# Patient Record
Sex: FEMALE | Race: WHITE | Employment: OTHER
[De-identification: names, ages, dates, MRNs, and addresses within clinical notes are randomized per-mention and may not be internally consistent; named-entity substitution may affect disease eponyms.]

---

## 2017-02-13 ENCOUNTER — TELEPHONE (OUTPATIENT)
Dept: INTERNAL MEDICINE | Facility: CLINIC | Age: 82
End: 2017-02-13

## 2017-02-20 ENCOUNTER — HOSPITAL ENCOUNTER (OUTPATIENT)
Age: 82
Setting detail: SPECIMEN
Discharge: HOME | End: 2017-02-20
Payer: MEDICARE

## 2017-02-20 ENCOUNTER — OFFICE VISIT (OUTPATIENT)
Dept: INTERNAL MEDICINE | Facility: CLINIC | Age: 82
End: 2017-02-20

## 2017-02-20 VITALS
HEIGHT: 62 IN | DIASTOLIC BLOOD PRESSURE: 62 MMHG | BODY MASS INDEX: 40.3 KG/M2 | SYSTOLIC BLOOD PRESSURE: 106 MMHG | HEART RATE: 67 BPM | WEIGHT: 219 LBS

## 2017-02-20 DIAGNOSIS — G89.29 CHRONIC LOW BACK PAIN WITHOUT SCIATICA, UNSPECIFIED BACK PAIN LATERALITY: ICD-10-CM

## 2017-02-20 DIAGNOSIS — Z79.899 LONG TERM USE OF DRUG: Primary | ICD-10-CM

## 2017-02-20 DIAGNOSIS — M54.50 CHRONIC LOW BACK PAIN WITHOUT SCIATICA, UNSPECIFIED BACK PAIN LATERALITY: ICD-10-CM

## 2017-02-20 DIAGNOSIS — B37.2 INTERTRIGINOUS CANDIDIASIS: ICD-10-CM

## 2017-02-20 DIAGNOSIS — M79.89 PAIN AND SWELLING OF LOWER LEG, LEFT: ICD-10-CM

## 2017-02-20 DIAGNOSIS — I50.32 CHRONIC DIASTOLIC CONGESTIVE HEART FAILURE (HCC): ICD-10-CM

## 2017-02-20 DIAGNOSIS — E11.8 TYPE 2 DIABETES MELLITUS WITH COMPLICATION, WITHOUT LONG-TERM CURRENT USE OF INSULIN (HCC): Primary | ICD-10-CM

## 2017-02-20 DIAGNOSIS — M79.662 PAIN AND SWELLING OF LOWER LEG, LEFT: ICD-10-CM

## 2017-02-20 PROCEDURE — 4040F PNEUMOC VAC/ADMIN/RCVD: CPT | Performed by: INTERNAL MEDICINE

## 2017-02-20 PROCEDURE — G8598 ASA/ANTIPLAT THER USED: HCPCS | Performed by: INTERNAL MEDICINE

## 2017-02-20 PROCEDURE — G8427 DOCREV CUR MEDS BY ELIG CLIN: HCPCS | Performed by: INTERNAL MEDICINE

## 2017-02-20 PROCEDURE — 99214 OFFICE O/P EST MOD 30 MIN: CPT | Performed by: INTERNAL MEDICINE

## 2017-02-20 PROCEDURE — 1090F PRES/ABSN URINE INCON ASSESS: CPT | Performed by: INTERNAL MEDICINE

## 2017-02-20 PROCEDURE — G8484 FLU IMMUNIZE NO ADMIN: HCPCS | Performed by: INTERNAL MEDICINE

## 2017-02-20 PROCEDURE — 1123F ACP DISCUSS/DSCN MKR DOCD: CPT | Performed by: INTERNAL MEDICINE

## 2017-02-20 PROCEDURE — G8400 PT W/DXA NO RESULTS DOC: HCPCS | Performed by: INTERNAL MEDICINE

## 2017-02-20 PROCEDURE — G8417 CALC BMI ABV UP PARAM F/U: HCPCS | Performed by: INTERNAL MEDICINE

## 2017-02-20 PROCEDURE — 1036F TOBACCO NON-USER: CPT | Performed by: INTERNAL MEDICINE

## 2017-02-20 RX ORDER — SYRING-NEEDL,DISP,INSUL,0.3 ML 30 GX5/16"
1 SYRINGE, EMPTY DISPOSABLE MISCELLANEOUS 4 TIMES DAILY
Qty: 100 EACH | Refills: 3 | Status: SHIPPED | OUTPATIENT
Start: 2017-02-20 | End: 2018-05-07

## 2017-02-20 RX ORDER — NYSTATIN 100000 [USP'U]/G
POWDER TOPICAL
Qty: 1 BOTTLE | Refills: 1 | Status: SHIPPED | OUTPATIENT
Start: 2017-02-20 | End: 2018-08-28 | Stop reason: ALTCHOICE

## 2017-02-20 RX ORDER — HYDROCODONE BITARTRATE AND ACETAMINOPHEN 5; 325 MG/1; MG/1
1 TABLET ORAL DAILY PRN
Qty: 30 TABLET | Refills: 0 | Status: SHIPPED | OUTPATIENT
Start: 2017-02-20 | End: 2017-04-20 | Stop reason: SDUPTHER

## 2017-02-20 RX ORDER — GLUCOSAMINE HCL/CHONDROITIN SU 500-400 MG
1 CAPSULE ORAL 4 TIMES DAILY
Qty: 100 STRIP | Refills: 11 | Status: SHIPPED | OUTPATIENT
Start: 2017-02-20 | End: 2018-05-07

## 2017-02-20 RX ORDER — FUROSEMIDE 20 MG/1
20 TABLET ORAL DAILY
Qty: 60 TABLET | Refills: 3 | Status: SHIPPED | OUTPATIENT
Start: 2017-02-20 | End: 2017-04-20 | Stop reason: ALTCHOICE

## 2017-02-22 ASSESSMENT — ENCOUNTER SYMPTOMS
ANAL BLEEDING: 0
ABDOMINAL DISTENTION: 0
CHEST TIGHTNESS: 0
EYE DISCHARGE: 0
BACK PAIN: 1
ABDOMINAL PAIN: 0
SHORTNESS OF BREATH: 0
COUGH: 1
APNEA: 0
EYE ITCHING: 0
COLOR CHANGE: 0

## 2017-02-25 LAB
6-ACETYLMORPHINE, UR: NOT DETECTED
7-AMINOCLONAZEPAM, URINE: NOT DETECTED
ALPHA-OH-ALPRAZ, URINE: NOT DETECTED
ALPRAZOLAM, URINE: NOT DETECTED
AMPHETAMINES, URINE: NOT DETECTED
BARBITURATES, URINE: PRESENT
BENZOYLECGONINE, UR: NOT DETECTED
BUPRENORPHINE URINE: NOT DETECTED
CARISOPRODOL, UR: NOT DETECTED
CLONAZEPAM, URINE: NOT DETECTED
CODEINE, URINE: NOT DETECTED
CREATININE URINE: 101.5 MG/DL (ref 20–400)
DIAZEPAM, URINE: NOT DETECTED
DRUGS EXPECTED, UR: NORMAL
EER HI RES INTERP UR: NORMAL
ETHYL GLUCURONIDE UR: NOT DETECTED
FENTANYL URINE: NOT DETECTED
HYDROCODONE, URINE: NOT DETECTED
HYDROMORPHONE, URINE: NOT DETECTED
LORAZEPAM, URINE: NOT DETECTED
MARIJUANA METAB, UR: NOT DETECTED
MDA, UR: NOT DETECTED
MDEA, EVE, UR: NOT DETECTED
MDMA URINE: NOT DETECTED
MEPERIDINE METAB, UR: NOT DETECTED
METHADONE, URINE: NOT DETECTED
METHAMPHETAMINE, URINE: NOT DETECTED
METHYLPHENIDATE: NOT DETECTED
MIDAZOLAM, URINE: NOT DETECTED
MORPHINE URINE: NOT DETECTED
NORBUPRENORPHINE, URINE: NOT DETECTED
NORDIAZEPAM, URINE: NOT DETECTED
NORFENTANYL, URINE: NOT DETECTED
NORHYDROCODONE, URINE: NOT DETECTED
NOROXYCODONE, URINE: NOT DETECTED
NOROXYMORPHONE, URINE: NOT DETECTED
OXAZEPAM, URINE: NOT DETECTED
OXYCODONE URINE: NOT DETECTED
OXYMORPHONE, URINE: NOT DETECTED
PAIN MANAGEMENT DRUG PANEL INTERP, URINE: NORMAL
PAIN MGT DRUG PANEL, HI RES, UR: NORMAL
PCP,URINE: NOT DETECTED
PHENTERMINE, UR: NOT DETECTED
PROPOXYPHENE, URINE: NOT DETECTED
TAPENTADOL, URINE: NOT DETECTED
TAPENTADOL-O-SULFATE, URINE: NOT DETECTED
TEMAZEPAM, URINE: NOT DETECTED
TRAMADOL, URINE: NOT DETECTED
ZOLPIDEM, URINE: NOT DETECTED

## 2017-02-28 ENCOUNTER — HOSPITAL ENCOUNTER (OUTPATIENT)
Dept: VASCULAR LAB | Age: 82
Discharge: HOME | End: 2017-02-28
Payer: MEDICARE

## 2017-02-28 ENCOUNTER — HOSPITAL ENCOUNTER (OUTPATIENT)
Age: 82
Discharge: HOME | End: 2017-02-28
Payer: MEDICARE

## 2017-02-28 DIAGNOSIS — M79.662 PAIN AND SWELLING OF LOWER LEG, LEFT: ICD-10-CM

## 2017-02-28 DIAGNOSIS — M79.89 PAIN AND SWELLING OF LOWER LEG, LEFT: ICD-10-CM

## 2017-02-28 DIAGNOSIS — I50.32 CHRONIC DIASTOLIC CONGESTIVE HEART FAILURE (HCC): ICD-10-CM

## 2017-02-28 DIAGNOSIS — E11.8 TYPE 2 DIABETES MELLITUS WITH COMPLICATION, WITHOUT LONG-TERM CURRENT USE OF INSULIN (HCC): ICD-10-CM

## 2017-02-28 LAB
ANION GAP SERPL CALCULATED.3IONS-SCNC: 15 MMOL/L (ref 9–17)
BUN BLDV-MCNC: 16 MG/DL (ref 8–23)
BUN/CREAT BLD: ABNORMAL (ref 9–20)
CALCIUM SERPL-MCNC: 7.9 MG/DL (ref 8.6–10.4)
CHLORIDE BLD-SCNC: 104 MMOL/L (ref 98–107)
CO2: 23 MMOL/L (ref 20–31)
CREAT SERPL-MCNC: 0.85 MG/DL (ref 0.5–0.9)
ESTIMATED AVERAGE GLUCOSE: 146 MG/DL
GFR AFRICAN AMERICAN: >60 ML/MIN
GFR NON-AFRICAN AMERICAN: >60 ML/MIN
GFR SERPL CREATININE-BSD FRML MDRD: ABNORMAL ML/MIN/{1.73_M2}
GFR SERPL CREATININE-BSD FRML MDRD: ABNORMAL ML/MIN/{1.73_M2}
GLUCOSE BLD-MCNC: 111 MG/DL (ref 70–99)
HBA1C MFR BLD: 6.7 % (ref 4–6)
POTASSIUM SERPL-SCNC: 4.5 MMOL/L (ref 3.7–5.3)
SODIUM BLD-SCNC: 142 MMOL/L (ref 135–144)

## 2017-02-28 PROCEDURE — 36415 COLL VENOUS BLD VENIPUNCTURE: CPT

## 2017-02-28 PROCEDURE — 80048 BASIC METABOLIC PNL TOTAL CA: CPT

## 2017-02-28 PROCEDURE — 93970 EXTREMITY STUDY: CPT

## 2017-02-28 PROCEDURE — 83036 HEMOGLOBIN GLYCOSYLATED A1C: CPT

## 2017-03-30 DIAGNOSIS — E11.8 TYPE 2 DIABETES MELLITUS WITH COMPLICATION (HCC): ICD-10-CM

## 2017-03-30 RX ORDER — SITAGLIPTIN 100 MG/1
TABLET, FILM COATED ORAL
Qty: 90 TABLET | Refills: 3 | Status: SHIPPED | OUTPATIENT
Start: 2017-03-30 | End: 2018-04-16 | Stop reason: SDUPTHER

## 2017-03-31 RX ORDER — SITAGLIPTIN 100 MG/1
TABLET, FILM COATED ORAL
Qty: 90 TABLET | Refills: 0 | Status: SHIPPED | OUTPATIENT
Start: 2017-03-31 | End: 2017-04-20 | Stop reason: SDUPTHER

## 2017-04-10 RX ORDER — REPAGLINIDE 1 MG/1
TABLET ORAL
Qty: 270 TABLET | Refills: 0 | Status: SHIPPED | OUTPATIENT
Start: 2017-04-10 | End: 2018-08-29 | Stop reason: SDUPTHER

## 2017-04-20 ENCOUNTER — OFFICE VISIT (OUTPATIENT)
Dept: INTERNAL MEDICINE CLINIC | Age: 82
End: 2017-04-20
Payer: MEDICARE

## 2017-04-20 VITALS
BODY MASS INDEX: 40.12 KG/M2 | HEART RATE: 64 BPM | HEIGHT: 62 IN | WEIGHT: 218 LBS | SYSTOLIC BLOOD PRESSURE: 88 MMHG | DIASTOLIC BLOOD PRESSURE: 54 MMHG

## 2017-04-20 DIAGNOSIS — G89.29 CHRONIC LOW BACK PAIN WITHOUT SCIATICA, UNSPECIFIED BACK PAIN LATERALITY: ICD-10-CM

## 2017-04-20 DIAGNOSIS — M25.512 CHRONIC LEFT SHOULDER PAIN: Primary | ICD-10-CM

## 2017-04-20 DIAGNOSIS — I50.32 CHRONIC DIASTOLIC CONGESTIVE HEART FAILURE (HCC): ICD-10-CM

## 2017-04-20 DIAGNOSIS — G89.29 CHRONIC LEFT SHOULDER PAIN: Primary | ICD-10-CM

## 2017-04-20 DIAGNOSIS — I15.9 SECONDARY HYPERTENSION: ICD-10-CM

## 2017-04-20 DIAGNOSIS — M54.50 CHRONIC LOW BACK PAIN WITHOUT SCIATICA, UNSPECIFIED BACK PAIN LATERALITY: ICD-10-CM

## 2017-04-20 PROCEDURE — G8417 CALC BMI ABV UP PARAM F/U: HCPCS | Performed by: INTERNAL MEDICINE

## 2017-04-20 PROCEDURE — 1123F ACP DISCUSS/DSCN MKR DOCD: CPT | Performed by: INTERNAL MEDICINE

## 2017-04-20 PROCEDURE — G8400 PT W/DXA NO RESULTS DOC: HCPCS | Performed by: INTERNAL MEDICINE

## 2017-04-20 PROCEDURE — 1090F PRES/ABSN URINE INCON ASSESS: CPT | Performed by: INTERNAL MEDICINE

## 2017-04-20 PROCEDURE — 4040F PNEUMOC VAC/ADMIN/RCVD: CPT | Performed by: INTERNAL MEDICINE

## 2017-04-20 PROCEDURE — G8598 ASA/ANTIPLAT THER USED: HCPCS | Performed by: INTERNAL MEDICINE

## 2017-04-20 PROCEDURE — G8427 DOCREV CUR MEDS BY ELIG CLIN: HCPCS | Performed by: INTERNAL MEDICINE

## 2017-04-20 PROCEDURE — 1036F TOBACCO NON-USER: CPT | Performed by: INTERNAL MEDICINE

## 2017-04-20 PROCEDURE — 20610 DRAIN/INJ JOINT/BURSA W/O US: CPT | Performed by: INTERNAL MEDICINE

## 2017-04-20 PROCEDURE — 99214 OFFICE O/P EST MOD 30 MIN: CPT | Performed by: INTERNAL MEDICINE

## 2017-04-20 RX ORDER — METHYLPREDNISOLONE ACETATE 80 MG/ML
40 INJECTION, SUSPENSION INTRA-ARTICULAR; INTRALESIONAL; INTRAMUSCULAR; SOFT TISSUE ONCE
Status: COMPLETED | OUTPATIENT
Start: 2017-04-20 | End: 2017-04-20

## 2017-04-20 RX ORDER — MEDICAL SUPPLY, MISCELLANEOUS
1 EACH MISCELLANEOUS DAILY
Qty: 1 EACH | Refills: 0 | Status: SHIPPED | OUTPATIENT
Start: 2017-04-20 | End: 2018-05-07

## 2017-04-20 RX ORDER — BUMETANIDE 1 MG/1
1 TABLET ORAL DAILY
Qty: 30 TABLET | Refills: 0 | Status: SHIPPED | OUTPATIENT
Start: 2017-04-20 | End: 2017-06-29 | Stop reason: SDUPTHER

## 2017-04-20 RX ORDER — HYDROCODONE BITARTRATE AND ACETAMINOPHEN 5; 325 MG/1; MG/1
1 TABLET ORAL DAILY PRN
Qty: 30 TABLET | Refills: 0 | Status: SHIPPED | OUTPATIENT
Start: 2017-04-20 | End: 2017-05-11 | Stop reason: SDUPTHER

## 2017-04-20 RX ADMIN — METHYLPREDNISOLONE ACETATE 40 MG: 80 INJECTION, SUSPENSION INTRA-ARTICULAR; INTRALESIONAL; INTRAMUSCULAR; SOFT TISSUE at 16:28

## 2017-04-23 ASSESSMENT — ENCOUNTER SYMPTOMS
EYE ITCHING: 0
COLOR CHANGE: 0
ANAL BLEEDING: 0
EYE DISCHARGE: 0
COUGH: 0
ABDOMINAL PAIN: 0
EYE PAIN: 0
ABDOMINAL DISTENTION: 0
CHEST TIGHTNESS: 0
BACK PAIN: 1
CHOKING: 0
APNEA: 0

## 2017-04-26 RX ORDER — LEVOTHYROXINE SODIUM 0.1 MG/1
TABLET ORAL
Qty: 90 TABLET | Refills: 0 | Status: SHIPPED | OUTPATIENT
Start: 2017-04-26 | End: 2017-09-01 | Stop reason: SDUPTHER

## 2017-05-11 ENCOUNTER — HOSPITAL ENCOUNTER (OUTPATIENT)
Age: 82
Setting detail: SPECIMEN
Discharge: HOME | End: 2017-05-11
Payer: MEDICARE

## 2017-05-11 ENCOUNTER — OFFICE VISIT (OUTPATIENT)
Dept: INTERNAL MEDICINE CLINIC | Age: 82
End: 2017-05-11
Payer: MEDICARE

## 2017-05-11 VITALS
WEIGHT: 219 LBS | HEART RATE: 73 BPM | BODY MASS INDEX: 40.3 KG/M2 | HEIGHT: 62 IN | SYSTOLIC BLOOD PRESSURE: 90 MMHG | DIASTOLIC BLOOD PRESSURE: 56 MMHG

## 2017-05-11 DIAGNOSIS — G89.29 CHRONIC LOW BACK PAIN WITHOUT SCIATICA, UNSPECIFIED BACK PAIN LATERALITY: ICD-10-CM

## 2017-05-11 DIAGNOSIS — E03.9 HYPOTHYROIDISM, UNSPECIFIED TYPE: ICD-10-CM

## 2017-05-11 DIAGNOSIS — Z13.9 SCREENING: ICD-10-CM

## 2017-05-11 DIAGNOSIS — M54.50 CHRONIC LOW BACK PAIN WITHOUT SCIATICA, UNSPECIFIED BACK PAIN LATERALITY: ICD-10-CM

## 2017-05-11 DIAGNOSIS — E11.8 TYPE 2 DIABETES MELLITUS WITH COMPLICATION, WITHOUT LONG-TERM CURRENT USE OF INSULIN (HCC): Primary | ICD-10-CM

## 2017-05-11 DIAGNOSIS — E66.01 MORBID OBESITY WITH BMI OF 40.0-44.9, ADULT (HCC): ICD-10-CM

## 2017-05-11 LAB
CREATININE URINE: 51.2 MG/DL (ref 28–217)
HBA1C MFR BLD: 7.6 %
MICROALBUMIN/CREAT 24H UR: <12 MG/L
MICROALBUMIN/CREAT UR-RTO: 23 MCG/MG CREAT

## 2017-05-11 PROCEDURE — G8598 ASA/ANTIPLAT THER USED: HCPCS | Performed by: INTERNAL MEDICINE

## 2017-05-11 PROCEDURE — G8400 PT W/DXA NO RESULTS DOC: HCPCS | Performed by: INTERNAL MEDICINE

## 2017-05-11 PROCEDURE — 99214 OFFICE O/P EST MOD 30 MIN: CPT | Performed by: INTERNAL MEDICINE

## 2017-05-11 PROCEDURE — 4040F PNEUMOC VAC/ADMIN/RCVD: CPT | Performed by: INTERNAL MEDICINE

## 2017-05-11 PROCEDURE — 1123F ACP DISCUSS/DSCN MKR DOCD: CPT | Performed by: INTERNAL MEDICINE

## 2017-05-11 PROCEDURE — 83036 HEMOGLOBIN GLYCOSYLATED A1C: CPT | Performed by: INTERNAL MEDICINE

## 2017-05-11 PROCEDURE — 1090F PRES/ABSN URINE INCON ASSESS: CPT | Performed by: INTERNAL MEDICINE

## 2017-05-11 PROCEDURE — G8427 DOCREV CUR MEDS BY ELIG CLIN: HCPCS | Performed by: INTERNAL MEDICINE

## 2017-05-11 PROCEDURE — G8417 CALC BMI ABV UP PARAM F/U: HCPCS | Performed by: INTERNAL MEDICINE

## 2017-05-11 PROCEDURE — 1036F TOBACCO NON-USER: CPT | Performed by: INTERNAL MEDICINE

## 2017-05-11 RX ORDER — HYDROCODONE BITARTRATE AND ACETAMINOPHEN 5; 325 MG/1; MG/1
1 TABLET ORAL DAILY PRN
Qty: 30 TABLET | Refills: 0 | Status: SHIPPED | OUTPATIENT
Start: 2017-05-11 | End: 2017-07-13 | Stop reason: ALTCHOICE

## 2017-05-15 ASSESSMENT — ENCOUNTER SYMPTOMS
ANAL BLEEDING: 0
ABDOMINAL DISTENTION: 0
ABDOMINAL PAIN: 0
APNEA: 0
SHORTNESS OF BREATH: 1
EYE REDNESS: 0
CHEST TIGHTNESS: 0
EYE ITCHING: 0
WHEEZING: 1
EYE PAIN: 0
COLOR CHANGE: 0
EYE DISCHARGE: 0
BACK PAIN: 1

## 2017-06-29 DIAGNOSIS — I50.32 CHRONIC DIASTOLIC CONGESTIVE HEART FAILURE (HCC): ICD-10-CM

## 2017-06-29 RX ORDER — BUMETANIDE 1 MG/1
TABLET ORAL
Qty: 30 TABLET | Refills: 6 | Status: SHIPPED | OUTPATIENT
Start: 2017-06-29 | End: 2017-08-18 | Stop reason: SDUPTHER

## 2017-07-03 ENCOUNTER — APPOINTMENT (OUTPATIENT)
Dept: GENERAL RADIOLOGY | Age: 82
DRG: 202 | End: 2017-07-03
Payer: MEDICARE

## 2017-07-03 ENCOUNTER — HOSPITAL ENCOUNTER (INPATIENT)
Age: 82
LOS: 2 days | Discharge: HOME HEALTH | DRG: 202 | End: 2017-07-05
Attending: EMERGENCY MEDICINE | Admitting: FAMILY MEDICINE
Payer: MEDICARE

## 2017-07-03 DIAGNOSIS — D64.9 ANEMIA, UNSPECIFIED TYPE: ICD-10-CM

## 2017-07-03 DIAGNOSIS — J18.9 PNEUMONIA DUE TO ORGANISM: Primary | ICD-10-CM

## 2017-07-03 LAB
-: ABNORMAL
ABSOLUTE EOS #: 0.3 K/UL (ref 0–0.4)
ABSOLUTE LYMPH #: 1.26 K/UL (ref 1–4.8)
ABSOLUTE MONO #: 0.22 K/UL (ref 0.1–0.8)
AMORPHOUS: ABNORMAL
ANION GAP SERPL CALCULATED.3IONS-SCNC: 16 MMOL/L (ref 9–17)
BACTERIA: ABNORMAL
BASOPHILS # BLD: 0 %
BASOPHILS ABSOLUTE: 0 K/UL (ref 0–0.2)
BILIRUBIN URINE: NEGATIVE
BNP INTERPRETATION: ABNORMAL
BUN BLDV-MCNC: 19 MG/DL (ref 8–23)
BUN/CREAT BLD: ABNORMAL (ref 9–20)
CALCIUM SERPL-MCNC: 8.3 MG/DL (ref 8.6–10.4)
CASTS UA: ABNORMAL /LPF (ref 0–8)
CHLORIDE BLD-SCNC: 103 MMOL/L (ref 98–107)
CO2: 21 MMOL/L (ref 20–31)
COLOR: YELLOW
CREAT SERPL-MCNC: 0.87 MG/DL (ref 0.5–0.9)
CRYSTALS, UA: ABNORMAL /HPF
DIFFERENTIAL TYPE: ABNORMAL
EOSINOPHILS RELATIVE PERCENT: 4 %
EPITHELIAL CELLS UA: ABNORMAL /HPF (ref 0–5)
GFR AFRICAN AMERICAN: >60 ML/MIN
GFR NON-AFRICAN AMERICAN: >60 ML/MIN
GFR SERPL CREATININE-BSD FRML MDRD: ABNORMAL ML/MIN/{1.73_M2}
GFR SERPL CREATININE-BSD FRML MDRD: ABNORMAL ML/MIN/{1.73_M2}
GLUCOSE BLD-MCNC: 130 MG/DL (ref 70–99)
GLUCOSE URINE: NEGATIVE
HCT VFR BLD CALC: 24.9 % (ref 36–46)
HEMOGLOBIN: 8 G/DL (ref 12–16)
KETONES, URINE: ABNORMAL
LEUKOCYTE ESTERASE, URINE: ABNORMAL
LYMPHOCYTES # BLD: 17 %
MCH RBC QN AUTO: 30.1 PG (ref 26–34)
MCHC RBC AUTO-ENTMCNC: 31.9 G/DL (ref 31–37)
MCV RBC AUTO: 94.4 FL (ref 80–100)
MONOCYTES # BLD: 3 %
MORPHOLOGY: ABNORMAL
MUCUS: ABNORMAL
NITRITE, URINE: NEGATIVE
OTHER OBSERVATIONS UA: ABNORMAL
PDW BLD-RTO: 26.8 % (ref 12.5–15.4)
PH UA: 5 (ref 5–8)
PLATELET # BLD: 234 K/UL (ref 140–450)
PLATELET ESTIMATE: ABNORMAL
PMV BLD AUTO: 8.2 FL (ref 6–12)
POC TROPONIN I: 0 NG/ML (ref 0–0.1)
POC TROPONIN I: 0 NG/ML (ref 0–0.1)
POC TROPONIN INTERP: NORMAL
POC TROPONIN INTERP: NORMAL
POTASSIUM SERPL-SCNC: 4.4 MMOL/L (ref 3.7–5.3)
PRO-BNP: 356 PG/ML
PROTEIN UA: ABNORMAL
RBC # BLD: 2.64 M/UL (ref 4–5.2)
RBC # BLD: ABNORMAL 10*6/UL
RBC UA: ABNORMAL /HPF (ref 0–4)
RENAL EPITHELIAL, UA: ABNORMAL /HPF
SEG NEUTROPHILS: 76 %
SEGMENTED NEUTROPHILS ABSOLUTE COUNT: 5.62 K/UL (ref 1.8–7.7)
SODIUM BLD-SCNC: 140 MMOL/L (ref 135–144)
SPECIFIC GRAVITY UA: 1.03 (ref 1–1.03)
TRICHOMONAS: ABNORMAL
TURBIDITY: ABNORMAL
URINE HGB: NEGATIVE
UROBILINOGEN, URINE: NORMAL
WBC # BLD: 7.4 K/UL (ref 3.5–11)
WBC # BLD: ABNORMAL 10*3/UL
WBC UA: ABNORMAL /HPF (ref 0–5)
YEAST: ABNORMAL

## 2017-07-03 PROCEDURE — 83036 HEMOGLOBIN GLYCOSYLATED A1C: CPT

## 2017-07-03 PROCEDURE — 85018 HEMOGLOBIN: CPT

## 2017-07-03 PROCEDURE — 80048 BASIC METABOLIC PNL TOTAL CA: CPT

## 2017-07-03 PROCEDURE — 87040 BLOOD CULTURE FOR BACTERIA: CPT

## 2017-07-03 PROCEDURE — 36415 COLL VENOUS BLD VENIPUNCTURE: CPT

## 2017-07-03 PROCEDURE — 99285 EMERGENCY DEPT VISIT HI MDM: CPT

## 2017-07-03 PROCEDURE — 84484 ASSAY OF TROPONIN QUANT: CPT

## 2017-07-03 PROCEDURE — 93005 ELECTROCARDIOGRAM TRACING: CPT

## 2017-07-03 PROCEDURE — 83880 ASSAY OF NATRIURETIC PEPTIDE: CPT

## 2017-07-03 PROCEDURE — 81001 URINALYSIS AUTO W/SCOPE: CPT

## 2017-07-03 PROCEDURE — 1200000000 HC SEMI PRIVATE

## 2017-07-03 PROCEDURE — 2580000003 HC RX 258: Performed by: EMERGENCY MEDICINE

## 2017-07-03 PROCEDURE — 6360000002 HC RX W HCPCS: Performed by: EMERGENCY MEDICINE

## 2017-07-03 PROCEDURE — 96365 THER/PROPH/DIAG IV INF INIT: CPT

## 2017-07-03 PROCEDURE — 85014 HEMATOCRIT: CPT

## 2017-07-03 PROCEDURE — 71020 XR CHEST STANDARD TWO VW: CPT

## 2017-07-03 PROCEDURE — 85025 COMPLETE CBC W/AUTO DIFF WBC: CPT

## 2017-07-03 RX ORDER — ONDANSETRON 2 MG/ML
4 INJECTION INTRAMUSCULAR; INTRAVENOUS EVERY 6 HOURS PRN
Status: DISCONTINUED | OUTPATIENT
Start: 2017-07-03 | End: 2017-07-05 | Stop reason: HOSPADM

## 2017-07-03 RX ORDER — SODIUM CHLORIDE 9 MG/ML
INJECTION, SOLUTION INTRAVENOUS CONTINUOUS
Status: DISCONTINUED | OUTPATIENT
Start: 2017-07-04 | End: 2017-07-04

## 2017-07-03 RX ORDER — BISACODYL 10 MG
10 SUPPOSITORY, RECTAL RECTAL DAILY PRN
Status: DISCONTINUED | OUTPATIENT
Start: 2017-07-03 | End: 2017-07-05 | Stop reason: HOSPADM

## 2017-07-03 RX ORDER — DEXTROSE MONOHYDRATE 50 MG/ML
100 INJECTION, SOLUTION INTRAVENOUS PRN
Status: DISCONTINUED | OUTPATIENT
Start: 2017-07-03 | End: 2017-07-05 | Stop reason: HOSPADM

## 2017-07-03 RX ORDER — SODIUM CHLORIDE 0.9 % (FLUSH) 0.9 %
10 SYRINGE (ML) INJECTION EVERY 12 HOURS SCHEDULED
Status: DISCONTINUED | OUTPATIENT
Start: 2017-07-04 | End: 2017-07-05 | Stop reason: HOSPADM

## 2017-07-03 RX ORDER — ASPIRIN 81 MG/1
81 TABLET, CHEWABLE ORAL DAILY
Status: DISCONTINUED | OUTPATIENT
Start: 2017-07-04 | End: 2017-07-05 | Stop reason: HOSPADM

## 2017-07-03 RX ORDER — ACETAMINOPHEN 325 MG/1
650 TABLET ORAL EVERY 4 HOURS PRN
Status: DISCONTINUED | OUTPATIENT
Start: 2017-07-03 | End: 2017-07-05 | Stop reason: HOSPADM

## 2017-07-03 RX ORDER — IPRATROPIUM BROMIDE AND ALBUTEROL SULFATE 2.5; .5 MG/3ML; MG/3ML
1 SOLUTION RESPIRATORY (INHALATION)
Status: DISCONTINUED | OUTPATIENT
Start: 2017-07-04 | End: 2017-07-04

## 2017-07-03 RX ORDER — FLUTICASONE PROPIONATE 50 MCG
1 SPRAY, SUSPENSION (ML) NASAL DAILY
Status: DISCONTINUED | OUTPATIENT
Start: 2017-07-04 | End: 2017-07-05 | Stop reason: HOSPADM

## 2017-07-03 RX ORDER — LEVOTHYROXINE SODIUM 0.1 MG/1
100 TABLET ORAL DAILY
Status: DISCONTINUED | OUTPATIENT
Start: 2017-07-04 | End: 2017-07-05 | Stop reason: HOSPADM

## 2017-07-03 RX ORDER — DOCUSATE SODIUM 100 MG/1
100 CAPSULE, LIQUID FILLED ORAL 2 TIMES DAILY
Status: DISCONTINUED | OUTPATIENT
Start: 2017-07-04 | End: 2017-07-05 | Stop reason: HOSPADM

## 2017-07-03 RX ORDER — DEXTROSE MONOHYDRATE 25 G/50ML
12.5 INJECTION, SOLUTION INTRAVENOUS PRN
Status: DISCONTINUED | OUTPATIENT
Start: 2017-07-03 | End: 2017-07-05 | Stop reason: HOSPADM

## 2017-07-03 RX ORDER — ATORVASTATIN CALCIUM 80 MG/1
80 TABLET, FILM COATED ORAL NIGHTLY
Status: DISCONTINUED | OUTPATIENT
Start: 2017-07-03 | End: 2017-07-05 | Stop reason: HOSPADM

## 2017-07-03 RX ORDER — MORPHINE SULFATE 4 MG/ML
4 INJECTION, SOLUTION INTRAMUSCULAR; INTRAVENOUS
Status: DISCONTINUED | OUTPATIENT
Start: 2017-07-03 | End: 2017-07-05 | Stop reason: HOSPADM

## 2017-07-03 RX ORDER — MORPHINE SULFATE 2 MG/ML
2 INJECTION, SOLUTION INTRAMUSCULAR; INTRAVENOUS
Status: DISCONTINUED | OUTPATIENT
Start: 2017-07-03 | End: 2017-07-05 | Stop reason: HOSPADM

## 2017-07-03 RX ORDER — FAMOTIDINE 20 MG/1
20 TABLET, FILM COATED ORAL 2 TIMES DAILY
Status: DISCONTINUED | OUTPATIENT
Start: 2017-07-04 | End: 2017-07-05 | Stop reason: HOSPADM

## 2017-07-03 RX ORDER — SODIUM CHLORIDE 0.9 % (FLUSH) 0.9 %
10 SYRINGE (ML) INJECTION PRN
Status: DISCONTINUED | OUTPATIENT
Start: 2017-07-03 | End: 2017-07-05 | Stop reason: HOSPADM

## 2017-07-03 RX ORDER — NICOTINE POLACRILEX 4 MG
15 LOZENGE BUCCAL PRN
Status: DISCONTINUED | OUTPATIENT
Start: 2017-07-03 | End: 2017-07-05 | Stop reason: HOSPADM

## 2017-07-03 RX ORDER — BUMETANIDE 1 MG/1
1 TABLET ORAL DAILY
Status: DISCONTINUED | OUTPATIENT
Start: 2017-07-04 | End: 2017-07-05 | Stop reason: HOSPADM

## 2017-07-03 RX ORDER — ALBUTEROL SULFATE 2.5 MG/3ML
2.5 SOLUTION RESPIRATORY (INHALATION)
Status: DISCONTINUED | OUTPATIENT
Start: 2017-07-03 | End: 2017-07-05 | Stop reason: HOSPADM

## 2017-07-03 RX ORDER — LANOLIN ALCOHOL/MO/W.PET/CERES
325 CREAM (GRAM) TOPICAL 2 TIMES DAILY
Status: DISCONTINUED | OUTPATIENT
Start: 2017-07-04 | End: 2017-07-05 | Stop reason: HOSPADM

## 2017-07-03 RX ORDER — CLOPIDOGREL BISULFATE 75 MG/1
75 TABLET ORAL DAILY
Status: DISCONTINUED | OUTPATIENT
Start: 2017-07-04 | End: 2017-07-05 | Stop reason: HOSPADM

## 2017-07-03 RX ADMIN — CEFTRIAXONE SODIUM 1 G: 1 INJECTION, POWDER, FOR SOLUTION INTRAMUSCULAR; INTRAVENOUS at 22:28

## 2017-07-03 RX ADMIN — DEXTROSE 500 MG: 5 SOLUTION INTRAVENOUS at 23:01

## 2017-07-03 ASSESSMENT — ENCOUNTER SYMPTOMS
SORE THROAT: 0
SHORTNESS OF BREATH: 0
ABDOMINAL PAIN: 0
VOMITING: 0
NAUSEA: 0

## 2017-07-03 ASSESSMENT — PAIN DESCRIPTION - FREQUENCY: FREQUENCY: CONTINUOUS

## 2017-07-03 ASSESSMENT — PAIN DESCRIPTION - ORIENTATION: ORIENTATION: RIGHT;LEFT

## 2017-07-03 ASSESSMENT — PAIN DESCRIPTION - DESCRIPTORS: DESCRIPTORS: ACHING

## 2017-07-03 ASSESSMENT — PAIN DESCRIPTION - PAIN TYPE: TYPE: ACUTE PAIN

## 2017-07-03 ASSESSMENT — PAIN SCALES - WONG BAKER: WONGBAKER_NUMERICALRESPONSE: 2

## 2017-07-03 ASSESSMENT — PAIN DESCRIPTION - LOCATION: LOCATION: EAR

## 2017-07-04 ENCOUNTER — APPOINTMENT (OUTPATIENT)
Dept: GENERAL RADIOLOGY | Age: 82
DRG: 202 | End: 2017-07-04
Payer: MEDICARE

## 2017-07-04 PROBLEM — R05.9 COUGH: Status: ACTIVE | Noted: 2017-07-04

## 2017-07-04 PROBLEM — E78.5 DYSLIPIDEMIA: Status: ACTIVE | Noted: 2017-07-04

## 2017-07-04 PROBLEM — H66.90 OTITIS MEDIA: Status: ACTIVE | Noted: 2017-07-04

## 2017-07-04 PROBLEM — E11.9 TYPE 2 DIABETES MELLITUS WITHOUT COMPLICATION, WITHOUT LONG-TERM CURRENT USE OF INSULIN (HCC): Status: ACTIVE | Noted: 2017-07-04

## 2017-07-04 PROBLEM — M25.475 BILATERAL SWELLING OF FEET AND ANKLES: Status: ACTIVE | Noted: 2017-07-04

## 2017-07-04 PROBLEM — D64.9 NORMOCYTIC ANEMIA: Status: ACTIVE | Noted: 2017-07-04

## 2017-07-04 PROBLEM — M25.472 BILATERAL SWELLING OF FEET AND ANKLES: Status: ACTIVE | Noted: 2017-07-04

## 2017-07-04 PROBLEM — J18.9 PNEUMONIA SYMPTOMS: Status: ACTIVE | Noted: 2017-07-04

## 2017-07-04 PROBLEM — H92.03 OTALGIA OF BOTH EARS: Status: ACTIVE | Noted: 2017-07-04

## 2017-07-04 PROBLEM — M25.471 BILATERAL SWELLING OF FEET AND ANKLES: Status: ACTIVE | Noted: 2017-07-04

## 2017-07-04 PROBLEM — M25.474 BILATERAL SWELLING OF FEET AND ANKLES: Status: ACTIVE | Noted: 2017-07-04

## 2017-07-04 PROBLEM — H65.93 BILATERAL NON-SUPPURATIVE OTITIS MEDIA: Status: ACTIVE | Noted: 2017-07-04

## 2017-07-04 LAB
ABSOLUTE EOS #: 0.11 K/UL (ref 0–0.4)
ABSOLUTE LYMPH #: 1.03 K/UL (ref 1–4.8)
ABSOLUTE MONO #: 0.29 K/UL (ref 0.1–1.2)
ABSOLUTE RETIC #: 0.04 M/UL (ref 0.02–0.1)
ADENOVIRUS PCR: NOT DETECTED
ALBUMIN SERPL-MCNC: 3 G/DL (ref 3.5–5.2)
ALBUMIN/GLOBULIN RATIO: 1.1 (ref 1–2.5)
ALP BLD-CCNC: 65 U/L (ref 35–104)
ALT SERPL-CCNC: 6 U/L (ref 5–33)
ANION GAP SERPL CALCULATED.3IONS-SCNC: 14 MMOL/L (ref 9–17)
AST SERPL-CCNC: 11 U/L
BASOPHILS # BLD: 0 %
BASOPHILS ABSOLUTE: 0 K/UL (ref 0–0.2)
BILIRUB SERPL-MCNC: 0.39 MG/DL (ref 0.3–1.2)
BLOOD BANK SPECIMEN: NORMAL
BORDETELLA PERTUSSIS PCR: NOT DETECTED
BUN BLDV-MCNC: 17 MG/DL (ref 8–23)
BUN/CREAT BLD: ABNORMAL (ref 9–20)
CALCIUM SERPL-MCNC: 8 MG/DL (ref 8.6–10.4)
CHLAMYDIA PNEUMONIAE BY PCR: NOT DETECTED
CHLORIDE BLD-SCNC: 106 MMOL/L (ref 98–107)
CO2: 21 MMOL/L (ref 20–31)
CORONAVIRUS 229E PCR: NOT DETECTED
CORONAVIRUS HKU1 PCR: NOT DETECTED
CORONAVIRUS NL63 PCR: NOT DETECTED
CORONAVIRUS OC43 PCR: NOT DETECTED
CREAT SERPL-MCNC: 0.78 MG/DL (ref 0.5–0.9)
DIFFERENTIAL TYPE: ABNORMAL
EOSINOPHILS RELATIVE PERCENT: 2 %
ESTIMATED AVERAGE GLUCOSE: 160 MG/DL
FERRITIN: 56 UG/L (ref 13–150)
FOLATE: 15.3 NG/ML
GFR AFRICAN AMERICAN: >60 ML/MIN
GFR NON-AFRICAN AMERICAN: >60 ML/MIN
GFR SERPL CREATININE-BSD FRML MDRD: ABNORMAL ML/MIN/{1.73_M2}
GFR SERPL CREATININE-BSD FRML MDRD: ABNORMAL ML/MIN/{1.73_M2}
GLUCOSE BLD-MCNC: 129 MG/DL (ref 70–99)
GLUCOSE BLD-MCNC: 137 MG/DL (ref 65–105)
GLUCOSE BLD-MCNC: 139 MG/DL (ref 65–105)
GLUCOSE BLD-MCNC: 162 MG/DL (ref 65–105)
HAPTOGLOBIN: 268 MG/DL (ref 30–200)
HBA1C MFR BLD: 7.2 % (ref 4–6)
HCT VFR BLD CALC: 21.3 % (ref 36–46)
HCT VFR BLD CALC: 21.9 % (ref 36–46)
HCT VFR BLD CALC: 22.5 % (ref 36–46)
HCT VFR BLD CALC: 23.5 % (ref 36–46)
HEMOGLOBIN: 7.1 G/DL (ref 12–16)
HEMOGLOBIN: 7.1 G/DL (ref 12–16)
HEMOGLOBIN: 7.3 G/DL (ref 12–16)
HEMOGLOBIN: 7.4 G/DL (ref 12–16)
HUMAN METAPNEUMOVIRUS PCR: NOT DETECTED
INFLUENZA A BY PCR: NOT DETECTED
INFLUENZA A H1 (2009) PCR: NORMAL
INFLUENZA A H1 PCR: NORMAL
INFLUENZA A H3 PCR: NORMAL
INFLUENZA B BY PCR: NOT DETECTED
IRON SATURATION: 7 % (ref 20–55)
IRON: 17 UG/DL (ref 37–145)
LACTATE DEHYDROGENASE: 136 U/L (ref 135–214)
LYMPHOCYTES # BLD: 18 %
MCH RBC QN AUTO: 30.5 PG (ref 26–34)
MCH RBC QN AUTO: 31.2 PG (ref 26–34)
MCHC RBC AUTO-ENTMCNC: 32.3 G/DL (ref 31–37)
MCHC RBC AUTO-ENTMCNC: 33.3 G/DL (ref 31–37)
MCV RBC AUTO: 93.5 FL (ref 80–100)
MCV RBC AUTO: 94.3 FL (ref 80–100)
MONOCYTES # BLD: 5 %
MORPHOLOGY: ABNORMAL
MYCOPLASMA PNEUMONIAE PCR: NOT DETECTED
PARAINFLUENZA 1 PCR: NOT DETECTED
PARAINFLUENZA 2 PCR: NOT DETECTED
PARAINFLUENZA 3 PCR: NOT DETECTED
PARAINFLUENZA 4 PCR: NOT DETECTED
PDW BLD-RTO: 26.8 % (ref 12.5–15.4)
PDW BLD-RTO: 26.9 % (ref 12.5–15.4)
PLATELET # BLD: 205 K/UL (ref 140–450)
PLATELET # BLD: 216 K/UL (ref 140–450)
PLATELET ESTIMATE: ABNORMAL
PMV BLD AUTO: 7.9 FL (ref 6–12)
PMV BLD AUTO: 8 FL (ref 6–12)
POTASSIUM SERPL-SCNC: 3.9 MMOL/L (ref 3.7–5.3)
RBC # BLD: 2.28 M/UL (ref 4–5.2)
RBC # BLD: 2.32 M/UL (ref 4–5.2)
RBC # BLD: ABNORMAL 10*6/UL
RESP SYNCYTIAL VIRUS PCR: NOT DETECTED
RETIC %: 1.9 % (ref 0.5–2)
RHINO/ENTEROVIRUS PCR: NOT DETECTED
SEG NEUTROPHILS: 75 %
SEGMENTED NEUTROPHILS ABSOLUTE COUNT: 4.27 K/UL (ref 1.8–7.7)
SODIUM BLD-SCNC: 141 MMOL/L (ref 135–144)
SOURCE: NORMAL
TOTAL IRON BINDING CAPACITY: 253 UG/DL (ref 250–450)
TOTAL PROTEIN: 5.8 G/DL (ref 6.4–8.3)
UNSATURATED IRON BINDING CAPACITY: 236 UG/DL (ref 112–347)
VITAMIN B-12: 41 PG/ML (ref 211–946)
WBC # BLD: 5.7 K/UL (ref 3.5–11)
WBC # BLD: 6 K/UL (ref 3.5–11)
WBC # BLD: ABNORMAL 10*3/UL

## 2017-07-04 PROCEDURE — 80053 COMPREHEN METABOLIC PANEL: CPT

## 2017-07-04 PROCEDURE — 71020 XR CHEST STANDARD TWO VW: CPT

## 2017-07-04 PROCEDURE — 82607 VITAMIN B-12: CPT

## 2017-07-04 PROCEDURE — 86850 RBC ANTIBODY SCREEN: CPT

## 2017-07-04 PROCEDURE — 86920 COMPATIBILITY TEST SPIN: CPT

## 2017-07-04 PROCEDURE — 85045 AUTOMATED RETICULOCYTE COUNT: CPT

## 2017-07-04 PROCEDURE — 82746 ASSAY OF FOLIC ACID SERUM: CPT

## 2017-07-04 PROCEDURE — 83540 ASSAY OF IRON: CPT

## 2017-07-04 PROCEDURE — 83010 ASSAY OF HAPTOGLOBIN QUANT: CPT

## 2017-07-04 PROCEDURE — G0378 HOSPITAL OBSERVATION PER HR: HCPCS

## 2017-07-04 PROCEDURE — 85027 COMPLETE CBC AUTOMATED: CPT

## 2017-07-04 PROCEDURE — 6360000002 HC RX W HCPCS: Performed by: NURSE PRACTITIONER

## 2017-07-04 PROCEDURE — 85025 COMPLETE CBC W/AUTO DIFF WBC: CPT

## 2017-07-04 PROCEDURE — 36415 COLL VENOUS BLD VENIPUNCTURE: CPT

## 2017-07-04 PROCEDURE — 96367 TX/PROPH/DG ADDL SEQ IV INF: CPT

## 2017-07-04 PROCEDURE — 94640 AIRWAY INHALATION TREATMENT: CPT

## 2017-07-04 PROCEDURE — 99220 PR INITIAL OBSERVATION CARE/DAY 70 MINUTES: CPT | Performed by: FAMILY MEDICINE

## 2017-07-04 PROCEDURE — 85014 HEMATOCRIT: CPT

## 2017-07-04 PROCEDURE — 6370000000 HC RX 637 (ALT 250 FOR IP): Performed by: NURSE PRACTITIONER

## 2017-07-04 PROCEDURE — 87798 DETECT AGENT NOS DNA AMP: CPT

## 2017-07-04 PROCEDURE — 87581 M.PNEUMON DNA AMP PROBE: CPT

## 2017-07-04 PROCEDURE — 1200000000 HC SEMI PRIVATE

## 2017-07-04 PROCEDURE — 83615 LACTATE (LD) (LDH) ENZYME: CPT

## 2017-07-04 PROCEDURE — 99215 OFFICE O/P EST HI 40 MIN: CPT | Performed by: INTERNAL MEDICINE

## 2017-07-04 PROCEDURE — 82728 ASSAY OF FERRITIN: CPT

## 2017-07-04 PROCEDURE — 6370000000 HC RX 637 (ALT 250 FOR IP): Performed by: INTERNAL MEDICINE

## 2017-07-04 PROCEDURE — 85018 HEMOGLOBIN: CPT

## 2017-07-04 PROCEDURE — 83550 IRON BINDING TEST: CPT

## 2017-07-04 PROCEDURE — 82947 ASSAY GLUCOSE BLOOD QUANT: CPT

## 2017-07-04 PROCEDURE — 86901 BLOOD TYPING SEROLOGIC RH(D): CPT

## 2017-07-04 PROCEDURE — 86900 BLOOD TYPING SEROLOGIC ABO: CPT

## 2017-07-04 PROCEDURE — 87486 CHLMYD PNEUM DNA AMP PROBE: CPT

## 2017-07-04 PROCEDURE — 96372 THER/PROPH/DIAG INJ SC/IM: CPT

## 2017-07-04 PROCEDURE — 2580000003 HC RX 258: Performed by: NURSE PRACTITIONER

## 2017-07-04 PROCEDURE — 87633 RESP VIRUS 12-25 TARGETS: CPT

## 2017-07-04 RX ORDER — 0.9 % SODIUM CHLORIDE 0.9 %
250 INTRAVENOUS SOLUTION INTRAVENOUS ONCE
Status: DISCONTINUED | OUTPATIENT
Start: 2017-07-04 | End: 2017-07-05 | Stop reason: HOSPADM

## 2017-07-04 RX ORDER — ECHINACEA PURPUREA EXTRACT 125 MG
2 TABLET ORAL 2 TIMES DAILY
Status: DISCONTINUED | OUTPATIENT
Start: 2017-07-04 | End: 2017-07-05 | Stop reason: HOSPADM

## 2017-07-04 RX ORDER — ALBUTEROL SULFATE 2.5 MG/3ML
2.5 SOLUTION RESPIRATORY (INHALATION)
Status: DISCONTINUED | OUTPATIENT
Start: 2017-07-04 | End: 2017-07-05 | Stop reason: HOSPADM

## 2017-07-04 RX ADMIN — IPRATROPIUM BROMIDE AND ALBUTEROL SULFATE 1 AMPULE: .5; 3 SOLUTION RESPIRATORY (INHALATION) at 16:36

## 2017-07-04 RX ADMIN — CEFTRIAXONE SODIUM 1 G: 1 INJECTION, POWDER, FOR SOLUTION INTRAMUSCULAR; INTRAVENOUS at 22:17

## 2017-07-04 RX ADMIN — BUMETANIDE 1 MG: 1 TABLET ORAL at 08:01

## 2017-07-04 RX ADMIN — INSULIN LISPRO 1 UNITS: 100 INJECTION, SOLUTION INTRAVENOUS; SUBCUTANEOUS at 20:34

## 2017-07-04 RX ADMIN — IPRATROPIUM BROMIDE AND ALBUTEROL SULFATE 1 AMPULE: .5; 3 SOLUTION RESPIRATORY (INHALATION) at 08:52

## 2017-07-04 RX ADMIN — SERTRALINE 50 MG: 50 TABLET, FILM COATED ORAL at 08:00

## 2017-07-04 RX ADMIN — SODIUM CHLORIDE: 9 INJECTION, SOLUTION INTRAVENOUS at 01:29

## 2017-07-04 RX ADMIN — FERROUS SULFATE TAB EC 325 MG (65 MG FE EQUIVALENT) 325 MG: 325 (65 FE) TABLET DELAYED RESPONSE at 20:21

## 2017-07-04 RX ADMIN — FLUTICASONE PROPIONATE 1 SPRAY: 50 SPRAY, METERED NASAL at 08:00

## 2017-07-04 RX ADMIN — VITAMIN D, TAB 1000IU (100/BT) 1000 UNITS: 25 TAB at 08:00

## 2017-07-04 RX ADMIN — ASPIRIN 81 MG: 81 TABLET, CHEWABLE ORAL at 08:01

## 2017-07-04 RX ADMIN — FAMOTIDINE 20 MG: 20 TABLET, FILM COATED ORAL at 08:01

## 2017-07-04 RX ADMIN — FERROUS SULFATE TAB EC 325 MG (65 MG FE EQUIVALENT) 325 MG: 325 (65 FE) TABLET DELAYED RESPONSE at 08:01

## 2017-07-04 RX ADMIN — AZITHROMYCIN MONOHYDRATE 500 MG: 500 INJECTION, POWDER, LYOPHILIZED, FOR SOLUTION INTRAVENOUS at 23:31

## 2017-07-04 RX ADMIN — CLOPIDOGREL 75 MG: 75 TABLET, FILM COATED ORAL at 08:01

## 2017-07-04 RX ADMIN — METOPROLOL TARTRATE 12.5 MG: 25 TABLET ORAL at 20:21

## 2017-07-04 RX ADMIN — ENOXAPARIN SODIUM 40 MG: 40 INJECTION SUBCUTANEOUS at 08:00

## 2017-07-04 RX ADMIN — Medication 10 ML: at 20:22

## 2017-07-04 RX ADMIN — DOCUSATE SODIUM 100 MG: 100 CAPSULE ORAL at 20:21

## 2017-07-04 RX ADMIN — DOCUSATE SODIUM 100 MG: 100 CAPSULE ORAL at 08:01

## 2017-07-04 RX ADMIN — ATORVASTATIN CALCIUM 80 MG: 80 TABLET, FILM COATED ORAL at 20:22

## 2017-07-04 RX ADMIN — SALINE NASAL SPRAY 2 SPRAY: 1.5 SOLUTION NASAL at 20:22

## 2017-07-04 RX ADMIN — FAMOTIDINE 20 MG: 20 TABLET, FILM COATED ORAL at 20:21

## 2017-07-04 RX ADMIN — LEVOTHYROXINE SODIUM 100 MCG: 100 TABLET ORAL at 08:01

## 2017-07-04 ASSESSMENT — ENCOUNTER SYMPTOMS
EYE PAIN: 0
CHEST TIGHTNESS: 0
BACK PAIN: 0
DIARRHEA: 0
ABDOMINAL PAIN: 0
COUGH: 1
RHINORRHEA: 0
SHORTNESS OF BREATH: 0
VOMITING: 0
SINUS PRESSURE: 0
CONSTIPATION: 0
NAUSEA: 0
SORE THROAT: 1
TROUBLE SWALLOWING: 1
BLOOD IN STOOL: 0

## 2017-07-05 VITALS
WEIGHT: 215 LBS | HEIGHT: 62 IN | RESPIRATION RATE: 16 BRPM | DIASTOLIC BLOOD PRESSURE: 58 MMHG | OXYGEN SATURATION: 93 % | HEART RATE: 76 BPM | BODY MASS INDEX: 39.56 KG/M2 | TEMPERATURE: 98.7 F | SYSTOLIC BLOOD PRESSURE: 131 MMHG

## 2017-07-05 PROBLEM — R35.0 FREQUENCY OF MICTURITION: Status: ACTIVE | Noted: 2017-07-05

## 2017-07-05 PROBLEM — J40 TRACHEOBRONCHITIS: Status: ACTIVE | Noted: 2017-07-05

## 2017-07-05 LAB
ABO/RH: NORMAL
ANION GAP SERPL CALCULATED.3IONS-SCNC: 13 MMOL/L (ref 9–17)
ANTIBODY SCREEN: NEGATIVE
ARM BAND NUMBER: NORMAL
BILIRUBIN URINE: NEGATIVE
BLD PROD TYP BPU: NORMAL
BUN BLDV-MCNC: 14 MG/DL (ref 8–23)
BUN/CREAT BLD: ABNORMAL (ref 9–20)
CALCIUM SERPL-MCNC: 8.1 MG/DL (ref 8.6–10.4)
CHLORIDE BLD-SCNC: 105 MMOL/L (ref 98–107)
CO2: 24 MMOL/L (ref 20–31)
COLOR: YELLOW
COMMENT UA: NORMAL
CREAT SERPL-MCNC: 0.77 MG/DL (ref 0.5–0.9)
CROSSMATCH RESULT: NORMAL
DISPENSE STATUS BLOOD BANK: NORMAL
EXPIRATION DATE: NORMAL
GFR AFRICAN AMERICAN: >60 ML/MIN
GFR NON-AFRICAN AMERICAN: >60 ML/MIN
GFR SERPL CREATININE-BSD FRML MDRD: ABNORMAL ML/MIN/{1.73_M2}
GFR SERPL CREATININE-BSD FRML MDRD: ABNORMAL ML/MIN/{1.73_M2}
GLUCOSE BLD-MCNC: 138 MG/DL (ref 65–105)
GLUCOSE BLD-MCNC: 158 MG/DL (ref 65–105)
GLUCOSE BLD-MCNC: 160 MG/DL (ref 70–99)
GLUCOSE URINE: NEGATIVE
HCT VFR BLD CALC: 22 % (ref 36–46)
HCT VFR BLD CALC: 25.3 % (ref 36–46)
HEMOGLOBIN: 7.1 G/DL (ref 12–16)
HEMOGLOBIN: 8.2 G/DL (ref 12–16)
KETONES, URINE: NEGATIVE
LEUKOCYTE ESTERASE, URINE: NEGATIVE
MCH RBC QN AUTO: 30.2 PG (ref 26–34)
MCHC RBC AUTO-ENTMCNC: 32.2 G/DL (ref 31–37)
MCV RBC AUTO: 93.8 FL (ref 80–100)
NITRITE, URINE: NEGATIVE
PDW BLD-RTO: 27.1 % (ref 12.5–15.4)
PH UA: 5 (ref 5–8)
PLATELET # BLD: 211 K/UL (ref 140–450)
PMV BLD AUTO: 8.1 FL (ref 6–12)
POTASSIUM SERPL-SCNC: 4.1 MMOL/L (ref 3.7–5.3)
PROTEIN UA: NEGATIVE
RBC # BLD: 2.35 M/UL (ref 4–5.2)
SODIUM BLD-SCNC: 142 MMOL/L (ref 135–144)
SPECIFIC GRAVITY UA: 1.01 (ref 1–1.03)
SURGICAL PATHOLOGY REPORT: NORMAL
TRANSFUSION STATUS: NORMAL
TURBIDITY: CLEAR
UNIT DIVISION: 0
UNIT NUMBER: NORMAL
URINE HGB: NEGATIVE
UROBILINOGEN, URINE: NORMAL
WBC # BLD: 5.8 K/UL (ref 3.5–11)

## 2017-07-05 PROCEDURE — 96375 TX/PRO/DX INJ NEW DRUG ADDON: CPT

## 2017-07-05 PROCEDURE — 97530 THERAPEUTIC ACTIVITIES: CPT

## 2017-07-05 PROCEDURE — G0378 HOSPITAL OBSERVATION PER HR: HCPCS

## 2017-07-05 PROCEDURE — 85027 COMPLETE CBC AUTOMATED: CPT

## 2017-07-05 PROCEDURE — 6360000002 HC RX W HCPCS: Performed by: NURSE PRACTITIONER

## 2017-07-05 PROCEDURE — 82947 ASSAY GLUCOSE BLOOD QUANT: CPT

## 2017-07-05 PROCEDURE — G8979 MOBILITY GOAL STATUS: HCPCS

## 2017-07-05 PROCEDURE — 6360000002 HC RX W HCPCS: Performed by: FAMILY MEDICINE

## 2017-07-05 PROCEDURE — 81003 URINALYSIS AUTO W/O SCOPE: CPT

## 2017-07-05 PROCEDURE — 87086 URINE CULTURE/COLONY COUNT: CPT

## 2017-07-05 PROCEDURE — 99225 PR SBSQ OBSERVATION CARE/DAY 25 MINUTES: CPT | Performed by: FAMILY MEDICINE

## 2017-07-05 PROCEDURE — 85018 HEMOGLOBIN: CPT

## 2017-07-05 PROCEDURE — 2580000003 HC RX 258: Performed by: NURSE PRACTITIONER

## 2017-07-05 PROCEDURE — 6370000000 HC RX 637 (ALT 250 FOR IP): Performed by: NURSE PRACTITIONER

## 2017-07-05 PROCEDURE — 85014 HEMATOCRIT: CPT

## 2017-07-05 PROCEDURE — 99225 PR SBSQ OBSERVATION CARE/DAY 25 MINUTES: CPT | Performed by: INTERNAL MEDICINE

## 2017-07-05 PROCEDURE — 97162 PT EVAL MOD COMPLEX 30 MIN: CPT

## 2017-07-05 PROCEDURE — 2580000003 HC RX 258: Performed by: FAMILY MEDICINE

## 2017-07-05 PROCEDURE — 80048 BASIC METABOLIC PNL TOTAL CA: CPT

## 2017-07-05 PROCEDURE — G8978 MOBILITY CURRENT STATUS: HCPCS

## 2017-07-05 PROCEDURE — 36415 COLL VENOUS BLD VENIPUNCTURE: CPT

## 2017-07-05 PROCEDURE — 96372 THER/PROPH/DIAG INJ SC/IM: CPT

## 2017-07-05 RX ORDER — CYANOCOBALAMIN 1000 UG/ML
1000 INJECTION INTRAMUSCULAR; SUBCUTANEOUS ONCE
Status: COMPLETED | OUTPATIENT
Start: 2017-07-05 | End: 2017-07-05

## 2017-07-05 RX ORDER — LEVOFLOXACIN 500 MG/1
500 TABLET, FILM COATED ORAL DAILY
Qty: 7 TABLET | Refills: 0 | Status: SHIPPED | OUTPATIENT
Start: 2017-07-05 | End: 2017-07-12

## 2017-07-05 RX ADMIN — Medication 10 ML: at 08:57

## 2017-07-05 RX ADMIN — LEVOTHYROXINE SODIUM 100 MCG: 100 TABLET ORAL at 05:50

## 2017-07-05 RX ADMIN — FAMOTIDINE 20 MG: 20 TABLET, FILM COATED ORAL at 08:59

## 2017-07-05 RX ADMIN — METOPROLOL TARTRATE 12.5 MG: 25 TABLET ORAL at 08:56

## 2017-07-05 RX ADMIN — VITAMIN D, TAB 1000IU (100/BT) 1000 UNITS: 25 TAB at 08:56

## 2017-07-05 RX ADMIN — CYANOCOBALAMIN 1000 MCG: 1000 INJECTION, SOLUTION INTRAMUSCULAR at 08:58

## 2017-07-05 RX ADMIN — BUMETANIDE 1 MG: 1 TABLET ORAL at 08:56

## 2017-07-05 RX ADMIN — SERTRALINE 50 MG: 50 TABLET, FILM COATED ORAL at 08:56

## 2017-07-05 RX ADMIN — IRON SUCROSE 200 MG: 20 INJECTION, SOLUTION INTRAVENOUS at 08:57

## 2017-07-05 RX ADMIN — INSULIN LISPRO 2 UNITS: 100 INJECTION, SOLUTION INTRAVENOUS; SUBCUTANEOUS at 09:40

## 2017-07-05 RX ADMIN — DOCUSATE SODIUM 100 MG: 100 CAPSULE ORAL at 08:55

## 2017-07-05 RX ADMIN — ASPIRIN 81 MG: 81 TABLET, CHEWABLE ORAL at 08:56

## 2017-07-05 RX ADMIN — CLOPIDOGREL 75 MG: 75 TABLET, FILM COATED ORAL at 08:56

## 2017-07-05 RX ADMIN — FERROUS SULFATE TAB EC 325 MG (65 MG FE EQUIVALENT) 325 MG: 325 (65 FE) TABLET DELAYED RESPONSE at 08:55

## 2017-07-05 RX ADMIN — ONDANSETRON 4 MG: 2 INJECTION, SOLUTION INTRAMUSCULAR; INTRAVENOUS at 00:43

## 2017-07-05 RX ADMIN — ENOXAPARIN SODIUM 40 MG: 40 INJECTION SUBCUTANEOUS at 08:58

## 2017-07-05 ASSESSMENT — ENCOUNTER SYMPTOMS
DIARRHEA: 0
SHORTNESS OF BREATH: 0
WHEEZING: 0
ABDOMINAL PAIN: 0
SORE THROAT: 1
NAUSEA: 0
VOMITING: 0

## 2017-07-05 ASSESSMENT — PAIN SCALES - WONG BAKER: WONGBAKER_NUMERICALRESPONSE: 2

## 2017-07-05 ASSESSMENT — PAIN DESCRIPTION - ORIENTATION: ORIENTATION: RIGHT;LEFT

## 2017-07-05 ASSESSMENT — PAIN DESCRIPTION - LOCATION: LOCATION: KNEE

## 2017-07-05 ASSESSMENT — PAIN DESCRIPTION - PAIN TYPE: TYPE: ACUTE PAIN;CHRONIC PAIN

## 2017-07-06 ENCOUNTER — TELEPHONE (OUTPATIENT)
Dept: PHARMACY | Age: 82
End: 2017-07-06

## 2017-07-06 LAB
CULTURE: NO GROWTH
CULTURE: NORMAL
Lab: NORMAL
PATHOLOGIST REVIEW: NORMAL
SPECIMEN DESCRIPTION: NORMAL
STATUS: NORMAL

## 2017-07-07 DIAGNOSIS — F32.A DEPRESSION: ICD-10-CM

## 2017-07-07 LAB
EKG ATRIAL RATE: 85 BPM
EKG P AXIS: 5 DEGREES
EKG P-R INTERVAL: 182 MS
EKG Q-T INTERVAL: 400 MS
EKG QRS DURATION: 96 MS
EKG QTC CALCULATION (BAZETT): 476 MS
EKG R AXIS: -35 DEGREES
EKG T AXIS: 59 DEGREES
EKG VENTRICULAR RATE: 85 BPM

## 2017-07-09 LAB
CULTURE: NORMAL
Lab: NORMAL
Lab: NORMAL
SPECIMEN DESCRIPTION: NORMAL
SPECIMEN DESCRIPTION: NORMAL
STATUS: NORMAL
STATUS: NORMAL

## 2017-07-13 ENCOUNTER — OFFICE VISIT (OUTPATIENT)
Dept: INTERNAL MEDICINE CLINIC | Age: 82
End: 2017-07-13
Payer: MEDICARE

## 2017-07-13 VITALS
DIASTOLIC BLOOD PRESSURE: 50 MMHG | WEIGHT: 211 LBS | HEART RATE: 69 BPM | HEIGHT: 62 IN | BODY MASS INDEX: 38.83 KG/M2 | SYSTOLIC BLOOD PRESSURE: 90 MMHG

## 2017-07-13 DIAGNOSIS — H61.22 IMPACTED CERUMEN OF LEFT EAR: ICD-10-CM

## 2017-07-13 DIAGNOSIS — I10 ESSENTIAL HYPERTENSION: Primary | Chronic | ICD-10-CM

## 2017-07-13 DIAGNOSIS — K21.00 GASTROESOPHAGEAL REFLUX DISEASE WITH ESOPHAGITIS: ICD-10-CM

## 2017-07-13 DIAGNOSIS — M54.50 CHRONIC LOW BACK PAIN WITHOUT SCIATICA, UNSPECIFIED BACK PAIN LATERALITY: ICD-10-CM

## 2017-07-13 DIAGNOSIS — D64.9 NORMOCYTIC ANEMIA: ICD-10-CM

## 2017-07-13 DIAGNOSIS — E11.8 TYPE 2 DIABETES MELLITUS WITH COMPLICATION, WITHOUT LONG-TERM CURRENT USE OF INSULIN (HCC): ICD-10-CM

## 2017-07-13 DIAGNOSIS — G89.29 CHRONIC LOW BACK PAIN WITHOUT SCIATICA, UNSPECIFIED BACK PAIN LATERALITY: ICD-10-CM

## 2017-07-13 DIAGNOSIS — H92.03 OTALGIA OF BOTH EARS: ICD-10-CM

## 2017-07-13 PROCEDURE — G8598 ASA/ANTIPLAT THER USED: HCPCS | Performed by: INTERNAL MEDICINE

## 2017-07-13 PROCEDURE — 99214 OFFICE O/P EST MOD 30 MIN: CPT | Performed by: INTERNAL MEDICINE

## 2017-07-13 PROCEDURE — 1036F TOBACCO NON-USER: CPT | Performed by: INTERNAL MEDICINE

## 2017-07-13 PROCEDURE — G8417 CALC BMI ABV UP PARAM F/U: HCPCS | Performed by: INTERNAL MEDICINE

## 2017-07-13 PROCEDURE — 1111F DSCHRG MED/CURRENT MED MERGE: CPT | Performed by: INTERNAL MEDICINE

## 2017-07-13 PROCEDURE — G8427 DOCREV CUR MEDS BY ELIG CLIN: HCPCS | Performed by: INTERNAL MEDICINE

## 2017-07-13 PROCEDURE — 1090F PRES/ABSN URINE INCON ASSESS: CPT | Performed by: INTERNAL MEDICINE

## 2017-07-13 PROCEDURE — 4040F PNEUMOC VAC/ADMIN/RCVD: CPT | Performed by: INTERNAL MEDICINE

## 2017-07-13 PROCEDURE — 1123F ACP DISCUSS/DSCN MKR DOCD: CPT | Performed by: INTERNAL MEDICINE

## 2017-07-13 PROCEDURE — G8400 PT W/DXA NO RESULTS DOC: HCPCS | Performed by: INTERNAL MEDICINE

## 2017-07-13 RX ORDER — HYDROCODONE BITARTRATE AND ACETAMINOPHEN 5; 325 MG/1; MG/1
1 TABLET ORAL DAILY PRN
Qty: 30 TABLET | Refills: 0 | Status: SHIPPED | OUTPATIENT
Start: 2017-07-13 | End: 2017-08-18 | Stop reason: SDUPTHER

## 2017-07-13 RX ORDER — OMEPRAZOLE 20 MG/1
20 TABLET, DELAYED RELEASE ORAL DAILY
Qty: 30 TABLET | Refills: 3 | Status: SHIPPED | OUTPATIENT
Start: 2017-07-13 | End: 2018-01-11 | Stop reason: SDUPTHER

## 2017-07-23 ASSESSMENT — ENCOUNTER SYMPTOMS
COLOR CHANGE: 0
BLOOD IN STOOL: 0
COUGH: 0
CHEST TIGHTNESS: 0
EYE REDNESS: 0
DIARRHEA: 0
VOMITING: 1
CHOKING: 0
APNEA: 0
EYE PAIN: 0
SHORTNESS OF BREATH: 1
CONSTIPATION: 0
EYE ITCHING: 0
BACK PAIN: 0
ABDOMINAL PAIN: 1
NAUSEA: 1
EYE DISCHARGE: 0
ABDOMINAL DISTENTION: 0

## 2017-08-01 ENCOUNTER — TELEPHONE (OUTPATIENT)
Dept: INTERNAL MEDICINE CLINIC | Age: 82
End: 2017-08-01

## 2017-08-04 DIAGNOSIS — I10 HYPERTENSION: ICD-10-CM

## 2017-08-04 RX ORDER — CLOPIDOGREL BISULFATE 75 MG/1
TABLET ORAL
Qty: 90 TABLET | Refills: 3 | Status: SHIPPED | OUTPATIENT
Start: 2017-08-04 | End: 2018-09-25 | Stop reason: SDUPTHER

## 2017-08-04 RX ORDER — MELATONIN
Qty: 30 TABLET | Refills: 0 | Status: SHIPPED | OUTPATIENT
Start: 2017-08-04 | End: 2017-11-06 | Stop reason: SDUPTHER

## 2017-08-18 ENCOUNTER — OFFICE VISIT (OUTPATIENT)
Dept: INTERNAL MEDICINE CLINIC | Age: 82
End: 2017-08-18
Payer: MEDICARE

## 2017-08-18 VITALS
HEART RATE: 67 BPM | DIASTOLIC BLOOD PRESSURE: 58 MMHG | WEIGHT: 215 LBS | BODY MASS INDEX: 39.56 KG/M2 | HEIGHT: 62 IN | SYSTOLIC BLOOD PRESSURE: 96 MMHG

## 2017-08-18 DIAGNOSIS — D50.9 IRON DEFICIENCY ANEMIA, UNSPECIFIED IRON DEFICIENCY ANEMIA TYPE: Primary | ICD-10-CM

## 2017-08-18 DIAGNOSIS — M54.50 CHRONIC LOW BACK PAIN WITHOUT SCIATICA, UNSPECIFIED BACK PAIN LATERALITY: ICD-10-CM

## 2017-08-18 DIAGNOSIS — I50.32 CHRONIC DIASTOLIC CONGESTIVE HEART FAILURE (HCC): ICD-10-CM

## 2017-08-18 DIAGNOSIS — E53.8 VITAMIN B 12 DEFICIENCY: ICD-10-CM

## 2017-08-18 DIAGNOSIS — I25.83 CORONARY ARTERY DISEASE DUE TO LIPID RICH PLAQUE: ICD-10-CM

## 2017-08-18 DIAGNOSIS — I25.10 CORONARY ARTERY DISEASE DUE TO LIPID RICH PLAQUE: ICD-10-CM

## 2017-08-18 DIAGNOSIS — G89.29 CHRONIC LOW BACK PAIN WITHOUT SCIATICA, UNSPECIFIED BACK PAIN LATERALITY: ICD-10-CM

## 2017-08-18 PROCEDURE — G8598 ASA/ANTIPLAT THER USED: HCPCS | Performed by: INTERNAL MEDICINE

## 2017-08-18 PROCEDURE — 99214 OFFICE O/P EST MOD 30 MIN: CPT | Performed by: INTERNAL MEDICINE

## 2017-08-18 PROCEDURE — 1036F TOBACCO NON-USER: CPT | Performed by: INTERNAL MEDICINE

## 2017-08-18 PROCEDURE — 1090F PRES/ABSN URINE INCON ASSESS: CPT | Performed by: INTERNAL MEDICINE

## 2017-08-18 PROCEDURE — G8417 CALC BMI ABV UP PARAM F/U: HCPCS | Performed by: INTERNAL MEDICINE

## 2017-08-18 PROCEDURE — G8427 DOCREV CUR MEDS BY ELIG CLIN: HCPCS | Performed by: INTERNAL MEDICINE

## 2017-08-18 PROCEDURE — 4040F PNEUMOC VAC/ADMIN/RCVD: CPT | Performed by: INTERNAL MEDICINE

## 2017-08-18 PROCEDURE — G8400 PT W/DXA NO RESULTS DOC: HCPCS | Performed by: INTERNAL MEDICINE

## 2017-08-18 PROCEDURE — 1123F ACP DISCUSS/DSCN MKR DOCD: CPT | Performed by: INTERNAL MEDICINE

## 2017-08-18 RX ORDER — ATORVASTATIN CALCIUM 80 MG/1
TABLET, FILM COATED ORAL
Qty: 90 TABLET | Refills: 3 | Status: SHIPPED | OUTPATIENT
Start: 2017-08-18 | End: 2017-08-18 | Stop reason: SDUPTHER

## 2017-08-18 RX ORDER — HYDROCODONE BITARTRATE AND ACETAMINOPHEN 5; 325 MG/1; MG/1
1 TABLET ORAL DAILY PRN
Qty: 30 TABLET | Refills: 0 | Status: SHIPPED | OUTPATIENT
Start: 2017-08-18 | End: 2018-08-28 | Stop reason: ALTCHOICE

## 2017-08-18 RX ORDER — LANOLIN ALCOHOL/MO/W.PET/CERES
325 CREAM (GRAM) TOPICAL 2 TIMES DAILY
Qty: 90 TABLET | Refills: 3 | Status: SHIPPED | OUTPATIENT
Start: 2017-08-18 | End: 2020-06-19 | Stop reason: SDUPTHER

## 2017-08-18 RX ORDER — BUMETANIDE 1 MG/1
1 TABLET ORAL 2 TIMES DAILY
Qty: 30 TABLET | Refills: 6 | Status: ON HOLD | OUTPATIENT
Start: 2017-08-18 | End: 2018-01-06 | Stop reason: HOSPADM

## 2017-08-18 RX ORDER — DOCUSATE SODIUM 100 MG/1
100 CAPSULE, LIQUID FILLED ORAL DAILY PRN
Qty: 30 CAPSULE | Refills: 0 | Status: SHIPPED | OUTPATIENT
Start: 2017-08-18 | End: 2022-02-23 | Stop reason: SDUPTHER

## 2017-08-18 RX ORDER — ATORVASTATIN CALCIUM 80 MG/1
80 TABLET, FILM COATED ORAL DAILY
Qty: 30 TABLET | Refills: 3 | Status: SHIPPED | OUTPATIENT
Start: 2017-08-18 | End: 2020-03-05 | Stop reason: SDUPTHER

## 2017-08-18 RX ORDER — OMEPRAZOLE 20 MG/1
CAPSULE, DELAYED RELEASE ORAL
Status: ON HOLD | COMMUNITY
Start: 2017-07-13 | End: 2018-01-06 | Stop reason: HOSPADM

## 2017-08-20 ASSESSMENT — ENCOUNTER SYMPTOMS
ABDOMINAL PAIN: 0
COUGH: 0
SHORTNESS OF BREATH: 1
EYE DISCHARGE: 0
EYE REDNESS: 0
CHEST TIGHTNESS: 0
EYE ITCHING: 0
CHOKING: 0
WHEEZING: 1
COLOR CHANGE: 0
EYE PAIN: 0
CONSTIPATION: 0
APNEA: 0
BLOOD IN STOOL: 0
BACK PAIN: 1
DIARRHEA: 0
ABDOMINAL DISTENTION: 0

## 2017-09-01 RX ORDER — LEVOTHYROXINE SODIUM 0.1 MG/1
TABLET ORAL
Qty: 90 TABLET | Refills: 0 | Status: SHIPPED | OUTPATIENT
Start: 2017-09-01 | End: 2017-12-08 | Stop reason: SDUPTHER

## 2017-09-13 DIAGNOSIS — G89.29 CHRONIC LOW BACK PAIN WITHOUT SCIATICA, UNSPECIFIED BACK PAIN LATERALITY: ICD-10-CM

## 2017-09-13 DIAGNOSIS — Z51.81 ENCOUNTER FOR THERAPEUTIC DRUG MONITORING: Primary | ICD-10-CM

## 2017-09-13 DIAGNOSIS — M54.50 CHRONIC LOW BACK PAIN WITHOUT SCIATICA, UNSPECIFIED BACK PAIN LATERALITY: ICD-10-CM

## 2017-09-14 ENCOUNTER — TELEPHONE (OUTPATIENT)
Dept: INTERNAL MEDICINE CLINIC | Age: 82
End: 2017-09-14

## 2017-09-14 RX ORDER — HYDROCODONE BITARTRATE AND ACETAMINOPHEN 5; 325 MG/1; MG/1
1 TABLET ORAL DAILY PRN
Qty: 30 TABLET | Refills: 0 | OUTPATIENT
Start: 2017-09-14

## 2017-09-29 ENCOUNTER — TELEPHONE (OUTPATIENT)
Dept: INTERNAL MEDICINE CLINIC | Age: 82
End: 2017-09-29

## 2017-10-10 ENCOUNTER — OFFICE VISIT (OUTPATIENT)
Dept: INTERNAL MEDICINE CLINIC | Age: 82
End: 2017-10-10
Payer: MEDICARE

## 2017-10-10 VITALS
HEIGHT: 62 IN | DIASTOLIC BLOOD PRESSURE: 60 MMHG | SYSTOLIC BLOOD PRESSURE: 120 MMHG | HEART RATE: 55 BPM | OXYGEN SATURATION: 94 % | WEIGHT: 220 LBS | BODY MASS INDEX: 40.48 KG/M2

## 2017-10-10 DIAGNOSIS — Z23 NEED FOR VACCINATION: ICD-10-CM

## 2017-10-10 DIAGNOSIS — I50.32 CHRONIC DIASTOLIC CONGESTIVE HEART FAILURE (HCC): ICD-10-CM

## 2017-10-10 DIAGNOSIS — E11.9 TYPE 2 DIABETES MELLITUS WITHOUT COMPLICATION, WITHOUT LONG-TERM CURRENT USE OF INSULIN (HCC): Primary | ICD-10-CM

## 2017-10-10 DIAGNOSIS — E53.8 VITAMIN B 12 DEFICIENCY: ICD-10-CM

## 2017-10-10 LAB — HBA1C MFR BLD: 7.1 %

## 2017-10-10 PROCEDURE — 1123F ACP DISCUSS/DSCN MKR DOCD: CPT | Performed by: INTERNAL MEDICINE

## 2017-10-10 PROCEDURE — G8400 PT W/DXA NO RESULTS DOC: HCPCS | Performed by: INTERNAL MEDICINE

## 2017-10-10 PROCEDURE — G8484 FLU IMMUNIZE NO ADMIN: HCPCS | Performed by: INTERNAL MEDICINE

## 2017-10-10 PROCEDURE — 1090F PRES/ABSN URINE INCON ASSESS: CPT | Performed by: INTERNAL MEDICINE

## 2017-10-10 PROCEDURE — G8427 DOCREV CUR MEDS BY ELIG CLIN: HCPCS | Performed by: INTERNAL MEDICINE

## 2017-10-10 PROCEDURE — 99214 OFFICE O/P EST MOD 30 MIN: CPT | Performed by: INTERNAL MEDICINE

## 2017-10-10 PROCEDURE — G8417 CALC BMI ABV UP PARAM F/U: HCPCS | Performed by: INTERNAL MEDICINE

## 2017-10-10 PROCEDURE — 83036 HEMOGLOBIN GLYCOSYLATED A1C: CPT | Performed by: INTERNAL MEDICINE

## 2017-10-10 PROCEDURE — 1036F TOBACCO NON-USER: CPT | Performed by: INTERNAL MEDICINE

## 2017-10-10 PROCEDURE — 4040F PNEUMOC VAC/ADMIN/RCVD: CPT | Performed by: INTERNAL MEDICINE

## 2017-10-10 PROCEDURE — 90662 IIV NO PRSV INCREASED AG IM: CPT | Performed by: INTERNAL MEDICINE

## 2017-10-10 PROCEDURE — G0008 ADMIN INFLUENZA VIRUS VAC: HCPCS | Performed by: INTERNAL MEDICINE

## 2017-10-10 PROCEDURE — G8598 ASA/ANTIPLAT THER USED: HCPCS | Performed by: INTERNAL MEDICINE

## 2017-10-10 RX ORDER — METOLAZONE 2.5 MG/1
2.5 TABLET ORAL DAILY
Qty: 30 TABLET | Refills: 3 | Status: ON HOLD | OUTPATIENT
Start: 2017-10-10 | End: 2018-01-06 | Stop reason: HOSPADM

## 2017-10-10 NOTE — PROGRESS NOTES
Subjective:      Patient ID: Briseyda Britt is a [de-identified] y.o. female. Visit Information    Have you changed or started any medications since your last visit including any over-the-counter medicines, vitamins, or herbal medicines? no   Are you having any side effects from any of your medications? -  no  Have you stopped taking any of your medications? Is so, why? -  no    Have you seen any other physician or provider since your last visit? No  Have you had any other diagnostic tests since your last visit? No  Have you been seen in the emergency room and/or had an admission to a hospital since we last saw you? No  Have you had your routine dental cleaning in the past 6 months? no    Have you activated your [employer] account? If not, what are your barriers? No: Inactive      Patient Care Team:  Fabiano Galvez MD as PCP - General (Internal Medicine)  Fabiano Galvez MD as PCP - S Attributed Provider  Julee Billy MD as Consulting Physician (Gastroenterology)    Medical History Review  Past Medical, Family, and Social History reviewed and does not contribute to the patient presenting condition    Health Maintenance   Topic Date Due    DTaP/Tdap/Td vaccine (1 - Tdap) 05/01/1954    Zostavax vaccine  05/01/1997    Diabetic foot exam  06/18/2016    Diabetic retinal exam  09/02/2016    Flu vaccine (1) 09/01/2017    Lipid screen  09/03/2017    Pneumococcal low/med risk (2 of 2 - PPSV23) 12/13/2017    Diabetic hemoglobin A1C test  01/03/2018    DEXA (modify frequency per FRAX score)  Addressed     Chief Complaint   Patient presents with    Diarrhea     pt stated she has had on and off for over a week    Swelling     bilateral swelling in both legs and feet.  pt stated it has not gotten better    Diabetes     pt due for A1C check    Other     pt would like flu shot       HPI    Review of Systems    Objective:   Physical Exam    Assessment:            Plan:
exhibits no discharge. Left eye exhibits no discharge. No scleral icterus. Neck: Normal range of motion. Neck supple. No JVD present. No tracheal deviation present. No thyromegaly present. Cardiovascular: Normal rate and normal heart sounds. Exam reveals no gallop. No murmur heard. Pulmonary/Chest: Effort normal and breath sounds normal. No stridor. No respiratory distress. She has no wheezes. She has no rales. She exhibits no tenderness. Abdominal: Soft. Bowel sounds are normal. She exhibits no distension. There is no tenderness. There is no rebound and no guarding. Musculoskeletal: Normal range of motion. She exhibits edema (3+ pitting edema present in both legs). Neurological: She is alert and oriented to person, place, and time. Skin: She is not diaphoretic. Assessment:       1. Type 2 diabetes mellitus without complication, without long-term current use of insulin ([facility] [address]    2. Need for vaccination    3. Chronic diastolic congestive heart failure ([address]    4. Vitamin B 12 deficiency            Plan:     1. Type 2 diabetes mellitus without complication, without long-term current use of insulin (HCC)    High blood sugars are better controlled  - POCT glycosylated hemoglobin (Hb A1C)    2. Need for vaccination  - INFLUENZA, HIGH DOSE, 65 YRS +, IM, PF, PREFILL SYR, 0.5ML (FLUZONE HD)    3. Chronic diastolic congestive heart failure ([address]    I will add Zaroxolyn to Bumex which patient is already taking. I would repeat her BMP  - metolazone (ZAROXOLYN) 2.5 MG tablet; Take 1 tablet by mouth daily  Dispense: 30 tablet; Refill: 3  - Basic Metabolic Panel; Future    4. Vitamin B 12 deficiency  - CBC; Future  - Vitamin B12 & Folate; Future    Patient is on oral vitamin B12 replacement, will recheck her vitamin B12 level. Patient may need an) vitamin B12 replacement      · Return in about 6 weeks (around 11/21/2017).     · Vernon Coker received counseling on the following healthy behaviors: nutrition,

## 2017-10-12 ASSESSMENT — ENCOUNTER SYMPTOMS
EYE ITCHING: 0
EYE DISCHARGE: 0
EYE PAIN: 0
COUGH: 0
BACK PAIN: 1
APNEA: 0
BLOOD IN STOOL: 0
EYE REDNESS: 0
DIARRHEA: 1
SHORTNESS OF BREATH: 1
ABDOMINAL PAIN: 0
CHOKING: 0
CONSTIPATION: 0
COLOR CHANGE: 0
ABDOMINAL DISTENTION: 0
CHEST TIGHTNESS: 0

## 2017-11-02 ENCOUNTER — TELEPHONE (OUTPATIENT)
Dept: PHARMACY | Facility: CLINIC | Age: 82
End: 2017-11-02

## 2017-11-02 NOTE — LETTER
[address]  [address], [place] [address]  Phone: [phone], option 7  Fax: [phone] [facility] [address]           11/06/17     Dear Wendy Cardenas,    You are eligible for a complete medication therapy review performed by a [address] [place] [address],[address] licensed clinical pharmacist. This review helps ensure that you are getting the most benefit from the medications you receive and includes the following:  ? Review of your medications, including over-the-counter and herbal medications. ? Answering questions about your medications and how to get the most benefit from them. ? Identifying and helping to prevent potential drug interactions or side effects. ? Possibly identifying less costly alternatives that are equally effective. Under this program, [employer] will work with you and your doctor to manage your drug therapy. Please contact the [address] [place] [address] office to set up a time for your medication review with one of our clinical pharmacists. To contact us call [phone] and select Option 7. This will be a phone consult and therefore will not require a trip to the medical office. Please note: This is an optional program. It is a free service provided to help ensure that your medicines are safe, necessary, and effective. Your participation is encouraged, but not required.     Sincerely,  Stefano Ervin, PharmD  [address] [facility]  [phone], option 7

## 2017-11-06 RX ORDER — MELATONIN
Qty: 30 TABLET | Refills: 5 | Status: SHIPPED | OUTPATIENT
Start: 2017-11-06 | End: 2018-02-15 | Stop reason: SDUPTHER

## 2017-11-08 ENCOUNTER — OFFICE VISIT (OUTPATIENT)
Dept: INTERNAL MEDICINE CLINIC | Age: 82
End: 2017-11-08
Payer: MEDICARE

## 2017-11-08 VITALS
WEIGHT: 214 LBS | OXYGEN SATURATION: 98 % | SYSTOLIC BLOOD PRESSURE: 120 MMHG | HEART RATE: 75 BPM | TEMPERATURE: 98.8 F | HEIGHT: 62 IN | BODY MASS INDEX: 39.38 KG/M2 | DIASTOLIC BLOOD PRESSURE: 60 MMHG

## 2017-11-08 DIAGNOSIS — J01.00 ACUTE NON-RECURRENT MAXILLARY SINUSITIS: Primary | ICD-10-CM

## 2017-11-08 PROCEDURE — G8417 CALC BMI ABV UP PARAM F/U: HCPCS | Performed by: INTERNAL MEDICINE

## 2017-11-08 PROCEDURE — 4040F PNEUMOC VAC/ADMIN/RCVD: CPT | Performed by: INTERNAL MEDICINE

## 2017-11-08 PROCEDURE — 1036F TOBACCO NON-USER: CPT | Performed by: INTERNAL MEDICINE

## 2017-11-08 PROCEDURE — 1090F PRES/ABSN URINE INCON ASSESS: CPT | Performed by: INTERNAL MEDICINE

## 2017-11-08 PROCEDURE — G8427 DOCREV CUR MEDS BY ELIG CLIN: HCPCS | Performed by: INTERNAL MEDICINE

## 2017-11-08 PROCEDURE — G8400 PT W/DXA NO RESULTS DOC: HCPCS | Performed by: INTERNAL MEDICINE

## 2017-11-08 PROCEDURE — G8484 FLU IMMUNIZE NO ADMIN: HCPCS | Performed by: INTERNAL MEDICINE

## 2017-11-08 PROCEDURE — G8598 ASA/ANTIPLAT THER USED: HCPCS | Performed by: INTERNAL MEDICINE

## 2017-11-08 PROCEDURE — 99213 OFFICE O/P EST LOW 20 MIN: CPT | Performed by: INTERNAL MEDICINE

## 2017-11-08 PROCEDURE — 1123F ACP DISCUSS/DSCN MKR DOCD: CPT | Performed by: INTERNAL MEDICINE

## 2017-11-08 RX ORDER — LORATADINE 10 MG/1
10 TABLET ORAL DAILY
Qty: 10 TABLET | Refills: 0 | Status: SHIPPED | OUTPATIENT
Start: 2017-11-08 | End: 2017-11-27 | Stop reason: SDUPTHER

## 2017-11-08 RX ORDER — BENZONATATE 100 MG/1
100 CAPSULE ORAL 3 TIMES DAILY PRN
Qty: 30 CAPSULE | Refills: 0 | Status: SHIPPED | OUTPATIENT
Start: 2017-11-08 | End: 2017-11-15

## 2017-11-08 RX ORDER — AMOXICILLIN AND CLAVULANATE POTASSIUM 875; 125 MG/1; MG/1
1 TABLET, FILM COATED ORAL 2 TIMES DAILY
Qty: 14 TABLET | Refills: 0 | Status: SHIPPED | OUTPATIENT
Start: 2017-11-08 | End: 2017-11-15

## 2017-11-08 NOTE — ADDENDUM NOTE
Encounter addended by: Parisa Mcclendon on: 11/8/2017  9:46 AM<BR>    Actions taken: Letter status changed

## 2017-11-21 ASSESSMENT — ENCOUNTER SYMPTOMS
CONSTIPATION: 0
COUGH: 1
ABDOMINAL PAIN: 0
BLOOD IN STOOL: 0
CHEST TIGHTNESS: 0
ABDOMINAL DISTENTION: 0
EYE PAIN: 0
SHORTNESS OF BREATH: 0
COLOR CHANGE: 0
EYE DISCHARGE: 0
CHOKING: 0
APNEA: 0
EYE REDNESS: 0
BACK PAIN: 1
EYE ITCHING: 0
DIARRHEA: 0

## 2017-11-27 DIAGNOSIS — J01.00 ACUTE NON-RECURRENT MAXILLARY SINUSITIS: ICD-10-CM

## 2017-11-27 RX ORDER — LORATADINE 10 MG/1
TABLET ORAL
Qty: 10 TABLET | Refills: 0 | Status: SHIPPED | OUTPATIENT
Start: 2017-11-27 | End: 2018-01-11 | Stop reason: SDUPTHER

## 2017-12-08 RX ORDER — LEVOTHYROXINE SODIUM 0.1 MG/1
TABLET ORAL
Qty: 90 TABLET | Refills: 0 | Status: SHIPPED | OUTPATIENT
Start: 2017-12-08 | End: 2018-03-19 | Stop reason: SDUPTHER

## 2017-12-29 ENCOUNTER — TELEPHONE (OUTPATIENT)
Dept: INTERNAL MEDICINE CLINIC | Age: 82
End: 2017-12-29

## 2017-12-29 NOTE — LETTER
AVINASH BOSE [facility]  [address] [facility] [address]  Phone: [phone]  Fax: [phone]      December 29, 2017     [address]      Dear Vernon Coker:    I am writing you because I have been informed of your missed appointment(s). We ask that you please call 24 hours in advance if you are unable to make your appointment so that we can give that time to another patient in need. We care about you and the management of your healthcare and want to make sure that you follow up as recommended. I have to also mention, that in an effort to provide quality care and timely appointments to all my patients, it is our policy that patients be discharged from the practice if they do not show for three scheduled appointments. You would be informed of this termination by mail, and would have 30 days from the date of discharge to locate another physician. We're sorry you were unable to keep your appointment and hope that you are doing well. We would like to continue treating your healthcare needs. Please call the office to let us know your plans for treatment and to reschedule your appointment.      Sincerely,        Kailey Watts MD

## 2018-01-04 ENCOUNTER — APPOINTMENT (OUTPATIENT)
Dept: GENERAL RADIOLOGY | Age: 83
DRG: 871 | End: 2018-01-04
Payer: MEDICARE

## 2018-01-04 ENCOUNTER — HOSPITAL ENCOUNTER (INPATIENT)
Age: 83
LOS: 3 days | Discharge: HOME HEALTH | DRG: 871 | End: 2018-01-07
Attending: EMERGENCY MEDICINE | Admitting: INTERNAL MEDICINE
Payer: MEDICARE

## 2018-01-04 DIAGNOSIS — E83.51 HYPOCALCEMIA: ICD-10-CM

## 2018-01-04 DIAGNOSIS — R65.21 SEPTIC SHOCK (HCC): ICD-10-CM

## 2018-01-04 DIAGNOSIS — I50.9 ACUTE ON CHRONIC CONGESTIVE HEART FAILURE, UNSPECIFIED CONGESTIVE HEART FAILURE TYPE: ICD-10-CM

## 2018-01-04 DIAGNOSIS — D50.9 HYPOCHROMIC-MICROCYTIC ANEMIA: ICD-10-CM

## 2018-01-04 DIAGNOSIS — J18.9 PNEUMONIA OF RIGHT LUNG DUE TO INFECTIOUS ORGANISM, UNSPECIFIED PART OF LUNG: Primary | ICD-10-CM

## 2018-01-04 DIAGNOSIS — N17.9 ACUTE KIDNEY INJURY (HCC): ICD-10-CM

## 2018-01-04 DIAGNOSIS — E87.29 INCREASED ANION GAP METABOLIC ACIDOSIS: ICD-10-CM

## 2018-01-04 DIAGNOSIS — A41.9 SEPTIC SHOCK (HCC): ICD-10-CM

## 2018-01-04 DIAGNOSIS — R41.0 DELIRIUM: ICD-10-CM

## 2018-01-04 DIAGNOSIS — R73.9 HYPERGLYCEMIA: ICD-10-CM

## 2018-01-04 DIAGNOSIS — A41.9 SEPSIS, DUE TO UNSPECIFIED ORGANISM: ICD-10-CM

## 2018-01-04 PROBLEM — E87.1 HYPONATREMIA: Status: ACTIVE | Noted: 2018-01-04

## 2018-01-04 PROBLEM — E87.20 LACTIC ACID ACIDOSIS: Status: ACTIVE | Noted: 2018-01-04

## 2018-01-04 PROBLEM — E88.09 HYPOALBUMINEMIA: Status: ACTIVE | Noted: 2018-01-04

## 2018-01-04 LAB
-: ABNORMAL
ABSOLUTE EOS #: 0 K/UL (ref 0–0.4)
ABSOLUTE IMMATURE GRANULOCYTE: 0 K/UL (ref 0–0.3)
ABSOLUTE LYMPH #: 0.59 K/UL (ref 1–4.8)
ABSOLUTE MONO #: 0.59 K/UL (ref 0.1–0.8)
ALBUMIN SERPL-MCNC: 2.9 G/DL (ref 3.5–5.2)
ALBUMIN/GLOBULIN RATIO: 0.9 (ref 1–2.5)
ALLEN TEST: ABNORMAL
ALP BLD-CCNC: 84 U/L (ref 35–104)
ALT SERPL-CCNC: 14 U/L (ref 5–33)
AMORPHOUS: ABNORMAL
ANION GAP SERPL CALCULATED.3IONS-SCNC: 19 MMOL/L (ref 9–17)
AST SERPL-CCNC: 21 U/L
BACTERIA: ABNORMAL
BASOPHILS # BLD: 0 % (ref 0–2)
BASOPHILS ABSOLUTE: 0 K/UL (ref 0–0.2)
BILIRUB SERPL-MCNC: 0.65 MG/DL (ref 0.3–1.2)
BILIRUBIN DIRECT: 0.27 MG/DL
BILIRUBIN URINE: NEGATIVE
BILIRUBIN, INDIRECT: 0.38 MG/DL (ref 0–1)
BNP INTERPRETATION: ABNORMAL
BUN BLDV-MCNC: 19 MG/DL (ref 8–23)
BUN/CREAT BLD: ABNORMAL (ref 9–20)
CALCIUM SERPL-MCNC: 7.2 MG/DL (ref 8.6–10.4)
CASTS UA: ABNORMAL /LPF (ref 0–2)
CHLORIDE BLD-SCNC: 97 MMOL/L (ref 98–107)
CO2: 17 MMOL/L (ref 20–31)
COLOR: ABNORMAL
CREAT SERPL-MCNC: 1.27 MG/DL (ref 0.5–0.9)
CRYSTALS, UA: ABNORMAL /HPF
DIFFERENTIAL TYPE: ABNORMAL
EKG ATRIAL RATE: 83 BPM
EKG P AXIS: 88 DEGREES
EKG P-R INTERVAL: 182 MS
EKG Q-T INTERVAL: 406 MS
EKG QRS DURATION: 94 MS
EKG QTC CALCULATION (BAZETT): 477 MS
EKG R AXIS: -43 DEGREES
EKG T AXIS: 37 DEGREES
EKG VENTRICULAR RATE: 83 BPM
EOSINOPHILS RELATIVE PERCENT: 0 % (ref 1–4)
EPITHELIAL CELLS UA: ABNORMAL /HPF (ref 0–5)
FIO2: ABNORMAL
GFR AFRICAN AMERICAN: 49 ML/MIN
GFR NON-AFRICAN AMERICAN: 40 ML/MIN
GFR SERPL CREATININE-BSD FRML MDRD: ABNORMAL ML/MIN/{1.73_M2}
GFR SERPL CREATININE-BSD FRML MDRD: ABNORMAL ML/MIN/{1.73_M2}
GLOBULIN: ABNORMAL G/DL (ref 1.5–3.8)
GLUCOSE BLD-MCNC: 114 MG/DL (ref 65–105)
GLUCOSE BLD-MCNC: 132 MG/DL (ref 65–105)
GLUCOSE BLD-MCNC: 184 MG/DL (ref 65–105)
GLUCOSE BLD-MCNC: 233 MG/DL (ref 74–100)
GLUCOSE BLD-MCNC: 238 MG/DL (ref 70–99)
GLUCOSE URINE: ABNORMAL
HCO3 VENOUS: 17.9 MMOL/L (ref 22–29)
HCT VFR BLD CALC: 27 % (ref 36.3–47.1)
HEMOGLOBIN: 7.9 G/DL (ref 11.9–15.1)
IMMATURE GRANULOCYTES: 0 %
INR BLD: 1
KETONES, URINE: ABNORMAL
LACTIC ACID, WHOLE BLOOD: 1.1 MMOL/L (ref 0.7–2.1)
LACTIC ACID, WHOLE BLOOD: 2.3 MMOL/L (ref 0.7–2.1)
LACTIC ACID, WHOLE BLOOD: 4.9 MMOL/L (ref 0.7–2.1)
LEUKOCYTE ESTERASE, URINE: ABNORMAL
LYMPHOCYTES # BLD: 4 % (ref 24–44)
MCH RBC QN AUTO: 20.3 PG (ref 25.2–33.5)
MCHC RBC AUTO-ENTMCNC: 29.3 G/DL (ref 28.4–34.8)
MCV RBC AUTO: 69.4 FL (ref 82.6–102.9)
MODE: ABNORMAL
MONOCYTES # BLD: 4 % (ref 1–7)
MORPHOLOGY: ABNORMAL
MRSA, DNA, NASAL: NORMAL
MUCUS: ABNORMAL
NEGATIVE BASE EXCESS, VEN: 6 (ref 0–2)
NITRITE, URINE: POSITIVE
O2 DEVICE/FLOW/%: ABNORMAL
O2 SAT, VEN: 47 % (ref 60–85)
OTHER OBSERVATIONS UA: ABNORMAL
PARTIAL THROMBOPLASTIN TIME: 21 SEC (ref 21.3–31.3)
PATIENT TEMP: ABNORMAL
PCO2, VEN: 29.3 MM HG (ref 41–51)
PDW BLD-RTO: 18.5 % (ref 11.8–14.4)
PH UA: 5 (ref 5–8)
PH VENOUS: 7.39 (ref 7.32–7.43)
PLATELET # BLD: 234 K/UL (ref 138–453)
PLATELET ESTIMATE: ABNORMAL
PMV BLD AUTO: 10.8 FL (ref 8.1–13.5)
PO2, VEN: 25.2 MM HG (ref 30–50)
POC LACTIC ACID: 4.53 MMOL/L (ref 0.56–1.39)
POC PCO2 TEMP: ABNORMAL MM HG
POC PH TEMP: ABNORMAL
POC PO2 TEMP: ABNORMAL MM HG
POC TROPONIN I: 0 NG/ML (ref 0–0.1)
POC TROPONIN I: 0 NG/ML (ref 0–0.1)
POC TROPONIN INTERP: NORMAL
POC TROPONIN INTERP: NORMAL
POSITIVE BASE EXCESS, VEN: ABNORMAL (ref 0–3)
POTASSIUM SERPL-SCNC: 3.7 MMOL/L (ref 3.7–5.3)
PRO-BNP: 1343 PG/ML
PROTEIN UA: NEGATIVE
PROTHROMBIN TIME: 10.8 SEC (ref 9.4–12.6)
RBC # BLD: 3.89 M/UL (ref 3.95–5.11)
RBC # BLD: ABNORMAL 10*6/UL
RBC UA: ABNORMAL /HPF (ref 0–2)
RENAL EPITHELIAL, UA: ABNORMAL /HPF
SAMPLE SITE: ABNORMAL
SEG NEUTROPHILS: 92 % (ref 36–66)
SEGMENTED NEUTROPHILS ABSOLUTE COUNT: 13.62 K/UL (ref 1.8–7.7)
SODIUM BLD-SCNC: 133 MMOL/L (ref 135–144)
SPECIFIC GRAVITY UA: 1.02 (ref 1–1.03)
SPECIMEN DESCRIPTION: NORMAL
TOTAL CO2, VENOUS: 19 MMOL/L (ref 23–30)
TOTAL PROTEIN: 6.3 G/DL (ref 6.4–8.3)
TRICHOMONAS: ABNORMAL
TURBIDITY: ABNORMAL
URINE HGB: NEGATIVE
UROBILINOGEN, URINE: NORMAL
WBC # BLD: 14.8 K/UL (ref 3.5–11.3)
WBC # BLD: ABNORMAL 10*3/UL
WBC UA: ABNORMAL /HPF (ref 0–5)
YEAST: ABNORMAL

## 2018-01-04 PROCEDURE — 83605 ASSAY OF LACTIC ACID: CPT

## 2018-01-04 PROCEDURE — 6360000002 HC RX W HCPCS: Performed by: EMERGENCY MEDICINE

## 2018-01-04 PROCEDURE — 6370000000 HC RX 637 (ALT 250 FOR IP): Performed by: NURSE PRACTITIONER

## 2018-01-04 PROCEDURE — 82803 BLOOD GASES ANY COMBINATION: CPT

## 2018-01-04 PROCEDURE — 6360000002 HC RX W HCPCS: Performed by: INTERNAL MEDICINE

## 2018-01-04 PROCEDURE — 2580000003 HC RX 258: Performed by: NURSE PRACTITIONER

## 2018-01-04 PROCEDURE — 94640 AIRWAY INHALATION TREATMENT: CPT

## 2018-01-04 PROCEDURE — 99285 EMERGENCY DEPT VISIT HI MDM: CPT

## 2018-01-04 PROCEDURE — 94664 DEMO&/EVAL PT USE INHALER: CPT

## 2018-01-04 PROCEDURE — 99222 1ST HOSP IP/OBS MODERATE 55: CPT | Performed by: INTERNAL MEDICINE

## 2018-01-04 PROCEDURE — 36415 COLL VENOUS BLD VENIPUNCTURE: CPT

## 2018-01-04 PROCEDURE — 82947 ASSAY GLUCOSE BLOOD QUANT: CPT

## 2018-01-04 PROCEDURE — 94762 N-INVAS EAR/PLS OXIMTRY CONT: CPT

## 2018-01-04 PROCEDURE — 6370000000 HC RX 637 (ALT 250 FOR IP): Performed by: INTERNAL MEDICINE

## 2018-01-04 PROCEDURE — 85730 THROMBOPLASTIN TIME PARTIAL: CPT

## 2018-01-04 PROCEDURE — 87086 URINE CULTURE/COLONY COUNT: CPT

## 2018-01-04 PROCEDURE — 87040 BLOOD CULTURE FOR BACTERIA: CPT

## 2018-01-04 PROCEDURE — 93005 ELECTROCARDIOGRAM TRACING: CPT

## 2018-01-04 PROCEDURE — 83880 ASSAY OF NATRIURETIC PEPTIDE: CPT

## 2018-01-04 PROCEDURE — 99223 1ST HOSP IP/OBS HIGH 75: CPT | Performed by: INTERNAL MEDICINE

## 2018-01-04 PROCEDURE — 2060000000 HC ICU INTERMEDIATE R&B

## 2018-01-04 PROCEDURE — 81001 URINALYSIS AUTO W/SCOPE: CPT

## 2018-01-04 PROCEDURE — 2580000003 HC RX 258: Performed by: EMERGENCY MEDICINE

## 2018-01-04 PROCEDURE — 85025 COMPLETE CBC W/AUTO DIFF WBC: CPT

## 2018-01-04 PROCEDURE — 87641 MR-STAPH DNA AMP PROBE: CPT

## 2018-01-04 PROCEDURE — 71046 X-RAY EXAM CHEST 2 VIEWS: CPT

## 2018-01-04 PROCEDURE — 96374 THER/PROPH/DIAG INJ IV PUSH: CPT

## 2018-01-04 PROCEDURE — 80048 BASIC METABOLIC PNL TOTAL CA: CPT

## 2018-01-04 PROCEDURE — 84484 ASSAY OF TROPONIN QUANT: CPT

## 2018-01-04 PROCEDURE — 2500000003 HC RX 250 WO HCPCS: Performed by: INTERNAL MEDICINE

## 2018-01-04 PROCEDURE — 6360000002 HC RX W HCPCS: Performed by: NURSE PRACTITIONER

## 2018-01-04 PROCEDURE — 2500000003 HC RX 250 WO HCPCS: Performed by: EMERGENCY MEDICINE

## 2018-01-04 PROCEDURE — 80076 HEPATIC FUNCTION PANEL: CPT

## 2018-01-04 PROCEDURE — 85610 PROTHROMBIN TIME: CPT

## 2018-01-04 RX ORDER — FAMOTIDINE 20 MG/1
20 TABLET, FILM COATED ORAL DAILY
Status: DISCONTINUED | OUTPATIENT
Start: 2018-01-04 | End: 2018-01-07 | Stop reason: HOSPADM

## 2018-01-04 RX ORDER — LEVOTHYROXINE SODIUM 0.1 MG/1
100 TABLET ORAL DAILY
Status: DISCONTINUED | OUTPATIENT
Start: 2018-01-04 | End: 2018-01-07 | Stop reason: HOSPADM

## 2018-01-04 RX ORDER — SODIUM CHLORIDE 0.9 % (FLUSH) 0.9 %
10 SYRINGE (ML) INJECTION EVERY 12 HOURS SCHEDULED
Status: DISCONTINUED | OUTPATIENT
Start: 2018-01-04 | End: 2018-01-07 | Stop reason: HOSPADM

## 2018-01-04 RX ORDER — DEXTROSE MONOHYDRATE 50 MG/ML
100 INJECTION, SOLUTION INTRAVENOUS PRN
Status: DISCONTINUED | OUTPATIENT
Start: 2018-01-04 | End: 2018-01-07 | Stop reason: HOSPADM

## 2018-01-04 RX ORDER — SODIUM CHLORIDE 0.9 % (FLUSH) 0.9 %
10 SYRINGE (ML) INJECTION PRN
Status: DISCONTINUED | OUTPATIENT
Start: 2018-01-04 | End: 2018-01-07 | Stop reason: HOSPADM

## 2018-01-04 RX ORDER — DEXTROSE MONOHYDRATE 25 G/50ML
12.5 INJECTION, SOLUTION INTRAVENOUS PRN
Status: DISCONTINUED | OUTPATIENT
Start: 2018-01-04 | End: 2018-01-07 | Stop reason: HOSPADM

## 2018-01-04 RX ORDER — ATORVASTATIN CALCIUM 80 MG/1
80 TABLET, FILM COATED ORAL DAILY
Status: DISCONTINUED | OUTPATIENT
Start: 2018-01-04 | End: 2018-01-07 | Stop reason: HOSPADM

## 2018-01-04 RX ORDER — REPAGLINIDE 0.5 MG/1
1 TABLET ORAL
Status: DISCONTINUED | OUTPATIENT
Start: 2018-01-04 | End: 2018-01-05

## 2018-01-04 RX ORDER — NICOTINE 21 MG/24HR
1 PATCH, TRANSDERMAL 24 HOURS TRANSDERMAL DAILY PRN
Status: DISCONTINUED | OUTPATIENT
Start: 2018-01-04 | End: 2018-01-07 | Stop reason: HOSPADM

## 2018-01-04 RX ORDER — 0.9 % SODIUM CHLORIDE 0.9 %
500 INTRAVENOUS SOLUTION INTRAVENOUS ONCE
Status: COMPLETED | OUTPATIENT
Start: 2018-01-04 | End: 2018-01-04

## 2018-01-04 RX ORDER — DOCUSATE SODIUM 100 MG/1
100 CAPSULE, LIQUID FILLED ORAL DAILY PRN
Status: DISCONTINUED | OUTPATIENT
Start: 2018-01-04 | End: 2018-01-07 | Stop reason: HOSPADM

## 2018-01-04 RX ORDER — CHOLECALCIFEROL (VITAMIN D3) 125 MCG
1000 CAPSULE ORAL DAILY
Status: DISCONTINUED | OUTPATIENT
Start: 2018-01-04 | End: 2018-01-07 | Stop reason: HOSPADM

## 2018-01-04 RX ORDER — CLOPIDOGREL BISULFATE 75 MG/1
75 TABLET ORAL DAILY
Status: DISCONTINUED | OUTPATIENT
Start: 2018-01-04 | End: 2018-01-07 | Stop reason: HOSPADM

## 2018-01-04 RX ORDER — SODIUM CHLORIDE 9 MG/ML
INJECTION, SOLUTION INTRAVENOUS CONTINUOUS
Status: ACTIVE | OUTPATIENT
Start: 2018-01-04 | End: 2018-01-06

## 2018-01-04 RX ORDER — IPRATROPIUM BROMIDE AND ALBUTEROL SULFATE 2.5; .5 MG/3ML; MG/3ML
1 SOLUTION RESPIRATORY (INHALATION)
Status: DISCONTINUED | OUTPATIENT
Start: 2018-01-04 | End: 2018-01-07 | Stop reason: HOSPADM

## 2018-01-04 RX ORDER — ONDANSETRON 2 MG/ML
4 INJECTION INTRAMUSCULAR; INTRAVENOUS EVERY 6 HOURS PRN
Status: DISCONTINUED | OUTPATIENT
Start: 2018-01-04 | End: 2018-01-07 | Stop reason: HOSPADM

## 2018-01-04 RX ORDER — HYDROCODONE BITARTRATE AND ACETAMINOPHEN 5; 325 MG/1; MG/1
1 TABLET ORAL EVERY 4 HOURS PRN
Status: DISCONTINUED | OUTPATIENT
Start: 2018-01-04 | End: 2018-01-07 | Stop reason: HOSPADM

## 2018-01-04 RX ORDER — BUMETANIDE 1 MG/1
1 TABLET ORAL 2 TIMES DAILY
Status: DISCONTINUED | OUTPATIENT
Start: 2018-01-04 | End: 2018-01-04

## 2018-01-04 RX ORDER — VANCOMYCIN HYDROCHLORIDE 1 G/200ML
1000 INJECTION, SOLUTION INTRAVENOUS EVERY 24 HOURS
Status: DISCONTINUED | OUTPATIENT
Start: 2018-01-04 | End: 2018-01-04

## 2018-01-04 RX ORDER — LEVOFLOXACIN 5 MG/ML
750 INJECTION, SOLUTION INTRAVENOUS
Status: DISCONTINUED | OUTPATIENT
Start: 2018-01-04 | End: 2018-01-06

## 2018-01-04 RX ORDER — NICOTINE POLACRILEX 4 MG
15 LOZENGE BUCCAL PRN
Status: DISCONTINUED | OUTPATIENT
Start: 2018-01-04 | End: 2018-01-07 | Stop reason: HOSPADM

## 2018-01-04 RX ORDER — DOCUSATE SODIUM 100 MG/1
100 CAPSULE, LIQUID FILLED ORAL 2 TIMES DAILY
Status: DISCONTINUED | OUTPATIENT
Start: 2018-01-04 | End: 2018-01-07 | Stop reason: HOSPADM

## 2018-01-04 RX ORDER — ALBUTEROL SULFATE 2.5 MG/3ML
2.5 SOLUTION RESPIRATORY (INHALATION)
Status: DISCONTINUED | OUTPATIENT
Start: 2018-01-04 | End: 2018-01-07 | Stop reason: HOSPADM

## 2018-01-04 RX ORDER — ACETAMINOPHEN 325 MG/1
650 TABLET ORAL EVERY 4 HOURS PRN
Status: DISCONTINUED | OUTPATIENT
Start: 2018-01-04 | End: 2018-01-07 | Stop reason: HOSPADM

## 2018-01-04 RX ORDER — BISACODYL 10 MG
10 SUPPOSITORY, RECTAL RECTAL DAILY PRN
Status: DISCONTINUED | OUTPATIENT
Start: 2018-01-04 | End: 2018-01-07 | Stop reason: HOSPADM

## 2018-01-04 RX ORDER — METOLAZONE 2.5 MG/1
2.5 TABLET ORAL DAILY
Status: DISCONTINUED | OUTPATIENT
Start: 2018-01-04 | End: 2018-01-04

## 2018-01-04 RX ORDER — HYDROCODONE BITARTRATE AND ACETAMINOPHEN 5; 325 MG/1; MG/1
2 TABLET ORAL EVERY 4 HOURS PRN
Status: DISCONTINUED | OUTPATIENT
Start: 2018-01-04 | End: 2018-01-07 | Stop reason: HOSPADM

## 2018-01-04 RX ORDER — ASPIRIN 81 MG/1
81 TABLET, CHEWABLE ORAL DAILY
Status: DISCONTINUED | OUTPATIENT
Start: 2018-01-04 | End: 2018-01-07 | Stop reason: HOSPADM

## 2018-01-04 RX ORDER — LANOLIN ALCOHOL/MO/W.PET/CERES
325 CREAM (GRAM) TOPICAL 2 TIMES DAILY
Status: DISCONTINUED | OUTPATIENT
Start: 2018-01-04 | End: 2018-01-07 | Stop reason: HOSPADM

## 2018-01-04 RX ORDER — CETIRIZINE HYDROCHLORIDE 10 MG/1
10 TABLET ORAL DAILY
Status: DISCONTINUED | OUTPATIENT
Start: 2018-01-04 | End: 2018-01-07 | Stop reason: HOSPADM

## 2018-01-04 RX ADMIN — DOCUSATE SODIUM 100 MG: 100 CAPSULE, LIQUID FILLED ORAL at 21:35

## 2018-01-04 RX ADMIN — HYDROCODONE BITARTRATE AND ACETAMINOPHEN 1 TABLET: 5; 325 TABLET ORAL at 07:37

## 2018-01-04 RX ADMIN — SODIUM CHLORIDE 500 ML: 9 INJECTION, SOLUTION INTRAVENOUS at 02:42

## 2018-01-04 RX ADMIN — REPAGLINIDE 1 MG: 0.5 TABLET ORAL at 17:03

## 2018-01-04 RX ADMIN — FERROUS SULFATE TAB EC 325 MG (65 MG FE EQUIVALENT) 325 MG: 325 (65 FE) TABLET DELAYED RESPONSE at 21:34

## 2018-01-04 RX ADMIN — VANCOMYCIN HYDROCHLORIDE 1250 MG: 1 INJECTION, POWDER, LYOPHILIZED, FOR SOLUTION INTRAVENOUS at 02:44

## 2018-01-04 RX ADMIN — CEFEPIME HYDROCHLORIDE 2 G: 2 INJECTION, POWDER, FOR SOLUTION INTRAVENOUS at 02:42

## 2018-01-04 RX ADMIN — SODIUM CHLORIDE: 9 INJECTION, SOLUTION INTRAVENOUS at 05:26

## 2018-01-04 RX ADMIN — IPRATROPIUM BROMIDE AND ALBUTEROL SULFATE 1 AMPULE: .5; 3 SOLUTION RESPIRATORY (INHALATION) at 07:58

## 2018-01-04 RX ADMIN — CEFTRIAXONE SODIUM 1 G: 1 INJECTION, POWDER, FOR SOLUTION INTRAMUSCULAR; INTRAVENOUS at 17:06

## 2018-01-04 RX ADMIN — LEVOFLOXACIN 750 MG: 5 INJECTION, SOLUTION INTRAVENOUS at 07:37

## 2018-01-04 RX ADMIN — CETIRIZINE HYDROCHLORIDE 10 MG: 10 TABLET ORAL at 09:09

## 2018-01-04 RX ADMIN — IPRATROPIUM BROMIDE AND ALBUTEROL SULFATE 1 AMPULE: .5; 3 SOLUTION RESPIRATORY (INHALATION) at 12:03

## 2018-01-04 RX ADMIN — FAMOTIDINE 20 MG: 20 TABLET, FILM COATED ORAL at 11:33

## 2018-01-04 RX ADMIN — IPRATROPIUM BROMIDE 0.5 MG: 0.5 SOLUTION RESPIRATORY (INHALATION) at 01:43

## 2018-01-04 RX ADMIN — ASPIRIN 81 MG: 81 TABLET, CHEWABLE ORAL at 09:09

## 2018-01-04 RX ADMIN — FERROUS SULFATE TAB EC 325 MG (65 MG FE EQUIVALENT) 325 MG: 325 (65 FE) TABLET DELAYED RESPONSE at 09:09

## 2018-01-04 RX ADMIN — VITAMIN D, TAB 1000IU (100/BT) 1000 UNITS: 25 TAB at 09:09

## 2018-01-04 RX ADMIN — ALBUTEROL SULFATE 5 MG: 5 SOLUTION RESPIRATORY (INHALATION) at 01:43

## 2018-01-04 RX ADMIN — ENOXAPARIN SODIUM 40 MG: 40 INJECTION SUBCUTANEOUS at 09:09

## 2018-01-04 RX ADMIN — LEVOTHYROXINE SODIUM 100 MCG: 100 TABLET ORAL at 07:37

## 2018-01-04 RX ADMIN — CLOPIDOGREL 75 MG: 75 TABLET, FILM COATED ORAL at 09:09

## 2018-01-04 RX ADMIN — REPAGLINIDE 1 MG: 0.5 TABLET ORAL at 07:37

## 2018-01-04 RX ADMIN — IPRATROPIUM BROMIDE AND ALBUTEROL SULFATE 1 AMPULE: .5; 3 SOLUTION RESPIRATORY (INHALATION) at 15:36

## 2018-01-04 RX ADMIN — ATORVASTATIN CALCIUM 80 MG: 80 TABLET, FILM COATED ORAL at 09:09

## 2018-01-04 RX ADMIN — ONDANSETRON 4 MG: 2 INJECTION INTRAMUSCULAR; INTRAVENOUS at 06:28

## 2018-01-04 RX ADMIN — METOLAZONE 2.5 MG: 2.5 TABLET ORAL at 09:09

## 2018-01-04 RX ADMIN — BUMETANIDE 1 MG: 1 TABLET ORAL at 09:09

## 2018-01-04 RX ADMIN — SERTRALINE 50 MG: 50 TABLET, FILM COATED ORAL at 09:09

## 2018-01-04 RX ADMIN — Medication 1000 MCG: at 09:08

## 2018-01-04 RX ADMIN — METOPROLOL TARTRATE 25 MG: 25 TABLET ORAL at 09:08

## 2018-01-04 RX ADMIN — INSULIN LISPRO 1 UNITS: 100 INJECTION, SOLUTION INTRAVENOUS; SUBCUTANEOUS at 11:57

## 2018-01-04 RX ADMIN — CALCIUM GLUCONATE 2 G: 94 INJECTION, SOLUTION INTRAVENOUS at 05:26

## 2018-01-04 RX ADMIN — HYDROCODONE BITARTRATE AND ACETAMINOPHEN 1 TABLET: 5; 325 TABLET ORAL at 14:32

## 2018-01-04 RX ADMIN — HYDROCODONE BITARTRATE AND ACETAMINOPHEN 2 TABLET: 5; 325 TABLET ORAL at 19:38

## 2018-01-04 RX ADMIN — REPAGLINIDE 1 MG: 0.5 TABLET ORAL at 11:33

## 2018-01-04 RX ADMIN — LINAGLIPTIN 5 MG: 5 TABLET, FILM COATED ORAL at 09:09

## 2018-01-04 ASSESSMENT — PAIN SCALES - GENERAL
PAINLEVEL_OUTOF10: 0
PAINLEVEL_OUTOF10: 7
PAINLEVEL_OUTOF10: 0
PAINLEVEL_OUTOF10: 0
PAINLEVEL_OUTOF10: 8
PAINLEVEL_OUTOF10: 8
PAINLEVEL_OUTOF10: 6

## 2018-01-04 ASSESSMENT — ENCOUNTER SYMPTOMS
BLOOD IN STOOL: 0
SHORTNESS OF BREATH: 1
BLURRED VISION: 0
HEMOPTYSIS: 0
NAUSEA: 0
SPUTUM PRODUCTION: 1
CONSTIPATION: 0
COUGH: 1
WHEEZING: 1
WHEEZING: 0
ABDOMINAL PAIN: 0
BACK PAIN: 0
RHINORRHEA: 1
DIARRHEA: 0
VOMITING: 0

## 2018-01-04 ASSESSMENT — PAIN DESCRIPTION - LOCATION: LOCATION: BACK

## 2018-01-04 ASSESSMENT — PAIN DESCRIPTION - PAIN TYPE: TYPE: CHRONIC PAIN

## 2018-01-04 NOTE — PROGRESS NOTES
Pharmacy Note     Renal Dose Adjustment    Marco Ivey is a [de-identified] y.o. female. Pharmacist assessment of renally cleared medications. Recent Labs      01/04/18   0154   BUN  19       Recent Labs      01/04/18   0154   CREATININE  1.27*       Estimated Creatinine Clearance: 39 mL/min (based on SCr of 1.27 mg/dL). Estimated CrCl using Ideal Body Weight:  28 mL/min (based on IBW 49.6 kg)    Height:   Ht Readings from Last 1 Encounters:   01/04/18 5' 1.81\" (1.57 m)     Weight:  Wt Readings from Last 1 Encounters:   01/04/18 220 lb (99.8 kg)       The following medication dose has been adjusted based upon renal function per P&T Guidelines:             Famotidine dose changed from 20 mg po bid to 20 mg po daily. Konrad Prince Pharm. D.    1/4/2018  4:27 AM

## 2018-01-04 NOTE — CONSULTS
smoker of @year@. Pack year equal ____. There  is not history of TB or TB exposure. There is not asbestos or silica dust exposure. The patient reports does not have coal, foundry, quarry or [employer] exposure. Travel history reveals No.  There is not  history of recreational or IV drug use. There is not hot tube exposure. The patient does not dog    Occupational history :retiree     TOBACCO:   reports that she has quit smoking. She has never used smokeless tobacco.  ETOH:   reports that she does not drink alcohol. ALLERGIES:    Allergies   Allergen Reactions    Strawberry Extract     Tape Mika [place] Tape] Rash       Home Meds:   Prior to Admission medications    Medication Sig Start Date End Date Taking? Authorizing Provider   levothyroxine (SYNTHROID) 100 MCG tablet TAKE 1 TAB BY MOUTH EVERY MORNING 12/8/17   Noah Flynn MD   loratadine (CLARITIN) 10 MG tablet TAKE 1 TAB BY MOUTH ONCE A DAY 11/27/17   Noah Flynn MD   Cholecalciferol (VITAMIN D3) 1000 units TABS TAKE 1 TAB BY MOUTH ONCE A DAY 11/6/17   Noah Flynn MD   metolazone (ZAROXOLYN) 2.5 MG tablet Take 1 tablet by mouth daily 10/10/17   Noah Flynn MD   omeprazole (PRILOSEC) 20 MG delayed release capsule  7/13/17   Historical Provider, MD   atorvastatin (LIPITOR) 80 MG tablet Take 1 tablet by mouth daily 8/18/17   Noah Flynn MD   bumetanide (BUMEX) 1 MG tablet Take 1 tablet by mouth 2 times daily 8/18/17   Noah Flynn MD   HYDROcodone-acetaminophen (NORCO) 5-325 MG per tablet Take 1 tablet by mouth daily as needed for Pain .  Earliest Fill Date: 8/18/17 8/18/17   Noah Flynn MD   ferrous sulfate (FE TABS) 325 (65 Fe) MG EC tablet Take 1 tablet by mouth 2 times daily 8/18/17   Noah Flynn MD   docusate sodium (COLACE) 100 MG capsule Take 1 capsule by mouth daily as needed for Constipation 8/18/17   Noah Flynn MD   cyanocobalamin ([employer] VITAMIN B12) 1000 MCG tablet Take 1 tablet by mouth daily 8/18/17   Noah Flynn MD   clopidogrel (PLAVIX) 75 MG tablet TAKE 1 TAB BY MOUTH ONCE A DAY 8/4/17   Isabella Shay MD   metoprolol tartrate (LOPRESSOR) 25 MG tablet TAKE 1 TABLET BY MOUTH 2 TIMES DAILY 8/4/17   Isabella Shay MD   omeprazole (PRILOSEC OTC) 20 MG tablet Take 1 tablet by mouth daily 7/13/17   Isabella Shay MD   metFORMIN (GLUCOPHAGE) 500 MG tablet TAKE 1 TAB BY MOUTH THREE TIMES A DAY 7/10/17   Isabella Shay MD   sertraline (ZOLOFT) 50 MG tablet TAKE 1 TAB BY MOUTH ONCE A DAY 7/10/17   Isabella Shay MD   Blood Pressure Monitoring (B-D ASSURE BPM/AUTO ARM CUFF) MISC 1 Device by Does not apply route daily 4/20/17   Isablela Shay MD   repaglinide (PRANDIN) 1 MG tablet TAKE 1 TAB BY MOUTH THREE TIMES A DAY BEFORE MEALS 4/10/17   Lytle Schaumann, MD   JANUVIA 100 MG tablet TAKE 1 TAB BY MOUTH ONCE A DAY 3/30/17   Lytle Schaumann, MD   Glucose Blood (BLOOD GLUCOSE TEST STRIPS) STRP 1 Device by In Vitro route 4 times daily Test___times daily    Diagnosis: 250.0   Diabetes Mellitus____Insulin Dependent____Non-Insulin Dependent 2/20/17   Isabella Shay MD   Lancets (AIMSC[employer] ULTRA THIN AUTO LANCET) [facility] 1 Device by Does not apply route 4 times daily 2/20/17   Isabella Shay MD   nystatin (MYCOSTATIN) 645832 UNIT/GM powder Apply 3 times daily.  2/20/17   Isabella Shay MD   Vitamin D, Cholecalciferol, 1000 UNITS CAPS Take 1,000 Units by mouth daily 10/6/16   Isabella Shay MD   aspirin 81 MG chewable tablet Take 1 tablet by mouth daily 9/5/16   Kal Salinas MD   clopidogrel (PLAVIX) 75 MG tablet TAKE ONE TABLET BY MOUTH ONCE A DAY 8/2/16   Roland Mccormick MD   ferrous sulfate 325 (65 FE) MG tablet TAKE ONE TABLET BY MOUTH TWICE A DAY 6/18/15   Lytle Schaumann, MD     CURRENT MEDS :  Scheduled Meds:   vancomycin (VANCOCIN) intermittent dosing (placeholder)   Other RX Placeholder    aspirin  81 mg Oral Daily    atorvastatin  80 mg Oral Daily    bumetanide  1 mg Oral BID    clopidogrel  75 mg Oral Daily    cyanocobalamin 1,000 mcg Oral Daily    ferrous sulfate  325 mg Oral BID    linagliptin  5 mg Oral Daily    levothyroxine  100 mcg Oral Daily    cetirizine  10 mg Oral Daily    metolazone  2.5 mg Oral Daily    metoprolol tartrate  25 mg Oral BID    repaglinide  1 mg Oral TID AC    sertraline  50 mg Oral Daily    vitamin D  1,000 Units Oral Daily    sodium chloride flush  10 mL Intravenous 2 times per day    docusate sodium  100 mg Oral BID    famotidine  20 mg Oral Daily    enoxaparin  40 mg Subcutaneous Daily    ipratropium-albuterol  1 ampule Inhalation Q4H WA    levofloxacin  750 mg Intravenous Q48H    insulin lispro  0-6 Units Subcutaneous TID WC    insulin lispro  0-3 Units Subcutaneous Nightly    vancomycin  1,000 mg Intravenous Q24H       Continuous Infusions:   sodium chloride 50 mL/hr at 01/04/18 0526    dextrose             REVIEW OF SYSTEMS:  CONSTITUTIONAL:   See hpi   EYES:  negative for  double vision, blurred vision, dry eyes, eye discharge and redness  HEENT:  negative for  hearing loss, tinnitus, ear drainage, earaches and nasal congestion  RESPIRATORY:  See hpi  CARDIOVASCULAR:  negative for  chest pain, palpitations, orthopnea, PND  GASTROINTESTINAL:  negative for, vomiting, change in bowel habits, diarrhea,+ nausea  constipation, abdominal pain, pruritus, abdominal mass and abdominal distention  GENITOURINARY:  negative for frequency, dysuria, nocturia, urinary incontinence and hesitancy  INTEGUMENT:  negative for rash, skin lesion(s), dryness, skin color change, changes in lesion, pruritus and changes in hair  HEMATOLOGIC/LYMPHATIC:  negative for easy bruising, bleeding, lymphadenopathy, petechiae and swelling/edema  ALLERGIC/IMMUNOLOGIC:  negative for recurrent infections, urticaria and drug reactions  ENDOCRINE:  negative for heat intolerance, cold intolerance, tremor, weight changes and change in bowel habits  MUSCULOSKELETAL:  negative for  myalgias, arthralgias, pain, joint swelling,

## 2018-01-04 NOTE — PROGRESS NOTES
Smoking Cessation - topics covered   []  Health Risks  []  Benefits of Quitting   []  Smoking Cessation  []  Patient has no history of tobacco use  [x]  Patient is former smoker. [x]  No need for tobacco cessation education. []  Booklet given  []  Patient verbalizes understanding. []  Patient denies need for tobacco cessation education.   Cullen Barnett  8:04 AM

## 2018-01-04 NOTE — PLAN OF CARE
Diabetes  Will monitor and control Blood Pressure - has been low  Increase hydration / meds titrated  Hold Zaroxolyn  Hold metoprolol  Check bun and creatinine  Correct electrolyte abnormalities  DVT prophylaxis  Check iron, outpatient anemia workup suggested  Thyroid replacement (history of thyroid cancer)    PHARMACY TO DOSE VANCOMYCIN  IP CONSULT TO HOSPITALIST  IP CONSULT TO [facility],   1/4/2018  4:52 PM

## 2018-01-04 NOTE — ED PROVIDER NOTES
[address] [address]    Specimen Description  [phone]     Special Requests NOT REPORTED     Culture Pending     Status Pending    CBC WITH AUTO DIFFERENTIAL   Result Value Ref Range    WBC 14.8 (H) 3.5 - 11.3 k/uL    RBC 3.89 (L) 3.95 - 5.11 m/uL    Hemoglobin 7.9 (L) 11.9 - 15.1 g/dL    Hematocrit 27.0 (L) 36.3 - 47.1 %    MCV 69.4 (L) 82.6 - 102.9 fL    MCH 20.3 (L) 25.2 - 33.5 pg    MCHC 29.3 28.4 - 34.8 g/dL    RDW 18.5 (H) 11.8 - 14.4 %    Platelets 242 285 - 826 k/uL    MPV 10.8 8.1 - 13.5 fL    Differential Type NOT REPORTED     WBC Morphology NOT REPORTED     RBC Morphology NOT REPORTED     Platelet Estimate NOT REPORTED     Immature Granulocytes 0 0 %    Seg Neutrophils 92 (H) 36 - 66 %    Lymphocytes 4 (L) 24 - 44 %    Monocytes 4 1 - 7 %    Eosinophils % 0 (L) 1 - 4 %    Basophils 0 0 - 2 %    Absolute Immature Granulocyte 0.00 0.00 - 0.30 k/uL    Segs Absolute 13.62 (H) 1.8 - 7.7 k/uL    Absolute Lymph # 0.59 (L) 1.0 - 4.8 k/uL    Absolute Mono # 0.59 0.1 - 0.8 k/uL    Absolute Eos # 0.00 0.0 - 0.4 k/uL    Basophils # 0.00 0.0 - 0.2 k/uL    Morphology ANISOCYTOSIS PRESENT    BASIC METABOLIC PANEL   Result Value Ref Range    Glucose 238 (H) 70 - 99 mg/dL    BUN 19 8 - 23 mg/dL    CREATININE 1.27 (H) 0.50 - 0.90 mg/dL    Bun/Cre Ratio NOT REPORTED 9 - 20    Calcium 7.2 (L) 8.6 - 10.4 mg/dL    Sodium 133 (L) 135 - 144 mmol/L    Potassium 3.7 3.7 - 5.3 mmol/L    Chloride 97 (L) 98 - 107 mmol/L    CO2 17 (L) 20 - 31 mmol/L    Anion Gap 19 (H) 9 - 17 mmol/L    GFR Non-African American 40 (L) >60 mL/min    GFR  49 (L) >60 mL/min    GFR Comment          GFR Staging NOT REPORTED    HEPATIC FUNCTION PANEL   Result Value Ref Range    Alb 2.9 (L) 3.5 - 5.2 g/dL    Alkaline Phosphatase 84 35 - 104 U/L    ALT 14 5 - 33 U/L    AST 21 <32 U/L    Total Bilirubin 0.65 0.3 - 1.2 mg/dL    Bilirubin, Direct 0.27 <0.31 mg/dL    Bilirubin, Indirect 0.38 0.00 - 1.00 mg/dL    Total Protein 6.3 lead to respiratory failure and intubation. We will give 500 milliliter bolus and plan for fluids slowly after that. Patient also hypocalcemic. Given replacement. [facility] consulted for admission. Spoke with Arnel Allison NP who agreed with admission to stepdown. PROCEDURES:  Procedures    CONSULTS:  PHARMACY TO DOSE VANCOMYCIN  IP CONSULT TO HOSPITALIST  IP CONSULT TO PULMONOLOGY    CRITICAL CARE:  See attending note    FINAL IMPRESSION      1. Pneumonia of right lung due to infectious organism, unspecified part of lung    2. Sepsis, due to unspecified organism ([facility] [address]    3. Hypocalcemia    4. Delirium    5. Septic shock ([address]    6. Acute kidney injury ([facility] [address]    7. Hyperglycemia    8. Increased anion gap metabolic acidosis    9.  Acute on chronic congestive heart failure, unspecified congestive heart failure type [facility])              DISPOSITION / PLAN     DISPOSITION Admitted 01/04/2018 03:19:58 AM          PATIENT REFERRED TO:  Swapnil Fisherers, [address] [place] [address]  [phone]            DISCHARGE MEDICATIONS:  New Prescriptions    No medications on file       Burgess Enriqueta DO  Emergency Medicine Resident    (Please note that portions of this note were completed with a voice recognition program.  Efforts were made to edit the dictations but occasionally words are mis-transcribed.)        Burgess Enriqueta DO  01/04/18 [id number]

## 2018-01-04 NOTE — H&P
Sharee Anand [address]    HISTORY AND PHYSICAL EXAMINATION            Date:   1/4/2018  Patient name:  Leesa Vásquez  Date of admission:  1/4/2018  1:17 AM  MRN:   [id number]  Account:  [de-identified]  YOB: 1935  PCP:    Jacinda Garcia MD  Room:   0120/7111-92  Code Status:    Full Code    Chief Complaint:     Chief Complaint   Patient presents with    Fever    Cough    Altered Mental Status       History Obtained From:     patient, electronic medical record    History of Present Illness: The patient is a [de-identified] y.o. female who presents with:   Fever; Cough; and Altered Mental Status       Patient was admitted thru ER with:  Echo Christiansen is a [de-identified] y.o. female who presents With increasing cough over the past few days productive of clear sputum. Also family states patient has become more confused as of the past 24 hours. Some baseline dementia. Patient noted to be subjective fever. Also state that the patient has been urinating more frequently has a history of urosepsis in the past.  Patient currently denies any complaints. States that she feels well. Patient is oriented to time but not place. Denies any chest pain, shortness of breath, abdominal pain, dysuria, back pain, neck pain, headache.  \"    82-year-old female admitted to the hospital through the emergency room with cough/sputum production, mental status change  There is concern of underlying urinary tract infection  The patient reports she is feeling much better  She is having trouble providing a clear historical account of the circumstances that led to her admission  She is hard of hearing  She has underlying dementia    Past Medical History:     Past Medical History:   Diagnosis Date    Arthritis     Atrial flutter ([address]     CAD (coronary artery disease)     Diabetes ([address]     Diabetes mellitus ([address]     Hyperlipidemia     Hypertension     Psychiatric problem     Positive for fever and malaise/fatigue. Negative for chills and diaphoresis. HENT: Positive for hearing loss. Eyes: Negative for blurred vision. Respiratory: Positive for cough, sputum production (White ) and shortness of breath. Negative for hemoptysis and wheezing. Cardiovascular: Negative for chest pain and palpitations. Gastrointestinal: Negative for blood in stool, melena, nausea and vomiting. Genitourinary: Negative for flank pain and hematuria. Musculoskeletal: Positive for joint pain and myalgias. The patient has chronic arthralgias and myalgias     Skin: Negative for rash. Neurological: Negative for dizziness, focal weakness and weakness. Psychiatric/Behavioral: Positive for memory loss. Physical Exam:   BP (!) 120/33   Pulse 87   Temp 100.6 °F (38.1 °C) (Oral)   Resp 18   Ht 5' 1\" (1.549 m)   Wt 220 lb (99.8 kg)   SpO2 91%   BMI 41.57 kg/m²   Temp (24hrs), Av.1 °F (37.8 °C), Min:99.8 °F (37.7 °C), Max:100.6 °F (38.1 °C)    Recent Labs      18   0151   POCGLU  233*     No intake or output data in the 24 hours ending 18 1126    Physical Exam   Constitutional: No distress. HENT:   Head: Normocephalic and atraumatic. Hard of hearing   Eyes: Conjunctivae are normal. No scleral icterus. Neck: Neck supple. No JVD present. Cardiovascular: Normal rate and regular rhythm. Pulmonary/Chest: Effort normal. No respiratory distress. She has wheezes. She has no rales. She exhibits no tenderness. Abdominal: Soft. Bowel sounds are normal. She exhibits no distension. There is no rebound and no guarding. Musculoskeletal: She exhibits edema. She exhibits no tenderness. Neurological:   Awake  No gross motor or sensory deficits  Somewhat confused  The patient is having trouble providing historical data    Skin: Skin is warm and dry. She is not diaphoretic.    Psychiatric: Memory normal.          Investigations:      Laboratory Testing:  Recent Results (from the past 24 hour(s))   EKG 12 Lead    Collection Time: 01/04/18  1:42 AM   Result Value Ref Range    Ventricular Rate 83 BPM    Atrial Rate 83 BPM    P-R Interval 182 ms    QRS Duration 94 ms    Q-T Interval 406 ms    QTc Calculation (Bazett) 477 ms    P Axis 88 degrees    R Axis -43 degrees    T Axis 37 degrees   POCT troponin    Collection Time: 01/04/18  1:48 AM   Result Value Ref Range    POC Troponin I 0.00 0.00 - 0.10 ng/mL    POC Troponin Interp       The Troponin-I (POC) results cannot be compared to the Troponin-T results.    Venous Blood Gas, POC    Collection Time: 01/04/18  1:51 AM   Result Value Ref Range    pH, Uday 7.394 7.320 - 7.430    pCO2, Uday 29.3 (L) 41.0 - 51.0 mm Hg    pO2, Uday 25.2 (L) 30.0 - 50.0 mm Hg    HCO3, Venous 17.9 (L) 22.0 - 29.0 mmol/L    Total CO2, Venous 19 (L) 23.0 - 30.0 mmol/L    Negative Base Excess, Uday 6 (H) 0.0 - 2.0    Positive Base Excess, Uday NOT REPORTED 0.0 - 3.0    O2 Sat, Uday 47 (L) 60.0 - 85.0 %    O2 Device/Flow/% NOT REPORTED     Redd Test NOT REPORTED     Sample Site NOT REPORTED     Mode NOT REPORTED     FIO2 NOT REPORTED     Pt Temp NOT REPORTED     POC pH Temp NOT REPORTED     POC pCO2 Temp NOT REPORTED mm Hg    POC pO2 Temp NOT REPORTED mm Hg   Lactic Acid, POC    Collection Time: 01/04/18  1:51 AM   Result Value Ref Range    POC Lactic Acid 4.53 (H) 0.56 - 1.39 mmol/L   POCT Glucose    Collection Time: 01/04/18  1:51 AM   Result Value Ref Range    POC Glucose 233 (H) 74 - 100 mg/dL   CBC WITH AUTO DIFFERENTIAL    Collection Time: 01/04/18  1:54 AM   Result Value Ref Range    WBC 14.8 (H) 3.5 - 11.3 k/uL    RBC 3.89 (L) 3.95 - 5.11 m/uL    Hemoglobin 7.9 (L) 11.9 - 15.1 g/dL    Hematocrit 27.0 (L) 36.3 - 47.1 %    MCV 69.4 (L) 82.6 - 102.9 fL    MCH 20.3 (L) 25.2 - 33.5 pg    MCHC 29.3 28.4 - 34.8 g/dL    RDW 18.5 (H) 11.8 - 14.4 %    Platelets 504 740 - 543 k/uL    MPV 10.8 8.1 - 13.5 fL    Differential Type NOT REPORTED     WBC Morphology NOT REPORTED RBC Morphology NOT REPORTED     Platelet Estimate NOT REPORTED     Immature Granulocytes 0 0 %    Seg Neutrophils 92 (H) 36 - 66 %    Lymphocytes 4 (L) 24 - 44 %    Monocytes 4 1 - 7 %    Eosinophils % 0 (L) 1 - 4 %    Basophils 0 0 - 2 %    Absolute Immature Granulocyte 0.00 0.00 - 0.30 k/uL    Segs Absolute 13.62 (H) 1.8 - 7.7 k/uL    Absolute Lymph # 0.59 (L) 1.0 - 4.8 k/uL    Absolute Mono # 0.59 0.1 - 0.8 k/uL    Absolute Eos # 0.00 0.0 - 0.4 k/uL    Basophils # 0.00 0.0 - 0.2 k/uL    Morphology ANISOCYTOSIS PRESENT    BASIC METABOLIC PANEL    Collection Time: 01/04/18  1:54 AM   Result Value Ref Range    Glucose 238 (H) 70 - 99 mg/dL    BUN 19 8 - 23 mg/dL    CREATININE 1.27 (H) 0.50 - 0.90 mg/dL    Bun/Cre Ratio NOT REPORTED 9 - 20    Calcium 7.2 (L) 8.6 - 10.4 mg/dL    Sodium 133 (L) 135 - 144 mmol/L    Potassium 3.7 3.7 - 5.3 mmol/L    Chloride 97 (L) 98 - 107 mmol/L    CO2 17 (L) 20 - 31 mmol/L    Anion Gap 19 (H) 9 - 17 mmol/L    GFR Non-African American 40 (L) >60 mL/min    GFR  49 (L) >60 mL/min    GFR Comment          GFR Staging NOT REPORTED    Culture Blood #1    Collection Time: 01/04/18  1:54 AM   Result Value Ref Range    Specimen Description . BLOOD 11ML RIGHT FA     Special Requests NOT REPORTED     Culture NO GROWTH 7 HOURS     Culture       [facility] [address] [address] [phone]    Status Pending    HEPATIC FUNCTION PANEL    Collection Time: 01/04/18  1:54 AM   Result Value Ref Range    Alb 2.9 (L) 3.5 - 5.2 g/dL    Alkaline Phosphatase 84 35 - 104 U/L    ALT 14 5 - 33 U/L    AST 21 <32 U/L    Total Bilirubin 0.65 0.3 - 1.2 mg/dL    Bilirubin, Direct 0.27 <0.31 mg/dL    Bilirubin, Indirect 0.38 0.00 - 1.00 mg/dL    Total Protein 6.3 (L) 6.4 - 8.3 g/dL    Globulin NOT REPORTED 1.5 - 3.8 g/dL    Albumin/Globulin Ratio 0.9 (L) 1.0 - 2.5   PROTIME-INR    Collection Time: 01/04/18  1:54 AM   Result Value Ref Range    Protime 10.8 9.4 - 12.6 sec    INR 1.0

## 2018-01-04 NOTE — ED NOTES
Pt arrived to the ED w/ family for c/o fever, cough and AMS  Per family, she has had multiple UTI, pt is concerned for possible pneumonia   Pt was found naked sitting in the doorway   Pt has been having a productive cough for the last few days  Pt skin is warm to touch   Pt placed on full cardiac monitor  Dr. BLANCO at bedside for examination  Pt has diminished breath sounds  RR is even and non-labored       Viktor Jackson RN  01/04/18 2987

## 2018-01-04 NOTE — PROGRESS NOTES
Pharmacy Note     Renal Dose Adjustment    Payton Braun is a [de-identified] y.o. female. Pharmacist assessment of renally cleared medications. Recent Labs      01/04/18   0154   BUN  19       Recent Labs      01/04/18   0154   CREATININE  1.27*       Estimated Creatinine Clearance: 39 mL/min (based on SCr of 1.27 mg/dL). Estimated CrCl using Ideal Body Weight: 28 mL/min (based on IBW 49.6 kg)    Height:   Ht Readings from Last 1 Encounters:   01/04/18 5' 1.81\" (1.57 m)     Weight:  Wt Readings from Last 1 Encounters:   01/04/18 220 lb (99.8 kg)       The following medication dose has been adjusted based upon renal function per P&T Guidelines:             Levaquin dose changed from 750 mg IV every 24 hours to 750 mg IV every 48 hours. Ayana Villafana Pharm. D.    1/4/2018  4:30 AM

## 2018-01-05 ENCOUNTER — APPOINTMENT (OUTPATIENT)
Dept: GENERAL RADIOLOGY | Age: 83
DRG: 871 | End: 2018-01-05
Payer: MEDICARE

## 2018-01-05 ENCOUNTER — APPOINTMENT (OUTPATIENT)
Dept: ULTRASOUND IMAGING | Age: 83
DRG: 871 | End: 2018-01-05
Payer: MEDICARE

## 2018-01-05 LAB
BUN BLDV-MCNC: 26 MG/DL (ref 8–23)
CREAT SERPL-MCNC: 2.27 MG/DL (ref 0.5–0.9)
CREATININE URINE: 178.4 MG/DL (ref 28–217)
CREATININE URINE: 89.9 MG/DL (ref 28–217)
CULTURE: NORMAL
CULTURE: NORMAL
DIRECT EXAM: NORMAL
DIRECT EXAM: NORMAL
FERRITIN: 105 UG/L (ref 13–150)
GFR AFRICAN AMERICAN: 25 ML/MIN
GFR NON-AFRICAN AMERICAN: 21 ML/MIN
GFR SERPL CREATININE-BSD FRML MDRD: ABNORMAL ML/MIN/{1.73_M2}
GFR SERPL CREATININE-BSD FRML MDRD: ABNORMAL ML/MIN/{1.73_M2}
GLUCOSE BLD-MCNC: 102 MG/DL (ref 65–105)
GLUCOSE BLD-MCNC: 107 MG/DL (ref 65–105)
GLUCOSE BLD-MCNC: 114 MG/DL (ref 70–99)
GLUCOSE BLD-MCNC: 128 MG/DL (ref 65–105)
GLUCOSE BLD-MCNC: 151 MG/DL (ref 65–105)
GLUCOSE BLD-MCNC: 54 MG/DL (ref 65–105)
GLUCOSE BLD-MCNC: 56 MG/DL (ref 65–105)
GLUCOSE BLD-MCNC: 65 MG/DL (ref 65–105)
GLUCOSE BLD-MCNC: 80 MG/DL (ref 65–105)
GLUCOSE BLD-MCNC: 84 MG/DL (ref 65–105)
GLUCOSE BLD-MCNC: 87 MG/DL (ref 70–99)
HCT VFR BLD CALC: 24.7 % (ref 36.3–47.1)
HEMOGLOBIN: 7.1 G/DL (ref 11.9–15.1)
IRON SATURATION: 18 % (ref 20–55)
IRON: 38 UG/DL (ref 37–145)
Lab: NORMAL
Lab: NORMAL
MCH RBC QN AUTO: 20.6 PG (ref 25.2–33.5)
MCHC RBC AUTO-ENTMCNC: 28.7 G/DL (ref 28.4–34.8)
MCV RBC AUTO: 71.6 FL (ref 82.6–102.9)
PDW BLD-RTO: 18.5 % (ref 11.8–14.4)
PLATELET # BLD: 274 K/UL (ref 138–453)
PMV BLD AUTO: 11.3 FL (ref 8.1–13.5)
RBC # BLD: 3.45 M/UL (ref 3.95–5.11)
SODIUM,UR: 66 MMOL/L
SPECIMEN DESCRIPTION: NORMAL
SPECIMEN DESCRIPTION: NORMAL
STATUS: NORMAL
STATUS: NORMAL
TOTAL IRON BINDING CAPACITY: 211 UG/DL (ref 250–450)
TOTAL PROTEIN, URINE: 46 MG/DL
UNSATURATED IRON BINDING CAPACITY: 173 UG/DL (ref 112–347)
VANCOMYCIN RANDOM DATE LAST DOSE: NORMAL
VANCOMYCIN RANDOM DOSE AMOUNT: NORMAL
VANCOMYCIN RANDOM TIME LAST DOSE: NORMAL
VANCOMYCIN RANDOM: 8.9 UG/ML
WBC # BLD: 12.2 K/UL (ref 3.5–11.3)

## 2018-01-05 PROCEDURE — 6370000000 HC RX 637 (ALT 250 FOR IP): Performed by: NURSE PRACTITIONER

## 2018-01-05 PROCEDURE — 99233 SBSQ HOSP IP/OBS HIGH 50: CPT | Performed by: INTERNAL MEDICINE

## 2018-01-05 PROCEDURE — 82728 ASSAY OF FERRITIN: CPT

## 2018-01-05 PROCEDURE — 82570 ASSAY OF URINE CREATININE: CPT

## 2018-01-05 PROCEDURE — 6370000000 HC RX 637 (ALT 250 FOR IP): Performed by: INTERNAL MEDICINE

## 2018-01-05 PROCEDURE — 71046 X-RAY EXAM CHEST 2 VIEWS: CPT

## 2018-01-05 PROCEDURE — 85027 COMPLETE CBC AUTOMATED: CPT

## 2018-01-05 PROCEDURE — 94640 AIRWAY INHALATION TREATMENT: CPT

## 2018-01-05 PROCEDURE — 82565 ASSAY OF CREATININE: CPT

## 2018-01-05 PROCEDURE — 83550 IRON BINDING TEST: CPT

## 2018-01-05 PROCEDURE — 36415 COLL VENOUS BLD VENIPUNCTURE: CPT

## 2018-01-05 PROCEDURE — 51798 US URINE CAPACITY MEASURE: CPT

## 2018-01-05 PROCEDURE — 80202 ASSAY OF VANCOMYCIN: CPT

## 2018-01-05 PROCEDURE — 2580000003 HC RX 258: Performed by: INTERNAL MEDICINE

## 2018-01-05 PROCEDURE — 84156 ASSAY OF PROTEIN URINE: CPT

## 2018-01-05 PROCEDURE — 84300 ASSAY OF URINE SODIUM: CPT

## 2018-01-05 PROCEDURE — 6360000002 HC RX W HCPCS: Performed by: INTERNAL MEDICINE

## 2018-01-05 PROCEDURE — 76770 US EXAM ABDO BACK WALL COMP: CPT

## 2018-01-05 PROCEDURE — 82947 ASSAY GLUCOSE BLOOD QUANT: CPT

## 2018-01-05 PROCEDURE — 83540 ASSAY OF IRON: CPT

## 2018-01-05 PROCEDURE — 94762 N-INVAS EAR/PLS OXIMTRY CONT: CPT

## 2018-01-05 PROCEDURE — 87899 AGENT NOS ASSAY W/OPTIC: CPT

## 2018-01-05 PROCEDURE — 2060000000 HC ICU INTERMEDIATE R&B

## 2018-01-05 PROCEDURE — 84520 ASSAY OF UREA NITROGEN: CPT

## 2018-01-05 RX ADMIN — IPRATROPIUM BROMIDE AND ALBUTEROL SULFATE 1 AMPULE: .5; 3 SOLUTION RESPIRATORY (INHALATION) at 20:51

## 2018-01-05 RX ADMIN — DEXTROSE 15 G: 15 GEL ORAL at 17:15

## 2018-01-05 RX ADMIN — ATORVASTATIN CALCIUM 80 MG: 80 TABLET, FILM COATED ORAL at 08:31

## 2018-01-05 RX ADMIN — FERROUS SULFATE TAB EC 325 MG (65 MG FE EQUIVALENT) 325 MG: 325 (65 FE) TABLET DELAYED RESPONSE at 20:30

## 2018-01-05 RX ADMIN — REPAGLINIDE 1 MG: 0.5 TABLET ORAL at 08:30

## 2018-01-05 RX ADMIN — FERROUS SULFATE TAB EC 325 MG (65 MG FE EQUIVALENT) 325 MG: 325 (65 FE) TABLET DELAYED RESPONSE at 08:27

## 2018-01-05 RX ADMIN — IPRATROPIUM BROMIDE AND ALBUTEROL SULFATE 1 AMPULE: .5; 3 SOLUTION RESPIRATORY (INHALATION) at 11:58

## 2018-01-05 RX ADMIN — SODIUM CHLORIDE: 9 INJECTION, SOLUTION INTRAVENOUS at 05:49

## 2018-01-05 RX ADMIN — REPAGLINIDE 1 MG: 0.5 TABLET ORAL at 12:11

## 2018-01-05 RX ADMIN — CETIRIZINE HYDROCHLORIDE 10 MG: 10 TABLET ORAL at 08:27

## 2018-01-05 RX ADMIN — CLOPIDOGREL 75 MG: 75 TABLET, FILM COATED ORAL at 08:27

## 2018-01-05 RX ADMIN — SERTRALINE 50 MG: 50 TABLET, FILM COATED ORAL at 08:27

## 2018-01-05 RX ADMIN — Medication 1000 MCG: at 08:27

## 2018-01-05 RX ADMIN — DOCUSATE SODIUM 100 MG: 100 CAPSULE, LIQUID FILLED ORAL at 20:31

## 2018-01-05 RX ADMIN — HYDROCODONE BITARTRATE AND ACETAMINOPHEN 2 TABLET: 5; 325 TABLET ORAL at 05:33

## 2018-01-05 RX ADMIN — IPRATROPIUM BROMIDE AND ALBUTEROL SULFATE 1 AMPULE: .5; 3 SOLUTION RESPIRATORY (INHALATION) at 16:06

## 2018-01-05 RX ADMIN — FAMOTIDINE 20 MG: 20 TABLET, FILM COATED ORAL at 08:27

## 2018-01-05 RX ADMIN — HYDROCODONE BITARTRATE AND ACETAMINOPHEN 2 TABLET: 5; 325 TABLET ORAL at 01:01

## 2018-01-05 RX ADMIN — CEFTRIAXONE SODIUM 1 G: 1 INJECTION, POWDER, FOR SOLUTION INTRAMUSCULAR; INTRAVENOUS at 17:18

## 2018-01-05 RX ADMIN — VITAMIN D, TAB 1000IU (100/BT) 1000 UNITS: 25 TAB at 08:27

## 2018-01-05 RX ADMIN — LEVOTHYROXINE SODIUM 100 MCG: 100 TABLET ORAL at 05:33

## 2018-01-05 RX ADMIN — ASPIRIN 81 MG: 81 TABLET, CHEWABLE ORAL at 08:27

## 2018-01-05 RX ADMIN — LINAGLIPTIN 5 MG: 5 TABLET, FILM COATED ORAL at 08:27

## 2018-01-05 RX ADMIN — DOCUSATE SODIUM 100 MG: 100 CAPSULE, LIQUID FILLED ORAL at 08:31

## 2018-01-05 RX ADMIN — IPRATROPIUM BROMIDE AND ALBUTEROL SULFATE 1 AMPULE: .5; 3 SOLUTION RESPIRATORY (INHALATION) at 08:56

## 2018-01-05 ASSESSMENT — ENCOUNTER SYMPTOMS
SPUTUM PRODUCTION: 1
SHORTNESS OF BREATH: 1
ABDOMINAL PAIN: 0
COUGH: 1
HEMOPTYSIS: 0
BLURRED VISION: 0
NAUSEA: 0
BLOOD IN STOOL: 0
VOMITING: 0
WHEEZING: 0

## 2018-01-05 ASSESSMENT — PAIN SCALES - GENERAL
PAINLEVEL_OUTOF10: 8
PAINLEVEL_OUTOF10: 8
PAINLEVEL_OUTOF10: 0

## 2018-01-05 NOTE — PROGRESS NOTES
Problem Relation Age of Onset    Cancer Mother     Cancer Father     Cancer Brother     Diabetes Brother        SURGICAL HISTORY:   Past Surgical History:   Procedure Laterality Date    APPENDECTOMY      BACK SURGERY      CHOLECYSTECTOMY      CORONARY ANGIOPLASTY WITH STENT PLACEMENT      HYSTERECTOMY      THYROIDECTOMY                TOBACCO:   reports that she has quit smoking. She has never used smokeless tobacco.  ETOH:   reports that she does not drink alcohol. ALLERGIES:    Allergies   Allergen Reactions    Strawberry Extract     Tape Alina Savage Tape] Rash       Home Meds:   Prior to Admission medications    Medication Sig Start Date End Date Taking? Authorizing Provider   levothyroxine (SYNTHROID) 100 MCG tablet TAKE 1 TAB BY MOUTH EVERY MORNING 12/8/17   Vitor Davenport MD   loratadine (CLARITIN) 10 MG tablet TAKE 1 TAB BY MOUTH ONCE A DAY 11/27/17   Vitor Davenport MD   Cholecalciferol (VITAMIN D3) 1000 units TABS TAKE 1 TAB BY MOUTH ONCE A DAY 11/6/17   Vitor Davenport MD   metolazone (ZAROXOLYN) 2.5 MG tablet Take 1 tablet by mouth daily 10/10/17   Vitor Davenport MD   omeprazole (PRILOSEC) 20 MG delayed release capsule  7/13/17   Historical Provider, MD   atorvastatin (LIPITOR) 80 MG tablet Take 1 tablet by mouth daily 8/18/17   Vitor Davenport MD   bumetanide (BUMEX) 1 MG tablet Take 1 tablet by mouth 2 times daily 8/18/17   Vitor Davenport MD   HYDROcodone-acetaminophen (NORCO) 5-325 MG per tablet Take 1 tablet by mouth daily as needed for Pain .  Earliest Fill Date: 8/18/17 8/18/17   Vitor Davenport MD   ferrous sulfate (FE TABS) 325 (65 Fe) MG EC tablet Take 1 tablet by mouth 2 times daily 8/18/17   Vitor Davenport MD   docusate sodium (COLACE) 100 MG capsule Take 1 capsule by mouth daily as needed for Constipation 8/18/17   Vitor Davenport MD   cyanocobalamin ([employer] VITAMIN B12) 1000 MCG tablet Take 1 tablet by mouth daily 8/18/17   Vitor Davenport MD   clopidogrel (PLAVIX) 75 MG tablet TAKE 1 TAB BY MOUTH ONCE A DAY 8/4/17   Kirit Washington MD   metoprolol tartrate (LOPRESSOR) 25 MG tablet TAKE 1 TABLET BY MOUTH 2 TIMES DAILY 8/4/17   Kirit Washington MD   omeprazole (PRILOSEC OTC) 20 MG tablet Take 1 tablet by mouth daily 7/13/17   Kirit Washington MD   metFORMIN (GLUCOPHAGE) 500 MG tablet TAKE 1 TAB BY MOUTH THREE TIMES A DAY 7/10/17   Kirit Washington MD   sertraline (ZOLOFT) 50 MG tablet TAKE 1 TAB BY MOUTH ONCE A DAY 7/10/17   Kirit Washington MD   Blood Pressure Monitoring (B-D ASSURE BPM/AUTO ARM CUFF) MISC 1 Device by Does not apply route daily 4/20/17   Kirit Washington MD   repaglinide (PRANDIN) 1 MG tablet TAKE 1 TAB BY MOUTH THREE TIMES A DAY BEFORE MEALS 4/10/17   Yulissa Sosa MD   JANUVIA 100 MG tablet TAKE 1 TAB BY MOUTH ONCE A DAY 3/30/17   Yulissa Sosa MD   Glucose Blood (BLOOD GLUCOSE TEST STRIPS) STRP 1 Device by In Vitro route 4 times daily Test___times daily    Diagnosis: 250.0   Diabetes Mellitus____Insulin Dependent____Non-Insulin Dependent 2/20/17   Kirit Washington MD   Lancets (AIMSCO ULTRA THIN AUTO LANCET) [facility] 1 Device by Does not apply route 4 times daily 2/20/17   Kirit Washington MD   nystatin (MYCOSTATIN) 922687 UNIT/GM powder Apply 3 times daily. 2/20/17   Kirit Washington MD   Vitamin D, Cholecalciferol, 1000 UNITS CAPS Take 1,000 Units by mouth daily 10/6/16   Kirit Washington MD   aspirin 81 MG chewable tablet Take 1 tablet by mouth daily 9/5/16   Kirill Elam MD   clopidogrel (PLAVIX) 75 MG tablet TAKE ONE TABLET BY MOUTH ONCE A DAY 8/2/16   Cheo Cedillo MD   ferrous sulfate 325 (65 FE) MG tablet TAKE ONE TABLET BY MOUTH TWICE A DAY 6/18/15   Yulissa Sosa MD         Intake/Output Summary (Last 24 hours) at 01/05/18 1045  Last data filed at 01/05/18 0629   Gross per 24 hour   Intake          2057.33 ml   Output              650 ml   Net          1407.33 ml       Diet/Nutrition   DIET GENERAL; Carb Control: 3 carbs/meal (approximate 1500 kcals/day);  Low Sodium

## 2018-01-06 LAB
ANION GAP SERPL CALCULATED.3IONS-SCNC: 12 MMOL/L (ref 9–17)
BUN BLDV-MCNC: 24 MG/DL (ref 8–23)
BUN/CREAT BLD: ABNORMAL (ref 9–20)
CALCIUM SERPL-MCNC: 7 MG/DL (ref 8.6–10.4)
CHLORIDE BLD-SCNC: 106 MMOL/L (ref 98–107)
CO2: 22 MMOL/L (ref 20–31)
CREAT SERPL-MCNC: 1.56 MG/DL (ref 0.5–0.9)
GFR AFRICAN AMERICAN: 39 ML/MIN
GFR NON-AFRICAN AMERICAN: 32 ML/MIN
GFR SERPL CREATININE-BSD FRML MDRD: ABNORMAL ML/MIN/{1.73_M2}
GFR SERPL CREATININE-BSD FRML MDRD: ABNORMAL ML/MIN/{1.73_M2}
GLUCOSE BLD-MCNC: 112 MG/DL (ref 65–105)
GLUCOSE BLD-MCNC: 114 MG/DL (ref 65–105)
GLUCOSE BLD-MCNC: 114 MG/DL (ref 70–99)
GLUCOSE BLD-MCNC: 150 MG/DL (ref 65–105)
GLUCOSE BLD-MCNC: 152 MG/DL (ref 65–105)
GLUCOSE BLD-MCNC: 166 MG/DL (ref 65–105)
GLUCOSE BLD-MCNC: 181 MG/DL (ref 65–105)
GLUCOSE BLD-MCNC: 78 MG/DL (ref 65–105)
HCT VFR BLD CALC: 24.5 % (ref 36.3–47.1)
HCT VFR BLD CALC: 24.5 % (ref 36.3–47.1)
HEMOGLOBIN: 7.1 G/DL (ref 11.9–15.1)
HEMOGLOBIN: 7.1 G/DL (ref 11.9–15.1)
MCH RBC QN AUTO: 20.6 PG (ref 25.2–33.5)
MCH RBC QN AUTO: 20.7 PG (ref 25.2–33.5)
MCHC RBC AUTO-ENTMCNC: 29 G/DL (ref 28.4–34.8)
MCHC RBC AUTO-ENTMCNC: 29 G/DL (ref 28.4–34.8)
MCV RBC AUTO: 71 FL (ref 82.6–102.9)
MCV RBC AUTO: 71.4 FL (ref 82.6–102.9)
PDW BLD-RTO: 18.4 % (ref 11.8–14.4)
PDW BLD-RTO: 18.4 % (ref 11.8–14.4)
PLATELET # BLD: 270 K/UL (ref 138–453)
PLATELET # BLD: 295 K/UL (ref 138–453)
PMV BLD AUTO: 10.3 FL (ref 8.1–13.5)
PMV BLD AUTO: 10.4 FL (ref 8.1–13.5)
POTASSIUM SERPL-SCNC: 3.9 MMOL/L (ref 3.7–5.3)
RBC # BLD: 3.43 M/UL (ref 3.95–5.11)
RBC # BLD: 3.45 M/UL (ref 3.95–5.11)
SODIUM BLD-SCNC: 140 MMOL/L (ref 135–144)
WBC # BLD: 9.4 K/UL (ref 3.5–11.3)
WBC # BLD: 9.9 K/UL (ref 3.5–11.3)

## 2018-01-06 PROCEDURE — 82947 ASSAY GLUCOSE BLOOD QUANT: CPT

## 2018-01-06 PROCEDURE — 99232 SBSQ HOSP IP/OBS MODERATE 35: CPT | Performed by: INTERNAL MEDICINE

## 2018-01-06 PROCEDURE — 80048 BASIC METABOLIC PNL TOTAL CA: CPT

## 2018-01-06 PROCEDURE — 2580000003 HC RX 258: Performed by: INTERNAL MEDICINE

## 2018-01-06 PROCEDURE — 6370000000 HC RX 637 (ALT 250 FOR IP): Performed by: NURSE PRACTITIONER

## 2018-01-06 PROCEDURE — 6360000002 HC RX W HCPCS: Performed by: NURSE PRACTITIONER

## 2018-01-06 PROCEDURE — 94640 AIRWAY INHALATION TREATMENT: CPT

## 2018-01-06 PROCEDURE — 6360000002 HC RX W HCPCS: Performed by: INTERNAL MEDICINE

## 2018-01-06 PROCEDURE — 2580000003 HC RX 258: Performed by: NURSE PRACTITIONER

## 2018-01-06 PROCEDURE — 36415 COLL VENOUS BLD VENIPUNCTURE: CPT

## 2018-01-06 PROCEDURE — 85027 COMPLETE CBC AUTOMATED: CPT

## 2018-01-06 PROCEDURE — 2060000000 HC ICU INTERMEDIATE R&B

## 2018-01-06 PROCEDURE — 94762 N-INVAS EAR/PLS OXIMTRY CONT: CPT

## 2018-01-06 RX ORDER — LEVOFLOXACIN 750 MG/1
750 TABLET ORAL EVERY OTHER DAY
Status: DISCONTINUED | OUTPATIENT
Start: 2018-01-08 | End: 2018-01-07 | Stop reason: HOSPADM

## 2018-01-06 RX ORDER — CEFUROXIME AXETIL 250 MG/1
250 TABLET ORAL 2 TIMES DAILY
Qty: 14 TABLET | Refills: 0 | Status: SHIPPED | OUTPATIENT
Start: 2018-01-06 | End: 2018-01-07

## 2018-01-06 RX ORDER — LEVOFLOXACIN 750 MG/1
750 TABLET ORAL EVERY OTHER DAY
Qty: 3 TABLET | Refills: 0 | Status: SHIPPED | OUTPATIENT
Start: 2018-01-08 | End: 2018-01-07

## 2018-01-06 RX ADMIN — SODIUM CHLORIDE: 9 INJECTION, SOLUTION INTRAVENOUS at 01:07

## 2018-01-06 RX ADMIN — ATORVASTATIN CALCIUM 80 MG: 80 TABLET, FILM COATED ORAL at 08:09

## 2018-01-06 RX ADMIN — FERROUS SULFATE TAB EC 325 MG (65 MG FE EQUIVALENT) 325 MG: 325 (65 FE) TABLET DELAYED RESPONSE at 20:16

## 2018-01-06 RX ADMIN — IPRATROPIUM BROMIDE AND ALBUTEROL SULFATE 1 AMPULE: .5; 3 SOLUTION RESPIRATORY (INHALATION) at 16:44

## 2018-01-06 RX ADMIN — HYDROCODONE BITARTRATE AND ACETAMINOPHEN 1 TABLET: 5; 325 TABLET ORAL at 19:08

## 2018-01-06 RX ADMIN — DOCUSATE SODIUM 100 MG: 100 CAPSULE, LIQUID FILLED ORAL at 08:09

## 2018-01-06 RX ADMIN — SERTRALINE 50 MG: 50 TABLET, FILM COATED ORAL at 08:09

## 2018-01-06 RX ADMIN — HYDROCODONE BITARTRATE AND ACETAMINOPHEN 2 TABLET: 5; 325 TABLET ORAL at 00:10

## 2018-01-06 RX ADMIN — CLOPIDOGREL 75 MG: 75 TABLET, FILM COATED ORAL at 08:09

## 2018-01-06 RX ADMIN — IPRATROPIUM BROMIDE AND ALBUTEROL SULFATE 1 AMPULE: .5; 3 SOLUTION RESPIRATORY (INHALATION) at 19:30

## 2018-01-06 RX ADMIN — LEVOFLOXACIN 750 MG: 5 INJECTION, SOLUTION INTRAVENOUS at 05:07

## 2018-01-06 RX ADMIN — FERROUS SULFATE TAB EC 325 MG (65 MG FE EQUIVALENT) 325 MG: 325 (65 FE) TABLET DELAYED RESPONSE at 08:09

## 2018-01-06 RX ADMIN — LEVOTHYROXINE SODIUM 100 MCG: 100 TABLET ORAL at 05:07

## 2018-01-06 RX ADMIN — CEFTRIAXONE SODIUM 1 G: 1 INJECTION, POWDER, FOR SOLUTION INTRAMUSCULAR; INTRAVENOUS at 17:37

## 2018-01-06 RX ADMIN — HYDROCODONE BITARTRATE AND ACETAMINOPHEN 2 TABLET: 5; 325 TABLET ORAL at 05:29

## 2018-01-06 RX ADMIN — ASPIRIN 81 MG: 81 TABLET, CHEWABLE ORAL at 08:09

## 2018-01-06 RX ADMIN — IPRATROPIUM BROMIDE AND ALBUTEROL SULFATE 1 AMPULE: .5; 3 SOLUTION RESPIRATORY (INHALATION) at 08:43

## 2018-01-06 RX ADMIN — DOCUSATE SODIUM 100 MG: 100 CAPSULE, LIQUID FILLED ORAL at 20:16

## 2018-01-06 RX ADMIN — VITAMIN D, TAB 1000IU (100/BT) 1000 UNITS: 25 TAB at 08:09

## 2018-01-06 RX ADMIN — IPRATROPIUM BROMIDE AND ALBUTEROL SULFATE 1 AMPULE: .5; 3 SOLUTION RESPIRATORY (INHALATION) at 12:45

## 2018-01-06 RX ADMIN — FAMOTIDINE 20 MG: 20 TABLET, FILM COATED ORAL at 08:09

## 2018-01-06 RX ADMIN — Medication 10 ML: at 20:16

## 2018-01-06 RX ADMIN — Medication 1000 MCG: at 08:09

## 2018-01-06 ASSESSMENT — ENCOUNTER SYMPTOMS
ABDOMINAL PAIN: 0
HEMOPTYSIS: 0
BLOOD IN STOOL: 0
SPUTUM PRODUCTION: 1
VOMITING: 0
NAUSEA: 0
BLURRED VISION: 0
COUGH: 1
WHEEZING: 0
SHORTNESS OF BREATH: 1

## 2018-01-06 ASSESSMENT — PAIN SCALES - GENERAL
PAINLEVEL_OUTOF10: 4
PAINLEVEL_OUTOF10: 0
PAINLEVEL_OUTOF10: 2
PAINLEVEL_OUTOF10: 9
PAINLEVEL_OUTOF10: 10

## 2018-01-06 NOTE — PLAN OF CARE
lispro (HUMALOG) injection vial 0-6 Units TID WC   insulin lispro (HUMALOG) injection vial 0-3 Units Nightly   cefTRIAXone (ROCEPHIN) 1 g in sterile water 10 mL IV syringe Q24H       VITALS:  BP (!) 115/51   Pulse 68   Temp 97.4 °F (36.3 °C) (Axillary)   Resp 17   Ht 5' 1\" (1.549 m)   Wt 225 lb 4.8 oz (102.2 kg)   SpO2 92%   BMI 42.57 kg/m²     LABS:   Hematology:  Recent Labs      01/04/18 0154 01/05/18   0626 01/06/18   0629  01/06/18   1457   WBC  14.8*  12.2*  9.4  9.9   HGB  7.9*  7.1*  7.1*  7.1*   HCT  27.0*  24.7*  24.5*  24.5*   PLT  234  274  270  295   INR  1.0   --    --    --      Chemistry:  Recent Labs      01/04/18 0154 01/05/18   0626 01/05/18   1907 01/05/18   1908 01/06/18   0629   NA  133*   --    --    --   140   K  3.7   --    --    --   3.9   CL  97*   --    --    --   106   CO2  17*   --    --    --   22   GLUCOSE  238*   --   87  114*  114*   BUN  19  26*   --    --   24*   CREATININE  1.27*  2.27*   --    --   1.56*   CALCIUM  7.2*   --    --    --   7.0*     Recent Labs      01/04/18   0148  01/04/18 0154 01/04/18   0346   PROT   --   6.3*   --    LABALBU   --   2.9*   --    AST   --   21   --    ALT   --   14   --    ALKPHOS   --   84   --    BILITOT   --   0.65   --    BILIDIR   --   0.27   --    TROPONINI  0.00   --   0.00       PROBLEMS:  Principal Problem:    Pneumonia of right lung due to infectious organism  Active Problems:    Acute kidney injury ([facility]    CAD (coronary artery disease)    Essential hypertension    Diabetes mellitus (HCC)    Urinary tract infection without hematuria    Chronic diastolic congestive heart failure (HCC)    Type 2 diabetes mellitus without complication, without long-term current use of insulin ([facility])    Pneumonia    Hypoalbuminemia    Lactic acid acidosis    Hyponatremia    Hyperglycemia    Hypocalcemia      UPDATED PLAN:  See current orders  Gentle hydration  Renal evaluation  Vancomycin on hold   Check bun and creatinine  Will monitor and control Diabetes - hold Prandin, hold Tradjenta   Will monitor and control Blood Pressure  Correct electrolyte abnormalities   Pulmonary evaluation - may need thoracentesis for right pleural effusion  Respiratory Therapy and Bronchodilators prn   Zaroxolyn on hold  Metoprolol on hold  Metformin on hold  Bumex on hold  The Zaroxolyn on hold  Thyroid replacement - history of thyroid cancer  Repeat H&H,  Transfuse hemoglobin less than 7,  Patient has consented  Stool for occult blood pending  Outpatient anemia workup,  Continue vitamin B12 supplementation  Urine culture -  Revealing no growth   Discharge planning  Will discharge when arrangements complete and ok with other services.   Follow-up with PCP in one week, Swapnil Cantu MD  Notify PCP of discharge    CBC 1 week   BMP one week   Pulmonary follow-up as scheduled   Suggest chest x-ray in 2 weeks  The patient's status and plan have been discussed with the patient and family at the bedside   He family plans on taking her home, lives with her daughter    [address] [facility], [address] [place] [address]  1/6/2018  6:35 PM

## 2018-01-06 NOTE — PROGRESS NOTES
organism  3. DM2  4. CAD  5. Lactic acidosis  6. Hyponatremia - secondary to BRIA  7. Chronic diastolic CHF  8. Essential hypertension  9. Anemia of chronic disease  PLAN     1. Continue to hold Bumex and metolazone (did get a dose yesterday)  2. Encourage oral intake  3. Monitor input and output  4. BMP in AM  5. Antibiotic dosing per eGFR       Please do not hesitate to call with questions    This note is created with the assistance of a speech-recognition program. While intending to generate a document that actually reflects the content of the visit, no guarantees can be provided that every mistake has been identified and corrected by editing    Sean Garcia MD, [facility] Ashley Kumari), [address]   1/6/2018 3:20 PM     Attending Physician Statement  I have discussed the care of Marco Ivey, including pertinent history and exam findings with the resident/fellow. I have reviewed the key elements of all parts of the encounter with the resident/fellow and have edited the documentation as appropriate. I have seen and examined the patient with the resident/fellow. I agree with the assessment and plan and status of the problem list as documented. Addiitionally I recommend OK for discharge any time from the Nephrology stand point.     Kyle Lopez MD  Nephrology Attending  [facility]

## 2018-01-06 NOTE — PROGRESS NOTES
Systems   Constitutional: Negative for chills, diaphoresis, fever and malaise/fatigue. HENT: Positive for hearing loss. Eyes: Negative for blurred vision. Respiratory: Positive for cough, sputum production (yellow) and shortness of breath. Negative for hemoptysis and wheezing. Cardiovascular: Positive for leg swelling (chronic). Negative for chest pain and palpitations. Gastrointestinal: Negative for abdominal pain, blood in stool, melena, nausea and vomiting. Genitourinary: Negative for flank pain and hematuria. Musculoskeletal: Negative for joint pain and myalgias. No new neuromuscular complaints   Skin: Negative for itching and rash. Neurological: Positive for weakness. Negative for dizziness and focal weakness. Psychiatric/Behavioral: Positive for memory loss. Physical Examination:        Vitals:  BP (!) 93/41   Pulse 64   Temp 97.7 °F (36.5 °C) (Oral)   Resp 13   Ht 5' 1\" (1.549 m)   Wt 225 lb 4.8 oz (102.2 kg)   SpO2 93%   BMI 42.57 kg/m²   Temp (24hrs), Av.7 °F (36.5 °C), Min:97.3 °F (36.3 °C), Max:98 °F (36.7 °C)    Recent Labs      18   0013  18   0106  18   0246  18   0516   POCGLU  152*  181*  166*  114*       Physical Exam   Constitutional: No distress. HENT:   Head: Normocephalic and atraumatic. Eyes: Conjunctivae are normal. No scleral icterus. Neck: Neck supple. No JVD present. Cardiovascular: Normal rate and regular rhythm. Pulmonary/Chest: Effort normal. No respiratory distress. She exhibits no tenderness. Airflow improved  Scattered rhonchi  Rare rale   Abdominal: Soft. Bowel sounds are normal. She exhibits no distension. There is no tenderness. Musculoskeletal: She exhibits edema (lymphedema). She exhibits no tenderness. Neurological: She is alert. She exhibits normal muscle tone. Coordination normal.   Skin: Skin is warm and dry. She is not diaphoretic.    Lymphedema and stasis changes at the ankles Data:     I/O (24Hr): Intake/Output Summary (Last 24 hours) at 01/06/18 0746  Last data filed at 01/06/18 0546   Gross per 24 hour   Intake             2025 ml   Output              500 ml   Net             1525 ml       Labs:    Hematology:  Recent Labs      01/04/18   0154 01/05/18   0626 01/06/18   0629   WBC  14.8*  12.2*  9.4   HGB  7.9*  7.1*  7.1*   HCT  27.0*  24.7*  24.5*   PLT  234  274  270   INR  1.0   --    --      Chemistry:  Recent Labs      01/04/18   0154 01/05/18   0626 01/05/18   1907 01/05/18   1908 01/06/18   0629   NA  133*   --    --    --   140   K  3.7   --    --    --   3.9   CL  97*   --    --    --   106   CO2  17*   --    --    --   22   GLUCOSE  238*   --   87  114*  114*   BUN  19  26*   --    --   24*   CREATININE  1.27*  2.27*   --    --   1.56*   CALCIUM  7.2*   --    --    --   7.0*     Recent Labs      01/04/18   0148  01/04/18 0154 01/04/18   0346   PROT   --   6.3*   --    LABALBU   --   2.9*   --    AST   --   21   --    ALT   --   14   --    ALKPHOS   --   84   --    BILITOT   --   0.65   --    BILIDIR   --   0.27   --    TROPONINI  0.00   --   0.00       Lab Results   Component Value Date/Time    SPECIAL NOT REPORTED 01/05/2018 01:05 AM     Lab Results   Component Value Date/Time    CULTURE CULTURE IN PROGRESS 01/04/2018 06:00 AM    CULTURE  01/04/2018 06:00 AM     [address] [place] [address] [phone]       Lab Results   Component Value Date    PH 7.20 10/05/2012    PCO2 66 10/05/2012    HCO3 25.8 10/05/2012    FIO2 NOT REPORTED 01/04/2018       Radiology:  Renal ultrasound  Negative    Chest x-ray 1/5:  Impression:        1. Findings consistent with multifocal pneumonia in the right lung primarily  in the right upper lobe.  Additional airspace opacity in the right mid and  right lower lung zones.  Follow-up is recommend to document resolution. 2. Underlying mild CHF related changes as detailed above.          Assessment:

## 2018-01-06 NOTE — PLAN OF CARE
Problem: Risk for Impaired Skin Integrity  Goal: Tissue integrity - skin and mucous membranes  Structural intactness and normal physiological function of skin and  mucous membranes. Intervention: SKIN ASSESSMENT  Skin assessment complete. No new breakdown noted. Interventions in place. See doc flowsheet for interventions initiated. Pt encouraged to turn.  Will continue to monitor for additional needs and skin breakdown

## 2018-01-06 NOTE — PROGRESS NOTES
sertraline (ZOLOFT) 50 MG tablet TAKE 1 TAB BY MOUTH ONCE A DAY 7/10/17   Watson Ward MD   Blood Pressure Monitoring (B-D ASSURE BPM/AUTO ARM CUFF) MISC 1 Device by Does not apply route daily 4/20/17   Watson Ward MD   repaglinide (PRANDIN) 1 MG tablet TAKE 1 TAB BY MOUTH THREE TIMES A DAY BEFORE MEALS 4/10/17   Eli Burkett MD   JANUVIA 100 MG tablet TAKE 1 TAB BY MOUTH ONCE A DAY 3/30/17   Eli Burkett MD   Glucose Blood (BLOOD GLUCOSE TEST STRIPS) STRP 1 Device by In Vitro route 4 times daily Test___times daily    Diagnosis: 250.0   Diabetes Mellitus____Insulin Dependent____Non-Insulin Dependent 2/20/17   Watson Ward MD   Lancets ([employer] ULTRA THIN AUTO LANCET) [address] 1 Device by Does not apply route 4 times daily 2/20/17   Watson Ward MD   nystatin (MYCOSTATIN) 718641 UNIT/GM powder Apply 3 times daily. 2/20/17   Watson Ward MD   Vitamin D, Cholecalciferol, 1000 UNITS CAPS Take 1,000 Units by mouth daily 10/6/16   Watson Ward MD   aspirin 81 MG chewable tablet Take 1 tablet by mouth daily 9/5/16   Newton Reich MD   clopidogrel (PLAVIX) 75 MG tablet TAKE ONE TABLET BY MOUTH ONCE A DAY 8/2/16   Pamella Fajardo MD   ferrous sulfate 325 (65 FE) MG tablet TAKE ONE TABLET BY MOUTH TWICE A DAY 6/18/15   Eli Burkett MD         Intake/Output Summary (Last 24 hours) at 01/06/18 1052  Last data filed at 01/06/18 0546   Gross per 24 hour   Intake             2025 ml   Output              500 ml   Net             1525 ml       Diet/Nutrition   DIET GENERAL; Carb Control: 3 carbs/meal (approximate 1500 kcals/day);  Low Sodium (2 GM)    Vitals:   BP (!) 93/41   Pulse 64   Temp 97.7 °F (36.5 °C) (Oral)   Resp 13   Ht 5' 1\" (1.549 m)   Wt 225 lb 4.8 oz (102.2 kg)   SpO2 93%   BMI 42.57 kg/m²  on     I/O   I/O (24 Hours)    Patient Vitals for the past 8 hrs:   BP Temp Temp src Pulse Resp SpO2 Weight   01/06/18 0730 (!) 93/41 97.7 °F (36.5 °C) Oral 64 13 93 % -   01/06/18 0546 - - - - - - MODE NOT REPORTED 01/04/2018       Cx:  None +     Films:  CXR  Infiltrates- right upper lobe and right lower lobe  Pleural effusion on the right small           Assessment:   Right upper lobe pneumonia   Small rt pleural effusion   BRIA worsening fena 1.3 %   Chronic le lymphedema  Active Hospital Problems    Diagnosis Date Noted    Hypocalcemia [E83.51]     Pneumonia [J18.9] 01/04/2018    Hypoalbuminemia [E88.09] 01/04/2018    Lactic acid acidosis [E87.2] 01/04/2018    Hyponatremia [E87.1] 01/04/2018    Hyperglycemia [R73.9]     Type 2 diabetes mellitus without complication, without long-term current use of insulin ([facility] [E11.9] 07/04/2017    Pneumonia of right lung due to infectious organism [J18.9]     Chronic diastolic congestive heart failure ([address] [I50.32] 02/20/2017    Urinary tract infection without hematuria [N39.0]     Diabetes mellitus ([address] [E11.9]     CAD (coronary artery disease) [I25.10]     Essential hypertension [I10]     Acute kidney injury ([address] [N17.9] 09/27/2013      Plan:  Cont ceftriaxone has received 3 doses   Renal dose levoquin has received 2 doses - change to po   Renal fx better   Encourage po   For follow x-ray on Sunday to assess for right pleural effusion.  If better can dc luis on 5 more doses of levoquin and follow up in clinic in 4 weeks with xray chest   D/w pt's daughters     Mary Conrad MD

## 2018-01-07 ENCOUNTER — APPOINTMENT (OUTPATIENT)
Dept: GENERAL RADIOLOGY | Age: 83
DRG: 871 | End: 2018-01-07
Payer: MEDICARE

## 2018-01-07 VITALS
HEART RATE: 67 BPM | BODY MASS INDEX: 41.07 KG/M2 | SYSTOLIC BLOOD PRESSURE: 119 MMHG | OXYGEN SATURATION: 92 % | WEIGHT: 217.5 LBS | HEIGHT: 61 IN | RESPIRATION RATE: 19 BRPM | DIASTOLIC BLOOD PRESSURE: 66 MMHG | TEMPERATURE: 98.4 F

## 2018-01-07 PROBLEM — E66.01 OBESITY, CLASS III, BMI 40-49.9 (MORBID OBESITY) (HCC): Status: ACTIVE | Noted: 2018-01-07

## 2018-01-07 PROBLEM — E66.813 OBESITY, CLASS III, BMI 40-49.9 (MORBID OBESITY) (HCC): Status: ACTIVE | Noted: 2018-01-07

## 2018-01-07 LAB
ANION GAP SERPL CALCULATED.3IONS-SCNC: 13 MMOL/L (ref 9–17)
BUN BLDV-MCNC: 16 MG/DL (ref 8–23)
BUN/CREAT BLD: ABNORMAL (ref 9–20)
CALCIUM SERPL-MCNC: 7.7 MG/DL (ref 8.6–10.4)
CHLORIDE BLD-SCNC: 105 MMOL/L (ref 98–107)
CO2: 24 MMOL/L (ref 20–31)
CREAT SERPL-MCNC: 1.08 MG/DL (ref 0.5–0.9)
GFR AFRICAN AMERICAN: 59 ML/MIN
GFR NON-AFRICAN AMERICAN: 49 ML/MIN
GFR SERPL CREATININE-BSD FRML MDRD: ABNORMAL ML/MIN/{1.73_M2}
GFR SERPL CREATININE-BSD FRML MDRD: ABNORMAL ML/MIN/{1.73_M2}
GLUCOSE BLD-MCNC: 115 MG/DL (ref 70–99)
HCT VFR BLD CALC: 27.6 % (ref 36.3–47.1)
HEMOGLOBIN: 7.9 G/DL (ref 11.9–15.1)
MCH RBC QN AUTO: 20.1 PG (ref 25.2–33.5)
MCHC RBC AUTO-ENTMCNC: 28.6 G/DL (ref 28.4–34.8)
MCV RBC AUTO: 70.2 FL (ref 82.6–102.9)
PDW BLD-RTO: 18.3 % (ref 11.8–14.4)
PLATELET # BLD: 337 K/UL (ref 138–453)
PMV BLD AUTO: 10 FL (ref 8.1–13.5)
POTASSIUM SERPL-SCNC: 4.5 MMOL/L (ref 3.7–5.3)
RBC # BLD: 3.93 M/UL (ref 3.95–5.11)
SODIUM BLD-SCNC: 142 MMOL/L (ref 135–144)
WBC # BLD: 10.8 K/UL (ref 3.5–11.3)

## 2018-01-07 PROCEDURE — 6370000000 HC RX 637 (ALT 250 FOR IP): Performed by: NURSE PRACTITIONER

## 2018-01-07 PROCEDURE — 36415 COLL VENOUS BLD VENIPUNCTURE: CPT

## 2018-01-07 PROCEDURE — 85027 COMPLETE CBC AUTOMATED: CPT

## 2018-01-07 PROCEDURE — 71046 X-RAY EXAM CHEST 2 VIEWS: CPT

## 2018-01-07 PROCEDURE — 6360000002 HC RX W HCPCS: Performed by: INTERNAL MEDICINE

## 2018-01-07 PROCEDURE — 2580000003 HC RX 258: Performed by: NURSE PRACTITIONER

## 2018-01-07 PROCEDURE — 99232 SBSQ HOSP IP/OBS MODERATE 35: CPT | Performed by: INTERNAL MEDICINE

## 2018-01-07 PROCEDURE — 94762 N-INVAS EAR/PLS OXIMTRY CONT: CPT

## 2018-01-07 PROCEDURE — 2500000003 HC RX 250 WO HCPCS: Performed by: INTERNAL MEDICINE

## 2018-01-07 PROCEDURE — 80048 BASIC METABOLIC PNL TOTAL CA: CPT

## 2018-01-07 RX ORDER — CEFUROXIME AXETIL 250 MG/1
250 TABLET ORAL 2 TIMES DAILY
Qty: 6 TABLET | Refills: 0 | Status: SHIPPED | OUTPATIENT
Start: 2018-01-08 | End: 2018-01-11 | Stop reason: ALTCHOICE

## 2018-01-07 RX ORDER — LEVOFLOXACIN 750 MG/1
750 TABLET ORAL EVERY OTHER DAY
Qty: 5 TABLET | Refills: 0 | Status: SHIPPED | OUTPATIENT
Start: 2018-01-08 | End: 2018-01-17

## 2018-01-07 RX ADMIN — FERROUS SULFATE TAB EC 325 MG (65 MG FE EQUIVALENT) 325 MG: 325 (65 FE) TABLET DELAYED RESPONSE at 08:06

## 2018-01-07 RX ADMIN — SERTRALINE 50 MG: 50 TABLET, FILM COATED ORAL at 08:06

## 2018-01-07 RX ADMIN — ATORVASTATIN CALCIUM 80 MG: 80 TABLET, FILM COATED ORAL at 08:06

## 2018-01-07 RX ADMIN — DOCUSATE SODIUM 100 MG: 100 CAPSULE, LIQUID FILLED ORAL at 08:06

## 2018-01-07 RX ADMIN — CLOPIDOGREL 75 MG: 75 TABLET, FILM COATED ORAL at 08:06

## 2018-01-07 RX ADMIN — HYDROCODONE BITARTRATE AND ACETAMINOPHEN 1 TABLET: 5; 325 TABLET ORAL at 00:35

## 2018-01-07 RX ADMIN — ASPIRIN 81 MG: 81 TABLET, CHEWABLE ORAL at 08:06

## 2018-01-07 RX ADMIN — HYDROCODONE BITARTRATE AND ACETAMINOPHEN 1 TABLET: 5; 325 TABLET ORAL at 04:04

## 2018-01-07 RX ADMIN — Medication 1000 MCG: at 08:06

## 2018-01-07 RX ADMIN — LEVOTHYROXINE SODIUM 100 MCG: 100 TABLET ORAL at 06:03

## 2018-01-07 RX ADMIN — VITAMIN D, TAB 1000IU (100/BT) 1000 UNITS: 25 TAB at 08:06

## 2018-01-07 RX ADMIN — Medication 10 ML: at 08:06

## 2018-01-07 RX ADMIN — CEFTRIAXONE SODIUM 1 G: 1 INJECTION, POWDER, FOR SOLUTION INTRAMUSCULAR; INTRAVENOUS at 10:34

## 2018-01-07 RX ADMIN — FAMOTIDINE 20 MG: 20 TABLET, FILM COATED ORAL at 08:06

## 2018-01-07 ASSESSMENT — ENCOUNTER SYMPTOMS
NAUSEA: 0
WHEEZING: 0
SPUTUM PRODUCTION: 1
HEMOPTYSIS: 0
BLOOD IN STOOL: 0
COUGH: 1
BLURRED VISION: 0
SHORTNESS OF BREATH: 1
ABDOMINAL PAIN: 0
VOMITING: 0

## 2018-01-07 ASSESSMENT — PAIN SCALES - GENERAL
PAINLEVEL_OUTOF10: 6
PAINLEVEL_OUTOF10: 6

## 2018-01-07 NOTE — PROGRESS NOTES
initiated  Ferritin was 105  Recent folate was 15.3 recent B12 was 41 -  She is on vitamin B12 supplementation  Cultures have been negative    The patient responded to medical therapy  She was transitioned to oral therapy  She was discharged in improved condition    Past Medical History:   has a past medical history of Arthritis; Atrial flutter ([facility] CAD (coronary artery disease); Diabetes ([facility] Diabetes mellitus ([facility] Hyperlipidemia; Hypertension; Psychiatric problem; Thyroid [facility] ([facility] and Thyroid disease. Social History:   reports that she has quit smoking. She has never used smokeless tobacco. She reports that she does not drink alcohol or use drugs. Family History:   Family History   Problem Relation Age of Onset    Cancer Mother     Cancer Father     Cancer Brother     Diabetes Brother        Medications: Allergies:     Allergies   Allergen Reactions    Strawberry Extract     Tape Melburn Coins Tape] Rash       Current Meds:   Scheduled Meds:    [START ON 1/8/2018] levofloxacin  750 mg Oral Every Other Day    enoxaparin  30 mg Subcutaneous Daily    aspirin  81 mg Oral Daily    atorvastatin  80 mg Oral Daily    clopidogrel  75 mg Oral Daily    cyanocobalamin  1,000 mcg Oral Daily    ferrous sulfate  325 mg Oral BID    levothyroxine  100 mcg Oral Daily    cetirizine  10 mg Oral Daily    sertraline  50 mg Oral Daily    vitamin D  1,000 Units Oral Daily    sodium chloride flush  10 mL Intravenous 2 times per day    docusate sodium  100 mg Oral BID    famotidine  20 mg Oral Daily    ipratropium-albuterol  1 ampule Inhalation Q4H WA    insulin lispro  0-6 Units Subcutaneous TID WC    insulin lispro  0-3 Units Subcutaneous Nightly    cefTRIAXone (ROCEPHIN) IV  1 g Intravenous Q24H     Continuous Infusions:    dextrose       PRN Meds: docusate sodium, sodium chloride flush, acetaminophen, HYDROcodone 5 mg - acetaminophen **OR** HYDROcodone 5 mg - acetaminophen, magnesium hydroxide, bisacodyl, ondansetron, nicotine, albuterol, glucose, dextrose, glucagon (rDNA), dextrose      Review of Systems:     Review of Systems   Constitutional: Negative for chills, diaphoresis, fever and malaise/fatigue. HENT: Negative for hearing loss. Eyes: Negative for blurred vision. Respiratory: Positive for cough, sputum production (yellow) and shortness of breath. Negative for hemoptysis and wheezing. Cardiovascular: Positive for leg swelling (chronic). Negative for chest pain and palpitations. Gastrointestinal: Negative for abdominal pain, blood in stool, melena, nausea and vomiting. Genitourinary: Negative for flank pain and hematuria. Musculoskeletal: Negative for joint pain and myalgias. No new neuromuscular complaints   Skin: Negative for itching and rash. Neurological: Positive for weakness. Negative for dizziness and focal weakness. Psychiatric/Behavioral: Positive for memory loss. Physical Examination:        Vitals:  BP (!) 118/55   Pulse 72   Temp 98.1 °F (36.7 °C)   Resp 20   Ht 5' 1\" (1.549 m)   Wt 217 lb 8 oz (98.7 kg)   SpO2 93%   BMI 41.10 kg/m²   Temp (24hrs), Av.9 °F (36.6 °C), Min:97.4 °F (36.3 °C), Max:98.6 °F (37 °C)    Recent Labs      18   0516  18   1140  18   1627  18   1954   POCGLU  114*  78  112*  150*       Physical Exam   Constitutional: No distress. HENT:   Head: Normocephalic and atraumatic. Eyes: Conjunctivae are normal. No scleral icterus. Neck: Neck supple. No JVD present. Cardiovascular: Normal rate and regular rhythm. Pulmonary/Chest: Effort normal. No respiratory distress. She exhibits no tenderness. Airflow improved  Scattered rhonchi  Rare rale   Abdominal: Soft. Bowel sounds are normal. She exhibits no distension. There is no tenderness. Musculoskeletal: She exhibits edema (lymphedema 3/4 from the knee dpwn ). She exhibits no tenderness. Neurological: She is alert.  She exhibits normal muscle tone. Coordination normal.   Skin: Skin is warm and dry. She is not diaphoretic. Lymphedema and stasis changes at the ankles         Data:     I/O (24Hr): Intake/Output Summary (Last 24 hours) at 01/07/18 0736  Last data filed at 01/07/18 0655   Gross per 24 hour   Intake             1060 ml   Output             1100 ml   Net              -40 ml       Labs:    Hematology:  Recent Labs      01/06/18   0629  01/06/18   1457  01/07/18   0700   WBC  9.4  9.9  10.8   HGB  7.1*  7.1*  7.9*   HCT  24.5*  24.5*  27.6*   PLT  270  295  337     Chemistry:  Recent Labs      01/05/18   0626   01/05/18   1908  01/06/18   0629  01/07/18   0700   NA   --    --    --   140  142   K   --    --    --   3.9  4.5   CL   --    --    --   106  105   CO2   --    --    --   22  24   GLUCOSE   --    < >  114*  114*  115*   BUN  26*   --    --   24*  16   CREATININE  2.27*   --    --   1.56*  1.08*   CALCIUM   --    --    --   7.0*  7.7*    < > = values in this interval not displayed. No results for input(s): PROT, LABALBU, LABA1C, V0POTNA, H4LBMAA, FT4, TSH, AST, ALT, LDH, GGT, ALKPHOS, BILITOT, BILIDIR, AMMONIA, AMYLASE, LIPASE, LACTATE, CHOL, TRIG, HDL, LDLCALC, LDLDIRECT, LABVLDL, BNP, TROPONINI, CKTOTAL, CKMB, CKMBINDEX in the last 72 hours. Lab Results   Component Value Date/Time    SPECIAL NOT REPORTED 01/05/2018 01:05 AM     Lab Results   Component Value Date/Time    CULTURE CULTURE IN PROGRESS 01/04/2018 06:00 AM    CULTURE  01/04/2018 06:00 AM     [address], [address] [place] [address] [phone]       Lab Results   Component Value Date    PH 7.20 10/05/2012    PCO2 66 10/05/2012    HCO3 25.8 10/05/2012    FIO2 NOT REPORTED 01/04/2018       Radiology:  Renal ultrasound  Negative    Chest x-ray 1/5:  Impression:        1. Findings consistent with multifocal pneumonia in the right lung primarily  in the right upper lobe.  Additional airspace opacity in the right mid and  right lower lung zones.  Follow-up is recommend to document resolution. 2. Underlying mild CHF related changes as detailed above. Assessment:        Primary Problem  Principal Problem:    Pneumonia of right lung due to infectious organism  Active Problems:    Acute kidney injury ([facility]    CAD (coronary artery disease)    Essential hypertension    Diabetes mellitus ([facility]    Urinary tract infection without hematuria    Chronic diastolic congestive heart failure (HCC)    Type 2 diabetes mellitus without complication, without long-term current use of insulin (HCC)    Pneumonia    Hypoalbuminemia    Lactic acid acidosis    Hyponatremia    Hyperglycemia    Hypocalcemia      Plan:        Vancomycin Discontinued  To be discharged on Levaquin and Ceftin  Renal evaluation noted   Check bun and creatinine  Will monitor and control Diabetes - hold Prandin, hold Tradjenta for hypoglycemia  Will monitor and control Blood Pressure  Correct electrolyte abnormalities   Pulmonary evaluation - may need thoracentesis for right pleural effusion  Respiratory Therapy and Bronchodilators prn   Zaroxolyn on hold  Metoprolol on hold  Metformin on hold   Bumex on hold  Zaroxolyn on hold  Thyroid replacement - history of thyroid cancer  Monitor H&H,  Transfuse hemoglobin less than 7,  Patient has consented if needed  Stool for occult blood pending  Outpatient anemia workup,  Continue vitamin B12 supplementation  Urine culture -  Revealing no growth  Discharge planning - hopes to go home with daughter  Will discharge when arrangements complete and ok with other services.   Follow-up with PCP in one week, Krystle Frankel MD  Notify PCP of discharge    CBC 1 week   BMP one week   Pulmonary follow-up as scheduled   Suggest chest x-ray in 2 weeks    PHARMACY TO [address] [place], [place] [address]IST  IP CONSULT TO PULMONOLOGY  PHARMACY CONSULT FOR RENAL DOSING  IP CONSULT TO DO Yesi  1/7/2018  7:36 AM

## 2018-01-07 NOTE — PROGRESS NOTES
quit smoking. She has never used smokeless tobacco.  ETOH:   reports that she does not drink alcohol. ALLERGIES:    Allergies   Allergen Reactions    Strawberry Extract     Tape Georgena Fairly Tape] Rash       Home Meds:   Prior to Admission medications    Medication Sig Start Date End Date Taking? Authorizing Provider   levofloxacin (LEVAQUIN) 750 MG tablet Take 1 tablet by mouth every other day for 5 doses 1/8/18 1/17/18 Yes Jacob Esposito MD   cefUROXime (CEFTIN) 250 MG tablet Take 1 tablet by mouth 2 times daily for 3 days 1/8/18 1/11/18 Yes Jacob Esposito MD   levothyroxine (SYNTHROID) 100 MCG tablet TAKE 1 TAB BY MOUTH EVERY MORNING 12/8/17   Mary Jane Davenport MD   loratadine (CLARITIN) 10 MG tablet TAKE 1 TAB BY MOUTH ONCE A DAY 11/27/17   Mary Jane Davenport MD   Cholecalciferol (VITAMIN D3) 1000 units TABS TAKE 1 TAB BY MOUTH ONCE A DAY 11/6/17   Mary Jane Davenport MD   atorvastatin (LIPITOR) 80 MG tablet Take 1 tablet by mouth daily 8/18/17   Mary Jane Davenport MD   HYDROcodone-acetaminophen (NORCO) 5-325 MG per tablet Take 1 tablet by mouth daily as needed for Pain .  Earliest Fill Date: 8/18/17 8/18/17   Mary Jane Davenport MD   ferrous sulfate (FE TABS) 325 (65 Fe) MG EC tablet Take 1 tablet by mouth 2 times daily 8/18/17   Mary Jane Davenport MD   docusate sodium (COLACE) 100 MG capsule Take 1 capsule by mouth daily as needed for Constipation 8/18/17   Mary Jane Davenport MD   cyanocobalamin ([employer] VITAMIN B12) 1000 MCG tablet Take 1 tablet by mouth daily 8/18/17   Mary Jane Davenport MD   clopidogrel (PLAVIX) 75 MG tablet TAKE 1 TAB BY MOUTH ONCE A DAY 8/4/17   Mary Jane Davenport MD   metoprolol tartrate (LOPRESSOR) 25 MG tablet TAKE 1 TABLET BY MOUTH 2 TIMES DAILY 8/4/17   Mary Jane Davenport MD   omeprazole (PRILOSEC OTC) 20 MG tablet Take 1 tablet by mouth daily 7/13/17   Mary Jane Davenport MD   sertraline (ZOLOFT) 50 MG tablet TAKE 1 TAB BY MOUTH ONCE A DAY 7/10/17   Mary Jane Davenport MD   Blood Pressure Monitoring (B-D ASSURE BPM/AUTO ARM CUFF)

## 2018-01-07 NOTE — DISCHARGE SUMMARY
creatinine TECHNOLOGIST PROVIDED HISTORY: Reason for exam:->increased creatinine FINDINGS: Kidneys: The right kidney measures 10.3 cm in length and the left kidney measures CT 10.9 cm in length. Kidneys demonstrate normal cortical echogenicity. No evidence of hydronephrosis or intrarenal stones. A 14 mm cyst lower pole left kidney requiring no further imaging evaluation. Bladder: Unremarkable appearance of the bladder. The patient was unable to void. Unremarkable ultrasound of the kidneys and urinary bladder. Consultations:    Consults:     Final Specialist Recommendations/Findings:   PHARMACY TO DOSE VANCOMYCIN  IP CONSULT TO HOSPITALIST  IP CONSULT TO PULMONOLOGY  PHARMACY CONSULT FOR RENAL DOSING  IP CONSULT TO NEPHROLOGY        Discharged Condition:    Stable     [facility]    Physician Follow Up:   Shoshana Maldonado MD   [address] [place] [address] [address] [place] [phone]             Diet:   Cardiac/ADA    Activity:   As tolerated    Discharge Medications:      Medication List      START taking these medications    cefUROXime 250 MG tablet  Commonly known as:  CEFTIN  Take 1 tablet by mouth 2 times daily for 3 days     levofloxacin 750 MG tablet  Commonly known as:  LEVAQUIN  Take 1 tablet by mouth every other day for 5 doses        CHANGE how you take these medications    clopidogrel 75 MG tablet  Commonly known as:  PLAVIX  TAKE 1 TAB BY MOUTH ONCE A DAY  What changed:  Another medication with the same name was removed. Continue taking this medication, and follow the directions you see here. ferrous sulfate 325 (65 Fe) MG EC tablet  Commonly known as:  FE TABS  Take 1 tablet by mouth 2 times daily  What changed:  Another medication with the same name was removed. Continue taking this medication, and follow the directions you see here. vitamin D3 1000 units Tabs  TAKE 1 TAB BY MOUTH ONCE A DAY  What changed:  Another medication with the same name was removed.

## 2018-01-08 ENCOUNTER — TELEPHONE (OUTPATIENT)
Dept: PULMONOLOGY | Age: 83
End: 2018-01-08

## 2018-01-08 ENCOUNTER — CARE COORDINATION (OUTPATIENT)
Dept: CASE MANAGEMENT | Age: 83
End: 2018-01-08

## 2018-01-08 DIAGNOSIS — J18.9 PNEUMONIA OF RIGHT UPPER LOBE DUE TO INFECTIOUS ORGANISM: Primary | ICD-10-CM

## 2018-01-10 ENCOUNTER — HOSPITAL ENCOUNTER (OUTPATIENT)
Age: 83
Discharge: HOME | End: 2018-01-10
Payer: MEDICARE

## 2018-01-10 DIAGNOSIS — E83.51 HYPOCALCEMIA: ICD-10-CM

## 2018-01-10 DIAGNOSIS — N17.9 ACUTE KIDNEY INJURY (HCC): ICD-10-CM

## 2018-01-10 DIAGNOSIS — D50.9 HYPOCHROMIC-MICROCYTIC ANEMIA: ICD-10-CM

## 2018-01-10 PROBLEM — A41.9 SEPSIS DUE TO PNEUMONIA (HCC): Status: ACTIVE | Noted: 2018-01-10

## 2018-01-10 PROBLEM — J18.9 SEPSIS DUE TO PNEUMONIA (HCC): Status: ACTIVE | Noted: 2018-01-10

## 2018-01-10 LAB
ANION GAP SERPL CALCULATED.3IONS-SCNC: 14 MMOL/L (ref 9–17)
BUN BLDV-MCNC: 6 MG/DL (ref 8–23)
BUN/CREAT BLD: ABNORMAL (ref 9–20)
CALCIUM SERPL-MCNC: 7.4 MG/DL (ref 8.6–10.4)
CHLORIDE BLD-SCNC: 99 MMOL/L (ref 98–107)
CO2: 28 MMOL/L (ref 20–31)
CREAT SERPL-MCNC: 0.96 MG/DL (ref 0.5–0.9)
CULTURE: NORMAL
CULTURE: NORMAL
GFR AFRICAN AMERICAN: >60 ML/MIN
GFR NON-AFRICAN AMERICAN: 56 ML/MIN
GFR SERPL CREATININE-BSD FRML MDRD: ABNORMAL ML/MIN/{1.73_M2}
GFR SERPL CREATININE-BSD FRML MDRD: ABNORMAL ML/MIN/{1.73_M2}
GLUCOSE BLD-MCNC: 98 MG/DL (ref 70–99)
HCT VFR BLD CALC: 29.5 % (ref 36.3–47.1)
HEMOGLOBIN: 8.6 G/DL (ref 11.9–15.1)
Lab: NORMAL
MCH RBC QN AUTO: 20.2 PG (ref 25.2–33.5)
MCHC RBC AUTO-ENTMCNC: 29.2 G/DL (ref 28.4–34.8)
MCV RBC AUTO: 69.4 FL (ref 82.6–102.9)
PDW BLD-RTO: 18.6 % (ref 11.8–14.4)
PLATELET # BLD: 387 K/UL (ref 138–453)
PMV BLD AUTO: 9.8 FL (ref 8.1–13.5)
POTASSIUM SERPL-SCNC: 3.5 MMOL/L (ref 3.7–5.3)
RBC # BLD: 4.25 M/UL (ref 3.95–5.11)
SODIUM BLD-SCNC: 141 MMOL/L (ref 135–144)
SPECIMEN DESCRIPTION: NORMAL
STATUS: NORMAL
WBC # BLD: 10.6 K/UL (ref 3.5–11.3)

## 2018-01-10 PROCEDURE — 80048 BASIC METABOLIC PNL TOTAL CA: CPT

## 2018-01-10 PROCEDURE — 85027 COMPLETE CBC AUTOMATED: CPT

## 2018-01-10 PROCEDURE — 36415 COLL VENOUS BLD VENIPUNCTURE: CPT

## 2018-01-11 ENCOUNTER — OFFICE VISIT (OUTPATIENT)
Dept: INTERNAL MEDICINE CLINIC | Age: 83
End: 2018-01-11
Payer: MEDICARE

## 2018-01-11 ENCOUNTER — CARE COORDINATION (OUTPATIENT)
Dept: CASE MANAGEMENT | Age: 83
End: 2018-01-11

## 2018-01-11 VITALS
WEIGHT: 206 LBS | BODY MASS INDEX: 38.89 KG/M2 | HEART RATE: 81 BPM | DIASTOLIC BLOOD PRESSURE: 60 MMHG | HEIGHT: 61 IN | SYSTOLIC BLOOD PRESSURE: 104 MMHG | OXYGEN SATURATION: 93 %

## 2018-01-11 DIAGNOSIS — E11.9 TYPE 2 DIABETES MELLITUS WITHOUT COMPLICATION, WITHOUT LONG-TERM CURRENT USE OF INSULIN (HCC): Primary | ICD-10-CM

## 2018-01-11 DIAGNOSIS — K21.00 GASTROESOPHAGEAL REFLUX DISEASE WITH ESOPHAGITIS: ICD-10-CM

## 2018-01-11 DIAGNOSIS — I50.32 CHRONIC DIASTOLIC CONGESTIVE HEART FAILURE (HCC): ICD-10-CM

## 2018-01-11 DIAGNOSIS — J01.00 ACUTE NON-RECURRENT MAXILLARY SINUSITIS: ICD-10-CM

## 2018-01-11 DIAGNOSIS — E66.01 OBESITY, CLASS III, BMI 40-49.9 (MORBID OBESITY) (HCC): ICD-10-CM

## 2018-01-11 LAB — HBA1C MFR BLD: 7.4 %

## 2018-01-11 PROCEDURE — 99496 TRANSJ CARE MGMT HIGH F2F 7D: CPT | Performed by: INTERNAL MEDICINE

## 2018-01-11 PROCEDURE — 1111F DSCHRG MED/CURRENT MED MERGE: CPT | Performed by: INTERNAL MEDICINE

## 2018-01-11 PROCEDURE — 83036 HEMOGLOBIN GLYCOSYLATED A1C: CPT | Performed by: INTERNAL MEDICINE

## 2018-01-11 RX ORDER — ONDANSETRON 4 MG/1
4 TABLET, FILM COATED ORAL EVERY 8 HOURS PRN
Qty: 30 TABLET | Refills: 0 | Status: SHIPPED | OUTPATIENT
Start: 2018-01-11 | End: 2018-03-05 | Stop reason: SDUPTHER

## 2018-01-11 RX ORDER — OMEPRAZOLE 20 MG/1
20 TABLET, DELAYED RELEASE ORAL DAILY
Qty: 30 TABLET | Refills: 3 | Status: SHIPPED | OUTPATIENT
Start: 2018-01-11 | End: 2018-08-28 | Stop reason: ALTCHOICE

## 2018-01-11 RX ORDER — LORATADINE 10 MG/1
TABLET ORAL
Qty: 10 TABLET | Refills: 0 | Status: SHIPPED | OUTPATIENT
Start: 2018-01-11 | End: 2018-02-01 | Stop reason: SDUPTHER

## 2018-01-11 ASSESSMENT — PATIENT HEALTH QUESTIONNAIRE - PHQ9
1. LITTLE INTEREST OR PLEASURE IN DOING THINGS: 0
SUM OF ALL RESPONSES TO PHQ9 QUESTIONS 1 & 2: 0
SUM OF ALL RESPONSES TO PHQ QUESTIONS 1-9: 0
2. FEELING DOWN, DEPRESSED OR HOPELESS: 0

## 2018-01-11 NOTE — PROGRESS NOTES
Subjective:      Patient ID: Kathe De Oliveira is a [de-identified] y.o. female. Transition Care Office Visit    Date of Face-to-Face: 1/11/2018    Persons at visit: daughter    Here for follow after hospitalization for: Pneumonia    Activity: activity as tolerated    Any medication changes since post-hospitalization phone call? No    Any treatment changes since post-hospitalization phone call? No    Any further education needed on medications/treatment plan? No      Current Medication List  Outpatient Encounter Prescriptions as of 1/11/2018   Medication Sig Dispense Refill    loratadine (CLARITIN) 10 MG tablet TAKE 1 TAB BY MOUTH ONCE A DAY 10 tablet 0    levofloxacin (LEVAQUIN) 750 MG tablet Take 1 tablet by mouth every other day for 5 doses 5 tablet 0    levothyroxine (SYNTHROID) 100 MCG tablet TAKE 1 TAB BY MOUTH EVERY MORNING 90 tablet 0    Cholecalciferol (VITAMIN D3) 1000 units TABS TAKE 1 TAB BY MOUTH ONCE A DAY 30 tablet 5    atorvastatin (LIPITOR) 80 MG tablet Take 1 tablet by mouth daily 30 tablet 3    HYDROcodone-acetaminophen (NORCO) 5-325 MG per tablet Take 1 tablet by mouth daily as needed for Pain .  Earliest Fill Date: 8/18/17 30 tablet 0    ferrous sulfate (FE TABS) 325 (65 Fe) MG EC tablet Take 1 tablet by mouth 2 times daily 90 tablet 3    docusate sodium (COLACE) 100 MG capsule Take 1 capsule by mouth daily as needed for Constipation 30 capsule 0    cyanocobalamin ([employer] VITAMIN B12) 1000 MCG tablet Take 1 tablet by mouth daily 30 tablet 3    clopidogrel (PLAVIX) 75 MG tablet TAKE 1 TAB BY MOUTH ONCE A DAY 90 tablet 3    metoprolol tartrate (LOPRESSOR) 25 MG tablet TAKE 1 TABLET BY MOUTH 2 TIMES DAILY 180 tablet 3    omeprazole (PRILOSEC OTC) 20 MG tablet Take 1 tablet by mouth daily 30 tablet 3    sertraline (ZOLOFT) 50 MG tablet TAKE 1 TAB BY MOUTH ONCE A DAY 90 tablet 3    Blood Pressure Monitoring (B-D ASSURE BPM/AUTO ARM CUFF) MISC 1 Device by Does not apply route daily 1 each 0    Negative for congestion, dental problem, drooling and ear discharge. Eyes: Negative for pain, discharge, redness and itching. Respiratory: Negative for apnea, cough, choking, chest tightness and shortness of breath. Cardiovascular: Positive for leg swelling. Negative for chest pain. Gastrointestinal: Negative for abdominal distention, abdominal pain, blood in stool, constipation and diarrhea. Endocrine: Negative for cold intolerance and heat intolerance. Genitourinary: Negative for difficulty urinating, dysuria, enuresis, flank pain and frequency. Musculoskeletal: Negative for arthralgias, back pain, gait problem and joint swelling. Skin: Negative for color change, pallor and rash. Neurological: Negative for dizziness, facial asymmetry, light-headedness, numbness and headaches. Psychiatric/Behavioral: Negative for agitation, behavioral problems, confusion, decreased concentration and dysphoric mood. Objective:   Physical Exam   Constitutional: She is oriented to person, place, and time. She appears well-developed. Wheelchair-bound   HENT:   Head: Normocephalic and atraumatic. Mouth/Throat: No oropharyngeal exudate. Eyes: Conjunctivae are normal. Pupils are equal, round, and reactive to light. Right eye exhibits no discharge. Left eye exhibits no discharge. No scleral icterus. Neck: Normal range of motion. Neck supple. No JVD present. No tracheal deviation present. No thyromegaly present. Cardiovascular: Normal rate and normal heart sounds. Exam reveals no gallop. No murmur heard. Pulmonary/Chest: Effort normal and breath sounds normal. No stridor. No respiratory distress. She has no wheezes. She has no rales. She exhibits no tenderness. Abdominal: Soft. Bowel sounds are normal. She exhibits no distension. There is no tenderness. There is no rebound and no guarding. Musculoskeletal: Normal range of motion. She exhibits edema (Present in Both legs ).    Neurological: She is alert and oriented to person, place, and time. Skin: She is not diaphoretic. Assessment:      1. Type 2 diabetes mellitus without complication, without long-term current use of insulin ([facility])    2. Obesity, Class III, BMI 40-49.9 (morbid obesity) ([facility]    3. Chronic diastolic congestive heart failure ([facility]    4. Gastroesophageal reflux disease with esophagitis            Plan:     1. Type 2 diabetes mellitus without complication, without long-term current use of insulin ([facility])    - POCT glycosylated hemoglobin (Hb A1C)    2. Obesity, Class III, BMI 40-49.9 (morbid obesity) ([facility]- Advice to loose weight     3. Chronic diastolic congestive heart failure ([facility]  Compensated     4. Gastroesophageal reflux disease with esophagitis  - omeprazole (PRILOSEC OTC) 20 MG tablet; Take 1 tablet by mouth daily  Dispense: 30 tablet; Refill: 3  - ondansetron (ZOFRAN) 4 MG tablet; Take 1 tablet by mouth every 8 hours as needed for Nausea or Vomiting  Dispense: 30 tablet; Refill: 0    Patient symptoms of pneumonia has completely improved, as per family her altered mental status has improved and she is at her baseline  She will have chest x-ray in 4 weeks to document resolution of lung shadows    · Return in about 4 weeks (around 2/8/2018). · Sunitha Selby received counseling on the following healthy behaviors: nutrition, exercise and medication adherence    · Reviewed prior labs and health maintenance. · Discussed use, benefit, and side effects of prescribed medications. Barriers to medication compliance addressed. All patient questions answered. Pt voiced understanding.      MD AVINASH [facility]  1/21/2018, 12:06 AM         [place]   YOB: 1935    Date of Office Visit:  1/11/2018  Date of Hospital Admission: 1/4/18  Date of Hospital Discharge: 1/7/18  [facility] Risk Score [risk of hospital readmission >=10  medium risk capsule  Take 1 capsule by mouth daily as needed for Constipation             ferrous sulfate (FE TABS) 325 (65 Fe) MG EC tablet  Take 1 tablet by mouth 2 times daily             Glucose Blood (BLOOD GLUCOSE TEST STRIPS) STRP  1 Device by In Vitro route 4 times daily Test___times daily    Diagnosis: 250.0   Diabetes Mellitus____Insulin Dependent____Non-Insulin Dependent             HYDROcodone-acetaminophen (NORCO) 5-325 MG per tablet  Take 1 tablet by mouth daily as needed for Pain . Earliest Fill Date: 8/18/17             JANUVIA 100 MG tablet  TAKE 1 TAB BY MOUTH ONCE A DAY             Lancets ([employer] ULTRA THIN AUTO LANCET) MISC  1 Device by Does not apply route 4 times daily             levothyroxine (SYNTHROID) 100 MCG tablet  TAKE 1 TAB BY MOUTH EVERY MORNING             loratadine (CLARITIN) 10 MG tablet  TAKE 1 TAB BY MOUTH ONCE A DAY             metoprolol tartrate (LOPRESSOR) 25 MG tablet  TAKE 1 TABLET BY MOUTH 2 TIMES DAILY             nystatin (MYCOSTATIN) 121911 UNIT/GM powder  Apply 3 times daily.              omeprazole (PRILOSEC OTC) 20 MG tablet  Take 1 tablet by mouth daily             ondansetron (ZOFRAN) 4 MG tablet  Take 1 tablet by mouth every 8 hours as needed for Nausea or Vomiting             repaglinide (PRANDIN) 1 MG tablet  TAKE 1 TAB BY MOUTH THREE TIMES A DAY BEFORE MEALS             sertraline (ZOLOFT) 50 MG tablet  TAKE 1 TAB BY MOUTH ONCE A DAY             vitamin B-12 (CYANOCOBALAMIN) 1000 MCG tablet  TAKE 1 TAB BY MOUTH ONCE A DAY                   Medications marked \"taking\" at this time  Outpatient Prescriptions Marked as Taking for the 1/11/18 encounter (Office Visit) with Irving Schreiber MD   Medication Sig Dispense Refill    loratadine (CLARITIN) 10 MG tablet TAKE 1 TAB BY MOUTH ONCE A DAY 10 tablet 0    omeprazole (PRILOSEC OTC) 20 MG tablet Take 1 tablet by mouth daily 30 tablet 3    ondansetron (ZOFRAN) 4 MG tablet Take 1 tablet by mouth every 8 hours as needed for

## 2018-01-11 NOTE — CARE COORDINATION
[facility] Transitions Follow Up Call    2018    Patient: Tamika Huerta  Patient : 1935   MRN: [id number]  Reason for Admission: There are no discharge diagnoses documented for the most recent discharge. Discharge Date: 18 RARS: Risk Score: 13.75       Spoke with: daughter Esperanza Meneses with daughter Stoney Roman who states patient is doing well- although she states she does not think patient has moved her bowels since discharge (18)- states appetite is small and patient does not complain of nausea or vomiting. Writer advised speaking with Dr Nataliya Gold this afternoon at appointment to see if anything else can be recommended as a stool softener- writer recommended giving patient a colace (ordered prn for constipation)- v/u   States patient had labs done yesterday and should get results at appointment today. States patient still has cough (productive) but is improving since discharge. States she does not have follow up with pulmonary yet- writer encouraged call their office for appointment- v/u   Encouraged call if questions or concerns- v/u     Care Transitions Subsequent and Final Call    Subsequent and Final Calls  Do you have any ongoing symptoms?:  No  Have your medications changed?:  No  Do you have any questions related to your medications?:  No  Do you currently have any active services?:  Yes  Are you currently active with any services?:  Home Health  Do you have any needs or concerns that I can assist you with?:  No  Identified Barriers:  Lack of Education  Care Transitions Interventions  Other Interventions:             Follow Up  Future Appointments  Date Time Provider Estrellita Segura   2018 3:45 PM Lonnie Boxer, MD [address] Arturo Aguilar RN

## 2018-01-12 LAB
CULTURE: NORMAL
CULTURE: NORMAL
Lab: NORMAL
SPECIMEN DESCRIPTION: NORMAL
STATUS: NORMAL

## 2018-01-15 ENCOUNTER — CARE COORDINATION (OUTPATIENT)
Dept: CASE MANAGEMENT | Age: 83
End: 2018-01-15

## 2018-01-19 DIAGNOSIS — E53.8 VITAMIN B 12 DEFICIENCY: ICD-10-CM

## 2018-01-19 RX ORDER — LANOLIN ALCOHOL/MO/W.PET/CERES
CREAM (GRAM) TOPICAL
Qty: 30 TABLET | Refills: 3 | Status: SHIPPED | OUTPATIENT
Start: 2018-01-19 | End: 2018-02-15 | Stop reason: SDUPTHER

## 2018-01-21 ASSESSMENT — ENCOUNTER SYMPTOMS
EYE DISCHARGE: 0
APNEA: 0
CHOKING: 0
EYE ITCHING: 0
SHORTNESS OF BREATH: 0
BLOOD IN STOOL: 0
DIARRHEA: 0
EYE REDNESS: 0
COUGH: 0
CHEST TIGHTNESS: 0
BACK PAIN: 0
ABDOMINAL PAIN: 0
EYE PAIN: 0
CONSTIPATION: 0
ABDOMINAL DISTENTION: 0
COLOR CHANGE: 0

## 2018-01-22 ENCOUNTER — CARE COORDINATION (OUTPATIENT)
Dept: CASE MANAGEMENT | Age: 83
End: 2018-01-22

## 2018-01-26 ENCOUNTER — TELEPHONE (OUTPATIENT)
Dept: INTERNAL MEDICINE CLINIC | Age: 83
End: 2018-01-26

## 2018-01-26 NOTE — TELEPHONE ENCOUNTER
[address] Hernandez called to inform office that physicial therapist is in the hm this morning & pt is complaining of some chest discomfort, heaviness, hurts on the lft back side. Pt is s/p lft lower lobe pneu. Pt is not symptomomatic & dtr is w/ pt so pt is not alone. No openings available to be seen in office.  care wants to know if you want to order stat chest xray or to go to ED if symptoms worsen.

## 2018-02-01 DIAGNOSIS — J01.00 ACUTE NON-RECURRENT MAXILLARY SINUSITIS: ICD-10-CM

## 2018-02-01 RX ORDER — LORATADINE 10 MG/1
TABLET ORAL
Qty: 10 TABLET | Refills: 0 | Status: SHIPPED | OUTPATIENT
Start: 2018-02-01 | End: 2018-02-15 | Stop reason: SDUPTHER

## 2018-02-15 ENCOUNTER — HOSPITAL ENCOUNTER (OUTPATIENT)
Age: 83
Setting detail: SPECIMEN
Discharge: HOME | End: 2018-02-15
Payer: MEDICARE

## 2018-02-15 ENCOUNTER — HOSPITAL ENCOUNTER (OUTPATIENT)
Dept: GENERAL RADIOLOGY | Facility: CLINIC | Age: 83
Discharge: HOME | End: 2018-02-17
Payer: MEDICARE

## 2018-02-15 ENCOUNTER — OFFICE VISIT (OUTPATIENT)
Dept: INTERNAL MEDICINE CLINIC | Age: 83
End: 2018-02-15
Payer: MEDICARE

## 2018-02-15 ENCOUNTER — HOSPITAL ENCOUNTER (OUTPATIENT)
Facility: CLINIC | Age: 83
Discharge: HOME | End: 2018-02-17
Payer: MEDICARE

## 2018-02-15 VITALS
HEIGHT: 61 IN | DIASTOLIC BLOOD PRESSURE: 72 MMHG | SYSTOLIC BLOOD PRESSURE: 130 MMHG | WEIGHT: 214 LBS | BODY MASS INDEX: 40.4 KG/M2 | HEART RATE: 63 BPM | OXYGEN SATURATION: 92 %

## 2018-02-15 DIAGNOSIS — E03.9 HYPOTHYROIDISM, UNSPECIFIED TYPE: ICD-10-CM

## 2018-02-15 DIAGNOSIS — Z23 NEED FOR VACCINATION: Primary | ICD-10-CM

## 2018-02-15 DIAGNOSIS — J18.9 PNEUMONIA OF RIGHT UPPER LOBE DUE TO INFECTIOUS ORGANISM: ICD-10-CM

## 2018-02-15 DIAGNOSIS — E53.8 VITAMIN B 12 DEFICIENCY: ICD-10-CM

## 2018-02-15 DIAGNOSIS — J01.00 ACUTE NON-RECURRENT MAXILLARY SINUSITIS: ICD-10-CM

## 2018-02-15 DIAGNOSIS — E11.8 TYPE 2 DIABETES MELLITUS WITH COMPLICATION, WITHOUT LONG-TERM CURRENT USE OF INSULIN (HCC): ICD-10-CM

## 2018-02-15 DIAGNOSIS — E55.9 VITAMIN D DEFICIENCY: ICD-10-CM

## 2018-02-15 LAB
FOLATE: 5.6 NG/ML
TSH SERPL DL<=0.05 MIU/L-ACNC: 1.96 MIU/L (ref 0.3–5)
VITAMIN B-12: 411 PG/ML (ref 232–1245)

## 2018-02-15 PROCEDURE — 82607 VITAMIN B-12: CPT

## 2018-02-15 PROCEDURE — 90732 PPSV23 VACC 2 YRS+ SUBQ/IM: CPT | Performed by: INTERNAL MEDICINE

## 2018-02-15 PROCEDURE — 99214 OFFICE O/P EST MOD 30 MIN: CPT | Performed by: INTERNAL MEDICINE

## 2018-02-15 PROCEDURE — 1036F TOBACCO NON-USER: CPT | Performed by: INTERNAL MEDICINE

## 2018-02-15 PROCEDURE — 82746 ASSAY OF FOLIC ACID SERUM: CPT

## 2018-02-15 PROCEDURE — 36415 COLL VENOUS BLD VENIPUNCTURE: CPT

## 2018-02-15 PROCEDURE — G8427 DOCREV CUR MEDS BY ELIG CLIN: HCPCS | Performed by: INTERNAL MEDICINE

## 2018-02-15 PROCEDURE — 71046 X-RAY EXAM CHEST 2 VIEWS: CPT

## 2018-02-15 PROCEDURE — G8417 CALC BMI ABV UP PARAM F/U: HCPCS | Performed by: INTERNAL MEDICINE

## 2018-02-15 PROCEDURE — 1123F ACP DISCUSS/DSCN MKR DOCD: CPT | Performed by: INTERNAL MEDICINE

## 2018-02-15 PROCEDURE — G8598 ASA/ANTIPLAT THER USED: HCPCS | Performed by: INTERNAL MEDICINE

## 2018-02-15 PROCEDURE — G8400 PT W/DXA NO RESULTS DOC: HCPCS | Performed by: INTERNAL MEDICINE

## 2018-02-15 PROCEDURE — 84443 ASSAY THYROID STIM HORMONE: CPT

## 2018-02-15 PROCEDURE — 1090F PRES/ABSN URINE INCON ASSESS: CPT | Performed by: INTERNAL MEDICINE

## 2018-02-15 PROCEDURE — 82306 VITAMIN D 25 HYDROXY: CPT

## 2018-02-15 PROCEDURE — 4040F PNEUMOC VAC/ADMIN/RCVD: CPT | Performed by: INTERNAL MEDICINE

## 2018-02-15 PROCEDURE — G0009 ADMIN PNEUMOCOCCAL VACCINE: HCPCS | Performed by: INTERNAL MEDICINE

## 2018-02-15 PROCEDURE — G8484 FLU IMMUNIZE NO ADMIN: HCPCS | Performed by: INTERNAL MEDICINE

## 2018-02-15 RX ORDER — OMEPRAZOLE 20 MG/1
CAPSULE, DELAYED RELEASE ORAL
COMMUNITY
Start: 2018-01-11 | End: 2018-05-07

## 2018-02-15 RX ORDER — MELATONIN
Qty: 30 TABLET | Refills: 11 | Status: SHIPPED | OUTPATIENT
Start: 2018-02-15 | End: 2019-05-29 | Stop reason: SDUPTHER

## 2018-02-15 RX ORDER — LANOLIN ALCOHOL/MO/W.PET/CERES
CREAM (GRAM) TOPICAL
Qty: 30 TABLET | Refills: 11 | Status: SHIPPED | OUTPATIENT
Start: 2018-02-15 | End: 2018-04-16 | Stop reason: SDUPTHER

## 2018-02-15 RX ORDER — CEFUROXIME AXETIL 250 MG/1
TABLET ORAL
Refills: 0 | COMMUNITY
Start: 2018-01-15 | End: 2018-04-16 | Stop reason: ALTCHOICE

## 2018-02-16 LAB — VITAMIN D 25-HYDROXY: 23.1 NG/ML (ref 30–100)

## 2018-02-16 RX ORDER — LORATADINE 10 MG/1
TABLET ORAL
Qty: 10 TABLET | Refills: 0 | Status: SHIPPED | OUTPATIENT
Start: 2018-02-16 | End: 2018-02-26 | Stop reason: SDUPTHER

## 2018-02-26 DIAGNOSIS — J01.00 ACUTE NON-RECURRENT MAXILLARY SINUSITIS: ICD-10-CM

## 2018-02-26 RX ORDER — LORATADINE 10 MG/1
TABLET ORAL
Qty: 10 TABLET | Refills: 1 | Status: SHIPPED | OUTPATIENT
Start: 2018-02-26 | End: 2018-04-09 | Stop reason: SDUPTHER

## 2018-03-05 DIAGNOSIS — K21.00 GASTROESOPHAGEAL REFLUX DISEASE WITH ESOPHAGITIS: ICD-10-CM

## 2018-03-05 RX ORDER — ONDANSETRON 4 MG/1
TABLET, FILM COATED ORAL
Qty: 30 TABLET | Refills: 0 | Status: SHIPPED | OUTPATIENT
Start: 2018-03-05 | End: 2018-08-28 | Stop reason: ALTCHOICE

## 2018-03-19 RX ORDER — LEVOTHYROXINE SODIUM 0.1 MG/1
TABLET ORAL
Qty: 90 TABLET | Refills: 3 | Status: SHIPPED | OUTPATIENT
Start: 2018-03-19 | End: 2019-04-15 | Stop reason: SDUPTHER

## 2018-04-09 DIAGNOSIS — J01.00 ACUTE NON-RECURRENT MAXILLARY SINUSITIS: ICD-10-CM

## 2018-04-10 RX ORDER — LORATADINE 10 MG/1
TABLET ORAL
Qty: 10 TABLET | Refills: 6 | Status: SHIPPED | OUTPATIENT
Start: 2018-04-10 | End: 2018-08-28 | Stop reason: ALTCHOICE

## 2018-04-16 ENCOUNTER — OFFICE VISIT (OUTPATIENT)
Dept: INTERNAL MEDICINE CLINIC | Age: 83
End: 2018-04-16
Payer: MEDICARE

## 2018-04-16 VITALS — DIASTOLIC BLOOD PRESSURE: 62 MMHG | HEIGHT: 61 IN | HEART RATE: 64 BPM | SYSTOLIC BLOOD PRESSURE: 136 MMHG

## 2018-04-16 DIAGNOSIS — E11.8 TYPE 2 DIABETES MELLITUS WITH COMPLICATION, WITHOUT LONG-TERM CURRENT USE OF INSULIN (HCC): ICD-10-CM

## 2018-04-16 DIAGNOSIS — E03.9 HYPOTHYROIDISM, UNSPECIFIED TYPE: ICD-10-CM

## 2018-04-16 DIAGNOSIS — E53.8 VITAMIN B 12 DEFICIENCY: ICD-10-CM

## 2018-04-16 DIAGNOSIS — E11.8 TYPE 2 DIABETES MELLITUS WITH COMPLICATION, WITHOUT LONG-TERM CURRENT USE OF INSULIN (HCC): Primary | ICD-10-CM

## 2018-04-16 DIAGNOSIS — I10 ESSENTIAL HYPERTENSION: Chronic | ICD-10-CM

## 2018-04-16 DIAGNOSIS — L03.116 CELLULITIS OF LEFT LEG: ICD-10-CM

## 2018-04-16 DIAGNOSIS — M79.89 LEFT LEG SWELLING: Primary | ICD-10-CM

## 2018-04-16 DIAGNOSIS — I50.32 CHRONIC DIASTOLIC CONGESTIVE HEART FAILURE (HCC): ICD-10-CM

## 2018-04-16 LAB — HBA1C MFR BLD: 7.5 %

## 2018-04-16 PROCEDURE — 1036F TOBACCO NON-USER: CPT | Performed by: INTERNAL MEDICINE

## 2018-04-16 PROCEDURE — G8417 CALC BMI ABV UP PARAM F/U: HCPCS | Performed by: INTERNAL MEDICINE

## 2018-04-16 PROCEDURE — G8598 ASA/ANTIPLAT THER USED: HCPCS | Performed by: INTERNAL MEDICINE

## 2018-04-16 PROCEDURE — 1123F ACP DISCUSS/DSCN MKR DOCD: CPT | Performed by: INTERNAL MEDICINE

## 2018-04-16 PROCEDURE — 99214 OFFICE O/P EST MOD 30 MIN: CPT | Performed by: INTERNAL MEDICINE

## 2018-04-16 PROCEDURE — 1090F PRES/ABSN URINE INCON ASSESS: CPT | Performed by: INTERNAL MEDICINE

## 2018-04-16 PROCEDURE — 4040F PNEUMOC VAC/ADMIN/RCVD: CPT | Performed by: INTERNAL MEDICINE

## 2018-04-16 PROCEDURE — G8400 PT W/DXA NO RESULTS DOC: HCPCS | Performed by: INTERNAL MEDICINE

## 2018-04-16 PROCEDURE — 83036 HEMOGLOBIN GLYCOSYLATED A1C: CPT | Performed by: INTERNAL MEDICINE

## 2018-04-16 PROCEDURE — G8427 DOCREV CUR MEDS BY ELIG CLIN: HCPCS | Performed by: INTERNAL MEDICINE

## 2018-04-16 RX ORDER — DOXYCYCLINE HYCLATE 100 MG
100 TABLET ORAL 2 TIMES DAILY
Qty: 20 TABLET | Refills: 0 | Status: SHIPPED | OUTPATIENT
Start: 2018-04-16 | End: 2018-04-26

## 2018-04-16 RX ORDER — LANOLIN ALCOHOL/MO/W.PET/CERES
CREAM (GRAM) TOPICAL
Qty: 30 TABLET | Refills: 11 | Status: SHIPPED | OUTPATIENT
Start: 2018-04-16 | End: 2019-05-24 | Stop reason: SDUPTHER

## 2018-04-16 RX ORDER — FUROSEMIDE 20 MG/1
20 TABLET ORAL DAILY
Qty: 60 TABLET | Refills: 3 | Status: SHIPPED | OUTPATIENT
Start: 2018-04-16 | End: 2018-08-28 | Stop reason: SDUPTHER

## 2018-04-16 ASSESSMENT — ENCOUNTER SYMPTOMS
COUGH: 0
BLOOD IN STOOL: 0
EYE REDNESS: 0
COLOR CHANGE: 0
DIARRHEA: 0
BACK PAIN: 0
APNEA: 0
CONSTIPATION: 0
ABDOMINAL PAIN: 0
CHOKING: 0
ABDOMINAL DISTENTION: 0
SHORTNESS OF BREATH: 1
EYE ITCHING: 0
EYE DISCHARGE: 0
CHEST TIGHTNESS: 0
EYE PAIN: 0

## 2018-05-07 ENCOUNTER — OFFICE VISIT (OUTPATIENT)
Dept: INTERNAL MEDICINE CLINIC | Age: 83
End: 2018-05-07
Payer: MEDICARE

## 2018-05-07 VITALS
BODY MASS INDEX: 40.22 KG/M2 | HEIGHT: 61 IN | WEIGHT: 213 LBS | SYSTOLIC BLOOD PRESSURE: 136 MMHG | DIASTOLIC BLOOD PRESSURE: 62 MMHG

## 2018-05-07 DIAGNOSIS — E11.8 TYPE 2 DIABETES MELLITUS WITH COMPLICATION, WITHOUT LONG-TERM CURRENT USE OF INSULIN (HCC): ICD-10-CM

## 2018-05-07 DIAGNOSIS — M25.562 CHRONIC PAIN OF LEFT KNEE: ICD-10-CM

## 2018-05-07 DIAGNOSIS — Z13.220 SCREENING FOR HYPERLIPIDEMIA: Primary | ICD-10-CM

## 2018-05-07 DIAGNOSIS — G89.29 CHRONIC PAIN OF LEFT KNEE: ICD-10-CM

## 2018-05-07 DIAGNOSIS — B37.2 CANDIDAL SKIN INFECTION: ICD-10-CM

## 2018-05-07 PROCEDURE — 4040F PNEUMOC VAC/ADMIN/RCVD: CPT | Performed by: INTERNAL MEDICINE

## 2018-05-07 PROCEDURE — G8417 CALC BMI ABV UP PARAM F/U: HCPCS | Performed by: INTERNAL MEDICINE

## 2018-05-07 PROCEDURE — 1090F PRES/ABSN URINE INCON ASSESS: CPT | Performed by: INTERNAL MEDICINE

## 2018-05-07 PROCEDURE — G8427 DOCREV CUR MEDS BY ELIG CLIN: HCPCS | Performed by: INTERNAL MEDICINE

## 2018-05-07 PROCEDURE — G8598 ASA/ANTIPLAT THER USED: HCPCS | Performed by: INTERNAL MEDICINE

## 2018-05-07 PROCEDURE — G8400 PT W/DXA NO RESULTS DOC: HCPCS | Performed by: INTERNAL MEDICINE

## 2018-05-07 PROCEDURE — 1036F TOBACCO NON-USER: CPT | Performed by: INTERNAL MEDICINE

## 2018-05-07 PROCEDURE — 1123F ACP DISCUSS/DSCN MKR DOCD: CPT | Performed by: INTERNAL MEDICINE

## 2018-05-07 PROCEDURE — 99214 OFFICE O/P EST MOD 30 MIN: CPT | Performed by: INTERNAL MEDICINE

## 2018-05-07 RX ORDER — NYSTATIN 100000 U/G
CREAM TOPICAL
Qty: 1 TUBE | Refills: 1 | Status: SHIPPED | OUTPATIENT
Start: 2018-05-07

## 2018-05-07 ASSESSMENT — ENCOUNTER SYMPTOMS
CHEST TIGHTNESS: 0
CONSTIPATION: 0
COUGH: 0
EYE DISCHARGE: 0
CHOKING: 0
COLOR CHANGE: 0
BLOOD IN STOOL: 0
APNEA: 0
EYE REDNESS: 0
SHORTNESS OF BREATH: 0
BACK PAIN: 1
ABDOMINAL DISTENTION: 0
DIARRHEA: 0
EYE ITCHING: 0
EYE PAIN: 0
ABDOMINAL PAIN: 0

## 2018-08-15 ENCOUNTER — TELEPHONE (OUTPATIENT)
Dept: INTERNAL MEDICINE CLINIC | Age: 83
End: 2018-08-15

## 2018-08-28 ENCOUNTER — OFFICE VISIT (OUTPATIENT)
Dept: INTERNAL MEDICINE CLINIC | Age: 83
End: 2018-08-28
Payer: MEDICARE

## 2018-08-28 VITALS — DIASTOLIC BLOOD PRESSURE: 74 MMHG | SYSTOLIC BLOOD PRESSURE: 124 MMHG | BODY MASS INDEX: 41.6 KG/M2 | HEIGHT: 60 IN

## 2018-08-28 DIAGNOSIS — L03.119 CELLULITIS OF LOWER EXTREMITY, UNSPECIFIED LATERALITY: ICD-10-CM

## 2018-08-28 DIAGNOSIS — E11.9 TYPE 2 DIABETES MELLITUS WITHOUT COMPLICATION, WITHOUT LONG-TERM CURRENT USE OF INSULIN (HCC): ICD-10-CM

## 2018-08-28 DIAGNOSIS — C73 THYROID CANCER (HCC): ICD-10-CM

## 2018-08-28 DIAGNOSIS — M79.89 LEFT LEG SWELLING: ICD-10-CM

## 2018-08-28 DIAGNOSIS — Z13.220 SCREENING FOR HYPERLIPIDEMIA: Primary | ICD-10-CM

## 2018-08-28 DIAGNOSIS — I50.32 CHRONIC DIASTOLIC CONGESTIVE HEART FAILURE (HCC): ICD-10-CM

## 2018-08-28 PROCEDURE — 1036F TOBACCO NON-USER: CPT | Performed by: INTERNAL MEDICINE

## 2018-08-28 PROCEDURE — 1090F PRES/ABSN URINE INCON ASSESS: CPT | Performed by: INTERNAL MEDICINE

## 2018-08-28 PROCEDURE — G8400 PT W/DXA NO RESULTS DOC: HCPCS | Performed by: INTERNAL MEDICINE

## 2018-08-28 PROCEDURE — G8427 DOCREV CUR MEDS BY ELIG CLIN: HCPCS | Performed by: INTERNAL MEDICINE

## 2018-08-28 PROCEDURE — 4040F PNEUMOC VAC/ADMIN/RCVD: CPT | Performed by: INTERNAL MEDICINE

## 2018-08-28 PROCEDURE — 99214 OFFICE O/P EST MOD 30 MIN: CPT | Performed by: INTERNAL MEDICINE

## 2018-08-28 PROCEDURE — G8598 ASA/ANTIPLAT THER USED: HCPCS | Performed by: INTERNAL MEDICINE

## 2018-08-28 PROCEDURE — G8417 CALC BMI ABV UP PARAM F/U: HCPCS | Performed by: INTERNAL MEDICINE

## 2018-08-28 PROCEDURE — 1101F PT FALLS ASSESS-DOCD LE1/YR: CPT | Performed by: INTERNAL MEDICINE

## 2018-08-28 PROCEDURE — 1123F ACP DISCUSS/DSCN MKR DOCD: CPT | Performed by: INTERNAL MEDICINE

## 2018-08-28 RX ORDER — FUROSEMIDE 40 MG/1
40 TABLET ORAL DAILY
Qty: 30 TABLET | Refills: 3 | Status: SHIPPED | OUTPATIENT
Start: 2018-08-28 | End: 2019-01-11 | Stop reason: SDUPTHER

## 2018-08-28 RX ORDER — MUPIROCIN CALCIUM 20 MG/G
CREAM TOPICAL
Qty: 1 TUBE | Refills: 1 | Status: SHIPPED | OUTPATIENT
Start: 2018-08-28 | End: 2018-09-27

## 2018-08-28 ASSESSMENT — PATIENT HEALTH QUESTIONNAIRE - PHQ9
1. LITTLE INTEREST OR PLEASURE IN DOING THINGS: 0
2. FEELING DOWN, DEPRESSED OR HOPELESS: 0
SUM OF ALL RESPONSES TO PHQ QUESTIONS 1-9: 0
SUM OF ALL RESPONSES TO PHQ9 QUESTIONS 1 & 2: 0
SUM OF ALL RESPONSES TO PHQ QUESTIONS 1-9: 0

## 2018-08-28 ASSESSMENT — ENCOUNTER SYMPTOMS
ABDOMINAL DISTENTION: 0
CHEST TIGHTNESS: 0
BLOOD IN STOOL: 0
CHOKING: 0
COUGH: 0
DIARRHEA: 0
CONSTIPATION: 0
EYE DISCHARGE: 0
ABDOMINAL PAIN: 0
EYE PAIN: 0
SHORTNESS OF BREATH: 0
BACK PAIN: 1
EYE ITCHING: 0
EYE REDNESS: 0
COLOR CHANGE: 0
APNEA: 0

## 2018-08-28 NOTE — PROGRESS NOTES
Subjective:      Patient ID: Enedina Yanez is a 82 y.o. female. Chronic Disease Visit Information    BP Readings from Last 3 Encounters:   05/07/18 136/62   04/16/18 136/62   02/15/18 130/72          Hemoglobin A1C (%)   Date Value   04/16/2018 7.5   01/11/2018 7.4   10/10/2017 7.1     Microalb/Crt. Ratio (mcg/mg creat)   Date Value   05/11/2017 23     LDL Cholesterol (mg/dL)   Date Value   09/03/2016 23     HDL (mg/dL)   Date Value   09/03/2016 53     BUN (mg/dL)   Date Value   01/10/2018 6 (L)     CREATININE (mg/dL)   Date Value   01/10/2018 0.96 (H)     Glucose (mg/dL)   Date Value   01/10/2018 98   05/14/2012 380 (H)            Have you changed or started any medications since your last visit including any over-the-counter medicines, vitamins, or herbal medicines? no   Are you having any side effects from any of your medications? -  no  Have you stopped taking any of your medications? Is so, why? -  no    Have you seen any other physician or provider since your last visit? No  Have you had any other diagnostic tests since your last visit? No  Have you been seen in the emergency room and/or had an admission to a hospital since we last saw you? No  Have you had your annual diabetic retinal (eye) exam? No  Have you had your routine dental cleaning in the past 6 months? no    Have you activated your [employer] account? If not, what are your barriers? No:      Patient Care Team:  Thomas Napier MD as PCP - General (Internal Medicine)  Thomas Napier MD as PCP - S Attributed Provider  Lou Klein MD as Consulting Physician (Gastroenterology)  Severo Labella, MD as Consulting Physician (Pulmonology)     Chief Complaint   Patient presents with    Edema     b/l lower leg edema and redness x 2 months.                Medical History Review  Past Medical, Family, and Social History reviewed and does contribute to the patient presenting condition    Health Maintenance   Topic Date Due    DTaP/Tdap/Td vaccine (1 - Tdap) 05/01/1956    Shingles Vaccine (1 of 2 - 2 Dose Series) 05/01/1987    Diabetic retinal exam  09/02/2016    Lipid screen  09/03/2017    Flu vaccine (1) 09/01/2018    A1C test (Diabetic or Prediabetic)  10/16/2018    Potassium monitoring  01/10/2019    Creatinine monitoring  01/10/2019    Diabetic foot exam  05/07/2019    DEXA (modify frequency per FRAX score)  Addressed    Pneumococcal low/med risk  Completed       HPI- Patient is here for evaluation of multiple medical problems which includes hypothyroidism, diabetes, chronic kidney disease, heart failure with preserved ejection fraction. She notice worsening swelling in her both legs, associated with rash and pain in both legs, symptoms are going on for last 2 months, notice occasional drainage from the local site, symptoms are progressively getting worse, no fever, shortness of breath. No other complaints    Review of Systems   Constitutional: Negative for activity change, appetite change, chills, diaphoresis, fatigue and fever. HENT: Negative for congestion, dental problem, drooling and ear discharge. Eyes: Negative for pain, discharge, redness and itching. Respiratory: Negative for apnea, cough, choking, chest tightness and shortness of breath. Cardiovascular: Positive for leg swelling (both legs ). Negative for chest pain. Gastrointestinal: Negative for abdominal distention, abdominal pain, blood in stool, constipation and diarrhea. Endocrine: Negative for cold intolerance and heat intolerance. Genitourinary: Negative for difficulty urinating, dysuria, enuresis, flank pain and frequency. Musculoskeletal: Positive for arthralgias (generalized ), back pain and gait problem (uses wheel chair ). Negative for joint swelling. Skin: Positive for rash (Both legs ). Negative for color change and pallor. Neurological: Negative for dizziness, facial asymmetry, light-headedness, numbness and headaches.    Psychiatric/Behavioral:

## 2018-08-30 RX ORDER — REPAGLINIDE 1 MG/1
TABLET ORAL
Qty: 90 TABLET | Refills: 5 | Status: SHIPPED | OUTPATIENT
Start: 2018-08-30 | End: 2019-05-08 | Stop reason: SDUPTHER

## 2018-09-11 DIAGNOSIS — J01.00 ACUTE NON-RECURRENT MAXILLARY SINUSITIS: ICD-10-CM

## 2018-09-11 DIAGNOSIS — F32.A DEPRESSION: ICD-10-CM

## 2018-09-11 RX ORDER — LORATADINE 10 MG/1
TABLET ORAL
Qty: 30 TABLET | Refills: 1 | Status: SHIPPED | OUTPATIENT
Start: 2018-09-11 | End: 2019-01-03 | Stop reason: SDUPTHER

## 2018-09-25 DIAGNOSIS — I10 HYPERTENSION: ICD-10-CM

## 2018-09-25 RX ORDER — CLOPIDOGREL BISULFATE 75 MG/1
TABLET ORAL
Qty: 90 TABLET | Refills: 3 | Status: SHIPPED | OUTPATIENT
Start: 2018-09-25 | End: 2019-11-25 | Stop reason: SDUPTHER

## 2018-09-25 RX ORDER — ATORVASTATIN CALCIUM 80 MG/1
TABLET, FILM COATED ORAL
Qty: 90 TABLET | Refills: 3 | Status: SHIPPED | OUTPATIENT
Start: 2018-09-25 | End: 2018-12-18

## 2018-09-26 PROBLEM — R05.9 COUGH: Status: RESOLVED | Noted: 2017-07-04 | Resolved: 2018-09-26

## 2018-10-11 ENCOUNTER — OFFICE VISIT (OUTPATIENT)
Dept: INTERNAL MEDICINE CLINIC | Age: 83
End: 2018-10-11
Payer: MEDICARE

## 2018-10-11 ENCOUNTER — HOSPITAL ENCOUNTER (OUTPATIENT)
Facility: CLINIC | Age: 83
Discharge: HOME | End: 2018-10-13
Payer: MEDICARE

## 2018-10-11 ENCOUNTER — HOSPITAL ENCOUNTER (OUTPATIENT)
Dept: GENERAL RADIOLOGY | Facility: CLINIC | Age: 83
Discharge: HOME | End: 2018-10-13
Payer: MEDICARE

## 2018-10-11 VITALS
BODY MASS INDEX: 41.6 KG/M2 | DIASTOLIC BLOOD PRESSURE: 78 MMHG | TEMPERATURE: 98.4 F | HEIGHT: 60 IN | HEART RATE: 74 BPM | SYSTOLIC BLOOD PRESSURE: 111 MMHG

## 2018-10-11 DIAGNOSIS — G89.29 CHRONIC LOW BACK PAIN WITHOUT SCIATICA, UNSPECIFIED BACK PAIN LATERALITY: ICD-10-CM

## 2018-10-11 DIAGNOSIS — M54.50 CHRONIC LOW BACK PAIN WITHOUT SCIATICA, UNSPECIFIED BACK PAIN LATERALITY: ICD-10-CM

## 2018-10-11 DIAGNOSIS — G89.29 CHRONIC PAIN OF LEFT KNEE: ICD-10-CM

## 2018-10-11 DIAGNOSIS — J06.9 UPPER RESPIRATORY TRACT INFECTION, UNSPECIFIED TYPE: ICD-10-CM

## 2018-10-11 DIAGNOSIS — M25.562 CHRONIC PAIN OF LEFT KNEE: ICD-10-CM

## 2018-10-11 DIAGNOSIS — J06.9 UPPER RESPIRATORY TRACT INFECTION, UNSPECIFIED TYPE: Primary | ICD-10-CM

## 2018-10-11 PROCEDURE — 1090F PRES/ABSN URINE INCON ASSESS: CPT | Performed by: INTERNAL MEDICINE

## 2018-10-11 PROCEDURE — 1123F ACP DISCUSS/DSCN MKR DOCD: CPT | Performed by: INTERNAL MEDICINE

## 2018-10-11 PROCEDURE — G8417 CALC BMI ABV UP PARAM F/U: HCPCS | Performed by: INTERNAL MEDICINE

## 2018-10-11 PROCEDURE — 1036F TOBACCO NON-USER: CPT | Performed by: INTERNAL MEDICINE

## 2018-10-11 PROCEDURE — 1101F PT FALLS ASSESS-DOCD LE1/YR: CPT | Performed by: INTERNAL MEDICINE

## 2018-10-11 PROCEDURE — G8400 PT W/DXA NO RESULTS DOC: HCPCS | Performed by: INTERNAL MEDICINE

## 2018-10-11 PROCEDURE — 4040F PNEUMOC VAC/ADMIN/RCVD: CPT | Performed by: INTERNAL MEDICINE

## 2018-10-11 PROCEDURE — 99214 OFFICE O/P EST MOD 30 MIN: CPT | Performed by: INTERNAL MEDICINE

## 2018-10-11 PROCEDURE — G8427 DOCREV CUR MEDS BY ELIG CLIN: HCPCS | Performed by: INTERNAL MEDICINE

## 2018-10-11 PROCEDURE — 71046 X-RAY EXAM CHEST 2 VIEWS: CPT

## 2018-10-11 PROCEDURE — G8598 ASA/ANTIPLAT THER USED: HCPCS | Performed by: INTERNAL MEDICINE

## 2018-10-11 PROCEDURE — G8484 FLU IMMUNIZE NO ADMIN: HCPCS | Performed by: INTERNAL MEDICINE

## 2018-10-11 RX ORDER — GUAIFENESIN 100 MG/5ML
10 SYRUP ORAL EVERY 4 HOURS PRN
Qty: 1 BOTTLE | Refills: 2 | Status: SHIPPED | OUTPATIENT
Start: 2018-10-11 | End: 2019-05-31 | Stop reason: ALTCHOICE

## 2018-10-11 RX ORDER — LORATADINE 10 MG/1
10 TABLET ORAL DAILY
Qty: 10 TABLET | Refills: 0 | Status: SHIPPED | OUTPATIENT
Start: 2018-10-11 | End: 2018-11-20 | Stop reason: SDUPTHER

## 2018-10-11 RX ORDER — LEVOFLOXACIN 500 MG/1
500 TABLET, FILM COATED ORAL DAILY
Qty: 10 TABLET | Refills: 0 | Status: SHIPPED | OUTPATIENT
Start: 2018-10-11 | End: 2018-10-21

## 2018-10-11 RX ORDER — HYDROCODONE BITARTRATE AND ACETAMINOPHEN 5; 325 MG/1; MG/1
1 TABLET ORAL EVERY 8 HOURS PRN
Qty: 15 TABLET | Refills: 0 | Status: SHIPPED | OUTPATIENT
Start: 2018-10-11 | End: 2018-10-16

## 2018-10-11 ASSESSMENT — ENCOUNTER SYMPTOMS
ABDOMINAL DISTENTION: 0
BLOOD IN STOOL: 0
SINUS PRESSURE: 1
EYE REDNESS: 0
EYE DISCHARGE: 0
APNEA: 0
EYE ITCHING: 0
COLOR CHANGE: 0
COUGH: 1
DIARRHEA: 0
CHOKING: 0
CONSTIPATION: 0
BACK PAIN: 0
ABDOMINAL PAIN: 0
RHINORRHEA: 1
SHORTNESS OF BREATH: 1
CHEST TIGHTNESS: 0
EYE PAIN: 0

## 2018-10-23 DIAGNOSIS — K21.00 GASTROESOPHAGEAL REFLUX DISEASE WITH ESOPHAGITIS: ICD-10-CM

## 2018-10-23 NOTE — TELEPHONE ENCOUNTER
Medication: Ondansetron  Last visit: 10/11/18  Next visit: 12/18/2018  Last refill: 3/5/18  Pharmacy: [address]

## 2018-10-29 RX ORDER — ONDANSETRON 4 MG/1
4 TABLET, FILM COATED ORAL EVERY 8 HOURS PRN
Qty: 30 TABLET | Refills: 0 | Status: SHIPPED | OUTPATIENT
Start: 2018-10-29 | End: 2020-05-22

## 2018-11-20 DIAGNOSIS — J06.9 UPPER RESPIRATORY TRACT INFECTION, UNSPECIFIED TYPE: ICD-10-CM

## 2018-11-20 RX ORDER — LORATADINE 10 MG/1
10 TABLET ORAL DAILY
Qty: 30 TABLET | Refills: 0 | Status: SHIPPED | OUTPATIENT
Start: 2018-11-20 | End: 2018-12-18

## 2018-12-18 ENCOUNTER — OFFICE VISIT (OUTPATIENT)
Dept: INTERNAL MEDICINE CLINIC | Age: 83
End: 2018-12-18
Payer: MEDICARE

## 2018-12-18 ENCOUNTER — HOSPITAL ENCOUNTER (OUTPATIENT)
Age: 83
Setting detail: SPECIMEN
Discharge: HOME | End: 2018-12-18
Payer: MEDICARE

## 2018-12-18 ENCOUNTER — HOSPITAL ENCOUNTER (OUTPATIENT)
Dept: GENERAL RADIOLOGY | Facility: CLINIC | Age: 83
Discharge: HOME | End: 2018-12-20
Payer: MEDICARE

## 2018-12-18 ENCOUNTER — HOSPITAL ENCOUNTER (OUTPATIENT)
Facility: CLINIC | Age: 83
Discharge: HOME | End: 2018-12-20
Payer: MEDICARE

## 2018-12-18 VITALS
WEIGHT: 222 LBS | SYSTOLIC BLOOD PRESSURE: 122 MMHG | BODY MASS INDEX: 43.59 KG/M2 | HEIGHT: 60 IN | DIASTOLIC BLOOD PRESSURE: 60 MMHG

## 2018-12-18 DIAGNOSIS — E03.9 HYPOTHYROIDISM, UNSPECIFIED TYPE: ICD-10-CM

## 2018-12-18 DIAGNOSIS — M25.562 CHRONIC PAIN OF LEFT KNEE: ICD-10-CM

## 2018-12-18 DIAGNOSIS — E11.8 TYPE 2 DIABETES MELLITUS WITH COMPLICATION, WITHOUT LONG-TERM CURRENT USE OF INSULIN (HCC): Primary | ICD-10-CM

## 2018-12-18 DIAGNOSIS — G89.29 CHRONIC PAIN OF LEFT KNEE: ICD-10-CM

## 2018-12-18 DIAGNOSIS — Z00.00 HEALTH CARE MAINTENANCE: ICD-10-CM

## 2018-12-18 LAB
ALBUMIN SERPL-MCNC: 3.4 G/DL (ref 3.5–5.2)
ALBUMIN/GLOBULIN RATIO: 1.1 (ref 1–2.5)
ALP BLD-CCNC: 84 U/L (ref 35–104)
ALT SERPL-CCNC: 12 U/L (ref 5–33)
ANION GAP SERPL CALCULATED.3IONS-SCNC: 11 MMOL/L (ref 9–17)
AST SERPL-CCNC: 19 U/L
BILIRUB SERPL-MCNC: 0.18 MG/DL (ref 0.3–1.2)
BILIRUBIN DIRECT: <0.08 MG/DL
BILIRUBIN, INDIRECT: ABNORMAL MG/DL (ref 0–1)
BUN BLDV-MCNC: 14 MG/DL (ref 8–23)
BUN/CREAT BLD: ABNORMAL (ref 9–20)
CALCIUM SERPL-MCNC: 8.4 MG/DL (ref 8.6–10.4)
CHLORIDE BLD-SCNC: 102 MMOL/L (ref 98–107)
CO2: 26 MMOL/L (ref 20–31)
CREAT SERPL-MCNC: 0.95 MG/DL (ref 0.5–0.9)
ESTIMATED AVERAGE GLUCOSE: 197 MG/DL
GFR AFRICAN AMERICAN: >60 ML/MIN
GFR NON-AFRICAN AMERICAN: 56 ML/MIN
GFR SERPL CREATININE-BSD FRML MDRD: ABNORMAL ML/MIN/{1.73_M2}
GFR SERPL CREATININE-BSD FRML MDRD: ABNORMAL ML/MIN/{1.73_M2}
GLOBULIN: ABNORMAL G/DL (ref 1.5–3.8)
GLUCOSE BLD-MCNC: 186 MG/DL (ref 70–99)
HBA1C MFR BLD: 8.5 % (ref 4–6)
HCT VFR BLD CALC: 29 % (ref 36.3–47.1)
HEMOGLOBIN: 8 G/DL (ref 11.9–15.1)
MCH RBC QN AUTO: 20.2 PG (ref 25.2–33.5)
MCHC RBC AUTO-ENTMCNC: 27.6 G/DL (ref 28.4–34.8)
MCV RBC AUTO: 73 FL (ref 82.6–102.9)
NRBC AUTOMATED: 0 PER 100 WBC
PDW BLD-RTO: 17.2 % (ref 11.8–14.4)
PLATELET # BLD: 212 K/UL (ref 138–453)
PMV BLD AUTO: 11.8 FL (ref 8.1–13.5)
POTASSIUM SERPL-SCNC: 4.1 MMOL/L (ref 3.7–5.3)
RBC # BLD: 3.97 M/UL (ref 3.95–5.11)
SODIUM BLD-SCNC: 139 MMOL/L (ref 135–144)
TOTAL PROTEIN: 6.4 G/DL (ref 6.4–8.3)
TSH SERPL DL<=0.05 MIU/L-ACNC: 3.02 MIU/L (ref 0.3–5)
WBC # BLD: 7.5 K/UL (ref 3.5–11.3)

## 2018-12-18 PROCEDURE — 73562 X-RAY EXAM OF KNEE 3: CPT

## 2018-12-18 PROCEDURE — G8400 PT W/DXA NO RESULTS DOC: HCPCS | Performed by: INTERNAL MEDICINE

## 2018-12-18 PROCEDURE — G8598 ASA/ANTIPLAT THER USED: HCPCS | Performed by: INTERNAL MEDICINE

## 2018-12-18 PROCEDURE — 1090F PRES/ABSN URINE INCON ASSESS: CPT | Performed by: INTERNAL MEDICINE

## 2018-12-18 PROCEDURE — 1101F PT FALLS ASSESS-DOCD LE1/YR: CPT | Performed by: INTERNAL MEDICINE

## 2018-12-18 PROCEDURE — G8427 DOCREV CUR MEDS BY ELIG CLIN: HCPCS | Performed by: INTERNAL MEDICINE

## 2018-12-18 PROCEDURE — 1036F TOBACCO NON-USER: CPT | Performed by: INTERNAL MEDICINE

## 2018-12-18 PROCEDURE — 1123F ACP DISCUSS/DSCN MKR DOCD: CPT | Performed by: INTERNAL MEDICINE

## 2018-12-18 PROCEDURE — 99214 OFFICE O/P EST MOD 30 MIN: CPT | Performed by: INTERNAL MEDICINE

## 2018-12-18 PROCEDURE — G8417 CALC BMI ABV UP PARAM F/U: HCPCS | Performed by: INTERNAL MEDICINE

## 2018-12-18 PROCEDURE — G8484 FLU IMMUNIZE NO ADMIN: HCPCS | Performed by: INTERNAL MEDICINE

## 2018-12-18 PROCEDURE — 4040F PNEUMOC VAC/ADMIN/RCVD: CPT | Performed by: INTERNAL MEDICINE

## 2018-12-18 ASSESSMENT — ENCOUNTER SYMPTOMS
ABDOMINAL DISTENTION: 0
EYE DISCHARGE: 0
EYE PAIN: 0
SHORTNESS OF BREATH: 0
DIARRHEA: 0
EYE REDNESS: 0
ABDOMINAL PAIN: 0
COUGH: 0
COLOR CHANGE: 0
CHEST TIGHTNESS: 0
EYE ITCHING: 0
BACK PAIN: 0
APNEA: 0
CHOKING: 0
BLOOD IN STOOL: 0
CONSTIPATION: 0

## 2018-12-18 NOTE — PROGRESS NOTES
blood work     Diabetes     due for blood work      Health Maintenance   Topic Date Due    DTaP/Tdap/Td vaccine (1 - Tdap) 05/01/1956    Shingles Vaccine (1 of 2 - 2 Dose Series) 05/01/1987    Diabetic retinal exam  09/02/2016    Lipid screen  09/03/2017    Flu vaccine (1) 09/01/2018    A1C test (Diabetic or Prediabetic)  10/16/2018    Potassium monitoring  01/10/2019    Creatinine monitoring  01/10/2019    Diabetic foot exam  05/07/2019    DEXA (modify frequency per FRAX score)  Addressed    Pneumococcal low/med risk  Completed     HPI-patient is here for eval his multiple medical problems. She has morbid obesity, diabetes, patient has chronic pain in left knee, pain is going on for last few days, pain increased with movement of knee. Relieved with rest.  Did not remember any trauma, fall, injury. No other complaints    Review of Systems   Constitutional: Negative for activity change, appetite change, chills, diaphoresis, fatigue and fever. HENT: Negative for congestion, dental problem, drooling and ear discharge. Eyes: Negative for pain, discharge, redness and itching. Respiratory: Negative for apnea, cough, choking, chest tightness and shortness of breath. Cardiovascular: Negative for chest pain and leg swelling. Gastrointestinal: Negative for abdominal distention, abdominal pain, blood in stool, constipation and diarrhea. Endocrine: Negative for cold intolerance and heat intolerance. Genitourinary: Negative for difficulty urinating, dysuria, enuresis, flank pain and frequency. Musculoskeletal: Positive for arthralgias (Left Knee ), gait problem (wheel Chair Bound ) and joint swelling. Negative for back pain. Skin: Negative for color change, pallor and rash. Neurological: Negative for dizziness, facial asymmetry, light-headedness, numbness and headaches.    Psychiatric/Behavioral: Negative for agitation, behavioral problems, confusion, decreased concentration and dysphoric

## 2019-01-03 DIAGNOSIS — J01.00 ACUTE NON-RECURRENT MAXILLARY SINUSITIS: ICD-10-CM

## 2019-01-03 RX ORDER — LORATADINE 10 MG/1
10 TABLET ORAL DAILY
Qty: 30 TABLET | Refills: 5 | Status: SHIPPED | OUTPATIENT
Start: 2019-01-03 | End: 2019-05-10 | Stop reason: ALTCHOICE

## 2019-01-11 DIAGNOSIS — M79.89 LEFT LEG SWELLING: ICD-10-CM

## 2019-01-11 RX ORDER — FUROSEMIDE 40 MG/1
40 TABLET ORAL DAILY
Qty: 90 TABLET | Refills: 3 | Status: SHIPPED | OUTPATIENT
Start: 2019-01-11 | End: 2020-03-05 | Stop reason: SDUPTHER

## 2019-02-05 ENCOUNTER — OFFICE VISIT (OUTPATIENT)
Dept: INTERNAL MEDICINE CLINIC | Age: 84
End: 2019-02-05
Payer: MEDICARE

## 2019-02-05 VITALS
SYSTOLIC BLOOD PRESSURE: 127 MMHG | BODY MASS INDEX: 43.59 KG/M2 | HEART RATE: 68 BPM | DIASTOLIC BLOOD PRESSURE: 68 MMHG | HEIGHT: 60 IN | RESPIRATION RATE: 18 BRPM | WEIGHT: 222 LBS

## 2019-02-05 DIAGNOSIS — E11.8 TYPE 2 DIABETES MELLITUS WITH COMPLICATION, WITHOUT LONG-TERM CURRENT USE OF INSULIN (HCC): ICD-10-CM

## 2019-02-05 DIAGNOSIS — E66.01 MORBID OBESITY WITH BMI OF 40.0-44.9, ADULT (HCC): ICD-10-CM

## 2019-02-05 DIAGNOSIS — D50.9 IRON DEFICIENCY ANEMIA, UNSPECIFIED IRON DEFICIENCY ANEMIA TYPE: ICD-10-CM

## 2019-02-05 DIAGNOSIS — E11.9 TYPE 2 DIABETES MELLITUS WITHOUT COMPLICATION, WITHOUT LONG-TERM CURRENT USE OF INSULIN (HCC): ICD-10-CM

## 2019-02-05 DIAGNOSIS — E66.9 OBESITY, UNSPECIFIED CLASSIFICATION, UNSPECIFIED OBESITY TYPE, UNSPECIFIED WHETHER SERIOUS COMORBIDITY PRESENT: ICD-10-CM

## 2019-02-05 DIAGNOSIS — I50.32 CHRONIC DIASTOLIC CONGESTIVE HEART FAILURE (HCC): Primary | ICD-10-CM

## 2019-02-05 DIAGNOSIS — M25.562 CHRONIC PAIN OF LEFT KNEE: ICD-10-CM

## 2019-02-05 DIAGNOSIS — G89.29 CHRONIC PAIN OF LEFT KNEE: ICD-10-CM

## 2019-02-05 PROCEDURE — G8417 CALC BMI ABV UP PARAM F/U: HCPCS | Performed by: INTERNAL MEDICINE

## 2019-02-05 PROCEDURE — 1090F PRES/ABSN URINE INCON ASSESS: CPT | Performed by: INTERNAL MEDICINE

## 2019-02-05 PROCEDURE — 4040F PNEUMOC VAC/ADMIN/RCVD: CPT | Performed by: INTERNAL MEDICINE

## 2019-02-05 PROCEDURE — G8400 PT W/DXA NO RESULTS DOC: HCPCS | Performed by: INTERNAL MEDICINE

## 2019-02-05 PROCEDURE — G8598 ASA/ANTIPLAT THER USED: HCPCS | Performed by: INTERNAL MEDICINE

## 2019-02-05 PROCEDURE — 1123F ACP DISCUSS/DSCN MKR DOCD: CPT | Performed by: INTERNAL MEDICINE

## 2019-02-05 PROCEDURE — 1036F TOBACCO NON-USER: CPT | Performed by: INTERNAL MEDICINE

## 2019-02-05 PROCEDURE — 1101F PT FALLS ASSESS-DOCD LE1/YR: CPT | Performed by: INTERNAL MEDICINE

## 2019-02-05 PROCEDURE — 99214 OFFICE O/P EST MOD 30 MIN: CPT | Performed by: INTERNAL MEDICINE

## 2019-02-05 PROCEDURE — G8427 DOCREV CUR MEDS BY ELIG CLIN: HCPCS | Performed by: INTERNAL MEDICINE

## 2019-02-05 PROCEDURE — G8484 FLU IMMUNIZE NO ADMIN: HCPCS | Performed by: INTERNAL MEDICINE

## 2019-02-07 ASSESSMENT — ENCOUNTER SYMPTOMS
ABDOMINAL DISTENTION: 0
DIARRHEA: 0
CHOKING: 0
APNEA: 0
EYE ITCHING: 0
ABDOMINAL PAIN: 0
EYE DISCHARGE: 0
COLOR CHANGE: 0
CONSTIPATION: 0
SHORTNESS OF BREATH: 1
CHEST TIGHTNESS: 0
EYE PAIN: 0
BACK PAIN: 1
EYE REDNESS: 0
COUGH: 0
BLOOD IN STOOL: 0

## 2019-02-21 ENCOUNTER — TELEPHONE (OUTPATIENT)
Dept: INTERNAL MEDICINE CLINIC | Age: 84
End: 2019-02-21

## 2019-03-12 ENCOUNTER — OFFICE VISIT (OUTPATIENT)
Dept: INTERNAL MEDICINE CLINIC | Age: 84
End: 2019-03-12
Payer: MEDICARE

## 2019-03-12 VITALS
HEIGHT: 60 IN | SYSTOLIC BLOOD PRESSURE: 120 MMHG | OXYGEN SATURATION: 96 % | DIASTOLIC BLOOD PRESSURE: 62 MMHG | WEIGHT: 223 LBS | BODY MASS INDEX: 43.78 KG/M2

## 2019-03-12 DIAGNOSIS — Z74.09 MOBILITY IMPAIRED: ICD-10-CM

## 2019-03-12 DIAGNOSIS — E66.01 MORBID OBESITY WITH BMI OF 40.0-44.9, ADULT (HCC): Primary | ICD-10-CM

## 2019-03-12 PROCEDURE — G8417 CALC BMI ABV UP PARAM F/U: HCPCS | Performed by: INTERNAL MEDICINE

## 2019-03-12 PROCEDURE — 1123F ACP DISCUSS/DSCN MKR DOCD: CPT | Performed by: INTERNAL MEDICINE

## 2019-03-12 PROCEDURE — 1090F PRES/ABSN URINE INCON ASSESS: CPT | Performed by: INTERNAL MEDICINE

## 2019-03-12 PROCEDURE — G8598 ASA/ANTIPLAT THER USED: HCPCS | Performed by: INTERNAL MEDICINE

## 2019-03-12 PROCEDURE — 1101F PT FALLS ASSESS-DOCD LE1/YR: CPT | Performed by: INTERNAL MEDICINE

## 2019-03-12 PROCEDURE — G8400 PT W/DXA NO RESULTS DOC: HCPCS | Performed by: INTERNAL MEDICINE

## 2019-03-12 PROCEDURE — 4040F PNEUMOC VAC/ADMIN/RCVD: CPT | Performed by: INTERNAL MEDICINE

## 2019-03-12 PROCEDURE — 1036F TOBACCO NON-USER: CPT | Performed by: INTERNAL MEDICINE

## 2019-03-12 PROCEDURE — G8427 DOCREV CUR MEDS BY ELIG CLIN: HCPCS | Performed by: INTERNAL MEDICINE

## 2019-03-12 PROCEDURE — 99213 OFFICE O/P EST LOW 20 MIN: CPT | Performed by: INTERNAL MEDICINE

## 2019-03-12 PROCEDURE — G8484 FLU IMMUNIZE NO ADMIN: HCPCS | Performed by: INTERNAL MEDICINE

## 2019-03-12 ASSESSMENT — PATIENT HEALTH QUESTIONNAIRE - PHQ9
2. FEELING DOWN, DEPRESSED OR HOPELESS: 0
SUM OF ALL RESPONSES TO PHQ QUESTIONS 1-9: 0
SUM OF ALL RESPONSES TO PHQ QUESTIONS 1-9: 0
SUM OF ALL RESPONSES TO PHQ9 QUESTIONS 1 & 2: 0
1. LITTLE INTEREST OR PLEASURE IN DOING THINGS: 0

## 2019-03-24 ASSESSMENT — ENCOUNTER SYMPTOMS
DIARRHEA: 0
EYE REDNESS: 0
COLOR CHANGE: 0
CONSTIPATION: 0
APNEA: 0
CHOKING: 0
EYE ITCHING: 0
ABDOMINAL PAIN: 0
EYE PAIN: 0
BACK PAIN: 1
BLOOD IN STOOL: 0
CHEST TIGHTNESS: 0
ABDOMINAL DISTENTION: 0
SHORTNESS OF BREATH: 0
EYE DISCHARGE: 0
COUGH: 0

## 2019-03-26 ENCOUNTER — TELEPHONE (OUTPATIENT)
Dept: INTERNAL MEDICINE CLINIC | Age: 84
End: 2019-03-26

## 2019-03-26 DIAGNOSIS — Z74.09 MOBILITY IMPAIRED: Primary | ICD-10-CM

## 2019-04-15 DIAGNOSIS — F32.A DEPRESSION: ICD-10-CM

## 2019-04-16 RX ORDER — LEVOTHYROXINE SODIUM 0.1 MG/1
TABLET ORAL
Qty: 90 TABLET | Refills: 3 | Status: SHIPPED | OUTPATIENT
Start: 2019-04-16 | End: 2019-05-08 | Stop reason: SDUPTHER

## 2019-04-18 ENCOUNTER — TELEPHONE (OUTPATIENT)
Dept: INTERNAL MEDICINE CLINIC | Age: 84
End: 2019-04-18

## 2019-05-08 ENCOUNTER — CARE COORDINATION (OUTPATIENT)
Dept: CARE COORDINATION | Age: 84
End: 2019-05-08

## 2019-05-08 ENCOUNTER — OFFICE VISIT (OUTPATIENT)
Dept: INTERNAL MEDICINE CLINIC | Age: 84
End: 2019-05-08
Payer: MEDICARE

## 2019-05-08 ENCOUNTER — HOSPITAL ENCOUNTER (INPATIENT)
Age: 84
LOS: 1 days | Discharge: HOME | DRG: 638 | End: 2019-05-09
Attending: INTERNAL MEDICINE | Admitting: INTERNAL MEDICINE
Payer: MEDICARE

## 2019-05-08 VITALS
HEART RATE: 82 BPM | OXYGEN SATURATION: 96 % | SYSTOLIC BLOOD PRESSURE: 104 MMHG | HEIGHT: 60 IN | DIASTOLIC BLOOD PRESSURE: 72 MMHG | WEIGHT: 206 LBS | BODY MASS INDEX: 40.44 KG/M2

## 2019-05-08 DIAGNOSIS — E03.9 HYPOTHYROIDISM, UNSPECIFIED TYPE: ICD-10-CM

## 2019-05-08 DIAGNOSIS — L03.116 CELLULITIS OF LEFT LOWER EXTREMITY: ICD-10-CM

## 2019-05-08 DIAGNOSIS — I25.10 CORONARY ARTERY DISEASE DUE TO LIPID RICH PLAQUE: ICD-10-CM

## 2019-05-08 DIAGNOSIS — E11.9 TYPE 2 DIABETES MELLITUS WITHOUT COMPLICATION, WITHOUT LONG-TERM CURRENT USE OF INSULIN (HCC): ICD-10-CM

## 2019-05-08 DIAGNOSIS — E66.01 MORBID OBESITY WITH BMI OF 40.0-44.9, ADULT (HCC): Primary | ICD-10-CM

## 2019-05-08 DIAGNOSIS — I25.83 CORONARY ARTERY DISEASE DUE TO LIPID RICH PLAQUE: ICD-10-CM

## 2019-05-08 PROBLEM — L03.90 CELLULITIS: Status: ACTIVE | Noted: 2019-05-08

## 2019-05-08 LAB
-: NORMAL
ALBUMIN SERPL-MCNC: 3.3 G/DL (ref 3.5–5.2)
ALBUMIN/GLOBULIN RATIO: ABNORMAL (ref 1–2.5)
ALLEN TEST: ABNORMAL
ALP BLD-CCNC: 114 U/L (ref 35–104)
ALT SERPL-CCNC: 10 U/L (ref 5–33)
ANION GAP SERPL CALCULATED.3IONS-SCNC: 16 MMOL/L (ref 9–17)
AST SERPL-CCNC: 15 U/L
BETA-HYDROXYBUTYRATE: 0.43 MMOL/L (ref 0.02–0.27)
BILIRUB SERPL-MCNC: 0.34 MG/DL (ref 0.3–1.2)
BUN BLDV-MCNC: 15 MG/DL (ref 8–23)
BUN/CREAT BLD: ABNORMAL (ref 9–20)
CALCIUM SERPL-MCNC: 7.9 MG/DL (ref 8.6–10.4)
CARBOXYHEMOGLOBIN: 2.1 % (ref 0–5)
CHLORIDE BLD-SCNC: 87 MMOL/L (ref 98–107)
CHOLESTEROL/HDL RATIO: 4.6
CHOLESTEROL: 218 MG/DL
CO2: 23 MMOL/L (ref 20–31)
CREAT SERPL-MCNC: 1.2 MG/DL (ref 0.5–0.9)
FIO2: ABNORMAL
GFR AFRICAN AMERICAN: 52 ML/MIN
GFR NON-AFRICAN AMERICAN: 43 ML/MIN
GFR SERPL CREATININE-BSD FRML MDRD: ABNORMAL ML/MIN/{1.73_M2}
GFR SERPL CREATININE-BSD FRML MDRD: ABNORMAL ML/MIN/{1.73_M2}
GLUCOSE BLD-MCNC: 543 MG/DL (ref 65–105)
GLUCOSE BLD-MCNC: 746 MG/DL (ref 70–99)
HCO3 VENOUS: 25.5 MMOL/L (ref 24–30)
HCT VFR BLD CALC: 31.9 % (ref 36–46)
HDLC SERPL-MCNC: 47 MG/DL
HEMOGLOBIN: 9.5 G/DL (ref 12–16)
LDL CHOLESTEROL: ABNORMAL MG/DL (ref 0–130)
MAGNESIUM: 1.6 MG/DL (ref 1.6–2.6)
MCH RBC QN AUTO: 20.8 PG (ref 26–34)
MCHC RBC AUTO-ENTMCNC: 29.8 G/DL (ref 31–37)
MCV RBC AUTO: 69.9 FL (ref 80–100)
METHEMOGLOBIN: 0.9 % (ref 0–1.9)
MODE: ABNORMAL
NEGATIVE BASE EXCESS, VEN: ABNORMAL MMOL/L (ref 0–2)
NOTIFICATION TIME: ABNORMAL
NOTIFICATION: ABNORMAL
NRBC AUTOMATED: ABNORMAL PER 100 WBC
O2 DEVICE/FLOW/%: ABNORMAL
O2 SAT, VEN: 45.1 % (ref 60–85)
OXYHEMOGLOBIN: ABNORMAL % (ref 95–98)
PATIENT TEMP: 37
PCO2, VEN, TEMP ADJ: ABNORMAL MMHG (ref 39–55)
PCO2, VEN: 45.9 (ref 39–55)
PDW BLD-RTO: 19.6 % (ref 11.5–14.9)
PEEP/CPAP: ABNORMAL
PH VENOUS: 7.35 (ref 7.32–7.42)
PH, VEN, TEMP ADJ: ABNORMAL (ref 7.32–7.42)
PHOSPHORUS: 2.7 MG/DL (ref 2.6–4.5)
PLATELET # BLD: 170 K/UL (ref 150–450)
PMV BLD AUTO: 9.5 FL (ref 6–12)
PO2, VEN, TEMP ADJ: ABNORMAL MMHG (ref 30–50)
PO2, VEN: 25.1 (ref 30–50)
POSITIVE BASE EXCESS, VEN: 0 MMOL/L (ref 0–2)
POTASSIUM SERPL-SCNC: 3.9 MMOL/L (ref 3.7–5.3)
PSV: ABNORMAL
PT. POSITION: ABNORMAL
RBC # BLD: 4.57 M/UL (ref 4–5.2)
REASON FOR REJECTION: NORMAL
RESPIRATORY RATE: ABNORMAL
SAMPLE SITE: ABNORMAL
SET RATE: ABNORMAL
SODIUM BLD-SCNC: 126 MMOL/L (ref 135–144)
TEXT FOR RESPIRATORY: ABNORMAL
THYROXINE, FREE: 0.33 NG/DL (ref 0.93–1.7)
TOTAL HB: ABNORMAL G/DL (ref 12–16)
TOTAL PROTEIN: 6.3 G/DL (ref 6.4–8.3)
TOTAL RATE: ABNORMAL
TRIGL SERPL-MCNC: 750 MG/DL
TSH SERPL DL<=0.05 MIU/L-ACNC: 13.12 MIU/L (ref 0.3–5)
VLDLC SERPL CALC-MCNC: ABNORMAL MG/DL (ref 1–30)
VT: ABNORMAL
WBC # BLD: 7.9 K/UL (ref 3.5–11)
ZZ NTE CLEAN UP: ORDERED TEST: NORMAL
ZZ NTE WITH NAME CLEAN UP: SPECIMEN SOURCE: NORMAL

## 2019-05-08 PROCEDURE — 85027 COMPLETE CBC AUTOMATED: CPT

## 2019-05-08 PROCEDURE — G8400 PT W/DXA NO RESULTS DOC: HCPCS | Performed by: INTERNAL MEDICINE

## 2019-05-08 PROCEDURE — G8427 DOCREV CUR MEDS BY ELIG CLIN: HCPCS | Performed by: INTERNAL MEDICINE

## 2019-05-08 PROCEDURE — 2580000003 HC RX 258: Performed by: INTERNAL MEDICINE

## 2019-05-08 PROCEDURE — 1123F ACP DISCUSS/DSCN MKR DOCD: CPT | Performed by: INTERNAL MEDICINE

## 2019-05-08 PROCEDURE — 1090F PRES/ABSN URINE INCON ASSESS: CPT | Performed by: INTERNAL MEDICINE

## 2019-05-08 PROCEDURE — 36415 COLL VENOUS BLD VENIPUNCTURE: CPT

## 2019-05-08 PROCEDURE — G8417 CALC BMI ABV UP PARAM F/U: HCPCS | Performed by: INTERNAL MEDICINE

## 2019-05-08 PROCEDURE — 2060000000 HC ICU INTERMEDIATE R&B

## 2019-05-08 PROCEDURE — 87040 BLOOD CULTURE FOR BACTERIA: CPT

## 2019-05-08 PROCEDURE — G8598 ASA/ANTIPLAT THER USED: HCPCS | Performed by: INTERNAL MEDICINE

## 2019-05-08 PROCEDURE — 82947 ASSAY GLUCOSE BLOOD QUANT: CPT

## 2019-05-08 PROCEDURE — 84439 ASSAY OF FREE THYROXINE: CPT

## 2019-05-08 PROCEDURE — 83721 ASSAY OF BLOOD LIPOPROTEIN: CPT

## 2019-05-08 PROCEDURE — 6370000000 HC RX 637 (ALT 250 FOR IP): Performed by: STUDENT IN AN ORGANIZED HEALTH CARE EDUCATION/TRAINING PROGRAM

## 2019-05-08 PROCEDURE — 4040F PNEUMOC VAC/ADMIN/RCVD: CPT | Performed by: INTERNAL MEDICINE

## 2019-05-08 PROCEDURE — 84100 ASSAY OF PHOSPHORUS: CPT

## 2019-05-08 PROCEDURE — 83036 HEMOGLOBIN GLYCOSYLATED A1C: CPT

## 2019-05-08 PROCEDURE — 82800 BLOOD PH: CPT

## 2019-05-08 PROCEDURE — 1036F TOBACCO NON-USER: CPT | Performed by: INTERNAL MEDICINE

## 2019-05-08 PROCEDURE — 6370000000 HC RX 637 (ALT 250 FOR IP): Performed by: INTERNAL MEDICINE

## 2019-05-08 PROCEDURE — 6360000002 HC RX W HCPCS: Performed by: INTERNAL MEDICINE

## 2019-05-08 PROCEDURE — 84443 ASSAY THYROID STIM HORMONE: CPT

## 2019-05-08 PROCEDURE — 82010 KETONE BODYS QUAN: CPT

## 2019-05-08 PROCEDURE — 6360000002 HC RX W HCPCS: Performed by: STUDENT IN AN ORGANIZED HEALTH CARE EDUCATION/TRAINING PROGRAM

## 2019-05-08 PROCEDURE — 99214 OFFICE O/P EST MOD 30 MIN: CPT | Performed by: INTERNAL MEDICINE

## 2019-05-08 PROCEDURE — 80061 LIPID PANEL: CPT

## 2019-05-08 PROCEDURE — 83735 ASSAY OF MAGNESIUM: CPT

## 2019-05-08 PROCEDURE — 80053 COMPREHEN METABOLIC PANEL: CPT

## 2019-05-08 PROCEDURE — 2580000003 HC RX 258: Performed by: STUDENT IN AN ORGANIZED HEALTH CARE EDUCATION/TRAINING PROGRAM

## 2019-05-08 PROCEDURE — 82805 BLOOD GASES W/O2 SATURATION: CPT

## 2019-05-08 RX ORDER — SODIUM CHLORIDE 0.9 % (FLUSH) 0.9 %
10 SYRINGE (ML) INJECTION PRN
Status: DISCONTINUED | OUTPATIENT
Start: 2019-05-08 | End: 2019-05-09 | Stop reason: HOSPADM

## 2019-05-08 RX ORDER — HEPARIN SODIUM 5000 [USP'U]/ML
5000 INJECTION, SOLUTION INTRAVENOUS; SUBCUTANEOUS EVERY 8 HOURS SCHEDULED
Status: DISCONTINUED | OUTPATIENT
Start: 2019-05-08 | End: 2019-05-09 | Stop reason: HOSPADM

## 2019-05-08 RX ORDER — CLOPIDOGREL BISULFATE 75 MG/1
75 TABLET ORAL DAILY
Status: DISCONTINUED | OUTPATIENT
Start: 2019-05-08 | End: 2019-05-09 | Stop reason: HOSPADM

## 2019-05-08 RX ORDER — DEXTROSE MONOHYDRATE 50 MG/ML
100 INJECTION, SOLUTION INTRAVENOUS PRN
Status: DISCONTINUED | OUTPATIENT
Start: 2019-05-08 | End: 2019-05-09 | Stop reason: HOSPADM

## 2019-05-08 RX ORDER — DEXTROSE MONOHYDRATE 25 G/50ML
12.5 INJECTION, SOLUTION INTRAVENOUS PRN
Status: DISCONTINUED | OUTPATIENT
Start: 2019-05-08 | End: 2019-05-08

## 2019-05-08 RX ORDER — ASPIRIN 81 MG/1
81 TABLET, CHEWABLE ORAL DAILY
Status: DISCONTINUED | OUTPATIENT
Start: 2019-05-08 | End: 2019-05-09 | Stop reason: HOSPADM

## 2019-05-08 RX ORDER — MUPIROCIN CALCIUM 20 MG/G
CREAM TOPICAL
Qty: 1 TUBE | Refills: 0 | Status: SHIPPED | OUTPATIENT
Start: 2019-05-08 | End: 2019-06-07

## 2019-05-08 RX ORDER — FAMOTIDINE 20 MG/1
20 TABLET, FILM COATED ORAL 2 TIMES DAILY
Status: DISCONTINUED | OUTPATIENT
Start: 2019-05-08 | End: 2019-05-09 | Stop reason: HOSPADM

## 2019-05-08 RX ORDER — DEXTROSE MONOHYDRATE 50 MG/ML
100 INJECTION, SOLUTION INTRAVENOUS PRN
Status: DISCONTINUED | OUTPATIENT
Start: 2019-05-08 | End: 2019-05-08

## 2019-05-08 RX ORDER — POTASSIUM CHLORIDE 20 MEQ/1
40 TABLET, EXTENDED RELEASE ORAL PRN
Status: DISCONTINUED | OUTPATIENT
Start: 2019-05-08 | End: 2019-05-09 | Stop reason: HOSPADM

## 2019-05-08 RX ORDER — REPAGLINIDE 1 MG/1
TABLET ORAL
Qty: 90 TABLET | Refills: 5 | Status: SHIPPED | OUTPATIENT
Start: 2019-05-08 | End: 2020-05-22

## 2019-05-08 RX ORDER — ACETAMINOPHEN 325 MG/1
650 TABLET ORAL EVERY 4 HOURS PRN
Status: DISCONTINUED | OUTPATIENT
Start: 2019-05-08 | End: 2019-05-09 | Stop reason: HOSPADM

## 2019-05-08 RX ORDER — SODIUM CHLORIDE 0.9 % (FLUSH) 0.9 %
10 SYRINGE (ML) INJECTION EVERY 12 HOURS SCHEDULED
Status: DISCONTINUED | OUTPATIENT
Start: 2019-05-08 | End: 2019-05-09 | Stop reason: HOSPADM

## 2019-05-08 RX ORDER — NICOTINE POLACRILEX 4 MG
15 LOZENGE BUCCAL PRN
Status: DISCONTINUED | OUTPATIENT
Start: 2019-05-08 | End: 2019-05-08

## 2019-05-08 RX ORDER — FUROSEMIDE 40 MG/1
40 TABLET ORAL DAILY
Status: DISCONTINUED | OUTPATIENT
Start: 2019-05-08 | End: 2019-05-09 | Stop reason: HOSPADM

## 2019-05-08 RX ORDER — POTASSIUM CHLORIDE 7.45 MG/ML
10 INJECTION INTRAVENOUS PRN
Status: DISCONTINUED | OUTPATIENT
Start: 2019-05-08 | End: 2019-05-09 | Stop reason: HOSPADM

## 2019-05-08 RX ORDER — ATORVASTATIN CALCIUM 80 MG/1
80 TABLET, FILM COATED ORAL NIGHTLY
Status: DISCONTINUED | OUTPATIENT
Start: 2019-05-08 | End: 2019-05-09 | Stop reason: HOSPADM

## 2019-05-08 RX ORDER — INSULIN GLARGINE 100 [IU]/ML
15 INJECTION, SOLUTION SUBCUTANEOUS NIGHTLY
Status: DISCONTINUED | OUTPATIENT
Start: 2019-05-08 | End: 2019-05-09

## 2019-05-08 RX ORDER — LEVOTHYROXINE SODIUM 0.1 MG/1
TABLET ORAL
Qty: 90 TABLET | Refills: 3 | Status: ON HOLD | OUTPATIENT
Start: 2019-05-08 | End: 2019-05-09 | Stop reason: HOSPADM

## 2019-05-08 RX ORDER — CEPHALEXIN 500 MG/1
500 CAPSULE ORAL 3 TIMES DAILY
Qty: 30 CAPSULE | Refills: 0 | Status: ON HOLD | OUTPATIENT
Start: 2019-05-08 | End: 2019-05-09 | Stop reason: HOSPADM

## 2019-05-08 RX ORDER — LEVOTHYROXINE SODIUM 0.1 MG/1
100 TABLET ORAL DAILY
Status: DISCONTINUED | OUTPATIENT
Start: 2019-05-08 | End: 2019-05-09

## 2019-05-08 RX ORDER — INSULIN GLARGINE 100 [IU]/ML
20 INJECTION, SOLUTION SUBCUTANEOUS NIGHTLY
Status: DISCONTINUED | OUTPATIENT
Start: 2019-05-08 | End: 2019-05-08

## 2019-05-08 RX ORDER — SODIUM CHLORIDE 9 MG/ML
INJECTION, SOLUTION INTRAVENOUS CONTINUOUS
Status: DISCONTINUED | OUTPATIENT
Start: 2019-05-08 | End: 2019-05-09 | Stop reason: HOSPADM

## 2019-05-08 RX ORDER — NICOTINE POLACRILEX 4 MG
15 LOZENGE BUCCAL PRN
Status: DISCONTINUED | OUTPATIENT
Start: 2019-05-08 | End: 2019-05-09 | Stop reason: HOSPADM

## 2019-05-08 RX ORDER — DEXTROSE MONOHYDRATE 25 G/50ML
12.5 INJECTION, SOLUTION INTRAVENOUS PRN
Status: DISCONTINUED | OUTPATIENT
Start: 2019-05-08 | End: 2019-05-09 | Stop reason: HOSPADM

## 2019-05-08 RX ORDER — ONDANSETRON 2 MG/ML
4 INJECTION INTRAMUSCULAR; INTRAVENOUS EVERY 6 HOURS PRN
Status: DISCONTINUED | OUTPATIENT
Start: 2019-05-08 | End: 2019-05-09 | Stop reason: HOSPADM

## 2019-05-08 RX ADMIN — METOPROLOL TARTRATE 25 MG: 25 TABLET ORAL at 21:15

## 2019-05-08 RX ADMIN — DEXTROSE MONOHYDRATE 100 ML/HR: 50 INJECTION, SOLUTION INTRAVENOUS at 17:51

## 2019-05-08 RX ADMIN — SODIUM CHLORIDE: 9 INJECTION, SOLUTION INTRAVENOUS at 17:51

## 2019-05-08 RX ADMIN — CLOPIDOGREL BISULFATE 75 MG: 75 TABLET ORAL at 18:17

## 2019-05-08 RX ADMIN — HEPARIN SODIUM 5000 UNITS: 5000 INJECTION, SOLUTION INTRAVENOUS; SUBCUTANEOUS at 21:15

## 2019-05-08 RX ADMIN — INSULIN GLARGINE 15 UNITS: 100 INJECTION, SOLUTION SUBCUTANEOUS at 18:12

## 2019-05-08 RX ADMIN — INSULIN LISPRO 18 UNITS: 100 INJECTION, SOLUTION INTRAVENOUS; SUBCUTANEOUS at 18:20

## 2019-05-08 RX ADMIN — ATORVASTATIN CALCIUM 80 MG: 80 TABLET, FILM COATED ORAL at 21:15

## 2019-05-08 RX ADMIN — VANCOMYCIN HYDROCHLORIDE 1500 MG: 5 INJECTION, POWDER, LYOPHILIZED, FOR SOLUTION INTRAVENOUS at 17:55

## 2019-05-08 RX ADMIN — INSULIN LISPRO 9 UNITS: 100 INJECTION, SOLUTION INTRAVENOUS; SUBCUTANEOUS at 21:15

## 2019-05-08 RX ADMIN — ASPIRIN 81 MG 81 MG: 81 TABLET ORAL at 18:17

## 2019-05-08 RX ADMIN — SERTRALINE HYDROCHLORIDE 50 MG: 50 TABLET ORAL at 18:17

## 2019-05-08 RX ADMIN — FUROSEMIDE 40 MG: 40 TABLET ORAL at 18:17

## 2019-05-08 RX ADMIN — LEVOTHYROXINE SODIUM 100 MCG: 100 TABLET ORAL at 18:17

## 2019-05-08 RX ADMIN — FAMOTIDINE 20 MG: 20 TABLET ORAL at 21:16

## 2019-05-08 ASSESSMENT — ENCOUNTER SYMPTOMS
ABDOMINAL PAIN: 0
VOMITING: 0
BACK PAIN: 1
DIARRHEA: 0
CONSTIPATION: 0
EYE DISCHARGE: 0
COUGH: 0
NAUSEA: 0
COLOR CHANGE: 0
SHORTNESS OF BREATH: 0
ABDOMINAL PAIN: 0
EYE PAIN: 0
SHORTNESS OF BREATH: 0
DIARRHEA: 0
BLOOD IN STOOL: 0
COUGH: 0
EYE REDNESS: 0
CHEST TIGHTNESS: 0
ABDOMINAL DISTENTION: 0
EYE ITCHING: 0
CONSTIPATION: 0
APNEA: 0
CHOKING: 0

## 2019-05-08 ASSESSMENT — PAIN SCALES - GENERAL: PAINLEVEL_OUTOF10: 0

## 2019-05-08 NOTE — PROGRESS NOTES
Pharmacy Note  Vancomycin Consult    Serum Creatinine = 1.20, adjusted crcl = 26 using IBW. Dose = 1500 mg every 36 hours. Itz Joe. Ph.  5/8/2019  5:32 PM

## 2019-05-08 NOTE — PROGRESS NOTES
Subjective:      Chief Complaint   Patient presents with    Diabetes     Pt due for a1c check, pt states she has not been checking BS, pt due for DM foot exam     Rash     Left leg rash, pt dtr states rash bleeds at times        Patient ID: Tacho Suresh is a 82 y.o. female. Visit Information    Have you changed or started any medications since your last visit including any over-the-counter medicines, vitamins, or herbal medicines? no   Are you having any side effects from any of your medications? -  no  Have you stopped taking any of your medications? Is so, why? -  no    Have you seen any other physician or provider since your last visit? No  Have you had any other diagnostic tests since your last visit? No  Have you been seen in the emergency room and/or had an admission to a hospital since we last saw you? No  Have you had your routine dental cleaning in the past 6 months? no    Have you activated your [employer] account? If not, what are your barriers?  No:      Patient Care Team:  Emil Koenig MD as PCP - General (Internal Medicine)  Emil Koenig MD as PCP - S Attributed Provider  Nadja Og MD as Consulting Physician (Gastroenterology)  Florin Ortiz MD as Consulting Physician (Pulmonology)    Medical History Review  Past Medical, Family, and Social History reviewed and does not contribute to the patient presenting condition    Health Maintenance   Topic Date Due    DTaP/Tdap/Td vaccine (1 - Tdap) 05/01/1956    Shingles Vaccine (1 of 2) 05/01/1987    Diabetic retinal exam  09/02/2016    Lipid screen  09/03/2017    Diabetic foot exam  05/07/2019    A1C test (Diabetic or Prediabetic)  06/18/2019    Flu vaccine (Season Ended) 09/01/2019    Potassium monitoring  12/18/2019    Creatinine monitoring  12/18/2019    Pneumococcal 65+ years Vaccine  Completed    DEXA (modify frequency per FRAX score)  Addressed     HPI       HPI   1) Location/Symptom Rash in left leg , Pain   2) range of motion. Neck supple. No JVD present. No tracheal deviation present. No thyromegaly present. Cardiovascular: Normal rate and normal heart sounds. Exam reveals no gallop. No murmur heard. Pulmonary/Chest: Effort normal and breath sounds normal. No stridor. No respiratory distress. She has no wheezes. She has no rales. She exhibits no tenderness. Abdominal: Soft. Bowel sounds are normal. She exhibits no distension. There is no tenderness. There is no rebound and no guarding. Musculoskeletal: Normal range of motion. She exhibits no edema. Neurological: She is alert and oriented to person, place, and time. Skin: Rash (area of redness around 8x6 cm in Left leg , Small amount of drainage present ) noted. She is not diaphoretic. Assessment / Plan:   1. Depression  - sertraline (ZOLOFT) 50 MG tablet; TAKE 1 TAB BY MOUTH ONCE A DAY  Dispense: 90 tablet; Refill: 1    2. Morbid obesity with BMI of 40.0-44.9, adult ([address]  Loosing weight     3. Type 2 diabetes mellitus without complication, without long-term current use of insulin (HCC)  - POCT glycosylated hemoglobin (Hb A1C)  - repaglinide (PRANDIN) 1 MG tablet; TAKE 1 TAB BY MOUTH THREE TIMES A DAY BEFORE MEALS  Dispense: 90 tablet; Refill: 5  - Comprehensive Metabolic Panel; Future    4. Hypothyroidism, unspecified type  - levothyroxine (SYNTHROID) 100 MCG tablet; TAKE 1 TAB BY MOUTH EVERY MORNING  Dispense: 90 tablet; Refill: 3  - TSH; Future    5. Coronary artery disease due to lipid rich plaque  - Lipid Panel; Future    6. Cellulitis of left lower extremity      Patient has Uncontrolled blood sugars , Her blood sugar is Office is High ( > 600) ,her HBAIC is > 14   Advise to get admitted at [facility] for control of her Hyperglycemia   She has Infection of Left leg responsible for Hyperglycemia   Patient and Family understood   Will start on IV Antibiotics , Blood Culture       · Return in about 2 weeks (around 5/22/2019).     · Reviewed prior labs and health maintenance. · Discussed use, benefit, and side effects of prescribed medications. Barriers to medication compliance addressed. All patient questions answered. Pt voiced understanding. MD AVINASH ValeroFreeman Health System  5/8/2019, 3:32 PM    Please note that this chart was generated using voice recognition Dragon dictation software. Although every effort was made to ensure the accuracy of this automated transcription, some errors in transcription may have occurred.

## 2019-05-08 NOTE — H&P
[address]      [address] [facility]     HISTORY AND PHYSICAL EXAMINATION            Date:   5/8/2019  Patient name:  Vikas Stiles  Date of admission:  5/8/2019  3:17 PM  MRN:   [id number]  Account:  [de-identified]  YOB: 1935  PCP:    Flaquita Ngo MD  Room:   [address] [place] [address]  Code Status:    Full Code    Chief Complaint:     No chief complaint on file. Hyperglycemia, sent in from office     History Obtained From:     patient    History of Present Illness: The patient is a 82 y.o. Non-/non  female who presents withNo chief complaint on file. and she is admitted to the hospital for the management of  Hyperglycemia. 81 yo female, pmhx DM II - not on insulin, CHFpEF, HTN, hypothyroidism s/p thyroidectomy 2/2 thyroid cancer, presents with hyperglycemia. Patient was sent from her pcp office for glucose reading of >600. She was seen in the office for a left leg cellulitis. Patient states she has had blurry vision, increased thirst and polyuria for 2 days duration. Left leg cellulitis has been present for 1 week. She does not take her blood sugars at home, lives with daughter. Also has chronic bilateral pitting edema but edema of left LE has been worsening with associated erythema and warmth. Denies fever/chills, headaches, n/v, sob, chest pain, abdo pain, constipation/diarrhea. Pt was a direct admit, vanc was started.      Past Medical History:     Past Medical History:   Diagnosis Date    Arthritis     Atrial flutter ([address]     CAD (coronary artery disease)     CHF (congestive heart failure) (HCC)     Diabetes ([address]     Diabetes mellitus ([address]     Hyperlipidemia     Hypertension     Psychiatric problem     Thyroid cancer ([address]     S/P thyroidectomy; on synthroid    Thyroid disease     hypothyroid        Past SurgicalHistory: Past Surgical History:   Procedure Laterality Date    APPENDECTOMY      BACK SURGERY      CHOLECYSTECTOMY      CORONARY ANGIOPLASTY WITH STENT PLACEMENT      HYSTERECTOMY      THYROIDECTOMY          Medications Prior to Admission:        Prior to Admission medications    Medication Sig Start Date End Date Taking? Authorizing Provider   sertraline (ZOLOFT) 50 MG tablet TAKE 1 TAB BY MOUTH ONCE A DAY 5/8/19   Suki Mayorga MD   repaglinide (PRANDIN) 1 MG tablet TAKE 1 TAB BY MOUTH THREE TIMES A DAY BEFORE MEALS 5/8/19   Suki Mayorga MD   levothyroxine (SYNTHROID) 100 MCG tablet TAKE 1 TAB BY MOUTH EVERY MORNING 5/8/19   Suki Mayorga MD   cephALEXin (KEFLEX) 500 MG capsule Take 1 capsule by mouth 3 times daily for 10 days 5/8/19 5/18/19  Suki Mayorga MD   mupirocin (BACTROBAN) 2 % cream Apply 3 times daily. 5/8/19 6/7/19  Suki Mayorga MD   furosemide (LASIX) 40 MG tablet Take 1 tablet by mouth daily 1/11/19   Suki Mayorga MD   loratadine (CLARITIN) 10 MG tablet Take 1 tablet by mouth daily 1/3/19   Suki Mayorga MD   diclofenac sodium (VOLTAREN) 1 % GEL Apply 2 g topically 2 times daily 12/18/18   Suki Mayorga MD   ondansetron (ZOFRAN) 4 MG tablet Take 1 tablet by mouth every 8 hours as needed for Nausea or Vomiting 10/29/18   Suki Mayorga MD   guaiFENesin (ALTARUSSIN) 100 MG/5ML syrup Take 10 mLs by mouth every 4 hours as needed for Cough 10/11/18   Suki Mayorga MD   clopidogrel (PLAVIX) 75 MG tablet TAKE 1 TAB BY MOUTH ONCE A DAY 9/25/18   Suki Mayorga MD   metoprolol tartrate (LOPRESSOR) 25 MG tablet TAKE 1 TAB BY MOUTH TWICE A DAY 9/25/18   Suki Mayorga MD   metFORMIN (GLUCOPHAGE) 500 MG tablet TAKE 1 TAB BY MOUTH THREE TIMES A DAY 7/25/18   Suki Mayorga MD   nystatin (MYCOSTATIN) 984345 UNIT/GM cream Apply topically 2 times daily.  5/7/18   Suki Mayorga MD   SITagliptin (JANUVIA) 100 MG tablet Take 1 tablet by mouth daily 4/16/18   Suki Mayorga MD   vitamin B-12 (CYANOCOBALAMIN) 1000 MCG tablet TAKE 1 TAB BY MOUTH ONCE A DAY 4/16/18   Chante Christianson MD   Cholecalciferol (VITAMIN D3) 1000 units TABS TAKE 1 TAB BY MOUTH ONCE A DAY 2/15/18   Chante Christianson MD   atorvastatin (LIPITOR) 80 MG tablet Take 1 tablet by mouth daily 8/18/17   Chante Christianson MD   ferrous sulfate (FE TABS) 325 (65 Fe) MG EC tablet Take 1 tablet by mouth 2 times daily 8/18/17   Chante Christianson MD   docusate sodium (COLACE) 100 MG capsule Take 1 capsule by mouth daily as needed for Constipation 8/18/17   Chante Christianson MD   aspirin 81 MG chewable tablet Take 1 tablet by mouth daily 9/5/16   Ivet Akhtar MD        Allergies:     Strawberry extract and Tape Inetta Ana tape]    Social History:     Tobacco:    reports that she has quit smoking. She has never used smokeless tobacco.  Alcohol:      reports that she does not drink alcohol. Drug Use:  reports that she does not use drugs. Family History:     Family History   Problem Relation Age of Onset    Cancer Mother     Cancer Father     Cancer Brother     Diabetes Brother        Review of Systems:     Positive and Negative as described in HPI. Review of Systems   Constitutional: Negative for chills, fatigue and fever. Eyes: Positive for visual disturbance (blurry vision x2days ). Respiratory: Negative for cough and shortness of breath. Cardiovascular: Positive for leg swelling. Negative for chest pain and palpitations. Gastrointestinal: Negative for abdominal pain, constipation, diarrhea, nausea and vomiting. Endocrine: Positive for polydipsia and polyuria. Negative for polyphagia. Genitourinary: Positive for frequency. Negative for dysuria. Musculoskeletal: Negative for myalgias. Skin: Positive for rash (left leg redness ). Neurological: Negative for weakness, light-headedness, numbness and headaches.        Physical Exam:   BP (!) 138/57   Pulse 71   Temp 98.6 °F (37 °C) (Oral)   Resp 16   Ht 5' (1.524 m)   Wt 205 lb 14.6 oz (93.4 kg)   SpO2 98%   BMI 40.21 kg/m²   Temp (24hrs), Av.6 °F (37 °C), Min:98.6 °F (37 °C), Max:98.6 °F (37 °C)    No results for input(s): POCGLU in the last 72 hours. No intake or output data in the 24 hours ending 19 1649    Physical Exam   Constitutional: She is oriented to person, place, and time. No distress. Obese, in no acute distress    HENT:   Mouth/Throat: Oropharynx is clear and moist.   Eyes: Conjunctivae are normal.   Neck: Neck supple. Cardiovascular: Normal rate, regular rhythm and normal heart sounds. Pulmonary/Chest: Effort normal and breath sounds normal.   Abdominal: Soft. Bowel sounds are normal.   Musculoskeletal: She exhibits edema (bilateral +2 pitting edema LE ). She exhibits no tenderness. Lymphadenopathy:     She has no cervical adenopathy. Neurological: She is alert and oriented to person, place, and time. Skin: Skin is warm and dry. She is not diaphoretic. Left lateral LE: 10cm x 10cm erythematous area, warm to touch, no discharge noted   Psychiatric: She has a normal mood and affect. Nursing note and vitals reviewed.       Investigations:     Laboratory Testing:  Recent Results (from the past 24 hour(s))   BLOOD GAS, VENOUS    Collection Time: 19  4:03 PM   Result Value Ref Range    pH, Uday 7.354 7.320 - 7.420    pCO2, Uday 45.9 39.0 - 55.0    pO2, Uday 25.1 (L) 30.0 - 50.0    HCO3, Venous 25.5 24.0 - 30.0 mmol/L    Positive Base Excess, Uday 0.0 0.0 - 2.0 mmol/L    Negative Base Excess, Uday NOT REPORTED 0.0 - 2.0 mmol/L    O2 Sat, Uday 45.1 (L) 60.0 - 85.0 %    Total Hb NOT REPORTED 12.0 - 16.0 g/dl    Oxyhemoglobin NOT REPORTED 95.0 - 98.0 %    Carboxyhemoglobin 2.1 0 - 5 %    Methemoglobin 0.9 0.0 - 1.9 %    Pt Temp 37.0     pH, Uday, Temp Adj NOT REPORTED 7.320 - 7.420    pCO2, Uday, Temp Adj NOT REPORTED 39.0 - 55.0 mmHg    pO2, Uday, Temp Adj NOT REPORTED 30.0 - 50.0 mmHg    O2 Device/Flow/% NOT REPORTED     Respiratory Rate NOT REPORTED     Alina Higginbotham Test NOT REPORTED     Sample Site NOT REPORTED     Pt. Position NOT REPORTED     Mode NOT REPORTED     Set Rate NOT REPORTED     Total Rate NOT REPORTED     VT NOT REPORTED     FIO2 NOT REPORTED     Peep/Cpap NOT REPORTED     PSV NOT REPORTED     Text for Respiratory DRAWN PER LAB     NOTIFICATION NOT REPORTED     NOTIFICATION TIME NOT REPORTED    CBC    Collection Time: 05/08/19  4:03 PM   Result Value Ref Range    WBC 7.9 3.5 - 11.0 k/uL    RBC 4.57 4.0 - 5.2 m/uL    Hemoglobin 9.5 (L) 12.0 - 16.0 g/dL    Hematocrit 31.9 (L) 36 - 46 %    MCV 69.9 (L) 80 - 100 fL    MCH 20.8 (L) 26 - 34 pg    MCHC 29.8 (L) 31 - 37 g/dL    RDW 19.6 (H) 11.5 - 14.9 %    Platelets 489 590 - 699 k/uL    MPV 9.5 6.0 - 12.0 fL    NRBC Automated NOT REPORTED per 100 WBC   SPECIMEN REJECTION    Collection Time: 05/08/19  4:03 PM   Result Value Ref Range    Specimen Source . BLOOD     Ordered Test [facility]     Reason for Rejection Unable to perform testing: Specimen hemolyzed. - NOT REPORTED        Imaging/Diagnostics:  No results found.     Assessment :      Primary Problem  Hyperglycemia    Active Hospital Problems    Diagnosis Date Noted    Cellulitis [L03.90] 05/08/2019    Hyperglycemia [R73.9]     Type 2 diabetes mellitus without complication, without long-term current use of insulin ([facility] [address] [facility] [E11.9] 07/04/2017    Chronic diastolic congestive heart failure (HCC) [I50.32] 02/20/2017    Essential hypertension [I10]     Hypothyroidism [E03.9]        Plan:     Patient status Admit as inpatient in the  Progressive Unit/Step down    Hyperglycemia in DM II   BS >600 in office   HbA1c (12/2018): 8.5%   F/u HbA1c    Lantus 15 units nightly   Low dose sliding scale  Hypoglycemia protocol  Anion gap, Mg, Phos: wnl   Glucose checks AC and at bedtime   F/u Beta-hydroxybutyrate  F/u UA  IV Normal Saline at 250 mL/hr  Potassium replacement protocol     Cellulitis   Afebrile   Vancomycin   Wound care per nursing     CHFpEF   EF (2016): 60-65%

## 2019-05-08 NOTE — PROGRESS NOTES
vancomycin 1500 mg IV x 1 then further dosing per results of serum creatinine. Timing of trough level will be determined based on culture results, renal function, and clinical response. Thank you for the consult. Will continue to follow. Nieves Pummel Curlee Galeazzi. Ph.  5/8/2019  3:55 PM

## 2019-05-08 NOTE — CARE COORDINATION
Ambulatory Care Coordination Note  5/8/2019  CM Risk Score: 6  Shmuel Mortality Risk Score: 30    ACC: Migue Lira RN    Summary Note: Spoke with patient, explained care coordination. Patient is accepting of program.  PCP asked cc to introduce self and care coordination. PCP sent patient to the hospital from the office due to blood sugar over 600, unable to read on meter. CC explained that she will call when patient returns home and gave them CC contact #. Ambulatory Care Coordination Assessment    Care Coordination Protocol  Program Enrollment:  Rising Risk  Referral from Primary Care Provider:  Yes  Week 1 - Initial Assessment     Do you have all of your prescriptions and are they filled?:  Yes  Are you able to afford your medications?:  Yes  How often do you have trouble taking your medications the way you have been told to take them?:  I always take them as prescribed. Do you have Home O2 Therapy?:  No      Ability to seek help/take action for Emergent Urgent situations i.e. fire, crime, inclement weather or health crisis.:  Needs Assistance     Current Housing:  Private Residence              Are you experiencing loss of meaning?:  No  Are you experiencing loss of hope and peace?:  No     Suggested Interventions and Community Resources   Zone Management Tools: In Process                  Prior to Admission medications    Medication Sig Start Date End Date Taking? Authorizing Provider   sertraline (ZOLOFT) 50 MG tablet TAKE 1 TAB BY MOUTH ONCE A DAY 5/8/19   Prema Mallory MD   repaglinide (PRANDIN) 1 MG tablet TAKE 1 TAB BY MOUTH THREE TIMES A DAY BEFORE MEALS 5/8/19   Prema Mallory MD   levothyroxine (SYNTHROID) 100 MCG tablet TAKE 1 TAB BY MOUTH EVERY MORNING 5/8/19   Prema Mallory MD   cephALEXin (KEFLEX) 500 MG capsule Take 1 capsule by mouth 3 times daily for 10 days 5/8/19 5/18/19  Prema Mallory MD   mupirocin (BACTROBAN) 2 % cream Apply 3 times daily.  5/8/19 6/7/19  Prema Mallory MD

## 2019-05-09 VITALS
WEIGHT: 209.44 LBS | OXYGEN SATURATION: 98 % | TEMPERATURE: 98.3 F | HEART RATE: 76 BPM | SYSTOLIC BLOOD PRESSURE: 97 MMHG | DIASTOLIC BLOOD PRESSURE: 54 MMHG | HEIGHT: 60 IN | BODY MASS INDEX: 41.12 KG/M2 | RESPIRATION RATE: 16 BRPM

## 2019-05-09 PROBLEM — E83.39 HYPOPHOSPHATEMIA: Status: ACTIVE | Noted: 2019-05-09

## 2019-05-09 LAB
ALBUMIN SERPL-MCNC: 3.2 G/DL (ref 3.5–5.2)
ALBUMIN/GLOBULIN RATIO: ABNORMAL (ref 1–2.5)
ALP BLD-CCNC: 91 U/L (ref 35–104)
ALT SERPL-CCNC: 9 U/L (ref 5–33)
ANION GAP SERPL CALCULATED.3IONS-SCNC: 11 MMOL/L (ref 9–17)
AST SERPL-CCNC: 14 U/L
BILIRUB SERPL-MCNC: 0.28 MG/DL (ref 0.3–1.2)
BUN BLDV-MCNC: 13 MG/DL (ref 8–23)
BUN/CREAT BLD: ABNORMAL (ref 9–20)
CALCIUM SERPL-MCNC: 7.6 MG/DL (ref 8.6–10.4)
CHLORIDE BLD-SCNC: 97 MMOL/L (ref 98–107)
CO2: 28 MMOL/L (ref 20–31)
CREAT SERPL-MCNC: 1 MG/DL (ref 0.5–0.9)
ESTIMATED AVERAGE GLUCOSE: 421 MG/DL
GFR AFRICAN AMERICAN: >60 ML/MIN
GFR NON-AFRICAN AMERICAN: 53 ML/MIN
GFR SERPL CREATININE-BSD FRML MDRD: ABNORMAL ML/MIN/{1.73_M2}
GFR SERPL CREATININE-BSD FRML MDRD: ABNORMAL ML/MIN/{1.73_M2}
GLUCOSE BLD-MCNC: 220 MG/DL (ref 70–99)
GLUCOSE BLD-MCNC: 247 MG/DL (ref 65–105)
GLUCOSE BLD-MCNC: 267 MG/DL (ref 65–105)
GLUCOSE BLD-MCNC: 403 MG/DL (ref 65–105)
HBA1C MFR BLD: 16.3 % (ref 4–6)
HCT VFR BLD CALC: 29.3 % (ref 36–46)
HEMOGLOBIN: 9.2 G/DL (ref 12–16)
LDL CHOLESTEROL DIRECT: 94 MG/DL
MCH RBC QN AUTO: 21 PG (ref 26–34)
MCHC RBC AUTO-ENTMCNC: 31.2 G/DL (ref 31–37)
MCV RBC AUTO: 67.1 FL (ref 80–100)
NRBC AUTOMATED: ABNORMAL PER 100 WBC
PDW BLD-RTO: 18.8 % (ref 11.5–14.9)
PHOSPHORUS: 1.9 MG/DL (ref 2.6–4.5)
PLATELET # BLD: 165 K/UL (ref 150–450)
PMV BLD AUTO: 8.5 FL (ref 6–12)
POTASSIUM SERPL-SCNC: 3.3 MMOL/L (ref 3.7–5.3)
RBC # BLD: 4.37 M/UL (ref 4–5.2)
SODIUM BLD-SCNC: 136 MMOL/L (ref 135–144)
TOTAL PROTEIN: 5.8 G/DL (ref 6.4–8.3)
WBC # BLD: 7.8 K/UL (ref 3.5–11)

## 2019-05-09 PROCEDURE — 97162 PT EVAL MOD COMPLEX 30 MIN: CPT

## 2019-05-09 PROCEDURE — 6370000000 HC RX 637 (ALT 250 FOR IP): Performed by: NURSE PRACTITIONER

## 2019-05-09 PROCEDURE — 36415 COLL VENOUS BLD VENIPUNCTURE: CPT

## 2019-05-09 PROCEDURE — 99999 PR OFFICE/OUTPT VISIT,PROCEDURE ONLY: CPT | Performed by: INTERNAL MEDICINE

## 2019-05-09 PROCEDURE — 97530 THERAPEUTIC ACTIVITIES: CPT

## 2019-05-09 PROCEDURE — 80053 COMPREHEN METABOLIC PANEL: CPT

## 2019-05-09 PROCEDURE — 6370000000 HC RX 637 (ALT 250 FOR IP): Performed by: STUDENT IN AN ORGANIZED HEALTH CARE EDUCATION/TRAINING PROGRAM

## 2019-05-09 PROCEDURE — 97166 OT EVAL MOD COMPLEX 45 MIN: CPT

## 2019-05-09 PROCEDURE — 85027 COMPLETE CBC AUTOMATED: CPT

## 2019-05-09 PROCEDURE — 83036 HEMOGLOBIN GLYCOSYLATED A1C: CPT

## 2019-05-09 PROCEDURE — 6360000002 HC RX W HCPCS: Performed by: STUDENT IN AN ORGANIZED HEALTH CARE EDUCATION/TRAINING PROGRAM

## 2019-05-09 PROCEDURE — 82947 ASSAY GLUCOSE BLOOD QUANT: CPT

## 2019-05-09 PROCEDURE — 84100 ASSAY OF PHOSPHORUS: CPT

## 2019-05-09 RX ORDER — LEVOTHYROXINE SODIUM 0.12 MG/1
125 TABLET ORAL DAILY
Status: DISCONTINUED | OUTPATIENT
Start: 2019-05-10 | End: 2019-05-09 | Stop reason: HOSPADM

## 2019-05-09 RX ORDER — INSULIN GLARGINE 100 [IU]/ML
10 INJECTION, SOLUTION SUBCUTANEOUS ONCE
Status: COMPLETED | OUTPATIENT
Start: 2019-05-09 | End: 2019-05-09

## 2019-05-09 RX ORDER — INSULIN GLARGINE 100 [IU]/ML
20 INJECTION, SOLUTION SUBCUTANEOUS NIGHTLY
Status: DISCONTINUED | OUTPATIENT
Start: 2019-05-09 | End: 2019-05-09 | Stop reason: HOSPADM

## 2019-05-09 RX ORDER — CEPHALEXIN 500 MG/1
500 CAPSULE ORAL 4 TIMES DAILY
Qty: 20 CAPSULE | Refills: 0 | Status: SHIPPED | OUTPATIENT
Start: 2019-05-09 | End: 2019-05-14

## 2019-05-09 RX ORDER — LEVOTHYROXINE SODIUM 0.12 MG/1
125 TABLET ORAL DAILY
Qty: 30 TABLET | Refills: 3 | Status: SHIPPED | OUTPATIENT
Start: 2019-05-10 | End: 2020-05-22

## 2019-05-09 RX ADMIN — SERTRALINE HYDROCHLORIDE 50 MG: 50 TABLET ORAL at 08:19

## 2019-05-09 RX ADMIN — FUROSEMIDE 40 MG: 40 TABLET ORAL at 08:19

## 2019-05-09 RX ADMIN — FAMOTIDINE 20 MG: 20 TABLET ORAL at 08:19

## 2019-05-09 RX ADMIN — LEVOTHYROXINE SODIUM 100 MCG: 100 TABLET ORAL at 06:26

## 2019-05-09 RX ADMIN — ASPIRIN 81 MG 81 MG: 81 TABLET ORAL at 08:19

## 2019-05-09 RX ADMIN — INSULIN LISPRO 7 UNITS: 100 INJECTION, SOLUTION INTRAVENOUS; SUBCUTANEOUS at 00:26

## 2019-05-09 RX ADMIN — METOPROLOL TARTRATE 25 MG: 25 TABLET ORAL at 08:19

## 2019-05-09 RX ADMIN — INSULIN LISPRO 6 UNITS: 100 INJECTION, SOLUTION INTRAVENOUS; SUBCUTANEOUS at 11:46

## 2019-05-09 RX ADMIN — CLOPIDOGREL BISULFATE 75 MG: 75 TABLET ORAL at 08:19

## 2019-05-09 RX ADMIN — POTASSIUM CHLORIDE 40 MEQ: 1500 TABLET, EXTENDED RELEASE ORAL at 06:26

## 2019-05-09 RX ADMIN — HEPARIN SODIUM 5000 UNITS: 5000 INJECTION, SOLUTION INTRAVENOUS; SUBCUTANEOUS at 06:26

## 2019-05-09 RX ADMIN — INSULIN LISPRO 9 UNITS: 100 INJECTION, SOLUTION INTRAVENOUS; SUBCUTANEOUS at 08:19

## 2019-05-09 RX ADMIN — INSULIN GLARGINE 10 UNITS: 100 INJECTION, SOLUTION SUBCUTANEOUS at 10:04

## 2019-05-09 ASSESSMENT — ENCOUNTER SYMPTOMS
DIARRHEA: 0
COUGH: 0
CONSTIPATION: 0
NAUSEA: 0
VOMITING: 0
SHORTNESS OF BREATH: 0
ABDOMINAL PAIN: 0

## 2019-05-09 NOTE — PLAN OF CARE
Problem: Pain:  Goal: Patient's pain/discomfort is manageable  Description  Patient's pain/discomfort is manageable  Outcome: Ongoing   Pt has had no complaints of pain throughout this shift   Problem: Skin Integrity:  Goal: Skin integrity will stabilize  Description  Skin integrity will stabilize  Outcome: Ongoing   Pt has area of skin breakdown on lower left leg. Redness noted to area   Problem: Falls - Risk of:  Goal: Absence of physical injury  Description  Absence of physical injury  Outcome: Ongoing   The patient remained free from falls this shift, call light within reach, bed in locked and lowest position. Side rails up x2. Continue to monitor closely.

## 2019-05-09 NOTE — DISCHARGE SUMMARY
[address]    Discharge Summary     Patient ID: Juvenal Simmons  :  1935   MRN: [id number]     ACCOUNT:  [de-identified]   Patient's PCP: Jackie De Leon MD  Admit Date: 2019   Discharge Date: 2019     Length of Stay: 1  Code Status:  Full Code  Admitting Physician: Jackie De Leon MD  Discharge Physician: Cristofer Smith MD     Active Discharge Diagnoses:       Primary Problem  Hyperglycemia      [place] Problems    Diagnosis Date Noted    Hypophosphatemia [E83.39] 2019    Cellulitis [L03.90] 2019    Hyperglycemia [R73.9]     Type 2 diabetes mellitus without complication, without long-term current use of insulin ([facility] [address] [E11.9] 2017    Chronic diastolic congestive heart failure ([facility] [address] [I50.32] 2017    Essential hypertension [I10]     Hypothyroidism [E03.9]     Hyperlipidemia [E78.5]        Admission Condition:  fair     Discharged Condition: good    Hospital Stay:       Hospital Course: Juvenal Simmons is a 82 y.o. female who was admitted for the management of   Hyperglycemia , presented as a direct admit from the PCP office for hyperglycemia. Patient was at her PCP office for left leg cellulitis when found to have elevated blood sugar. On admission, patient was stable with no complaints. Labs were significant for hyperglycemia with normal anion gap and bicarb. Insulin, fluids and an antibiotic started. Hypothyroidism found to be inadequately managed - increased levothyroxine dose. Patient's symptoms improved. On day of discharge, symptoms improved and patient was agreeable of dc plan.          Significant therapeutic interventions: insulin    Significant Diagnostic Studies: n/aLabs / Micro:  CBC:   Lab Results   Component Value Date    WBC 7.8 2019    RBC 4.37 2019    RBC 4.41 2012    HGB 9.2 2019    HCT 29.3 2019    MCV 67.1 2019    MCH 21.0 05/09/2019    MCHC 31.2 05/09/2019    RDW 18.8 05/09/2019     05/09/2019     02/03/2012     BMP:    Lab Results   Component Value Date    GLUCOSE 220 05/09/2019    GLUCOSE 380 05/14/2012     05/09/2019    K 3.3 05/09/2019    CL 97 05/09/2019    CO2 28 05/09/2019    ANIONGAP 11 05/09/2019    BUN 13 05/09/2019    CREATININE 1.00 05/09/2019    BUNCRER NOT REPORTED 05/09/2019    CALCIUM 7.6 05/09/2019    LABGLOM 53 05/09/2019    GFRAA >60 05/09/2019    GFR      05/09/2019    GFR NOT REPORTED 05/09/2019     TSH:    Lab Results   Component Value Date    TSH 13.12 05/08/2019           Radiology:    No results found. Consultations:    Consults:     Final Specialist Recommendations/Findings:   PHARMACY TO DOSE VANCOMYCIN  IP CONSULT TO SOCIAL WORK      The patient was seen and examined on day of discharge and this discharge summary is in conjunction with any daily progress note from day of discharge. Discharge plan:       Disposition: Home    Physician Follow Up:     Laura Mcelroy MD   [address] [place] [address] [address] [place] [phone]             Requiring Further Evaluation/Follow Up POST HOSPITALIZATION/Incidental Findings: Increased levothyroxine dose, f/u elevated triglycerides, diabetic education     Diet: diabetic diet    Activity: As tolerated    Instructions to Patient:   - Take all medications as prescribed  - Follow up with PCP   - In case of any worsening condition please visit Emergency Room.       Discharge Medications:      Medication List      CHANGE how you take these medications    cephALEXin 500 MG capsule  Commonly known as:  KEFLEX  Take 1 capsule by mouth 4 times daily for 5 days  What changed:  when to take this     levothyroxine 125 MCG tablet  Commonly known as:  SYNTHROID  Take 1 tablet by mouth Daily  Start taking on:  5/10/2019  What changed:    · medication strength  · how much to take  · how to take this  · when to take this  · additional instructions CONTINUE taking these medications    aspirin 81 MG chewable tablet  Take 1 tablet by mouth daily     atorvastatin 80 MG tablet  Commonly known as:  LIPITOR  Take 1 tablet by mouth daily     clopidogrel 75 MG tablet  Commonly known as:  PLAVIX  TAKE 1 TAB BY MOUTH ONCE A DAY     diclofenac sodium 1 % Gel  Commonly known as:  VOLTAREN  Apply 2 g topically 2 times daily     docusate sodium 100 MG capsule  Commonly known as:  COLACE  Take 1 capsule by mouth daily as needed for Constipation     ferrous sulfate 325 (65 Fe) MG EC tablet  Commonly known as:  FE TABS  Take 1 tablet by mouth 2 times daily     furosemide 40 MG tablet  Commonly known as:  LASIX  Take 1 tablet by mouth daily     guaiFENesin 100 MG/5ML syrup  Commonly known as:  ALTARUSSIN  Take 10 mLs by mouth every 4 hours as needed for Cough     loratadine 10 MG tablet  Commonly known as:  CLARITIN  Take 1 tablet by mouth daily     metFORMIN 500 MG tablet  Commonly known as:  GLUCOPHAGE  TAKE 1 TAB BY MOUTH THREE TIMES A DAY     metoprolol tartrate 25 MG tablet  Commonly known as:  LOPRESSOR  TAKE 1 TAB BY MOUTH TWICE A DAY     mupirocin 2 % cream  Commonly known as:  BACTROBAN  Apply 3 times daily. nystatin [id number] UNIT/GM cream  Commonly known as:  MYCOSTATIN  Apply topically 2 times daily.      ondansetron 4 MG tablet  Commonly known as:  ZOFRAN  Take 1 tablet by mouth every 8 hours as needed for Nausea or Vomiting     repaglinide 1 MG tablet  Commonly known as:  PRANDIN  TAKE 1 TAB BY MOUTH THREE TIMES A DAY BEFORE MEALS     sertraline 50 MG tablet  Commonly known as:  ZOLOFT  TAKE 1 TAB BY MOUTH ONCE A DAY     SITagliptin 100 MG tablet  Commonly known as:  JANUVIA  Take 1 tablet by mouth daily     vitamin B-12 1000 MCG tablet  Commonly known as:  CYANOCOBALAMIN  TAKE 1 TAB BY MOUTH ONCE A DAY     vitamin D3 1000 units Tabs  TAKE 1 TAB BY MOUTH ONCE A DAY           Where to Get Your Medications      These medications were sent to [facility]

## 2019-05-09 NOTE — DISCHARGE INSTR - COC
Continuity of Care Form    Patient Name: Gene Staley   :  1935  MRN:  [id number]    Admit date:  2019  Discharge date:  ***    Code Status Order: Full Code   Advance Directives:   Advance Care [facility] [address] [facility] Documentation     Date/Time Healthcare Directive Type of Healthcare Directive Copy in [address] Agent's Name Healthcare Agent's Phone Number    19 [phone]  No, patient does not have an advance directive for healthcare treatment -- -- -- -- --          Admitting Physician:  Jordi Gama MD  PCP: Jordi Gama MD    Discharging Nurse: [facility] Unit/Room#: 2106/2106-01  Discharging Unit Phone Number: ***    Emergency Contact:   Extended Emergency Contact Information  Primary Emergency Contact: Qing Reece  Address: N/A           Severa Genera [address] [place] [address] Phone: [phone]  Mobile Phone: [phone]  Relation: Child  Secondary Emergency Contact: Jaylen Box  Address: [address] [place] [address], [address] [place] [address] Phone: [phone]  Mobile Phone: [phone]  Relation: Child    Past Surgical History:  Past Surgical History:   Procedure Laterality Date    APPENDECTOMY      BACK SURGERY      CHOLECYSTECTOMY      CORONARY ANGIOPLASTY WITH STENT PLACEMENT      HYSTERECTOMY      THYROIDECTOMY         Immunization History:   Immunization History   Administered Date(s) Administered    Influenza Virus Vaccine 2014    Influenza, High Dose (Fluzone 65 yrs and older) 2016, 10/10/2017    Pneumococcal 13-valent Conjugate (Gjrvzzt79) 2016    Pneumococcal Polysaccharide ([id number]) 02/15/2018       Active Problems:  Patient Active Problem List   Diagnosis Code    Acute kidney injury ([facility] [address] N17.9    Anemia due to vitamin B12 deficiency D51.9    CAD (coronary artery disease) I25.10    Hyperlipidemia E78.5    Thyroid cancer ([facility] [address] C73    Hypothyroidism E03.9    Essential hypertension I10    Diabetes mellitus ([facility] [address] E11.9    Transaminitis R74.0    Acute cystitis without hematuria N30.00    Urinary tract infection without hematuria N39.0    Chronic diastolic congestive heart failure ([facility]) I50.32    Otalgia of both ears H92.03    Bilateral swelling of feet and ankles M25.473, M25.476    Pneumonia symptoms R06.89    Dyslipidemia E78.5    Type 2 diabetes mellitus without complication, without long-term current use of insulin ([facility]) E11.9    Bilateral non-suppurative otitis media H65.93    Hypochromic-microcytic anemia D50.9    Pneumonia of right lung due to infectious organism J18.9    Tracheobronchitis J40    Frequency of micturition R35.0    Pneumonia J18.9    Hypoalbuminemia E88.09    Lactic acid acidosis E87.2    Hyponatremia E87.1    Hyperglycemia R73.9    Hypocalcemia E83.51    Obesity, Class III, BMI 40-49.9 (morbid obesity) ([facility]) E66.01    Sepsis due to pneumonia ([facility]) J18.9, A41.9    Cellulitis L03.90    Hypophosphatemia E83.39       Isolation/Infection:   Isolation          Contact        Patient Infection Status     Infection Encounter Level? Onset Date Added Added By Resolved Resolved By Review Date    MDRO (multi-drug resistant organism) No  09/06/16 Mo Richard RN       E.  Coli - urine 9/2016            Nurse Assessment:  Last Vital Signs: BP (!) 97/54   Pulse 76   Temp 98.3 °F (36.8 °C) (Oral)   Resp 16   Ht 5' (1.524 m)   Wt 209 lb 7 oz (95 kg)   SpO2 98%   BMI 40.90 kg/m²     Last documented pain score (0-10 scale): Pain Level: 0  Last Weight:   Wt Readings from Last 1 Encounters:   05/09/19 209 lb 7 oz (95 kg)     Mental Status:  {IP PT MENTAL STATUS:60897}    IV Access:  {[facility] IV ACCESS:228819232}    Nursing Mobility/ADLs:  Walking   {CHP DME JFHU:292329406}  Transfer  {CHP DME INLA:945332845}  Bathing  {CHP DME PQSE:329558571}  Dressing  {CHP DME KUAA:887340650}  Toileting  {CHP DME DUHO:963944785}  Feeding  {GRACIELA HUMPHREYS EAUQ:960391350}  Med Admin {P DME HDBP:528268489}  Med Delivery   { KATELYN MED Delivery:896810258}    Wound Care Documentation and Therapy:        Elimination:  Continence:   · Bowel: {YES / IY:67266}  · Bladder: {YES / PQ:41065}  Urinary Catheter: {Urinary Catheter:172423621}   Colostomy/Ileostomy/Ileal Conduit: {YES / HM:30354}       Date of Last BM: ***    Intake/Output Summary (Last 24 hours) at 2019 1342  Last data filed at 2019 0643  Gross per 24 hour   Intake 1847 ml   Output --   Net 1847 ml     I/O last 3 completed shifts:   In:  [I.V.:]  Out: -     Safety Concerns:     [address] [employer] Safety Concerns:940964746}    Impairments/Disabilities:      [employer] Impairments/Disabilities:031887973}    Nutrition Therapy:  Current Nutrition Therapy:   [employer] Diet List:674940759}    Routes of Feeding: {[facility] DME Other Feedings:662196248}  Liquids: {Slp liquid thickness:86276}  Daily Fluid Restriction: {CHP DME Yes amt example:396709095}  Last Modified Barium Swallow with Video (Video Swallowing Test): {Done Not Done BUPZ:518042915}    Treatments at the Time of Hospital Discharge:   Respiratory Treatments: ***  Oxygen Therapy:  {Therapy; copd oxygen:19423}  Ventilator:    { CC Vent XLSL:074435528}    Rehab Therapies: {THERAPEUTIC INTERVENTION:9947666075}  Weight Bearing Status/Restrictions: [employer]  Weight Bearin}  Other Medical Equipment (for information only, NOT a DME order):  {EQUIPMENT:607333096}  Other Treatments: ***    Patient's personal belongings (please select all that are sent with patient):  {[facility] DME Belongings:116885779}    RN SIGNATURE:  {Esignature:854340269}    CASE MANAGEMENT/SOCIAL WORK SECTION    Inpatient Status Date: ***    Readmission Risk Assessment Score:  Readmission Risk              Risk of Unplanned Readmission:        21           Discharging to Facility/ Agency   · Name:   · Address:  · Phone:  · Fax:    Dialysis Facility (if applicable)   · Name:  · Address:  · Dialysis Schedule:  · Phone:  · Fax:    / signature: {Esignature:575088968}    PHYSICIAN SECTION    Prognosis: {Prognosis:3075770983}    Condition at Discharge: [address] Patient Condition:546443993}    Rehab Potential (if transferring to Rehab): {Prognosis:4192764555}    Recommended Labs or Other Treatments After Discharge: ***    Physician Certification: I certify the above information and transfer of Bre Ma  is necessary for the continuing treatment of the diagnosis listed and that she requires {Admit to Appropriate Level of Care:15085} for {GREATER/LESS:465137763} 30 days.      Update Admission H&P: {CHP DME Changes in DTAMF:122350690}    PHYSICIAN SIGNATURE:  {Esignature:741066732}

## 2019-05-09 NOTE — FLOWSHEET NOTE
05/09/19 1315   Encounter Summary   Services provided to: Patient and family together   Ref[facility]Consult From: [facility]   (5-9-19)   Complexity of Encounter Low   Length of Encounter 15 minutes   Routine   Type Initial   Assessment Approachable;Passive   Intervention Active listening;Nurtured hope;Sustaining presence/ Ministry of presence   Outcome Expressed gratitude;Engaged in conversation

## 2019-05-09 NOTE — DISCHARGE INSTR - DIET

## 2019-05-09 NOTE — PROGRESS NOTES
[address] [facility].    PROGRESS NOTE             5/9/2019    8:36 AM    Name:   Ruddy Holden  MRN:     [id number]     Acct:      [de-identified]   Room:   [address] [place] [address] Day:  1  Admit Date:  5/8/2019  3:17 PM    PCP:  Hawk Esposito MD  Code Status:  Full Code    Subjective:     C/C: No chief complaint on file. Interval History Status: improved. Patient seen bedside on progressive. No acute events overnight. VSS. Patient has no complaints, states blurry vision is improving. BS has improved. Levothyroxine dose increased. Cellulitis managed with vanc, monitor cr. Brief History:     83 yo female, pmhx DM II - not on insulin, CHFpEF, HTN, hypothyroidism s/p thyroidectomy 2/2 thyroid cancer, presents with hyperglycemia. Review of Systems:     Review of Systems   Constitutional: Negative for chills, fatigue and fever. Eyes: Positive for visual disturbance (improving ). Respiratory: Negative for cough and shortness of breath. Cardiovascular: Positive for leg swelling (chronic ). Negative for chest pain and palpitations. Gastrointestinal: Negative for abdominal pain, constipation, diarrhea, nausea and vomiting. Endocrine: Positive for polydipsia and polyuria. Negative for polyphagia. Genitourinary: Positive for frequency. Negative for dysuria. Musculoskeletal: Negative for myalgias. Neurological: Negative for weakness, light-headedness and headaches. Medications: Allergies:     Allergies   Allergen Reactions    Strawberry Extract     Tape Birdena Layman Tape] Rash       Current Meds:   Scheduled Meds:    [START ON 5/10/2019] levothyroxine  125 mcg Oral Daily    vancomycin (VANCOCIN) intermittent dosing (placeholder)   Other RX Placeholder    aspirin  81 mg Oral Daily    atorvastatin  80 mg Oral Nightly    clopidogrel  75 mg Oral Daily    furosemide  40 mg Oral Daily    metoprolol tartrate  25 mg Oral BID    sertraline  50 mg Oral Daily    sodium chloride flush  10 mL Intravenous 2 times per day    famotidine  20 mg Oral BID    insulin glargine  15 Units Subcutaneous Nightly    heparin (porcine)  5,000 Units Subcutaneous 3 times per day    [START ON 5/10/2019] vancomycin  1,500 mg Intravenous Q36H    insulin lispro  0-18 Units Subcutaneous TID WC    insulin lispro  0-9 Units Subcutaneous Nightly     Continuous Infusions:    sodium chloride 150 mL/hr at 19 1751    dextrose 100 mL/hr (19 175)     PRN Meds: sodium phosphate IVPB **OR** sodium phosphate IVPB, sodium chloride flush, ondansetron, potassium chloride **OR** potassium alternative oral replacement **OR** potassium chloride, magnesium hydroxide, acetaminophen, glucose, dextrose, glucagon (rDNA), dextrose    Data:     Past Medical History:   has a past medical history of Arthritis, Atrial flutter ([facility] [address] CAD (coronary artery disease), CHF (congestive heart failure) ([facility] [address] Diabetes ([facility] [address] Diabetes mellitus ([facility] [address] Hyperlipidemia, Hypertension, Psychiatric problem, Thyroid cancer ([facility] [address] and Thyroid disease. Social History:   reports that she has quit smoking. She has never used smokeless tobacco. She reports that she does not drink alcohol or use drugs. Family History:   Family History   Problem Relation Age of Onset    Cancer Mother     Cancer Father     Cancer Brother     Diabetes Brother        Vitals:  BP (!) 104/50   Pulse 75   Temp 98.2 °F (36.8 °C) (Oral)   Resp 15   Ht 5' (1.524 m)   Wt 209 lb 7 oz (95 kg)   SpO2 96%   BMI 40.90 kg/m²   Temp (24hrs), Av.4 °F (36.9 °C), Min:98.2 °F (36.8 °C), Max:98.6 °F (37 °C)    Recent Labs     19  0001 19  0737   POCGLU 543* 403* 267*       I/O(24Hr):     Intake/Output Summary (Last 24 hours) at 2019 0836  Last data filed at 2019 0643  Gross per 24 hour   Intake 1847 ml   Output --   Net 1847 ml       Labs:    Lab Results   Component Value Date    WBC 7.8 05/09/2019    HGB 9.2 (L) 05/09/2019    HCT 29.3 (L) 05/09/2019    MCV 67.1 (L) 05/09/2019     05/09/2019     Lab Results   Component Value Date     05/09/2019    K 3.3 (L) 05/09/2019    CL 97 (L) 05/09/2019    CO2 28 05/09/2019    BUN 13 05/09/2019    CREATININE 1.00 (H) 05/09/2019    GLUCOSE 220 (H) 05/09/2019    CALCIUM 7.6 (L) 05/09/2019    PROT 5.8 (L) 05/09/2019    LABALBU 3.2 (L) 05/09/2019    BILITOT 0.28 (L) 05/09/2019    ALKPHOS 91 05/09/2019    AST 14 05/09/2019    ALT 9 05/09/2019    LABGLOM 53 (L) 05/09/2019    GFRAA >60 05/09/2019    GLOB NOT REPORTED 12/18/2018           Lab Results   Component Value Date/Time    SPECIAL LT AC 15ML 05/08/2019 04:03 PM    SPECIAL RT FOREARM 6ML 05/08/2019 04:03 PM     Lab Results   Component Value Date/Time    CULTURE NO GROWTH 9 HOURS 05/08/2019 04:03 PM    CULTURE NO GROWTH 9 HOURS 05/08/2019 04:03 PM       [unfilled]    Radiology:    No results found. Physical Examination:        Physical Exam   Constitutional: No distress. obese   HENT:   Mouth/Throat: Oropharynx is clear and moist.   Eyes: Conjunctivae are normal.   Neck: Neck supple. Cardiovascular: Normal rate, regular rhythm and normal heart sounds. Pulmonary/Chest: Effort normal and breath sounds normal.   Abdominal: Soft. Bowel sounds are normal.   Musculoskeletal: She exhibits edema (+1-2 pitting edema in bilateral LE). She exhibits no tenderness. Neurological: She is alert. Skin: Skin is warm and dry. Lateral left leg: cellulitis 10cm x 8 cm, erythematous, no longer warm to touch   Nursing note and vitals reviewed.         Assessment:        Primary Problem  Hyperglycemia    Active Hospital Problems    Diagnosis Date Noted    Cellulitis [L03.90] 05/08/2019    Hyperglycemia [R73.9]     Type 2 diabetes mellitus without complication, without long-term current use of insulin (HCC) [E11.9] 07/04/2017    Chronic diastolic congestive heart failure ([facility] [address] [I50.32] 02/20/2017    Essential hypertension [I10]     Hypothyroidism [E03.9]     Hyperlipidemia [E78.5]        Plan:        Hyperglycemia in DM II   HbA1c (12/2018): 8.5%   F/u HbA1c    Increased Lantus from 15 to 20 units nightly   High dose sliding scale  Hypoglycemia protocol  Anion gap, Mg: wnl   Glucose checks AC and at bedtime   Beta-hydroxybutyrate elevated   F/u UA  IV Normal Saline at 75 mL/hr  Potassium replacement protocol      Cellulitis   Afebrile   Vancomycin   Wound care per nursing      Hypophosphatemia  Replace   Phos 1.9     CHFpEF   EF (2016): 60-65%   Resume home meds     Hypertension   Resume home med     Hyperlipidemia   Triglycerides today: 750   Cholesterol: 218   Lipitor      Hypothyroidism   TSH today: 13.12   T4: 0.33  Increase Levothyroxine 125mcg  Possibly increase dose tomorrow      Maintenance   DVT prophylaxis: Heparin  Will need DM education         Neil Schaeffer MD  5/9/2019  8:36 AM

## 2019-05-09 NOTE — PROGRESS NOTES
[address] [facility]    Occupational Therapy Evaluation  Date: 19  Patient Name: Ria Vega       Room:   MRN: [id number]  Account: [de-identified]   : 1935  (82 y.o.) Gender: female     Discharge Recommendations:  Home with assist PRN, Home with Home health OT, Home with nursing aide    Referring Practitioner: Dr. Jose Viramontes  Diagnosis: Hyperglycemia  Additional Pertinent Hx: Pt reports that she has had \"quite a few\" falls with the most recent being ~3 weeks ago, reports that she gets dizzy    Treatment Diagnosis: Impaired self-care status  Past Medical History:  has a past medical history of Arthritis, Atrial flutter ([address] CAD (coronary artery disease), CHF (congestive heart failure) ([address] Diabetes ([address] Diabetes mellitus ([address] Hyperlipidemia, Hypertension, Psychiatric problem, Thyroid cancer ([address] and Thyroid disease. Past Surgical History:   has a past surgical history that includes Coronary angioplasty with stent; Hysterectomy; Cholecystectomy; Thyroidectomy; Appendectomy; and back surgery. Restrictions  Restrictions/Precautions: Fall Risk, Contact Precautions  Required Braces or Orthoses?: No     Vitals  Temp: 98.2 °F (36.8 °C)  Pulse: 75  Resp: 15  BP: (!) 104/50  Height: 5' (152.4 cm)  Weight: 209 lb 7 oz (95 kg)  BMI (Calculated): 41  Oxygen Therapy  SpO2: 96 %  O2 Device: None (Room air)  Level of Consciousness: Alert    Subjective  Subjective: \"I just slide over\"  Comments: When asked how she normally does bed<>wheelchair transfer at home.   Overall Orientation Status: Within Functional Limits  Vision  Vision: Within Functional Limits  Hearing  Hearing: Within functional limits  Social/Functional History  Lives With: Daughter  Type of Home: House  Home Layout: One level  Home Access: Stairs to enter without rails(Family always helps her up/down the stairs; 2 assist)  Entrance Stairs - Number of Steps: 4  Bathroom Shower/Tub: Tub/Shower unit(Sponge bathes only.)  Bathroom Toilet: Standard  Bathroom Equipment: (NO DME)  Bathroom Accessibility: Wheelchair accessible(Power w/c)  Home Equipment: Wheelchair-electric  Receives Help From: Family  ADL Assistance: Needs assistance(Daughter A with LB ADL's; IND with toileting)  Homemaking Assistance: Needs assistance(Daughter is primary; Pt assists as able)  Homemaking Responsibilities: No  Ambulation Assistance: (Pt non-ambulatory for \"a couple years\"; IND w/power w/c)  Transfer Assistance: Independent  Active : No  Patient's  Info: Family  Occupation: Retired  Additional Comments: Pt reports that daughter works from ~5:00 AM to noon, pt is home alone during that time. Pt reports that grandson and son live close by and can help out too. Objective  Vision - Basic Assessment  Patient Visual Report: No visual complaint reported. Cognition  Overall Cognitive Status: WFL   Perception  Overall Perceptual Status: WFL  Sensation  Overall Sensation Status: Impaired  Additional Comments: Reports some numbness in L lower leg; Denies neuropathy   ADL  Feeding: Independent  Grooming: Setup  UE Bathing: Stand by assistance  LE Bathing: Moderate assistance  UE Dressing: Stand by assistance  LE Dressing: Maximum assistance(A for foot level dressing and steady A in standing)  Toileting: Dependent/Total(External female catheter in place)  Additional Comments: Based on skilled observation and clinical reasoning. Pt's daughter A with LB ADL's at home, but typically pt is able to complete toileting at Mod I level. Pt is reliant on her own home set-up for IND which may be difficult to simulate here.       UE Function  LUE Strength  Gross LUE Strength: WFL  L Hand Grasp: 4/5     LUE Tone: Normotonic     LUE AROM (degrees)  LUE AROM : WFL     Left Hand AROM (degrees)  Left Hand AROM: WFL  RUE Strength  Gross RUE Strength: WFL  R Hand Grasp: 4/5      RUE Tone: Normotonic     RUE AROM (degrees)  RUE AROM : WFL     Right Hand AROM concerns/fall prevention techniques that she can utilize at home. Short term goal 4: Pt will actively participate in 15-20 minutes of therapeutic exercise/activity to promote increased independence and safety with self-care and mobility.     Plan  Safety Devices  Safety Devices in place: Yes  Type of devices: Patient at risk for falls, Gait belt, Left in bed, Call light within reach, Bed alarm in place, Nurse notified     Plan  Times per week: 5-7  Times per day: Daily  Current Treatment Recommendations: Balance Training, Functional Mobility Training, Endurance Training, Safety Education & Training, Patient/Caregiver Education & Training, Equipment Evaluation, Education, & procurement, Self-Care / ADL    Equipment Recommendations  Equipment Needed: (TBD)  OT Individual Minutes  Time In: 8059  Time Out: 7844  Minutes: 27    Electronically signed by Sree Evangelista on 5/9/19 at 10:54 AM

## 2019-05-09 NOTE — FLOWSHEET NOTE
05/09/19 3106   Provider Notification   Reason for Communication Review case   Provider Name Rajesh Gaytan NP    Provider Notification Nurse Practitioner   Method of Communication Secure Message   Response See orders   Notification Time 0630   RN notified Rajesh Gaytan NP regarding the pts labs.  Orders placed

## 2019-05-10 ENCOUNTER — CARE COORDINATION (OUTPATIENT)
Dept: CASE MANAGEMENT | Age: 84
End: 2019-05-10

## 2019-05-10 DIAGNOSIS — R73.9 HYPERGLYCEMIA: Primary | ICD-10-CM

## 2019-05-10 LAB
ESTIMATED AVERAGE GLUCOSE: NORMAL MG/DL
HBA1C MFR BLD: NORMAL % (ref 4–6)

## 2019-05-10 PROCEDURE — 1111F DSCHRG MED/CURRENT MED MERGE: CPT | Performed by: INTERNAL MEDICINE

## 2019-05-10 NOTE — CARE COORDINATION
[facility] Transitions Initial Follow Up Call    Call within 2 business days of discharge: Yes    Patient: Ria Vega Patient : 1935   MRN: [id number]  Reason for Admission: Cellulitis of the Lt lower extremity  Discharge Date: 19 RARS: Readmission Risk Score: 23      Last Discharge [facility]       Complaint Diagnosis Description Type Department Provider    19  Cellulitis of left lower extremity Admission (Discharged) Elissa Huggins MD           Spoke with: Fela Salgado    Facility:  [facility]    Was able to contact Lottie Salazar daughter for initial transitional call. She stated that Fabian Reddy was doing \"good\". She just gave her medications and was getting her breakfast.  She said that Fabian Reddy has had no complains of pain, chills or being hot. Fela Salgado has not seen her leg yet today and has not checked her blood sugar. They do have a glucometer in the home. Encouraged to elevate the leg when not up. Medications reviewed with Fela Salgado and all medications in home. 1111F order completed. No services at discharge. Fela Salgado stated will be calling today for an appointmane for follow up hospital visit with PCP. CTC role explained and she was receptive to further outreach. Contact information given and encouraged to call if need arises. Non-face-to-face services provided:  Scheduled appointment with [facility] to call and make appointment  Obtained and reviewed discharge summary and/or continuity of care documents  Assessment and support for treatment adherence and medication management-reviewed and educated.     Care Transitions 24 Hour Call    Do you have any ongoing symptoms?:  No  Do you have a copy of your discharge instructions?:  Yes  Do you have all of your prescriptions and are they filled?:  Yes  Have you been contacted by a [facility] Pharmacist?:  No  Have you scheduled your follow up appointment?:  No  Were you discharged with any Home Care or Post Acute Services:  No  Post Acute Services:  Home Health  Patient DME:  Wheelchair  Do you have support at home?:  Child  Do you feel like you have everything you need to keep you well at home?:  Yes  Care Transitions Interventions         Follow Up  No future appointments.     Maria De Jesus Franco RN

## 2019-05-12 LAB
ESTIMATED AVERAGE GLUCOSE: 410 MG/DL
HBA1C MFR BLD: 15.9 %

## 2019-05-14 LAB
CULTURE: NORMAL
CULTURE: NORMAL
Lab: NORMAL
Lab: NORMAL
SPECIMEN DESCRIPTION: NORMAL
SPECIMEN DESCRIPTION: NORMAL

## 2019-05-16 ENCOUNTER — CARE COORDINATION (OUTPATIENT)
Dept: CASE MANAGEMENT | Age: 84
End: 2019-05-16

## 2019-05-16 NOTE — CARE COORDINATION
[facility] Transitions Follow Up Call    2019    Patient: Ramila Abebe  Patient : 1935   MRN: [id number]  Reason for Admission: Cellulitis of Lt Lower extremity  Discharge Date: 19 RARS: Readmission Risk Score: 23         1st attempt to reach patient for Care Transitions. [facility] requesting return call. Contact information provided.  [phone]      Follow Up  Future Appointments   Date Time Provider Estrellita Segura   2019  2:00 PM Brad Pastrana MD [address] RN

## 2019-05-17 ENCOUNTER — CARE COORDINATION (OUTPATIENT)
Dept: CASE MANAGEMENT | Age: 84
End: 2019-05-17

## 2019-05-17 NOTE — CARE COORDINATION
[facility] Transitions Follow Up Call    2019    Patient: Mitzi Stark  Patient : 1935   MRN: [id number]  Reason for Admission: cellulitis  Discharge Date: 19 RARS: Readmission Risk Score: 23         # 2 attempt- unable to reach patient, left vm message with name and call back number, requested call back//JU    Care Transitions Subsequent and Final Call    Subsequent and Final Calls  Are you currently active with any services?:  Home Health  Care Transitions Interventions  Other Interventions:             Follow Up  Future Appointments   Date Time Provider Estrellita Segura   2019  2:00 PM April Mar MD [address] Levine RN

## 2019-05-20 ENCOUNTER — CARE COORDINATION (OUTPATIENT)
Dept: CASE MANAGEMENT | Age: 84
End: 2019-05-20

## 2019-05-20 NOTE — CARE COORDINATION
[facility] Transitions Follow Up Call    2019    Patient: Mitzi Stark  Patient : 1935   MRN: [id number]  Reason for Admission: Cellulitis of the Lt lower extremity  Discharge Date: 19 RARS: Readmission Risk Score: 23         Spoke with: Paty Maher    Was able to contact Aislinn Tucker daughter. She stated that Yanna Victoria was going \"fine\". She said her Lt leg is looking better. She said it was drying up and the redness is decreasing. No drainage noted. Her blood sugars have been running alittle high. Last blood sugar was 260. Paty Maher stated that Yanna Victoria has not complained of f/c, n/v and ambulating and has not fallen. No further concerns or questions. Encouraged her to call if need arises. PCP appointment on  changed to  because of transportation issues. Care Transitions Subsequent and Final Call    Subsequent and Final Calls  Do you have any ongoing symptoms?:  Yes  Onset of Patient-reported symptoms: In the past 7 days  Patient-reported symptoms:  Other  Have your medications changed?:  No  Do you have any questions related to your medications?:  No  Do you currently have any active services?:  No  Are you currently active with any services?:  Home Health  Do you have any needs or concerns that I can assist you with?:  No  Identified Barriers:  Lack of Education  Care Transitions Interventions  Other Interventions:             Follow Up  Future Appointments   Date Time Provider Estrellita Segura   2019  1:15 PM MD Guillaume Keita RN

## 2019-05-23 ENCOUNTER — CARE COORDINATION (OUTPATIENT)
Dept: CASE MANAGEMENT | Age: 84
End: 2019-05-23

## 2019-05-23 NOTE — CARE COORDINATION
[address] Transitions Interview     2019    Patient: Valentino Dove Patient : 1935   MRN: [id number]  Reason for Admission: cellulitis  RARS: Readmission Risk Score: 23         Final call- unable to reach patient, left vm message informing patient of final call. Left name and call back information, requesting call back, will forward note on to [facility] care transitions completed//JU      Readmission Risk  Patient Active Problem List   Diagnosis    Acute kidney injury ([facility]    Anemia due to vitamin B12 deficiency    CAD (coronary artery disease)    Hyperlipidemia    Thyroid cancer ([address]    Hypothyroidism    Essential hypertension    Diabetes mellitus ([address]    Transaminitis    Acute cystitis without hematuria    Urinary tract infection without hematuria    Chronic diastolic congestive heart failure (HCC)    Otalgia of both ears    Bilateral swelling of feet and ankles    Pneumonia symptoms    Dyslipidemia    Type 2 diabetes mellitus without complication, without long-term current use of insulin (HCC)    Bilateral non-suppurative otitis media    Hypochromic-microcytic anemia    Pneumonia of right lung due to infectious organism    Tracheobronchitis    Frequency of micturition    Pneumonia    Hypoalbuminemia    Lactic acid acidosis    Hyponatremia    Hyperglycemia    Hypocalcemia    Obesity, Class III, BMI 40-49.9 (morbid obesity) ([address]    Sepsis due to pneumonia ([address]    Cellulitis    Hypophosphatemia       Inpatient Assessment  Care Transitions Summary    Care Transitions Inpatient Review  Medication Review  Do you have all of your prescriptions and are they filled?:  Yes   Are you able to afford your medications?:  Yes  How often do you have difficulty taking your medications?:  I always take them as prescribed.   Housing Review  Who do you live with?:  Child  Social Support  Durable Medical Equipment  Patient DME:  Wheelchair  Functional Review  Ability to seek help/take action for Emergent/Urgent situations i.e. fire, crime, inclement weather or health crisis.:  Needs Assistance  Hearing and Vision  Hearing Impairment:  Hard of hearing (Comment: could not understand writer at all)  Care Transitions Interventions         Follow Up  Future Appointments   Date Time Provider Estrellita Segura   5/31/2019  1:15 PM Brad Pastrana MD [address] Maintenance  There are no preventive care reminders to display for this patient.     Samy Davis RN

## 2019-05-24 ENCOUNTER — CARE COORDINATION (OUTPATIENT)
Dept: CARE COORDINATION | Age: 84
End: 2019-05-24

## 2019-05-24 DIAGNOSIS — E53.8 VITAMIN B 12 DEFICIENCY: ICD-10-CM

## 2019-05-24 RX ORDER — LANOLIN ALCOHOL/MO/W.PET/CERES
CREAM (GRAM) TOPICAL
Qty: 30 TABLET | Refills: 2 | Status: SHIPPED | OUTPATIENT
Start: 2019-05-24 | End: 2019-10-02 | Stop reason: SDUPTHER

## 2019-05-29 ENCOUNTER — CARE COORDINATION (OUTPATIENT)
Dept: CARE COORDINATION | Age: 84
End: 2019-05-29

## 2019-05-29 DIAGNOSIS — E55.9 VITAMIN D DEFICIENCY: ICD-10-CM

## 2019-05-29 RX ORDER — MELATONIN
Qty: 30 TABLET | Refills: 11 | Status: SHIPPED | OUTPATIENT
Start: 2019-05-29 | End: 2020-07-07 | Stop reason: SDUPTHER

## 2019-05-29 NOTE — CARE COORDINATION
Ambulatory Care Coordination Note  5/29/2019  CM Risk Score: 6  Shmuel Mortality Risk Score: 30    ACC: Aleah Renteria, RN    Summary Note: Spoke with daughter who stated Tonya Noland is doing well. She requested refill of 2 medications, CC verified 1 has been done and will send request for the other to PCP. Daughter stated she does not have any additional needs and she has a follow up appointment with PCP this week. CC Plan:   Follow up 1 week after PCP appointment for needs. Care Coordination Interventions    Program Enrollment:  Complex Care  Referral from Primary Care Provider:  Yes  Suggested Interventions and Community Resources  Zone Management Tools: In Process         Goals Addressed                 This Visit's Progress     Medication Management   Improving     I will take my medication as directed. I will notify my provider of any problems with medications, like adverse effects or side effects. I will notify my provider/Care Coordinator if I am unable to afford my medications. I will notify my provider for advice before I stop taking any of my medication. I will work with CC   DM,CHF  Barriers: overwhelmed by complexity of regimen  Plan for overcoming my barriers: work with cc  Confidence: 7/10  Anticipated Goal Completion Date: 8.8.19            Prior to Admission medications    Medication Sig Start Date End Date Taking? Authorizing Provider   vitamin B-12 (CYANOCOBALAMIN) 1000 MCG tablet TAKE 1 TAB BY MOUTH ONCE A DAY 5/24/19   Mars Appiah MD   levothyroxine (SYNTHROID) 125 MCG tablet Take 1 tablet by mouth Daily 5/10/19   Brandon Melara MD   sertraline (ZOLOFT) 50 MG tablet TAKE 1 TAB BY MOUTH ONCE A DAY 5/8/19   Mars Appiah MD   repaglinide (PRANDIN) 1 MG tablet TAKE 1 TAB BY MOUTH THREE TIMES A DAY BEFORE MEALS 5/8/19   Mars Appiah MD   mupirocin (BACTROBAN) 2 % cream Apply 3 times daily.  5/8/19 6/7/19  Mars Appiah MD   furosemide (LASIX) 40 MG tablet Take 1 tablet by mouth daily 1/11/19   Tino Whitman MD   diclofenac sodium (VOLTAREN) 1 % GEL Apply 2 g topically 2 times daily 12/18/18   Tino Whitman MD   ondansetron (ZOFRAN) 4 MG tablet Take 1 tablet by mouth every 8 hours as needed for Nausea or Vomiting 10/29/18   Tino Whitman MD   guaiFENesin (ALTARUSSIN) 100 MG/5ML syrup Take 10 mLs by mouth every 4 hours as needed for Cough 10/11/18   Tino Whitman MD   clopidogrel (PLAVIX) 75 MG tablet TAKE 1 TAB BY MOUTH ONCE A DAY 9/25/18   Tino Whitman MD   metoprolol tartrate (LOPRESSOR) 25 MG tablet TAKE 1 TAB BY MOUTH TWICE A DAY 9/25/18   Tino Whitman MD   metFORMIN (GLUCOPHAGE) 500 MG tablet TAKE 1 TAB BY MOUTH THREE TIMES A DAY 7/25/18   Tino Whitman MD   nystatin (MYCOSTATIN) 621909 UNIT/GM cream Apply topically 2 times daily. 5/7/18   Tino Whitman MD   SITagliptin (JANUVIA) 100 MG tablet Take 1 tablet by mouth daily 4/16/18   Tino Whitman MD   Cholecalciferol (VITAMIN D3) 1000 units TABS TAKE 1 TAB BY MOUTH ONCE A DAY 2/15/18   Tino Whitman MD   atorvastatin (LIPITOR) 80 MG tablet Take 1 tablet by mouth daily 8/18/17   Tino Whitman MD   ferrous sulfate (FE TABS) 325 (65 Fe) MG EC tablet Take 1 tablet by mouth 2 times daily 8/18/17   Tino Whitman MD   docusate sodium (COLACE) 100 MG capsule Take 1 capsule by mouth daily as needed for Constipation 8/18/17   Tino Whitman MD   aspirin 81 MG chewable tablet Take 1 tablet by mouth daily 9/5/16   Phillip Barrientos MD       Future Appointments   Date Time Provider Estrellita Segura   5/31/2019  1:15 PM Tino Whitman MD [address]     ,   Diabetes Assessment    Meal Planning:  Avoidance of concentrated sweets   How often do you test your blood sugar?:  Other   Do you have barriers with adherence to non-pharmacologic self-management interventions?  (Nutrition/Exercise/Self-Monitoring):  No       Increase or Decrease trend in Blood Sugars      ,   Congestive Heart Failure Assessment    Are you currently restricting fluids?:  Other  Do you understand a low sodium diet?:  Yes  Do you understand how to read food labels?:  Yes  How many restaurant meals do you eat per week?:  0  Do you salt your food before tasting it?:  No     No patient-reported symptoms      Symptoms:          and   General Assessment    Do you have any symptoms that are causing concern?:  No

## 2019-05-31 ENCOUNTER — OFFICE VISIT (OUTPATIENT)
Dept: INTERNAL MEDICINE CLINIC | Age: 84
End: 2019-05-31
Payer: MEDICARE

## 2019-05-31 VITALS
OXYGEN SATURATION: 93 % | HEIGHT: 60 IN | SYSTOLIC BLOOD PRESSURE: 122 MMHG | HEART RATE: 79 BPM | WEIGHT: 206 LBS | BODY MASS INDEX: 40.44 KG/M2 | DIASTOLIC BLOOD PRESSURE: 64 MMHG

## 2019-05-31 DIAGNOSIS — I10 ESSENTIAL HYPERTENSION: ICD-10-CM

## 2019-05-31 DIAGNOSIS — E11.9 TYPE 2 DIABETES MELLITUS WITHOUT COMPLICATION, WITHOUT LONG-TERM CURRENT USE OF INSULIN (HCC): Primary | ICD-10-CM

## 2019-05-31 DIAGNOSIS — I50.32 CHRONIC DIASTOLIC CONGESTIVE HEART FAILURE (HCC): ICD-10-CM

## 2019-05-31 DIAGNOSIS — E03.9 HYPOTHYROIDISM, UNSPECIFIED TYPE: ICD-10-CM

## 2019-05-31 DIAGNOSIS — H66.90 ACUTE OTITIS MEDIA, UNSPECIFIED OTITIS MEDIA TYPE: ICD-10-CM

## 2019-05-31 DIAGNOSIS — E66.9 OBESITY, UNSPECIFIED CLASSIFICATION, UNSPECIFIED OBESITY TYPE, UNSPECIFIED WHETHER SERIOUS COMORBIDITY PRESENT: ICD-10-CM

## 2019-05-31 PROCEDURE — G8427 DOCREV CUR MEDS BY ELIG CLIN: HCPCS | Performed by: INTERNAL MEDICINE

## 2019-05-31 PROCEDURE — G8400 PT W/DXA NO RESULTS DOC: HCPCS | Performed by: INTERNAL MEDICINE

## 2019-05-31 PROCEDURE — 1090F PRES/ABSN URINE INCON ASSESS: CPT | Performed by: INTERNAL MEDICINE

## 2019-05-31 PROCEDURE — 99214 OFFICE O/P EST MOD 30 MIN: CPT | Performed by: INTERNAL MEDICINE

## 2019-05-31 PROCEDURE — 1111F DSCHRG MED/CURRENT MED MERGE: CPT | Performed by: INTERNAL MEDICINE

## 2019-05-31 PROCEDURE — 4040F PNEUMOC VAC/ADMIN/RCVD: CPT | Performed by: INTERNAL MEDICINE

## 2019-05-31 PROCEDURE — 1123F ACP DISCUSS/DSCN MKR DOCD: CPT | Performed by: INTERNAL MEDICINE

## 2019-05-31 PROCEDURE — 1036F TOBACCO NON-USER: CPT | Performed by: INTERNAL MEDICINE

## 2019-05-31 PROCEDURE — G8598 ASA/ANTIPLAT THER USED: HCPCS | Performed by: INTERNAL MEDICINE

## 2019-05-31 PROCEDURE — G8417 CALC BMI ABV UP PARAM F/U: HCPCS | Performed by: INTERNAL MEDICINE

## 2019-05-31 RX ORDER — AMOXICILLIN AND CLAVULANATE POTASSIUM 875; 125 MG/1; MG/1
1 TABLET, FILM COATED ORAL 2 TIMES DAILY
Qty: 14 TABLET | Refills: 0 | Status: SHIPPED | OUTPATIENT
Start: 2019-05-31 | End: 2019-06-07

## 2019-05-31 ASSESSMENT — ENCOUNTER SYMPTOMS
CONSTIPATION: 0
APNEA: 0
EYE DISCHARGE: 0
BLOOD IN STOOL: 0
BACK PAIN: 1
SHORTNESS OF BREATH: 1
CHEST TIGHTNESS: 0
COLOR CHANGE: 0
COUGH: 0
EYE ITCHING: 0
CHOKING: 0
DIARRHEA: 0
ABDOMINAL DISTENTION: 0
ABDOMINAL PAIN: 0
EYE REDNESS: 0
EYE PAIN: 0

## 2019-05-31 NOTE — PROGRESS NOTES
Subjective:     Chief Complaint   Patient presents with    Follow-Up from Hospital     f/u from [facility] for Hyperglycemia, Pt states her BS since being home has been around 297     Diabetes     Pt a1c was checked on 05/09/19     Otalgia     Pt has pain in her left ear, pt states pain started yesterday        Patient ID: Juvenal Simmons is a 82 y.o. female. Visit Information    Have you changed or started any medications since your last visit including any over-the-counter medicines, vitamins, or herbal medicines? no   Are you having any side effects from any of your medications? -  no  Have you stopped taking any of your medications? Is so, why? -  no    Have you seen any other physician or provider since your last visit? No  Have you had any other diagnostic tests since your last visit? Yes - Records Obtained  Have you been seen in the emergency room and/or had an admission to a hospital since we last saw you? Yes - Records Obtained  Have you had your routine dental cleaning in the past 6 months? no    Have you activated your [employer] account? If not, what are your barriers?  No:      Patient Care Team:  Jackie De Leon MD as PCP - General (Internal Medicine)  Jackie De Leon MD [facility]CP [facility]  Olive Riojas MD as Consulting Physician (Gastroenterology)  Elysia Barksdale MD as Consulting Physician (Pulmonology)  Lorna Arias RN as Care Coordinator    Medical History Review  Past Medical, Family, and Social History reviewed and does not contribute to the patient presenting condition    Health Maintenance   Topic Date Due    DTaP/Tdap/Td vaccine (1 - Tdap) 05/01/1956    Shingles Vaccine (1 of 2) 05/01/1987    Diabetic retinal exam  09/02/2016    Diabetic foot exam  05/07/2019    Flu vaccine (Season Ended) 09/01/2019    A1C test (Diabetic or Prediabetic)  11/09/2019    Lipid screen  05/08/2020    TSH testing  05/08/2020    Potassium monitoring  05/09/2020    Creatinine monitoring

## 2019-06-06 ENCOUNTER — TELEPHONE (OUTPATIENT)
Dept: INTERNAL MEDICINE CLINIC | Age: 84
End: 2019-06-06

## 2019-06-06 NOTE — TELEPHONE ENCOUNTER
Per pts dtr & pts pharmacy - need prior auth for insulin glargine (LANTUS SOLOSTAR) 100 UNIT/ML injection pen     Please advise if process has been started.

## 2019-06-11 ENCOUNTER — CARE COORDINATION (OUTPATIENT)
Dept: CARE COORDINATION | Age: 84
End: 2019-06-11

## 2019-06-11 NOTE — CARE COORDINATION
Attempting to reach patient for a follow up care coordination call regarding any needs, questions or concerns.   Tho Curtis, [address] [facility] does not state name, no message left d/t HIPPA

## 2019-06-14 DIAGNOSIS — E11.8 TYPE 2 DIABETES MELLITUS WITH COMPLICATION, WITHOUT LONG-TERM CURRENT USE OF INSULIN (HCC): ICD-10-CM

## 2019-06-17 RX ORDER — SITAGLIPTIN 100 MG/1
TABLET, FILM COATED ORAL
Qty: 90 TABLET | Refills: 3 | Status: SHIPPED | OUTPATIENT
Start: 2019-06-17 | End: 2020-05-22 | Stop reason: SDUPTHER

## 2019-06-18 ENCOUNTER — CARE COORDINATION (OUTPATIENT)
Dept: CARE COORDINATION | Age: 84
End: 2019-06-18

## 2019-06-20 ENCOUNTER — TELEPHONE (OUTPATIENT)
Dept: INTERNAL MEDICINE CLINIC | Age: 84
End: 2019-06-20

## 2019-06-20 RX ORDER — PEN NEEDLE, DIABETIC 31 G X1/4"
1 NEEDLE, DISPOSABLE MISCELLANEOUS DAILY
Qty: 100 EACH | Refills: 11 | Status: SHIPPED | OUTPATIENT
Start: 2019-06-20 | End: 2020-07-09

## 2019-07-01 ENCOUNTER — OFFICE VISIT (OUTPATIENT)
Dept: INTERNAL MEDICINE CLINIC | Age: 84
End: 2019-07-01
Payer: MEDICARE

## 2019-07-01 ENCOUNTER — CARE COORDINATION (OUTPATIENT)
Dept: CARE COORDINATION | Age: 84
End: 2019-07-01

## 2019-07-01 VITALS
WEIGHT: 205 LBS | HEIGHT: 60 IN | SYSTOLIC BLOOD PRESSURE: 134 MMHG | DIASTOLIC BLOOD PRESSURE: 64 MMHG | BODY MASS INDEX: 40.25 KG/M2 | OXYGEN SATURATION: 97 % | HEART RATE: 67 BPM

## 2019-07-01 DIAGNOSIS — E66.9 OBESITY, UNSPECIFIED CLASSIFICATION, UNSPECIFIED OBESITY TYPE, UNSPECIFIED WHETHER SERIOUS COMORBIDITY PRESENT: ICD-10-CM

## 2019-07-01 DIAGNOSIS — E66.01 MORBID OBESITY WITH BMI OF 40.0-44.9, ADULT (HCC): ICD-10-CM

## 2019-07-01 DIAGNOSIS — E11.8 TYPE 2 DIABETES MELLITUS WITH COMPLICATION, WITHOUT LONG-TERM CURRENT USE OF INSULIN (HCC): Primary | ICD-10-CM

## 2019-07-01 DIAGNOSIS — I10 ESSENTIAL HYPERTENSION: ICD-10-CM

## 2019-07-01 PROCEDURE — 4040F PNEUMOC VAC/ADMIN/RCVD: CPT | Performed by: INTERNAL MEDICINE

## 2019-07-01 PROCEDURE — G8598 ASA/ANTIPLAT THER USED: HCPCS | Performed by: INTERNAL MEDICINE

## 2019-07-01 PROCEDURE — 1036F TOBACCO NON-USER: CPT | Performed by: INTERNAL MEDICINE

## 2019-07-01 PROCEDURE — 1090F PRES/ABSN URINE INCON ASSESS: CPT | Performed by: INTERNAL MEDICINE

## 2019-07-01 PROCEDURE — 1123F ACP DISCUSS/DSCN MKR DOCD: CPT | Performed by: INTERNAL MEDICINE

## 2019-07-01 PROCEDURE — 99214 OFFICE O/P EST MOD 30 MIN: CPT | Performed by: INTERNAL MEDICINE

## 2019-07-01 PROCEDURE — G8400 PT W/DXA NO RESULTS DOC: HCPCS | Performed by: INTERNAL MEDICINE

## 2019-07-01 PROCEDURE — G8417 CALC BMI ABV UP PARAM F/U: HCPCS | Performed by: INTERNAL MEDICINE

## 2019-07-01 PROCEDURE — G8427 DOCREV CUR MEDS BY ELIG CLIN: HCPCS | Performed by: INTERNAL MEDICINE

## 2019-07-01 NOTE — CARE COORDINATION
Ambulatory Care Coordination Note  7/1/2019  CM Risk Score: 6  Laura Mortality Risk Score: [unfilled]    ACC: Vik Burris RN    Summary Note: Met with patient and daughters before her follow up with PCP. Patient stated she is doing better then before she was admitted last month. Per patient her blood sugar is improving. CC gave patient and daughters educational material on diabetes, asked that they call with any questions or concerns. Patient agreed to meet with Jayda Meyer RD at her next appointment but stated it would be a waste of Rebecca's time to call since she is having phone issues. CC will ask Petrona Frankel to come to her next appointment. CC Plan:   Follow up call to daughter in 2 weeks, check on blood sugar. Care Coordination Interventions    Program Enrollment:  Complex Care  Referral from Primary Care Provider:  Yes  Suggested Interventions and Community Resources  Zone Management Tools: In Process         Goals Addressed                 This Visit's Progress     Medication Management   Improving     I will take my medication as directed. I will notify my provider of any problems with medications, like adverse effects or side effects. I will notify my provider/Care Coordinator if I am unable to afford my medications. I will notify my provider for advice before I stop taking any of my medication. I will work with CC   DM,CHF  Barriers: overwhelmed by complexity of regimen  Plan for overcoming my barriers: work with cc  Confidence: 7/10  Anticipated Goal Completion Date: 8.8.19            Prior to Admission medications    Medication Sig Start Date End Date Taking?  Authorizing Provider   Insulin Pen Needle (PEN NEEDLES) 31G X 6 MM MISC 1 each by Does not apply route daily 6/20/19   Bernadette Smith MD   JANUVIA 100 MG tablet TAKE 1 TAB BY MOUTH ONCE A DAY 6/17/19   Bernadette Smith MD   insulin glargine (BASAGLAR KWIKPEN) 100 UNIT/ML injection pen Inject 20 Units into the skin nightly 6/7/19 [id number]     ,   Diabetes Assessment    Meal Planning:  Avoidance of concentrated sweets   How often do you test your blood sugar?:  Other   Do you have barriers with adherence to non-pharmacologic self-management interventions?  (Nutrition/Exercise/Self-Monitoring):  No       No patient-reported symptoms, Increase or Decrease trend in Blood Sugars       and   Congestive Heart Failure Assessment    Are you currently restricting fluids?:  Other  Do you understand a low sodium diet?:  Yes  Do you understand how to read food labels?:  Yes  How many restaurant meals do you eat per week?:  0  Do you salt your food before tasting it?:  No     No patient-reported symptoms      Symptoms:      Salt intake watch compared to last visit:  stable

## 2019-07-03 PROBLEM — E66.9 OBESITY: Status: ACTIVE | Noted: 2019-07-03

## 2019-07-03 RX ORDER — LANCETS 30 GAUGE
1 EACH MISCELLANEOUS 2 TIMES DAILY
Qty: 100 EACH | Refills: 11 | Status: SHIPPED | OUTPATIENT
Start: 2019-07-03 | End: 2019-07-05 | Stop reason: SDUPTHER

## 2019-07-03 ASSESSMENT — ENCOUNTER SYMPTOMS
EYE ITCHING: 0
BLOOD IN STOOL: 0
COUGH: 0
DIARRHEA: 0
CHOKING: 0
ABDOMINAL DISTENTION: 0
APNEA: 0
SHORTNESS OF BREATH: 1
CHEST TIGHTNESS: 0
EYE PAIN: 0
COLOR CHANGE: 0
BACK PAIN: 1
EYE REDNESS: 0
ABDOMINAL PAIN: 0
EYE DISCHARGE: 0
CONSTIPATION: 0

## 2019-07-05 DIAGNOSIS — E13.8 DIABETES MELLITUS OF OTHER TYPE WITH COMPLICATION, UNSPECIFIED WHETHER LONG TERM INSULIN USE: Primary | ICD-10-CM

## 2019-07-05 RX ORDER — LANCETS 30 GAUGE
1 EACH MISCELLANEOUS 2 TIMES DAILY
Qty: 100 EACH | Refills: 11 | Status: SHIPPED | OUTPATIENT
Start: 2019-07-05

## 2019-07-16 ENCOUNTER — CARE COORDINATION (OUTPATIENT)
Dept: CARE COORDINATION | Age: 84
End: 2019-07-16

## 2019-07-17 DIAGNOSIS — J01.00 ACUTE NON-RECURRENT MAXILLARY SINUSITIS: ICD-10-CM

## 2019-07-18 RX ORDER — LORATADINE 10 MG/1
TABLET ORAL
Qty: 30 TABLET | Refills: 3 | Status: SHIPPED | OUTPATIENT
Start: 2019-07-18 | End: 2020-01-20 | Stop reason: SDUPTHER

## 2019-07-24 ENCOUNTER — TELEPHONE (OUTPATIENT)
Dept: INTERNAL MEDICINE CLINIC | Age: 84
End: 2019-07-24

## 2019-07-31 ENCOUNTER — CARE COORDINATION (OUTPATIENT)
Dept: CARE COORDINATION | Age: 84
End: 2019-07-31

## 2019-08-12 ENCOUNTER — CARE COORDINATION (OUTPATIENT)
Dept: CARE COORDINATION | Age: 84
End: 2019-08-12

## 2019-09-03 ENCOUNTER — CARE COORDINATION (OUTPATIENT)
Dept: CARE COORDINATION | Age: 84
End: 2019-09-03

## 2019-09-03 NOTE — CARE COORDINATION
CC spoke briefly with patient's daughter. CC was checking on patient, she missed her appointment today with PCP. Daughter apologized stating she was not aware of this appointment and asked CC to reschedule it for her. CC scheduled it for next week. Daughter mentioned patient lost her son recently and is having a hard time.    CC Plan:   Follow up with patient at upcoming PCP appointment

## 2019-09-04 ENCOUNTER — TELEPHONE (OUTPATIENT)
Dept: INTERNAL MEDICINE CLINIC | Age: 84
End: 2019-09-04

## 2019-09-10 ENCOUNTER — CARE COORDINATION (OUTPATIENT)
Dept: CARE COORDINATION | Age: 84
End: 2019-09-10

## 2019-09-10 ENCOUNTER — OFFICE VISIT (OUTPATIENT)
Dept: INTERNAL MEDICINE CLINIC | Age: 84
End: 2019-09-10
Payer: MEDICARE

## 2019-09-10 VITALS
BODY MASS INDEX: 40.04 KG/M2 | SYSTOLIC BLOOD PRESSURE: 138 MMHG | HEART RATE: 76 BPM | DIASTOLIC BLOOD PRESSURE: 88 MMHG | OXYGEN SATURATION: 94 % | HEIGHT: 60 IN

## 2019-09-10 DIAGNOSIS — E11.8 TYPE 2 DIABETES MELLITUS WITH COMPLICATION, WITHOUT LONG-TERM CURRENT USE OF INSULIN (HCC): ICD-10-CM

## 2019-09-10 DIAGNOSIS — E66.01 MORBID OBESITY WITH BMI OF 40.0-44.9, ADULT (HCC): ICD-10-CM

## 2019-09-10 DIAGNOSIS — F43.21 GRIEF: ICD-10-CM

## 2019-09-10 DIAGNOSIS — I10 ESSENTIAL HYPERTENSION: Primary | ICD-10-CM

## 2019-09-10 DIAGNOSIS — E11.9 TYPE 2 DIABETES MELLITUS WITHOUT COMPLICATION, WITHOUT LONG-TERM CURRENT USE OF INSULIN (HCC): ICD-10-CM

## 2019-09-10 DIAGNOSIS — E03.9 HYPOTHYROIDISM, UNSPECIFIED TYPE: ICD-10-CM

## 2019-09-10 DIAGNOSIS — F32.A DEPRESSION, UNSPECIFIED DEPRESSION TYPE: ICD-10-CM

## 2019-09-10 LAB — HBA1C MFR BLD: 9.4 %

## 2019-09-10 PROCEDURE — 1123F ACP DISCUSS/DSCN MKR DOCD: CPT | Performed by: INTERNAL MEDICINE

## 2019-09-10 PROCEDURE — 83036 HEMOGLOBIN GLYCOSYLATED A1C: CPT | Performed by: INTERNAL MEDICINE

## 2019-09-10 PROCEDURE — G8417 CALC BMI ABV UP PARAM F/U: HCPCS | Performed by: INTERNAL MEDICINE

## 2019-09-10 PROCEDURE — G8598 ASA/ANTIPLAT THER USED: HCPCS | Performed by: INTERNAL MEDICINE

## 2019-09-10 PROCEDURE — 4040F PNEUMOC VAC/ADMIN/RCVD: CPT | Performed by: INTERNAL MEDICINE

## 2019-09-10 PROCEDURE — G8400 PT W/DXA NO RESULTS DOC: HCPCS | Performed by: INTERNAL MEDICINE

## 2019-09-10 PROCEDURE — 1090F PRES/ABSN URINE INCON ASSESS: CPT | Performed by: INTERNAL MEDICINE

## 2019-09-10 PROCEDURE — G8427 DOCREV CUR MEDS BY ELIG CLIN: HCPCS | Performed by: INTERNAL MEDICINE

## 2019-09-10 PROCEDURE — 1036F TOBACCO NON-USER: CPT | Performed by: INTERNAL MEDICINE

## 2019-09-10 PROCEDURE — 99214 OFFICE O/P EST MOD 30 MIN: CPT | Performed by: INTERNAL MEDICINE

## 2019-09-10 RX ORDER — ALPRAZOLAM 0.25 MG/1
0.25 TABLET ORAL NIGHTLY PRN
Qty: 10 TABLET | Refills: 0 | Status: SHIPPED | OUTPATIENT
Start: 2019-09-10 | End: 2019-09-20

## 2019-09-10 RX ORDER — SERTRALINE HYDROCHLORIDE 100 MG/1
TABLET, FILM COATED ORAL
Qty: 30 TABLET | Refills: 2 | Status: SHIPPED | OUTPATIENT
Start: 2019-09-10 | End: 2020-01-20 | Stop reason: SDUPTHER

## 2019-09-10 SDOH — HEALTH STABILITY: PHYSICAL HEALTH: ON AVERAGE, HOW MANY DAYS PER WEEK DO YOU ENGAGE IN MODERATE TO STRENUOUS EXERCISE (LIKE A BRISK WALK)?: 0 DAYS

## 2019-09-10 SDOH — SOCIAL STABILITY: SOCIAL NETWORK
IN A TYPICAL WEEK, HOW MANY TIMES DO YOU TALK ON THE PHONE WITH FAMILY, FRIENDS, OR NEIGHBORS?: MORE THAN THREE TIMES A WEEK

## 2019-09-10 SDOH — ECONOMIC STABILITY: TRANSPORTATION INSECURITY
IN THE PAST 12 MONTHS, HAS LACK OF TRANSPORTATION KEPT YOU FROM MEETINGS, WORK, OR FROM GETTING THINGS NEEDED FOR DAILY LIVING?: NO

## 2019-09-10 SDOH — SOCIAL STABILITY: SOCIAL NETWORK: HOW OFTEN DO YOU ATTENT MEETINGS OF THE CLUB OR ORGANIZATION YOU BELONG TO?: NEVER

## 2019-09-10 SDOH — SOCIAL STABILITY: SOCIAL NETWORK: ARE YOU MARRIED, WIDOWED, DIVORCED, SEPARATED, NEVER MARRIED, OR LIVING WITH A PARTNER?: DIVORCED

## 2019-09-10 SDOH — HEALTH STABILITY: MENTAL HEALTH: HOW OFTEN DO YOU HAVE A DRINK CONTAINING ALCOHOL?: NEVER

## 2019-09-10 SDOH — ECONOMIC STABILITY: INCOME INSECURITY: HOW HARD IS IT FOR YOU TO PAY FOR THE VERY BASICS LIKE FOOD, HOUSING, MEDICAL CARE, AND HEATING?: NOT HARD AT ALL

## 2019-09-10 SDOH — ECONOMIC STABILITY: FOOD INSECURITY: WITHIN THE PAST 12 MONTHS, THE FOOD YOU BOUGHT JUST DIDN'T LAST AND YOU DIDN'T HAVE MONEY TO GET MORE.: NEVER TRUE

## 2019-09-10 SDOH — SOCIAL STABILITY: SOCIAL NETWORK: HOW OFTEN DO YOU GET TOGETHER WITH FRIENDS OR RELATIVES?: MORE THAN THREE TIMES A WEEK

## 2019-09-10 SDOH — ECONOMIC STABILITY: FOOD INSECURITY: WITHIN THE PAST 12 MONTHS, YOU WORRIED THAT YOUR FOOD WOULD RUN OUT BEFORE YOU GOT MONEY TO BUY MORE.: NEVER TRUE

## 2019-09-10 SDOH — SOCIAL STABILITY: SOCIAL NETWORK: HOW OFTEN DO YOU ATTEND CHURCH OR RELIGIOUS SERVICES?: NEVER

## 2019-09-10 SDOH — HEALTH STABILITY: PHYSICAL HEALTH: ON AVERAGE, HOW MANY MINUTES DO YOU ENGAGE IN EXERCISE AT THIS LEVEL?: 0 MIN

## 2019-09-10 SDOH — ECONOMIC STABILITY: TRANSPORTATION INSECURITY
IN THE PAST 12 MONTHS, HAS THE LACK OF TRANSPORTATION KEPT YOU FROM MEDICAL APPOINTMENTS OR FROM GETTING MEDICATIONS?: NO

## 2019-09-10 SDOH — HEALTH STABILITY: MENTAL HEALTH
STRESS IS WHEN SOMEONE FEELS TENSE, NERVOUS, ANXIOUS, OR CAN'T SLEEP AT NIGHT BECAUSE THEIR MIND IS TROUBLED. HOW STRESSED ARE YOU?: NOT AT ALL

## 2019-09-10 SDOH — SOCIAL STABILITY: SOCIAL NETWORK
DO YOU BELONG TO ANY CLUBS OR ORGANIZATIONS SUCH AS CHURCH GROUPS UNIONS, FRATERNAL OR ATHLETIC GROUPS, OR SCHOOL GROUPS?: NO

## 2019-09-10 NOTE — CARE COORDINATION
Ambulatory Care Coordination Note  9/10/2019  CM Risk Score: 6  Charlson 10 Year Mortality Risk Score: 100%     ACC: Renetta Ch RN    Summary Note: met with patient and care giver before her follow up with PCP. Patient asked if cc had any idea how to get a wheel chair ramp installed. Per patient they have tried several places including the [facility] but it has been a long time since they called. CC asked if she was ok with  Referral to [facility] and GENOVEVA Huertas to help with this. Patient agreed. Per patient she rents her home but is certain her landlord would give her permission for the ramp. Her A1c has improved 9.4 today it was 16.3 on 5-9-19. CC Plan:   Follow up call in 1-2 weeks, referral to [place] and GENOVEVA placed. Care Coordination Interventions    Program Enrollment:  Complex Care  Referral from Primary Care Provider:  Yes  Suggested Interventions and Community Resources  Zone Management Tools: In Process         Goals Addressed                 This Visit's Progress     Medication Management   Improving     I will take my medication as directed. I will notify my provider of any problems with medications, like adverse effects or side effects. I will notify my provider/Care Coordinator if I am unable to afford my medications. I will notify my provider for advice before I stop taking any of my medication. I will work with CC   DM,CHF  Barriers: overwhelmed by complexity of regimen  Plan for overcoming my barriers: work with cc  Confidence: 7/10  Anticipated Goal Completion Date: 8.8.19  Extended goal paco until 10. 1.19 continues to work with CC            Prior to Admission medications    Medication Sig Start Date End Date Taking? Authorizing Provider   sertraline (ZOLOFT) 100 MG tablet TAKE 1 TAB BY MOUTH ONCE A DAY 9/10/19   David Pace MD   ALPRAZolam (XANAX) 0.25 MG tablet Take 1 tablet by mouth nightly as needed for Anxiety for up to 10 days.  9/10/19 9/20/19  Braydon

## 2019-09-11 ENCOUNTER — CARE COORDINATION (OUTPATIENT)
Dept: CARE COORDINATION | Age: 84
End: 2019-09-11

## 2019-09-11 ENCOUNTER — TELEPHONE (OUTPATIENT)
Dept: INTERNAL MEDICINE CLINIC | Age: 84
End: 2019-09-11

## 2019-09-11 ASSESSMENT — ENCOUNTER SYMPTOMS
EYE DISCHARGE: 0
EYE ITCHING: 0
ABDOMINAL DISTENTION: 0
ABDOMINAL PAIN: 0
SHORTNESS OF BREATH: 1
CHOKING: 0
BLOOD IN STOOL: 0
COLOR CHANGE: 0
APNEA: 0
EYE REDNESS: 0
BACK PAIN: 0
COUGH: 0
DIARRHEA: 0
CHEST TIGHTNESS: 0
EYE PAIN: 0
CONSTIPATION: 0

## 2019-09-11 NOTE — TELEPHONE ENCOUNTER
Lmom for pt to return call regarding insulin: Im not sure if patient is currently taking Lantus or Basaguar at this time

## 2019-09-20 ENCOUNTER — CARE COORDINATION (OUTPATIENT)
Dept: CARE COORDINATION | Age: 84
End: 2019-09-20

## 2019-09-23 ENCOUNTER — TELEPHONE (OUTPATIENT)
Dept: INTERNAL MEDICINE CLINIC | Age: 84
End: 2019-09-23

## 2019-10-02 DIAGNOSIS — E53.8 VITAMIN B 12 DEFICIENCY: ICD-10-CM

## 2019-10-02 DIAGNOSIS — J01.00 ACUTE NON-RECURRENT MAXILLARY SINUSITIS: ICD-10-CM

## 2019-10-03 RX ORDER — LORATADINE 10 MG/1
TABLET ORAL
Qty: 30 TABLET | Refills: 11 | Status: SHIPPED | OUTPATIENT
Start: 2019-10-03 | End: 2019-10-18

## 2019-10-03 RX ORDER — CYANOCOBALAMIN (VITAMIN B-12) 1000 MCG
TABLET, EXTENDED RELEASE ORAL
Qty: 30 TABLET | Refills: 11 | Status: SHIPPED | OUTPATIENT
Start: 2019-10-03 | End: 2021-10-28

## 2019-10-07 ENCOUNTER — CARE COORDINATION (OUTPATIENT)
Dept: CARE COORDINATION | Age: 84
End: 2019-10-07

## 2019-10-08 ENCOUNTER — CARE COORDINATION (OUTPATIENT)
Dept: CARE COORDINATION | Age: 84
End: 2019-10-08

## 2019-10-18 ENCOUNTER — OFFICE VISIT (OUTPATIENT)
Dept: INTERNAL MEDICINE CLINIC | Age: 84
End: 2019-10-18
Payer: MEDICARE

## 2019-10-18 VITALS
OXYGEN SATURATION: 94 % | SYSTOLIC BLOOD PRESSURE: 122 MMHG | HEART RATE: 68 BPM | BODY MASS INDEX: 38.87 KG/M2 | WEIGHT: 198 LBS | HEIGHT: 60 IN | DIASTOLIC BLOOD PRESSURE: 68 MMHG

## 2019-10-18 DIAGNOSIS — E11.8 TYPE 2 DIABETES MELLITUS WITH COMPLICATION, WITHOUT LONG-TERM CURRENT USE OF INSULIN (HCC): Primary | ICD-10-CM

## 2019-10-18 DIAGNOSIS — E66.01 MORBID OBESITY WITH BMI OF 40.0-44.9, ADULT (HCC): ICD-10-CM

## 2019-10-18 DIAGNOSIS — Z76.89 ENCOUNTER FOR POWER MOBILITY DEVICE ASSESSMENT: ICD-10-CM

## 2019-10-18 DIAGNOSIS — E03.9 HYPOTHYROIDISM, UNSPECIFIED TYPE: ICD-10-CM

## 2019-10-18 DIAGNOSIS — I10 ESSENTIAL HYPERTENSION: ICD-10-CM

## 2019-10-18 DIAGNOSIS — D64.9 ANEMIA, UNSPECIFIED TYPE: ICD-10-CM

## 2019-10-18 PROCEDURE — 4040F PNEUMOC VAC/ADMIN/RCVD: CPT | Performed by: INTERNAL MEDICINE

## 2019-10-18 PROCEDURE — G8484 FLU IMMUNIZE NO ADMIN: HCPCS | Performed by: INTERNAL MEDICINE

## 2019-10-18 PROCEDURE — G8598 ASA/ANTIPLAT THER USED: HCPCS | Performed by: INTERNAL MEDICINE

## 2019-10-18 PROCEDURE — G8400 PT W/DXA NO RESULTS DOC: HCPCS | Performed by: INTERNAL MEDICINE

## 2019-10-18 PROCEDURE — 99214 OFFICE O/P EST MOD 30 MIN: CPT | Performed by: INTERNAL MEDICINE

## 2019-10-18 PROCEDURE — 1123F ACP DISCUSS/DSCN MKR DOCD: CPT | Performed by: INTERNAL MEDICINE

## 2019-10-18 PROCEDURE — 1090F PRES/ABSN URINE INCON ASSESS: CPT | Performed by: INTERNAL MEDICINE

## 2019-10-18 PROCEDURE — G8427 DOCREV CUR MEDS BY ELIG CLIN: HCPCS | Performed by: INTERNAL MEDICINE

## 2019-10-18 PROCEDURE — 1036F TOBACCO NON-USER: CPT | Performed by: INTERNAL MEDICINE

## 2019-10-18 PROCEDURE — G8417 CALC BMI ABV UP PARAM F/U: HCPCS | Performed by: INTERNAL MEDICINE

## 2019-10-18 RX ORDER — LEVOTHYROXINE SODIUM 0.1 MG/1
TABLET ORAL
COMMUNITY
Start: 2019-10-09 | End: 2019-10-18

## 2019-10-29 ASSESSMENT — ENCOUNTER SYMPTOMS
CONSTIPATION: 0
SHORTNESS OF BREATH: 0
APNEA: 0
EYE DISCHARGE: 0
COUGH: 0
COLOR CHANGE: 0
ABDOMINAL PAIN: 0
EYE ITCHING: 0
ABDOMINAL DISTENTION: 0
BACK PAIN: 1
CHOKING: 0
BLOOD IN STOOL: 0
EYE REDNESS: 0
EYE PAIN: 0
DIARRHEA: 0
CHEST TIGHTNESS: 0

## 2019-11-04 RX ORDER — ATORVASTATIN CALCIUM 80 MG/1
TABLET, FILM COATED ORAL
Qty: 90 TABLET | Refills: 0 | Status: SHIPPED | OUTPATIENT
Start: 2019-11-04 | End: 2019-12-11

## 2019-11-07 ENCOUNTER — CARE COORDINATION (OUTPATIENT)
Dept: CARE COORDINATION | Age: 84
End: 2019-11-07

## 2019-11-25 RX ORDER — ATORVASTATIN CALCIUM 80 MG/1
TABLET, FILM COATED ORAL
Qty: 90 TABLET | Refills: 1 | Status: SHIPPED | OUTPATIENT
Start: 2019-11-25 | End: 2019-12-11

## 2019-11-25 RX ORDER — CLOPIDOGREL BISULFATE 75 MG/1
TABLET ORAL
Qty: 90 TABLET | Refills: 1 | Status: SHIPPED | OUTPATIENT
Start: 2019-11-25 | End: 2020-05-22 | Stop reason: SDUPTHER

## 2019-12-02 ENCOUNTER — TELEPHONE (OUTPATIENT)
Dept: INTERNAL MEDICINE CLINIC | Age: 84
End: 2019-12-02

## 2019-12-10 ENCOUNTER — HOSPITAL ENCOUNTER (OUTPATIENT)
Age: 84
Discharge: HOME | End: 2019-12-10
Payer: MEDICARE

## 2019-12-10 ENCOUNTER — TELEPHONE (OUTPATIENT)
Dept: INTERNAL MEDICINE CLINIC | Age: 84
End: 2019-12-10

## 2019-12-10 DIAGNOSIS — I10 ESSENTIAL HYPERTENSION: ICD-10-CM

## 2019-12-10 DIAGNOSIS — E03.9 HYPOTHYROIDISM, UNSPECIFIED TYPE: ICD-10-CM

## 2019-12-10 DIAGNOSIS — E11.8 TYPE 2 DIABETES MELLITUS WITH COMPLICATION, WITHOUT LONG-TERM CURRENT USE OF INSULIN (HCC): ICD-10-CM

## 2019-12-10 DIAGNOSIS — D64.9 ANEMIA, UNSPECIFIED TYPE: ICD-10-CM

## 2019-12-10 LAB
ANION GAP SERPL CALCULATED.3IONS-SCNC: 13 MMOL/L (ref 9–17)
BUN BLDV-MCNC: 13 MG/DL (ref 8–23)
BUN/CREAT BLD: ABNORMAL (ref 9–20)
CALCIUM SERPL-MCNC: 8.1 MG/DL (ref 8.6–10.4)
CHLORIDE BLD-SCNC: 100 MMOL/L (ref 98–107)
CO2: 22 MMOL/L (ref 20–31)
CREAT SERPL-MCNC: 0.93 MG/DL (ref 0.5–0.9)
FERRITIN: 10 UG/L (ref 13–150)
GFR AFRICAN AMERICAN: >60 ML/MIN
GFR NON-AFRICAN AMERICAN: 58 ML/MIN
GFR SERPL CREATININE-BSD FRML MDRD: ABNORMAL ML/MIN/{1.73_M2}
GFR SERPL CREATININE-BSD FRML MDRD: ABNORMAL ML/MIN/{1.73_M2}
GLUCOSE BLD-MCNC: 409 MG/DL (ref 70–99)
HCT VFR BLD CALC: 34.4 % (ref 36.3–47.1)
HEMOGLOBIN: 10.2 G/DL (ref 11.9–15.1)
IRON SATURATION: 8 % (ref 20–55)
IRON: 23 UG/DL (ref 37–145)
MCH RBC QN AUTO: 21.9 PG (ref 25.2–33.5)
MCHC RBC AUTO-ENTMCNC: 29.7 G/DL (ref 28.4–34.8)
MCV RBC AUTO: 74 FL (ref 82.6–102.9)
NRBC AUTOMATED: 0 PER 100 WBC
PDW BLD-RTO: 18.1 % (ref 11.8–14.4)
PLATELET # BLD: 196 K/UL (ref 138–453)
PMV BLD AUTO: 11.3 FL (ref 8.1–13.5)
POTASSIUM SERPL-SCNC: 4.2 MMOL/L (ref 3.7–5.3)
RBC # BLD: 4.65 M/UL (ref 3.95–5.11)
SODIUM BLD-SCNC: 135 MMOL/L (ref 135–144)
TOTAL IRON BINDING CAPACITY: 281 UG/DL (ref 250–450)
TSH SERPL DL<=0.05 MIU/L-ACNC: 8.21 MIU/L (ref 0.3–5)
UNSATURATED IRON BINDING CAPACITY: 258 UG/DL (ref 112–347)
WBC # BLD: 8 K/UL (ref 3.5–11.3)

## 2019-12-10 PROCEDURE — 82728 ASSAY OF FERRITIN: CPT

## 2019-12-10 PROCEDURE — 80048 BASIC METABOLIC PNL TOTAL CA: CPT

## 2019-12-10 PROCEDURE — 83540 ASSAY OF IRON: CPT

## 2019-12-10 PROCEDURE — 83550 IRON BINDING TEST: CPT

## 2019-12-10 PROCEDURE — 36415 COLL VENOUS BLD VENIPUNCTURE: CPT

## 2019-12-10 PROCEDURE — 84443 ASSAY THYROID STIM HORMONE: CPT

## 2019-12-10 PROCEDURE — 85027 COMPLETE CBC AUTOMATED: CPT

## 2019-12-11 ENCOUNTER — CARE COORDINATION (OUTPATIENT)
Dept: CARE COORDINATION | Age: 84
End: 2019-12-11

## 2019-12-11 ENCOUNTER — OFFICE VISIT (OUTPATIENT)
Dept: INTERNAL MEDICINE CLINIC | Age: 84
End: 2019-12-11
Payer: MEDICARE

## 2019-12-11 VITALS
WEIGHT: 206 LBS | BODY MASS INDEX: 40.44 KG/M2 | OXYGEN SATURATION: 95 % | SYSTOLIC BLOOD PRESSURE: 122 MMHG | HEIGHT: 60 IN | HEART RATE: 69 BPM | DIASTOLIC BLOOD PRESSURE: 76 MMHG

## 2019-12-11 DIAGNOSIS — L60.0 IGTN (INGROWING TOE NAIL): ICD-10-CM

## 2019-12-11 DIAGNOSIS — I10 ESSENTIAL HYPERTENSION: ICD-10-CM

## 2019-12-11 DIAGNOSIS — E11.8 TYPE 2 DIABETES MELLITUS WITH COMPLICATION, WITHOUT LONG-TERM CURRENT USE OF INSULIN (HCC): Primary | ICD-10-CM

## 2019-12-11 DIAGNOSIS — E66.01 MORBID OBESITY WITH BMI OF 40.0-44.9, ADULT (HCC): ICD-10-CM

## 2019-12-11 DIAGNOSIS — D50.9 IRON DEFICIENCY ANEMIA, UNSPECIFIED IRON DEFICIENCY ANEMIA TYPE: ICD-10-CM

## 2019-12-11 LAB — HBA1C MFR BLD: 13.9 %

## 2019-12-11 PROCEDURE — G8598 ASA/ANTIPLAT THER USED: HCPCS | Performed by: INTERNAL MEDICINE

## 2019-12-11 PROCEDURE — G8484 FLU IMMUNIZE NO ADMIN: HCPCS | Performed by: INTERNAL MEDICINE

## 2019-12-11 PROCEDURE — G8400 PT W/DXA NO RESULTS DOC: HCPCS | Performed by: INTERNAL MEDICINE

## 2019-12-11 PROCEDURE — 4040F PNEUMOC VAC/ADMIN/RCVD: CPT | Performed by: INTERNAL MEDICINE

## 2019-12-11 PROCEDURE — 1090F PRES/ABSN URINE INCON ASSESS: CPT | Performed by: INTERNAL MEDICINE

## 2019-12-11 PROCEDURE — 1123F ACP DISCUSS/DSCN MKR DOCD: CPT | Performed by: INTERNAL MEDICINE

## 2019-12-11 PROCEDURE — G8427 DOCREV CUR MEDS BY ELIG CLIN: HCPCS | Performed by: INTERNAL MEDICINE

## 2019-12-11 PROCEDURE — G8417 CALC BMI ABV UP PARAM F/U: HCPCS | Performed by: INTERNAL MEDICINE

## 2019-12-11 PROCEDURE — 1036F TOBACCO NON-USER: CPT | Performed by: INTERNAL MEDICINE

## 2019-12-11 PROCEDURE — 83036 HEMOGLOBIN GLYCOSYLATED A1C: CPT | Performed by: INTERNAL MEDICINE

## 2019-12-11 PROCEDURE — 99214 OFFICE O/P EST MOD 30 MIN: CPT | Performed by: INTERNAL MEDICINE

## 2019-12-11 RX ORDER — FERROUS SULFATE 325(65) MG
325 TABLET ORAL 2 TIMES DAILY
Qty: 60 TABLET | Refills: 5 | Status: SHIPPED | OUTPATIENT
Start: 2019-12-11 | End: 2020-05-22 | Stop reason: SDDI

## 2019-12-17 ENCOUNTER — HOSPITAL ENCOUNTER (OUTPATIENT)
Dept: PHYSICAL THERAPY | Age: 84
Setting detail: THERAPIES SERIES
Discharge: HOME | End: 2019-12-17
Payer: MEDICARE

## 2019-12-17 PROCEDURE — 97161 PT EVAL LOW COMPLEX 20 MIN: CPT

## 2019-12-20 ENCOUNTER — CARE COORDINATION (OUTPATIENT)
Dept: CARE COORDINATION | Age: 84
End: 2019-12-20

## 2019-12-24 ASSESSMENT — ENCOUNTER SYMPTOMS
CHOKING: 0
COUGH: 0
ABDOMINAL PAIN: 0
DIARRHEA: 0
CONSTIPATION: 0
ABDOMINAL DISTENTION: 0
EYE REDNESS: 0
EYE PAIN: 0
SHORTNESS OF BREATH: 1
CHEST TIGHTNESS: 0
COLOR CHANGE: 0
EYE DISCHARGE: 0
APNEA: 0
BACK PAIN: 1
BLOOD IN STOOL: 0
EYE ITCHING: 0

## 2019-12-26 ENCOUNTER — CARE COORDINATION (OUTPATIENT)
Dept: CARE COORDINATION | Age: 84
End: 2019-12-26

## 2020-01-03 ENCOUNTER — CARE COORDINATION (OUTPATIENT)
Dept: CARE COORDINATION | Age: 85
End: 2020-01-03

## 2020-01-03 NOTE — CARE COORDINATION
Attempting to reach patient for a follow up care coordination call regarding any needs, questions or concerns.   Joana Box, [address] [facility]

## 2020-01-06 NOTE — TELEPHONE ENCOUNTER
Medication: Metformin  Last visit: 12/11/19  Next visit: 2/12/2020  Last refill: 9/24/19  Pharmacy: [facility]

## 2020-01-07 ENCOUNTER — OFFICE VISIT (OUTPATIENT)
Dept: PODIATRY | Age: 85
End: 2020-01-07
Payer: MEDICARE

## 2020-01-07 VITALS — BODY MASS INDEX: 37.91 KG/M2 | WEIGHT: 206 LBS | HEIGHT: 62 IN

## 2020-01-07 PROCEDURE — G8417 CALC BMI ABV UP PARAM F/U: HCPCS | Performed by: PODIATRIST

## 2020-01-07 PROCEDURE — G8484 FLU IMMUNIZE NO ADMIN: HCPCS | Performed by: PODIATRIST

## 2020-01-07 PROCEDURE — 1090F PRES/ABSN URINE INCON ASSESS: CPT | Performed by: PODIATRIST

## 2020-01-07 PROCEDURE — 1123F ACP DISCUSS/DSCN MKR DOCD: CPT | Performed by: PODIATRIST

## 2020-01-07 PROCEDURE — G8427 DOCREV CUR MEDS BY ELIG CLIN: HCPCS | Performed by: PODIATRIST

## 2020-01-07 PROCEDURE — G8400 PT W/DXA NO RESULTS DOC: HCPCS | Performed by: PODIATRIST

## 2020-01-07 PROCEDURE — 4040F PNEUMOC VAC/ADMIN/RCVD: CPT | Performed by: PODIATRIST

## 2020-01-07 PROCEDURE — 1036F TOBACCO NON-USER: CPT | Performed by: PODIATRIST

## 2020-01-07 PROCEDURE — 99203 OFFICE O/P NEW LOW 30 MIN: CPT | Performed by: PODIATRIST

## 2020-01-07 PROCEDURE — 11721 DEBRIDE NAIL 6 OR MORE: CPT | Performed by: PODIATRIST

## 2020-01-07 ASSESSMENT — ENCOUNTER SYMPTOMS
BACK PAIN: 0
NAUSEA: 0
SHORTNESS OF BREATH: 0
COLOR CHANGE: 0
DIARRHEA: 0

## 2020-01-07 NOTE — PROGRESS NOTES
noted to the left foot. The skin to the bilateral feet is not thin and shiny. The skin to the bilateral feet is  warm, supple, and dry. Vascular: DP pulse of the right foot is  palpable. DP pulse of the left foot is not palpable. PT pulse of the right foot is not palpable. PT pulse of the left foot is not palpable. CFT is less than 3 secs to the digits of the right foot. CFT is less than 3 secs to the digits of the left foot. There is no edema noted to the bilateral foot or ankle. There is no hair growth noted to the digits of the bilateral feet. There are no varicosities noted to the right foot/ankle. There are no varicosities noted to the left foot/ankle. Erythema is absent to the bilateral feet. Neurological: Reflexes are present to the right plantar foot and to the Achilles tendon. Reflexes are present to the left plantar foot and to the Achilles tendon. Protective sensation is present to the right plantar foot as noted with a 5.07 Luling-Radha monofilament. Protective sensation is present to the left plantar foot as noted with a 5.07 Luling-Radha monofilament. Musculoskeletal:  Muscle strength is +5/5 to all four muscle groups of the right lower extremity and +5/5 to all four muscle groups of the left lower extremity. There are no areas of subluxation, dislocation, or laxity noted to either lower extremity. Range of motion to the right ankle is  free of pain or grinding. Range of motion to the left ankle is  free of pain or grinding. Range of motion to the right subtalar joint is  free of pain or grinding. Range of motion to the left subtalar joint is  free of pain or grinding. No abnormalities, asymmetries, or misalignments are seen between the extremities.     Weightbearing evaluation does not reveal rearfoot eversion, medial prominence of the talar head, loss of the medial longitudinal arch height, and too many toes sign bilaterally. The digits of the right foot are not contracted. The digits of the left foot are not contracted. There is no prominence noted to the first metatarsal head without abduction of the hallux of the right foot. There is no prominence noted to the first metatarsal head without abduction of the hallux of the left foot. Shoe examination was performed. Biomechanical Exam: Patient brought in today in a wheelchair. Asessment: Patient is a 82 y.o. female with:    Diagnosis Orders   1. Onychomycosis of toenail  [place] DEBRIDEMENT OF NAILS, 6 OR MORE    HM DIABETES FOOT EXAM   2. Pain of toes of both feet  [place] DEBRIDEMENT OF NAILS, 6 OR MORE    HM DIABETES FOOT EXAM   3. Type 2 diabetes mellitus with peripheral vascular disease (HCC)  KY DEBRIDEMENT OF NAILS, 6 OR MORE    HM DIABETES FOOT EXAM       Plan:  1. Clinical evaluation of the patient. 2. Toenails 1-5 of the right foot and 1-5 of the left foot were debrided in length and thickness using a nail nipper and a . Patient educated on proper diabetic foot care. 3. Contact office with any questions/problems/concerns. Return in about 9 weeks (around 3/10/2020) for At risk diabetic foot care.    1/7/2020      Corrine Mayen DPM

## 2020-01-17 ENCOUNTER — CARE COORDINATION (OUTPATIENT)
Dept: CARE COORDINATION | Age: 85
End: 2020-01-17

## 2020-01-17 NOTE — CARE COORDINATION
Daughter returned call stating that she only cancelled her [employer] and signed her up for silver scripts. She did ask about why her wheel chair was declined, cc will check with supplier and call daughter. Supplier stated that they are out of network. CC spoke with daughter and encouraged her to call Lynette for a supplier and to call CC or the office with this information.

## 2020-01-17 NOTE — CARE COORDINATION
Ambulatory Care Coordination Note  1/17/2020  CM Risk Score: 6  Charlson 10 Year Mortality Risk Score: 100%     ACC: Bernardo Medrano RN    Summary Note: Spoke with patient who denied any needs from PCP or CC at this time. Per patient she is feeling well. She is questioning her insurance coverage but did not have any specifics regarding if she changed insurance. She will have her daughter call me if she has questions that I can help with. Patient does not weigh herself, CC educated her on the importance of monitoring her weight and reporting any rapid weight gain. Patient understood. CC Plan:   Follow up call in 1 week  Care Coordination Interventions    Program Enrollment:  Complex Care  Referral from Primary Care Provider:  No  Suggested Interventions and Community Resources  Specialty Services Referral:  Completed (Comment: podiatry referral)         Goals Addressed                 This Visit's Progress     Conditions and Symptoms   Improving     I will schedule office visits, as directed by my provider. I will keep my appointment or reschedule if I have to cancel. I will notify my provider of any barriers to my plan of care. I will follow my Zone Management tool to seek urgent or emergent care. I will notify my provider of any symptoms that indicate a worsening of my condition. Barriers: lack of education  Plan for overcoming my barriers: work with cc  Confidence: 8/10  Anticipated Goal Completion Date: 2.20.20              Prior to Admission medications    Medication Sig Start Date End Date Taking?  Authorizing Provider   metoprolol tartrate (LOPRESSOR) 25 MG tablet Take 1 tablet by mouth 2 times daily 1/13/20   Franchesca Lira MD   metFORMIN (GLUCOPHAGE) 500 MG tablet Take 1 tablet by mouth 3 times daily 1/7/20   Franchesca Lira MD   insulin glargine (BASAGLAR KWIKPEN) 100 UNIT/ML injection pen Inject 30 Units into the skin nightly 12/11/19   Franchesca Lira MD   ferrous sulfate 325 (65 Fe) MG tablet Take 1 tablet by mouth 2 times daily 12/11/19   Franchesca Lira MD   Insulin Pen Needle 29G X 12MM MISC 1 each by Does not apply route daily 12/11/19   Franchesca Lira MD   clopidogrel (PLAVIX) 75 MG tablet TAKE 1 TAB BY MOUTH ONCE A DAY 11/25/19   Franchesca Lira MD   Cyanocobalamin (VITAMIN B-12) 1000 MCG extended release tablet TAKE 1 TAB BY MOUTH ONCE A DAY 10/3/19   Franchesca Lira MD   sertraline (ZOLOFT) 100 MG tablet TAKE 1 TAB BY MOUTH ONCE A DAY 9/10/19   Franchesca Lira MD   loratadine (CLARITIN) 10 MG tablet TAKE 1 TAB BY MOUTH ONCE A DAY 7/18/19   Thompson Simon MD   Lancets MISC 1 each by Does not apply route 2 times daily 7/5/19   Franchesca Lira MD   Insulin Pen Needle (PEN NEEDLES) 31G X 6 MM MISC 1 each by Does not apply route daily 6/20/19   Franchesca Lira MD   JANUVIA 100 MG tablet TAKE 1 TAB BY MOUTH ONCE A DAY 6/17/19   Franchesca Lira MD   Cholecalciferol (VITAMIN D3) 1000 units TABS TAKE 1 TAB BY MOUTH ONCE A DAY 5/29/19   Franchesca Lira MD   levothyroxine (SYNTHROID) 125 MCG tablet Take 1 tablet by mouth Daily 5/10/19   Chandra Whitaker MD   repaglinide (PRANDIN) 1 MG tablet TAKE 1 TAB BY MOUTH THREE TIMES A DAY BEFORE MEALS 5/8/19   Franchesca Lira MD   furosemide (LASIX) 40 MG tablet Take 1 tablet by mouth daily 1/11/19   Franchesca Lira MD   diclofenac sodium (VOLTAREN) 1 % GEL Apply 2 g topically 2 times daily 12/18/18   Franchesca Lira MD   ondansetron (ZOFRAN) 4 MG tablet Take 1 tablet by mouth every 8 hours as needed for Nausea or Vomiting 10/29/18   Franchesca Lira MD   nystatin (MYCOSTATIN) 255225 UNIT/GM cream Apply topically 2 times daily.  5/7/18   Franchesca Lira MD   atorvastatin (LIPITOR) 80 MG tablet Take 1 tablet by mouth daily 8/18/17   Franchesca Lira MD   ferrous sulfate (FE TABS) 325 (65 Fe) MG EC tablet Take 1 tablet by mouth 2 times daily 8/18/17   Franchesca Lira MD   docusate sodium (COLACE) 100 MG capsule Take 1 capsule by mouth daily as needed for Constipation 8/18/17 Franchesca Lira MD   aspirin 81 MG chewable tablet Take 1 tablet by mouth daily 9/5/16   Henry Street MD       Future Appointments   Date Time Provider Estrellita Segura   2/12/2020  1:15 PM Franchesca Lira MD [address]   3/10/2020 10:00 AM Nate Aaron DPM [facility]     ,   Diabetes Assessment    Meal Planning:  Avoidance of concentrated sweets   How often do you test your blood sugar?:  Other   Do you have barriers with adherence to non-pharmacologic self-management interventions?  (Nutrition/Exercise/Self-Monitoring):  No       No patient-reported symptoms      ,   Congestive Heart Failure Assessment    Are you currently restricting fluids?:  Other  Do you understand a low sodium diet?:  Yes  Do you understand how to read food labels?:  Yes  How many restaurant meals do you eat per week?:  0  Do you salt your food before tasting it?:  No     No patient-reported symptoms      Symptoms:   None:  Yes      Symptom course:  stable  Weight trend:  stable  Salt intake watch compared to last visit:  stable      and   General Assessment    Do you have any symptoms that are causing concern?:  No

## 2020-01-20 RX ORDER — LORATADINE 10 MG/1
TABLET ORAL
Qty: 30 TABLET | Refills: 3 | Status: SHIPPED | OUTPATIENT
Start: 2020-01-20 | End: 2020-05-22 | Stop reason: ALTCHOICE

## 2020-01-20 RX ORDER — SERTRALINE HYDROCHLORIDE 100 MG/1
TABLET, FILM COATED ORAL
Qty: 30 TABLET | Refills: 2 | Status: SHIPPED | OUTPATIENT
Start: 2020-01-20 | End: 2020-05-13

## 2020-02-06 ENCOUNTER — CARE COORDINATION (OUTPATIENT)
Dept: CARE COORDINATION | Age: 85
End: 2020-02-06

## 2020-02-13 ENCOUNTER — CARE COORDINATION (OUTPATIENT)
Dept: CARE COORDINATION | Age: 85
End: 2020-02-13

## 2020-02-13 NOTE — CARE COORDINATION
MG tablet Take 1 tablet by mouth 3 times daily 1/7/20   Sabrina Solano MD   insulin glargine (BASAGLAR KWIKPEN) 100 UNIT/ML injection pen Inject 30 Units into the skin nightly 12/11/19   Sabrina Solano MD   ferrous sulfate 325 (65 Fe) MG tablet Take 1 tablet by mouth 2 times daily 12/11/19   Sabrina Solano MD   Insulin Pen Needle 29G X 12MM MISC 1 each by Does not apply route daily 12/11/19   Sabrina Solano MD   clopidogrel (PLAVIX) 75 MG tablet TAKE 1 TAB BY MOUTH ONCE A DAY 11/25/19   Sabrina Solano MD   Cyanocobalamin (VITAMIN B-12) 1000 MCG extended release tablet TAKE 1 TAB BY MOUTH ONCE A DAY 10/3/19   Sabrina Solano MD   Lancets MISC 1 each by Does not apply route 2 times daily 7/5/19   Sabrina Solano MD   Insulin Pen Needle (PEN NEEDLES) 31G X 6 MM MISC 1 each by Does not apply route daily 6/20/19   Sabrina Solano MD   JANUVIA 100 MG tablet TAKE 1 TAB BY MOUTH ONCE A DAY 6/17/19   Sabrina Solano MD   Cholecalciferol (VITAMIN D3) 1000 units TABS TAKE 1 TAB BY MOUTH ONCE A DAY 5/29/19   Sabrina Solano MD   levothyroxine (SYNTHROID) 125 MCG tablet Take 1 tablet by mouth Daily 5/10/19   Castillo Urrutia MD   repaglinide (PRANDIN) 1 MG tablet TAKE 1 TAB BY MOUTH THREE TIMES A DAY BEFORE MEALS 5/8/19   Sabrina Solano MD   furosemide (LASIX) 40 MG tablet Take 1 tablet by mouth daily 1/11/19   Sabrina Solano MD   diclofenac sodium (VOLTAREN) 1 % GEL Apply 2 g topically 2 times daily 12/18/18   Sabrina Solano MD   ondansetron (ZOFRAN) 4 MG tablet Take 1 tablet by mouth every 8 hours as needed for Nausea or Vomiting 10/29/18   Sabrina Solano MD   nystatin (MYCOSTATIN) 877587 UNIT/GM cream Apply topically 2 times daily.  5/7/18   Sabrina Solano MD   atorvastatin (LIPITOR) 80 MG tablet Take 1 tablet by mouth daily 8/18/17   Sabrina Solano MD   ferrous sulfate (FE TABS) 325 (65 Fe) MG EC tablet Take 1 tablet by mouth 2 times daily 8/18/17   Sabrina Solano MD   docusate sodium (COLACE) 100 MG capsule Take 1 capsule by mouth daily as needed for Constipation 8/18/17   Renard Lr MD   aspirin 81 MG chewable tablet Take 1 tablet by mouth daily 9/5/16   Selvin Darnell MD       Future Appointments   Date Time Provider Estrellita Pederseni   3/10/2020 10:00 AM Arslan Stover DPM [facility]     ,   Diabetes Assessment    Meal Planning:  Avoidance of concentrated sweets   How often do you test your blood sugar?:  Other   Do you have barriers with adherence to non-pharmacologic self-management interventions?  (Nutrition/Exercise/Self-Monitoring):  No       No patient-reported symptoms      ,   Congestive Heart Failure Assessment    Are you currently restricting fluids?:  Other  Do you understand a low sodium diet?:  Yes  Do you understand how to read food labels?:  Yes  How many restaurant meals do you eat per week?:  0  Do you salt your food before tasting it?:  No     No patient-reported symptoms      Symptoms:      Symptom course:  stable  Weight trend:  stable  Salt intake watch compared to last visit:  stable      and   General Assessment

## 2020-02-25 ENCOUNTER — CARE COORDINATION (OUTPATIENT)
Dept: CARE COORDINATION | Age: 85
End: 2020-02-25

## 2020-03-04 ENCOUNTER — CARE COORDINATION (OUTPATIENT)
Dept: CARE COORDINATION | Age: 85
End: 2020-03-04

## 2020-03-04 NOTE — CARE COORDINATION
Ambulatory Care Coordination Note  3/4/2020  CM Risk Score: 6  Charlson 10 Year Mortality Risk Score: 100%     ACC: Sabas Hadley, RN    Summary Note: spoke with daughter who stated that Nichol Main is doing good. She has her wheel chair, taking medication as directed. She has no additional needs for equipment at this time. Denied any SOB, swelling or weight gain. No recent ed visits or hospitalizations. CC will speak with PCP regarding graduation at this time but will be happy to assist in the future with any needs. Care Coordination Interventions    Program Enrollment:  Complex Care  Referral from Primary Care Provider:  No  Suggested Interventions and [address]:  [address] [facility]  Specialty Services Referral:  Completed (Comment: podiatry referral)  Transportation Support:  Declined  Zone Management Tools:  Completed         Goals Addressed                 This Visit's Progress     COMPLETED: Conditions and Symptoms   On track     I will schedule office visits, as directed by my provider. I will keep my appointment or reschedule if I have to cancel. I will notify my provider of any barriers to my plan of care. I will follow my Zone Management tool to seek urgent or emergent care. I will notify my provider of any symptoms that indicate a worsening of my condition. Barriers: lack of education  Plan for overcoming my barriers: work with cc  Confidence: 8/10  Anticipated Goal Completion Date: 2.20.20              Prior to Admission medications    Medication Sig Start Date End Date Taking?  Authorizing Provider   sertraline (ZOLOFT) 100 MG tablet TAKE 1 TAB BY MOUTH ONCE A DAY 1/20/20   Yvonne Talbot MD   loratadine (CLARITIN) 10 MG tablet TAKE 1 TAB BY MOUTH ONCE A DAY 1/20/20   Yvonne Talbot MD   metoprolol tartrate (LOPRESSOR) 25 MG tablet Take 1 tablet by mouth 2 times daily 1/13/20   Yvonne Talbot MD   metFORMIN (GLUCOPHAGE) 500 MG tablet Take 1 tablet by mouth 3 times daily 1/7/20   Kristofer Luke MD   insulin glargine (BASAGLAR KWIKPEN) 100 UNIT/ML injection pen Inject 30 Units into the skin nightly 12/11/19   Kristofer Luke MD   ferrous sulfate 325 (65 Fe) MG tablet Take 1 tablet by mouth 2 times daily 12/11/19   Kristofer Luke MD   Insulin Pen Needle 29G X 12MM MISC 1 each by Does not apply route daily 12/11/19   Kristofer Luke MD   clopidogrel (PLAVIX) 75 MG tablet TAKE 1 TAB BY MOUTH ONCE A DAY 11/25/19   Kristofer Luke MD   Cyanocobalamin (VITAMIN B-12) 1000 MCG extended release tablet TAKE 1 TAB BY MOUTH ONCE A DAY 10/3/19   Kristofer Luke MD   Lancets MISC 1 each by Does not apply route 2 times daily 7/5/19   Kristofer Luke MD   Insulin Pen Needle (PEN NEEDLES) 31G X 6 MM MISC 1 each by Does not apply route daily 6/20/19   Kristofer Luke MD   JANUVIA 100 MG tablet TAKE 1 TAB BY MOUTH ONCE A DAY 6/17/19   Kristofer Luke MD   Cholecalciferol (VITAMIN D3) 1000 units TABS TAKE 1 TAB BY MOUTH ONCE A DAY 5/29/19   Kristofer Luke MD   levothyroxine (SYNTHROID) 125 MCG tablet Take 1 tablet by mouth Daily 5/10/19   Roberth Cantrell MD   repaglinide (PRANDIN) 1 MG tablet TAKE 1 TAB BY MOUTH THREE TIMES A DAY BEFORE MEALS 5/8/19   Kristofer Luke MD   furosemide (LASIX) 40 MG tablet Take 1 tablet by mouth daily 1/11/19   Kristofer Luke MD   diclofenac sodium (VOLTAREN) 1 % GEL Apply 2 g topically 2 times daily 12/18/18   Kristofer Luke MD   ondansetron (ZOFRAN) 4 MG tablet Take 1 tablet by mouth every 8 hours as needed for Nausea or Vomiting 10/29/18   Kristofer Luke MD   nystatin (MYCOSTATIN) 255795 UNIT/GM cream Apply topically 2 times daily.  5/7/18   Kristofer Luke MD   atorvastatin (LIPITOR) 80 MG tablet Take 1 tablet by mouth daily 8/18/17   Kristofer Luke MD   ferrous sulfate (FE TABS) 325 (65 Fe) MG EC tablet Take 1 tablet by mouth 2 times daily 8/18/17   Kristofer Luke MD   docusate sodium (COLACE) 100 MG capsule Take 1 capsule by mouth daily as needed for Constipation 8/18/17   Stephania Kahn MD   aspirin 81 MG chewable tablet Take 1 tablet by mouth daily 9/5/16   Ivan Tsai MD       Future Appointments   Date Time Provider Estrellita Segura   3/10/2020 10:00 AM Yomi Maier DPM [facility]     ,   Diabetes Assessment    Meal Planning:  Avoidance of concentrated sweets   How often do you test your blood sugar?:  Other   Do you have barriers with adherence to non-pharmacologic self-management interventions?  (Nutrition/Exercise/Self-Monitoring):  No       No patient-reported symptoms       and   Congestive Heart Failure Assessment    Are you currently restricting fluids?:  Other  Do you understand a low sodium diet?:  Yes  Do you understand how to read food labels?:  Yes  How many restaurant meals do you eat per week?:  0  Do you salt your food before tasting it?:  No         Symptoms:   CHF associated shortness of breath: Neg      Symptom course:  stable  Weight trend:  stable  Salt intake watch compared to last visit:  stable

## 2020-03-05 RX ORDER — ATORVASTATIN CALCIUM 80 MG/1
80 TABLET, FILM COATED ORAL DAILY
Qty: 90 TABLET | Refills: 3 | Status: CANCELLED | OUTPATIENT
Start: 2020-03-05

## 2020-03-05 RX ORDER — ATORVASTATIN CALCIUM 80 MG/1
80 TABLET, FILM COATED ORAL DAILY
Qty: 30 TABLET | Refills: 3 | Status: SHIPPED | OUTPATIENT
Start: 2020-03-05 | End: 2021-04-01 | Stop reason: SDUPTHER

## 2020-03-05 RX ORDER — FUROSEMIDE 40 MG/1
40 TABLET ORAL DAILY
Qty: 90 TABLET | Refills: 3 | Status: SHIPPED | OUTPATIENT
Start: 2020-03-05 | End: 2021-04-01 | Stop reason: SDUPTHER

## 2020-03-05 RX ORDER — FUROSEMIDE 40 MG/1
40 TABLET ORAL DAILY
Qty: 90 TABLET | Refills: 3 | Status: CANCELLED | OUTPATIENT
Start: 2020-03-05

## 2020-03-25 PROBLEM — E78.5 HYPERLIPIDEMIA: Status: RESOLVED | Noted: 2020-03-25 | Resolved: 2020-03-24

## 2020-05-13 RX ORDER — SERTRALINE HYDROCHLORIDE 100 MG/1
TABLET, FILM COATED ORAL
Qty: 30 TABLET | Refills: 2 | Status: SHIPPED | OUTPATIENT
Start: 2020-05-13 | End: 2020-05-22 | Stop reason: SDUPTHER

## 2020-05-13 RX ORDER — LEVOTHYROXINE SODIUM 0.1 MG/1
TABLET ORAL
Qty: 90 TABLET | Refills: 0 | Status: SHIPPED | OUTPATIENT
Start: 2020-05-13 | End: 2020-05-22 | Stop reason: SDUPTHER

## 2020-05-22 ENCOUNTER — OFFICE VISIT (OUTPATIENT)
Dept: INTERNAL MEDICINE CLINIC | Age: 85
End: 2020-05-22
Payer: MEDICARE

## 2020-05-22 ENCOUNTER — HOSPITAL ENCOUNTER (EMERGENCY)
Age: 85
Discharge: HOME | End: 2020-05-22
Attending: EMERGENCY MEDICINE
Payer: MEDICARE

## 2020-05-22 ENCOUNTER — HOSPITAL ENCOUNTER (OUTPATIENT)
Dept: CT IMAGING | Age: 85
Discharge: HOME | End: 2020-05-24
Payer: MEDICARE

## 2020-05-22 ENCOUNTER — HOSPITAL ENCOUNTER (OUTPATIENT)
Age: 85
Discharge: HOME | End: 2020-05-22
Payer: MEDICARE

## 2020-05-22 VITALS
SYSTOLIC BLOOD PRESSURE: 112 MMHG | HEART RATE: 70 BPM | DIASTOLIC BLOOD PRESSURE: 56 MMHG | OXYGEN SATURATION: 96 % | TEMPERATURE: 97.2 F | RESPIRATION RATE: 18 BRPM

## 2020-05-22 VITALS
DIASTOLIC BLOOD PRESSURE: 60 MMHG | HEART RATE: 71 BPM | OXYGEN SATURATION: 95 % | HEIGHT: 62 IN | BODY MASS INDEX: 35.44 KG/M2 | WEIGHT: 192.6 LBS | TEMPERATURE: 98.2 F | SYSTOLIC BLOOD PRESSURE: 90 MMHG

## 2020-05-22 LAB
-: NORMAL
ABSOLUTE EOS #: 0.18 K/UL (ref 0–0.4)
ABSOLUTE IMMATURE GRANULOCYTE: ABNORMAL K/UL (ref 0–0.3)
ABSOLUTE LYMPH #: 2.07 K/UL (ref 1–4.8)
ABSOLUTE MONO #: 0.45 K/UL (ref 0.1–1.3)
ALBUMIN SERPL-MCNC: 3.6 G/DL (ref 3.5–5.2)
ALBUMIN/GLOBULIN RATIO: ABNORMAL (ref 1–2.5)
ALLEN TEST: ABNORMAL
ALP BLD-CCNC: 153 U/L (ref 35–104)
ALT SERPL-CCNC: 9 U/L (ref 5–33)
ANION GAP SERPL CALCULATED.3IONS-SCNC: 16 MMOL/L (ref 9–17)
ANION GAP: 10 MMOL/L (ref 7–16)
AST SERPL-CCNC: 10 U/L
BASOPHILS # BLD: 1 % (ref 0–2)
BASOPHILS ABSOLUTE: 0.09 K/UL (ref 0–0.2)
BILIRUB SERPL-MCNC: 0.25 MG/DL (ref 0.3–1.2)
BUN BLDV-MCNC: 21 MG/DL (ref 8–23)
BUN/CREAT BLD: ABNORMAL (ref 9–20)
CALCIUM SERPL-MCNC: 8.7 MG/DL (ref 8.6–10.4)
CHLORIDE BLD-SCNC: 89 MMOL/L (ref 98–107)
CO2: 24 MMOL/L (ref 20–31)
CREAT SERPL-MCNC: 1.2 MG/DL (ref 0.5–0.9)
DIFFERENTIAL TYPE: ABNORMAL
EOSINOPHILS RELATIVE PERCENT: 2 % (ref 0–4)
ESTIMATED AVERAGE GLUCOSE: 444 MG/DL
FIO2: ABNORMAL
GFR AFRICAN AMERICAN: 52 ML/MIN
GFR NON-AFRICAN AMERICAN: 41 ML/MIN
GFR NON-AFRICAN AMERICAN: 43 ML/MIN
GFR SERPL CREATININE-BSD FRML MDRD: 50 ML/MIN
GFR SERPL CREATININE-BSD FRML MDRD: ABNORMAL ML/MIN/{1.73_M2}
GLUCOSE BLD-MCNC: 409 MG/DL (ref 65–105)
GLUCOSE BLD-MCNC: 468 MG/DL (ref 65–105)
GLUCOSE BLD-MCNC: 603 MG/DL (ref 74–100)
GLUCOSE BLD-MCNC: 604 MG/DL (ref 70–99)
HBA1C MFR BLD: 14 %
HBA1C MFR BLD: 17.1 % (ref 4–6)
HCO3 VENOUS: 28.6 MMOL/L (ref 22–29)
HCT VFR BLD CALC: 35.4 % (ref 36–46)
HEMOGLOBIN: 11 G/DL (ref 12–16)
IMMATURE GRANULOCYTES: ABNORMAL %
LYMPHOCYTES # BLD: 23 % (ref 24–44)
MCH RBC QN AUTO: 22.5 PG (ref 26–34)
MCHC RBC AUTO-ENTMCNC: 31 G/DL (ref 31–37)
MCV RBC AUTO: 72.6 FL (ref 80–100)
MODE: ABNORMAL
MONOCYTES # BLD: 5 % (ref 1–7)
MORPHOLOGY: ABNORMAL
NEGATIVE BASE EXCESS, VEN: ABNORMAL (ref 0–2)
NRBC AUTOMATED: ABNORMAL PER 100 WBC
O2 DEVICE/FLOW/%: ABNORMAL
O2 SAT, VEN: 26 % (ref 60–85)
PATIENT TEMP: ABNORMAL
PCO2, VEN: 49.6 MM HG (ref 41–51)
PDW BLD-RTO: 17.1 % (ref 11.5–14.9)
PH VENOUS: 7.37 (ref 7.32–7.43)
PLATELET # BLD: 178 K/UL (ref 150–450)
PLATELET ESTIMATE: ABNORMAL
PMV BLD AUTO: 9.8 FL (ref 6–12)
PO2, VEN: 18.5 MM HG (ref 30–50)
POC CHLORIDE: 91 MMOL/L (ref 98–107)
POC CREATININE: 1.25 MG/DL (ref 0.51–1.19)
POC HEMATOCRIT: 38 % (ref 36–46)
POC HEMOGLOBIN: 12.9 G/DL (ref 12–16)
POC IONIZED CALCIUM: 1.1 MMOL/L (ref 1.15–1.33)
POC LACTIC ACID: 4.46 MMOL/L (ref 0.56–1.39)
POC PCO2 TEMP: ABNORMAL MM HG
POC PH TEMP: ABNORMAL
POC PO2 TEMP: ABNORMAL MM HG
POC POTASSIUM: 4.5 MMOL/L (ref 3.5–4.5)
POC SODIUM: 130 MMOL/L (ref 138–146)
POSITIVE BASE EXCESS, VEN: 2 (ref 0–3)
POTASSIUM SERPL-SCNC: 3.8 MMOL/L (ref 3.7–5.3)
RBC # BLD: 4.88 M/UL (ref 4–5.2)
RBC # BLD: ABNORMAL 10*6/UL
REASON FOR REJECTION: NORMAL
SAMPLE SITE: ABNORMAL
SEG NEUTROPHILS: 69 % (ref 36–66)
SEGMENTED NEUTROPHILS ABSOLUTE COUNT: 6.21 K/UL (ref 1.3–9.1)
SODIUM BLD-SCNC: 129 MMOL/L (ref 135–144)
TOTAL CO2, VENOUS: 30 MMOL/L (ref 23–30)
TOTAL PROTEIN: 6.9 G/DL (ref 6.4–8.3)
TSH SERPL DL<=0.05 MIU/L-ACNC: 12.23 MIU/L (ref 0.3–5)
WBC # BLD: 9 K/UL (ref 3.5–11)
WBC # BLD: ABNORMAL 10*3/UL
ZZ NTE CLEAN UP: ORDERED TEST: NORMAL
ZZ NTE WITH NAME CLEAN UP: SPECIMEN SOURCE: NORMAL

## 2020-05-22 PROCEDURE — 1036F TOBACCO NON-USER: CPT | Performed by: PHYSICIAN ASSISTANT

## 2020-05-22 PROCEDURE — 36415 COLL VENOUS BLD VENIPUNCTURE: CPT

## 2020-05-22 PROCEDURE — G8417 CALC BMI ABV UP PARAM F/U: HCPCS | Performed by: PHYSICIAN ASSISTANT

## 2020-05-22 PROCEDURE — 6370000000 HC RX 637 (ALT 250 FOR IP): Performed by: EMERGENCY MEDICINE

## 2020-05-22 PROCEDURE — 99283 EMERGENCY DEPT VISIT LOW MDM: CPT

## 2020-05-22 PROCEDURE — 2580000003 HC RX 258: Performed by: EMERGENCY MEDICINE

## 2020-05-22 PROCEDURE — G8400 PT W/DXA NO RESULTS DOC: HCPCS | Performed by: PHYSICIAN ASSISTANT

## 2020-05-22 PROCEDURE — 1090F PRES/ABSN URINE INCON ASSESS: CPT | Performed by: PHYSICIAN ASSISTANT

## 2020-05-22 PROCEDURE — 99214 OFFICE O/P EST MOD 30 MIN: CPT | Performed by: PHYSICIAN ASSISTANT

## 2020-05-22 PROCEDURE — 84443 ASSAY THYROID STIM HORMONE: CPT

## 2020-05-22 PROCEDURE — 96372 THER/PROPH/DIAG INJ SC/IM: CPT

## 2020-05-22 PROCEDURE — 82947 ASSAY GLUCOSE BLOOD QUANT: CPT

## 2020-05-22 PROCEDURE — 84295 ASSAY OF SERUM SODIUM: CPT

## 2020-05-22 PROCEDURE — 1123F ACP DISCUSS/DSCN MKR DOCD: CPT | Performed by: PHYSICIAN ASSISTANT

## 2020-05-22 PROCEDURE — G8427 DOCREV CUR MEDS BY ELIG CLIN: HCPCS | Performed by: PHYSICIAN ASSISTANT

## 2020-05-22 PROCEDURE — 80053 COMPREHEN METABOLIC PANEL: CPT

## 2020-05-22 PROCEDURE — 82565 ASSAY OF CREATININE: CPT

## 2020-05-22 PROCEDURE — 4040F PNEUMOC VAC/ADMIN/RCVD: CPT | Performed by: PHYSICIAN ASSISTANT

## 2020-05-22 PROCEDURE — 84132 ASSAY OF SERUM POTASSIUM: CPT

## 2020-05-22 PROCEDURE — 82803 BLOOD GASES ANY COMBINATION: CPT

## 2020-05-22 PROCEDURE — 82330 ASSAY OF CALCIUM: CPT

## 2020-05-22 PROCEDURE — 85025 COMPLETE CBC W/AUTO DIFF WBC: CPT

## 2020-05-22 PROCEDURE — 85014 HEMATOCRIT: CPT

## 2020-05-22 PROCEDURE — 83605 ASSAY OF LACTIC ACID: CPT

## 2020-05-22 PROCEDURE — 70450 CT HEAD/BRAIN W/O DYE: CPT

## 2020-05-22 PROCEDURE — 83036 HEMOGLOBIN GLYCOSYLATED A1C: CPT

## 2020-05-22 PROCEDURE — 82435 ASSAY OF BLOOD CHLORIDE: CPT

## 2020-05-22 RX ORDER — POTASSIUM CHLORIDE 20 MEQ/1
20 TABLET, EXTENDED RELEASE ORAL 2 TIMES DAILY WITH MEALS
Status: DISCONTINUED | OUTPATIENT
Start: 2020-05-22 | End: 2020-05-22 | Stop reason: HOSPADM

## 2020-05-22 RX ORDER — LEVOTHYROXINE SODIUM 0.1 MG/1
TABLET ORAL
Qty: 90 TABLET | Refills: 0 | Status: SHIPPED | OUTPATIENT
Start: 2020-05-22 | End: 2020-12-10 | Stop reason: SDUPTHER

## 2020-05-22 RX ORDER — LORATADINE 10 MG/1
TABLET ORAL
Qty: 30 TABLET | Refills: 3 | Status: SHIPPED | OUTPATIENT
Start: 2020-05-22 | End: 2020-10-02

## 2020-05-22 RX ORDER — SERTRALINE HYDROCHLORIDE 100 MG/1
TABLET, FILM COATED ORAL
Qty: 30 TABLET | Refills: 2 | Status: SHIPPED | OUTPATIENT
Start: 2020-05-22 | End: 2020-09-14

## 2020-05-22 RX ORDER — CLOPIDOGREL BISULFATE 75 MG/1
TABLET ORAL
Qty: 90 TABLET | Refills: 1 | Status: SHIPPED | OUTPATIENT
Start: 2020-05-22 | End: 2020-06-19 | Stop reason: SDUPTHER

## 2020-05-22 RX ORDER — 0.9 % SODIUM CHLORIDE 0.9 %
1000 INTRAVENOUS SOLUTION INTRAVENOUS ONCE
Status: COMPLETED | OUTPATIENT
Start: 2020-05-22 | End: 2020-05-22

## 2020-05-22 RX ADMIN — POTASSIUM CHLORIDE 20 MEQ: 1500 TABLET, EXTENDED RELEASE ORAL at 18:44

## 2020-05-22 RX ADMIN — SODIUM CHLORIDE 1000 ML: 9 INJECTION, SOLUTION INTRAVENOUS at 18:39

## 2020-05-22 RX ADMIN — INSULIN LISPRO 10 UNITS: 100 INJECTION, SOLUTION INTRAVENOUS; SUBCUTANEOUS at 18:39

## 2020-05-22 ASSESSMENT — ENCOUNTER SYMPTOMS
SHORTNESS OF BREATH: 0
NAUSEA: 0
VOMITING: 0
CONSTIPATION: 0
COUGH: 0
WHEEZING: 0
BLOOD IN STOOL: 0
SORE THROAT: 0
BACK PAIN: 0
ABDOMINAL PAIN: 0
DIARRHEA: 0

## 2020-05-22 ASSESSMENT — PATIENT HEALTH QUESTIONNAIRE - PHQ9
SUM OF ALL RESPONSES TO PHQ9 QUESTIONS 1 & 2: 1
2. FEELING DOWN, DEPRESSED OR HOPELESS: 1
SUM OF ALL RESPONSES TO PHQ QUESTIONS 1-9: 1
1. LITTLE INTEREST OR PLEASURE IN DOING THINGS: 0
SUM OF ALL RESPONSES TO PHQ QUESTIONS 1-9: 1

## 2020-05-22 NOTE — ED PROVIDER NOTES
[facility] ED  Emergency Department Encounter  EmergencyMedicine Resident     This patient was seen during the COVID-19 crisis. There were limited resources and those resources we did have had to be conserved for the sickest of patients. Pt Name:Parvin Menchaca  MRN: [id number]  [place] 1935  Date of evaluation: 5/22/20  PCP:  Ioana Hernandez MD    [address] [place] [address]       Chief Complaint   Patient presents with    Hyperglycemia     pt reports being told she had a high BG at [facility] and was sent here. HISTORY OF PRESENT ILLNESS  (Location/Symptom, Timing/Onset, Context/Setting, Quality, Duration, Modifying Factors, Severity.)      Camden Garcia is a 82 y.o. female who presents with primary complaint that she was found to have an elevated blood sugar level on routine outpatient laboratory studies she was told to go to the nearest ER which is our facility. Patient is asymptomatic. She of note had CT scan today to as she told her family doctor when she saw him earlier today that she had fallen. CT scan showed no acute findings she has no midline cervical pain. No deficits. The fall was approximately 1 week ago no loss of consciousness. PAST MEDICAL / SURGICAL / SOCIAL / FAMILY HISTORY      has a past medical history of Arthritis, Atrial flutter ([address] CAD (coronary artery disease), CHF (congestive heart failure) ([address] Diabetes ([address] Diabetes mellitus ([address] Hyperlipidemia, Hypertension, Psychiatric problem, Thyroid cancer ([address] and Thyroid disease. has a past surgical history that includes Coronary angioplasty with stent; Hysterectomy; Cholecystectomy; Thyroidectomy; Appendectomy; and back surgery.     Social History     Socioeconomic History    Marital status:      Spouse name: Not on file    Number of children: Not on file    Years of education: Not on file    Highest education level: Not on file   Occupational History    Not on file   Social Needs    daily 3/5/20   Xavier Da Silva MD   atorvastatin (LIPITOR) 80 MG tablet Take 1 tablet by mouth daily 3/5/20   Xavier Da Silva MD   metoprolol tartrate (LOPRESSOR) 25 MG tablet Take 1 tablet by mouth 2 times daily 1/13/20   Xavier Da Silva MD   metFORMIN (GLUCOPHAGE) 500 MG tablet Take 1 tablet by mouth 3 times daily 1/7/20   Xavier Da Silva MD   insulin glargine (BASAGLAR KWIKPEN) 100 UNIT/ML injection pen Inject 30 Units into the skin nightly 12/11/19   Xavier Da Silva MD   Insulin Pen Needle 29G X 12MM MISC 1 each by Does not apply route daily 12/11/19   Xavier Da Silva MD   Cyanocobalamin (VITAMIN B-12) 1000 MCG extended release tablet TAKE 1 TAB BY MOUTH ONCE A DAY 10/3/19   Xavier Da Silva MD   Lancets MISC 1 each by Does not apply route 2 times daily 7/5/19   Xavier Da Silva MD   Insulin Pen Needle (PEN NEEDLES) 31G X 6 MM MISC 1 each by Does not apply route daily 6/20/19   Xavier Da Silva MD   Cholecalciferol (VITAMIN D3) 1000 units TABS TAKE 1 TAB BY MOUTH ONCE A DAY 5/29/19   Xavier Da Silva MD   diclofenac sodium (VOLTAREN) 1 % GEL Apply 2 g topically 2 times daily 12/18/18   Xavier Da Silva MD   nystatin (MYCOSTATIN) 023526 UNIT/GM cream Apply topically 2 times daily. 5/7/18   Xavier Da Silva MD   ferrous sulfate (FE TABS) 325 (65 Fe) MG EC tablet Take 1 tablet by mouth 2 times daily  Patient not taking: Reported on 5/22/2020 8/18/17   Xavier Da Silva MD   docusate sodium (COLACE) 100 MG capsule Take 1 capsule by mouth daily as needed for Constipation  Patient not taking: Reported on 5/22/2020 8/18/17   Xavier Da Silva MD   aspirin 81 MG chewable tablet Take 1 tablet by mouth daily 9/5/16   Marnie Barksdale MD       REVIEW OF SYSTEMS    (2-9 systems for level 4, 10 or more for level 5)      Review of Systems   Constitutional: Negative for diaphoresis and fever. HENT: Negative for sore throat. Respiratory: Negative for cough, shortness of breath and wheezing.     Cardiovascular: Negative for chest pain, palpitations and leg swelling. Gastrointestinal: Negative for abdominal pain, blood in stool, constipation, diarrhea, nausea and vomiting. Genitourinary: Negative for dysuria, frequency and hematuria. Musculoskeletal: Negative for back pain and neck pain. Skin: Negative for rash. Neurological: Negative for dizziness and headaches. Hematological: Does not bruise/bleed easily. PHYSICAL EXAM   (up to 7 for level 4, 8 or more for level 5)      INITIAL VITALS:   BP (!) 112/56   Pulse 70   Temp 97.2 °F (36.2 °C) (Oral)   Resp 18   SpO2 96%     Physical Exam  Constitutional:       Appearance: She is well-developed. She is not diaphoretic. HENT:      Head: Normocephalic and atraumatic. Eyes:      General:         Right eye: No discharge. Left eye: No discharge. Pupils: Pupils are equal, round, and reactive to light. Neck:      Musculoskeletal: Normal range of motion. Cardiovascular:      Rate and Rhythm: Normal rate and regular rhythm. Heart sounds: Normal heart sounds. No murmur. No friction rub. No gallop. Pulmonary:      Effort: No respiratory distress. Breath sounds: No wheezing or rales. Chest:      Chest wall: No tenderness. Abdominal:      General: Bowel sounds are normal. There is no distension. Palpations: Abdomen is soft. There is no mass. Tenderness: There is no abdominal tenderness. There is no guarding or rebound. Musculoskeletal: Normal range of motion. General: No tenderness or deformity. Skin:     General: Skin is warm and dry. Coloration: Skin is not pale. Findings: No erythema or rash. Neurological:      Mental Status: She is alert and oriented to person, place, and time. Psychiatric:         Speech: Speech normal.         Behavior: Behavior normal.         Thought Content:  Thought content normal.         Judgment: Judgment normal.         DIFFERENTIAL  DIAGNOSIS     PLAN (LABS / IMAGING / EKG):  Orders Placed This Encounter Procedures    Hemoglobin and hematocrit, blood    SODIUM (POC)    POTASSIUM (POC)    CHLORIDE (POC)    CALCIUM, IONIC (POC)    Venous Blood Gas, POC    Creatinine W/GFR Point of Care    Lactic Acid, POC    POCT Glucose    Anion Gap (Calc) POC    POCT Glucose    POC Glucose Fingerstick       MEDICATIONS ORDERED:  Orders Placed This Encounter   Medications    potassium chloride (KLOR-CON M) extended release tablet 20 mEq    insulin lispro (HUMALOG) injection vial 10 Units    0.9 % sodium chloride bolus         DIAGNOSTIC RESULTS / EMERGENCY DEPARTMENT COURSE / MDM     LABS:  Results for orders placed or performed during the hospital encounter of 05/22/20   Hemoglobin and hematocrit, blood   Result Value Ref Range    POC Hemoglobin 12.9 12.0 - 16.0 g/dL    POC Hematocrit 38 36 - 46 %   SODIUM (POC)   Result Value Ref Range    POC Sodium 130 (L) 138 - 146 mmol/L   POTASSIUM (POC)   Result Value Ref Range    POC Potassium 4.5 3.5 - 4.5 mmol/L   CHLORIDE (POC)   Result Value Ref Range    POC Chloride 91 (L) 98 - 107 mmol/L   CALCIUM, IONIC (POC)   Result Value Ref Range    POC Ionized Calcium 1.10 (L) 1.15 - 1.33 mmol/L   Venous Blood Gas, POC   Result Value Ref Range    pH, Uday 7.369 7.320 - 7.430    pCO2, Uday 49.6 41.0 - 51.0 mm Hg    pO2, Uday 18.5 (L) 30.0 - 50.0 mm Hg    HCO3, Venous 28.6 22.0 - 29.0 mmol/L    Total CO2, Venous 30 23.0 - 30.0 mmol/L    Negative Base Excess, Uday NOT REPORTED 0.0 - 2.0    Positive Base Excess, Uday 2 0.0 - 3.0    O2 Sat, Uday 26 (L) 60.0 - 85.0 %    O2 Device/Flow/% NOT REPORTED     Redd Test NOT REPORTED     Sample Site NOT REPORTED     Mode NOT REPORTED     FIO2 NOT REPORTED     Pt Temp NOT REPORTED     POC pH Temp NOT REPORTED     POC pCO2 Temp NOT REPORTED mm Hg    POC pO2 Temp NOT REPORTED mm Hg   Creatinine W/GFR Point of Care   Result Value Ref Range    POC Creatinine 1.25 (H) 0.51 - 1.19 mg/dL    GFR Comment 50 (L) >60 mL/min    GFR Non- 41 (L)

## 2020-05-22 NOTE — PROGRESS NOTES
[address] [address]  Dept: [phone]  Dept Fax: [phone]    OfficeProgress/Follow Up Note  Date of patient's visit: 5/26/2020  Patient's Name:  Pilo Triplett YOB: 1935            Patient Care Team:  Hitesh Gee MD as PCP - General (Internal Medicine)  Hitesh Gee MD as PCP - [facility]  Orly Tatum MD as Consulting Physician (Gastroenterology)  Humberto Farris MD as Consulting Physician (Pulmonology)  oJsselyn Ray, RN as Ambulatory Care Manager    REASON FOR VISIT:  Same day visit     HISTORY OF PRESENT ILLNESS:      Chief Complaint   Patient presents with    Anorexia     loss of appetite, not eating right, for about 1-2 weeks, light headed     Fall     trying to get out of bed, about 1 week ago, bruise on right shoulder, hit head, did not go to ER or Urgent care     History was obtained from the patient. Pilo Triplett is a 82 y.o. female here today for above. Fall, mechanical trip/fall. Occurred approximately one week ago at home. Patient believes she may have hit her head, no loss of consciousness. Complains of mild headache, lightheadedness today. No weakness/numbess, changes in vision or speech. No anticoagulation. Does take Aspirin 81 mg QD, Plavix 75 mg QD. Loss of appetite, fatigue. Symptoms present for the past few months. Associated with weight loss of 14 lbs over past 4 months. Hypothyroidism, on Synthroid 100 mcg daily. Chronic anemia, last hgb 10.2, no longer taking iron. Diabetes mellitus, uncontrolled, multiple medications, not checking blood sugars. No fever/chills, chest pain, cough, dyspnea. No night sweats.      Patient Active Problem List   Diagnosis    Acute kidney injury ([address]    Anemia due to vitamin B12 deficiency    CAD (coronary artery disease)    Thy[facility] cancer ([facility]    Hypothyroidism    Essential hypertension    Type 2 diabetes Negative for cough, chest tightness, shortness of breath and wheezing. Cardiovascular: Positive for leg swelling (bilateral, chronic). Negative for chest pain and palpitations. Gastrointestinal: Negative for abdominal pain, blood in stool, constipation, diarrhea, nausea and vomiting. Endocrine: Negative for cold intolerance, heat intolerance, polydipsia, polyphagia and polyuria. Genitourinary: Negative for difficulty urinating, dysuria, flank pain, frequency, hematuria and urgency. Musculoskeletal: Positive for arthralgias. Negative for back pain, neck pain and neck stiffness. Skin: Negative for pallor and rash. Neurological: Positive for headaches. Negative for dizziness, tremors, seizures, syncope, facial asymmetry, speech difficulty, weakness, light-headedness and numbness. Hematological: Negative for adenopathy. Bruises/bleeds easily. Psychiatric/Behavioral: Positive for dysphoric mood (stable). Negative for self-injury and suicidal ideas. The patient is not nervous/anxious.       PHYSICAL EXAM:      Vitals:    05/22/20 1437   BP: 90/60   Pulse: 71   Temp: 98.2 °F (36.8 °C)   SpO2: 95%   Weight: 192 lb 9.6 oz (87.4 kg)   Height: 5' 2.01\" (1.575 m)     BP Readings from Last 3 Encounters:   05/22/20 (!) 112/56   05/22/20 90/60   12/11/19 122/76      Wt Readings from Last 3 Encounters:   05/22/20 192 lb 9.6 oz (87.4 kg)   01/07/20 206 lb (93.4 kg)   12/11/19 206 lb (93.4 kg)     General - alert, well appearing, non toxic, in no acute distress  Skin - normal coloration and turgor, no rash  Eyes - pupils equal and reactive, extraocular eye movements intact, no conjunctival pallor   Mouth - mucous membranes are moist, pharynx normal without lesions  Neck - supple, no significant adenopathy, no palpable masses   Lymphatics - no palpable lymphadenopathy, no hepatosplenomegaly  Chest - clear to auscultation, no wheezes, rales or rhonchi, symmetric air entry  Heart - normal rate, rhythm is regular, no sertraline (ZOLOFT) 100 MG tablet; TAKE 1 TAB BY MOUTH ONCE A DAY  Dispense: 30 tablet; Refill: 2    Addendum:  Labs reveal critical results with glucose > 600, discussed with patient and daughter and was instructed to present to emergency room for further labs, rule out acidosis. Advised to follow up in office within 1-2 weeks. FOLLOW UP AND INSTRUCTIONS:   Return in about 4 weeks (around 6/19/2020), earlier if needed. Patient was educated on signs and symptoms which require more emergent evaluation and treatment, instructed to present to emergency room should these develop, she understands and agrees with plan. Sula Cockayne received counseling on the following healthy behaviors: nutrition, exercise and medication adherence    Discussed use, benefit, and side effects of prescribed medications. Barriers to medication compliance addressed. All patient questions answered. Patient voiced understanding. Patient given educational materials - see patient instructions    Teofilo BOSE [facility]  5/26/2020, 8:48 AM    Please note that this chart was generated using voice recognition Dragon dictation software. Although everyeffort was made to ensure the accuracy of this automated transcription, some errors in transcription may have occurred.

## 2020-05-22 NOTE — ED NOTES
Pt to room 22 with c/o hyperglycemia. Pt reports she was at [facility] for an appointment because she had fallen and hit her head last week. Pt reports she was at her appointment and was told her BG was elevated at an unknown number, and was told to come to [facility]. V's for care. Pt reports she also has been drinking regular 7UP, and has a hx of diabetes. Pt reports she checks her BG once a day, and is insulin dependant. Pt denies pain, or any respiratory symptoms. Pt placed on continuous pulse ox, and BP. IV established. Pt alert and oriented x4, talking in complete sentences, respirations even and unlabored. Pt acting age appropriate. White board updated, will continue to monitor.        Dio Kang RN  05/22/20 8037

## 2020-05-23 ENCOUNTER — CARE COORDINATION (OUTPATIENT)
Dept: CARE COORDINATION | Age: 85
End: 2020-05-23

## 2020-05-23 NOTE — CARE COORDINATION
30 units last night, metformin TID and Januvia she takes in the morning. Voices she has all medication/insulin at home. Encouraged she monitor diet closer: avoid concentrated sweets, avoid pop or choose diet, plate portions- especially with holiday weekend. Instructed she call PCP office on Tuesday to schedule follow up. Voiced understanding. Declined LOOP enrollment. Plan for follow-up call in 7-14 days based on severity of symptoms and risk factors.

## 2020-05-24 ENCOUNTER — HOSPITAL ENCOUNTER (OUTPATIENT)
Age: 85
Setting detail: SPECIMEN
Discharge: HOME | End: 2020-05-24
Payer: MEDICARE

## 2020-05-24 LAB
-: ABNORMAL
AMORPHOUS: ABNORMAL
BACTERIA: ABNORMAL
BILIRUBIN URINE: NEGATIVE
CASTS UA: ABNORMAL /LPF (ref 0–2)
COLOR: YELLOW
COMMENT UA: ABNORMAL
CRYSTALS, UA: ABNORMAL /HPF
EPITHELIAL CELLS UA: ABNORMAL /HPF (ref 0–5)
GLUCOSE URINE: ABNORMAL
KETONES, URINE: NEGATIVE
LEUKOCYTE ESTERASE, URINE: ABNORMAL
MUCUS: ABNORMAL
NITRITE, URINE: POSITIVE
OTHER OBSERVATIONS UA: ABNORMAL
PH UA: 5 (ref 5–8)
PROTEIN UA: NEGATIVE
RBC UA: ABNORMAL /HPF (ref 0–2)
RENAL EPITHELIAL, UA: ABNORMAL /HPF
SPECIFIC GRAVITY UA: 1.04 (ref 1–1.03)
TRICHOMONAS: ABNORMAL
TURBIDITY: ABNORMAL
URINE HGB: ABNORMAL
UROBILINOGEN, URINE: NORMAL
WBC UA: ABNORMAL /HPF (ref 0–5)
YEAST: ABNORMAL

## 2020-05-26 RX ORDER — CEPHALEXIN 500 MG/1
500 CAPSULE ORAL 2 TIMES DAILY
Qty: 14 CAPSULE | Refills: 0 | Status: SHIPPED | OUTPATIENT
Start: 2020-05-26 | End: 2020-06-02

## 2020-05-26 ASSESSMENT — ENCOUNTER SYMPTOMS
NAUSEA: 0
EYE PAIN: 0
VOMITING: 0
COUGH: 0
WHEEZING: 0
BACK PAIN: 0
ABDOMINAL PAIN: 0
SORE THROAT: 0
SHORTNESS OF BREATH: 0
RHINORRHEA: 0
EYE ITCHING: 0
EYE DISCHARGE: 0
DIARRHEA: 0
CONSTIPATION: 0
BLOOD IN STOOL: 0
SINUS PAIN: 0
CHEST TIGHTNESS: 0
TROUBLE SWALLOWING: 0
VOICE CHANGE: 0

## 2020-05-29 RX ORDER — LORATADINE 10 MG/1
TABLET ORAL
Qty: 30 TABLET | Refills: 3 | OUTPATIENT
Start: 2020-05-29

## 2020-06-06 ENCOUNTER — CARE COORDINATION (OUTPATIENT)
Dept: CARE COORDINATION | Age: 85
End: 2020-06-06

## 2020-06-19 ENCOUNTER — OFFICE VISIT (OUTPATIENT)
Dept: INTERNAL MEDICINE CLINIC | Age: 85
End: 2020-06-19
Payer: MEDICARE

## 2020-06-19 VITALS
OXYGEN SATURATION: 98 % | BODY MASS INDEX: 37.36 KG/M2 | DIASTOLIC BLOOD PRESSURE: 70 MMHG | SYSTOLIC BLOOD PRESSURE: 118 MMHG | TEMPERATURE: 98.2 F | HEIGHT: 62 IN | RESPIRATION RATE: 16 BRPM | HEART RATE: 62 BPM | WEIGHT: 203 LBS

## 2020-06-19 LAB
CHP ED QC CHECK: NORMAL
GLUCOSE BLD-MCNC: 303 MG/DL

## 2020-06-19 PROCEDURE — G8417 CALC BMI ABV UP PARAM F/U: HCPCS | Performed by: INTERNAL MEDICINE

## 2020-06-19 PROCEDURE — 99214 OFFICE O/P EST MOD 30 MIN: CPT | Performed by: INTERNAL MEDICINE

## 2020-06-19 PROCEDURE — G8400 PT W/DXA NO RESULTS DOC: HCPCS | Performed by: INTERNAL MEDICINE

## 2020-06-19 PROCEDURE — 1123F ACP DISCUSS/DSCN MKR DOCD: CPT | Performed by: INTERNAL MEDICINE

## 2020-06-19 PROCEDURE — G8427 DOCREV CUR MEDS BY ELIG CLIN: HCPCS | Performed by: INTERNAL MEDICINE

## 2020-06-19 PROCEDURE — 4040F PNEUMOC VAC/ADMIN/RCVD: CPT | Performed by: INTERNAL MEDICINE

## 2020-06-19 PROCEDURE — 1090F PRES/ABSN URINE INCON ASSESS: CPT | Performed by: INTERNAL MEDICINE

## 2020-06-19 PROCEDURE — 82962 GLUCOSE BLOOD TEST: CPT | Performed by: INTERNAL MEDICINE

## 2020-06-19 PROCEDURE — 1036F TOBACCO NON-USER: CPT | Performed by: INTERNAL MEDICINE

## 2020-06-19 RX ORDER — INSULIN GLARGINE 100 [IU]/ML
45 INJECTION, SOLUTION SUBCUTANEOUS NIGHTLY
Qty: 15 PEN | Refills: 1 | Status: SHIPPED | OUTPATIENT
Start: 2020-06-19 | End: 2020-07-02

## 2020-06-19 RX ORDER — CLOPIDOGREL BISULFATE 75 MG/1
TABLET ORAL
Qty: 90 TABLET | Refills: 1 | Status: SHIPPED | OUTPATIENT
Start: 2020-06-19 | End: 2021-07-29 | Stop reason: SDUPTHER

## 2020-06-19 RX ORDER — LANOLIN ALCOHOL/MO/W.PET/CERES
325 CREAM (GRAM) TOPICAL 2 TIMES DAILY
Qty: 90 TABLET | Refills: 3 | Status: SHIPPED | OUTPATIENT
Start: 2020-06-19 | End: 2021-04-01 | Stop reason: SDUPTHER

## 2020-06-19 ASSESSMENT — PATIENT HEALTH QUESTIONNAIRE - PHQ9
SUM OF ALL RESPONSES TO PHQ QUESTIONS 1-9: 0
2. FEELING DOWN, DEPRESSED OR HOPELESS: 0
1. LITTLE INTEREST OR PLEASURE IN DOING THINGS: 0
SUM OF ALL RESPONSES TO PHQ QUESTIONS 1-9: 0
SUM OF ALL RESPONSES TO PHQ9 QUESTIONS 1 & 2: 0

## 2020-06-19 NOTE — PROGRESS NOTES
Last A!C 5/22/2020    Obesity     [address] [facility]    Congestive Heart Failure     [address] [facility]    Health Maintenance     Due: shingles and dtap vaccines. Pt is also due for diabetic eye exam and AWV. SUBJECTIVE:  Yo Mohan is a 82 y.o. female patient who  comes for complaints of   Chief Complaint   Patient presents with    Follow-up     Pt is here for blood sugars and uti symptoms. Pt has not had any symptoms.  Breast Cancer     [facility]                  Last A!C 5/22/2020    Obesity     [address] [facility]    Congestive Heart Failure     [facility]    Health Maintenance     Due: shingles and dtap vaccines. Pt is also due for diabetic eye exam and AWV. DIABETES MELLITUS:  Patient denies excessive thirst or increased frequency of urination, chest pain or dyspnea , numbness, tingling or pain in extremities, does report occasional blurry vision      - Follows a diabetic diet most of the time.   -Is compliant with medication(s) and is tolerating med(s) without any side effects.    -  Reports checking his/her glucose on a once a day schedule with sugars in the fasting range. Hemoglobin A1C   Date Value Ref Range Status   05/22/2020 17.1 (H) 4.0 - 6.0 % Final   05/22/2020 14.0 % Final   12/11/2019 13.9 % Final     Pt brings in some BG readings- mostly 200-400 range- unclear which ones were fasting. Pt on 40units basaglar hs- will increase to 45  Also on metformin and Januvia       HYPERTENSION:  Patient indicates that s/he is feeling well and denies any symptoms referable to elevated blood pressure. - Specifically denies headache, chest pain, palpitations, dyspnea and peripheral edema.  - Patient denies any side effects of their medication(s) and is compliant with their regimen.    -S/he does not check BP's generally. -Denies regular aerobic exercise.  - does not watches her diet for sodium, low fat and low cholesterol most of the time.   Last 3 Encounter BP Readings:     Date:        BP:  BP awake and oriented x 3. ASSESSMENT AND PLAN (MEDICAL DECISION MAKING):  Sammy Colon was seen today for follow-up, breast cancer, obesity, congestive heart failure and health maintenance. Diagnoses and all orders for this visit:    Type 2 diabetes mellitus with complication, without long-term current use of insulin (HCC)  Comments:  high FBG  increase dose of insulin   in office- adv to keep BG log-daughter agrees to help  Orders:  -     POCT Glucose  -     insulin glargine (BASAGLAR KWIKPEN) 100 UNIT/ML injection pen; Inject 45 Units into the skin nightly  -     Hepatic Function Panel; Future  -     POCT Glucose    Dyslipidemia  -     Lipid Panel; Future    Essential hypertension    Obesity, Class III, BMI 40-49.9 (morbid obesity) ([address]  Comments:  unable to exercise due to b/l joint pain  diet discussed    Elevated alkaline phosphatase level  Comments:  noted with hyperglycemia in ER  will repeat  no abdo pain/n/v  Orders:  -     Hepatic Function Panel; Future    Type 2 diabetes mellitus without complication, without long-term current use of insulin (HCC)    Iron deficiency anemia, unspecified iron deficiency anemia type  -     ferrous sulfate (FE TABS) 325 (65 Fe) MG EC tablet;  Take 1 tablet by mouth 2 times daily    Other orders  -     clopidogrel (PLAVIX) 75 MG tablet; TAKE 1 TAB BY MOUTH ONCE A DAY         Follow up in: 2wchanell Douglas MD

## 2020-06-29 ENCOUNTER — HOSPITAL ENCOUNTER (OUTPATIENT)
Age: 85
Discharge: HOME | End: 2020-06-29
Payer: MEDICARE

## 2020-06-29 LAB
ALBUMIN SERPL-MCNC: 3.8 G/DL (ref 3.5–5.2)
ALBUMIN/GLOBULIN RATIO: 1.2 (ref 1–2.5)
ALP BLD-CCNC: 121 U/L (ref 35–104)
ALT SERPL-CCNC: 13 U/L (ref 5–33)
AST SERPL-CCNC: 13 U/L
BILIRUB SERPL-MCNC: 0.36 MG/DL (ref 0.3–1.2)
BILIRUBIN DIRECT: 0.09 MG/DL
BILIRUBIN, INDIRECT: 0.27 MG/DL (ref 0–1)
CHOLESTEROL/HDL RATIO: 3.3
CHOLESTEROL: 173 MG/DL
GLOBULIN: ABNORMAL G/DL (ref 1.5–3.8)
HDLC SERPL-MCNC: 52 MG/DL
LDL CHOLESTEROL: 54 MG/DL (ref 0–130)
TOTAL PROTEIN: 6.9 G/DL (ref 6.4–8.3)
TRIGL SERPL-MCNC: 337 MG/DL
VLDLC SERPL CALC-MCNC: ABNORMAL MG/DL (ref 1–30)

## 2020-06-29 PROCEDURE — 80061 LIPID PANEL: CPT

## 2020-06-29 PROCEDURE — 80076 HEPATIC FUNCTION PANEL: CPT

## 2020-06-29 PROCEDURE — 36415 COLL VENOUS BLD VENIPUNCTURE: CPT

## 2020-07-02 ENCOUNTER — OFFICE VISIT (OUTPATIENT)
Dept: INTERNAL MEDICINE CLINIC | Age: 85
End: 2020-07-02
Payer: MEDICARE

## 2020-07-02 VITALS
DIASTOLIC BLOOD PRESSURE: 64 MMHG | HEART RATE: 57 BPM | WEIGHT: 199 LBS | HEIGHT: 62 IN | TEMPERATURE: 97.2 F | BODY MASS INDEX: 36.62 KG/M2 | OXYGEN SATURATION: 95 % | SYSTOLIC BLOOD PRESSURE: 96 MMHG

## 2020-07-02 PROCEDURE — 1123F ACP DISCUSS/DSCN MKR DOCD: CPT | Performed by: INTERNAL MEDICINE

## 2020-07-02 PROCEDURE — 1090F PRES/ABSN URINE INCON ASSESS: CPT | Performed by: INTERNAL MEDICINE

## 2020-07-02 PROCEDURE — G8417 CALC BMI ABV UP PARAM F/U: HCPCS | Performed by: INTERNAL MEDICINE

## 2020-07-02 PROCEDURE — 4040F PNEUMOC VAC/ADMIN/RCVD: CPT | Performed by: INTERNAL MEDICINE

## 2020-07-02 PROCEDURE — G8400 PT W/DXA NO RESULTS DOC: HCPCS | Performed by: INTERNAL MEDICINE

## 2020-07-02 PROCEDURE — 99213 OFFICE O/P EST LOW 20 MIN: CPT | Performed by: INTERNAL MEDICINE

## 2020-07-02 PROCEDURE — 1036F TOBACCO NON-USER: CPT | Performed by: INTERNAL MEDICINE

## 2020-07-02 PROCEDURE — G8427 DOCREV CUR MEDS BY ELIG CLIN: HCPCS | Performed by: INTERNAL MEDICINE

## 2020-07-02 RX ORDER — INSULIN GLARGINE 100 [IU]/ML
55 INJECTION, SOLUTION SUBCUTANEOUS NIGHTLY
Qty: 15 PEN | Refills: 1 | Status: SHIPPED | OUTPATIENT
Start: 2020-07-02 | End: 2020-07-09

## 2020-07-02 RX ORDER — INSULIN GLARGINE 100 [IU]/ML
55 INJECTION, SOLUTION SUBCUTANEOUS NIGHTLY
Qty: 15 PEN | Refills: 1 | Status: SHIPPED | OUTPATIENT
Start: 2020-07-02 | End: 2020-07-02 | Stop reason: SDUPTHER

## 2020-07-02 RX ORDER — POTASSIUM CHLORIDE 20 MEQ/1
20 TABLET, EXTENDED RELEASE ORAL
COMMUNITY
Start: 2020-05-22 | End: 2021-04-01 | Stop reason: SDUPTHER

## 2020-07-02 NOTE — PROGRESS NOTES
 Health Maintenance     Dtap         Seen as folow up from last week  Insulin dose was increased  BG improved but still  High  Otherwise feels well    REVIEW OF SYSTEMS (except Subjective (HPI))  GENERAL: No fevers / chills  RESPIRATORY: Negative for cough, wheezing or shortness of breath  CARDIOVASCULAR: Negative for chest pain or palpitations.   GI: no nausea, vomiting, or diarrhea  NEURO: No history of headaches    Past Medical History:   Diagnosis Date    Arthritis     At[facility] flutter ([facility]     CAD (coronary artery disease)     CHF (congestive heart failure) (HCC)     Diabetes ([facility]     Diabetes mellitus ([facility]     Hyperlipidemia     Hypertension     Psychiatric problem     Thyroid cancer ([facility]     S/P thyroidectomy; on synthroid    Thyroid disease     hypothyroid       SOCIAL HISTORY:  Social History     Socioeconomic History    Marital status:      Spouse name: Not on file    Number of children: Not on file    Years of education: Not on file    Highest education level: Not on file   Occupational History    Not on file   Social Needs    Financial resource strain: Not hard at all   [employer] insecurity     Worry: Never true     Inability: Never true   [employer] needs     Medical: No     Non-medical: No   Tobacco Use    Smoking status: Former Smoker    Smokeless tobacco: Never Used   Substance and Sexual Activity    Alcohol use: No     Frequency: Never    Drug use: No    Sexual activity: Not Currently   Lifestyle    Physical activity     Days per week: 0 days     Minutes per session: 0 min    Stress: Not at all   Relationships    Social connections     Talks on phone: More than three times a week     Gets together: More than three times a week     Attends Hinduism service: Never     Active member of club or organization: No     Attends meetings of clubs or organizations: Never     Relationship status:     Intimate partner violence     Fear of current or ex partner: Not on file     Emotionally abused: Not on file     Physically abused: Not on file     Forced sexual activity: Not on file   Other Topics Concern    Not on file   Social History Narrative    Not on file           CURRENT MEDICATIONS:  Current Outpatient Medications   Medication Sig Dispense Refill    potassium chloride (KLOR-CON M) 20 MEQ extended release tablet Take 20 mEq by mouth      insulin glargine (BASAGLAR KWIKPEN) 100 UNIT/ML injection pen Inject 45 Units into the skin nightly 15 pen 1    clopidogrel (PLAVIX) 75 MG tablet TAKE 1 TAB BY MOUTH ONCE A DAY 90 tablet 1    ferrous sulfate (FE TABS) 325 (65 Fe) MG EC tablet Take 1 tablet by mouth 2 times daily 90 tablet 3    sertraline (ZOLOFT) 100 MG tablet TAKE 1 TAB BY MOUTH ONCE A DAY 30 tablet 2    levothyroxine (SYNTHROID) 100 MCG tablet TAKE 1 TAB BY MOUTH EVERY MORNING 90 tablet 0    SITagliptin (JANUVIA) 100 MG tablet TAKE 1 TAB BY MOUTH ONCE A DAY 90 tablet 3    furosemide (LASIX) 40 MG tablet Take 1 tablet by mouth daily 90 tablet 3    atorvastatin (LIPITOR) 80 MG tablet Take 1 tablet by mouth daily 30 tablet 3    metoprolol tartrate (LOPRESSOR) 25 MG tablet Take 1 tablet by mouth 2 times daily 180 tablet 3    metFORMIN (GLUCOPHAGE) 500 MG tablet Take 1 tablet by mouth 3 times daily 270 tablet 3    Insulin Pen Needle 29G X 12MM MISC 1 each by Does not apply route daily 100 each 3    Cyanocobalamin (VITAMIN B-12) 1000 MCG extended release tablet TAKE 1 TAB BY MOUTH ONCE A DAY 30 tablet 11    Lancets MISC 1 each by Does not apply route 2 times daily 100 each 11    Insulin Pen Needle (PEN NEEDLES) 31G X 6 MM MISC 1 each by Does not apply route daily 100 each 11    Cholecalciferol (VITAMIN D3) 1000 units TABS TAKE 1 TAB BY MOUTH ONCE A DAY 30 tablet 11    diclofenac sodium (VOLTAREN) 1 % GEL Apply 2 g topically 2 times daily 1 Tube 3    nystatin (MYCOSTATIN) 009178 UNIT/GM cream Apply topically 2 times daily.  1 Tube 1    aspirin 81 MG chewable tablet Take 1 tablet by mouth daily 30 tablet 3    loratadine (CLARITIN) 10 MG tablet TAKE 1 TAB BY MOUTH ONCE A DAY (Patient not taking: Reported on 6/19/2020) 30 tablet 3    docusate sodium (COLACE) 100 MG capsule Take 1 capsule by mouth daily as needed for Constipation (Patient not taking: Reported on 6/19/2020) 30 capsule 0     No current facility-administered medications for this visit. OBJECTIVE:  Vitals:    07/02/20 1333   BP: 96/64   Pulse: 57   Temp: 97.2 °F (36.2 °C)   SpO2: 95%     Body mass index is 36.39 kg/m². General exam (except above):  General appearance - well appearing, alert, in no acute distress  Head - Atraumatic, normocephalic  Eyes - EOMI, no jaundice or pallor  Lungs - Lungs clear to auscultation. No wheezing, rhonchi, rales  Heart - RRR without murmur, gallop, or rubs. No ectopy      ASSESSMENT AND PLAN (MEDICAL DECISION MAKING):   Caio Edge was seen today for 2 week follow-up and health maintenance. Diagnoses and all orders for this visit:    Type 2 diabetes mellitus with complication, without long-term current use of insulin ([facility])  Comments:  high FBG  increase dose of insulin   in office- adv to keep BG log-daughter agrees to help  Orders:  -     Discontinue: insulin glargine (BASAGLAR KWIKPEN) 100 UNIT/ML injection pen; Inject 55 Units into the skin nightly  -     insulin glargine (BASAGLAR KWIKPEN) 100 UNIT/ML injection pen;  Inject 55 Units into the skin nightly  -     [place] - Aggie Bird MD, Ophthalmology, [facility]       Recommened annual [place]        Follow up in: 1 week with blood glucose reading, ok to communicate on Mychart/telephone      Byron Rubio MD

## 2020-07-07 RX ORDER — MELATONIN
Qty: 30 TABLET | Refills: 11 | Status: SHIPPED | OUTPATIENT
Start: 2020-07-07 | End: 2021-04-01 | Stop reason: SDUPTHER

## 2020-07-07 NOTE — TELEPHONE ENCOUNTER
Medication: Vitamin D 3  Last visit: 7/2/2020  Next visit: 7/9/2020  Last refill: 5/29/2019  Pharmacy: [address] [facility]

## 2020-07-09 ENCOUNTER — TELEMEDICINE (OUTPATIENT)
Dept: INTERNAL MEDICINE CLINIC | Age: 85
End: 2020-07-09
Payer: MEDICARE

## 2020-07-09 PROCEDURE — 1090F PRES/ABSN URINE INCON ASSESS: CPT | Performed by: INTERNAL MEDICINE

## 2020-07-09 PROCEDURE — 99213 OFFICE O/P EST LOW 20 MIN: CPT | Performed by: INTERNAL MEDICINE

## 2020-07-09 PROCEDURE — 1123F ACP DISCUSS/DSCN MKR DOCD: CPT | Performed by: INTERNAL MEDICINE

## 2020-07-09 PROCEDURE — G8427 DOCREV CUR MEDS BY ELIG CLIN: HCPCS | Performed by: INTERNAL MEDICINE

## 2020-07-09 PROCEDURE — G8400 PT W/DXA NO RESULTS DOC: HCPCS | Performed by: INTERNAL MEDICINE

## 2020-07-09 PROCEDURE — 4040F PNEUMOC VAC/ADMIN/RCVD: CPT | Performed by: INTERNAL MEDICINE

## 2020-07-09 RX ORDER — INSULIN GLARGINE 100 [IU]/ML
35 INJECTION, SOLUTION SUBCUTANEOUS 2 TIMES DAILY
Qty: 15 PEN | Refills: 1 | Status: SHIPPED | OUTPATIENT
Start: 2020-07-09 | End: 2021-03-29 | Stop reason: SDUPTHER

## 2020-07-09 NOTE — PROGRESS NOTES
Chief Complaint   Patient presents with    Diabetes     1 week f/u for Blood Sugar        Visit Information    Have you changed or started any medications since your last visit including any over-the-counter medicines, vitamins, or herbal medicines? no   Are you having any side effects from any of your medications? -  no  Have you stopped taking any of your medications? Is so, why? -  no    Have you seen any other physician or provider since your last visit? No  Have you had any other diagnostic tests since your last visit? No  Have you been seen in the emergency room and/or had an admission to a hospital since we last saw you? No  Have you had your routine dental cleaning in the past 6 months? no    Have you activated your OpenCloud account? If not, what are your barriers?  Yes     Patient Care Team:  Elodia Brock MD as PCP - General (Internal Medicine)  Elodia Brock MD as PCP - [facility]  Rk Marti MD as Consulting Physician (Gastroenterology)  Iglesia Casey MD as Consulting Physician (Pulmonology)    Medical History Review  Past Medical, Family, and Social History reviewed and does not contribute to the patient presenting condition    Health Maintenance   Topic Date Due    DTaP/Tdap/Td vaccine (1 - Tdap) 05/01/1956    Shingles Vaccine (1 of 2) 05/01/1987    Diabetic retinal exam  09/02/2016    Annual Wellness Visit (AWV)  01/07/2020    Flu vaccine (1) 09/01/2020    A1C test (Diabetic or Prediabetic)  11/22/2020    Diabetic foot exam  01/07/2021    TSH testing  05/22/2021    Potassium monitoring  05/22/2021    Creatinine monitoring  05/22/2021    Lipid screen  06/29/2021    Pneumococcal 65+ years Vaccine  Completed    DEXA (modify frequency per FRAX score)  Addressed    Hepatitis A vaccine  Aged Out    Hib vaccine  Aged Out    Meningococcal (ACWY) vaccine  Aged Out     SUBJECTIVE:  Herbert Correa is a 82 y.o. female patient who  comes for complaints of   Chief Complaint Patient presents with    Diabetes     1 week f/u for Blood Sugar      Type 2 diabetes-poorly controlled  Dose was increased last week to 55 units at night. Patient's blood glucose still running high. She reports early morning fasting blood glucose in the range of 2 , at night she is running 400-4 50. Denies any hypoglycemic episodes. REVIEW OF SYSTEMS (except Subjective (HPI))  GENERAL: No fevers / chills  RESPIRATORY: Negative for cough, wheezing or shortness of breath  CARDIOVASCULAR: Negative for chest pain or palpitations.   GI: no nausea, vomiting, or diarrhea  NEURO: No history of headaches    Past Medical History:   Diagnosis Date    Arthritis     At[facility] flutter ([facility]     CAD (coronary artery disease)     CHF (congestive heart failure) (HCC)     [facility]     Diabetes mellitus [facility]     Hyperlipidemia     Hypertension     Psychiatric problem     Thyroid cancer ([facility]     S/P thyroidectomy; on synthroid    Thyroid disease     hypothyroid       SOCIAL HISTORY:  Social History     Socioeconomic History    Marital status:      Spouse name: Not on file    Number of children: Not on file    Years of education: Not on file    Highest education level: Not on file   Occupational History    Not on file   Social Needs    Financial resource strain: Not hard at all   Recurrent Energy insecurity     Worry: Never true     Inability: Never true   [employer] needs     Medical: No     Non-medical: No   Tobacco Use    Smoking status: Former Smoker    Smokeless tobacco: Never Used   Substance and Sexual Activity    Alcohol use: No     Frequency: Never    Drug use: No    Sexual activity: Not Currently   Lifestyle    Physical activity     Days per week: 0 days     Minutes per session: 0 min    Stress: Not at all   Relationships    Social connections     Talks on phone: More than three times a week     Gets together: More than three times a week     Attends Mu-ism service: Never 11    diclofenac sodium (VOLTAREN) 1 % GEL Apply 2 g topically 2 times daily 1 Tube 3    nystatin (MYCOSTATIN) 725940 UNIT/GM cream Apply topically 2 times daily. 1 Tube 1    docusate sodium (COLACE) 100 MG capsule Take 1 capsule by mouth daily as needed for Constipation 30 capsule 0    aspirin 81 MG chewable tablet Take 1 tablet by mouth daily 30 tablet 3     No current facility-administered medications for this visit. OBJECTIVE:  There were no vitals filed for this visit. There is no height or weight on file to calculate BMI. Physical examination not done since this was a video visit. Patient appears well  Normal color,  not short of breath during conversation. Not in any pain or distress   No significant exanthematous lesions or discoloration noted on facial skin   Mentation and cognition normal  No facial asymmetry  Alert, oriented to time, place and person. ASSESSMENT AND PLAN (MEDICAL DECISION MAKING):  Braeden Rosario was seen today for diabetes. Diagnoses and all orders for this visit:    Type 2 diabetes mellitus with complication, without long-term current use of insulin (HCC)  -     insulin glargine (BASAGLAR KWIKPEN) 100 UNIT/ML injection pen; Inject 35 Units into the skin 2 times daily    Type 2 diabetes mellitus with complication, without long-term current use of insulin (HCC)  Comments:  high FBG  increase dose of insulin   in office- adv to keep BG log-daughter agrees to help  Orders:  -     insulin glargine (BASAGLAR KWIKPEN) 100 UNIT/ML injection pen; Inject 35 Units into the skin 2 times daily         Johnson Plascencia is a 82 y.o. female being evaluated by a Virtual Visit (video visit) encounter to address concerns as mentioned above. A caregiver was present when appropriate.  Due to this being a TeleHealth encounter (During HOFAB-88 public health emergency), evaluation of the following organ systems was limited: Vitals/Constitutional/EENT/Resp/CV/GI//MS/Neuro/Skin/Heme-Lymph-Imm. Pursuant to the emergency declaration under the [address] [place] [address] and the [employer] Act, this Virtual Visit was conducted with patient's (and/or legal guardian's) consent, to reduce the patient's risk of exposure to COVID-19 and provide necessary medical care. The patient (and/or legal guardian) has also been advised to contact this office for worsening conditions or problems, and seek emergency medical treatment and/or call 911 if deemed necessary. Patient identification was verified at the start of the visit: Yes    Total time spent for this encounter: Not billed by time    Services were provided through a video synchronous discussion virtually to substitute for in-person clinic visit. Patient and provider were located at their individual homes. --Sriram Freed MD on 7/9/2020 at 1:59 PM    An electronic signature was used to authenticate this note.       Sriram Freed MD

## 2020-09-14 RX ORDER — SERTRALINE HYDROCHLORIDE 100 MG/1
TABLET, FILM COATED ORAL
Qty: 30 TABLET | Refills: 2 | Status: SHIPPED | OUTPATIENT
Start: 2020-09-14 | End: 2021-01-29 | Stop reason: SDUPTHER

## 2020-10-02 RX ORDER — LORATADINE 10 MG/1
TABLET ORAL
Qty: 30 TABLET | Refills: 3 | Status: SHIPPED | OUTPATIENT
Start: 2020-10-02 | End: 2020-12-10 | Stop reason: SDUPTHER

## 2020-11-03 PROBLEM — J18.9 PNEUMONIA SYMPTOMS: Status: RESOLVED | Noted: 2017-07-04 | Resolved: 2020-11-03

## 2020-11-30 ENCOUNTER — OFFICE VISIT (OUTPATIENT)
Dept: INTERNAL MEDICINE CLINIC | Age: 85
End: 2020-11-30
Payer: MEDICARE

## 2020-11-30 VITALS
SYSTOLIC BLOOD PRESSURE: 108 MMHG | RESPIRATION RATE: 14 BRPM | DIASTOLIC BLOOD PRESSURE: 68 MMHG | BODY MASS INDEX: 38.86 KG/M2 | HEART RATE: 68 BPM | TEMPERATURE: 96.7 F | HEIGHT: 60 IN

## 2020-11-30 PROBLEM — I48.92 ATRIAL FLUTTER (HCC): Status: ACTIVE | Noted: 2020-11-30

## 2020-11-30 LAB — HBA1C MFR BLD: 14 %

## 2020-11-30 PROCEDURE — 4040F PNEUMOC VAC/ADMIN/RCVD: CPT | Performed by: INTERNAL MEDICINE

## 2020-11-30 PROCEDURE — G8417 CALC BMI ABV UP PARAM F/U: HCPCS | Performed by: INTERNAL MEDICINE

## 2020-11-30 PROCEDURE — G8484 FLU IMMUNIZE NO ADMIN: HCPCS | Performed by: INTERNAL MEDICINE

## 2020-11-30 PROCEDURE — G8427 DOCREV CUR MEDS BY ELIG CLIN: HCPCS | Performed by: INTERNAL MEDICINE

## 2020-11-30 PROCEDURE — 99214 OFFICE O/P EST MOD 30 MIN: CPT | Performed by: INTERNAL MEDICINE

## 2020-11-30 PROCEDURE — 83036 HEMOGLOBIN GLYCOSYLATED A1C: CPT | Performed by: INTERNAL MEDICINE

## 2020-11-30 PROCEDURE — 1123F ACP DISCUSS/DSCN MKR DOCD: CPT | Performed by: INTERNAL MEDICINE

## 2020-11-30 PROCEDURE — G8400 PT W/DXA NO RESULTS DOC: HCPCS | Performed by: INTERNAL MEDICINE

## 2020-11-30 PROCEDURE — 1090F PRES/ABSN URINE INCON ASSESS: CPT | Performed by: INTERNAL MEDICINE

## 2020-11-30 PROCEDURE — 1036F TOBACCO NON-USER: CPT | Performed by: INTERNAL MEDICINE

## 2020-11-30 RX ORDER — FLASH GLUCOSE SCANNING READER
1 EACH MISCELLANEOUS 4 TIMES DAILY
Qty: 1 DEVICE | Refills: 0 | Status: SHIPPED | OUTPATIENT
Start: 2020-11-30

## 2020-11-30 ASSESSMENT — PATIENT HEALTH QUESTIONNAIRE - PHQ9
SUM OF ALL RESPONSES TO PHQ QUESTIONS 1-9: 0
1. LITTLE INTEREST OR PLEASURE IN DOING THINGS: 0
SUM OF ALL RESPONSES TO PHQ9 QUESTIONS 1 & 2: 0
2. FEELING DOWN, DEPRESSED OR HOPELESS: 0

## 2020-11-30 NOTE — PROGRESS NOTES
Subjective:      Patient ID: Eli Ramirez is a 82 y.o. female. HPI  Visit Information    Have you changed or started any medications since your last visit including any over-the-counter medicines, vitamins, or herbal medicines? no   Are you having any side effects from any of your medications? -  no  Have you stopped taking any of your medications? Is so, why? -  no    Have you seen any other physician or provider since your last visit? No  Have you had any other diagnostic tests since your last visit? No  Have you been seen in the emergency room and/or had an admission to a hospital since we last saw you? No  Have you had your routine dental cleaning in the past 6 months? no    Have you activated your SANDOW account? If not, what are your barriers?  Yes     Patient Care Team:  Lety Cruz MD as PCP - General (Internal Medicine)  Lety Cruz MD as PCP - [address] [place] [address]  [facility] Provider  Aditi Orellana MD as Consulting Physician (Gastroenterology)  Myles Bradford MD as Consulting Physician (Pulmonology)    Medical History Review  Past Medical, Family, and Social History reviewed and does contribute to the patient presenting condition    Health Maintenance   Topic Date Due    DTaP/Tdap/Td vaccine (1 - Tdap) 05/01/1956    Shingles Vaccine (1 of 2) 05/01/1987    Diabetic retinal exam  09/02/2016    Annual Wellness Visit (AWV)  01/07/2020    Flu vaccine (1) 09/01/2020    A1C test (Diabetic or Prediabetic)  11/22/2020    Diabetic foot exam  01/07/2021    TSH testing  05/22/2021    Potassium monitoring  05/22/2021    Creatinine monitoring  05/22/2021    Lipid screen  06/29/2021    Pneumococcal 65+ years Vaccine  Completed    DEXA (modify frequency per FRAX score)  Addressed    Hepatitis A vaccine  Aged Out    Hib vaccine  Aged Out    Meningococcal (ACWY) vaccine  Aged Out     Chief Complaint   Patient presents with    Migraine     Pt is here with c/o headache and muscle aches, with no other symptoms. Onset last wednesday and tried OTC pepto bismol with little relief. Pt denies any other concerns at this time. HPI   1) Location/Symptom-  headache   2) Timing/Onset: 2 weeks back   3) Context/Setting: after she got hit by Hit by a book case , Book case feel on her   4) Quality: dull aching   5) Duration: Intermittent in nature   6) Modifying Factors: No   7) Severity: moderate   Has Uncontrolled DM, was taking Insulin Daily, inspite of BID , does not checks her Blood sugars at home     Swelling in legs is same     Review of Systems   Constitutional: Negative for activity change, appetite change, chills, diaphoresis, fatigue and fever. HENT: Negative for congestion, dental problem, drooling and ear discharge. Eyes: Negative for pain, discharge, redness and itching. Respiratory: Negative for apnea, cough, choking, chest tightness and shortness of breath. Cardiovascular: Negative for chest pain and leg swelling. Gastrointestinal: Negative for abdominal distention, abdominal pain, blood in stool, constipation and diarrhea. Endocrine: Negative for cold intolerance and heat intolerance. Genitourinary: Negative for difficulty urinating, dysuria, enuresis, flank pain and frequency. Musculoskeletal: Positive for arthralgias, back pain and gait problem (wheel chair Bound ). Negative for joint swelling. Swelling in legs    Skin: Negative for color change, pallor and rash. Neurological: Positive for headaches. Negative for dizziness, facial asymmetry, light-headedness and numbness. Psychiatric/Behavioral: Negative for agitation, behavioral problems, confusion, decreased concentration and dysphoric mood. Objective:   Physical Exam  Constitutional:       Appearance: She is well-developed. She is obese. She is not diaphoretic. Comments: On wheel chair    HENT:      Head: Normocephalic and atraumatic. Mouth/Throat:      Pharynx: No oropharyngeal exudate.    Eyes: WO CONTRAST; Future    6. Atrial flutter, unspecified type ([address]  Rate Controlled     7. Hypothyroidism, unspecified type  - TSH; Future      · Return in about 6 weeks (around 1/11/2021). · Reviewed prior labs and health maintenance. · Discussed use, benefit, and side effects of prescribed medications. Barriers to medication compliance addressed. All patient questions answered. Pt voiced understanding. Judy Dykes MD  [facility]  12/8/2020, 1:09 PM    Please note that this chart was generated using voice recognition Dragon dictation software. Although every effort was made to ensure the accuracy of this automated transcription, some errors in transcription may have occurred.

## 2020-12-08 ASSESSMENT — ENCOUNTER SYMPTOMS
EYE REDNESS: 0
EYE DISCHARGE: 0
DIARRHEA: 0
CHEST TIGHTNESS: 0
CHOKING: 0
APNEA: 0
EYE ITCHING: 0
COLOR CHANGE: 0
COUGH: 0
EYE PAIN: 0
BLOOD IN STOOL: 0
SHORTNESS OF BREATH: 0
ABDOMINAL DISTENTION: 0
BACK PAIN: 1
CONSTIPATION: 0
ABDOMINAL PAIN: 0

## 2020-12-10 RX ORDER — LEVOTHYROXINE SODIUM 0.1 MG/1
TABLET ORAL
Qty: 90 TABLET | Refills: 1 | Status: SHIPPED | OUTPATIENT
Start: 2020-12-10 | End: 2021-03-29 | Stop reason: SDUPTHER

## 2020-12-10 RX ORDER — LORATADINE 10 MG/1
TABLET ORAL
Qty: 90 TABLET | Refills: 1 | Status: SHIPPED | OUTPATIENT
Start: 2020-12-10 | End: 2021-03-12

## 2021-01-29 ENCOUNTER — OFFICE VISIT (OUTPATIENT)
Dept: INTERNAL MEDICINE CLINIC | Age: 86
End: 2021-01-29
Payer: MEDICARE

## 2021-01-29 VITALS
TEMPERATURE: 97 F | DIASTOLIC BLOOD PRESSURE: 62 MMHG | BODY MASS INDEX: 39.07 KG/M2 | WEIGHT: 199 LBS | HEIGHT: 60 IN | SYSTOLIC BLOOD PRESSURE: 110 MMHG

## 2021-01-29 DIAGNOSIS — Z91.81 AT HIGH RISK FOR FALLS: ICD-10-CM

## 2021-01-29 DIAGNOSIS — I10 ESSENTIAL HYPERTENSION: ICD-10-CM

## 2021-01-29 DIAGNOSIS — L03.115 CELLULITIS OF RIGHT LOWER EXTREMITY: ICD-10-CM

## 2021-01-29 DIAGNOSIS — E11.9 TYPE 2 DIABETES MELLITUS WITHOUT COMPLICATION, WITHOUT LONG-TERM CURRENT USE OF INSULIN (HCC): Primary | ICD-10-CM

## 2021-01-29 DIAGNOSIS — E03.9 HYPOTHYROIDISM, UNSPECIFIED TYPE: ICD-10-CM

## 2021-01-29 DIAGNOSIS — I50.32 CHRONIC DIASTOLIC HF (HEART FAILURE) (HCC): ICD-10-CM

## 2021-01-29 DIAGNOSIS — F32.A DEPRESSION, UNSPECIFIED DEPRESSION TYPE: ICD-10-CM

## 2021-01-29 PROCEDURE — G8417 CALC BMI ABV UP PARAM F/U: HCPCS | Performed by: INTERNAL MEDICINE

## 2021-01-29 PROCEDURE — 1036F TOBACCO NON-USER: CPT | Performed by: INTERNAL MEDICINE

## 2021-01-29 PROCEDURE — G8484 FLU IMMUNIZE NO ADMIN: HCPCS | Performed by: INTERNAL MEDICINE

## 2021-01-29 PROCEDURE — 99214 OFFICE O/P EST MOD 30 MIN: CPT | Performed by: INTERNAL MEDICINE

## 2021-01-29 PROCEDURE — G8400 PT W/DXA NO RESULTS DOC: HCPCS | Performed by: INTERNAL MEDICINE

## 2021-01-29 PROCEDURE — 1090F PRES/ABSN URINE INCON ASSESS: CPT | Performed by: INTERNAL MEDICINE

## 2021-01-29 PROCEDURE — G8427 DOCREV CUR MEDS BY ELIG CLIN: HCPCS | Performed by: INTERNAL MEDICINE

## 2021-01-29 PROCEDURE — 1123F ACP DISCUSS/DSCN MKR DOCD: CPT | Performed by: INTERNAL MEDICINE

## 2021-01-29 PROCEDURE — 4040F PNEUMOC VAC/ADMIN/RCVD: CPT | Performed by: INTERNAL MEDICINE

## 2021-01-29 RX ORDER — CEPHALEXIN 500 MG/1
500 CAPSULE ORAL 3 TIMES DAILY
Qty: 21 CAPSULE | Refills: 0 | Status: SHIPPED | OUTPATIENT
Start: 2021-01-29 | End: 2021-02-05

## 2021-01-29 RX ORDER — SERTRALINE HYDROCHLORIDE 100 MG/1
TABLET, FILM COATED ORAL
Qty: 30 TABLET | Refills: 2 | Status: SHIPPED | OUTPATIENT
Start: 2021-01-29 | End: 2021-05-11

## 2021-01-29 ASSESSMENT — PATIENT HEALTH QUESTIONNAIRE - PHQ9
8. MOVING OR SPEAKING SO SLOWLY THAT OTHER PEOPLE COULD HAVE NOTICED. OR THE OPPOSITE, BEING SO FIGETY OR RESTLESS THAT YOU HAVE BEEN MOVING AROUND A LOT MORE THAN USUAL: 1
7. TROUBLE CONCENTRATING ON THINGS, SUCH AS READING THE NEWSPAPER OR WATCHING TELEVISION: 0
6. FEELING BAD ABOUT YOURSELF - OR THAT YOU ARE A FAILURE OR HAVE LET YOURSELF OR YOUR FAMILY DOWN: 1
2. FEELING DOWN, DEPRESSED OR HOPELESS: 2
3. TROUBLE FALLING OR STAYING ASLEEP: 1
SUM OF ALL RESPONSES TO PHQ QUESTIONS 1-9: 8
SUM OF ALL RESPONSES TO PHQ9 QUESTIONS 1 & 2: 3
SUM OF ALL RESPONSES TO PHQ QUESTIONS 1-9: 8
9. THOUGHTS THAT YOU WOULD BE BETTER OFF DEAD, OR OF HURTING YOURSELF: 0

## 2021-01-29 NOTE — PATIENT INSTRUCTIONS
Patient Education        Preventing Falls: Care Instructions  Your Care Instructions     Getting around your home safely can be a challenge if you have injuries or health problems that make it easy for you to fall. Loose rugs and furniture in walkways are among the dangers for many older people who have problems walking or who have poor eyesight. People who have conditions such as arthritis, osteoporosis, or dementia also have to be careful not to fall. You can make your home safer with a few simple measures. Follow-up care is a key part of your treatment and safety. Be sure to make and go to all appointments, and call your doctor if you are having problems. It's also a good idea to know your test results and keep a list of the medicines you take. How can you care for yourself at home? Taking care of yourself  · You may get dizzy if you do not drink enough water. To prevent dehydration, drink plenty of fluids, enough so that your urine is light yellow or clear like water. Choose water and other caffeine-free clear liquids. If you have kidney, heart, or liver disease and have to limit fluids, talk with your doctor before you increase the amount of fluids you drink. · Exercise regularly to improve your strength, muscle tone, and balance. Walk if you can. Swimming may be a good choice if you cannot walk easily. · Have your vision and hearing checked each year or any time you notice a change. If you have trouble seeing and hearing, you might not be able to avoid objects and could lose your balance. · Know the side effects of the medicines you take. Ask your doctor or pharmacist whether the medicines you take can affect your balance. Sleeping pills or sedatives can affect your balance. · Limit the amount of alcohol you drink. Alcohol can impair your balance and other senses. · Ask your doctor whether calluses or corns on your feet need to be removed. If you wear loose-fitting shoes because of calluses or corns, you can lose your balance and fall. · Talk to your doctor if you have numbness in your feet. Preventing falls at home  · Remove raised doorway thresholds, throw rugs, and clutter. Repair loose carpet or raised areas in the floor. · Move furniture and electrical cords to keep them out of walking paths. · Use nonskid floor wax, and wipe up spills right away, especially on ceramic tile floors. · If you use a walker or cane, put rubber tips on it. If you use crutches, clean the bottoms of them regularly with an abrasive pad, such as steel wool. · Keep your house well lit, especially Yaritza Hummer, and outside walkways. Use night-lights in areas such as hallways and bathrooms. Add extra light switches or use remote switches (such as switches that go on or off when you clap your hands) to make it easier to turn lights on if you have to get up during the night. · Install sturdy handrails on stairways. · Move items in your cabinets so that the things you use a lot are on the lower shelves (about waist level). · Keep a cordless phone and a flashlight with new batteries by your bed. If possible, put a phone in each of the main rooms of your house, or carry a cell phone in case you fall and cannot reach a phone. Or, you can wear a device around your neck or wrist. You push a button that sends a signal for help. · Wear low-heeled shoes that fit well and give your feet good support. Use footwear with nonskid soles. Check the heels and soles of your shoes for wear. Repair or replace worn heels or soles. · Do not wear socks without shoes on wood floors. · Walk on the grass when the sidewalks are slippery. If you live in an area that gets snow and ice in the winter, sprinkle salt on slippery steps and sidewalks.   Preventing falls in the bath · Install grab bars and nonskid mats inside and outside your shower or tub and near the toilet and sinks. · Use shower chairs and bath benches. · Use a hand-held shower head that will allow you to sit while showering. · Get into a tub or shower by putting the weaker leg in first. Get out of a tub or shower with your strong side first.  · Repair loose toilet seats and consider installing a raised toilet seat to make getting on and off the toilet easier. · Keep your bathroom door unlocked while you are in the shower. Where can you learn more? Go to https://[employer]. org and sign in to your [employer] account. Enter [id number] [id number] [phone] in the [facility] box to learn more about \"Preventing Falls: Care Instructions. \"     If you do not have an account, please click on the \"Sign Up Now\" link. Current as of: April 15, 2020               Content Version: 12.6  © 6262-1236 [employer]. Care instructions adapted under license by [facility] ([facility]). If you have questions about a medical condition or this instruction, always ask your healthcare professional. [place] [address] any warranty or liability for your use of this information. Patient Education        13 Ways to Prevent Falls in the Hospital  When you're in the hospital, your risk of falling may be higher than normal. Medicines can make you dizzy. Or illness and treatments may cause you to feel weak and confused, making it harder to move around. But there are ways you can prevent falls during your stay. Things you can do to prevent a fall at the hospital   Bring nonskid socks, slippers, or shoes that stay on your feet. If you do not have these, ask the nurse for a pair of nonskid socks. Bring your walker or cane, if you use one at home. Or ask the hospital to provide one during your stay. Ask your doctor or nurse if your treatments or medicines will increase your risk of a fall. Some hospitals will check your risk when you are admitted to the hospital.    Tell your doctor or nurse if medicines make you dizzy, weak, or lightheaded. If you feel this way, do not try to get up on your own. Call the nurse for help. Call the nurse for help getting out of bed if you are on crutches or have trouble walking. Do not try to do it on your own until the nurse says it's ok and you feel sure you are able. When your nurse says it's ok, get up slowly. Sit up first and count to 10 before you get out of bed. Put on your eyeglasses before you get out of bed, if you wear them. If you need help getting to the bathroom, call the nurse before you have an urgent need to go. If you get fluids through a vein (IV), you may need to go to the bathroom more often. Things the hospital staff can do to prevent a fall   Keep needed items close by. Your phone, the nurse call light or button, and anything you need to help you walk should be close to you. Keep your bed low and locked. Your bed should be low enough so that you don't have problems getting out of bed. The wheels on your bed should be locked so the bed can't move. Explain what is safe. Your nurse or doctor will tell you what you can safely do and how often you need to get up and move around. Keep your room neat. Your room should not have any wet or slippery areas. There should be nothing in the way of going to the bathroom or hallway. Light up the room. Your room should have good lighting. Make sure there is a night light in your bathroom. Current as of: May 27, 2020               Content Version: 12.6  © 8010-9212 [employer]. Care instructions adapted under license by [facility] ([facility]). If you have questions about a medical condition or this instruction, always ask your healthcare professional. [address] any warranty or liability for your use of this information. Patient Education        Preventing Outdoor Falls: Care Instructions  Your Care Instructions     Worries about falls don't need to keep you indoors. Outdoor activities like walking have big benefits for your health. You will need to watch your step and learn a few safety measures. If you are worried about having a fall outdoors, ask your doctor about exercises, classes, or physical therapy that may help. You can learn ways to gain strength, flexibility, and balance. Ask if it might help to use a cane or walker. You can make your time outdoors safer with a few simple measures. Follow-up care is a key part of your treatment and safety. Be sure to make and go to all appointments, and call your doctor if you are having problems. It's also a good idea to know your test results and keep a list of the medicines you take. How can you prevent falls outdoors? · Wear shoes with firm soles and low heels. If you have to walk on an icy surface, use grippers that can be worn over your shoes in bad weather. · Be extra careful if weather is bad. Walk on the grass when the sidewalks are slick. If you live in a place that gets snow and ice in the winter, sprinkle salt on slippery stairs and sidewalks. · Be careful getting on or off buses and trains or getting in and out of cars. If handrails are available, use them. · Be careful when you cross the street. Look for crosswalks or places where curb cuts or ramps are present. · Try not to hurry, especially if you are carrying something. · Be cautious in parking lots or garages. There may be curbs or changes in pavement, or the height of the pavement may vary. · Make sure to wear the correct eyeglasses, if you need them. Reading glasses or bifocals can make it harder to see hazards that might be in your way. · If you are walking outdoors for exercise, try to:  ? Walk in well-lighted, well-maintained areas. These include high school or college tracks, shopping malls, and public spaces. ? Walk with a partner. ? Watch out for cracked sidewalks, curbs, changes in the height of the pavement, exposed tree roots, and debris such as fallen leaves or branches. Where can you learn more? Go to https://RebelMousepepiceweb.healthThe city of [place]-the [employer]. org and sign in to your [employer] account. Enter P467 in the [employer] box to learn more about \"Preventing Outdoor Falls: Care Instructions. \"     If you do not have an account, please click on the \"Sign Up Now\" link. Current as of: April 15, 2020               Content Version: 12.6  © 2006-2020 [employer]. Care instructions adapted under license by [facility] ([facility]). If you have questions about a medical condition or this instruction, always ask your healthcare professional. [address] any warranty or liability for your use of this information.

## 2021-01-29 NOTE — PROGRESS NOTES
Subjective:      Patient ID: Arslan Dasilva is a 82 y.o. female. HPI-patient is here for evaluation of multiple medical problems. She has hypertension, diabetes, hypothyroidism, depression, CHF. Patient is along with her daughter, patient mentioned that her glucometer broke, she is not checking her blood sugar for long time, she is not very compliant with her diet and medication  Patient is wheelchair-bound  She has chronic swelling in her legs, as per patient and her daughter, she noticed increased swelling with redness and pain in both legs, no drainage, no fever    Review of Systems   Constitutional: Negative for activity change, appetite change, chills, diaphoresis, fatigue and fever. HENT: Negative for congestion, dental problem, drooling and ear discharge. Eyes: Negative for pain, discharge, redness and itching. Respiratory: Negative for apnea, cough, choking, chest tightness and shortness of breath. Cardiovascular: Positive for leg swelling (Both legs). Negative for chest pain. Gastrointestinal: Negative for abdominal distention, abdominal pain, blood in stool, constipation and diarrhea. Endocrine: Negative for cold intolerance and heat intolerance. Genitourinary: Negative for difficulty urinating, dysuria, enuresis, flank pain and frequency. Musculoskeletal: Positive for gait problem (wheel chair bound ). Negative for arthralgias, back pain and joint swelling. Skin: Positive for rash (Pain, redness of both legs). Negative for color change and pallor. Neurological: Negative for dizziness, facial asymmetry, light-headedness, numbness and headaches. Psychiatric/Behavioral: Negative for agitation, behavioral problems, confusion, decreased concentration and dysphoric mood. Objective:   Physical Exam  Constitutional:       Appearance: She is well-developed. She is obese. She is not diaphoretic. HENT:      Head: Normocephalic and atraumatic.       Mouth/Throat: Pharynx: No oropharyngeal exudate. Eyes:      General: No scleral icterus. Right eye: No discharge. Left eye: No discharge. Conjunctiva/sclera: Conjunctivae normal.      Pupils: Pupils are equal, round, and reactive to light. Neck:      Musculoskeletal: Normal range of motion and neck supple. Thyroid: No thyromegaly. Vascular: No JVD. Trachea: No tracheal deviation. Cardiovascular:      Rate and Rhythm: Normal rate. Heart sounds: Normal heart sounds. No murmur. No gallop. Pulmonary:      Effort: Pulmonary effort is normal. No respiratory distress. Breath sounds: Normal breath sounds. No stridor. No wheezing or rales. Chest:      Chest wall: No tenderness. Abdominal:      General: Bowel sounds are normal. There is no distension. Palpations: Abdomen is soft. Tenderness: There is no abdominal tenderness. There is no guarding or rebound. Musculoskeletal: Normal range of motion. General: Swelling present. Right lower leg: Edema present. Left lower leg: Edema present. Skin:     Findings: Rash (Redness of both legs) present. Neurological:      Mental Status: She is alert and oriented to person, place, and time. Assessment / Plan:   1. Depression, unspecified depression type    - sertraline (ZOLOFT) 100 MG tablet; TAKE 1 TAB BY MOUTH ONCE A DAY  Dispense: 30 tablet; Refill: 2    2. Type 2 diabetes mellitus without complication, without long-term current use of insulin (HCC)  Uncontrolled, noncompliant with diet and medication  - Comprehensive Metabolic Panel; Future    3. Chronic diastolic HF (heart failure) (HCC)  Compensated    4. Essential hypertension  Does not check blood sugars  - metFORMIN (GLUCOPHAGE) 500 MG tablet; Take 1 tablet by mouth 3 times daily  Dispense: 270 tablet; Refill: 3  - DME Order for Glucometer as OP    5. Hypothyroidism, unspecified type  - TSH; Future    6.  Cellulitis of right lower extremity - cephALEXin (KEFLEX) 500 MG capsule; Take 1 capsule by mouth 3 times daily for 7 days  Dispense: 21 capsule; Refill: 0  Advise Ace wrap  Advised to keep both legs elevated  7. At high risk for falls  Uses wheelchair      · Return in about 2 months (around 3/29/2021). · Reviewed prior labs and health maintenance. · Discussed use, benefit, and side effects of prescribed medications. Barriers to medication compliance addressed. All patient questions answered. Pt voiced understanding. Stella Caceres MD  [facility]  2/2/2021, 10:02 PM    Please note that this chart was generated using voice recognition Dragon dictation software. Although every effort was made to ensure the accuracy of this automated transcription, some errors in transcription may have occurred. Visit Information    Have you changed or started any medications since your last visit including any over-the-counter medicines, vitamins, or herbal medicines? no   Are you having any side effects from any of your medications? -  no  Have you stopped taking any of your medications? Is so, why? -  no    Have you seen any other physician or provider since your last visit? No  Have you had any other diagnostic tests since your last visit? No  Have you been seen in the emergency room and/or had an admission to a hospital since we last saw you? No  Have you had your routine dental cleaning in the past 6 months? no    Have you activated your [employer] account? If not, what are your barriers?  Yes     Patient Care Team:  Stella Caceres MD as PCP - General (Internal Medicine)  Stella Caceres MD as PCP - [facility] Empaneled Provider  Lay Figueroa MD as Consulting Physician (Gastroenterology)  Brandon Lea MD as Consulting Physician (Pulmonology)    Medical History Review  Past Medical, Family, and Social History reviewed and does not contribute to the patient presenting condition    Health Maintenance   Topic Date Due  COVID-19 Vaccine (1 of 2) 05/01/1951    DTaP/Tdap/Td vaccine (1 - Tdap) 05/01/1956    Shingles Vaccine (1 of 2) 05/01/1987    Diabetic retinal exam  09/02/2016    Annual Wellness Visit (AWV)  01/07/2020    Flu vaccine (1) 09/01/2020    Diabetic foot exam  01/07/2021    TSH testing  05/22/2021    Potassium monitoring  05/22/2021    Creatinine monitoring  05/22/2021    A1C test (Diabetic or Prediabetic)  05/30/2021    Lipid screen  06/29/2021    Pneumococcal 65+ years Vaccine  Completed    DEXA (modify frequency per FRAX score)  Addressed    Hepatitis A vaccine  Aged Out    Hib vaccine  Aged Out    Meningococcal (ACWY) vaccine  Aged Out          On the basis of positive falls risk screening, assessment and plan is as follows: home safety tips provided.

## 2021-02-02 ASSESSMENT — ENCOUNTER SYMPTOMS
COUGH: 0
CHOKING: 0
SHORTNESS OF BREATH: 0
BLOOD IN STOOL: 0
EYE REDNESS: 0
CONSTIPATION: 0
APNEA: 0
EYE ITCHING: 0
DIARRHEA: 0
EYE PAIN: 0
COLOR CHANGE: 0
ABDOMINAL PAIN: 0
EYE DISCHARGE: 0
CHEST TIGHTNESS: 0
ABDOMINAL DISTENTION: 0
BACK PAIN: 0

## 2021-03-12 RX ORDER — LORATADINE 10 MG/1
TABLET ORAL
Qty: 30 TABLET | Refills: 3 | Status: SHIPPED | OUTPATIENT
Start: 2021-03-12 | End: 2021-07-08

## 2021-03-26 ENCOUNTER — TELEPHONE (OUTPATIENT)
Dept: INTERNAL MEDICINE CLINIC | Age: 86
End: 2021-03-26

## 2021-03-26 ENCOUNTER — HOSPITAL ENCOUNTER (OUTPATIENT)
Age: 86
Discharge: HOME | End: 2021-03-26
Payer: MEDICARE

## 2021-03-26 DIAGNOSIS — E11.9 TYPE 2 DIABETES MELLITUS WITHOUT COMPLICATION, WITHOUT LONG-TERM CURRENT USE OF INSULIN (HCC): ICD-10-CM

## 2021-03-26 DIAGNOSIS — E03.9 HYPOTHYROIDISM, UNSPECIFIED TYPE: ICD-10-CM

## 2021-03-26 LAB
ALBUMIN SERPL-MCNC: 3.7 G/DL (ref 3.5–5.2)
ALBUMIN/GLOBULIN RATIO: 1.4 (ref 1–2.5)
ALP BLD-CCNC: 139 U/L (ref 35–104)
ALT SERPL-CCNC: 19 U/L (ref 5–33)
ANION GAP SERPL CALCULATED.3IONS-SCNC: 15 MMOL/L (ref 9–17)
AST SERPL-CCNC: 17 U/L
BILIRUB SERPL-MCNC: 0.36 MG/DL (ref 0.3–1.2)
BUN BLDV-MCNC: 19 MG/DL (ref 8–23)
BUN/CREAT BLD: ABNORMAL (ref 9–20)
CALCIUM SERPL-MCNC: 7.9 MG/DL (ref 8.6–10.4)
CHLORIDE BLD-SCNC: 98 MMOL/L (ref 98–107)
CO2: 25 MMOL/L (ref 20–31)
CREAT SERPL-MCNC: 1.22 MG/DL (ref 0.5–0.9)
GFR AFRICAN AMERICAN: 51 ML/MIN
GFR NON-AFRICAN AMERICAN: 42 ML/MIN
GFR SERPL CREATININE-BSD FRML MDRD: ABNORMAL ML/MIN/{1.73_M2}
GFR SERPL CREATININE-BSD FRML MDRD: ABNORMAL ML/MIN/{1.73_M2}
GLUCOSE BLD-MCNC: 473 MG/DL (ref 70–99)
POTASSIUM SERPL-SCNC: 4.1 MMOL/L (ref 3.7–5.3)
SODIUM BLD-SCNC: 138 MMOL/L (ref 135–144)
TOTAL PROTEIN: 6.4 G/DL (ref 6.4–8.3)
TSH SERPL DL<=0.05 MIU/L-ACNC: 14.39 MIU/L (ref 0.3–5)

## 2021-03-26 PROCEDURE — 36415 COLL VENOUS BLD VENIPUNCTURE: CPT

## 2021-03-26 PROCEDURE — 80053 COMPREHEN METABOLIC PANEL: CPT

## 2021-03-26 PROCEDURE — 84443 ASSAY THYROID STIM HORMONE: CPT

## 2021-03-26 NOTE — TELEPHONE ENCOUNTER
Veronica Loza from lab called with a critical lab for [facility]. Glucose of 473. I discussed this with Annita Schreiber CNP in office. She would to know if pt is taking her insulin, if she is we will increase her from 35 units BID to 40 units BID. Luis Armando Morales would like her to follow up in the office next week. If pt is symptomatic (dizziness, blurred vision etc) refer to ED    Spoke with patient and she said that she has been using her Basaglar 37 units BID. Per Luis Armando Morales I instructed patient to increase her Basaglar to 45 units BID and she is scheduled to follow up with Dr. Graham Pena in the office on Monday 3.29.2021, I advised her to keep that appt. I advised patient to go to nearest ED if she develops any symptoms such as lightheadedness, dizziness, or vision changes.  She expressed understanding

## 2021-03-29 ENCOUNTER — OFFICE VISIT (OUTPATIENT)
Dept: INTERNAL MEDICINE CLINIC | Age: 86
End: 2021-03-29
Payer: MEDICARE

## 2021-03-29 VITALS
BODY MASS INDEX: 39.07 KG/M2 | HEIGHT: 60 IN | TEMPERATURE: 97.3 F | SYSTOLIC BLOOD PRESSURE: 100 MMHG | DIASTOLIC BLOOD PRESSURE: 62 MMHG | WEIGHT: 199 LBS

## 2021-03-29 DIAGNOSIS — E03.9 HYPOTHYROIDISM, UNSPECIFIED TYPE: ICD-10-CM

## 2021-03-29 DIAGNOSIS — Z91.199 NONCOMPLIANCE: ICD-10-CM

## 2021-03-29 DIAGNOSIS — E11.9 TYPE 2 DIABETES MELLITUS WITHOUT COMPLICATION, WITHOUT LONG-TERM CURRENT USE OF INSULIN (HCC): Primary | ICD-10-CM

## 2021-03-29 DIAGNOSIS — E11.8 TYPE 2 DIABETES MELLITUS WITH COMPLICATION, WITHOUT LONG-TERM CURRENT USE OF INSULIN (HCC): ICD-10-CM

## 2021-03-29 DIAGNOSIS — I10 ESSENTIAL HYPERTENSION: ICD-10-CM

## 2021-03-29 LAB — HBA1C MFR BLD: 14 %

## 2021-03-29 PROCEDURE — 1036F TOBACCO NON-USER: CPT | Performed by: INTERNAL MEDICINE

## 2021-03-29 PROCEDURE — G8400 PT W/DXA NO RESULTS DOC: HCPCS | Performed by: INTERNAL MEDICINE

## 2021-03-29 PROCEDURE — 4040F PNEUMOC VAC/ADMIN/RCVD: CPT | Performed by: INTERNAL MEDICINE

## 2021-03-29 PROCEDURE — 1090F PRES/ABSN URINE INCON ASSESS: CPT | Performed by: INTERNAL MEDICINE

## 2021-03-29 PROCEDURE — G8417 CALC BMI ABV UP PARAM F/U: HCPCS | Performed by: INTERNAL MEDICINE

## 2021-03-29 PROCEDURE — G8427 DOCREV CUR MEDS BY ELIG CLIN: HCPCS | Performed by: INTERNAL MEDICINE

## 2021-03-29 PROCEDURE — 99214 OFFICE O/P EST MOD 30 MIN: CPT | Performed by: INTERNAL MEDICINE

## 2021-03-29 PROCEDURE — G8484 FLU IMMUNIZE NO ADMIN: HCPCS | Performed by: INTERNAL MEDICINE

## 2021-03-29 PROCEDURE — 1123F ACP DISCUSS/DSCN MKR DOCD: CPT | Performed by: INTERNAL MEDICINE

## 2021-03-29 PROCEDURE — 83036 HEMOGLOBIN GLYCOSYLATED A1C: CPT | Performed by: INTERNAL MEDICINE

## 2021-03-29 RX ORDER — GLUCOSAMINE HCL/CHONDROITIN SU 500-400 MG
CAPSULE ORAL
Qty: 100 STRIP | Refills: 1 | Status: SHIPPED | OUTPATIENT
Start: 2021-03-29

## 2021-03-29 RX ORDER — LEVOTHYROXINE SODIUM 0.12 MG/1
TABLET ORAL
Qty: 90 TABLET | Refills: 3 | Status: ON HOLD | OUTPATIENT
Start: 2021-03-29 | End: 2022-01-11 | Stop reason: HOSPADM

## 2021-03-29 RX ORDER — INSULIN GLARGINE 100 [IU]/ML
50 INJECTION, SOLUTION SUBCUTANEOUS 2 TIMES DAILY
Qty: 6 PEN | Refills: 3 | Status: ON HOLD | OUTPATIENT
Start: 2021-03-29 | End: 2021-04-05 | Stop reason: HOSPADM

## 2021-03-29 ASSESSMENT — PATIENT HEALTH QUESTIONNAIRE - PHQ9
SUM OF ALL RESPONSES TO PHQ9 QUESTIONS 1 & 2: 0
SUM OF ALL RESPONSES TO PHQ QUESTIONS 1-9: 0
2. FEELING DOWN, DEPRESSED OR HOPELESS: 0
SUM OF ALL RESPONSES TO PHQ QUESTIONS 1-9: 0

## 2021-03-29 NOTE — PROGRESS NOTES
Subjective:      Patient ID: Chelle Del Valle is a 82 y.o. female. HPI patient is here for evaluation of multiple medical problems. She has hypertension, diabetes, hypothyroidism, depression, CHF. Patient is along with her daughter, patient mentioned that her glucometer broke, she is not checking her blood sugar for long time,   she is not very compliant with her diet and medication  Patient is wheelchair-bound  She has chronic swelling in her legs, as per patient and her daughter  Had Recent Blood work   She had a fall , Mechanical Fall , last week , while using Bathroom, did not go to ED   [facility] with [place] [facility]   Wants to be DNR CC - A   Review of Systems   Constitutional: Negative for activity change, appetite change, chills, diaphoresis, fatigue and fever. HENT: Negative for congestion, dental problem, drooling and ear discharge. Eyes: Negative for pain, discharge, redness and itching. Respiratory: Negative for apnea, cough, choking, chest tightness and shortness of breath. Cardiovascular: Positive for leg swelling. Negative for chest pain. Gastrointestinal: Negative for abdominal distention, abdominal pain, blood in stool, constipation and diarrhea. Endocrine: Negative for cold intolerance and heat intolerance. Genitourinary: Negative for difficulty urinating, dysuria, enuresis, flank pain and frequency. Musculoskeletal: Positive for gait problem (wheel Chair Bound ). Negative for arthralgias, back pain and joint swelling. Skin: Negative for color change, pallor and rash. Neurological: Negative for dizziness, facial asymmetry, light-headedness, numbness and headaches. Psychiatric/Behavioral: Negative for agitation, behavioral problems, confusion, decreased concentration and dysphoric mood. Objective:   Physical Exam  Constitutional:       Appearance: She is well-developed. She is obese. She is not diaphoretic. HENT:      Head: Normocephalic and atraumatic.       Mouth/Throat: Pharynx: No oropharyngeal exudate. Eyes:      General: No scleral icterus. Right eye: No discharge. Left eye: No discharge. Conjunctiva/sclera: Conjunctivae normal.      Pupils: Pupils are equal, round, and reactive to light. Neck:      Musculoskeletal: Normal range of motion and neck supple. Thyroid: No thyromegaly. Vascular: No JVD. Trachea: No tracheal deviation. Cardiovascular:      Rate and Rhythm: Normal rate. Heart sounds: Normal heart sounds. No murmur. No gallop. Pulmonary:      Effort: Pulmonary effort is normal. No respiratory distress. Breath sounds: Normal breath sounds. No stridor. No wheezing or rales. Chest:      Chest wall: No tenderness. Abdominal:      General: Bowel sounds are normal. There is no distension. Palpations: Abdomen is soft. Tenderness: There is no abdominal tenderness. There is no guarding or rebound. Musculoskeletal: Normal range of motion. Right lower leg: Edema present. Left lower leg: Edema present. Neurological:      Mental Status: She is alert and oriented to person, place, and time. Assessment / Plan:   1. Type 2 diabetes mellitus without complication, without long-term current use of insulin (HCC  - POCT glycosylated hemoglobin (Hb A1C)  - blood glucose monitor strips; Test 2 times a day & as needed for symptoms of irregular blood glucose. Dispense sufficient amount for indicated testing frequency plus additional to accommodate PRN testing needs. Dispense: 100 strip; Refill: 1    2. Essential hypertension  Controlled     3. Type 2 diabetes mellitus with complication, without long-term current use of insulin ([facility])  Uncontrolled  Due to NonCompliance   - Amb External Referral To Home Health    4. Hypothyroidism, unspecified type  TSH is High due to Non- Compliance   - levothyroxine (SYNTHROID) 125 MCG tablet; TAKE 1 TAB BY MOUTH EVERY MORNING  Dispense: 90 tablet; Refill: 3    5.  Shingles Vaccine (1 of 2) Never done    Diabetic retinal exam  09/02/2016    Annual Wellness Visit (AWV)  Never done    Flu vaccine (1) 09/01/2020    Diabetic foot exam  01/07/2021    A1C test (Diabetic or Prediabetic)  05/30/2021    Lipid screen  06/29/2021    TSH testing  03/26/2022    Potassium monitoring  03/26/2022    Creatinine monitoring  03/26/2022    Pneumococcal 65+ years Vaccine  Completed    DEXA (modify frequency per FRAX score)  Addressed    Hepatitis A vaccine  Aged Out    Hib vaccine  Aged Out    Meningococcal (ACWY) vaccine  Aged Out

## 2021-04-01 DIAGNOSIS — I25.10 CORONARY ARTERY DISEASE DUE TO LIPID RICH PLAQUE: ICD-10-CM

## 2021-04-01 DIAGNOSIS — E55.9 VITAMIN D DEFICIENCY: ICD-10-CM

## 2021-04-01 DIAGNOSIS — M79.89 LEFT LEG SWELLING: ICD-10-CM

## 2021-04-01 DIAGNOSIS — I25.83 CORONARY ARTERY DISEASE DUE TO LIPID RICH PLAQUE: ICD-10-CM

## 2021-04-01 DIAGNOSIS — D50.9 IRON DEFICIENCY ANEMIA, UNSPECIFIED IRON DEFICIENCY ANEMIA TYPE: ICD-10-CM

## 2021-04-01 RX ORDER — LANOLIN ALCOHOL/MO/W.PET/CERES
325 CREAM (GRAM) TOPICAL 2 TIMES DAILY
Qty: 60 TABLET | Refills: 3 | Status: SHIPPED | OUTPATIENT
Start: 2021-04-01 | End: 2021-08-10

## 2021-04-01 RX ORDER — MELATONIN
Qty: 30 TABLET | Refills: 3 | Status: SHIPPED | OUTPATIENT
Start: 2021-04-01 | End: 2021-08-10

## 2021-04-01 RX ORDER — ATORVASTATIN CALCIUM 80 MG/1
80 TABLET, FILM COATED ORAL DAILY
Qty: 30 TABLET | Refills: 3 | Status: SHIPPED | OUTPATIENT
Start: 2021-04-01 | End: 2021-08-10

## 2021-04-01 RX ORDER — POTASSIUM CHLORIDE 20 MEQ/1
20 TABLET, EXTENDED RELEASE ORAL DAILY
Qty: 30 TABLET | Refills: 3 | Status: ON HOLD | OUTPATIENT
Start: 2021-04-01 | End: 2021-04-05 | Stop reason: HOSPADM

## 2021-04-01 RX ORDER — FUROSEMIDE 40 MG/1
40 TABLET ORAL DAILY
Qty: 30 TABLET | Refills: 3 | Status: SHIPPED | OUTPATIENT
Start: 2021-04-01 | End: 2021-08-10

## 2021-04-01 NOTE — TELEPHONE ENCOUNTER
Medication: atorvastatin, ferrous sulfate, furosemide, potassium and vitamin D  Last visit:3/29/2021  Next visit: 6/10/2021  Last refill:   Pharmacy: [facility]

## 2021-04-03 ENCOUNTER — APPOINTMENT (OUTPATIENT)
Dept: GENERAL RADIOLOGY | Age: 86
DRG: 638 | End: 2021-04-03
Payer: MEDICARE

## 2021-04-03 ENCOUNTER — HOSPITAL ENCOUNTER (INPATIENT)
Age: 86
LOS: 2 days | Discharge: HOME | DRG: 638 | End: 2021-04-05
Attending: EMERGENCY MEDICINE | Admitting: INTERNAL MEDICINE
Payer: MEDICARE

## 2021-04-03 DIAGNOSIS — E16.2 HYPOGLYCEMIA: Primary | ICD-10-CM

## 2021-04-03 DIAGNOSIS — I10 ESSENTIAL HYPERTENSION: ICD-10-CM

## 2021-04-03 DIAGNOSIS — N30.00 ACUTE CYSTITIS WITHOUT HEMATURIA: ICD-10-CM

## 2021-04-03 DIAGNOSIS — E11.8 TYPE 2 DIABETES MELLITUS WITH COMPLICATION, WITHOUT LONG-TERM CURRENT USE OF INSULIN (HCC): ICD-10-CM

## 2021-04-03 LAB
-: ABNORMAL
ABSOLUTE EOS #: 0.11 K/UL (ref 0–0.44)
ABSOLUTE IMMATURE GRANULOCYTE: 0.13 K/UL (ref 0–0.3)
ABSOLUTE LYMPH #: 1.29 K/UL (ref 1.1–3.7)
ABSOLUTE MONO #: 0.59 K/UL (ref 0.1–1.2)
ALBUMIN SERPL-MCNC: 3.6 G/DL (ref 3.5–5.2)
ALBUMIN/GLOBULIN RATIO: 1.2 (ref 1–2.5)
ALLEN TEST: ABNORMAL
ALP BLD-CCNC: 100 U/L (ref 35–104)
ALT SERPL-CCNC: 19 U/L (ref 5–33)
AMORPHOUS: ABNORMAL
ANION GAP SERPL CALCULATED.3IONS-SCNC: 11 MMOL/L (ref 9–17)
ANION GAP: 11 MMOL/L (ref 7–16)
AST SERPL-CCNC: 20 U/L
BACTERIA: ABNORMAL
BASOPHILS # BLD: 1 % (ref 0–2)
BASOPHILS ABSOLUTE: 0.08 K/UL (ref 0–0.2)
BILIRUB SERPL-MCNC: 0.2 MG/DL (ref 0.3–1.2)
BILIRUBIN URINE: NEGATIVE
BNP INTERPRETATION: ABNORMAL
BUN BLDV-MCNC: 23 MG/DL (ref 8–23)
BUN/CREAT BLD: ABNORMAL (ref 9–20)
CALCIUM SERPL-MCNC: 8.3 MG/DL (ref 8.6–10.4)
CASTS UA: ABNORMAL /LPF (ref 0–8)
CHLORIDE BLD-SCNC: 101 MMOL/L (ref 98–107)
CO2: 27 MMOL/L (ref 20–31)
COLOR: YELLOW
CREAT SERPL-MCNC: 1.05 MG/DL (ref 0.5–0.9)
CRYSTALS, UA: ABNORMAL /HPF
DIFFERENTIAL TYPE: ABNORMAL
EKG ATRIAL RATE: 52 BPM
EKG P AXIS: 31 DEGREES
EKG P-R INTERVAL: 228 MS
EKG Q-T INTERVAL: 540 MS
EKG QRS DURATION: 150 MS
EKG QTC CALCULATION (BAZETT): 502 MS
EKG R AXIS: -59 DEGREES
EKG T AXIS: 38 DEGREES
EKG VENTRICULAR RATE: 52 BPM
EOSINOPHILS RELATIVE PERCENT: 1 % (ref 1–4)
EPITHELIAL CELLS UA: ABNORMAL /HPF (ref 0–5)
FIO2: ABNORMAL
GFR AFRICAN AMERICAN: >60 ML/MIN
GFR NON-AFRICAN AMERICAN: 41 ML/MIN
GFR NON-AFRICAN AMERICAN: 50 ML/MIN
GFR SERPL CREATININE-BSD FRML MDRD: 49 ML/MIN
GFR SERPL CREATININE-BSD FRML MDRD: ABNORMAL ML/MIN/{1.73_M2}
GLUCOSE BLD-MCNC: 40 MG/DL (ref 65–105)
GLUCOSE BLD-MCNC: 73 MG/DL (ref 65–105)
GLUCOSE BLD-MCNC: 79 MG/DL (ref 65–105)
GLUCOSE BLD-MCNC: 84 MG/DL (ref 70–99)
GLUCOSE BLD-MCNC: 88 MG/DL (ref 74–100)
GLUCOSE BLD-MCNC: 89 MG/DL (ref 65–105)
GLUCOSE BLD-MCNC: 96 MG/DL (ref 65–105)
GLUCOSE URINE: ABNORMAL
HCO3 VENOUS: 27.7 MMOL/L (ref 22–29)
HCT VFR BLD CALC: 40.4 % (ref 36.3–47.1)
HEMOGLOBIN: 12.8 G/DL (ref 11.9–15.1)
IMMATURE GRANULOCYTES: 1 %
INR BLD: 1
KETONES, URINE: ABNORMAL
LACTIC ACID, WHOLE BLOOD: 2.7 MMOL/L (ref 0.7–2.1)
LACTIC ACID: ABNORMAL MMOL/L
LEUKOCYTE ESTERASE, URINE: ABNORMAL
LYMPHOCYTES # BLD: 13 % (ref 24–43)
MAGNESIUM: 1.3 MG/DL (ref 1.6–2.6)
MCH RBC QN AUTO: 28.1 PG (ref 25.2–33.5)
MCHC RBC AUTO-ENTMCNC: 31.7 G/DL (ref 28.4–34.8)
MCV RBC AUTO: 88.6 FL (ref 82.6–102.9)
MODE: ABNORMAL
MONOCYTES # BLD: 6 % (ref 3–12)
MUCUS: ABNORMAL
NEGATIVE BASE EXCESS, VEN: ABNORMAL (ref 0–2)
NITRITE, URINE: NEGATIVE
NRBC AUTOMATED: 0 PER 100 WBC
O2 DEVICE/FLOW/%: ABNORMAL
O2 SAT, VEN: 52 % (ref 60–85)
OTHER OBSERVATIONS UA: ABNORMAL
PARTIAL THROMBOPLASTIN TIME: 23 SEC (ref 20.5–30.5)
PATIENT TEMP: ABNORMAL
PCO2, VEN: 46.4 MM HG (ref 41–51)
PDW BLD-RTO: 16.1 % (ref 11.8–14.4)
PH UA: 5 (ref 5–8)
PH VENOUS: 7.38 (ref 7.32–7.43)
PLATELET # BLD: 180 K/UL (ref 138–453)
PLATELET ESTIMATE: ABNORMAL
PMV BLD AUTO: 11.5 FL (ref 8.1–13.5)
PO2, VEN: 28.3 MM HG (ref 30–50)
POC CHLORIDE: 103 MMOL/L (ref 98–107)
POC CREATININE: 1.26 MG/DL (ref 0.51–1.19)
POC HEMATOCRIT: 39 % (ref 36–46)
POC HEMOGLOBIN: 13.3 G/DL (ref 12–16)
POC IONIZED CALCIUM: 0.93 MMOL/L (ref 1.15–1.33)
POC LACTIC ACID: 2.35 MMOL/L (ref 0.56–1.39)
POC PCO2 TEMP: ABNORMAL MM HG
POC PH TEMP: ABNORMAL
POC PO2 TEMP: ABNORMAL MM HG
POC POTASSIUM: 3.4 MMOL/L (ref 3.5–4.5)
POC SODIUM: 142 MMOL/L (ref 138–146)
POSITIVE BASE EXCESS, VEN: 2 (ref 0–3)
POTASSIUM SERPL-SCNC: 3.6 MMOL/L (ref 3.7–5.3)
PRO-BNP: 416 PG/ML
PROTEIN UA: NEGATIVE
PROTHROMBIN TIME: 10.7 SEC (ref 9.1–12.3)
RBC # BLD: 4.56 M/UL (ref 3.95–5.11)
RBC # BLD: ABNORMAL 10*6/UL
RBC UA: ABNORMAL /HPF (ref 0–4)
RENAL EPITHELIAL, UA: ABNORMAL /HPF
SAMPLE SITE: ABNORMAL
SEG NEUTROPHILS: 78 % (ref 36–65)
SEGMENTED NEUTROPHILS ABSOLUTE COUNT: 8.13 K/UL (ref 1.5–8.1)
SODIUM BLD-SCNC: 139 MMOL/L (ref 135–144)
SPECIFIC GRAVITY UA: 1.02 (ref 1–1.03)
T4 TOTAL: 5.5 UG/DL (ref 4.5–10.9)
TOTAL CO2, VENOUS: 29 MMOL/L (ref 23–30)
TOTAL PROTEIN: 6.5 G/DL (ref 6.4–8.3)
TRICHOMONAS: ABNORMAL
TROPONIN INTERP: NORMAL
TROPONIN T: NORMAL NG/ML
TROPONIN, HIGH SENSITIVITY: 13 NG/L (ref 0–14)
TROPONIN, HIGH SENSITIVITY: 13 NG/L (ref 0–14)
TROPONIN, HIGH SENSITIVITY: 14 NG/L (ref 0–14)
TSH SERPL DL<=0.05 MIU/L-ACNC: 10.76 MIU/L (ref 0.3–5)
TURBIDITY: CLEAR
URINE HGB: NEGATIVE
UROBILINOGEN, URINE: NORMAL
WBC # BLD: 10.3 K/UL (ref 3.5–11.3)
WBC # BLD: ABNORMAL 10*3/UL
WBC UA: ABNORMAL /HPF (ref 0–5)
YEAST: ABNORMAL

## 2021-04-03 PROCEDURE — 99285 EMERGENCY DEPT VISIT HI MDM: CPT

## 2021-04-03 PROCEDURE — 84436 ASSAY OF TOTAL THYROXINE: CPT

## 2021-04-03 PROCEDURE — 82330 ASSAY OF CALCIUM: CPT

## 2021-04-03 PROCEDURE — 83605 ASSAY OF LACTIC ACID: CPT

## 2021-04-03 PROCEDURE — 96365 THER/PROPH/DIAG IV INF INIT: CPT

## 2021-04-03 PROCEDURE — 2580000003 HC RX 258: Performed by: STUDENT IN AN ORGANIZED HEALTH CARE EDUCATION/TRAINING PROGRAM

## 2021-04-03 PROCEDURE — 85025 COMPLETE CBC W/AUTO DIFF WBC: CPT

## 2021-04-03 PROCEDURE — 6360000002 HC RX W HCPCS: Performed by: STUDENT IN AN ORGANIZED HEALTH CARE EDUCATION/TRAINING PROGRAM

## 2021-04-03 PROCEDURE — 71046 X-RAY EXAM CHEST 2 VIEWS: CPT

## 2021-04-03 PROCEDURE — 36415 COLL VENOUS BLD VENIPUNCTURE: CPT

## 2021-04-03 PROCEDURE — 84443 ASSAY THYROID STIM HORMONE: CPT

## 2021-04-03 PROCEDURE — 93005 ELECTROCARDIOGRAM TRACING: CPT | Performed by: STUDENT IN AN ORGANIZED HEALTH CARE EDUCATION/TRAINING PROGRAM

## 2021-04-03 PROCEDURE — 87186 SC STD MICRODIL/AGAR DIL: CPT

## 2021-04-03 PROCEDURE — 82803 BLOOD GASES ANY COMBINATION: CPT

## 2021-04-03 PROCEDURE — 83735 ASSAY OF MAGNESIUM: CPT

## 2021-04-03 PROCEDURE — 83880 ASSAY OF NATRIURETIC PEPTIDE: CPT

## 2021-04-03 PROCEDURE — 87077 CULTURE AEROBIC IDENTIFY: CPT

## 2021-04-03 PROCEDURE — 82565 ASSAY OF CREATININE: CPT

## 2021-04-03 PROCEDURE — 84132 ASSAY OF SERUM POTASSIUM: CPT

## 2021-04-03 PROCEDURE — 85610 PROTHROMBIN TIME: CPT

## 2021-04-03 PROCEDURE — 99223 1ST HOSP IP/OBS HIGH 75: CPT | Performed by: INTERNAL MEDICINE

## 2021-04-03 PROCEDURE — 2580000003 HC RX 258

## 2021-04-03 PROCEDURE — 1200000000 HC SEMI PRIVATE

## 2021-04-03 PROCEDURE — 93010 ELECTROCARDIOGRAM REPORT: CPT | Performed by: INTERNAL MEDICINE

## 2021-04-03 PROCEDURE — 82947 ASSAY GLUCOSE BLOOD QUANT: CPT

## 2021-04-03 PROCEDURE — 6370000000 HC RX 637 (ALT 250 FOR IP): Performed by: STUDENT IN AN ORGANIZED HEALTH CARE EDUCATION/TRAINING PROGRAM

## 2021-04-03 PROCEDURE — 82435 ASSAY OF BLOOD CHLORIDE: CPT

## 2021-04-03 PROCEDURE — 85014 HEMATOCRIT: CPT

## 2021-04-03 PROCEDURE — 87086 URINE CULTURE/COLONY COUNT: CPT

## 2021-04-03 PROCEDURE — 80053 COMPREHEN METABOLIC PANEL: CPT

## 2021-04-03 PROCEDURE — 85730 THROMBOPLASTIN TIME PARTIAL: CPT

## 2021-04-03 PROCEDURE — 84484 ASSAY OF TROPONIN QUANT: CPT

## 2021-04-03 PROCEDURE — 81001 URINALYSIS AUTO W/SCOPE: CPT

## 2021-04-03 PROCEDURE — 84295 ASSAY OF SERUM SODIUM: CPT

## 2021-04-03 RX ORDER — FUROSEMIDE 40 MG/1
40 TABLET ORAL DAILY
Status: DISCONTINUED | OUTPATIENT
Start: 2021-04-03 | End: 2021-04-05 | Stop reason: HOSPADM

## 2021-04-03 RX ORDER — ONDANSETRON 2 MG/ML
4 INJECTION INTRAMUSCULAR; INTRAVENOUS EVERY 6 HOURS PRN
Status: DISCONTINUED | OUTPATIENT
Start: 2021-04-03 | End: 2021-04-05 | Stop reason: HOSPADM

## 2021-04-03 RX ORDER — LEVOTHYROXINE SODIUM 0.12 MG/1
125 TABLET ORAL DAILY
Status: DISCONTINUED | OUTPATIENT
Start: 2021-04-03 | End: 2021-04-05 | Stop reason: HOSPADM

## 2021-04-03 RX ORDER — SODIUM CHLORIDE 9 MG/ML
25 INJECTION, SOLUTION INTRAVENOUS PRN
Status: DISCONTINUED | OUTPATIENT
Start: 2021-04-03 | End: 2021-04-05 | Stop reason: HOSPADM

## 2021-04-03 RX ORDER — DEXTROSE MONOHYDRATE 50 MG/ML
INJECTION, SOLUTION INTRAVENOUS CONTINUOUS
Status: DISCONTINUED | OUTPATIENT
Start: 2021-04-03 | End: 2021-04-04

## 2021-04-03 RX ORDER — DEXTROSE MONOHYDRATE 50 MG/ML
100 INJECTION, SOLUTION INTRAVENOUS PRN
Status: DISCONTINUED | OUTPATIENT
Start: 2021-04-03 | End: 2021-04-05 | Stop reason: HOSPADM

## 2021-04-03 RX ORDER — DOCUSATE SODIUM 100 MG/1
100 CAPSULE, LIQUID FILLED ORAL DAILY PRN
Status: DISCONTINUED | OUTPATIENT
Start: 2021-04-03 | End: 2021-04-05 | Stop reason: HOSPADM

## 2021-04-03 RX ORDER — ACETAMINOPHEN 650 MG/1
650 SUPPOSITORY RECTAL EVERY 6 HOURS PRN
Status: DISCONTINUED | OUTPATIENT
Start: 2021-04-03 | End: 2021-04-05 | Stop reason: HOSPADM

## 2021-04-03 RX ORDER — ATORVASTATIN CALCIUM 80 MG/1
80 TABLET, FILM COATED ORAL DAILY
Status: DISCONTINUED | OUTPATIENT
Start: 2021-04-03 | End: 2021-04-05 | Stop reason: HOSPADM

## 2021-04-03 RX ORDER — POLYETHYLENE GLYCOL 3350 17 G/17G
17 POWDER, FOR SOLUTION ORAL DAILY PRN
Status: DISCONTINUED | OUTPATIENT
Start: 2021-04-03 | End: 2021-04-05 | Stop reason: HOSPADM

## 2021-04-03 RX ORDER — ACETAMINOPHEN 325 MG/1
650 TABLET ORAL EVERY 6 HOURS PRN
Status: DISCONTINUED | OUTPATIENT
Start: 2021-04-03 | End: 2021-04-05 | Stop reason: HOSPADM

## 2021-04-03 RX ORDER — SODIUM CHLORIDE 0.9 % (FLUSH) 0.9 %
10 SYRINGE (ML) INJECTION EVERY 12 HOURS SCHEDULED
Status: DISCONTINUED | OUTPATIENT
Start: 2021-04-03 | End: 2021-04-05 | Stop reason: HOSPADM

## 2021-04-03 RX ORDER — MAGNESIUM SULFATE IN WATER 40 MG/ML
2000 INJECTION, SOLUTION INTRAVENOUS ONCE
Status: COMPLETED | OUTPATIENT
Start: 2021-04-03 | End: 2021-04-03

## 2021-04-03 RX ORDER — PROMETHAZINE HYDROCHLORIDE 12.5 MG/1
12.5 TABLET ORAL EVERY 6 HOURS PRN
Status: DISCONTINUED | OUTPATIENT
Start: 2021-04-03 | End: 2021-04-05 | Stop reason: HOSPADM

## 2021-04-03 RX ORDER — HEPARIN SODIUM 5000 [USP'U]/ML
5000 INJECTION, SOLUTION INTRAVENOUS; SUBCUTANEOUS EVERY 8 HOURS SCHEDULED
Status: DISCONTINUED | OUTPATIENT
Start: 2021-04-03 | End: 2021-04-05 | Stop reason: HOSPADM

## 2021-04-03 RX ORDER — SODIUM CHLORIDE 0.9 % (FLUSH) 0.9 %
10 SYRINGE (ML) INJECTION PRN
Status: DISCONTINUED | OUTPATIENT
Start: 2021-04-03 | End: 2021-04-05 | Stop reason: HOSPADM

## 2021-04-03 RX ORDER — NICOTINE POLACRILEX 4 MG
15 LOZENGE BUCCAL PRN
Status: DISCONTINUED | OUTPATIENT
Start: 2021-04-03 | End: 2021-04-05 | Stop reason: HOSPADM

## 2021-04-03 RX ORDER — LANOLIN ALCOHOL/MO/W.PET/CERES
325 CREAM (GRAM) TOPICAL 2 TIMES DAILY
Status: DISCONTINUED | OUTPATIENT
Start: 2021-04-03 | End: 2021-04-05 | Stop reason: HOSPADM

## 2021-04-03 RX ORDER — DEXTROSE MONOHYDRATE 25 G/50ML
12.5 INJECTION, SOLUTION INTRAVENOUS PRN
Status: DISCONTINUED | OUTPATIENT
Start: 2021-04-03 | End: 2021-04-05 | Stop reason: HOSPADM

## 2021-04-03 RX ORDER — CHOLECALCIFEROL (VITAMIN D3) 125 MCG
1000 CAPSULE ORAL DAILY
Status: DISCONTINUED | OUTPATIENT
Start: 2021-04-03 | End: 2021-04-05 | Stop reason: HOSPADM

## 2021-04-03 RX ORDER — ASPIRIN 81 MG/1
81 TABLET, CHEWABLE ORAL DAILY
Status: DISCONTINUED | OUTPATIENT
Start: 2021-04-03 | End: 2021-04-05 | Stop reason: HOSPADM

## 2021-04-03 RX ORDER — CLOPIDOGREL BISULFATE 75 MG/1
75 TABLET ORAL DAILY
Status: DISCONTINUED | OUTPATIENT
Start: 2021-04-03 | End: 2021-04-05 | Stop reason: HOSPADM

## 2021-04-03 RX ORDER — DEXTROSE MONOHYDRATE 25 G/50ML
INJECTION, SOLUTION INTRAVENOUS
Status: COMPLETED
Start: 2021-04-03 | End: 2021-04-03

## 2021-04-03 RX ORDER — SODIUM CHLORIDE, SODIUM LACTATE, POTASSIUM CHLORIDE, AND CALCIUM CHLORIDE .6; .31; .03; .02 G/100ML; G/100ML; G/100ML; G/100ML
1000 INJECTION, SOLUTION INTRAVENOUS ONCE
Status: COMPLETED | OUTPATIENT
Start: 2021-04-03 | End: 2021-04-03

## 2021-04-03 RX ADMIN — CEFTRIAXONE SODIUM 1000 MG: 1 INJECTION, POWDER, FOR SOLUTION INTRAMUSCULAR; INTRAVENOUS at 13:34

## 2021-04-03 RX ADMIN — CLOPIDOGREL 75 MG: 75 TABLET, FILM COATED ORAL at 20:52

## 2021-04-03 RX ADMIN — DEXTROSE MONOHYDRATE 50 ML: 25 INJECTION, SOLUTION INTRAVENOUS at 14:24

## 2021-04-03 RX ADMIN — SERTRALINE 100 MG: 50 TABLET, FILM COATED ORAL at 20:51

## 2021-04-03 RX ADMIN — SODIUM CHLORIDE, POTASSIUM CHLORIDE, SODIUM LACTATE AND CALCIUM CHLORIDE 1000 ML: 600; 310; 30; 20 INJECTION, SOLUTION INTRAVENOUS at 10:20

## 2021-04-03 RX ADMIN — MAGNESIUM SULFATE 2000 MG: 2 INJECTION INTRAVENOUS at 10:19

## 2021-04-03 RX ADMIN — HEPARIN SODIUM 5000 UNITS: 5000 INJECTION INTRAVENOUS; SUBCUTANEOUS at 22:40

## 2021-04-03 RX ADMIN — FERROUS SULFATE TAB EC 325 MG (65 MG FE EQUIVALENT) 325 MG: 325 (65 FE) TABLET DELAYED RESPONSE at 20:51

## 2021-04-03 RX ADMIN — DEXTROSE MONOHYDRATE 100 ML/HR: 50 INJECTION, SOLUTION INTRAVENOUS at 14:46

## 2021-04-03 RX ADMIN — ASPIRIN 81 MG: 81 TABLET, CHEWABLE ORAL at 20:51

## 2021-04-03 RX ADMIN — ATORVASTATIN CALCIUM 80 MG: 80 TABLET, FILM COATED ORAL at 20:51

## 2021-04-03 ASSESSMENT — PAIN SCALES - GENERAL: PAINLEVEL_OUTOF10: 0

## 2021-04-03 ASSESSMENT — ENCOUNTER SYMPTOMS
BACK PAIN: 0
ABDOMINAL PAIN: 0
VOMITING: 0
SHORTNESS OF BREATH: 0
RHINORRHEA: 0
COUGH: 0
NAUSEA: 0

## 2021-04-03 ASSESSMENT — PAIN DESCRIPTION - DESCRIPTORS: DESCRIPTORS: ACHING

## 2021-04-03 ASSESSMENT — PAIN DESCRIPTION - PAIN TYPE: TYPE: CHRONIC PAIN

## 2021-04-03 NOTE — ED TRIAGE NOTES
Per EMS family called out for low blood sugar and recent intermittent confusion. Per EMS family is concerned with possible UTI, pt reportedly hx UTI. Pt is alert [facility] and cooperative upon arrival to ER. Pt blood sugar was reported 40's by 1st responders and given an amp of glucose and blood sugars in 200's. Pt reported headache but STS this is chronic. Pt denied chest pain and nausea, SOB.

## 2021-04-03 NOTE — ED PROVIDER NOTES
[facility]  Emergency Department Encounter  Emergency Medicine Resident     Pt Name: Elvis Jones  MRN: [id number]  [place] 1935  Date of evaluation: 4/3/21  PCP:  Leonard Orellana MD    [address] [place] [address]       Chief Complaint   Patient presents with    Hypoglycemia     per 1st responders       HISTORY OF PRESENT ILLNESS  (Location/Symptom, Timing/Onset, Context/Setting, Quality, Duration, Modifying Factors, Severity.)    Elvis Jones is a 82 y.o. female who presents with hyperglycemia. Patient's glucose this morning was 40 mg/dL. EMS to give patient 1 amp of D50 and glucose silvestre to 243 mg/dL. Patient states that she has a history of diabetes with blood sugars that are difficult to control. Also states that over the past few days she has had increasing amount of fatigue along with some dysuria. States that she often does get urinary tract infections. Endorses some intermittent shortness of breath but denies any chest pain. States that she has had decreased oral intake overall. Denies any body aches, chills, nausea, vomiting. States that she has been taking her insulin and other medications as prescribed. PPE Worn:  Gloves: Yes  Eye Protection: Goggles  Mask: N95  Gown: NO    PAST MEDICAL / SURGICAL / SOCIAL / FAMILY HISTORY    has a past medical history of Arthritis, Atrial flutter ([address] CAD (coronary artery disease), CHF (congestive heart failure) ([address] Diabetes ([address] Diabetes mellitus ([address] Hyperlipidemia, Hypertension, Psychiatric problem, Thyroid cancer ([address] and Thyroid disease. has a past surgical history that includes Coronary angioplasty with stent; Hysterectomy; Cholecystectomy; Thyroidectomy; Appendectomy; and back surgery.     Social History     Socioeconomic History    Marital status:      Spouse name: Not on file    Number of children: Not on file    Years of education: Not on file    Highest education level: Not on file   Occupational History  Not on file   Social Needs    Financial resource strain: Not hard at all   [place] insecurity     Worry: Never true     Inability: Never true   [employer] needs     Medical: No     Non-medical: No   Tobacco Use    Smoking status: Former Smoker    Smokeless tobacco: Never Used   Substance and Sexual Activity    Alcohol use: No     Frequency: Never    Drug use: No    Sexual activity: Not Currently   Lifestyle    Physical activity     Days per week: 0 days     Minutes per session: 0 min    Stress: Not at all   Relationships    Social connections     Talks on phone: More than three times a week     Gets together: More than three times a week     Attends [employer] service: Never     Active member of club or organization: No     Attends meetings of clubs or organizations: Never     Relationship status:     Intimate partner violence     Fear of current or ex partner: Not on file     Emotionally abused: Not on file     Physically abused: Not on file     Forced sexual activity: Not on file   Other Topics Concern    Not on file   Social History Narrative    Not on file       Family History   Problem Relation Age of Onset    Cancer Mother     Cancer Father     Cancer Brother     Diabetes Brother        Allergies:    Strawberry extract and Tape [adhesive tape]    Home Medications:  Prior to Admission medications    Medication Sig Start Date End Date Taking?  Authorizing Provider   atorvastatin (LIPITOR) 80 MG tablet Take 1 tablet by mouth daily 4/1/21   Renita Vuong MD   ferrous sulfate (FE TABS) 325 (65 Fe) MG EC tablet Take 1 tablet by mouth 2 times daily 4/1/21   Renita Vuong MD   furosemide (LASIX) 40 MG tablet Take 1 tablet by mouth daily 4/1/21   Rneita Vuong MD   potassium chloride (KLOR-CON M) 20 MEQ extended release tablet Take 1 tablet by mouth daily 4/1/21   Renita Vuong MD   vitamin D3 (CHOLECALCIFEROL) 25 MCG (1000 UT) TABS tablet TAKE 1 TAB BY MOUTH ONCE A DAY 4/1/21   Braydon John Barrientos MD   blood glucose monitor strips Test 2 times a day & as needed for symptoms of irregular blood glucose. Dispense sufficient amount for indicated testing frequency plus additional to accommodate PRN testing needs.  3/29/21   Hayden Archibald MD   levothyroxine (SYNTHROID) 125 MCG tablet TAKE 1 TAB BY MOUTH EVERY MORNING 3/29/21   Hayden Archibald MD   insulin glargine (BASAGLAR KWIKPEN) 100 UNIT/ML injection pen Inject 50 Units into the skin 2 times daily 3/29/21   Hayden Archibald MD   loratadine (CLARITIN) 10 MG tablet TAKE 1 TAB BY MOUTH ONCE A DAY  Patient not taking: Reported on 3/29/2021 3/12/21   Hayden Archibald MD   sertraline (ZOLOFT) 100 MG tablet TAKE 1 TAB BY MOUTH ONCE A DAY 1/29/21   Hayden Archibald MD   metFORMIN (GLUCOPHAGE) 500 MG tablet Take 1 tablet by mouth 3 times daily 1/29/21   Hayden Archibald MD   metoprolol tartrate (LOPRESSOR) 25 MG tablet Take 1 tablet by mouth 2 times daily 12/10/20   Francis Sanchez MD   Continuous Blood Gluc  (FREESTYLE KERRY 14 DAY READER) CLAUDIO 1 Device by Does not apply route 4 times daily  Patient not taking: Reported on 1/29/2021 11/30/20   Hayden Archibald MD   Continuous Blood Gluc Sensor (FREESTYLE KERRY 2 SENSOR SYSTM) MISC 4 Devices by Does not apply route every 7 days  Patient not taking: Reported on 1/29/2021 11/30/20   Hayden Archibald MD   clopidogrel (PLAVIX) 75 MG tablet TAKE 1 TAB BY MOUTH ONCE A DAY 6/19/20   Santosh Zuñiga MD   SITagliptin (JANUVIA) 100 MG tablet TAKE 1 TAB BY MOUTH ONCE A DAY 5/22/20   Noe Ortiz PA-C   Insulin Pen Needle 29G X 12MM MISC 1 each by Does not apply route daily 12/11/19   Hayden Archibald MD   Cyanocobalamin (VITAMIN B-12) 1000 MCG extended release tablet TAKE 1 TAB BY MOUTH ONCE A DAY 10/3/19   Hayden Archibald MD   Lancets MISC 1 each by Does not apply route 2 times daily 7/5/19   Hayden Archibald MD   diclofenac sodium (VOLTAREN) 1 % GEL Apply 2 g topically 2 times daily  Patient not taking: Reported on 3/29/2021 solution    glucagon (rDNA) injection 1 mg    dextrose 5 % solution    insulin lispro (HUMALOG) injection vial 0-12 Units    insulin lispro (HUMALOG) injection vial 0-6 Units    cefTRIAXone (ROCEPHIN) 1000 mg IVPB in 50 mL D5W minibag     Order Specific Question:   Antimicrobial Indications     Answer:   Urinary Tract Infection    dextrose 50 % solution     Dejah Ko: cabinet override         DIAGNOSTIC RESULTS / EMERGENCYDEPARTMENT COURSE / MDM   LABS:  Labs Reviewed   CBC WITH AUTO DIFFERENTIAL - Abnormal; Notable for the following components:       Result Value    RDW 16.1 (*)     Seg Neutrophils 78 (*)     Lymphocytes 13 (*)     Immature Granulocytes 1 (*)     Segs Absolute 8.13 (*)     All other components within normal limits   COMPREHENSIVE METABOLIC PANEL - Abnormal; Notable for the following components:    CREATININE 1.05 (*)     Calcium 8.3 (*)     Potassium 3.6 (*)     Total Bilirubin 0.20 (*)     GFR Non- 50 (*)     All other components within normal limits   MAGNESIUM - Abnormal; Notable for the following components:    Magnesium 1.3 (*)     All other components within normal limits   BRAIN NATRIURETIC PEPTIDE - Abnormal; Notable for the following components:    Pro- (*)     All other components within normal limits   LACTIC ACID, PLASMA - Abnormal; Notable for the following components:    Lactic Acid, Whole Blood 2.7 (*)     All other components within normal limits   URINALYSIS WITH MICROSCOPIC - Abnormal; Notable for the following components:    Glucose, Ur TRACE (*)     Ketones, Urine TRACE (*)     Leukocyte Esterase, Urine TRACE (*)     Bacteria, UA MANY (*)     All other components within normal limits   POTASSIUM (POC) - Abnormal; Notable for the following components:    POC Potassium 3.4 (*)     All other components within normal limits   CALCIUM, IONIC (POC) - Abnormal; Notable for the following components:    POC Ionized Calcium 0.93 (*)     All other components within normal limits   TSH WITHOUT REFLEX - Abnormal; Notable for the following components:    TSH 10.76 (*)     All other components within normal limits   VENOUS BLOOD GAS, POINT OF CARE - Abnormal; Notable for the following components:    pO2, Uday 28.3 (*)     O2 Sat, Uday 52 (*)     All other components within normal limits   CREATININE W/GFR POINT OF CARE - Abnormal; Notable for the following components:    POC Creatinine 1.26 (*)     GFR Comment 49 (*)     GFR Non- 41 (*)     All other components within normal limits   LACTIC ACID,POINT OF CARE - Abnormal; Notable for the following components:    POC Lactic Acid 2.35 (*)     All other components within normal limits   POC GLUCOSE FINGERSTICK - Abnormal; Notable for the following components:    POC Glucose 40 (*)     All other components within normal limits   CULTURE, URINE   TROPONIN   APTT   PROTIME-INR   HGB/HCT   SODIUM (POC)   CHLORIDE (POC)   T4   POC BLOOD GAS AND CHEMISTRY   POCT GLUCOSE   ANION GAP (CALC) POC   POC GLUCOSE FINGERSTICK   POC GLUCOSE FINGERSTICK   POCT GLUCOSE   POCT GLUCOSE   POCT GLUCOSE   POCT GLUCOSE   POCT GLUCOSE   POCT GLUCOSE       RADIOLOGY:  Xr Chest (2 Vw)    Result Date: 4/3/2021  EXAMINATION: TWO XRAY VIEWS OF THE CHEST 4/3/2021 9:35 am COMPARISON: 10/11/2018 HISTORY: ORDERING SYSTEM PROVIDED HISTORY: shortness of breath, lower extermity edema, concern for fluid overload TECHNOLOGIST PROVIDED HISTORY: shortness of breath, lower extermity edema, concern for fluid overload Reason for Exam: PORT/UPRIGHT FINDINGS: The lungs are without acute focal process. There is no effusion or pneumothorax. The cardiomediastinal silhouette is prominent but stable. The osseous structures are without acute process. No acute process. Redemonstration of cardiomegaly. No acute CHF or pneumonia.        EKG    EKG Interpretation    Interpreted by me    Rhythm: Sinus bradycardia  Rate: Bradycardic, 52  Axis: Left axis deviation Ectopy: none  Conduction: Right bundle branch block  ST Segments: no acute change  T Waves: no acute change  Q Waves: none    Clinical Impression: Sinus bradycardia rate of 52, left axis deviation with a right bundle branch block pattern. Prolonged NC interval 220 ms, first-degree AV block. First-degree AV block and right bundle branch block new compared to EKG from 1/4/2018. All EKG's are interpreted by the Emergency Department Physician whoeither signs or Co-signs this chart in the absence of a cardiologist.    Impression:  Patient presents hypoglycemia. Did receive an amp of D50 by EMS. Glucose currently 88 mg/dL. Is also complaining of dysuria, does have a history of urinary tract infections. Pitting edema noted to the bilateral lower extremities. Patient is bradycardic not tachypneic. Will do broad work-up at this time along with urine analysis, urine culture. Patient not currently meeting SIRS criteria. We will keep an eye on the glucose. Also check BNP, chest x-ray with troponin evaluation of possible congestive heart failure worsening. EMERGENCY DEPARTMENT COURSE:   Less likely congestive heart failure worsening however patient's urine concerning for infection. Does have many bacteria in urine. Started patient on ceftriaxone. Given patient's hypoglycemic episode on her normal insulin regimen concern that patient may need adjustment to her insulin. Levothyroxine will also need to be adjusted. Spoke to internal medicine who accepted the patient to their service. Care transferred to internal medicine.     MDM  Number of Diagnoses or Management Options  Acute cystitis without hematuria: new, needed workup  Hypoglycemia: new, needed workup     Amount and/or Complexity of Data Reviewed  Clinical lab tests: ordered and reviewed  Tests in the radiology section of CPT®: ordered and reviewed  Review and summarize past medical records: yes  Discuss the patient with other providers: yes

## 2021-04-03 NOTE — ED NOTES
Bed: 23  Expected date:   Expected time:   Means of arrival:   Comments:  GEMMA from [address] [place] [address] Rd, RN  04/03/21 0254

## 2021-04-03 NOTE — ED PROVIDER NOTES
[address]  ED  eMERGENCY dEPARTMENT eNCOUnter   Attending Attestation     Pt Name: Al Fajardo  MRN: [id number]  [place] 1935  Date of evaluation: 4/3/21       Al Fajardo is a 82 y.o. female who presents with Hypoglycemia (per 1st responders)      History: Patient presents with low blood sugar responders. Patient is improved now but they are concerned that she has a UTI. Patient says she feels cold but also has lower extremity edema. No other complaints. Exam: Heart rate mildly bradycardic. Lungs are clear to auscultation bilaterally. Abdomen is soft, nontender. Patient is well-appearing. Plan for cardiac work-up, urinalysis, chest x-ray fluids to rule out pneumonia, probable admission. I performed a history and physical examination of the patient and discussed management with the resident. I reviewed the residents note and agree with the documented findings and plan of care. Any areas of disagreement are noted on the chart. I was personally present for the key portions of any procedures. I have documented in the chart those procedures where I was not present during the key portions. I have personally reviewed all images and agree with the resident's interpretation. I have reviewed the emergency nurses triage note. I agree with the chief complaint, past medical history, past surgical history, allergies, medications, social and family history as documented unless otherwise noted below. Documentation of the HPI, Physical Exam and Medical Decision Making performed by medical students or scribes is based on my personal performance of the HPI, PE and MDM. For Phys Assistant/ Nurse Practitioner cases/documentation I have had a face to face evaluation of this patient and have completed at least one if not all key elements of the E/M (history, physical exam, and MDM). Additional findings are as noted.     For APC cases I have personally evaluated and examined the patient in conjunction with the APC and agree with the treatment plan and disposition of the patient as recorded by the APC.     Maria De Jesus Ramirez MD  Attending Emergency  Physician       Soham Rangel MD  04/03/21 6928

## 2021-04-03 NOTE — ED NOTES
internal med team at bedside     Kirstin Diez, [facility]  04/03/21 5623

## 2021-04-03 NOTE — H&P
fatigue, fever and unexpected weight change. HENT: Negative for congestion. Eyes: Negative for discharge and itching. Respiratory: Negative for apnea, cough, choking, chest tightness, shortness of breath, wheezing and stridor. Cardiovascular: Negative for chest pain, palpitations and leg swelling. Gastrointestinal: Negative for abdominal distention, abdominal pain, blood in stool, diarrhea, nausea and vomiting. Genitourinary: Positive for frequency. Negative for difficulty urinating and urgency. Musculoskeletal: Negative for arthralgias. Skin: Negative for color change. Neurological: Negative for dizziness, tremors, seizures, syncope, facial asymmetry, speech difficulty, weakness, light-headedness, numbness and headaches. Hematological: Negative for adenopathy. Psychiatric/Behavioral: Negative for agitation, behavioral problems, confusion, decreased concentration, dysphoric mood, hallucinations, self-injury, sleep disturbance and suicidal ideas. The patient is not nervous/anxious and is not hyperactive. PAST MEDICAL HISTORY     Past Medical History:   Diagnosis Date    Arthritis     [facility] ([facility]     CAD (coronary artery disease)     CHF (congestive heart failure) ([facility]     Diabetes ([facility]     Diabetes mellitus ([facility]     Hyperlipidemia     Hypertension     Psychiatric problem     [facility]     S/P thyroidectomy; on synthroid    Thyroid disease     hypothyroid       PAST SURGICAL HISTORY     Past Surgical History:   Procedure Laterality Date    APPENDECTOMY      BACK SURGERY      CHOLECYSTECTOMY      CORONARY ANGIOPLASTY WITH STENT PLACEMENT      HYSTERECTOMY      THYROIDECTOMY         ALLERGIES     Strawberry extract and Tape [adhesive tape]    MEDICATIONS PRIOR TO ADMISSION     Prior to Admission medications    Medication Sig Start Date End Date Taking?  Authorizing Provider   atorvastatin (LIPITOR) 80 MG tablet Take 1 tablet by mouth daily 4/1/21 erythema, exudates or cobblestoning. No thrush was noted. Neck: Supple without thyromegaly. No elevated JVP. Trachea was midline. Respiratory: Chest was symmetrical without dullness to percussion. Breath sounds bilaterally were clear to auscultation. There were no wheezes, rhonchi or rales. There is no intercostal retraction or use of accessory muscles. No egophony noted. Cardiovascular: Regular without murmur, clicks, gallops or rubs. Abdomen: Slightly rounded and soft without organomegaly. No rebound, rigidity or guarding was appreciated. Lymphatic: No lymphadenopathy. Musculoskeletal: Normal curvature of the spine. No gross muscle weakness. Extremities: Positive for nonpitting edema but no ulcerations, tenderness, varicosities or erythema. Muscle size, tone and strength are normal.  No involuntary movements are noted. Skin:  Warm and dry. Good color, turgor and pigmentation. No lesions or scars.   No cyanosis or clubbing  Neurological/Psychiatric: The patient's general behavior, level of consciousness, thought content and emotional status is normal.          INVESTIGATIONS     Laboratory Testing:     Recent Results (from the past 24 hour(s))   Venous Blood Gas, POC    Collection Time: 04/03/21  8:40 AM   Result Value Ref Range    pH, Uday 7.384 7.320 - 7.430    pCO2, Uday 46.4 41.0 - 51.0 mm Hg    pO2, Uday 28.3 (L) 30 - 50 mm Hg    HCO3, Venous 27.7 22.0 - 29.0 mmol/L    Total CO2, Venous 29 23.0 - 30.0 mmol/L    Negative Base Excess, Uday NOT REPORTED 0.0 - 2.0    Positive Base Excess, Uday 2 0.0 - 3.0    O2 Sat, Uday 52 (L) 60.0 - 85.0 %    O2 Device/Flow/% NOT REPORTED     Redd Test NOT REPORTED     Sample Site NOT REPORTED     Mode NOT REPORTED     FIO2 NOT REPORTED     Pt Temp NOT REPORTED     POC pH Temp NOT REPORTED     POC pCO2 Temp NOT REPORTED mm Hg    POC pO2 Temp NOT REPORTED mm Hg   Hemoglobin and hematocrit, blood    Collection Time: 04/03/21  8:40 AM   Result Value Ref Range    POC Hemoglobin 13.3 12.0 - 16.0 g/dL    POC Hematocrit 39 36 - 46 %   Creatinine W/GFR Point of Care    Collection Time: 04/03/21  8:40 AM   Result Value Ref Range    POC Creatinine 1.26 (H) 0.51 - 1.19 mg/dL    GFR Comment 49 (L) >60 mL/min    GFR Non- 41 (L) >60 mL/min    GFR Comment         SODIUM (POC)    Collection Time: 04/03/21  8:40 AM   Result Value Ref Range    POC Sodium 142 138 - 146 mmol/L   POTASSIUM (POC)    Collection Time: 04/03/21  8:40 AM   Result Value Ref Range    POC Potassium 3.4 (L) 3.5 - 4.5 mmol/L   CHLORIDE (POC)    Collection Time: 04/03/21  8:40 AM   Result Value Ref Range    POC Chloride 103 98 - 107 mmol/L   CALCIUM, IONIC (POC)    Collection Time: 04/03/21  8:40 AM   Result Value Ref Range    POC Ionized Calcium 0.93 (L) 1.15 - 1.33 mmol/L   Lactic Acid, POC    Collection Time: 04/03/21  8:40 AM   Result Value Ref Range    POC Lactic Acid 2.35 (H) 0.56 - 1.39 mmol/L   POCT Glucose    Collection Time: 04/03/21  8:40 AM   Result Value Ref Range    POC Glucose 88 74 - 100 mg/dL   Anion Gap (Calc) POC    Collection Time: 04/03/21  8:40 AM   Result Value Ref Range    Anion Gap 11 7 - 16 mmol/L   EKG 12 Lead    Collection Time: 04/03/21  8:53 AM   Result Value Ref Range    Ventricular Rate 52 BPM    Atrial Rate 52 BPM    P-R Interval 228 ms    QRS Duration 150 ms    Q-T Interval 540 ms    QTc Calculation (Bazett) 502 ms    P Axis 31 degrees    R Axis -59 degrees    T Axis 38 degrees   CBC Auto Differential    Collection Time: 04/03/21  9:02 AM   Result Value Ref Range    WBC 10.3 3.5 - 11.3 k/uL    RBC 4.56 3.95 - 5.11 m/uL    Hemoglobin 12.8 11.9 - 15.1 g/dL    Hematocrit 40.4 36.3 - 47.1 %    MCV 88.6 82.6 - 102.9 fL    MCH 28.1 25.2 - 33.5 pg    MCHC 31.7 28.4 - 34.8 g/dL    RDW 16.1 (H) 11.8 - 14.4 %    Platelets 447 830 - 952 k/uL    MPV 11.5 8.1 - 13.5 fL    NRBC Automated 0.0 0.0 per 100 WBC    Differential Type NOT REPORTED     Seg Neutrophils 78 (H) 36 - 65 % ASSESSMENT & PLAN     ASSESSMENT / PLAN:     Hypoglycemia:  -Probably due to poor intake and recent increase in the dose of the insulin  -Check blood glucose every 2 hourly  -Hypoglycemia protocol  -On hold all home antidiabetic medications  -Started the patient on medium dose sliding scale  -Continue to monitor    Urinary tract infection:  -Patient is having trace leukocyte esterase and many bacteria on urinalysis  -Started on Rocephin  -Follow-up on urine cultures    Type 2 diabetes mellitus:  -Last HbA1c is 14  -Patient was on Lantus 50 units twice daily, Metformin 500 mg 3 times daily and Januvia 100 mg daily  -Held on oral antidiabetic medications  -Monitor blood glucose every 2 hours  -Hypoglycemia protocol  -Medium dose sliding scale    CAD s/p stent LAD 2012:  -Continue aspirin 81 mg daily, clopidogrel 75 mg daily and atorvastatin 80 mg daily  -Troponins are 14  -EKG is showing right bundle branch block with first-degree heart block  -We will trend troponin's  -Continue to monitor    Hypothyroidism:  -Continue levothyroxine    DVT ppx:Heparin  GI ppx: Not needed at the moment    PT/OT/SW: Consulted  Discharge Planning: to help with discharge of the patient    Rina Fischer MD  Internal Medicine Resident, PGY-1  [facility];  [place], [place]  4/3/2021, 7:33 PM

## 2021-04-03 NOTE — ED NOTES
Pt tolerated straight cath well. Urine correctly labeled and sent to lab via tube system.      Jacki Morocho RN  04/03/21 1038

## 2021-04-03 NOTE — Clinical Note
Patient Class: Inpatient [101]   REQUIRED: Diagnosis: Hypoglycemia [714901]   Estimated Length of Stay: Estimated stay of more than 2 midnights   Admitting Provider: Stacey Lao [[id number]]

## 2021-04-04 LAB
ABSOLUTE EOS #: 0.19 K/UL (ref 0–0.44)
ABSOLUTE IMMATURE GRANULOCYTE: 0.05 K/UL (ref 0–0.3)
ABSOLUTE LYMPH #: 1.8 K/UL (ref 1.1–3.7)
ABSOLUTE MONO #: 0.49 K/UL (ref 0.1–1.2)
ANION GAP SERPL CALCULATED.3IONS-SCNC: 10 MMOL/L (ref 9–17)
BASOPHILS # BLD: 1 % (ref 0–2)
BASOPHILS ABSOLUTE: 0.07 K/UL (ref 0–0.2)
BUN BLDV-MCNC: 20 MG/DL (ref 8–23)
BUN/CREAT BLD: ABNORMAL (ref 9–20)
CALCIUM SERPL-MCNC: 7.6 MG/DL (ref 8.6–10.4)
CHLORIDE BLD-SCNC: 102 MMOL/L (ref 98–107)
CO2: 26 MMOL/L (ref 20–31)
CREAT SERPL-MCNC: 1.04 MG/DL (ref 0.5–0.9)
CULTURE: ABNORMAL
DIFFERENTIAL TYPE: ABNORMAL
EOSINOPHILS RELATIVE PERCENT: 2 % (ref 1–4)
GFR AFRICAN AMERICAN: >60 ML/MIN
GFR NON-AFRICAN AMERICAN: 51 ML/MIN
GFR SERPL CREATININE-BSD FRML MDRD: ABNORMAL ML/MIN/{1.73_M2}
GFR SERPL CREATININE-BSD FRML MDRD: ABNORMAL ML/MIN/{1.73_M2}
GLUCOSE BLD-MCNC: 109 MG/DL (ref 65–105)
GLUCOSE BLD-MCNC: 120 MG/DL (ref 70–99)
GLUCOSE BLD-MCNC: 132 MG/DL (ref 65–105)
GLUCOSE BLD-MCNC: 141 MG/DL (ref 65–105)
GLUCOSE BLD-MCNC: 150 MG/DL (ref 65–105)
GLUCOSE BLD-MCNC: 152 MG/DL (ref 65–105)
GLUCOSE BLD-MCNC: 166 MG/DL (ref 65–105)
GLUCOSE BLD-MCNC: 186 MG/DL (ref 65–105)
GLUCOSE BLD-MCNC: 208 MG/DL (ref 65–105)
GLUCOSE BLD-MCNC: 214 MG/DL (ref 65–105)
GLUCOSE BLD-MCNC: 97 MG/DL (ref 65–105)
GLUCOSE BLD-MCNC: 99 MG/DL (ref 65–105)
HCT VFR BLD CALC: 33.6 % (ref 36.3–47.1)
HEMOGLOBIN: 10.5 G/DL (ref 11.9–15.1)
IMMATURE GRANULOCYTES: 1 %
LYMPHOCYTES # BLD: 21 % (ref 24–43)
Lab: ABNORMAL
MCH RBC QN AUTO: 28.2 PG (ref 25.2–33.5)
MCHC RBC AUTO-ENTMCNC: 31.3 G/DL (ref 28.4–34.8)
MCV RBC AUTO: 90.1 FL (ref 82.6–102.9)
MONOCYTES # BLD: 6 % (ref 3–12)
NRBC AUTOMATED: 0 PER 100 WBC
PDW BLD-RTO: 15.9 % (ref 11.8–14.4)
PLATELET # BLD: 174 K/UL (ref 138–453)
PLATELET ESTIMATE: ABNORMAL
PMV BLD AUTO: 12.1 FL (ref 8.1–13.5)
POTASSIUM SERPL-SCNC: 3.9 MMOL/L (ref 3.7–5.3)
RBC # BLD: 3.73 M/UL (ref 3.95–5.11)
RBC # BLD: ABNORMAL 10*6/UL
SEG NEUTROPHILS: 69 % (ref 36–65)
SEGMENTED NEUTROPHILS ABSOLUTE COUNT: 5.81 K/UL (ref 1.5–8.1)
SODIUM BLD-SCNC: 138 MMOL/L (ref 135–144)
SPECIMEN DESCRIPTION: ABNORMAL
TROPONIN INTERP: ABNORMAL
TROPONIN T: ABNORMAL NG/ML
TROPONIN, HIGH SENSITIVITY: 15 NG/L (ref 0–14)
WBC # BLD: 8.4 K/UL (ref 3.5–11.3)
WBC # BLD: ABNORMAL 10*3/UL

## 2021-04-04 PROCEDURE — 80048 BASIC METABOLIC PNL TOTAL CA: CPT

## 2021-04-04 PROCEDURE — 82947 ASSAY GLUCOSE BLOOD QUANT: CPT

## 2021-04-04 PROCEDURE — 1200000000 HC SEMI PRIVATE

## 2021-04-04 PROCEDURE — 85025 COMPLETE CBC W/AUTO DIFF WBC: CPT

## 2021-04-04 PROCEDURE — 97530 THERAPEUTIC ACTIVITIES: CPT

## 2021-04-04 PROCEDURE — 99232 SBSQ HOSP IP/OBS MODERATE 35: CPT | Performed by: INTERNAL MEDICINE

## 2021-04-04 PROCEDURE — 6370000000 HC RX 637 (ALT 250 FOR IP): Performed by: STUDENT IN AN ORGANIZED HEALTH CARE EDUCATION/TRAINING PROGRAM

## 2021-04-04 PROCEDURE — 36415 COLL VENOUS BLD VENIPUNCTURE: CPT

## 2021-04-04 PROCEDURE — 2580000003 HC RX 258: Performed by: STUDENT IN AN ORGANIZED HEALTH CARE EDUCATION/TRAINING PROGRAM

## 2021-04-04 PROCEDURE — 97162 PT EVAL MOD COMPLEX 30 MIN: CPT

## 2021-04-04 PROCEDURE — 84484 ASSAY OF TROPONIN QUANT: CPT

## 2021-04-04 PROCEDURE — 6360000002 HC RX W HCPCS: Performed by: STUDENT IN AN ORGANIZED HEALTH CARE EDUCATION/TRAINING PROGRAM

## 2021-04-04 RX ORDER — SODIUM CHLORIDE 9 MG/ML
INJECTION, SOLUTION INTRAVENOUS CONTINUOUS
Status: DISCONTINUED | OUTPATIENT
Start: 2021-04-04 | End: 2021-04-04

## 2021-04-04 RX ADMIN — ATORVASTATIN CALCIUM 80 MG: 80 TABLET, FILM COATED ORAL at 07:49

## 2021-04-04 RX ADMIN — FUROSEMIDE 40 MG: 40 TABLET ORAL at 07:49

## 2021-04-04 RX ADMIN — FERROUS SULFATE TAB EC 325 MG (65 MG FE EQUIVALENT) 325 MG: 325 (65 FE) TABLET DELAYED RESPONSE at 21:09

## 2021-04-04 RX ADMIN — HEPARIN SODIUM 5000 UNITS: 5000 INJECTION INTRAVENOUS; SUBCUTANEOUS at 13:25

## 2021-04-04 RX ADMIN — CEFTRIAXONE SODIUM 1000 MG: 1 INJECTION, POWDER, FOR SOLUTION INTRAMUSCULAR; INTRAVENOUS at 13:41

## 2021-04-04 RX ADMIN — ACETAMINOPHEN 650 MG: 325 TABLET ORAL at 02:32

## 2021-04-04 RX ADMIN — ACETAMINOPHEN 650 MG: 325 TABLET ORAL at 13:25

## 2021-04-04 RX ADMIN — DEXTROSE MONOHYDRATE: 50 INJECTION, SOLUTION INTRAVENOUS at 02:28

## 2021-04-04 RX ADMIN — HEPARIN SODIUM 5000 UNITS: 5000 INJECTION INTRAVENOUS; SUBCUTANEOUS at 05:25

## 2021-04-04 RX ADMIN — HEPARIN SODIUM 5000 UNITS: 5000 INJECTION INTRAVENOUS; SUBCUTANEOUS at 21:09

## 2021-04-04 RX ADMIN — CLOPIDOGREL 75 MG: 75 TABLET, FILM COATED ORAL at 07:49

## 2021-04-04 RX ADMIN — FERROUS SULFATE TAB EC 325 MG (65 MG FE EQUIVALENT) 325 MG: 325 (65 FE) TABLET DELAYED RESPONSE at 07:49

## 2021-04-04 RX ADMIN — SERTRALINE 100 MG: 50 TABLET, FILM COATED ORAL at 07:48

## 2021-04-04 RX ADMIN — SODIUM CHLORIDE, PRESERVATIVE FREE 10 ML: 5 INJECTION INTRAVENOUS at 21:08

## 2021-04-04 RX ADMIN — ASPIRIN 81 MG: 81 TABLET, CHEWABLE ORAL at 07:49

## 2021-04-04 RX ADMIN — LEVOTHYROXINE SODIUM 125 MCG: 125 TABLET ORAL at 05:25

## 2021-04-04 RX ADMIN — CYANOCOBALAMIN TAB 500 MCG 1000 MCG: 500 TAB at 07:48

## 2021-04-04 ASSESSMENT — PAIN DESCRIPTION - PAIN TYPE: TYPE: CHRONIC PAIN

## 2021-04-04 ASSESSMENT — PAIN DESCRIPTION - DESCRIPTORS: DESCRIPTORS: ACHING;SHARP

## 2021-04-04 ASSESSMENT — PAIN DESCRIPTION - ORIENTATION: ORIENTATION: LEFT

## 2021-04-04 ASSESSMENT — PAIN SCALES - GENERAL: PAINLEVEL_OUTOF10: 3

## 2021-04-04 ASSESSMENT — PAIN - FUNCTIONAL ASSESSMENT: PAIN_FUNCTIONAL_ASSESSMENT: PREVENTS OR INTERFERES SOME ACTIVE ACTIVITIES AND ADLS

## 2021-04-04 NOTE — CARE COORDINATION
Case Management Initial Discharge [facility],         Met with:patient to discuss discharge plans. Information verified: address, contacts, phone number, , insurance Yes    Emergency Contact/Next of Kin name & number: pt's daughter Khoa Levine [phone] and daughter Sukhjinder López [phone]    PCP: Raghav Montelongo MD  Date of last visit: Last Monday    Insurance Provider: Medicare    Discharge Planning    Living Arrangements:  Children   Support Systems:  Family Members    Home has 1 story  4 stairs to climb to get into front door    Patient able to perform ADL's:Independent    Current Services (outpatient & in home) none  DME equipment: Electric Chair, glucometer  DME provider: n/a    Receiving oral anticoagulation therapy? Yes, Plavix and baby ASA    If indicated:   Physician managing anticoagulation treatment: n/a  Where does patient obtain lab work for ATC treatment? n/a      Potential Assistance Needed:  Home Care    Patient agreeable to home care: Yes  Freedom of choice provided:  yes, referral sent to [facility]    Prior SNF/Rehab Placement and Facility: none  Agreeable to SNF/Rehab: No  [place] of choice provided: n/a     Evaluation: n/a    Expected Discharge date:  21    Patient expects to be discharged to:  Home  Follow Up Appointment: Best Day/ Time: Monday AM    Transportation provider: daughter  Transportation arrangements needed for discharge: No    Readmission Risk              Risk of Unplanned Readmission:        25             Does patient have a readmission risk score greater than 14?: Yes  If yes, follow-up appointment must be made within 7 days of discharge.      Goals of Care:       Discharge Plan:  Home w/ [facility]- referral sent, daughter will transport, has established PCP        Electronically signed by Raleigh Borges RN on 21 at 3:26 PM EDT

## 2021-04-04 NOTE — PROGRESS NOTES
A+1 very unsafely. She was unable to pivot well and finally turned so her buttocks were just on the EOB and laid back across the bed; max A to reposition in the bed. I discussed short term SNF to improve safety with transfers, she wanted time to \"think about it\". She states she has fallen at home and when this happens EMT has to come b/c she's unable to get up from the floor. Body structures, Functions, Activity limitations: Decreased functional mobility ; Decreased ROM; Decreased strength;Decreased safe awareness;Decreased endurance;Decreased balance; Increased pain;Decreased posture  Prognosis: Fair  Decision Making: Medium Complexity  PT Education: Goals;PT Role;Plan of Care  Patient Education: discussed possible SNF for short term rehab since pt has fallen at home and has difficulty transferring from bed to chair and back; pt states she will \"think about it\"  REQUIRES PT FOLLOW UP: Yes  Activity Tolerance  Activity Tolerance: Patient limited by pain; Patient limited by endurance       Patient Diagnosis(es): The primary encounter diagnosis was Hypoglycemia. A diagnosis of Acute cystitis without hematuria was also pertinent to this visit. has a past medical history of Arthritis, Atrial flutter ([address] CAD (coronary artery disease), CHF (congestive heart failure) ([address] Diabetes ([address] Diabetes mellitus ([address] Hyperlipidemia, Hypertension, Psychiatric problem, Thyroid cancer ([address] and Thyroid disease. has a past surgical history that includes Coronary angioplasty with stent; Hysterectomy; Cholecystectomy; Thyroidectomy; Appendectomy; and back surgery.     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)     Subjective  General  Patient assessed for rehabilitation services?: Yes  Response To Previous Treatment: Not applicable  Family / Caregiver Present: No  Follows Commands: Within Functional Limits  Pain Screening  Patient Currently in Pain: Yes  Pain Assessment  Pain Assessment: 0-10  Pain Level: 8  Patient's Stated Pain Goal: No pain  Pain Type: Chronic pain  Pain Location: Knee  Pain Orientation: Left  Pain Descriptors: Aching; Sharp  Pain Frequency: Continuous  Pain Onset: On-going  Clinical Progression: Rapidly worsening(with mobility)  Functional Pain Assessment: Prevents or interferes some active activities and ADLs  Vital Signs  Patient Currently in Pain: Yes  Pre Treatment Pain Screening  Intervention List: Patient able to continue with treatment  Comments / Details: pt requested pain meds at the end of PT session--RN notified    Orientation  Orientation  Overall Orientation Status: Within Normal Limits  Social/Functional History  Social/Functional History  Lives With: Daughter  Type of Home: House  Home Layout: One level  Home Access: Stairs to enter without rails  Entrance Stairs - Number of Steps: 4(pt states she rarely goes up/down stairs, and only with assistance (to go to dr's appointments, per pt))  Home Equipment: Wheelchair-electric  Receives Help From: Family  ADL Assistance: Needs assistance  Ambulation Assistance: (pt states she doesn't ambulate; she does a squat/partial stand pivot to her chair and back)  Transfer Assistance: Independent(has fallen d/t her LLE \"giving out\")  Active : No  Additional Comments: dtr lives with her, assists prn; she states when she needs help with mobility she normally gets assistance from grandchildren; when she falls, she has to call EMT to get off the floor    Objective     Observation/Palpation  Posture: Poor    AROM RLE (degrees)  RLE AROM: WFL  AROM LLE (degrees)  LLE AROM : Exceptions--hip WFL, knee ~30 degrees flexion, full extension; ankle WFL  AROM RUE (degrees)  RUE AROM : WFL  AROM LUE (degrees)  LUE AROM : WFL  Strength RLE  Strength RLE: WFL  Strength LLE  Strength LLE: Exception--hip 3/5; unable to assess knee d/t pain, but it \"gave out\" during transfer, so less than 3/5; ankle 3+/5  Strength RUE  Strength RUE: WFL  Strength LUE  Strength LUE: WFL  Tone RLE  RLE Tone: Normotonic  Tone LLE  LLE Tone: Normotonic  Sensation  Overall Sensation Status: WFL(denies N/T)  Bed mobility  Rolling to Left: Minimal assistance(slow and with difficulty)  Rolling to Right: Minimal assistance(slow and with difficulty)  Supine to Sit: Minimal assistance(slow and difficult)  Sit to Supine: Maximum assistance  Scooting: Maximal assistance  Transfers  Sit to Stand: Moderate Assistance(partial stand, holding onto chair with B hands)  Stand to sit: Moderate Assistance  Bed to Chair: Moderate assistance  Stand Pivot Transfers: Moderate Assistance  Squat Pivot Transfers:  Moderate Assistance  Comment: pt transferred from bed to [facility] chair transferring to her L (which is how she states she does it at home), LLE \"gave out\" requiring mod A to prevent fall  Ambulation  Ambulation?: No  Stairs/Curb  Stairs?: No     Balance  Posture: Poor  Sitting - Static: Good  Sitting - Dynamic: Fair  Standing - Static: Poor  Standing - Dynamic: Poor  Other exercises  Other exercises 1: AROM BUEs, RLE  Other exercises 2: AAROM L knee/hip AROM L ankle     Plan   Plan  Times per week: 5-6 visits weekly  Times per day: Daily  Current Treatment Recommendations: Strengthening, ROM, Balance Training, Functional Mobility Training, Transfer Training, Endurance Training, Wheelchair Mobility Training, Pain Management, Home Exercise Program, Safety Education & Training, Patient/Caregiver Education & Training  Safety Devices  Type of devices: Call light within reach, Gait belt, Patient at risk for falls, Left in bed, Nurse notified  Restraints  Initially in place: No    AM-PAC Score  AM-PAC Inpatient Mobility Raw Score : 10 (04/04/21 0901)  AM-PAC Inpatient T-Scale Score : 32.29 (04/04/21 1341)  Mobility Inpatient CMS 0-100% Score: 76.75 (04/04/21 1341)  Mobility Inpatient CMS G-Code Modifier : CL (04/04/21 1341)          Goals  Short term goals  Time Frame for Short term goals: 12 visits  Short term goal 1: independent bed mobility  Short term goal 2: transfer bed to chair and back with SBA+1  Short term goal 3: improve strength LE's to 4/5 throughout for safe transfers  Patient Goals   Patient goals : return home with daughter       Therapy Time   Individual Concurrent Group Co-treatment   Time In 1250         Time Out 1334         Minutes 44                 Ed Mcgee, PT

## 2021-04-04 NOTE — PROGRESS NOTES
[facility]  Internal Medicine Teaching Residency Program  Inpatient Daily Progress Note  ______________________________________________________________________________    Patient: Chelle Del Valle  YOB: 1935   MLP:[id number]    Acct: [de-identified]     Room: [address] [place] [address]  Admit date: 4/3/2021  Today's date: 04/04/21  Number of days in the hospital: 1    SUBJECTIVE   Admitting Diagnosis: <principal problem not specified>  CC: Altered mental status  Pt examined at bedside. Chart & results reviewed. -Patient had no acute events overnight  -Patient blood glucose went to 40 around 2 PM yesterday but has been stable afterwards  -Morning blood glucose is 97  -Troponins are 13 and 50  -Vitals are stable    ROS:  Review of Systems   Constitutional: Positive for appetite change. Negative for activity change, chills, diaphoresis, fatigue, fever and unexpected weight change. HENT: Negative for congestion. Eyes: Negative for discharge and itching. Respiratory: Negative for apnea, cough, choking, chest tightness, shortness of breath, wheezing and stridor. Cardiovascular: Negative for chest pain, palpitations and leg swelling. Gastrointestinal: Negative for abdominal distention, abdominal pain, blood in stool, diarrhea, nausea and vomiting. Genitourinary: Positive for frequency. Negative for difficulty urinating and urgency. Musculoskeletal: Negative for arthralgias. Skin: Negative for color change. Neurological: Negative for dizziness, tremors, seizures, syncope, facial asymmetry, speech difficulty, weakness, light-headedness, numbness and headaches. Hematological: Negative for adenopathy. Psychiatric/Behavioral: Negative for agitation, behavioral problems, confusion, decreased concentration, dysphoric mood, hallucinations, self-injury, sleep disturbance and suicidal ideas. The patient is not nervous/anxious and is not hyperactive.     BRIEF HISTORY The patient is a pleasant 82 y.o. 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 According to the daughter of the patient she was at home and in the morning time patient became very confused and was taking her doses out. Patient eyes were hazy and were rolled up. They checked the blood glucose at home and it was in 40s and they called the EMS. EMS arrived and they gave 1 ampoule of dextrose 50% and the blood glucose silvestre to 243. Patient denies any headache, blurred vision, chest pain, shortness of breath, abdominal pain, nausea, vomiting and or change in bowel habits. Patient reports that she has increased urinary frequency but no urgency, burning and or pain while urinating. Patient daughter reports that the primary care doctor recently increased the dose of insulin and thyroxine. Patient was found to be hypokalemic in the ED with potassium of 3.6 and a blood glucose in 80s. Urinalysis shows trace leukocyte Estrace and many bacteria on microscopy with WBCs of 2-5. Patient is admitted to the hospital and started on the hypoglycemia protocol and sliding scale for blood glucose control. Patient is also started on Rocephin for UTI. OBJECTIVE     Vital Signs:  BP (!) 102/46   Pulse 58   Temp 97.6 °F (36.4 °C)   Resp 17   Ht 5' 5\" (1.651 m)   Wt 250 lb (113.4 kg)   SpO2 100%   BMI 41.60 kg/m²     Temp (24hrs), Av.7 °F (35.9 °C), Min:96.1 °F (35.6 °C), Max:97.6 °F (36.4 °C)    In: -   Out: 775 [Urine:775]    Physical Exam:     Constitutional: This is a well developed, well nourished, Greater than 40 - Morbid Obesity / Extreme Obesity / Grade III 82y.o. year old female who is alert, oriented, cooperative and in no apparent distress. Head:normocephalic and atraumatic. EENT:  PERRLA. No conjunctival injections.    Septum was midline, mucosa was without erythema, exudates or cobblestoning. No thrush was noted. Neck: Supple without thyromegaly. No elevated JVP. Trachea was midline. Respiratory: Chest was symmetrical without dullness to percussion. Breath sounds bilaterally were clear to auscultation. There were no wheezes, rhonchi or rales. There is no intercostal retraction or use of accessory muscles. No egophony noted. Cardiovascular: Regular without murmur, clicks, gallops or rubs. Abdomen: Slightly rounded and soft without organomegaly. No rebound, rigidity or guarding was appreciated. Lymphatic: No lymphadenopathy. Musculoskeletal: Normal curvature of the spine. No gross muscle weakness. Extremities: Positive for nonpitting edema but no ulcerations, tenderness, varicosities or erythema. Muscle size, tone and strength are normal.  No involuntary movements are noted. Skin:  Warm and dry. Good color, turgor and pigmentation. No lesions or scars.   No cyanosis or clubbing  Neurological/Psychiatric: The patient's general behavior, level of consciousness, thought content and emotional status is normal.     Medications:  Scheduled Medications:    aspirin  81 mg Oral Daily    atorvastatin  80 mg Oral Daily    clopidogrel  75 mg Oral Daily    vitamin B-12  1,000 mcg Oral Daily    ferrous sulfate  325 mg Oral BID    furosemide  40 mg Oral Daily    levothyroxine  125 mcg Oral Daily    metoprolol tartrate  25 mg Oral BID    sertraline  100 mg Oral Daily    sodium chloride flush  10 mL Intravenous 2 times per day    heparin (porcine)  5,000 Units Subcutaneous 3 times per day    insulin lispro  0-12 Units Subcutaneous TID WC    insulin lispro  0-6 Units Subcutaneous Nightly    cefTRIAXone (ROCEPHIN) IV  1,000 mg Intravenous Q24H     Continuous Infusions:    sodium chloride      dextrose 100 mL/hr (04/03/21 1446)    dextrose 50 mL/hr at 04/04/21 0228     PRN Medicationsdocusate sodium, 100 mg, Daily PRN sodium chloride flush, 10 mL, PRN  sodium chloride, 25 mL, PRN  promethazine, 12.5 mg, Q6H PRN    Or  ondansetron, 4 mg, Q6H PRN  polyethylene glycol, 17 g, Daily PRN  acetaminophen, 650 mg, Q6H PRN    Or  acetaminophen, 650 mg, Q6H PRN  glucose, 15 g, PRN  dextrose, 12.5 g, PRN  glucagon (rDNA), 1 mg, PRN  dextrose, 100 mL/hr, PRN        Diagnostic Labs:  CBC:   Recent Labs     04/03/21 0902   WBC 10.3   RBC 4.56   HGB 12.8   HCT 40.4   MCV 88.6   RDW 16.1*        BMP:   Recent Labs     04/03/21  0840 04/03/21 0902   NA  --  139   K  --  3.6*   CL  --  101   CO2  --  27   BUN  --  23   CREATININE 1.26* 1.05*     BNP: No results for input(s): BNP in the last 72 hours. PT/INR:   Recent Labs     04/03/21 0902   PROTIME 10.7   INR 1.0     APTT:   Recent Labs     04/03/21 0902   APTT 23.0     CARDIAC ENZYMES: No results for input(s): CKMB, CKMBINDEX, TROPONINI in the last 72 hours. Invalid input(s): CKTOTAL;3  FASTING LIPID PANEL:  Lab Results   Component Value Date    CHOL 173 06/29/2020    HDL 52 06/29/2020    TRIG 337 (H) 06/29/2020     LIVER PROFILE:   Recent Labs     04/03/21 0902   AST 20   ALT 19   BILITOT 0.20*   ALKPHOS 100      MICROBIOLOGY:   Lab Results   Component Value Date/Time    CULTURE NO GROWTH 6 DAYS 05/08/2019 04:03 PM    CULTURE NO GROWTH 6 DAYS 05/08/2019 04:03 PM       Imaging:    Xr Chest (2 Vw)    Result Date: 4/3/2021  No acute process. Redemonstration of cardiomegaly. No acute CHF or pneumonia.        ASSESSMENT & PLAN     ASSESSMENT / PLAN:     Hypoglycemia:  -Probably due to poor intake and recent increase in the dose of the insulin  -Check blood glucose every 2 hourly  -Hypoglycemia protocol  -On hold all home antidiabetic medications  -Patient have not received any insulin from the sliding scale  -Patient need to have decreasing the dose of the Lantus on discharge    Urinary tract infection:  -Patient is having trace leukocyte esterase and many bacteria on urinalysis

## 2021-04-04 NOTE — PLAN OF CARE
Problem: Falls - Risk of:  Goal: Will remain free from falls  Description: Will remain free from falls  Outcome: Ongoing  Goal: Absence of physical injury  Description: Absence of physical injury  Outcome: Ongoing     Problem: Skin Integrity:  Goal: Will show no infection signs and symptoms  Description: Will show no infection signs and symptoms  Outcome: Ongoing  Goal: Absence of new skin breakdown  Description: Absence of new skin breakdown  Outcome: Ongoing     Problem: Safety:  Goal: Ability to remain free from injury will improve  Description: Ability to remain free from injury will improve  Outcome: Ongoing     Problem: Serum Glucose Level - Abnormal:  Goal: Ability to maintain appropriate glucose levels has stabilized  Description: Ability to maintain appropriate glucose levels has stabilized  Outcome: Ongoing

## 2021-04-04 NOTE — PLAN OF CARE
Problem: Falls - Risk of:  Goal: Will remain free from falls  Description: Will remain free from falls  4/4/2021 1728 by Mustpaha Schofield RN  Outcome: Ongoing     Problem: Skin Integrity:  Goal: Will show no infection signs and symptoms  Description: Will show no infection signs and symptoms  4/4/2021 1728 by Mustapha Schofield RN  Outcome: Ongoing     Problem: Safety:  Goal: Ability to remain free from injury will improve  Description: Ability to remain free from injury will improve  4/4/2021 1728 by Mustapha Schoifeld RN  Outcome: Ongoing

## 2021-04-05 VITALS
TEMPERATURE: 97.8 F | OXYGEN SATURATION: 94 % | HEART RATE: 63 BPM | RESPIRATION RATE: 16 BRPM | SYSTOLIC BLOOD PRESSURE: 124 MMHG | WEIGHT: 215.17 LBS | DIASTOLIC BLOOD PRESSURE: 47 MMHG | BODY MASS INDEX: 35.85 KG/M2 | HEIGHT: 65 IN

## 2021-04-05 LAB
ABSOLUTE EOS #: 0.3 K/UL (ref 0–0.44)
ABSOLUTE IMMATURE GRANULOCYTE: 0.06 K/UL (ref 0–0.3)
ABSOLUTE LYMPH #: 1.58 K/UL (ref 1.1–3.7)
ABSOLUTE MONO #: 0.49 K/UL (ref 0.1–1.2)
ANION GAP SERPL CALCULATED.3IONS-SCNC: 8 MMOL/L (ref 9–17)
BASOPHILS # BLD: 1 % (ref 0–2)
BASOPHILS ABSOLUTE: 0.08 K/UL (ref 0–0.2)
BUN BLDV-MCNC: 19 MG/DL (ref 8–23)
BUN/CREAT BLD: ABNORMAL (ref 9–20)
CALCIUM SERPL-MCNC: 8 MG/DL (ref 8.6–10.4)
CHLORIDE BLD-SCNC: 105 MMOL/L (ref 98–107)
CO2: 26 MMOL/L (ref 20–31)
CREAT SERPL-MCNC: 1.02 MG/DL (ref 0.5–0.9)
DIFFERENTIAL TYPE: ABNORMAL
EOSINOPHILS RELATIVE PERCENT: 5 % (ref 1–4)
GFR AFRICAN AMERICAN: >60 ML/MIN
GFR NON-AFRICAN AMERICAN: 52 ML/MIN
GFR SERPL CREATININE-BSD FRML MDRD: ABNORMAL ML/MIN/{1.73_M2}
GFR SERPL CREATININE-BSD FRML MDRD: ABNORMAL ML/MIN/{1.73_M2}
GLUCOSE BLD-MCNC: 132 MG/DL (ref 65–105)
GLUCOSE BLD-MCNC: 133 MG/DL (ref 65–105)
GLUCOSE BLD-MCNC: 133 MG/DL (ref 70–99)
GLUCOSE BLD-MCNC: 142 MG/DL (ref 65–105)
GLUCOSE BLD-MCNC: 148 MG/DL (ref 65–105)
HCT VFR BLD CALC: 37.9 % (ref 36.3–47.1)
HEMOGLOBIN: 11.4 G/DL (ref 11.9–15.1)
IMMATURE GRANULOCYTES: 1 %
LYMPHOCYTES # BLD: 24 % (ref 24–43)
MCH RBC QN AUTO: 27.8 PG (ref 25.2–33.5)
MCHC RBC AUTO-ENTMCNC: 30.1 G/DL (ref 28.4–34.8)
MCV RBC AUTO: 92.4 FL (ref 82.6–102.9)
MONOCYTES # BLD: 7 % (ref 3–12)
NRBC AUTOMATED: 0 PER 100 WBC
PDW BLD-RTO: 15.8 % (ref 11.8–14.4)
PLATELET # BLD: 158 K/UL (ref 138–453)
PLATELET ESTIMATE: ABNORMAL
PMV BLD AUTO: 11.4 FL (ref 8.1–13.5)
POTASSIUM SERPL-SCNC: 4 MMOL/L (ref 3.7–5.3)
RBC # BLD: 4.1 M/UL (ref 3.95–5.11)
RBC # BLD: ABNORMAL 10*6/UL
SEG NEUTROPHILS: 62 % (ref 36–65)
SEGMENTED NEUTROPHILS ABSOLUTE COUNT: 4.15 K/UL (ref 1.5–8.1)
SODIUM BLD-SCNC: 139 MMOL/L (ref 135–144)
WBC # BLD: 6.7 K/UL (ref 3.5–11.3)
WBC # BLD: ABNORMAL 10*3/UL

## 2021-04-05 PROCEDURE — 6360000002 HC RX W HCPCS: Performed by: STUDENT IN AN ORGANIZED HEALTH CARE EDUCATION/TRAINING PROGRAM

## 2021-04-05 PROCEDURE — 82947 ASSAY GLUCOSE BLOOD QUANT: CPT

## 2021-04-05 PROCEDURE — 80048 BASIC METABOLIC PNL TOTAL CA: CPT

## 2021-04-05 PROCEDURE — 36415 COLL VENOUS BLD VENIPUNCTURE: CPT

## 2021-04-05 PROCEDURE — 6370000000 HC RX 637 (ALT 250 FOR IP): Performed by: STUDENT IN AN ORGANIZED HEALTH CARE EDUCATION/TRAINING PROGRAM

## 2021-04-05 PROCEDURE — 99222 1ST HOSP IP/OBS MODERATE 55: CPT | Performed by: FAMILY MEDICINE

## 2021-04-05 PROCEDURE — 2580000003 HC RX 258: Performed by: STUDENT IN AN ORGANIZED HEALTH CARE EDUCATION/TRAINING PROGRAM

## 2021-04-05 PROCEDURE — 99239 HOSP IP/OBS DSCHRG MGMT >30: CPT | Performed by: INTERNAL MEDICINE

## 2021-04-05 PROCEDURE — 85025 COMPLETE CBC W/AUTO DIFF WBC: CPT

## 2021-04-05 RX ORDER — CEPHALEXIN 250 MG/1
250 CAPSULE ORAL 4 TIMES DAILY
Qty: 8 CAPSULE | Refills: 0 | Status: SHIPPED | OUTPATIENT
Start: 2021-04-06 | End: 2021-04-08

## 2021-04-05 RX ADMIN — FERROUS SULFATE TAB EC 325 MG (65 MG FE EQUIVALENT) 325 MG: 325 (65 FE) TABLET DELAYED RESPONSE at 08:20

## 2021-04-05 RX ADMIN — LEVOTHYROXINE SODIUM 125 MCG: 125 TABLET ORAL at 06:31

## 2021-04-05 RX ADMIN — CYANOCOBALAMIN TAB 500 MCG 1000 MCG: 500 TAB at 08:19

## 2021-04-05 RX ADMIN — SODIUM CHLORIDE, PRESERVATIVE FREE 10 ML: 5 INJECTION INTRAVENOUS at 08:20

## 2021-04-05 RX ADMIN — ASPIRIN 81 MG: 81 TABLET, CHEWABLE ORAL at 08:20

## 2021-04-05 RX ADMIN — CLOPIDOGREL 75 MG: 75 TABLET, FILM COATED ORAL at 08:20

## 2021-04-05 RX ADMIN — HEPARIN SODIUM 5000 UNITS: 5000 INJECTION INTRAVENOUS; SUBCUTANEOUS at 06:05

## 2021-04-05 RX ADMIN — ATORVASTATIN CALCIUM 80 MG: 80 TABLET, FILM COATED ORAL at 08:20

## 2021-04-05 RX ADMIN — FUROSEMIDE 40 MG: 40 TABLET ORAL at 08:20

## 2021-04-05 RX ADMIN — SERTRALINE 100 MG: 50 TABLET, FILM COATED ORAL at 08:20

## 2021-04-05 ASSESSMENT — ENCOUNTER SYMPTOMS
BLOOD IN STOOL: 0
EYE REDNESS: 0
ABDOMINAL PAIN: 0
CONSTIPATION: 0
EYE PAIN: 0
DIARRHEA: 0
BACK PAIN: 0
CHEST TIGHTNESS: 0
APNEA: 0
EYE DISCHARGE: 0
ABDOMINAL DISTENTION: 0
COLOR CHANGE: 0
COUGH: 0
SHORTNESS OF BREATH: 0
EYE ITCHING: 0
CHOKING: 0

## 2021-04-05 ASSESSMENT — PAIN DESCRIPTION - LOCATION: LOCATION: KNEE

## 2021-04-05 ASSESSMENT — PAIN DESCRIPTION - PROGRESSION: CLINICAL_PROGRESSION: NOT CHANGED

## 2021-04-05 ASSESSMENT — PAIN DESCRIPTION - ONSET: ONSET: ON-GOING

## 2021-04-05 NOTE — PROGRESS NOTES
CLINICAL PHARMACY NOTE: MEDS TO [address] Select Patient?: No  Total # of Prescriptions Filled: 2   The following medications were delivered to the patient:  · Metformin 500 mg tab  · Cephalexin 250 mg cap  Total # of Interventions Completed: 1  Time Spent (min): 60    Additional Documentation:Medications delivered to the patient in her room (318)

## 2021-04-05 NOTE — CARE COORDINATION
Transitional planning. Karen with [facility] called, they can accept pt. Pt's daughter will transport her home. 1325 informed [address] with [facility] that pt is discharged. They will gather what they need in Epic and call pt for start of care.

## 2021-04-05 NOTE — CONSULTS
Palliative Care Inpatient Consult    NAME:  Luz Barba RECORD NUMBER:  [id number]  AGE: 82 y.o. GENDER: female  : 1935  TODAY'S DATE:  2021    Reasons for Consultation:    Symptom and/or pain management  Provision of information regarding PC and/or hospice philosophies  Complex, time-intensive communication and interdisciplinary psychosocial support  Clarification of goals of care and/or assistance with difficult decision-making  Guidance in regards to resources and transition(s)    Members of PC team contributing to this consultation are :  Dr. Cornelio Fernández palliative care attending  History of Present Illness     The patient is a 82 y.o. Non-/non  female who presents with Hypoglycemia (per 1st responders)      Referred to Palliative Care by   [x] Physician   [] Nursing  [] Family Request   [] Other:       She was admitted to the internal medicine service for Hypoglycemia [E16.2]. Her hospital course has been associated with <principal problem not specified>. The patient has a complicated medical history and has been hospitalized since 4/3/2021  8:32 AM.  The patient was brought to the ED by the EMS who were called when patient's daughter found patient very confused and had her eyes easy and rolled up, EMS found patient's blood glucose levels at home to be in 40s and 1 ampoule of dextrose 50% was given and patient's blood glucose silvestre to 243. Patient reported of having increased urinary frequency but no urgency or burning. As per patient's daughter patient's primary care doctor recently increased patient's insulin dose and thyroxine dose. In the ED patient was found to be hypokalemic with potassium of 3.6 and blood glucose in 80s, urine showed trace leukocyte Estrace with microscopy. Patient was started on hypoglycemic protocol and Rocephin for UTI. Patient's urine was positive for Klebsiella pneumonia.   Patient has history of HTN CAD s/p stent to LAD in , type 2 diabetes, hypothyroidism secondary to thyroid cancer s/p thyroidectomy. Palliative care consulted for CODE STATUS discussion.     Active Hospital Problems    Diagnosis Date Noted    Hypoglycemia [E16.2] 04/03/2021       PAST MEDICAL HISTORY      Diagnosis Date    Arthritis     [facility]     CAD (coronary artery disease)     CHF (congestive heart failure) (HCC)     Diabetes [facility]     [facility] mellitus [facility]     Hyperlipidemia     Hypertension     Psychiatric problem     Thyroid cancer [facility]     S/P thyroidectomy; on synthroid    Thyroid disease     hypothyroid       PAST SURGICAL HISTORY  Past Surgical History:   Procedure Laterality Date    APPENDECTOMY      BACK SURGERY      CHOLECYSTECTOMY      CORONARY ANGIOPLASTY WITH STENT PLACEMENT      HYSTERECTOMY      THYROIDECTOMY         SOCIAL HISTORY  Social History     Tobacco Use    Smoking status: Former Smoker    Smokeless tobacco: Never Used   Substance Use Topics    Alcohol use: No     Frequency: Never    Drug use: No       ALLERGIES  Allergies   Allergen Reactions    Strawberry Extract     Tape [Adhesive Tape] Rash         MEDICATIONS  Current Medications    aspirin  81 mg Oral Daily    atorvastatin  80 mg Oral Daily    clopidogrel  75 mg Oral Daily    vitamin B-12  1,000 mcg Oral Daily    ferrous sulfate  325 mg Oral BID    furosemide  40 mg Oral Daily    levothyroxine  125 mcg Oral Daily    metoprolol tartrate  25 mg Oral BID    sertraline  100 mg Oral Daily    sodium chloride flush  10 mL Intravenous 2 times per day    heparin (porcine)  5,000 Units Subcutaneous 3 times per day    insulin lispro  0-12 Units Subcutaneous TID WC    insulin lispro  0-6 Units Subcutaneous Nightly    cefTRIAXone (ROCEPHIN) IV  1,000 mg Intravenous Q24H     docusate sodium, sodium chloride flush, sodium chloride, promethazine **OR** ondansetron, polyethylene glycol, acetaminophen **OR** acetaminophen, glucose, dextrose, glucagon (rDNA), dextrose  IV Drips/Infusions   sodium chloride      dextrose 100 mL/hr (04/03/21 1446)     Home Medications  No current facility-administered medications on file prior to encounter. Current Outpatient Medications on File Prior to Encounter   Medication Sig Dispense Refill    atorvastatin (LIPITOR) 80 MG tablet Take 1 tablet by mouth daily 30 tablet 3    ferrous sulfate (FE TABS) 325 (65 Fe) MG EC tablet Take 1 tablet by mouth 2 times daily 60 tablet 3    furosemide (LASIX) 40 MG tablet Take 1 tablet by mouth daily 30 tablet 3    potassium chloride (KLOR-CON M) 20 MEQ extended release tablet Take 1 tablet by mouth daily 30 tablet 3    vitamin D3 (CHOLECALCIFEROL) 25 MCG (1000 UT) TABS tablet TAKE 1 TAB BY MOUTH ONCE A DAY 30 tablet 3    blood glucose monitor strips Test 2 times a day & as needed for symptoms of irregular blood glucose. Dispense sufficient amount for indicated testing frequency plus additional to accommodate PRN testing needs.  100 strip 1    levothyroxine (SYNTHROID) 125 MCG tablet TAKE 1 TAB BY MOUTH EVERY MORNING 90 tablet 3    insulin glargine (BASAGLAR KWIKPEN) 100 UNIT/ML injection pen Inject 50 Units into the skin 2 times daily 6 pen 3    loratadine (CLARITIN) 10 MG tablet TAKE 1 TAB BY MOUTH ONCE A DAY (Patient not taking: Reported on 3/29/2021) 30 tablet 3    sertraline (ZOLOFT) 100 MG tablet TAKE 1 TAB BY MOUTH ONCE A DAY 30 tablet 2    metFORMIN (GLUCOPHAGE) 500 MG tablet Take 1 tablet by mouth 3 times daily 270 tablet 3    metoprolol tartrate (LOPRESSOR) 25 MG tablet Take 1 tablet by mouth 2 times daily 180 tablet 3    Continuous Blood Gluc  (FREESTYLE KERRY 14 DAY READER) CLAUDIO 1 Device by Does not apply route 4 times daily (Patient not taking: Reported on 1/29/2021) 1 Device 0    Continuous Blood Gluc Sensor (FREESTYLE KERRY 2 SENSOR SYSTM) MISC 4 Devices by Does not apply route every 7 days (Patient not taking: Reported on 1/29/2021) 4 each 0    clopidogrel (PLAVIX) 75 MG tablet TAKE 1 TAB BY MOUTH ONCE A DAY 90 tablet 1    SITagliptin (JANUVIA) 100 MG tablet TAKE 1 TAB BY MOUTH ONCE A DAY 90 tablet 3    Insulin Pen Needle 29G X 12MM MISC 1 each by Does not apply route daily 100 each 3    Cyanocobalamin (VITAMIN B-12) 1000 MCG extended release tablet TAKE 1 TAB BY MOUTH ONCE A DAY 30 tablet 11    Lancets MISC 1 each by Does not apply route 2 times daily 100 each 11    diclofenac sodium (VOLTAREN) 1 % GEL Apply 2 g topically 2 times daily (Patient not taking: Reported on 3/29/2021) 1 Tube 3    nystatin (MYCOSTATIN) 876070 UNIT/GM cream Apply topically 2 times daily. (Patient not taking: Reported on 3/29/2021) 1 Tube 1    docusate sodium (COLACE) 100 MG capsule Take 1 capsule by mouth daily as needed for Constipation (Patient not taking: Reported on 1/29/2021) 30 capsule 0    aspirin 81 MG chewable tablet Take 1 tablet by mouth daily 30 tablet 3       Data         BP (!) 124/47   Pulse 63   Temp 97.8 °F (36.6 °C) (Oral)   Resp 16   Ht 5' 5\" (1.651 m)   Wt 215 lb 2.7 oz (97.6 kg)   SpO2 94%   BMI 35.81 kg/m²     Wt Readings from Last 3 Encounters:   04/05/21 215 lb 2.7 oz (97.6 kg)   03/29/21 199 lb (90.3 kg)   01/29/21 199 lb (90.3 kg)        Code Status: DNR-CCA     ADVANCED CARE PLANNING:  Patient has capacity for medical decisions: yes  Health Care Power of : no  Living Will: no     Personal, Social, and Family History  Marital Status:   Living situation: with family:  daughter  Importance of augie/Taoist/spiritual beliefs: [] Very [x] Somewhat [] Not   Psychological Distress: mild  Does patient understand diagnosis/treatment?  yes  Does caregiver understand diagnosis/treatment? not asked    Past Medical History:   Diagnosis Date    Arthritis     [facility]     CAD (coronary artery disease)     CHF (congestive heart failure) (HCC)     Diabetes ([facility]     Diabetes mellitus ([facility]     Hyperlipidemia     Hypertension     Psychiatric problem     Thyroid cancer ([facility]     S/P thyroidectomy; on synthroid    Thyroid disease     hypothyroid         Family History   Problem Relation Age of Onset    Cancer Mother     Cancer Father     Cancer Brother     Diabetes Brother        Social History     Tobacco Use    Smoking status: Former Smoker    Smokeless tobacco: Never Used   Substance Use Topics    Alcohol use: No     Frequency: Never    Drug use: No           Assessment        REVIEW OF SYSTEMS  Constitutional: no fever, no chills or weight loss  Eyes: no eye pain or blurred vision  ENT: no hearing loss, congestion, or difficulty swallowing   Respiratory: no wheezing, chest tightness, or shortness of breath   Cardiovascular: no chest pain or pressure, no palpitations, no diaphoresis   Gastrointestinal: no nausea, vomiting,abdominal pain, diarrhea or constipation, no melena   Genitourinary: no dysuria,   + frequency, hematuria, or nocturia   Musculoskeletal: no myalgias or arthralgias, no back pain   Skin: no rashes or sores   Neurological: no focal weakness, numbness, tingling or headache, no seizures    PHYSICAL ASSESSMENT:  Constitutional: Alert and oriented to person, place, and time. Head: Normocephalic and atraumatic. Eyes: EOM are normal. Pupils are equal, round. Neck: Normal range of motion. Neck supple. No tracheal deviation present. Cardiovascular: Normal rate and regular rhythm, S1, S2, no murmur. Pulmonary/Chest: Effort normal and breath sounds normal. No rales or wheezes. Abdomen: Soft. No tenderness, not distended, no ascites, no organomegaly. Musculoskeletal: Normal range of motion. No edema lower ext. Neurological: Alert, awake, oriented, following commands  Skin: Normal turgor, no bleeding, no bruising.     Palliative Performance Scale:  ___60%  Ambulation reduced; Significant disease; Can't do hobbies/housework; intake normal or reduced; occasional assist; LOC full/confusion  ___50%  Mainly sit/lie; status, greater than 50% time in counseling and other major issues was more than 60 minutes      Electronically signed by   Justin Giraldo MD  Palliative Care Team  on 4/5/2021 at 8:17 AM    Palliative care office: [phone]

## 2021-04-05 NOTE — PROGRESS NOTES
[facility]  Internal Medicine Teaching Residency Program  Inpatient Daily Progress Note  ______________________________________________________________________________    Patient: Sher Osborn  YOB: 1935   CVS:[id number]    Acct: [de-identified]     Room: [address] [place] [address]  Admit date: 4/3/2021  Today's date: 04/05/21  Number of days in the hospital: 2    SUBJECTIVE   Admitting Diagnosis: <principal problem not specified>  CC: Altered mental status  Pt examined at bedside. Chart & results reviewed.   -No acute events overnight  -Blood glucose remained stable without any hypoglycemia episodes after discontinuing the D5.  -Urine culture grows Klebsiella pneumoniae pansensitive  -Physiotherapy to work with the patient yesterday and the patient will need 5-6 visits weekly  -Otherwise patient is stable and can be discharged home      ROS:  Review of Systems   Constitutional: Positive for appetite change. Negative for activity change, chills, diaphoresis, fatigue, fever and unexpected weight change. HENT: Negative for congestion. Eyes: Negative for discharge and itching. Respiratory: Negative for apnea, cough, choking, chest tightness, shortness of breath, wheezing and stridor. Cardiovascular: Negative for chest pain, palpitations and leg swelling. Gastrointestinal: Negative for abdominal distention, abdominal pain, blood in stool, diarrhea, nausea and vomiting. Genitourinary: Positive for frequency. Negative for difficulty urinating and urgency. Musculoskeletal: Negative for arthralgias. Skin: Negative for color change. Neurological: Negative for dizziness, tremors, seizures, syncope, facial asymmetry, speech difficulty, weakness, light-headedness, numbness and headaches. Hematological: Negative for adenopathy.    Psychiatric/Behavioral: Negative for agitation, behavioral problems, confusion, decreased concentration, dysphoric mood, hallucinations, self-injury, sleep disturbance and suicidal ideas. The patient is not nervous/anxious and is not hyperactive. BRIEF HISTORY   The patient is a pleasant 82 y.o. 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 According to the daughter of the patient she was at home and in the morning time patient became very confused and was taking her doses out. Patient eyes were hazy and were rolled up. They checked the blood glucose at home and it was in 40s and they called the EMS. EMS arrived and they gave 1 ampoule of dextrose 50% and the blood glucose silvestre to 243. Patient denies any headache, blurred vision, chest pain, shortness of breath, abdominal pain, nausea, vomiting and or change in bowel habits. Patient reports that she has increased urinary frequency but no urgency, burning and or pain while urinating. Patient daughter reports that the primary care doctor recently increased the dose of insulin and thyroxine. Patient was found to be hypokalemic in the ED with potassium of 3.6 and a blood glucose in 80s. Urinalysis shows trace leukocyte Estrace and many bacteria on microscopy with WBCs of 2-5. Patient is admitted to the hospital and started on the hypoglycemia protocol and sliding scale for blood glucose control. Patient is also started on Rocephin for UTI.   OBJECTIVE     Vital Signs:  BP (!) 104/48   Pulse 68   Temp 98.3 °F (36.8 °C) (Oral)   Resp 16   Ht 5' 5\" (1.651 m)   Wt 215 lb 2.7 oz (97.6 kg)   SpO2 95%   BMI 35.81 kg/m²     Temp (24hrs), Av.6 °F (37 °C), Min:98.3 °F (36.8 °C), Max:98.8 °F (37.1 °C)    In: -   Out:  [Urine:]    Physical Exam:     Constitutional: This is a well developed, well nourished, Greater than 40 - Morbid Obesity / Extreme Obesity / Grade III 82y.o. year old female who is alert, oriented, cooperative and in no apparent distress. Head:normocephalic and atraumatic. EENT:  PERRLA. No conjunctival injections. Septum was midline, mucosa was without erythema, exudates or cobblestoning. No thrush was noted. Neck: Supple without thyromegaly. No elevated JVP. Trachea was midline. Respiratory: Chest was symmetrical without dullness to percussion. Breath sounds bilaterally were clear to auscultation. There were no wheezes, rhonchi or rales. There is no intercostal retraction or use of accessory muscles. No egophony noted. Cardiovascular: Regular without murmur, clicks, gallops or rubs. Abdomen: Slightly rounded and soft without organomegaly. No rebound, rigidity or guarding was appreciated. Lymphatic: No lymphadenopathy. Musculoskeletal: Normal curvature of the spine. No gross muscle weakness. Extremities: Positive for nonpitting edema but no ulcerations, tenderness, varicosities or erythema. Muscle size, tone and strength are normal.  No involuntary movements are noted. Skin:  Warm and dry. Good color, turgor and pigmentation. No lesions or scars.   No cyanosis or clubbing  Neurological/Psychiatric: The patient's general behavior, level of consciousness, thought content and emotional status is normal.     Medications:  Scheduled Medications:    aspirin  81 mg Oral Daily    atorvastatin  80 mg Oral Daily    clopidogrel  75 mg Oral Daily    vitamin B-12  1,000 mcg Oral Daily    ferrous sulfate  325 mg Oral BID    furosemide  40 mg Oral Daily    levothyroxine  125 mcg Oral Daily    metoprolol tartrate  25 mg Oral BID    sertraline  100 mg Oral Daily    sodium chloride flush  10 mL Intravenous 2 times per day    heparin (porcine)  5,000 Units Subcutaneous 3 times per day    insulin lispro  0-12 Units Subcutaneous TID     insulin lispro  0-6 Units Subcutaneous Nightly    cefTRIAXone (ROCEPHIN) IV  1,000 mg Intravenous Q24H     Continuous Infusions:    sodium

## 2021-04-05 NOTE — DISCHARGE INSTR - COC
Continuity of Care Form    Patient Name: Shanna Resendiz   :  1935  MRN:  [id number]    [facility] date:  4/3/2021  Discharge date:  21    Code Status Order: DNR-CCA   Advance Directives:   [facility] Documentation       Date/Time Healthcare Directive Type of Healthcare Directive Copy in [address] Agent's Name Healthcare Agent's Phone Number    21 1421  Unknown, patient unable to respond due to medical condition -- -- -- -- --            Admitting Physician:  Mary Lou Zuñiga MD  PCP: Radha Royal MD    Discharging Nurse: [facility] Unit/Room#: 6838/2428-19  Discharging Unit Phone Number: [phone]    Emergency Contact:   Extended Emergency Contact Information  Primary Emergency Contact: Qing Reece  Address: N/A           [place] [address] [place] [address] Phone: [phone]  Mobile Phone: [phone]  Relation: Child  Secondary Emergency Contact: Marlon Shah  Address: [address] [place] [address] Phone: [phone]  Mobile Phone: [phone]  Relation: Child    Past Surgical History:  Past Surgical History:   Procedure Laterality Date    APPENDECTOMY      BACK SURGERY      CHOLECYSTECTOMY      CORONARY ANGIOPLASTY WITH STENT PLACEMENT      HYSTERECTOMY      THYROIDECTOMY         Immunization History:   Immunization History   Administered Date(s) Administered    Influenza Vaccine, unspecified formulation 2014    Influenza Virus Vaccine 2014    Influenza, High Dose (Fluzone 65 yrs and older) 2016, 10/10/2017    Pneumococcal Conjugate 13-valent (Asmita Nithin) 2016    Pneumococcal Polysaccharide (Jiochytlv18) 02/15/2018       Active Problems:  Patient Active Problem List   Diagnosis Code    Acute kidney injury ([facility] N17.9    Anemia due to vitamin B12 deficiency D51.9    CAD (coronary artery disease) I25.10    Thyroid cancer ([facility] [address] C73    Hypothyroidism E03.9 [facility]  Assisted  Med Delivery   whole    Wound Care Documentation and Therapy:        Elimination:  Continence: Bowel: Yes  Bladder: No  Urinary Catheter: None   Colostomy/Ileostomy/Ileal Conduit: No       Date of Last BM: 4/4/21    Intake/Output Summary (Last 24 hours) at 4/5/2021 1034  Last data filed at 4/5/2021 0617  Gross per 24 hour   Intake    Output 2050 ml   Net -2050 ml     I/O last 3 completed shifts:  In: -   Out: 2050 [Urine:2050]    Safety Concerns: At Risk for Falls    Impairments/Disabilities:      None    Nutrition Therapy:  Current Nutrition Therapy:   - Oral Diet:  Carb Control 4 carbs/meal (1800kcals/day)    Routes of Feeding: Oral  Liquids: No Restrictions  Daily Fluid Restriction: no  Last Modified Barium Swallow with Video (Video Swallowing Test): not done    Treatments at the Time of Hospital Discharge:   Respiratory Treatments: n/a  Oxygen Therapy:  is not on home oxygen therapy.   Ventilator:    - No ventilator support    Rehab Therapies: Physical Therapy and Occupational Therapy  Weight Bearing Status/Restrictions: No weight bearing restirctions  Other Medical Equipment (for information only, NOT a DME order):  wheelchair  Other Treatments: n/a    Patient's personal belongings (please select all that are sent with patient):  None    RN SIGNATURE:  Electronically signed by Kera Main RN on 4/5/21 at 11:10 AM EDT    CASE MANAGEMENT/SOCIAL WORK SECTION    Inpatient Status Date: ***    Readmission Risk Assessment Score:  Readmission Risk              Risk of Unplanned Readmission:        25           Discharging to Facility/ Agency   Name: [address] [place] [address]         Phone: [phone]       Fax: [phone]          Dialysis Facility (if applicable)   Name:  Address:  Dialysis Schedule:  Phone:  Fax:    / signature: Electronically signed by Walt Warren RN on 4/5/21 at 10:35 AM EDT    PHYSICIAN SECTION    Prognosis: Fair    Condition at Discharge: Stable    Rehab Potential (if transferring to Rehab): Fair    Recommended Labs or Other Treatments After Discharge:   PT and OT eval and treat  Monitor vitals  Blood sugar checks premeals and bedtime  Follow-up with PCP    Physician Certification: I certify the above information and transfer of Kevin Mena  is necessary for the continuing treatment of the diagnosis listed and that she requires Home Care for greater 30 days.      Update Admission H&P: Changes in H&P as follows - see discharge summary    PHYSICIAN SIGNATURE:  Electronically signed by Gurinder Granda MD on 4/5/21 at 1:18 PM EDT

## 2021-04-05 NOTE — PLAN OF CARE
Problem: Falls - Risk of:  Goal: Will remain free from falls  Description: Will remain free from falls  4/5/2021 0442 by Janusz Killian RN  Outcome: Ongoing  4/4/2021 1728 by Darcy Stratton RN  Outcome: Ongoing  Goal: Absence of physical injury  Description: Absence of physical injury  Outcome: Ongoing     Problem: Skin Integrity:  Goal: Will show no infection signs and symptoms  Description: Will show no infection signs and symptoms  4/5/2021 0442 by Janusz Killian RN  Outcome: Ongoing  4/4/2021 1728 by Darcy Stratton RN  Outcome: Ongoing  Goal: Absence of new skin breakdown  Description: Absence of new skin breakdown  Outcome: Ongoing     Problem: Safety:  Goal: Ability to remain free from injury will improve  Description: Ability to remain free from injury will improve  4/5/2021 0442 by Janusz Killian RN  Outcome: Ongoing  4/4/2021 1728 by Darcy Stratton RN  Outcome: Ongoing     Problem: Serum Glucose Level - Abnormal:  Goal: Ability to maintain appropriate glucose levels has stabilized  Description: Ability to maintain appropriate glucose levels has stabilized  Outcome: Ongoing     Problem: Pain:  Goal: Pain level will decrease  Description: Pain level will decrease  Outcome: Ongoing  Goal: Control of acute pain  Description: Control of acute pain  Outcome: Ongoing  Goal: Control of chronic pain  Description: Control of chronic pain  Outcome: Ongoing     Problem: Musculor/Skeletal Functional Status  Goal: Highest potential functional level  Outcome: Ongoing  Goal: Absence of falls  Outcome: Ongoing

## 2021-04-05 NOTE — FLOWSHEET NOTE
Advance Care Planning     Advance Care Planning Activator (Inpatient)  Conversation Note      Date of ACP Conversation: 4/3/2021    Conversation Conducted with: Patient with [address]: Named in Advance Directive or Healthcare Power of Fabricio (name) Dandre Rivera [phone]    ACP Activator: Mejia OROSCO [facility] Decision Maker:     Current Designated Health Care Decision Maker: Dandre Rivera (Daughter) [phone] [facility] [address] [place], [place]. [phone]. Patient also named an alternative: Vj Rajput (Daughter) [address] [place], [place]. [phone].       04/05/21 1219   Encounter Summary   Services provided to: Patient and family together   Referral/Consult From: Physician   Support System Family members   Continue Visiting   (4/5/2021)   Complexity of Encounter Moderate   Length of Encounter 45 minutes   Spiritual Assessment Completed Yes   Routine   Type Initial   Assessment Calm; Approachable   Intervention Active listening;Sustaining presence/ Ministry of presence   Outcome Expressed gratitude   Advance Directives (For Healthcare)   Healthcare Directive Yes, patient has an advance directive for healthcare treatment   Type of Healthcare Directive Durable power of  for health care;Living will   Copy in Chart Yes, copy in chart         Length of ACP Conversation in minutes:  39    Conversation Outcomes:  [x] ACP discussion completed  [] Existing advance directive reviewed with patient; no changes to patient's previously recorded wishes  [x] New Advance Directive completed  [] Portable Do Not Rescitate prepared for Provider review and signature  [] POLST/POST/MOLST/MOST prepared for Provider review and signature      Follow-up plan:    [] Schedule follow-up conversation to continue planning  [] Referred individual to Provider for additional questions/concerns   [] Advised patient/agent/surrogate to review completed ACP document and update if needed with changes in condition, patient preferences or care setting    [] This note routed to one or more involved healthcare providers

## 2021-04-06 ENCOUNTER — TELEPHONE (OUTPATIENT)
Dept: INTERNAL MEDICINE CLINIC | Age: 86
End: 2021-04-06

## 2021-04-06 NOTE — TELEPHONE ENCOUNTER
[address] Transitions Initial Follow Up Call    Outreach made within 2 business days of discharge: Yes    Patient: Jennine Gowers Patient : 1935   MRN: [id number]  Reason for Admission: There are no discharge diagnoses documented for the most recent discharge.   Discharge Date: 21       Spoke with: left message     Discharge department/facility: [facility]    Scheduled appointment with PCP within 7-14 days    Follow Up  Future Appointments   Date Time Provider Estrellita Segura   6/10/2021  3:15 PM Sandro Anthony, [address] [facility] Missy Brown

## 2021-04-07 ENCOUNTER — TELEPHONE (OUTPATIENT)
Dept: INTERNAL MEDICINE CLINIC | Age: 86
End: 2021-04-07

## 2021-04-07 NOTE — DISCHARGE SUMMARY
[address]     Department of Internal Medicine - Staff Internal Medicine Teaching Service    INPATIENT DISCHARGE SUMMARY      Patient Identification:  Alf Moss is a 82 y.o. female. :  1935  MRN: [id number]     Acct: [de-identified]   PCP: Kashif Soler MD  Admit Date:  4/3/2021  Discharge date and time: 2021  1:00 PM   Attending Provider: No att. providers found                                     [address] Problem Lists:  Active Problems:    Hypoglycemia    Encounter for palliative care  Resolved Problems:    * No resolved hospital problems. *      HOSPITAL STAY     Brief Inpatient course: Alf Moss is a pleasant 82 y. o. female with background history of hypertension, CAD s/p stent to LAD in , type 2 diabetes mellitus, hypothyroidism secondary to thyroid cancer s/p thyroidectomy presents with a chief complaint of altered mental status. According to the daughter of the patient she was at home and in the morning time patient became very confused and was taking her doses out.  Patient eyes were hazy and were rolled up. Cristian Greenwood checked the blood glucose at home and it was in 40s and they called the EMS. EMS arrived and they gave 1 ampoule of dextrose 50% and the blood glucose silvestre to 243. Patient denies any headache, blurred vision, chest pain, shortness of breath, abdominal pain, nausea, vomiting and or change in bowel habits. Patient reports that she has increased urinary frequency but no urgency, burning and or pain while urinating. Patient daughter reports that the primary care doctor recently increased the dose of insulin and thyroxine. Patient was found to be hypokalemic in the ED with potassium of 3.6 and a blood glucose in 80s. Urinalysis shows trace leukocyte Estrace and many bacteria on microscopy with WBCs of 2-5.   Patient is admitted to the hospital and started on the hypoglycemia protocol and sliding scale for blood glucose control. Patient is also started on Rocephin for UTI. Patient was monitored in the hospital for hypoglycemia. Patient blood sugars went down to 60s once and then she was started on 5% dextrose water. Patient urine culture came back positive for Klebsiella pneumoniae sensitive to cephalosporins and the patient discharged home on Keflex. Patient had no further hypoglycemia episodes and she is feeling better, therefore, it was decided to discharge the patient home. Procedures/ Significant Interventions:        Consults:     Consults:     Final Specialist Recommendations/Findings:   IP CONSULT TO INTERNAL MEDICINE  IP CONSULT TO CASE MANAGEMENT  IP CONSULT TO PALLIATIVE CARE  IP CONSULT TO HOME CARE NEEDS  IP CONSULT TO [facility]      Any Hospital Acquired Infections: none    Discharge Functional Status:  stable    DISCHARGE PLAN     Disposition: home    Patient Instructions:   Discharge Medication List as of 4/5/2021 12:02 PM      START taking these medications    Details   cephALEXin (KEFLEX) 250 MG capsule Take 1 capsule by mouth 4 times daily for 2 days, Disp-8 capsule, R-0Normal         CONTINUE these medications which have CHANGED    Details   metFORMIN (GLUCOPHAGE) 500 MG tablet Take 2 tablets by mouth 2 times daily (with meals), Disp-270 tablet, R-3Normal         CONTINUE these medications which have NOT CHANGED    Details   atorvastatin (LIPITOR) 80 MG tablet Take 1 tablet by mouth daily, Disp-30 tablet, R-3Normal      ferrous sulfate (FE TABS) 325 (65 Fe) MG EC tablet Take 1 tablet by mouth 2 times daily, Disp-60 tablet, R-3Normal      furosemide (LASIX) 40 MG tablet Take 1 tablet by mouth daily, Disp-30 tablet, R-3Normal      vitamin D3 (CHOLECALCIFEROL) 25 MCG (1000 UT) TABS tablet TAKE 1 TAB BY MOUTH ONCE A DAY, Disp-30 tablet, R-3Normal      blood glucose monitor strips Test 2 times a day & as needed for symptoms of irregular blood glucose.  Dispense sufficient amount for indicated testing frequency plus additional to accommodate PRN testing needs. , Disp-100 strip, R-1, Normal      levothyroxine (SYNTHROID) 125 MCG tablet TAKE 1 TAB BY MOUTH EVERY MORNING, Disp-90 tablet, R-3Normal      loratadine (CLARITIN) 10 MG tablet TAKE 1 TAB BY MOUTH ONCE A DAY, Disp-30 tablet, R-3Normal      sertraline (ZOLOFT) 100 MG tablet TAKE 1 TAB BY MOUTH ONCE A DAY, Disp-30 tablet, R-2Normal      metoprolol tartrate (LOPRESSOR) 25 MG tablet Take 1 tablet by mouth 2 times daily, Disp-180 tablet, R-3Normal      Continuous Blood Gluc  (FREESTYLE KERRY 14 DAY READER) CLAUDIO 1 Device by Does not apply route 4 times daily, Disp-1 Device,R-0Normal      Continuous Blood Gluc Sensor (FREESTYLE KERRY 2 SENSOR SYSTM) MISC 4 Devices by Does not apply route every 7 days, Disp-4 each,R-0Normal      clopidogrel (PLAVIX) 75 MG tablet TAKE 1 TAB BY MOUTH ONCE A DAY, Disp-90 tablet, R-1Normal      SITagliptin (JANUVIA) 100 MG tablet TAKE 1 TAB BY MOUTH ONCE A DAY, Disp-90 tablet, R-3Normal      Cyanocobalamin (VITAMIN B-12) 1000 MCG extended release tablet TAKE 1 TAB BY MOUTH ONCE A DAY, Disp-30 tablet, R-11Normal      Lancets MISC 2 TIMES DAILY Starting Fri 7/5/2019, Disp-100 each, R-11, Normal      diclofenac sodium (VOLTAREN) 1 % GEL Apply 2 g topically 2 times daily, Topical, 2 TIMES DAILY Starting Tue 12/18/2018, Disp-1 Tube, R-3, Normal      nystatin (MYCOSTATIN) 916919 UNIT/GM cream Apply topically 2 times daily. , Disp-1 Tube, R-1, Normal      docusate sodium (COLACE) 100 MG capsule Take 1 capsule by mouth daily as needed for Constipation, Disp-30 capsule, R-0Normal      aspirin 81 MG chewable tablet Take 1 tablet by mouth daily, Disp-30 tablet, R-3         STOP taking these medications       potassium chloride (KLOR-CON M) 20 MEQ extended release tablet Comments:   Reason for Stopping:         insulin glargine (BASAGLAR KWIKPEN) 100 UNIT/ML injection pen Comments:   Reason for Stopping:         Insulin Pen Needle 29G X 12MM MISC Comments:   Reason for Stopping:               Activity: activity as tolerated    Diet: diabetic diet    Follow-up:    Kashif Soler MD  [address]  [phone]    In 1 week  post hospital discharge       Patient Instructions: 1. Please stop taking insulin due to the risk of hypoglycemia  2. If blood glucose is running high then the PCP can start with low dose of long acting insulin  3. Please start taking Metformin 1 gm two times daily  4. Continue Januvia as home dose  5. Follow up with PCP in 1 week time  6. Please start taking Keflex from tomorrow as 250 mg 4 times daily for 2 more days. Follow up labs: Follow up imaging:     Note that over 30 minutes was spent in preparing discharge papers, discussing discharge with patient, medication review, etc.      Paty Mijares MD, MD  Internal Medicine Resident, PGY-1  [facility];  [place], [place]  4/7/2021, 11:44 AM

## 2021-04-09 ENCOUNTER — TELEPHONE (OUTPATIENT)
Dept: INTERNAL MEDICINE CLINIC | Age: 86
End: 2021-04-09

## 2021-04-12 ENCOUNTER — APPOINTMENT (OUTPATIENT)
Dept: GENERAL RADIOLOGY | Age: 86
DRG: 682 | End: 2021-04-12
Payer: MEDICARE

## 2021-04-12 ENCOUNTER — HOSPITAL ENCOUNTER (INPATIENT)
Age: 86
LOS: 4 days | Discharge: HOME | DRG: 682 | End: 2021-04-16
Attending: EMERGENCY MEDICINE | Admitting: INTERNAL MEDICINE
Payer: MEDICARE

## 2021-04-12 ENCOUNTER — NURSE TRIAGE (OUTPATIENT)
Dept: OTHER | Facility: CLINIC | Age: 86
End: 2021-04-12

## 2021-04-12 DIAGNOSIS — K92.2 UPPER GI BLEED: ICD-10-CM

## 2021-04-12 DIAGNOSIS — N17.9 AKI (ACUTE KIDNEY INJURY) (HCC): Primary | ICD-10-CM

## 2021-04-12 LAB
ABO/RH: NORMAL
ABSOLUTE EOS #: 0.36 K/UL (ref 0–0.44)
ABSOLUTE IMMATURE GRANULOCYTE: 0.1 K/UL (ref 0–0.3)
ABSOLUTE LYMPH #: 2.53 K/UL (ref 1.1–3.7)
ABSOLUTE MONO #: 0.77 K/UL (ref 0.1–1.2)
ALBUMIN SERPL-MCNC: 3.9 G/DL (ref 3.5–5.2)
ALBUMIN/GLOBULIN RATIO: 1.2 (ref 1–2.5)
ALP BLD-CCNC: 235 U/L (ref 35–104)
ALT SERPL-CCNC: 65 U/L (ref 5–33)
ANION GAP SERPL CALCULATED.3IONS-SCNC: 14 MMOL/L (ref 9–17)
ANTIBODY SCREEN: NEGATIVE
ARM BAND NUMBER: NORMAL
AST SERPL-CCNC: 61 U/L
BASOPHILS # BLD: 1 % (ref 0–2)
BASOPHILS ABSOLUTE: 0.09 K/UL (ref 0–0.2)
BILIRUB SERPL-MCNC: 0.46 MG/DL (ref 0.3–1.2)
BILIRUBIN DIRECT: 0.16 MG/DL
BILIRUBIN, INDIRECT: 0.3 MG/DL (ref 0–1)
BNP INTERPRETATION: ABNORMAL
BUN BLDV-MCNC: 36 MG/DL (ref 8–23)
BUN/CREAT BLD: ABNORMAL (ref 9–20)
CALCIUM SERPL-MCNC: 7.6 MG/DL (ref 8.6–10.4)
CHLORIDE BLD-SCNC: 101 MMOL/L (ref 98–107)
CO2: 22 MMOL/L (ref 20–31)
CREAT SERPL-MCNC: 1.35 MG/DL (ref 0.5–0.9)
DIFFERENTIAL TYPE: ABNORMAL
EOSINOPHILS RELATIVE PERCENT: 3 % (ref 1–4)
EXPIRATION DATE: NORMAL
GFR AFRICAN AMERICAN: 45 ML/MIN
GFR NON-AFRICAN AMERICAN: 37 ML/MIN
GFR SERPL CREATININE-BSD FRML MDRD: ABNORMAL ML/MIN/{1.73_M2}
GFR SERPL CREATININE-BSD FRML MDRD: ABNORMAL ML/MIN/{1.73_M2}
GLOBULIN: ABNORMAL G/DL (ref 1.5–3.8)
GLUCOSE BLD-MCNC: 108 MG/DL (ref 65–105)
GLUCOSE BLD-MCNC: 112 MG/DL (ref 65–105)
GLUCOSE BLD-MCNC: 122 MG/DL (ref 70–99)
HCT VFR BLD CALC: 37.6 % (ref 36.3–47.1)
HEMOGLOBIN: 12.1 G/DL (ref 11.9–15.1)
IMMATURE GRANULOCYTES: 1 %
INR BLD: 1
LYMPHOCYTES # BLD: 20 % (ref 24–43)
MCH RBC QN AUTO: 28.1 PG (ref 25.2–33.5)
MCHC RBC AUTO-ENTMCNC: 32.2 G/DL (ref 28.4–34.8)
MCV RBC AUTO: 87.4 FL (ref 82.6–102.9)
MONOCYTES # BLD: 6 % (ref 3–12)
NRBC AUTOMATED: 0 PER 100 WBC
PARTIAL THROMBOPLASTIN TIME: 23.9 SEC (ref 20.5–30.5)
PDW BLD-RTO: 15.1 % (ref 11.8–14.4)
PLATELET # BLD: 169 K/UL (ref 138–453)
PLATELET ESTIMATE: ABNORMAL
PMV BLD AUTO: 11.6 FL (ref 8.1–13.5)
POTASSIUM SERPL-SCNC: 3.9 MMOL/L (ref 3.7–5.3)
PRO-BNP: 334 PG/ML
PROTHROMBIN TIME: 10.7 SEC (ref 9.1–12.3)
RBC # BLD: 4.3 M/UL (ref 3.95–5.11)
RBC # BLD: ABNORMAL 10*6/UL
SEG NEUTROPHILS: 70 % (ref 36–65)
SEGMENTED NEUTROPHILS ABSOLUTE COUNT: 9.11 K/UL (ref 1.5–8.1)
SODIUM BLD-SCNC: 137 MMOL/L (ref 135–144)
TOTAL PROTEIN: 7.1 G/DL (ref 6.4–8.3)
TROPONIN INTERP: ABNORMAL
TROPONIN T: ABNORMAL NG/ML
TROPONIN, HIGH SENSITIVITY: 22 NG/L (ref 0–14)
WBC # BLD: 13 K/UL (ref 3.5–11.3)
WBC # BLD: ABNORMAL 10*3/UL

## 2021-04-12 PROCEDURE — 6360000002 HC RX W HCPCS: Performed by: STUDENT IN AN ORGANIZED HEALTH CARE EDUCATION/TRAINING PROGRAM

## 2021-04-12 PROCEDURE — 84484 ASSAY OF TROPONIN QUANT: CPT

## 2021-04-12 PROCEDURE — 96374 THER/PROPH/DIAG INJ IV PUSH: CPT

## 2021-04-12 PROCEDURE — 85025 COMPLETE CBC W/AUTO DIFF WBC: CPT

## 2021-04-12 PROCEDURE — C9113 INJ PANTOPRAZOLE SODIUM, VIA: HCPCS | Performed by: STUDENT IN AN ORGANIZED HEALTH CARE EDUCATION/TRAINING PROGRAM

## 2021-04-12 PROCEDURE — 99285 EMERGENCY DEPT VISIT HI MDM: CPT

## 2021-04-12 PROCEDURE — 2580000003 HC RX 258: Performed by: INTERNAL MEDICINE

## 2021-04-12 PROCEDURE — 83880 ASSAY OF NATRIURETIC PEPTIDE: CPT

## 2021-04-12 PROCEDURE — 85610 PROTHROMBIN TIME: CPT

## 2021-04-12 PROCEDURE — 80076 HEPATIC FUNCTION PANEL: CPT

## 2021-04-12 PROCEDURE — 86901 BLOOD TYPING SEROLOGIC RH(D): CPT

## 2021-04-12 PROCEDURE — 1200000000 HC SEMI PRIVATE

## 2021-04-12 PROCEDURE — 2580000003 HC RX 258: Performed by: STUDENT IN AN ORGANIZED HEALTH CARE EDUCATION/TRAINING PROGRAM

## 2021-04-12 PROCEDURE — 80048 BASIC METABOLIC PNL TOTAL CA: CPT

## 2021-04-12 PROCEDURE — 85730 THROMBOPLASTIN TIME PARTIAL: CPT

## 2021-04-12 PROCEDURE — 86900 BLOOD TYPING SEROLOGIC ABO: CPT

## 2021-04-12 PROCEDURE — 82947 ASSAY GLUCOSE BLOOD QUANT: CPT

## 2021-04-12 PROCEDURE — 71045 X-RAY EXAM CHEST 1 VIEW: CPT

## 2021-04-12 PROCEDURE — 93005 ELECTROCARDIOGRAM TRACING: CPT | Performed by: STUDENT IN AN ORGANIZED HEALTH CARE EDUCATION/TRAINING PROGRAM

## 2021-04-12 PROCEDURE — 86850 RBC ANTIBODY SCREEN: CPT

## 2021-04-12 RX ORDER — SODIUM CHLORIDE, SODIUM LACTATE, POTASSIUM CHLORIDE, CALCIUM CHLORIDE 600; 310; 30; 20 MG/100ML; MG/100ML; MG/100ML; MG/100ML
INJECTION, SOLUTION INTRAVENOUS CONTINUOUS
Status: DISCONTINUED | OUTPATIENT
Start: 2021-04-12 | End: 2021-04-15

## 2021-04-12 RX ORDER — SODIUM CHLORIDE 9 MG/ML
10 INJECTION INTRAVENOUS DAILY
Status: DISCONTINUED | OUTPATIENT
Start: 2021-04-12 | End: 2021-04-12

## 2021-04-12 RX ORDER — SODIUM CHLORIDE 9 MG/ML
25 INJECTION, SOLUTION INTRAVENOUS PRN
Status: DISCONTINUED | OUTPATIENT
Start: 2021-04-12 | End: 2021-04-16 | Stop reason: HOSPADM

## 2021-04-12 RX ORDER — POTASSIUM CHLORIDE 20 MEQ/1
40 TABLET, EXTENDED RELEASE ORAL PRN
Status: DISCONTINUED | OUTPATIENT
Start: 2021-04-12 | End: 2021-04-16 | Stop reason: HOSPADM

## 2021-04-12 RX ORDER — ACETAMINOPHEN 650 MG/1
650 SUPPOSITORY RECTAL EVERY 6 HOURS PRN
Status: DISCONTINUED | OUTPATIENT
Start: 2021-04-12 | End: 2021-04-16 | Stop reason: HOSPADM

## 2021-04-12 RX ORDER — PANTOPRAZOLE SODIUM 40 MG/10ML
40 INJECTION, POWDER, LYOPHILIZED, FOR SOLUTION INTRAVENOUS DAILY
Status: DISCONTINUED | OUTPATIENT
Start: 2021-04-12 | End: 2021-04-12

## 2021-04-12 RX ORDER — NICOTINE POLACRILEX 4 MG
15 LOZENGE BUCCAL PRN
Status: DISCONTINUED | OUTPATIENT
Start: 2021-04-12 | End: 2021-04-16 | Stop reason: HOSPADM

## 2021-04-12 RX ORDER — DEXTROSE MONOHYDRATE 50 MG/ML
100 INJECTION, SOLUTION INTRAVENOUS PRN
Status: DISCONTINUED | OUTPATIENT
Start: 2021-04-12 | End: 2021-04-16 | Stop reason: HOSPADM

## 2021-04-12 RX ORDER — ATORVASTATIN CALCIUM 80 MG/1
80 TABLET, FILM COATED ORAL DAILY
Status: DISCONTINUED | OUTPATIENT
Start: 2021-04-12 | End: 2021-04-16 | Stop reason: HOSPADM

## 2021-04-12 RX ORDER — SODIUM CHLORIDE 0.9 % (FLUSH) 0.9 %
10 SYRINGE (ML) INJECTION PRN
Status: DISCONTINUED | OUTPATIENT
Start: 2021-04-12 | End: 2021-04-16 | Stop reason: HOSPADM

## 2021-04-12 RX ORDER — PROMETHAZINE HYDROCHLORIDE 12.5 MG/1
12.5 TABLET ORAL EVERY 6 HOURS PRN
Status: DISCONTINUED | OUTPATIENT
Start: 2021-04-12 | End: 2021-04-16 | Stop reason: HOSPADM

## 2021-04-12 RX ORDER — METOPROLOL TARTRATE 5 MG/5ML
5 INJECTION INTRAVENOUS EVERY 6 HOURS PRN
Status: DISCONTINUED | OUTPATIENT
Start: 2021-04-12 | End: 2021-04-16 | Stop reason: HOSPADM

## 2021-04-12 RX ORDER — ONDANSETRON 2 MG/ML
4 INJECTION INTRAMUSCULAR; INTRAVENOUS EVERY 6 HOURS PRN
Status: DISCONTINUED | OUTPATIENT
Start: 2021-04-12 | End: 2021-04-16 | Stop reason: HOSPADM

## 2021-04-12 RX ORDER — POTASSIUM CHLORIDE 7.45 MG/ML
10 INJECTION INTRAVENOUS PRN
Status: DISCONTINUED | OUTPATIENT
Start: 2021-04-12 | End: 2021-04-16 | Stop reason: HOSPADM

## 2021-04-12 RX ORDER — CLOPIDOGREL BISULFATE 75 MG/1
75 TABLET ORAL DAILY
Status: DISCONTINUED | OUTPATIENT
Start: 2021-04-13 | End: 2021-04-16 | Stop reason: HOSPADM

## 2021-04-12 RX ORDER — PANTOPRAZOLE SODIUM 40 MG/10ML
40 INJECTION, POWDER, LYOPHILIZED, FOR SOLUTION INTRAVENOUS 2 TIMES DAILY
Status: DISCONTINUED | OUTPATIENT
Start: 2021-04-12 | End: 2021-04-16 | Stop reason: HOSPADM

## 2021-04-12 RX ORDER — CHOLECALCIFEROL (VITAMIN D3) 125 MCG
1000 CAPSULE ORAL DAILY
Status: DISCONTINUED | OUTPATIENT
Start: 2021-04-12 | End: 2021-04-16 | Stop reason: HOSPADM

## 2021-04-12 RX ORDER — ASPIRIN 81 MG/1
81 TABLET, CHEWABLE ORAL DAILY
Status: DISCONTINUED | OUTPATIENT
Start: 2021-04-12 | End: 2021-04-16 | Stop reason: HOSPADM

## 2021-04-12 RX ORDER — DEXTROSE MONOHYDRATE 25 G/50ML
12.5 INJECTION, SOLUTION INTRAVENOUS PRN
Status: DISCONTINUED | OUTPATIENT
Start: 2021-04-12 | End: 2021-04-16 | Stop reason: HOSPADM

## 2021-04-12 RX ORDER — ACETAMINOPHEN 325 MG/1
650 TABLET ORAL EVERY 6 HOURS PRN
Status: DISCONTINUED | OUTPATIENT
Start: 2021-04-12 | End: 2021-04-16 | Stop reason: HOSPADM

## 2021-04-12 RX ORDER — LEVOTHYROXINE SODIUM 0.12 MG/1
125 TABLET ORAL DAILY
Status: DISCONTINUED | OUTPATIENT
Start: 2021-04-13 | End: 2021-04-16 | Stop reason: HOSPADM

## 2021-04-12 RX ORDER — SODIUM CHLORIDE 0.9 % (FLUSH) 0.9 %
5-40 SYRINGE (ML) INJECTION EVERY 12 HOURS SCHEDULED
Status: DISCONTINUED | OUTPATIENT
Start: 2021-04-12 | End: 2021-04-16 | Stop reason: HOSPADM

## 2021-04-12 RX ORDER — SODIUM CHLORIDE 9 MG/ML
10 INJECTION INTRAVENOUS DAILY
Status: DISCONTINUED | OUTPATIENT
Start: 2021-04-12 | End: 2021-04-16 | Stop reason: HOSPADM

## 2021-04-12 RX ORDER — POLYETHYLENE GLYCOL 3350 17 G/17G
17 POWDER, FOR SOLUTION ORAL DAILY PRN
Status: DISCONTINUED | OUTPATIENT
Start: 2021-04-12 | End: 2021-04-16 | Stop reason: HOSPADM

## 2021-04-12 RX ADMIN — PANTOPRAZOLE SODIUM 40 MG: 40 INJECTION, POWDER, FOR SOLUTION INTRAVENOUS at 20:23

## 2021-04-12 RX ADMIN — SODIUM CHLORIDE, PRESERVATIVE FREE 10 ML: 5 INJECTION INTRAVENOUS at 20:23

## 2021-04-12 RX ADMIN — SODIUM CHLORIDE 10 ML: 9 INJECTION, SOLUTION INTRAMUSCULAR; INTRAVENOUS; SUBCUTANEOUS at 13:47

## 2021-04-12 RX ADMIN — PANTOPRAZOLE SODIUM 40 MG: 40 INJECTION, POWDER, FOR SOLUTION INTRAVENOUS at 13:47

## 2021-04-12 RX ADMIN — SODIUM CHLORIDE 8 MG/HR: 9 INJECTION, SOLUTION INTRAVENOUS at 15:00

## 2021-04-12 RX ADMIN — SODIUM CHLORIDE, POTASSIUM CHLORIDE, SODIUM LACTATE AND CALCIUM CHLORIDE: 600; 310; 30; 20 INJECTION, SOLUTION INTRAVENOUS at 17:11

## 2021-04-12 ASSESSMENT — ENCOUNTER SYMPTOMS
EYE PAIN: 0
ABDOMINAL PAIN: 0
SHORTNESS OF BREATH: 0
SINUS PAIN: 0
VOMITING: 0
NAUSEA: 0
DIARRHEA: 1
ANAL BLEEDING: 1
BLOOD IN STOOL: 1
SORE THROAT: 0
COUGH: 0
BACK PAIN: 0

## 2021-04-12 NOTE — ED PROVIDER NOTES
Inability: Never true    Transportation needs     Medical: No     Non-medical: No   Tobacco Use    Smoking status: Former Smoker    Smokeless tobacco: Never Used   Substance and Sexual Activity    Alcohol use: No     Frequency: Never    Drug use: No    Sexual activity: Not Currently   Lifestyle    Physical activity     Days per week: 0 days     Minutes per session: 0 min    Stress: Not at all   Relationships    Social connections     Talks on phone: More than three times a week     Gets together: More than three times a week     Attends Denominational service: Never     Active member of club or organization: No     Attends meetings of clubs or organizations: Never     Relationship status:     Intimate partner violence     Fear of current or ex partner: Not on file     Emotionally abused: Not on file     Physically abused: Not on file     Forced sexual activity: Not on file   Other Topics Concern    Not on file   Social History Narrative    Not on file       Family History   Problem Relation Age of Onset    Cancer Mother     Cancer Father     Cancer Brother     Diabetes Brother        Allergies:  Strawberry extract and Tape [adhesive tape]    Home Medications:  Prior to Admission medications    Medication Sig Start Date End Date Taking? Authorizing Provider   metFORMIN (GLUCOPHAGE) 500 MG tablet Take 2 tablets by mouth 2 times daily (with meals) 4/5/21   Ryan Sheth MD   atorvastatin (LIPITOR) 80 MG tablet Take 1 tablet by mouth daily 4/1/21   Aracely Harvey MD   ferrous sulfate (FE TABS) 325 (65 Fe) MG EC tablet Take 1 tablet by mouth 2 times daily 4/1/21   Aracely Harvey MD   furosemide (LASIX) 40 MG tablet Take 1 tablet by mouth daily 4/1/21   Aracely Harvey MD   vitamin D3 (CHOLECALCIFEROL) 25 MCG (1000 UT) TABS tablet TAKE 1 TAB BY MOUTH ONCE A DAY 4/1/21   Aracely Harvey MD   blood glucose monitor strips Test 2 times a day & as needed for symptoms of irregular blood glucose.  Dispense sufficient amount for indicated testing frequency plus additional to accommodate PRN testing needs. 3/29/21   Aracely Harvey MD   levothyroxine (SYNTHROID) 125 MCG tablet TAKE 1 TAB BY MOUTH EVERY MORNING 3/29/21   Aracely Harvey MD   loratadine (CLARITIN) 10 MG tablet TAKE 1 TAB BY MOUTH ONCE A DAY  Patient not taking: Reported on 3/29/2021 3/12/21   Aracely Harvey MD   sertraline (ZOLOFT) 100 MG tablet TAKE 1 TAB BY MOUTH ONCE A DAY 1/29/21   Aracely Harvey MD   metoprolol tartrate (LOPRESSOR) 25 MG tablet Take 1 tablet by mouth 2 times daily 12/10/20   David Rivas MD   Continuous Blood Gluc  (FREESTYLE KERRY 14 DAY READER) CLAUDIO 1 Device by Does not apply route 4 times daily  Patient not taking: Reported on 1/29/2021 11/30/20   Aracely Harvey MD   Continuous Blood Gluc Sensor (FREESTYLE KERRY 2 SENSOR SYSTM) MISC 4 Devices by Does not apply route every 7 days  Patient not taking: Reported on 1/29/2021 11/30/20   Aracely Harvey MD   clopidogrel (PLAVIX) 75 MG tablet TAKE 1 TAB BY MOUTH ONCE A DAY 6/19/20   Grazyna Jacinto MD   SITagliptin (JANUVIA) 100 MG tablet TAKE 1 TAB BY MOUTH ONCE A DAY 5/22/20   Elie Mauro PA-C   Cyanocobalamin (VITAMIN B-12) 1000 MCG extended release tablet TAKE 1 TAB BY MOUTH ONCE A DAY 10/3/19   Aracely Harvey MD   Lancets MISC 1 each by Does not apply route 2 times daily 7/5/19   Aracely Harvey MD   diclofenac sodium (VOLTAREN) 1 % GEL Apply 2 g topically 2 times daily  Patient not taking: Reported on 3/29/2021 12/18/18   Aracely Harvey MD   nystatin (MYCOSTATIN) 717614 UNIT/GM cream Apply topically 2 times daily.   Patient not taking: Reported on 3/29/2021 5/7/18   Aracely Harvey MD   docusate sodium (COLACE) 100 MG capsule Take 1 capsule by mouth daily as needed for Constipation  Patient not taking: Reported on 1/29/2021 8/18/17   Aracely Harvey MD   aspirin 81 MG chewable tablet Take 1 tablet by mouth daily 9/5/16   Kike Pagan MD       REVIEW OF SYSTEMS    (2-9 systems for level 4, 10 or more forlevel 5)      Review of Systems   Constitutional: Negative for activity change, chills and fever. HENT: Negative for congestion, sinus pain and sore throat. Eyes: Negative for pain and visual disturbance. Respiratory: Negative for cough and shortness of breath. Cardiovascular: Negative for chest pain. Gastrointestinal: Positive for anal bleeding, blood in stool and diarrhea. Negative for abdominal pain, nausea and vomiting. Genitourinary: Negative for difficulty urinating, dysuria and hematuria. Musculoskeletal: Negative for back pain and myalgias. Skin: Negative for rash and wound. Neurological: Positive for headaches. Negative for dizziness and light-headedness. Psychiatric/Behavioral: Negative for agitation and confusion. PHYSICAL EXAM   (up to 7 for level 4, 8 or more forlevel 5)      ED TRIAGE VITALS BP: (!) 94/56, Temp: 99 °F (37.2 °C), Pulse: 78, Resp: 23, SpO2: 97 %    Vitals:    04/12/21 1407 04/12/21 1409 04/12/21 1630 04/12/21 1915   BP:  (!) 103/53 (!) 113/53 (!) 98/51   Pulse: 75 74 66 70   Resp: 13 15 16 16   Temp:   98 °F (36.7 °C) 97.4 °F (36.3 °C)   TempSrc:   Oral Oral   SpO2: 97% 96% 100% 99%   Weight:       Height:             Physical Exam  Vitals signs and nursing note reviewed. Constitutional:       Appearance: She is ill-appearing and toxic-appearing. HENT:      Head: Normocephalic and atraumatic. Nose: Nose normal.      Mouth/Throat:      Mouth: Mucous membranes are moist.   Eyes:      Extraocular Movements: Extraocular movements intact. Pupils: Pupils are equal, round, and reactive to light. Comments: Pale conjunctiva   Neck:      Musculoskeletal: Normal range of motion. Cardiovascular:      Rate and Rhythm: Normal rate and regular rhythm. Pulses: Normal pulses. Heart sounds: Normal heart sounds. Pulmonary:      Effort: Pulmonary effort is normal.      Breath sounds: Normal breath sounds. Abdominal:      General: Abdomen is flat. Palpations: Abdomen is soft. Genitourinary:     Rectum: Guaiac result positive. Musculoskeletal: Normal range of motion. Skin:     Capillary Refill: Capillary refill takes less than 2 seconds. Comments: Blood present around the anal canal with maceration of the perirectal skin   Neurological:      General: No focal deficit present. Mental Status: She is alert and oriented to person, place, and time. Psychiatric:         Mood and Affect: Mood normal.         Behavior: Behavior normal.           DIFFERENTIAL  DIAGNOSIS     PLAN (LABS / IMAGING / EKG):  Orders Placed This Encounter   Procedures    C DIFF TOXIN/ANTIGEN    Gastrointestinal Panel, Molecular    XR CHEST PORTABLE    CBC Auto Differential    Protime-INR    APTT    Basic Metabolic Panel w/ Reflex to MG    Brain Natriuretic Peptide    Troponin    Troponin    Basic Metabolic Panel w/ Reflex to MG    CBC auto differential    Fecal lactoferrin    Hepatic Function Panel    DIET CARB CONTROL;    Diet NPO, After Midnight    Vital signs per unit routine    Notify physician    Up with assistance    Daily weights    Intake and output    Telemetry monitoring - 24 hour duration    HYPOGLYCEMIA TREATMENT: blood glucose less than 50 mg/dL and patient  ALERT and TOLERATING PO    HYPOGLYCEMIA TREATMENT: blood glucose less than 70 mg/dL and patient ALERT and TOLERATING PO    HYPOGLYCEMIA TREATMENT: blood glucose less than 70 mg/dL and patient NOT ALERT or NPO    Place sequential compression device    Strict intake and output    Turn or assist with turn approximately every 2 hours if patient is unable to turn self. Remind patient to turn if necessary.     Assess skin per unit guidelines    Pad/offload medical devices    Maintain HOB at the lowest elevation consistent with medical plan of care    Use lift equipment for lifting patient    Maintain heels off of bed at all times    Full Code    Inpatient consult to Internal Medicine    Inpatient consult to Social Work    C diff contact isolation    OT eval and treat    PT evaluation and treat    Initiate Oxygen Therapy Protocol    Pulse oximetry, continuous    POCT Glucose    POC Glucose Fingerstick    POC Glucose Fingerstick    EKG 12 Lead    EKG 12 lead    TYPE AND SCREEN    Type and Screen    PATIENT STATUS (FROM ED OR OR/PROCEDURAL) Inpatient       MEDICATIONS ORDERED:  ED Medication Orders (From admission, onward)    Start Ordered     Status Ordering Provider    04/13/21 0900 04/12/21 1620  clopidogrel (PLAVIX) tablet 75 mg  DAILY      Acknowledged ANANDA CORONADO    04/13/21 0700 04/12/21 1620  levothyroxine (SYNTHROID) tablet 125 mcg  DAILY      Acknowledged ANANDA CORONADO    04/12/21 2100 04/12/21 1620  sodium chloride flush 0.9 % injection 5-40 mL  2 times per day      Last MAR action: Not Given - by Ashleigh Eden on 04/12/21 at [address] [place] [address]    04/12/21 2100 04/12/21 1620  pantoprazole (PROTONIX) injection 40 mg  2 TIMES DAILY      Last MAR action: Given - by Ashleigh Eden on 04/12/21 at [address] [place] [address]    04/12/21 2100 04/12/21 1620  insulin lispro (HUMALOG) injection vial 0-6 Units  NIGHTLY      Last MAR action: Held - by Ashleigh Eden on 04/12/21 at [address]    04/12/21 1700 04/12/21 1620  insulin lispro (HUMALOG) injection vial 0-12 Units  3 TIMES DAILY WITH MEALS      Last MAR action: Not Given - by Racheal Cárdenas on 04/12/21 at [place] [address]    04/12/21 1645 04/12/21 1620  aspirin chewable tablet 81 mg  DAILY      Last MAR action: Not Given - by Racheal Cárdenas on 04/12/21 at [address] [place] [address]    04/12/21 1645 04/12/21 1620  atorvastatin (LIPITOR) tablet 80 mg  DAILY      Last MAR action: Not Given - by Racheal Cárdenas on 04/12/21 at [facility]    04/12/21 1645 04/12/21 1620  vitamin B-12 (CYANOCOBALAMIN) tablet 1,000 mcg  DAILY      Last MAR action: Not Given - by Racheal Cárdenas on 04/12/21 at [address] [facility] [facility]    04/12/21 1645 04/12/21 1620  sertraline (ZOLOFT) tablet 100 mg  DAILY      Last MAR action: Not Given - by Kaylyn Forte on 04/12/21 at [address]    04/12/21 1645 04/12/21 1620  sodium chloride (PF) 0.9 % injection 10 mL  DAILY      Last MAR action: Given - by Korene Severs on 04/12/21 at [place]    04/12/21 1645 04/12/21 1622  lactated ringers infusion  CONTINUOUS      Last MAR action: New Bag - by Kaylyn Forte on 04/12/21 at [facility]    04/12/21 1620 04/12/21 1620  sodium chloride flush 0.9 % injection 10 mL  PRN      Acknowledged ANANDA CORONADO    04/12/21 1620 04/12/21 1620  potassium chloride (KLOR-CON M) extended release tablet 40 mEq  PRN      Acknowledged ANANDA CORONADO    04/12/21 1620 04/12/21 1620  potassium bicarb-citric acid (EFFER-K) effervescent tablet 40 mEq  PRN      Acknowledged ANANDA CORONADO    04/12/21 1620 04/12/21 1620  potassium chloride 10 mEq/100 mL IVPB (Peripheral Line)  PRN      Acknowledged Gertha Barthel    04/12/21 1620 04/12/21 1620  promethazine (PHENERGAN) tablet 12.5 mg  EVERY 6 HOURS PRN      Acknowledged ANANDA CORONADO    04/12/21 1620 04/12/21 1620  ondansetron (ZOFRAN) injection 4 mg  EVERY 6 HOURS PRN      Acknowledged ANANDA CORONADO    04/12/21 1620 04/12/21 1620  acetaminophen (TYLENOL) tablet 650 mg  EVERY 6 HOURS PRN      Acknowledged ANANDA CORONADO    04/12/21 1620 04/12/21 1620  acetaminophen (TYLENOL) suppository 650 mg  EVERY 6 HOURS PRN      Acknowledged ANANDA CORONADO    04/12/21 1620 04/12/21 1620  metoprolol (LOPRESSOR) injection 5 mg  EVERY 6 HOURS PRN      Acknowledged ANANDA CORONADO    04/12/21 1620 04/12/21 1620  polyethylene glycol (GLYCOLAX) packet 17 g  DAILY PRN      Acknowledged ANANDA CORONADO    04/12/21 1620 04/12/21 1620  glucose (GLUTOSE) 40 % oral gel 15 g  PRN      Acknowledged ANANDA CORONADO    04/12/21 1620 04/12/21 1620  dextrose 50 % IV solution  PRN      Acknowledged Threasa Frees    04/12/21 1620 04/12/21 1620  glucagon (rDNA) injection 1 mg  PRN      Acknowledged ANANDA CORONADO    04/12/21 1620 04/12/21 1620  dextrose 5 % solution  PRN      Acknowledged ANANDA CORONADO    04/12/21 1620 04/12/21 1620  0.9 % sodium chloride infusion  PRN      Acknowledged ANANDA CORONADO          DDX: Upper GI bleed    DIAGNOSTIC RESULTS / EMERGENCY DEPARTMENT COURSE / MDM     IMPRESSION & INITIAL PLAN:  Is-year-old female presenting return today with soft blood pressures and report of diarrhea with rectal bleeding/dark-colored stools. Patient does have history of gastritis and ulcerative disease of the abdomen.     Hemoccult test positive with macerated skin around the perirectal region    LABS:  Results for orders placed or performed during the hospital encounter of 04/12/21   CBC Auto Differential   Result Value Ref Range    WBC 13.0 (H) 3.5 - 11.3 k/uL    RBC 4.30 3.95 - 5.11 m/uL    Hemoglobin 12.1 11.9 - 15.1 g/dL    Hematocrit 37.6 36.3 - 47.1 %    MCV 87.4 82.6 - 102.9 fL    MCH 28.1 25.2 - 33.5 pg    MCHC 32.2 28.4 - 34.8 g/dL    RDW 15.1 (H) 11.8 - 14.4 %    Platelets 746 280 - 536 k/uL    MPV 11.6 8.1 - 13.5 fL    NRBC Automated 0.0 0.0 per 100 WBC    Differential Type NOT REPORTED     WBC Morphology NOT REPORTED     RBC Morphology ANISOCYTOSIS PRESENT     Platelet Estimate NOT REPORTED     Seg Neutrophils 70 (H) 36 - 65 %    Lymphocytes 20 (L) 24 - 43 %    Monocytes 6 3 - 12 %    Eosinophils % 3 1 - 4 %    Basophils 1 0 - 2 %    Immature Granulocytes 1 (H) 0 %    Segs Absolute 9.11 (H) 1.50 - 8.10 k/uL    Absolute Lymph # 2.53 1.10 - 3.70 k/uL    Absolute Mono # 0.77 0.10 - 1.20 k/uL    Absolute Eos # 0.36 0.00 - 0.44 k/uL    Basophils Absolute 0.09 0.00 - 0.20 k/uL    Absolute Immature Granulocyte 0.10 0.00 - 0.30 k/uL   Protime-INR   Result Value Ref Range    Protime 10.7 9.1 - 12.3 sec    INR 1.0    APTT   Result Value Ref Range    PTT 23.9 20.5 - 30.5 sec   Basic Metabolic Panel w/ Reflex to MG   Result Value Ref Range    Glucose 122 (H) 70 - 99 mg/dL    BUN 36 (H) 8 - 23 mg/dL    CREATININE 1.35 (H) 0.50 - 0.90 mg/dL    Bun/Cre Ratio NOT REPORTED 9 - 20    Calcium 7.6 (L) 8.6 - 10.4 mg/dL    Sodium 137 135 - 144 mmol/L    Potassium 3.9 3.7 - 5.3 mmol/L    Chloride 101 98 - 107 mmol/L    CO2 22 20 - 31 mmol/L    Anion Gap 14 9 - 17 mmol/L    GFR Non-African American 37 (L) >60 mL/min    GFR  45 (L) >60 mL/min    GFR Comment          GFR Staging NOT REPORTED    Brain Natriuretic Peptide   Result Value Ref Range    Pro- (H) <300 pg/mL    BNP Interpretation Pro-BNP Reference Range:    Troponin   Result Value Ref Range    Troponin, High Sensitivity 22 (H) 0 - 14 ng/L    Troponin T NOT REPORTED <0.03 ng/mL    Troponin Interp NOT REPORTED    Hepatic Function Panel   Result Value Ref Range    Albumin 3.9 3.5 - 5.2 g/dL    Alkaline Phosphatase 235 (H) 35 - 104 U/L    ALT 65 (H) 5 - 33 U/L    AST 61 (H) <32 U/L    Total Bilirubin 0.46 0.3 - 1.2 mg/dL    Bilirubin, Direct 0.16 <0.31 mg/dL    Bilirubin, Indirect 0.30 0.00 - 1.00 mg/dL    Total Protein 7.1 6.4 - 8.3 g/dL    Globulin NOT REPORTED 1.5 - 3.8 g/dL    Albumin/Globulin Ratio 1.2 1.0 - 2.5   POC Glucose Fingerstick   Result Value Ref Range    POC Glucose 108 (H) 65 - 105 mg/dL   POC Glucose Fingerstick   Result Value Ref Range    POC Glucose 112 (H) 65 - 105 mg/dL   TYPE AND SCREEN   Result Value Ref Range    Expiration Date 04/15/2021,2359     Arm Band Number BE [id number]     ABO/Rh B NEGATIVE     Antibody Screen NEGATIVE        RADIOLOGY:  XR CHEST PORTABLE   Final Result   No radiographic evidence for free intraperitoneal air but small amounts may   not be apparent radiographically. If this remains a clinical concern a CT   scan should be obtained. CONSULTS:  IP CONSULT TO INTERNAL MEDICINE  IP CONSULT TO SOCIAL WORK    CRITICAL CARE:  See attending physician note    FINAL IMPRESSION      1.  BRIA (acute kidney injury) ([facility] [address]    2. Upper GI bleed          DISPOSITION / PLAN     DISPOSITION Admitted 04/12/2021 03:21:20 PM      PATIENT REFERRED TO:  No follow-up provider specified.     DISCHARGE MEDICATIONS:  Current Discharge Medication List        Current Discharge Medication List           Shanthi Gonzalez MD  Emergency Medicine Resident    (Please note that portions of this note were completed with a voice recognition program.  Efforts were made to edit the dictations but occasionally words are mis-transcribed.)       Shanthi Gonzalez MD  Resident  04/12/21 101

## 2021-04-12 NOTE — ED TRIAGE NOTES
Pt arrived to the ED with c/o rectal bleeding, diarrhea, and dizziness. Pt states this has been going on for three days. Stool occult was + at bedside with dr. Shruti Caro. Pt states she has felt dizzy and has about two three episodes of the dark colored stools. Pt is alert and oriented x4. Pt also has chronic lower bilateral leg swelling.

## 2021-04-12 NOTE — Clinical Note
Patient Class: Inpatient [101]   REQUIRED: Diagnosis: BRIA (acute kidney injury) ([facility] [address] [[id number]]   Estimated Length of Stay: Estimated stay of more than 2 midnights   Telemetry Bed Required?: Yes

## 2021-04-12 NOTE — H&P
[facility]     Department of Internal Medicine - Staff Internal Medicine Teaching Service          ADMISSION NOTE/HISTORY AND PHYSICAL EXAMINATION   Date: 4/12/2021  Patient Name: Andi Brooks  Date of admission: 4/12/2021 12:44 PM  YOB: 1935  PCP: aTd Lucas MD  History Obtained From:  patient, electronic medical record    CHIEF COMPLAINT     Chief complaint: lightheadedness, melena     HISTORY OF PRESENTING ILLNESS   PMH: HTN, DM type II, Hypothyroidism secondary to thyroidectomy, HLD, CAD, Atrial Flutter not on AC, Diastolic CHF    The patient is a pleasant 82 y.o. female presents with a chief complaint of diarrhea, lightheadedness, and melena. Patient endorses a 4 day history of of diarrhea with numerous episodes a day. Patient and family state diarrhea is dark/black in nature. No hematochezia or BRB. Associated lightheadedness and intermittent dizziness & poor oral intake. Patient denies fevers, chills, nausea, vomiting, chest pain, shortness of breath, abdominal pain, dysuria, or focal neurological deficits. Of note, patient was recently admitted and discharged from the hospital on 4/6/21 for hypoglycemia and Klebsiella UTI. Patient's insulin was d/c on discharge and patient completed course of antibiotics for UTI. In the ED, patient was afebrile, NSR, and hemodynamically stable saturating well on RA. BMP demonstrates Acute renal failure with creatinine 1.35 (baseline 1). Glucose 122. Trop 22 (baseline around 15). Leukocytosis at 13.0. CXR negative.      Review of Systems:  General ROS: Completed and except as mentioned above were negative   HEENT ROS: Completed and except as mentioned above were negative   Allergy and Immunology ROS:  Completed and except as mentioned above were negative  Hematological and Lymphatic ROS:  Completed and except as mentioned above were negative  Respiratory ROS:  Completed and except as mentioned above were negative Cardiovascular ROS:  Completed and except as mentioned above were negative  Gastrointestinal ROS: Completed and except as mentioned above were negative  Genito-Urinary ROS:  Completed and except as mentioned above were negative  Musculoskeletal ROS:  Completed and except as mentioned above were negative  Neurological ROS:  Completed and except as mentioned above were negative  Skin & Dermatological ROS:  Completed and except as mentioned above were negative  Psychological ROS:  Completed and except as mentioned above were negative    PAST MEDICAL HISTORY     Past Medical History:   Diagnosis Date    Arthritis     [facility]     CAD (coronary artery disease)     CHF [facility]congestive heart failure) [facility]     Diabetes [facility]     Diabetes mellitus [facility]     Hyperlipidemia     Hypertension     Psychiatric problem     Thyroid cancer ([facility]     S/P thyroidectomy; on synthroid    Thyroid disease     hypothyroid       PAST SURGICAL HISTORY     Past Surgical History:   Procedure Laterality Date    APPENDECTOMY      BACK SURGERY      CHOLECYSTECTOMY      CORONARY ANGIOPLASTY WITH STENT PLACEMENT      HYSTERECTOMY      THYROIDECTOMY         ALLERGIES     Strawberry extract and Tape [adhesive tape]    MEDICATIONS PRIOR TO ADMISSION     Prior to Admission medications    Medication Sig Start Date End Date Taking?  Authorizing Provider   metFORMIN (GLUCOPHAGE) 500 MG tablet Take 2 tablets by mouth 2 times daily (with meals) 4/5/21   Walter Goetz MD   atorvastatin (LIPITOR) 80 MG tablet Take 1 tablet by mouth daily 4/1/21   Radha Royal MD   ferrous sulfate (FE TABS) 325 (65 Fe) MG EC tablet Take 1 tablet by mouth 2 times daily 4/1/21   Radha Royal MD   furosemide (LASIX) 40 MG tablet Take 1 tablet by mouth daily 4/1/21   Radha Royal MD   vitamin D3 (CHOLECALCIFEROL) 25 MCG (1000 UT) TABS tablet TAKE 1 TAB BY MOUTH ONCE A DAY 4/1/21   Radha Royal MD   blood glucose monitor strips Test 2 times a day & as needed for symptoms of irregular blood glucose. Dispense sufficient amount for indicated testing frequency plus additional to accommodate PRN testing needs. 3/29/21   Honorio Billings MD   levothyroxine (SYNTHROID) 125 MCG tablet TAKE 1 TAB BY MOUTH EVERY MORNING 3/29/21   Honorio Billings MD   loratadine (CLARITIN) 10 MG tablet TAKE 1 TAB BY MOUTH ONCE A DAY  Patient not taking: Reported on 3/29/2021 3/12/21   Honorio Billings MD   sertraline (ZOLOFT) 100 MG tablet TAKE 1 TAB BY MOUTH ONCE A DAY 1/29/21   Honorio Billings MD   metoprolol tartrate (LOPRESSOR) 25 MG tablet Take 1 tablet by mouth 2 times daily 12/10/20   Shruthi Dominguez MD   Continuous Blood Gluc  (FREESTYLE KERRY 14 DAY READER) CLAUDIO 1 Device by Does not apply route 4 times daily  Patient not taking: Reported on 1/29/2021 11/30/20   Honorio Billings MD   Continuous Blood Gluc Sensor (FREESTYLE KERRY 2 SENSOR SYSTM) MISC 4 Devices by Does not apply route every 7 days  Patient not taking: Reported on 1/29/2021 11/30/20   Honorio Billings MD   clopidogrel (PLAVIX) 75 MG tablet TAKE 1 TAB BY MOUTH ONCE A DAY 6/19/20   Kaleb Gracia MD   SITagliptin (JANUVIA) 100 MG tablet TAKE 1 TAB BY MOUTH ONCE A DAY 5/22/20   Manju Romero PA-C   Cyanocobalamin (VITAMIN B-12) 1000 MCG extended release tablet TAKE 1 TAB BY MOUTH ONCE A DAY 10/3/19   Honorio Billings MD   Lancets MISC 1 each by Does not apply route 2 times daily 7/5/19   Honorio Billings MD   diclofenac sodium (VOLTAREN) 1 % GEL Apply 2 g topically 2 times daily  Patient not taking: Reported on 3/29/2021 12/18/18   Honorio Billings MD   nystatin (MYCOSTATIN) 769483 UNIT/GM cream Apply topically 2 times daily.   Patient not taking: Reported on 3/29/2021 5/7/18   Honorio Billings MD   docusate sodium (COLACE) 100 MG capsule Take 1 capsule by mouth daily as needed for Constipation  Patient not taking: Reported on 1/29/2021 8/18/17   Honorio Billings MD   aspirin 81 MG chewable tablet Take 1 tablet by mouth daily 16   Liza Angulo MD       SOCIAL HISTORY     Tobacco: denies  Alcohol: denies  Illicits: denies   Occupation: n/a    FAMILY HISTORY     Family History   Problem Relation Age of Onset    Cancer Mother     Cancer Father     Cancer Brother     Diabetes Brother        PHYSICAL EXAM     Vitals: BP (!) 103/53   Pulse 74   Temp 99 °F (37.2 °C) (Oral)   Resp 15   Ht 5' 2\" (1.575 m)   Wt 215 lb (97.5 kg)   SpO2 96%   BMI 39.32 kg/m²   Tmax: Temp (24hrs), Av °F (37.2 °C), Min:99 °F (37.2 °C), Max:99 °F (37.2 °C)    Last Body weight:   Wt Readings from Last 3 Encounters:   21 215 lb (97.5 kg)   21 215 lb 2.7 oz (97.6 kg)   21 199 lb (90.3 kg)     Body Mass Index : Body mass index is 39.32 kg/m². PHYSICAL EXAMINATION:  Constitutional: This is a well developed, well nourished, 35-39.9 - Obesity Grade II 82y.o. year old female who is alert, oriented, cooperative and in no apparent distress. Head:normocephalic and atraumatic. EENT:  PERRLA. No conjunctival injections. Septum was midline, mucosa was without erythema, exudates or cobblestoning. No thrush was noted. Neck: Supple without thyromegaly. No elevated JVP. Trachea was midline. Respiratory: Chest was symmetrical without dullness to percussion. Breath sounds bilaterally were clear to auscultation. There were no wheezes, rhonchi or rales. There is no intercostal retraction or use of accessory muscles. No egophony noted. Cardiovascular: Regular without murmur, clicks, gallops or rubs. Abdomen: Slightly rounded and soft without organomegaly. No rebound, rigidity or guarding was appreciated. Lymphatic: No lymphadenopathy. Musculoskeletal: Normal curvature of the spine. No gross muscle weakness. Extremities:  No lower extremity edema, ulcerations, tenderness, varicosities or erythema. Muscle size, tone and strength are normal.  No involuntary movements are noted. Skin:  Warm and dry.   Good color, turgor and pigmentation. No lesions or scars.   No cyanosis or clubbing  Neurological/Psychiatric: The patient's general behavior, level of consciousness, thought content and emotional status is normal.          INVESTIGATIONS     Laboratory Testing:     Recent Results (from the past 24 hour(s))   CBC Auto Differential    Collection Time: 04/12/21  1:24 PM   Result Value Ref Range    WBC 13.0 (H) 3.5 - 11.3 k/uL    RBC 4.30 3.95 - 5.11 m/uL    Hemoglobin 12.1 11.9 - 15.1 g/dL    Hematocrit 37.6 36.3 - 47.1 %    MCV 87.4 82.6 - 102.9 fL    MCH 28.1 25.2 - 33.5 pg    MCHC 32.2 28.4 - 34.8 g/dL    RDW 15.1 (H) 11.8 - 14.4 %    Platelets 471 502 - 593 k/uL    MPV 11.6 8.1 - 13.5 fL    NRBC Automated 0.0 0.0 per 100 WBC    Differential Type NOT REPORTED     WBC Morphology NOT REPORTED     RBC Morphology ANISOCYTOSIS PRESENT     Platelet Estimate NOT REPORTED     Seg Neutrophils 70 (H) 36 - 65 %    Lymphocytes 20 (L) 24 - 43 %    Monocytes 6 3 - 12 %    Eosinophils % 3 1 - 4 %    Basophils 1 0 - 2 %    Immature Granulocytes 1 (H) 0 %    Segs Absolute 9.11 (H) 1.50 - 8.10 k/uL    Absolute Lymph # 2.53 1.10 - 3.70 k/uL    Absolute Mono # 0.77 0.10 - 1.20 k/uL    Absolute Eos # 0.36 0.00 - 0.44 k/uL    Basophils Absolute 0.09 0.00 - 0.20 k/uL    Absolute Immature Granulocyte 0.10 0.00 - 0.30 k/uL   Protime-INR    Collection Time: 04/12/21  1:24 PM   Result Value Ref Range    Protime 10.7 9.1 - 12.3 sec    INR 1.0    APTT    Collection Time: 04/12/21  1:24 PM   Result Value Ref Range    PTT 23.9 20.5 - 30.5 sec   Basic Metabolic Panel w/ Reflex to MG    Collection Time: 04/12/21  1:24 PM   Result Value Ref Range    Glucose 122 (H) 70 - 99 mg/dL    BUN 36 (H) 8 - 23 mg/dL    CREATININE 1.35 (H) 0.50 - 0.90 mg/dL    Bun/Cre Ratio NOT REPORTED 9 - 20    Calcium 7.6 (L) 8.6 - 10.4 mg/dL    Sodium 137 135 - 144 mmol/L    Potassium 3.9 3.7 - 5.3 mmol/L    Chloride 101 98 - 107 mmol/L    CO2 22 20 - 31 mmol/L    Anion Gap 14 9 - 17 mmol/L type 2 demand ischemia    Chronic Diastolic CHF   -Hold on Lasix 40 mg daily due to dehydration  -Can restart on discharge or after resuscitation. Atrial Flutter  -NSR at this time.   -Not on home anticoagulation.   -Due to lower BP will order lopressor PRN for tachycardia.   -Hold lopressor scheduled BID. CAD  -Continue ASA and Plavix. DVT ppx: epc  GI ppx: Protonix BID    PT/OT/SW  Discharge Planning: pending. Trevon Yeboah MD  Internal Medicine Resident, PGY-3  [address] [place], [place]  4/12/2021, 3:14 PM

## 2021-04-12 NOTE — ED NOTES
Bed: 22  Expected date:   Expected time:   Means of arrival:   Comments:  [facility]  04/12/21 1248

## 2021-04-12 NOTE — ED PROVIDER NOTES
[facility]     Emergency Department     Faculty Attestation    I performed a history and physical examination of the patient and discussed management with the resident. I have reviewed and agree with the residents findings including all diagnostic interpretations, and treatment plans as written. Any areas of disagreement are noted on the chart. I was personally present for the key portions of any procedures. I have documented in the chart those procedures where I was not present during the key portions. I have reviewed the emergency nurses triage note. I agree with the chief complaint, past medical history, past surgical history, allergies, medications, social and family history as documented unless otherwise noted below. Documentation of the HPI, Physical Exam and Medical Decision Making performed by saba is based on my personal performance of the HPI, PE and MDM. For Physician Assistant/ Nurse Practitioner cases/documentation I have personally evaluated this patient and have completed at least one if not all key elements of the E/M (history, physical exam, and MDM). Additional findings are as noted. She went 3 days of black-colored loose stool. No abdominal pain but she feels lightheaded with some dizziness. No chest pain or shortness of breath. She has not had this before she is on aspirin as well as Plavix. She also takes iron pills. She denies any recent antibiotic use and no recent travel. No fevers or chills no abdominal pain she is tolerating oral intake. Patient on exam is awake alert speaking in full sentences moving all extremities equally her abdomen is soft nondistended nontender to palpation all quadrants no rebound or guarding noted, she has black-colored loose stool noted on her buttock as well as upper back. She has excoriations noted around the rectum, guaiac was positive.     Patient's blood pressure slightly hypotensive 94/56.  She is not tachycardic but she also is on a beta-blocker medication. We will plan on IV fluids type and screen, check coags, start Protonix. Check labs, h/h and ekg      EKG Interpretation    Interpreted by me  EKG shows normal sinus rhythm there is left axis deviation, ventricular rate of 74, right bundle branch block is noted, with T wave inversions noted in V2 as well as flattening noted in V3, Q-wave noted in V3 as well as inferior leads with inferior anterior infarct of age undetermined.     Essie Moreau D.O, M.P.H  Attending Emergency Medicine Physician         Essie Moreau, DO  04/12/21 1509 [address] [place] [address] Gwynn Schaumann, DO  04/12/21 1311

## 2021-04-13 LAB
ABSOLUTE EOS #: 0.26 K/UL (ref 0–0.44)
ABSOLUTE IMMATURE GRANULOCYTE: 0.07 K/UL (ref 0–0.3)
ABSOLUTE LYMPH #: 1.65 K/UL (ref 1.1–3.7)
ABSOLUTE MONO #: 0.65 K/UL (ref 0.1–1.2)
ANION GAP SERPL CALCULATED.3IONS-SCNC: 13 MMOL/L (ref 9–17)
BASOPHILS # BLD: 1 % (ref 0–2)
BASOPHILS ABSOLUTE: 0.06 K/UL (ref 0–0.2)
BUN BLDV-MCNC: 29 MG/DL (ref 8–23)
BUN/CREAT BLD: ABNORMAL (ref 9–20)
CALCIUM SERPL-MCNC: 7 MG/DL (ref 8.6–10.4)
CHLORIDE BLD-SCNC: 105 MMOL/L (ref 98–107)
CO2: 21 MMOL/L (ref 20–31)
CREAT SERPL-MCNC: 1.1 MG/DL (ref 0.5–0.9)
DIFFERENTIAL TYPE: ABNORMAL
EKG ATRIAL RATE: 74 BPM
EKG P-R INTERVAL: 226 MS
EKG Q-T INTERVAL: 456 MS
EKG QRS DURATION: 134 MS
EKG QTC CALCULATION (BAZETT): 506 MS
EKG R AXIS: -71 DEGREES
EKG T AXIS: 35 DEGREES
EKG VENTRICULAR RATE: 74 BPM
EOSINOPHILS RELATIVE PERCENT: 2 % (ref 1–4)
FOLATE: 8.7 NG/ML
GFR AFRICAN AMERICAN: 57 ML/MIN
GFR NON-AFRICAN AMERICAN: 47 ML/MIN
GFR SERPL CREATININE-BSD FRML MDRD: ABNORMAL ML/MIN/{1.73_M2}
GFR SERPL CREATININE-BSD FRML MDRD: ABNORMAL ML/MIN/{1.73_M2}
GLUCOSE BLD-MCNC: 110 MG/DL (ref 70–99)
GLUCOSE BLD-MCNC: 145 MG/DL (ref 65–105)
GLUCOSE BLD-MCNC: 154 MG/DL (ref 65–105)
GLUCOSE BLD-MCNC: 88 MG/DL (ref 65–105)
GLUCOSE BLD-MCNC: 97 MG/DL (ref 65–105)
HCT VFR BLD CALC: 32.1 % (ref 36.3–47.1)
HEMOGLOBIN: 10.3 G/DL (ref 11.9–15.1)
IMMATURE GRANULOCYTES: 1 %
IRON SATURATION: 16 % (ref 20–55)
IRON: 31 UG/DL (ref 37–145)
LYMPHOCYTES # BLD: 15 % (ref 24–43)
MAGNESIUM: 1.2 MG/DL (ref 1.6–2.6)
MCH RBC QN AUTO: 28.4 PG (ref 25.2–33.5)
MCHC RBC AUTO-ENTMCNC: 32.1 G/DL (ref 28.4–34.8)
MCV RBC AUTO: 88.4 FL (ref 82.6–102.9)
MONOCYTES # BLD: 6 % (ref 3–12)
NRBC AUTOMATED: 0 PER 100 WBC
PDW BLD-RTO: 14.9 % (ref 11.8–14.4)
PLATELET # BLD: 144 K/UL (ref 138–453)
PLATELET ESTIMATE: ABNORMAL
PMV BLD AUTO: 11.5 FL (ref 8.1–13.5)
POTASSIUM SERPL-SCNC: 3.3 MMOL/L (ref 3.7–5.3)
RBC # BLD: 3.63 M/UL (ref 3.95–5.11)
RBC # BLD: ABNORMAL 10*6/UL
SEG NEUTROPHILS: 75 % (ref 36–65)
SEGMENTED NEUTROPHILS ABSOLUTE COUNT: 8.7 K/UL (ref 1.5–8.1)
SODIUM BLD-SCNC: 139 MMOL/L (ref 135–144)
TOTAL IRON BINDING CAPACITY: 197 UG/DL (ref 250–450)
UNSATURATED IRON BINDING CAPACITY: 166 UG/DL (ref 112–347)
VITAMIN B-12: >2000 PG/ML (ref 232–1245)
WBC # BLD: 11.4 K/UL (ref 3.5–11.3)
WBC # BLD: ABNORMAL 10*3/UL

## 2021-04-13 PROCEDURE — 6370000000 HC RX 637 (ALT 250 FOR IP): Performed by: STUDENT IN AN ORGANIZED HEALTH CARE EDUCATION/TRAINING PROGRAM

## 2021-04-13 PROCEDURE — 36415 COLL VENOUS BLD VENIPUNCTURE: CPT

## 2021-04-13 PROCEDURE — 99232 SBSQ HOSP IP/OBS MODERATE 35: CPT | Performed by: INTERNAL MEDICINE

## 2021-04-13 PROCEDURE — 85025 COMPLETE CBC W/AUTO DIFF WBC: CPT

## 2021-04-13 PROCEDURE — C9113 INJ PANTOPRAZOLE SODIUM, VIA: HCPCS | Performed by: STUDENT IN AN ORGANIZED HEALTH CARE EDUCATION/TRAINING PROGRAM

## 2021-04-13 PROCEDURE — 97162 PT EVAL MOD COMPLEX 30 MIN: CPT

## 2021-04-13 PROCEDURE — 83735 ASSAY OF MAGNESIUM: CPT

## 2021-04-13 PROCEDURE — 82947 ASSAY GLUCOSE BLOOD QUANT: CPT

## 2021-04-13 PROCEDURE — 83550 IRON BINDING TEST: CPT

## 2021-04-13 PROCEDURE — U0005 INFEC AGEN DETEC AMPLI PROBE: HCPCS

## 2021-04-13 PROCEDURE — 2580000003 HC RX 258: Performed by: STUDENT IN AN ORGANIZED HEALTH CARE EDUCATION/TRAINING PROGRAM

## 2021-04-13 PROCEDURE — 6360000002 HC RX W HCPCS: Performed by: STUDENT IN AN ORGANIZED HEALTH CARE EDUCATION/TRAINING PROGRAM

## 2021-04-13 PROCEDURE — 82607 VITAMIN B-12: CPT

## 2021-04-13 PROCEDURE — 1200000000 HC SEMI PRIVATE

## 2021-04-13 PROCEDURE — 6360000002 HC RX W HCPCS: Performed by: INTERNAL MEDICINE

## 2021-04-13 PROCEDURE — 97535 SELF CARE MNGMENT TRAINING: CPT

## 2021-04-13 PROCEDURE — 83540 ASSAY OF IRON: CPT

## 2021-04-13 PROCEDURE — 97530 THERAPEUTIC ACTIVITIES: CPT

## 2021-04-13 PROCEDURE — 97166 OT EVAL MOD COMPLEX 45 MIN: CPT

## 2021-04-13 PROCEDURE — U0003 INFECTIOUS AGENT DETECTION BY NUCLEIC ACID (DNA OR RNA); SEVERE ACUTE RESPIRATORY SYNDROME CORONAVIRUS 2 (SARS-COV-2) (CORONAVIRUS DISEASE [COVID-19]), AMPLIFIED PROBE TECHNIQUE, MAKING USE OF HIGH THROUGHPUT TECHNOLOGIES AS DESCRIBED BY CMS-2020-01-R: HCPCS

## 2021-04-13 PROCEDURE — 80048 BASIC METABOLIC PNL TOTAL CA: CPT

## 2021-04-13 PROCEDURE — 82746 ASSAY OF FOLIC ACID SERUM: CPT

## 2021-04-13 RX ORDER — MAGNESIUM SULFATE HEPTAHYDRATE 40 MG/ML
4000 INJECTION, SOLUTION INTRAVENOUS ONCE
Status: COMPLETED | OUTPATIENT
Start: 2021-04-13 | End: 2021-04-13

## 2021-04-13 RX ORDER — POTASSIUM CHLORIDE 7.45 MG/ML
10 INJECTION INTRAVENOUS
Status: COMPLETED | OUTPATIENT
Start: 2021-04-13 | End: 2021-04-13

## 2021-04-13 RX ORDER — SODIUM CHLORIDE, SODIUM LACTATE, POTASSIUM CHLORIDE, AND CALCIUM CHLORIDE .6; .31; .03; .02 G/100ML; G/100ML; G/100ML; G/100ML
1000 INJECTION, SOLUTION INTRAVENOUS ONCE
Status: COMPLETED | OUTPATIENT
Start: 2021-04-13 | End: 2021-04-13

## 2021-04-13 RX ORDER — LACTOBACILLUS RHAMNOSUS GG 10B CELL
1 CAPSULE ORAL
Status: DISCONTINUED | OUTPATIENT
Start: 2021-04-13 | End: 2021-04-16 | Stop reason: HOSPADM

## 2021-04-13 RX ORDER — MAGNESIUM SULFATE 1 G/100ML
1000 INJECTION INTRAVENOUS PRN
Status: DISCONTINUED | OUTPATIENT
Start: 2021-04-13 | End: 2021-04-16 | Stop reason: HOSPADM

## 2021-04-13 RX ADMIN — POTASSIUM CHLORIDE 10 MEQ: 10 INJECTION, SOLUTION INTRAVENOUS at 12:14

## 2021-04-13 RX ADMIN — ASPIRIN 81 MG: 81 TABLET, CHEWABLE ORAL at 09:15

## 2021-04-13 RX ADMIN — ACETAMINOPHEN 650 MG: 325 TABLET ORAL at 12:18

## 2021-04-13 RX ADMIN — SERTRALINE 100 MG: 50 TABLET, FILM COATED ORAL at 09:14

## 2021-04-13 RX ADMIN — PANTOPRAZOLE SODIUM 40 MG: 40 INJECTION, POWDER, FOR SOLUTION INTRAVENOUS at 20:16

## 2021-04-13 RX ADMIN — SODIUM CHLORIDE, POTASSIUM CHLORIDE, SODIUM LACTATE AND CALCIUM CHLORIDE 1000 ML: 600; 310; 30; 20 INJECTION, SOLUTION INTRAVENOUS at 08:08

## 2021-04-13 RX ADMIN — SODIUM CHLORIDE, PRESERVATIVE FREE 10 ML: 5 INJECTION INTRAVENOUS at 20:13

## 2021-04-13 RX ADMIN — POTASSIUM CHLORIDE 10 MEQ: 10 INJECTION, SOLUTION INTRAVENOUS at 14:40

## 2021-04-13 RX ADMIN — MAGNESIUM SULFATE IN WATER 4000 MG: 40 INJECTION, SOLUTION INTRAVENOUS at 14:41

## 2021-04-13 RX ADMIN — SODIUM CHLORIDE, PRESERVATIVE FREE 10 ML: 5 INJECTION INTRAVENOUS at 20:15

## 2021-04-13 RX ADMIN — CLOPIDOGREL 75 MG: 75 TABLET, FILM COATED ORAL at 09:15

## 2021-04-13 RX ADMIN — SODIUM CHLORIDE, PRESERVATIVE FREE 10 ML: 5 INJECTION INTRAVENOUS at 09:16

## 2021-04-13 RX ADMIN — Medication 1 CAPSULE: at 17:00

## 2021-04-13 RX ADMIN — POTASSIUM CHLORIDE 10 MEQ: 10 INJECTION, SOLUTION INTRAVENOUS at 16:23

## 2021-04-13 RX ADMIN — Medication 1 CAPSULE: at 14:40

## 2021-04-13 RX ADMIN — ATORVASTATIN CALCIUM 80 MG: 80 TABLET, FILM COATED ORAL at 09:15

## 2021-04-13 RX ADMIN — Medication 1000 MCG: at 09:14

## 2021-04-13 RX ADMIN — LEVOTHYROXINE SODIUM 125 MCG: 125 TABLET ORAL at 05:26

## 2021-04-13 RX ADMIN — PANTOPRAZOLE SODIUM 40 MG: 40 INJECTION, POWDER, FOR SOLUTION INTRAVENOUS at 09:15

## 2021-04-13 RX ADMIN — POTASSIUM CHLORIDE 10 MEQ: 10 INJECTION, SOLUTION INTRAVENOUS at 10:19

## 2021-04-13 ASSESSMENT — PAIN SCALES - GENERAL: PAINLEVEL_OUTOF10: 4

## 2021-04-13 ASSESSMENT — PAIN DESCRIPTION - LOCATION: LOCATION: ARM

## 2021-04-13 ASSESSMENT — PAIN DESCRIPTION - PAIN TYPE: TYPE: ACUTE PAIN

## 2021-04-13 NOTE — PROGRESS NOTES
[facility]  Internal Medicine Teaching Residency Program  Inpatient Daily Progress Note  ______________________________________________________________________________    Patient: Jhon Luo  YOB: 1935   PWD:[id number]    Acct: [de-identified]     Room:   Admit date: 2021  Today's date: 21  Number of days in the hospital: 1    SUBJECTIVE   Admitting Diagnosis: <principal problem not specified>  CC: lightheadedness, melena  Pt examined at bedside. Chart & results reviewed. Patient seen and examined at bedside. No acute overnight events. Afebrile, hemodynamically stable. Hemoglobin this morning dropped from 12-10. Will get GI consult. Remaining labs unremarkable,  BRIA resolved. ROS:  Constitutional:  negative for chills, fevers, sweats  Respiratory:  negative for cough, dyspnea on exertion, hemoptysis, shortness of breath, wheezing  Cardiovascular:  negative for chest pain, chest pressure/discomfort, lower extremity edema, palpitations  Gastrointestinal:  negative for abdominal pain, constipation, diarrhea, nausea, vomiting  Neurological:  negative for dizziness, headache  BRIEF HISTORY   The patient is a pleasant 82 y.o. female presents with a chief complaint of diarrhea, lightheadedness, and melena. Patient endorses a 4 day history of of diarrhea with numerous episodes a day. Patient and family state diarrhea is dark/black in nature. No hematochezia or BRB. Associated lightheadedness and intermittent dizziness & poor oral intake. Patient denies fevers, chills, nausea, vomiting, chest pain, shortness of breath, abdominal pain, dysuria, or focal neurological deficits.   OBJECTIVE     Vital Signs:  BP (!) 92/46   Pulse 70   Temp 97.8 °F (36.6 °C) (Oral)   Resp 16   Ht 5' 2\" (1.575 m)   Wt 195 lb 5.2 oz (88.6 kg)   SpO2 99%   BMI 35.73 kg/m²     Temp (24hrs), Av.1 °F (36.7 °C), Min:97.4 °F (36.3 °C), Max:99 °F (37.2 °C)    No intake/output data recorded. Physical Exam:  Constitutional: This is a well developed, well nourished, 35-39.9 - Obesity Grade II 82y.o. year old female who is alert, oriented, cooperative and in no apparent distress. Head:normocephalic and atraumatic. EENT:  PERRLA. No conjunctival injections. Septum was midline, mucosa was without erythema, exudates or cobblestoning. No thrush was noted. Neck: Supple without thyromegaly. No elevated JVP. Trachea was midline. Respiratory: CTAB. No accessory muscle use. Cardiovascular: Regular without murmur, clicks, gallops or rubs. Abdomen: nontender to palpation. No organomegaly. Lymphatic: No lymphadenopathy. Musculoskeletal: Normal curvature of the spine. No gross muscle weakness. Extremities:  No lower extremity edema, ulcerations, tenderness, varicosities or erythema. Muscle size, tone and strength are normal.  No involuntary movements are noted. Skin:  Warm and dry. Good color, turgor and pigmentation. No lesions or scars.   No cyanosis or clubbing  Neurological/Psychiatric: The patient's general behavior, level of consciousness, thought content and emotional status is normal.        Medications:  Scheduled Medications:    potassium chloride  10 mEq Intravenous Q1H    lactated ringers bolus  1,000 mL Intravenous Once    aspirin  81 mg Oral Daily    atorvastatin  80 mg Oral Daily    clopidogrel  75 mg Oral Daily    vitamin B-12  1,000 mcg Oral Daily    levothyroxine  125 mcg Oral Daily    sertraline  100 mg Oral Daily    sodium chloride flush  5-40 mL Intravenous 2 times per day    pantoprazole  40 mg Intravenous BID    And    sodium chloride (PF)  10 mL Intravenous Daily    insulin lispro  0-12 Units Subcutaneous TID     insulin lispro  0-6 Units Subcutaneous Nightly     Continuous Infusions:    sodium chloride      dextrose      lactated ringers 100 mL/hr at 04/12/21 1711     PRN Medicationsmagnesium sulfate, 1,000 mg, PRN  metoprolol, 5 mg, Q6H PRN  sodium chloride flush, 10 mL, PRN  sodium chloride, 25 mL, PRN  potassium chloride, 40 mEq, PRN    Or  potassium alternative oral replacement, 40 mEq, PRN    Or  potassium chloride, 10 mEq, PRN  promethazine, 12.5 mg, Q6H PRN    Or  ondansetron, 4 mg, Q6H PRN  polyethylene glycol, 17 g, Daily PRN  acetaminophen, 650 mg, Q6H PRN    Or  acetaminophen, 650 mg, Q6H PRN  glucose, 15 g, PRN  dextrose, 12.5 g, PRN  glucagon (rDNA), 1 mg, PRN  dextrose, 100 mL/hr, PRN        Diagnostic Labs:  CBC:   Recent Labs     04/12/21  1324 04/13/21  0602   WBC 13.0* 11.4*   RBC 4.30 3.63*   HGB 12.1 10.3*   HCT 37.6 32.1*   MCV 87.4 88.4   RDW 15.1* 14.9*    144     BMP:   Recent Labs     04/12/21  1324 04/13/21  0602    139   K 3.9 3.3*    105   CO2 22 21   BUN 36* 29*   CREATININE 1.35* 1.10*     BNP: No results for input(s): BNP in the last 72 hours. PT/INR:   Recent Labs     04/12/21  1324   PROTIME 10.7   INR 1.0     APTT:   Recent Labs     04/12/21  1324   APTT 23.9     CARDIAC ENZYMES: No results for input(s): CKMB, CKMBINDEX, TROPONINI in the last 72 hours. Invalid input(s): CKTOTAL;3  FASTING LIPID PANEL:  Lab Results   Component Value Date    CHOL 173 06/29/2020    HDL 52 06/29/2020    TRIG 337 (H) 06/29/2020     LIVER PROFILE:   Recent Labs     04/12/21  1324   AST 61*   ALT 65*   BILIDIR 0.16   BILITOT 0.46   ALKPHOS 235*      MICROBIOLOGY:   Lab Results   Component Value Date/Time    CULTURE KLEBSIELLA PNEUMONIAE >590373 CFU/ML (A) 04/03/2021 11:07 AM       Imaging:    Xr Chest Portable    Result Date: 4/12/2021  No radiographic evidence for free intraperitoneal air but small amounts may not be apparent radiographically. If this remains a clinical concern a CT scan should be obtained.        ASSESSMENT & PLAN     ASSESSMENT / PLAN:     Acute renal Failure  -resolved this AM Cr 1.1  Continue IVF      GI bleed  -Protonix 40 mg IV BID  -Hb drop 12-10  -will consult GI DM type II  -Hold metformin due to BRIA  -glucose well controlled  -MDSS     Hypothyroidism  -Continue Synthroid 125 mcg daily. -TSH elevated, however, according to previous discharge synthroid was recently increased by PCP  -Patient will need TSH repeat in 4 weeks.       Troponemia  -flattened     Chronic Diastolic CHF   continue holding Lasix 40 mg daily due to dehydration  -Can restart on discharge or after resuscitation.      Atrial Flutter  -NSR at this time.   -Not on home anticoagulation.   - lopressor PRN for tachycardia.        CAD  -Continue ASA and Plavix.      DVT ppx: epc  GI ppx: Protonix BID    Roz Holm MD  Internal Medicine Resident, PGY-2  [facility];  [place], [place]  4/13/2021, 8:40 AM

## 2021-04-13 NOTE — PLAN OF CARE
Problem: Falls - Risk of:  Goal: Will remain free from falls  Description: Will remain free from falls  4/13/2021 1714 by Jenniffer Castillo RN  Outcome: Ongoing  4/13/2021 0338 by Gerardo Shepard RN  Outcome: Ongoing  Goal: Absence of physical injury  Description: Absence of physical injury  4/13/2021 1714 by Jenniffer Castillo RN  Outcome: Ongoing  4/13/2021 0338 by Gerardo Shepard RN  Outcome: Ongoing     Problem: Skin Integrity:  Goal: Will show no infection signs and symptoms  Description: Will show no infection signs and symptoms  4/13/2021 1714 by Jenniffer Castillo RN  Outcome: Ongoing  4/13/2021 0338 by Gerardo Shepard RN  Outcome: Ongoing  Goal: Absence of new skin breakdown  Description: Absence of new skin breakdown  4/13/2021 1714 by Jenniffer Castillo RN  Outcome: Ongoing  4/13/2021 0338 by Gerardo Shepard RN  Outcome: Ongoing     Problem: Musculor/Skeletal Functional Status  Goal: Highest potential functional level  Outcome: Ongoing  Goal: Absence of falls  Outcome: Ongoing

## 2021-04-13 NOTE — PROGRESS NOTES
Occupational Therapy   Occupational Therapy Initial Assessment  Date: 2021   Patient Name: Elvis Jones  MRN: [id number]     : 1935     Chief Complaint   Patient presents with    Rectal Bleeding    Diarrhea    Dizziness       Date of Service: 2021    Discharge Recommendations:  Patient would benefit from continued therapy after discharge  OT Equipment Recommendations  Equipment Needed: Yes  Mobility Devices: ADL Assistive Devices  ADL Assistive Devices: Transfer Tub Bench    Assessment   Performance deficits / Impairments: Decreased functional mobility ; Decreased ADL status; Decreased strength;Decreased safe awareness;Decreased cognition;Decreased endurance;Decreased balance;Decreased high-level IADLs  Assessment: Pt agreeable to OT eval this date. Pt completed bed mobility requiring Min A for trunk and BLE progression. Pt requires Max A for LB dressing task this date. Pt reports typically only stand pivots to electric w/c at baseline. Pt completed functional transfers this date with Mod A and requires Mod A for maintaining upright posture d/t R lateral lean. Pt unable to attempt functional mobility this date d/t poor standing balance and tolerance. Pt will require continued OT services to increase safety, independence, and endurance for ADL/IADL and functional transfer/mobility function  Prognosis: Good  Decision Making: Medium Complexity  Patient Education: Pt educated on OT role, OT POC, safety awareness, bed mobility training, transfer training, and importance of continued OT. Pt verbalized good understanding  REQUIRES OT FOLLOW UP: Yes  Activity Tolerance  Activity Tolerance: Patient Tolerated treatment well  Safety Devices  Safety Devices in place: Yes  Type of devices: Nurse notified;Gait belt;Left in bed;Call light within reach; Bed alarm in place  Restraints  Initially in place: No           Patient Diagnosis(es): The primary encounter diagnosis was BRIA (acute kidney injury) ([facility] [address]  A diagnosis of Upper GI bleed was also pertinent to this visit. has a past medical history of Arthritis, Atrial flutter ([facility] [address] CAD (coronary artery disease), CHF (congestive heart failure) ([facility] [address] Diabetes ([facility] [address] Diabetes mellitus ([facility] [address] Hyperlipidemia, Hypertension, Psychiatric problem, Thyroid cancer ([facility] [address] and Thyroid disease. has a past surgical history that includes Coronary angioplasty with stent; Hysterectomy; Cholecystectomy; Thyroidectomy; Appendectomy; and back surgery. Restrictions  Restrictions/Precautions  Restrictions/Precautions: Fall Risk  Required Braces or Orthoses?: No  Position Activity Restriction  Other position/activity restrictions: up with assist    Subjective   General  Patient assessed for rehabilitation services?: Yes  Family / Caregiver Present: Yes(2 daughters present)  General Comment  Comments: RN ok'd pt for OT/PT eval this date.  Pt agreeable to session and pleasant/cooperative throughout  Patient Currently in Pain: Denies    Social/Functional History  Social/Functional History  Lives With: Daughter  Type of Home: House  Home Layout: One level  Home Access: Stairs to enter without rails(pt reports both daughter assists with stairs)  Entrance Stairs - Number of Steps: 4  Bathroom Shower/Tub: (pt reports completes sponge baths)  Bathroom Toilet: Standard  Home Equipment: Wheelchair-electric  ADL Assistance: Needs assistance(daughter assists with majority of ADLs)  Homemaking Assistance: Needs assistance  Homemaking Responsibilities: No  Ambulation Assistance: Needs assistance(pt reports typically uses electric w/c)  Transfer Assistance: Independent  Active : No  Patient's  Info: Family  Occupation: Retired  Additional Comments: Pt reports there are times throughout the day that the pt is home alone       Objective   Vision: Within Functional Limits  Hearing: Exceptions to [facility]  Hearing Exceptions: Hard of hearing/hearing concerns         Balance  Sitting WFL  L Shoulder Flexion 0-180: 110  Left Hand AROM (degrees)  Left Hand AROM: WFL  RUE PROM (degrees)  RUE PROM: WFL  RUE AROM (degrees)  RUE AROM : Exceptions  RUE General AROM: shoulder limited, all other joints WFL  R Shoulder Flexion 0-180: 120  Right Hand AROM (degrees)  Right Hand AROM: WFL  LUE Strength  Gross LUE Strength: Exceptions to [facility]  L Shoulder Flex: 4-/5  L Shoulder Ext: 4-/5  L Elbow Flex: 4/5  L Elbow Ext: 4/5  L Hand General: 4/5  RUE Strength  Gross RUE Strength: Exceptions to [facility]  R Shoulder Flex: 4-/5  R Shoulder Ext: 4-/5  R Elbow Flex: 4/5  R Elbow Ext: 4/5  R Hand General: 4+/5                   Plan   Plan  Times per week: 3-4 x/wk  Current Treatment Recommendations: Strengthening, Balance Training, Functional Mobility Training, Endurance Training, Cognitive Reorientation, Safety Education & Training, Patient/Caregiver Education & Training, Equipment Evaluation, Education, & procurement, Self-Care / ADL    AM-PAC Score  AM-[facility] Inpatient Daily Activity Raw Score: 15 (04/13/21 1211)  AM-PAC Inpatient ADL T-Scale Score : 34.69 (04/13/21 1211)  ADL Inpatient CMS 0-100% Score: 56.46 (04/13/21 1211)  ADL Inpatient CMS G-Code Modifier : CK (04/13/21 1211)    Goals  Short term goals  Time Frame for Short term goals: By discharge, pt will:  Short term goal 1: Demo CGA for bed mobility to increase independence with ADLs  Short term goal 2: Demo Min A for functional transfers to increase safety and independence with ADLs/toileting tasks  Short term goal 3: Demo Min A for UB ADLs and grooming tasks with setup  Short term goal 4: Demo Mod A for LB ADLs and toileting tasks with setup and AE PRN  Short term goal 5: Demo 4+ minutes static standing to increase balance and standing tolerance for ADL performance       Therapy Time   Individual Concurrent Group Co-treatment   Time In 1021         Time Out 1040         Minutes 19         Timed Code Treatment Minutes: 8 Minutes       EDNA Tavares/L

## 2021-04-13 NOTE — PROGRESS NOTES
Physical Therapy    Facility/Department: [facility] RENAL//MED SURG  Initial Assessment    NAME: Kevin Mena  : 1935  MRN: [id number]    Date of Service: 2021  Chief Complaint   Patient presents with    Rectal Bleeding    Diarrhea    Dizziness       Discharge Recommendations:    Further therapy recommended at discharge. PT Equipment Recommendations  Equipment Needed: No    Assessment   Body structures, Functions, Activity limitations: Decreased functional mobility ; Decreased endurance;Decreased ROM; Decreased strength;Decreased balance; Increased pain  Assessment: Pt required min-modA to perform functional mobility this date due to fatigue and LLE weakness. Pt is a fall risk due to LE weakness and would be unsafe to perform functional mobility without 24hr support upon discharge. Recommending continued skilled physical therapy to address functional mobility deficits to return pt to prior level of independence. Prognosis: Fair  Decision Making: Medium Complexity  PT Education: Goals;PT Role;Plan of Care;General Safety  REQUIRES PT FOLLOW UP: Yes  Activity Tolerance  Activity Tolerance: Patient limited by endurance; Patient limited by fatigue       Patient Diagnosis(es): The primary encounter diagnosis was BRIA (acute kidney injury) ([facility] [address] A diagnosis of Upper GI bleed was also pertinent to this visit. has a past medical history of Arthritis, Atrial flutter ([address] CAD (coronary artery disease), CHF (congestive heart failure) ([address] Diabetes ([address] Diabetes mellitus ([address] Hyperlipidemia, Hypertension, Psychiatric problem, Thyroid cancer ([address] and Thyroid disease. has a past surgical history that includes Coronary angioplasty with stent; Hysterectomy; Cholecystectomy; Thyroidectomy; Appendectomy; and back surgery.     Restrictions  Restrictions/Precautions  Restrictions/Precautions: Fall Risk  Required Braces or Orthoses?: No  Position Activity Restriction  Other position/activity restrictions: up with assist  Vision/Hearing  Vision: Within Functional Limits  Hearing: Exceptions to [facility]  Hearing Exceptions: Hard of hearing/hearing concerns     Subjective  General  Patient assessed for rehabilitation services?: Yes  Response To Previous Treatment: Not applicable  Family / Caregiver Present: No  Follows Commands: Within Functional Limits  Subjective  Subjective: RN and pt in agreement for PT eval; pt supine in bed upon PT arrival, pt pleasant and cooperative throughout session  Pain Screening  Patient Currently in Pain: Denies  Vital Signs  Patient Currently in Pain: Denies       Orientation  Orientation  Overall Orientation Status: Within Functional Limits  Social/Functional History  Social/Functional History  Lives With: Daughter  Type of Home: House  Home Layout: One level  Home Access: Stairs to enter without rails(pt reports both daughter assists with stairs)  Entrance Stairs - Number of Steps: 4  Bathroom Shower/Tub: (pt reports completes sponge baths)  Bathroom Toilet: Standard  Home Equipment: Wheelchair-electric  ADL Assistance: Needs assistance(daughter assists with majority of ADLs)  Homemaking Assistance: Needs assistance  Homemaking Responsibilities: No  Ambulation Assistance: Needs assistance(pt reports typically uses electric w/c)  Transfer Assistance: Independent  Active : No  Patient's  Info: Family  Occupation: Retired  Additional Comments: Pt reports there are times throughout the day that the pt is home alone  [employer]  Overall Cognitive Status: Exceptions  Following Commands:  Follows one step commands with increased time  Safety Judgement: Decreased awareness of need for assistance  Problem Solving: Assistance required to correct errors made  Insights: Decreased awareness of deficits  Initiation: Requires cues for some  Sequencing: Requires cues for some    Objective  Joint Mobility  Spine: WFL  ROM RLE: WFL  ROM LLE: Hip WFL; PROM knee WFL, AROM lacking ~15 degrees; term goals: 15  Short term goal 1: Pt to perform bed mobility independently  Short term goal 2: Demonstrate stand pivot to wheelchair independently  Short term goal 3: Demonstrate standing dynamic balance of good - to decrease fall risk  Short term goal 4: Tolerate 30 minutes of therapy to demo increased endurance  Patient Goals   Patient goals :  To go home       Therapy Time   Individual Concurrent Group Co-treatment   Time In 1021         Time Out 1042         Minutes 21         Timed Code Treatment Minutes: 8 Minutes       Sole Rebolledo, PT

## 2021-04-13 NOTE — CONSULTS
THE [facility] [place] [facility]  Consultation Note     . REASON FOR CONSULTATION:    Melena x 4 days, FOBT+    HISTORY OF PRESENT ILLNESS:     This is a 82 y.o. female with PMH including HTN, DM type II, CAD-on Plavix, CHF, A. fib who was recently admitted to the hospital for UTI and discharged on oral Keflex. On Friday patient began to have loose watery stools that she describes as black. This has continued until yesterday when she became very weak and presented to the ER. Upon questioning, patient states that a month ago she stopped taking Aleve for arthritic pain in her knees. She denies any NSAIDs in the last month. Patient denies any abdominal pain, no fever or chills, no change in appetite. No smoking drinking or drug use    Summary of imaging completed at this time:  Abdominal imaging completed at this time      Summary of abnormal labs completed at this time:    Metabolic: K 3.3, BUN 29, creatinine 1.10, mag 1.2  Hematology: Hemoglobin was 12.1 yesterday and dropped to 10.3, WBCs 13-11.4  Coags: INR 1.0  Liver profile: Alk phos 235, ALT 65, AST 61, T bili normal 0.46    Patient states she had a daughter who  of breast cancer and a son who  of bone cancer. There is an history of thyroid cancer resulting in thyroidectomy patient denies any other personal history of cancers.       On physician exam:  Patient is resting comfortably in bed, staff reports only 2 small stools since arrival  No abdominal pain, nausea or vomiting      Previous GI history:   No previous EGD or colonoscopy on file    Past Medical/Social/Family History:  Past Medical History:   Diagnosis Date    Arthritis     Atrial flutter ([address]     CAD (coronary artery disease)     CHF (congestive heart failure) ([address]     Diabetes ([address]     Diabetes mellitus ([address]     Hyperlipidemia     Hypertension     Psychiatric problem     Thyroid cancer ([address]     S/P thyroidectomy; on synthroid    Thyroid disease     hypothyroid 11/30/20   Roxann Miramontes MD   Continuous Blood Gluc Sensor (FREESTYLE KERRY 2 SENSOR SYSTM) MISC 4 Devices by Does not apply route every 7 days  Patient not taking: Reported on 1/29/2021 11/30/20   Roxann Miramontes MD   clopidogrel (PLAVIX) 75 MG tablet TAKE 1 TAB BY MOUTH ONCE A DAY 6/19/20   Rosette Vaughn MD   SITagliptin (JANUVIA) 100 MG tablet TAKE 1 TAB BY MOUTH ONCE A DAY 5/22/20   Estrada Vega PA-C   Cyanocobalamin (VITAMIN B-12) 1000 MCG extended release tablet TAKE 1 TAB BY MOUTH ONCE A DAY 10/3/19   Roxann Miramontes MD   Lancets MISC 1 each by Does not apply route 2 times daily 7/5/19   Roxann Miramontes MD   diclofenac sodium (VOLTAREN) 1 % GEL Apply 2 g topically 2 times daily  Patient not taking: Reported on 3/29/2021 12/18/18   Roxann Miramontes MD   nystatin (MYCOSTATIN) 363041 UNIT/GM cream Apply topically 2 times daily. Patient not taking: Reported on 3/29/2021 5/7/18   Roxann Miramontes MD   docusate sodium (COLACE) 100 MG capsule Take 1 capsule by mouth daily as needed for Constipation  Patient not taking: Reported on 1/29/2021 8/18/17   Roxann Miramontes MD   aspirin 81 MG chewable tablet Take 1 tablet by mouth daily 9/5/16   Philomena Monroy MD     .  Current Medications:  Scheduled Meds:   lactobacillus  1 capsule Oral TID WC    magnesium sulfate  4,000 mg Intravenous Once    aspirin  81 mg Oral Daily    atorvastatin  80 mg Oral Daily    clopidogrel  75 mg Oral Daily    vitamin B-12  1,000 mcg Oral Daily    levothyroxine  125 mcg Oral Daily    sertraline  100 mg Oral Daily    sodium chloride flush  5-40 mL Intravenous 2 times per day    pantoprazole  40 mg Intravenous BID    And    sodium chloride (PF)  10 mL Intravenous Daily    insulin lispro  0-12 Units Subcutaneous TID WC    insulin lispro  0-6 Units Subcutaneous Nightly     . Continuous Infusions:   sodium chloride      dextrose      lactated ringers 100 mL/hr at 04/12/21 1711     .   PRN Meds:magnesium sulfate, metoprolol, sodium chloride flush, sodium chloride, potassium chloride **OR** potassium alternative oral replacement **OR** potassium chloride, promethazine **OR** ondansetron, polyethylene glycol, acetaminophen **OR** acetaminophen, glucose, dextrose, glucagon (rDNA), dextrose    REVIEW OF SYSTEMS:     Constitutional: Increased weakness and fatigue  HEENT:  No headache, otalgia, itchy eyes, nasal discharge or sore throat. Cardiac:  No chest pain, dyspnea, orthopnea or PND. Chest:   No cough, phlegm or wheezing. Abdomen:  Melena x4 days  Neuro:  No focal weakness, abnormal movements or seizure like activity. Skin:   No rashes, no itching. :   No hematuria, no pyuria, no dysuria, no flank pain. Extremities:  No swelling or joint pains. ROS was otherwise negative except as mentioned in the [address] PHYSICAL EXAM:    BP (!) 97/42   Pulse 67   Temp 98.1 °F (36.7 °C) (Oral)   Resp 16   Ht 5' 2\" (1.575 m)   Wt 195 lb 5.2 oz (88.6 kg)   SpO2 96%   BMI 35.73 kg/m²   . TMAX[24]    General: Well developed, Well nourished, No apparent distress  Head:  Normocephalic, Atraumatic  EENT: No teeth  Neck:  Supple, Trachea midline  Lungs:CTA Bilaterally  Heart: RRR, No murmur, No rub, No gallop, PMI nondisplaced. Abdomen:Soft, Non tender, Not distended, BS WNL,  No masses. No hepatomegalia   Ext:No clubbing. No cyanosis. No edema. Skin: No rashes. No jaundice. No stigmata of liver disease. Neuro:  A&O x Three, No focal neurological deficits    Imaging:   None    Hemotological labs: Anemia studies:  No results for input(s): LABIRON, TIBC, FERRITIN, BMVLQOCD28, FOLATE, OCCULTBLD in the last 72 hours.     CBC:  Recent Labs     04/12/21  1324 04/13/21  0602   WBC 13.0* 11.4*   HGB 12.1 10.3*   MCV 87.4 88.4   RDW 15.1* 14.9*    144       PT/INR:  Recent Labs     04/12/21  1324   PROTIME 10.7   INR 1.0       BMP:  Recent Labs     04/12/21  1324 04/13/21  0602    139   K 3.9 3.3*    105   CO2 22 21   BUN 36* 29* CREATININE 1.35* 1.10*   GLUCOSE 122* 110*   CALCIUM 7.6* 7.0*       Liver work up:  Hepatitis Functional Panel:  Recent Labs     04/12/21  1324   ALKPHOS 235*   ALT 65*   AST 61*   PROT 7.1   BILITOT 0.46   BILIDIR 0.16   LABALBU 3.9       Amylase/Lipase/Ammonia:  No results for input(s): AMYLASE, LIPASE, AMMONIA in the last 72 hours. Acute Hepatitis Panel:  No results found for: HEPBSAG, HEPCAB, HEPBIGM, HEPAIGM    Cancer Markers:  CEA:    No results for input(s): CEA in the last 72 hours. Ca 125:   No results for input(s):  in the last 72 hours. Ca 19-9:     Invalid input(s):   AFP: No results for input(s): AFP in the last 72 hours. Active Problems:    BRIA (acute kidney injury) ([facility] [address]    Upper GI bleed  Resolved Problems:    * No resolved hospital problems. *       GI Impression:    73-year-old female admitted to the hospital with increased fatigue, weakness, complaints of black diarrhea at home present for the last 4 days. Patient was recently treated for UTI with Keflex. Patient was recently taking Dickey Brandie likely patient has developed PUD or gastritis in the setting of AP use. Slight elevation in LFTs noted on routine labs    Plan and Recommendations:    1. Patient would benefit from an EGD but at this time she would like to discuss with her daughter-we will discuss with patient and daughter further at the bedside  2. Clear liquid diet-n.p.o. at midnight  3. Covid testing  4. Monitor for active bleeding  5. Daily CBC, BMP, and LFTs-if labs stay elevated may need additional investigation  6. Continue Protonix 40 mg IV twice daily  7. Please send stool for studies already ordered  8.  We will follow    This plan was formulated in collaboration with Dr. Zurdo Walker MD    Electronically signed by:  Tray Brewer [address] [place] [address] [facility]  4/13/2021    2:02 PM

## 2021-04-14 ENCOUNTER — ANESTHESIA (OUTPATIENT)
Dept: ENDOSCOPY | Age: 86
DRG: 682 | End: 2021-04-14
Payer: MEDICARE

## 2021-04-14 ENCOUNTER — ANESTHESIA EVENT (OUTPATIENT)
Dept: ENDOSCOPY | Age: 86
DRG: 682 | End: 2021-04-14
Payer: MEDICARE

## 2021-04-14 VITALS
RESPIRATION RATE: 18 BRPM | SYSTOLIC BLOOD PRESSURE: 154 MMHG | DIASTOLIC BLOOD PRESSURE: 68 MMHG | OXYGEN SATURATION: 99 %

## 2021-04-14 LAB
ABSOLUTE EOS #: 0.33 K/UL (ref 0–0.44)
ABSOLUTE IMMATURE GRANULOCYTE: 0.05 K/UL (ref 0–0.3)
ABSOLUTE LYMPH #: 1.4 K/UL (ref 1.1–3.7)
ABSOLUTE MONO #: 0.42 K/UL (ref 0.1–1.2)
ALBUMIN SERPL-MCNC: 2.9 G/DL (ref 3.5–5.2)
ALBUMIN/GLOBULIN RATIO: 1.1 (ref 1–2.5)
ALP BLD-CCNC: 171 U/L (ref 35–104)
ALT SERPL-CCNC: 37 U/L (ref 5–33)
ANION GAP SERPL CALCULATED.3IONS-SCNC: 10 MMOL/L (ref 9–17)
AST SERPL-CCNC: 32 U/L
BASOPHILS # BLD: 1 % (ref 0–2)
BASOPHILS ABSOLUTE: 0.05 K/UL (ref 0–0.2)
BILIRUB SERPL-MCNC: 0.32 MG/DL (ref 0.3–1.2)
BILIRUBIN DIRECT: 0.13 MG/DL
BILIRUBIN, INDIRECT: 0.19 MG/DL (ref 0–1)
BUN BLDV-MCNC: 18 MG/DL (ref 8–23)
BUN/CREAT BLD: ABNORMAL (ref 9–20)
CALCIUM SERPL-MCNC: 7.5 MG/DL (ref 8.6–10.4)
CHLORIDE BLD-SCNC: 105 MMOL/L (ref 98–107)
CO2: 26 MMOL/L (ref 20–31)
CREAT SERPL-MCNC: 1 MG/DL (ref 0.5–0.9)
DIFFERENTIAL TYPE: ABNORMAL
EOSINOPHILS RELATIVE PERCENT: 4 % (ref 1–4)
GFR AFRICAN AMERICAN: >60 ML/MIN
GFR NON-AFRICAN AMERICAN: 53 ML/MIN
GFR SERPL CREATININE-BSD FRML MDRD: ABNORMAL ML/MIN/{1.73_M2}
GFR SERPL CREATININE-BSD FRML MDRD: ABNORMAL ML/MIN/{1.73_M2}
GLOBULIN: ABNORMAL G/DL (ref 1.5–3.8)
GLUCOSE BLD-MCNC: 116 MG/DL (ref 65–105)
GLUCOSE BLD-MCNC: 122 MG/DL (ref 65–105)
GLUCOSE BLD-MCNC: 130 MG/DL (ref 70–99)
GLUCOSE BLD-MCNC: 142 MG/DL (ref 65–105)
HCT VFR BLD CALC: 33.2 % (ref 36.3–47.1)
HEMOGLOBIN: 10.6 G/DL (ref 11.9–15.1)
IMMATURE GRANULOCYTES: 1 %
LYMPHOCYTES # BLD: 18 % (ref 24–43)
MAGNESIUM: 2 MG/DL (ref 1.6–2.6)
MCH RBC QN AUTO: 28.5 PG (ref 25.2–33.5)
MCHC RBC AUTO-ENTMCNC: 31.9 G/DL (ref 28.4–34.8)
MCV RBC AUTO: 89.2 FL (ref 82.6–102.9)
MONOCYTES # BLD: 5 % (ref 3–12)
NRBC AUTOMATED: 0 PER 100 WBC
PDW BLD-RTO: 15.3 % (ref 11.8–14.4)
PLATELET # BLD: 150 K/UL (ref 138–453)
PLATELET ESTIMATE: ABNORMAL
PMV BLD AUTO: 11.8 FL (ref 8.1–13.5)
POTASSIUM SERPL-SCNC: 3.3 MMOL/L (ref 3.7–5.3)
RBC # BLD: 3.72 M/UL (ref 3.95–5.11)
RBC # BLD: ABNORMAL 10*6/UL
SARS-COV-2: NORMAL
SARS-COV-2: NOT DETECTED
SEG NEUTROPHILS: 71 % (ref 36–65)
SEGMENTED NEUTROPHILS ABSOLUTE COUNT: 5.57 K/UL (ref 1.5–8.1)
SODIUM BLD-SCNC: 141 MMOL/L (ref 135–144)
SOURCE: NORMAL
TOTAL PROTEIN: 5.5 G/DL (ref 6.4–8.3)
WBC # BLD: 7.8 K/UL (ref 3.5–11.3)
WBC # BLD: ABNORMAL 10*3/UL

## 2021-04-14 PROCEDURE — 6370000000 HC RX 637 (ALT 250 FOR IP): Performed by: INTERNAL MEDICINE

## 2021-04-14 PROCEDURE — 80076 HEPATIC FUNCTION PANEL: CPT

## 2021-04-14 PROCEDURE — 1200000000 HC SEMI PRIVATE

## 2021-04-14 PROCEDURE — 82947 ASSAY GLUCOSE BLOOD QUANT: CPT

## 2021-04-14 PROCEDURE — 97535 SELF CARE MNGMENT TRAINING: CPT

## 2021-04-14 PROCEDURE — 6370000000 HC RX 637 (ALT 250 FOR IP): Performed by: STUDENT IN AN ORGANIZED HEALTH CARE EDUCATION/TRAINING PROGRAM

## 2021-04-14 PROCEDURE — C9113 INJ PANTOPRAZOLE SODIUM, VIA: HCPCS | Performed by: INTERNAL MEDICINE

## 2021-04-14 PROCEDURE — 99232 SBSQ HOSP IP/OBS MODERATE 35: CPT | Performed by: INTERNAL MEDICINE

## 2021-04-14 PROCEDURE — 88342 IMHCHEM/IMCYTCHM 1ST ANTB: CPT

## 2021-04-14 PROCEDURE — 3700000001 HC ADD 15 MINUTES (ANESTHESIA): Performed by: INTERNAL MEDICINE

## 2021-04-14 PROCEDURE — 6360000002 HC RX W HCPCS: Performed by: NURSE ANESTHETIST, CERTIFIED REGISTERED

## 2021-04-14 PROCEDURE — 2720000010 HC SURG SUPPLY STERILE: Performed by: INTERNAL MEDICINE

## 2021-04-14 PROCEDURE — 7100000011 HC PHASE II RECOVERY - ADDTL 15 MIN: Performed by: INTERNAL MEDICINE

## 2021-04-14 PROCEDURE — 7100000010 HC PHASE II RECOVERY - FIRST 15 MIN: Performed by: INTERNAL MEDICINE

## 2021-04-14 PROCEDURE — 3609013000 HC EGD TRANSORAL CONTROL BLEEDING ANY METHOD: Performed by: INTERNAL MEDICINE

## 2021-04-14 PROCEDURE — 3609012400 HC EGD TRANSORAL BIOPSY SINGLE/MULTIPLE: Performed by: INTERNAL MEDICINE

## 2021-04-14 PROCEDURE — C9113 INJ PANTOPRAZOLE SODIUM, VIA: HCPCS | Performed by: STUDENT IN AN ORGANIZED HEALTH CARE EDUCATION/TRAINING PROGRAM

## 2021-04-14 PROCEDURE — 2580000003 HC RX 258: Performed by: STUDENT IN AN ORGANIZED HEALTH CARE EDUCATION/TRAINING PROGRAM

## 2021-04-14 PROCEDURE — 0DB68ZX EXCISION OF STOMACH, VIA NATURAL OR ARTIFICIAL OPENING ENDOSCOPIC, DIAGNOSTIC: ICD-10-PCS | Performed by: INTERNAL MEDICINE

## 2021-04-14 PROCEDURE — 6360000002 HC RX W HCPCS: Performed by: STUDENT IN AN ORGANIZED HEALTH CARE EDUCATION/TRAINING PROGRAM

## 2021-04-14 PROCEDURE — 6360000002 HC RX W HCPCS: Performed by: INTERNAL MEDICINE

## 2021-04-14 PROCEDURE — 0W3P8ZZ CONTROL BLEEDING IN GASTROINTESTINAL TRACT, VIA NATURAL OR ARTIFICIAL OPENING ENDOSCOPIC: ICD-10-PCS | Performed by: INTERNAL MEDICINE

## 2021-04-14 PROCEDURE — 85025 COMPLETE CBC W/AUTO DIFF WBC: CPT

## 2021-04-14 PROCEDURE — 88305 TISSUE EXAM BY PATHOLOGIST: CPT

## 2021-04-14 PROCEDURE — 2580000003 HC RX 258: Performed by: INTERNAL MEDICINE

## 2021-04-14 PROCEDURE — 83735 ASSAY OF MAGNESIUM: CPT

## 2021-04-14 PROCEDURE — 3700000000 HC ANESTHESIA ATTENDED CARE: Performed by: INTERNAL MEDICINE

## 2021-04-14 PROCEDURE — 36415 COLL VENOUS BLD VENIPUNCTURE: CPT

## 2021-04-14 PROCEDURE — 80048 BASIC METABOLIC PNL TOTAL CA: CPT

## 2021-04-14 PROCEDURE — 43239 EGD BIOPSY SINGLE/MULTIPLE: CPT | Performed by: INTERNAL MEDICINE

## 2021-04-14 PROCEDURE — 2500000003 HC RX 250 WO HCPCS: Performed by: NURSE ANESTHETIST, CERTIFIED REGISTERED

## 2021-04-14 RX ORDER — POTASSIUM CHLORIDE 7.45 MG/ML
10 INJECTION INTRAVENOUS
Status: DISPENSED | OUTPATIENT
Start: 2021-04-14 | End: 2021-04-14

## 2021-04-14 RX ORDER — PROPOFOL 10 MG/ML
INJECTION, EMULSION INTRAVENOUS PRN
Status: DISCONTINUED | OUTPATIENT
Start: 2021-04-14 | End: 2021-04-14 | Stop reason: SDUPTHER

## 2021-04-14 RX ORDER — EPINEPHRINE 0.1 MG/ML
SYRINGE (ML) INJECTION PRN
Status: DISCONTINUED | OUTPATIENT
Start: 2021-04-14 | End: 2021-04-14 | Stop reason: ALTCHOICE

## 2021-04-14 RX ORDER — LIDOCAINE HYDROCHLORIDE 10 MG/ML
INJECTION, SOLUTION EPIDURAL; INFILTRATION; INTRACAUDAL; PERINEURAL PRN
Status: DISCONTINUED | OUTPATIENT
Start: 2021-04-14 | End: 2021-04-14 | Stop reason: SDUPTHER

## 2021-04-14 RX ADMIN — SERTRALINE 100 MG: 50 TABLET, FILM COATED ORAL at 14:36

## 2021-04-14 RX ADMIN — CLOPIDOGREL 75 MG: 75 TABLET, FILM COATED ORAL at 14:37

## 2021-04-14 RX ADMIN — LIDOCAINE HYDROCHLORIDE 20 MG: 10 INJECTION, SOLUTION EPIDURAL; INFILTRATION; INTRACAUDAL; PERINEURAL at 11:22

## 2021-04-14 RX ADMIN — PROPOFOL 10 MG: 10 INJECTION, EMULSION INTRAVENOUS at 11:30

## 2021-04-14 RX ADMIN — PANTOPRAZOLE SODIUM 40 MG: 40 INJECTION, POWDER, FOR SOLUTION INTRAVENOUS at 20:14

## 2021-04-14 RX ADMIN — Medication 1000 MCG: at 14:36

## 2021-04-14 RX ADMIN — PROPOFOL 10 MG: 10 INJECTION, EMULSION INTRAVENOUS at 11:26

## 2021-04-14 RX ADMIN — INSULIN LISPRO 2 UNITS: 100 INJECTION, SOLUTION INTRAVENOUS; SUBCUTANEOUS at 16:55

## 2021-04-14 RX ADMIN — SODIUM CHLORIDE, POTASSIUM CHLORIDE, SODIUM LACTATE AND CALCIUM CHLORIDE: 600; 310; 30; 20 INJECTION, SOLUTION INTRAVENOUS at 05:07

## 2021-04-14 RX ADMIN — PANTOPRAZOLE SODIUM 40 MG: 40 INJECTION, POWDER, FOR SOLUTION INTRAVENOUS at 08:55

## 2021-04-14 RX ADMIN — PROPOFOL 10 MG: 10 INJECTION, EMULSION INTRAVENOUS at 11:36

## 2021-04-14 RX ADMIN — PROPOFOL 10 MG: 10 INJECTION, EMULSION INTRAVENOUS at 11:33

## 2021-04-14 RX ADMIN — Medication 1 CAPSULE: at 16:55

## 2021-04-14 RX ADMIN — LEVOTHYROXINE SODIUM 125 MCG: 125 TABLET ORAL at 05:09

## 2021-04-14 RX ADMIN — SODIUM CHLORIDE, PRESERVATIVE FREE 10 ML: 5 INJECTION INTRAVENOUS at 20:14

## 2021-04-14 RX ADMIN — ATORVASTATIN CALCIUM 80 MG: 80 TABLET, FILM COATED ORAL at 14:37

## 2021-04-14 RX ADMIN — POTASSIUM CHLORIDE 40 MEQ: 1500 TABLET, EXTENDED RELEASE ORAL at 21:54

## 2021-04-14 RX ADMIN — PROPOFOL 50 MG: 10 INJECTION, EMULSION INTRAVENOUS at 11:20

## 2021-04-14 RX ADMIN — PROPOFOL 10 MG: 10 INJECTION, EMULSION INTRAVENOUS at 11:23

## 2021-04-14 RX ADMIN — POTASSIUM BICARBONATE 40 MEQ: 782 TABLET, EFFERVESCENT ORAL at 14:36

## 2021-04-14 RX ADMIN — SODIUM CHLORIDE, PRESERVATIVE FREE 10 ML: 5 INJECTION INTRAVENOUS at 08:57

## 2021-04-14 ASSESSMENT — PAIN - FUNCTIONAL ASSESSMENT: PAIN_FUNCTIONAL_ASSESSMENT: 0-10

## 2021-04-14 ASSESSMENT — PAIN SCALES - GENERAL
PAINLEVEL_OUTOF10: 0

## 2021-04-14 NOTE — FLOWSHEET NOTE
DATE: 2021    NAME: Tato Phelps  MRN: [id number]   : 1935      Patient not seen this date for Physical Therapy due to:    Surgery/Procedure: EGD      Electronically signed by Reese Brooks PTA on 2021 at 9:39 AM

## 2021-04-14 NOTE — OP NOTE
Operative Note      Patient: Avinash Pelletier  YOB: 1935  MRN: [id number]    Date of Procedure: 4/14/2021    Pre-Op Diagnosis: MELENA    Post-Op Diagnosis: Same          Procedure(s):  EGD BIOPSY  EGD CONTROL HEMORRHAGE    Surgeon(s):  Angela Michaels MD    Assistant:   First Assistant: Ac Tapia RN    Anesthesia: Monitor Anesthesia Care    Estimated Blood Loss (mL): Minimal    Complications: None    Specimens:   ID Type Source Tests Collected by Time Destination   A : gastric biopsy Tissue Stomach [address] [place], MD 4/14/2021 1130        Implants:  * No implants in log *      Drains: * No LDAs found *    Findings:   1. Normal esophagus. Regular Z-line noted at 36 cm.  2. Diffuse erythema and erosions in the entire stomach suggestive of moderate to severe gastritis. Biopsies were performed for histology and H. pylori testing. 3. 4 sessile polyps were found in the antrum with stigmata of bleeding. 4. The polyps were biopsied for histology and started to bleed after the biopsy. The bleeding areas were injected with a total of 5 mL of 1: 10,000 epinephrine mixed saline in 1 mL aliquots and then treated with bipolar cautery. 5. There was visible oozing that continued at the polyp site. 7 hemoclips were then applied and the bleeding completely stopped at the end of the procedure. 6. The examined duodenum up to the third portion appeared normal.  There was no evidence of bleeding or stigmata of bleeding in the duodenum. Recommendations  1. Continue IV pantoprazole for total of 72 hours. 2. Await pathology results. 3. Resume Plavix today. 4. Hold aspirin for 48 hours. 5. Monitor CBC and transfuse as clinically indicated. 6. Start clear liquid diet and advance as tolerated. 7. We will continue to follow clinical course.     Detailed Description of Procedure:   Informed consent was obtained from the patient after explanation of the procedure including indications, description of the procedure,  benefits and possible risks and complications of the procedure, and alternatives. Questions were answered. The patient's history was reviewed and a directed physical examination was performed prior to the procedure. Patient was monitored throughout the procedure with pulse oximetry and periodic assessment of vital signs. Patient was sedated as noted above. With the patient in the left lateral decubitus position, the Olympus videoendoscope was placed in the patient's mouth and under direct visualization passed into the esophagus. Visualization of the esophagus, stomach, and duodenum was performed during both introduction and withdrawal of the endoscope and retroflexed view of the proximal stomach was obtained. The scope was passed to the 3rd portion of the duodenum. The patient tolerated the procedure well and was taken to the recovery area in good condition. The patient  was taken to the recovery area in good condition.     Electronically signed by Margarita Cobos MD on 4/14/2021 at 11:50 AM

## 2021-04-14 NOTE — DISCHARGE INSTR - COC
due to vitamin B12 deficiency D51.9    CAD (coronary artery disease) I25.10    Thyroid cancer ([facility] C74    Hypothyroidism E03.9    Essential hypertension I10    Type 2 diabetes mellitus with complication, without long-term current use of insulin ([facility]) E11.8    Transaminitis R74.01    Acute cystitis without hematuria N30.00    Urinary tract infection without hematuria N39.0    Chronic diastolic congestive heart failure ([facility]) I50.32    Otalgia of both ears H92.03    Bilateral swelling of feet and ankles M25.471, M25.474, M25.475, M25.472    Dyslipidemia E78.5    Type 2 diabetes mellitus without complication, without long-term current use of insulin ([facility]) E11.9    Bilateral non-suppurative otitis media H65.93    Hypochromic-microcytic anemia D50.9    Pneumonia of right lung due to infectious organism J18.9    Tracheobronchitis J40    Frequency of micturition R35.0    Pneumonia J18.9    Hypoalbuminemia E88.09    Lactic acid acidosis E87.2    Hyponatremia E87.1    Hyperglycemia R73.9    Hypocalcemia E83.51    Obesity, Class III, BMI 40-49.9 (morbid obesity) ([facility]) E66.01    Sepsis due to pneumonia ([facility] J18.9, A41.9    Cellulitis L03.90    Hypophosphatemia E83.39    Obesity E66.9    Atrial flutter ([facility]) I48.92    Hypoglycemia E16.2    Encounter for palliative care Z51.5    BRIA (acute kidney injury) ([facility] N17.9    Upper GI bleed K92.2       Isolation/Infection:   Isolation            C Diff Contact          Patient Infection Status       Infection Onset Added Last Indicated Last Indicated By Review Planned Expiration Resolved Resolved By    C-diff Rule Out 04/12/21 04/12/21 04/12/21 Gastrointestinal Panel, Molecular (Ordered)        MDRO (multi-drug resistant organism)  09/06/16 09/06/16 Margaret Benitez RN        E.  Coli - urine 9/2016              Nurse Assessment:  Last Vital Signs: /61   Pulse 61   Temp 97 °F (36.1 °C) (Tympanic)   Resp 15   Ht 5' 2\" (1.575 m)   Wt 197 lb 8.5 oz (89.6 kg)   SpO2 98%   BMI 36.13 kg/m²     Last documented pain score (0-10 scale): Pain Level: 0  Last Weight:   Wt Readings from Last 1 Encounters:   04/14/21 197 lb 8.5 oz (89.6 kg)     Mental Status:  oriented    IV Access:  - None    Nursing Mobility/ADLs:  Walking   Assisted  Transfer  Assisted  Bathing  Assisted  Dressing  Assisted  Toileting  Assisted  Feeding  Independent  Med Admin  Independent  Med Delivery   whole    Wound Care Documentation and Therapy:        Elimination:  Continence:   · Bowel: Yes  · Bladder: No  Urinary Catheter: None   Colostomy/Ileostomy/Ileal Conduit: No       Date of Last BM: 4/14/2021    Intake/Output Summary (Last 24 hours) at 4/14/2021 1130  Last data filed at 4/14/2021 0517  Gross per 24 hour   Intake    Output 700 ml   Net -700 ml     I/O last 3 completed shifts:  In: -   Out: 700 [Urine:700]    Safety Concerns: At Risk for Falls    Impairments/Disabilities:      NONE    Nutrition Therapy:  Current Nutrition Therapy:   - Oral Diet:  Carb Control 4 carbs/meal (1800kcals/day) and Cardiac    Routes of Feeding: Oral  Liquids: No Restrictions  Daily Fluid Restriction: no  Last Modified Barium Swallow with Video (Video Swallowing Test): not done    Treatments at the Time of Hospital Discharge:   Respiratory Treatments: n/a  Oxygen Therapy:  is not on home oxygen therapy.   Ventilator:    - No ventilator support    Rehab Therapies: Physical Therapy and Occupational Therapy  Weight Bearing Status/Restrictions: No weight bearing restirctions  Other Medical Equipment (for information only, NOT a DME order):  Electric wheelchair  Other Treatments: ***    Patient's personal belongings (please select all that are sent with patient):  personal belongings    RN SIGNATURE:  Electronically signed by Gerardo Murillo RN on 4/16/21 at 11:44 AM EDT    CASE MANAGEMENT/SOCIAL WORK SECTION    Inpatient Status Date: ***    Readmission Risk Assessment Score:  Readmission Risk Risk of Unplanned Readmission:        28           Discharging to Facility/ Agency   · Name: Claudio Stark,[facility], [address] [place] [address]         Phone: [phone]       Fax: [phone]        Dialysis Facility (if applicable)   · Name:  · Address:  · Dialysis Schedule:  · Phone:  · Fax:    / signature: Electronically signed by Jennie Gage RN on 4/14/21 at 11:31 AM EDT    PHYSICIAN SECTION    Prognosis: Fair    Condition at Discharge: Stable    Rehab Potential (if transferring to Rehab): Fair    Recommended Labs or Other Treatments After Discharge: Medication compliance and assistance with ADLs. Physician Certification: I certify the above information and transfer of Bri Costello  is necessary for the continuing treatment of the diagnosis listed and that she requires Home Care for greater 30 days.      Update Admission H&P: No change in H&P    PHYSICIAN SIGNATURE:  Electronically signed by Deepika Denis MD on 4/16/21 at 11:33 AM EDT

## 2021-04-14 NOTE — PLAN OF CARE
Problem: Falls - Risk of:  Goal: Will remain free from falls  Description: Will remain free from falls  4/14/2021 0418 by Kash Suarez RN  Outcome: Ongoing  4/13/2021 1714 by Kiet Bain RN  Outcome: Ongoing  Goal: Absence of physical injury  Description: Absence of physical injury  4/14/2021 0418 by Kash Suarez RN  Outcome: Ongoing  4/13/2021 1714 by Kiet Bain RN  Outcome: Ongoing     Problem: Skin Integrity:  Goal: Will show no infection signs and symptoms  Description: Will show no infection signs and symptoms  4/14/2021 0418 by Kash Suarez RN  Outcome: Ongoing  4/13/2021 1714 by Kiet Bain RN  Outcome: Ongoing  Goal: Absence of new skin breakdown  Description: Absence of new skin breakdown  4/14/2021 0418 by Kash Suarez RN  Outcome: Ongoing  4/13/2021 1714 by Kiet Bain RN  Outcome: Ongoing  Goal: Risk for impaired skin integrity will decrease  Description: Risk for impaired skin integrity will decrease  Outcome: Ongoing     Problem: Musculor/Skeletal Functional Status  Goal: Highest potential functional level  4/14/2021 0418 by Kash Suarez RN  Outcome: Ongoing  4/13/2021 1714 by Kiet Bain RN  Outcome: Ongoing  Goal: Absence of falls  4/14/2021 0418 by Kash Suarez RN  Outcome: Ongoing  4/13/2021 1714 by Kiet Bain RN  Outcome: Ongoing     Problem: Activity:  Goal: Ability to tolerate increased activity will improve  Description: Ability to tolerate increased activity will improve  Outcome: Ongoing     Problem:  Bowel/Gastric:  Goal: Control of bowel function will improve  Description: Control of bowel function will improve  Outcome: Ongoing  Goal: Ability to achieve a regular elimination pattern will improve  Description: Ability to achieve a regular elimination pattern will improve  Outcome: Ongoing     Problem: Nutritional:  Goal: Ability to follow a diet with enough fiber (20 to 30 grams) for normal bowel function will improve  Description: Ability to follow a diet with enough

## 2021-04-14 NOTE — PROGRESS NOTES
up with assist  Subjective   General  Chart Reviewed: Yes  Patient assessed for rehabilitation services?: Yes  Family / Caregiver Present: Yes(pt 2 daughters)  General Comment  Comments: RN and pt agreeable to therapy this day  Vital Signs  Patient Currently in Pain: Denies   Orientation  Orientation  Overall Orientation Status: Within Functional Limits  Objective    ADL  Grooming: Modified independent ;Setup(face washing completed supine in bed)  UE Dressing: Minimal assistance;Setup;Verbal cueing; Increased time to complete(to doff/don gown req assist threading over IV)  LE Dressing: Maximum assistance;Setup;Verbal cueing; Increased time to complete(to doff/don socks seated at EOB pt limited per decreased ROM and balance)  Additional Comments: Pt supine in bed at start of session agreeable to therapy after min encouragement. Pt req increased time and verbal cues for processing and initiation. Education/demonstration provided on 4 figure dressing technique pt declined attempt. Balance  Sitting Balance: Contact guard assistance(SBA req intermitent periods of CGA during dynamic sitting)  Standing Balance: Minimal assistance  Standing Balance  Time: Pt tolerated approx 1-1:30 min  Activity: static standing at EOB utilizing SS  Comment: pt req assist with post lean, MORENO provided guidance at hips  Functional Mobility  Functional Mobility Comments: ZEKE this date d/t poor standing balance and tolerance  Bed mobility  Supine to Sit: Contact guard assistance  Sit to Supine: Minimal assistance(req assist with BLE)  Scooting: Contact guard assistance  Comment: HOB elevated, utilizing bed rails  Transfers  Sit to stand: Minimal assistance  Stand to sit: Minimal assistance  Transfer Comments: utilizing SS req increased cues for sequencing and posture     Cognition  Overall Cognitive Status: Exceptions  Following Commands: Follows multistep commands with repitition; Follows multistep commands with increased time  Safety Judgement: Decreased awareness of need for assistance  Problem Solving: Assistance required to correct errors made;Assistance required to identify errors made  Insights: Decreased awareness of deficits  Initiation: Requires cues for some  Sequencing: Requires cues for some  Cognition Comment: pt demo flat affect becoming intermitently agitated     Pt tolerated approx 10-11 min dynamic/static sitting at EOB utilizing 0-1 hand support for balance maintaince with bed rail. 1 major R lateral post LOB noted pt unable to self correct req MORENO assist. At session end pt supine in bed with call light in reach, bed alarm on and all needs met.    Plan   Plan  Times per week: 3-4 x/wk  Current Treatment Recommendations: Strengthening, Balance Training, Functional Mobility Training, Endurance Training, Cognitive Reorientation, Safety Education & Training, Patient/Caregiver Education & Training, Equipment Evaluation, Education, & procurement, Self-Care / ADL  Cont POC  Goals  Short term goals  Time Frame for Short term goals: By discharge, pt will:  Short term goal 1: Demo CGA for bed mobility to increase independence with ADLs  Short term goal 2: Demo Min A for functional transfers to increase safety and independence with ADLs/toileting tasks  Short term goal 3: Demo Min A for UB ADLs and grooming tasks with setup  Short term goal 4: Demo Mod A for LB ADLs and toileting tasks with setup and AE PRN  Short term goal 5: Demo 4+ minutes static standing to increase balance and standing tolerance for ADL performance       Therapy Time   Individual Concurrent Group Co-treatment   Time In 0813         Time Out 0843         Minutes 30         Timed Code Treatment Minutes: [facility], MORENO/L

## 2021-04-14 NOTE — PROGRESS NOTES
[facility]  Internal Medicine Teaching Residency Program  Inpatient Daily Progress Note  ______________________________________________________________________________    Patient: Nate Pop  YOB: 1935   CSE:[id number]    Acct: [de-identified]     Room: 0331/0331-01  Admit date: 4/12/2021  Today's date: 04/14/21  Number of days in the hospital: 2    SUBJECTIVE   Admitting Diagnosis: Upper GI bleed  CC: lightheadedness, melena  Pt examined at bedside. Chart & results reviewed. Patient seen and examined at bedside. No acute overnight events. Afebrile, hemodynamically stable. Hemoglobin this morning dropped from 10.6 today. EGD today which revealed moderate to severe gastritis, follow-up on pathology reports for H. Pylori. 4 sessile polyps were found in the antrum with stigmata of bleeding. Recommendation to continue IV pantoprazole for total of 72 hours. Resume Plavix, hold aspirin for 48 hours. Start clear liquid diet and advance as tolerated. LFTs trending down. Low potassium, replaced IV 40. BRIA resolved. ROS:  Constitutional:  negative for chills, fevers, sweats  Respiratory:  negative for cough, dyspnea on exertion, hemoptysis, shortness of breath, wheezing  Cardiovascular:  negative for chest pain, chest pressure/discomfort, lower extremity edema, palpitations  Gastrointestinal:  negative for abdominal pain, constipation, diarrhea, nausea, vomiting  Neurological:  negative for dizziness, headache  BRIEF HISTORY   The patient is a pleasant 82 y.o. female presents with a chief complaint of diarrhea, lightheadedness, and melena. Patient endorses a 4 day history of of diarrhea with numerous episodes a day. Patient and family state diarrhea is dark/black in nature. No hematochezia or BRB. Associated lightheadedness and intermittent dizziness & poor oral intake.  Patient denies fevers, chills, nausea, vomiting, chest pain, shortness of breath, abdominal pain, dysuria, or focal neurological deficits. OBJECTIVE     Vital Signs:  /61   Pulse 66   Temp 97.5 °F (36.4 °C) (Infrared)   Resp 20   Ht 5' 2\" (1.575 m)   Wt 197 lb 8.5 oz (89.6 kg)   SpO2 97%   BMI 36.13 kg/m²     Temp (24hrs), Av.5 °F (36.4 °C), Min:97 °F (36.1 °C), Max:97.9 °F (36.6 °C)    In: 700   Out: 700 [Urine:700]    Physical Exam:  Constitutional: This is a well developed, well nourished, 35-39.9 - Obesity Grade II 82y.o. year old female who is alert, oriented, cooperative and in no apparent distress. Head:normocephalic and atraumatic. EENT:  PERRLA. No conjunctival injections. Septum was midline, mucosa was without erythema, exudates or cobblestoning. No thrush was noted. Neck: Supple without thyromegaly. No elevated JVP. Trachea was midline. Respiratory: CTAB. No accessory muscle use. Cardiovascular: Regular without murmur, clicks, gallops or rubs. Abdomen: nontender to palpation. No organomegaly. Lymphatic: No lymphadenopathy. Musculoskeletal: Normal curvature of the spine. No gross muscle weakness. Extremities:  No lower extremity edema, ulcerations, tenderness, varicosities or erythema. Muscle size, tone and strength are normal.  No involuntary movements are noted. Skin:  Warm and dry. Good color, turgor and pigmentation. No lesions or scars.   No cyanosis or clubbing  Neurological/Psychiatric: The patient's general behavior, level of consciousness, thought content and emotional status is normal.        Medications:  Scheduled Medications:    lactobacillus  1 capsule Oral TID WC    [Held by provider] aspirin  81 mg Oral Daily    atorvastatin  80 mg Oral Daily    clopidogrel  75 mg Oral Daily    vitamin B-12  1,000 mcg Oral Daily    levothyroxine  125 mcg Oral Daily    sertraline  100 mg Oral Daily    sodium chloride flush  5-40 mL Intravenous 2 times per day    pantoprazole  40 mg Intravenous BID    And    sodium chloride (PF) 10 mL Intravenous Daily    insulin lispro  0-12 Units Subcutaneous TID WC    insulin lispro  0-6 Units Subcutaneous Nightly     Continuous Infusions:    sodium chloride      dextrose      lactated ringers 40 mL/hr at 04/14/21 0507     PRN Medicationsmagnesium sulfate, 1,000 mg, PRN  metoprolol, 5 mg, Q6H PRN  sodium chloride flush, 10 mL, PRN  sodium chloride, 25 mL, PRN  potassium chloride, 40 mEq, PRN    Or  potassium alternative oral replacement, 40 mEq, PRN    Or  potassium chloride, 10 mEq, PRN  promethazine, 12.5 mg, Q6H PRN    Or  ondansetron, 4 mg, Q6H PRN  polyethylene glycol, 17 g, Daily PRN  acetaminophen, 650 mg, Q6H PRN    Or  acetaminophen, 650 mg, Q6H PRN  glucose, 15 g, PRN  dextrose, 12.5 g, PRN  glucagon (rDNA), 1 mg, PRN  dextrose, 100 mL/hr, PRN        Diagnostic Labs:  CBC:   Recent Labs     04/12/21  1324 04/13/21  0602 04/14/21  0548   WBC 13.0* 11.4* 7.8   RBC 4.30 3.63* 3.72*   HGB 12.1 10.3* 10.6*   HCT 37.6 32.1* 33.2*   MCV 87.4 88.4 89.2   RDW 15.1* 14.9* 15.3*    144 150     BMP:   Recent Labs     04/12/21  1324 04/13/21  0602 04/14/21  0548    139 141   K 3.9 3.3* 3.3*    105 105   CO2 22 21 26   BUN 36* 29* 18   CREATININE 1.35* 1.10* 1.00*     BNP: No results for input(s): BNP in the last 72 hours. PT/INR:   Recent Labs     04/12/21  1324   PROTIME 10.7   INR 1.0     APTT:   Recent Labs     04/12/21  1324   APTT 23.9     CARDIAC ENZYMES: No results for input(s): CKMB, CKMBINDEX, TROPONINI in the last 72 hours.     Invalid input(s): CKTOTAL;3  FASTING LIPID PANEL:  Lab Results   Component Value Date    CHOL 173 06/29/2020    HDL 52 06/29/2020    TRIG 337 (H) 06/29/2020     LIVER PROFILE:   Recent Labs     04/12/21  1324 04/14/21  0548   AST 61* 32*   ALT 65* 37*   BILIDIR 0.16 0.13   BILITOT 0.46 0.32   ALKPHOS 235* 171*      MICROBIOLOGY:   Lab Results   Component Value Date/Time    CULTURE KLEBSIELLA PNEUMONIAE >206477 CFU/ML (A) 04/03/2021 11:07 AM Imaging:    Xr Chest Portable    Result Date: 4/12/2021  No radiographic evidence for free intraperitoneal air but small amounts may not be apparent radiographically. If this remains a clinical concern a CT scan should be obtained. ASSESSMENT & PLAN     ASSESSMENT / PLAN:     Acute renal Failure  -Resolved  -Continue IVF at 40 mL/h      Upper GI bleed secondary to severe gastritis, 4 sessile polyps in the antrum with stigmata of bleeding  -S/p EGD, cauterization of the bleeding, Hemoclip application.  -Await pathology reports  -Continue Protonix 40 mg IV BID for 72 hours  -Monitor H&H  -Transfuse if hemoglobin less than 7 or as clinically indicated  -Hold aspirin for 48 hours  -Resume Plavix today  -Appreciate GI recommendations  -Follow-up on stool studies. No further episodes of diarrhea. DM type II  -Hold metformin due to BRIA  -Blood glucose well controlled  -Continue medium dose corrective algorithm     Hypothyroidism  -Continue Synthroid 125 mcg daily. -TSH elevated, however, according to previous discharge synthroid was recently increased by PCP  -Patient will need TSH repeat in 4 weeks.       Troponemia  -flattened     Chronic Diastolic CHF   -Hold Lasix 40 mg daily due to dehydration  -Can restart on discharge or after resuscitation.      Atrial Flutter  -NSR at this time.   -Not on home anticoagulation.   -Lopressor PRN for tachycardia     CAD  -Continue ASA and Plavix.      DVT ppx: epc cuffs  GI ppx: Protonix BID    PT/OT: Consulted  Discharge planning: Home with home care    Antoine Gomez MD  Internal Medicine Resident, PGY-1  [address], [place]  4/14/2021, 1:41 PM

## 2021-04-14 NOTE — ANESTHESIA POSTPROCEDURE EVALUATION
Department of Anesthesiology  Postprocedure Note    Patient: Neelam Pillai  MRN: [id number]  YOB: 1935  Date of evaluation: 4/14/2021  Time:  3:53 PM     Procedure Summary     Date: 04/14/21 Room / Location: [address] [place] [address]    Anesthesia Start: 1116 Anesthesia Stop: 2154    Procedures:       EGD BIOPSY      EGD CONTROL HEMORRHAGE Diagnosis: (MELENA)    Surgeons: Surjit Dave MD Responsible Provider: Yesi Frye MD    Anesthesia Type: MAC, general ASA Status: 3          Anesthesia Type: MAC, general    Ilya Phase I: Ilya Score: 10    Ilya Phase II: Ilya Score: 10    Last vitals: Reviewed and per EMR flowsheets.        Anesthesia Post Evaluation    Patient location during evaluation: PACU  Patient participation: complete - patient participated  Level of consciousness: awake and alert  Pain score: 0  Airway patency: patent  Nausea & Vomiting: no nausea and no vomiting  Complications: no  Cardiovascular status: hemodynamically stable  Respiratory status: room air  Hydration status: euvolemic

## 2021-04-14 NOTE — ANESTHESIA PRE PROCEDURE
Department of Anesthesiology  Preprocedure Note       Name:  Constantin    Age:  82 y.o.  :  1935                                          MRN:  [id number]         Date:  2021      Surgeon: Philomena Lao):  Kings Wade MD    Procedure: Procedure(s):  EGD ESOPHAGOGASTRODUODENOSCOPY    Medications prior to admission:   Prior to Admission medications    Medication Sig Start Date End Date Taking? Authorizing Provider   metFORMIN (GLUCOPHAGE) 500 MG tablet Take 2 tablets by mouth 2 times daily (with meals) 21   Marita Chinchilla MD   atorvastatin (LIPITOR) 80 MG tablet Take 1 tablet by mouth daily 21   Chandler Livingston MD   ferrous sulfate (FE TABS) 325 (65 Fe) MG EC tablet Take 1 tablet by mouth 2 times daily 21   Chandler Livingston MD   furosemide (LASIX) 40 MG tablet Take 1 tablet by mouth daily 21   Chandler Livingston MD   vitamin D3 (CHOLECALCIFEROL) 25 MCG (1000 UT) TABS tablet TAKE 1 TAB BY MOUTH ONCE A DAY 21   Chandler Livingston MD   blood glucose monitor strips Test 2 times a day & as needed for symptoms of irregular blood glucose. Dispense sufficient amount for indicated testing frequency plus additional to accommodate PRN testing needs.  3/29/21   Chandler Livingston MD   levothyroxine (SYNTHROID) 125 MCG tablet TAKE 1 TAB BY MOUTH EVERY MORNING 3/29/21   Chandler Livingston MD   loratadine (CLARITIN) 10 MG tablet TAKE 1 TAB BY MOUTH ONCE A DAY  Patient not taking: Reported on 3/29/2021 3/12/21   Chandler Livingston MD   sertraline (ZOLOFT) 100 MG tablet TAKE 1 TAB BY MOUTH ONCE A DAY 21   Chandler Livingston MD   metoprolol tartrate (LOPRESSOR) 25 MG tablet Take 1 tablet by mouth 2 times daily 12/10/20   Mikayla Gates MD   Continuous Blood Gluc  (FREESTYLE KERRY 14 DAY READER) CLAUDIO 1 Device by Does not apply route 4 times daily  Patient not taking: Reported on 20   Chandler Livingston MD   Continuous Blood Gluc Sensor (FREESTYLE KERRY 2 SENSOR SYSTM) MISC 4 Devices by Does not apply route every 7 days  Patient not taking: Reported on 1/29/2021 11/30/20   Radha Royal MD   clopidogrel (PLAVIX) 75 MG tablet TAKE 1 TAB BY MOUTH ONCE A DAY 6/19/20   Chucho Lao MD   SITagliptin (JANUVIA) 100 MG tablet TAKE 1 TAB BY MOUTH ONCE A DAY 5/22/20   Raghavendra Hdz PA-C   Cyanocobalamin (VITAMIN B-12) 1000 MCG extended release tablet TAKE 1 TAB BY MOUTH ONCE A DAY 10/3/19   Radha Royal MD   Lancets MISC 1 each by Does not apply route 2 times daily 7/5/19   Radha Royal MD   diclofenac sodium (VOLTAREN) 1 % GEL Apply 2 g topically 2 times daily  Patient not taking: Reported on 3/29/2021 12/18/18   Radha Royal MD   nystatin (MYCOSTATIN) 358293 UNIT/GM cream Apply topically 2 times daily.   Patient not taking: Reported on 3/29/2021 5/7/18   Radha Royal MD   docusate sodium (COLACE) 100 MG capsule Take 1 capsule by mouth daily as needed for Constipation  Patient not taking: Reported on 1/29/2021 8/18/17   Radha Royal MD   aspirin 81 MG chewable tablet Take 1 tablet by mouth daily 9/5/16   Nataliia Lynne MD       Current medications:    Current Facility-Administered Medications   Medication Dose Route Frequency Provider Last Rate Last Admin    potassium chloride 10 mEq/100 mL IVPB (Peripheral Line)  10 mEq Intravenous Q1H Zen Davis MD        magnesium sulfate 1000 mg in dextrose 5% 100 mL IVPB  1,000 mg Intravenous PRN Yuniorjoanna Ba MD        lactobacillus (CULTURELLE) capsule 1 capsule  1 capsule Oral TID  Zen Davis MD   1 capsule at 04/13/21 1700    aspirin chewable tablet 81 mg  81 mg Oral Daily John Scanlon MD   81 mg at 04/13/21 0915    atorvastatin (LIPITOR) tablet 80 mg  80 mg Oral Daily John Scanlon MD   80 mg at 04/13/21 0915    clopidogrel (PLAVIX) tablet 75 mg  75 mg Oral Daily John Scanlon MD   75 mg at 04/13/21 0915    vitamin B-12 (CYANOCOBALAMIN) tablet 1,000 mcg  1,000 mcg Oral Daily John Scanlon MD   1,000 mcg at 04/13/21 0914    Brooke Rubin MD        insulin lispro (HUMALOG) injection vial 0-12 Units  0-12 Units Subcutaneous TID WC Brooke Rubin MD        insulin lispro (HUMALOG) injection vial 0-6 Units  0-6 Units Subcutaneous Nightly Brooke Rubin MD   Stopped at 04/12/21 2022    lactated ringers infusion   Intravenous Continuous Kaykay Donahue MD 40 mL/hr at 04/14/21 0507 New Bag at 04/14/21 0507       Allergies:     Allergies   Allergen Reactions    Strawberry Extract     Tape [employer]me Tape] Rash       Problem List:    Patient Active Problem List   Diagnosis Code    Acute kidney injury ([facility] [address] N17.9    Anemia due to vitamin B12 deficiency D51.9    CAD (coronary artery disease) I25.10    Thyroid cancer ([facility] C74    Hypothyroidism E03.9    Essential hypertension I10    Type 2 diabetes mellitus with complication, without long-term current use of insulin ([facility]) E11.8    Transaminitis R74.01    Acute cystitis without hematuria N30.00    Urinary tract infection without hematuria N39.0    Chronic diastolic congestive heart failure ([facility]) I50.32    Otalgia of both ears H92.03    Bilateral swelling of feet and ankles M25.471, M25.474, M25.475, M25.472    Dyslipidemia E78.5    Type 2 diabetes mellitus without complication, without long-term current use of insulin ([facility]) E11.9    Bilateral non-suppurative otitis media H65.93    Hypochromic-microcytic anemia D50.9    Pneumonia of right lung due to infectious organism J18.9    Tracheobronchitis J40    Frequency of micturition R35.0    Pneumonia J18.9    Hypoalbuminemia E88.09    Lactic acid acidosis E87.2    Hyponatremia E87.1    Hyperglycemia R73.9    Hypocalcemia E83.51    Obesity, Class III, BMI 40-49.9 (morbid obesity) ([facility]) E66.01    Sepsis due to pneumonia ([facility]) J18.9, A41.9    Cellulitis L03.90    Hypophosphatemia E83.39    Obesity E66.9    Atrial flutter ([facility]) I48.92    Hypoglycemia E16.2    Encounter for palliative care Z51.5    BRIA (acute kidney 89.2 04/14/2021    RDW 15.3 04/14/2021     04/14/2021     02/03/2012       CMP:   Lab Results   Component Value Date     04/14/2021    K 3.3 04/14/2021     04/14/2021    CO2 26 04/14/2021    BUN 18 04/14/2021    CREATININE 1.00 04/14/2021    GFRAA >60 04/14/2021    LABGLOM 53 04/14/2021    GLUCOSE 130 04/14/2021    GLUCOSE 380 05/14/2012    PROT 5.5 04/14/2021    CALCIUM 7.5 04/14/2021    BILITOT 0.32 04/14/2021    ALKPHOS 171 04/14/2021    AST 32 04/14/2021    ALT 37 04/14/2021       POC Tests:   Recent Labs     04/14/21  0720   POCGLU 116*       Coags:   Lab Results   Component Value Date    PROTIME 10.7 04/12/2021    INR 1.0 04/12/2021    APTT 23.9 04/12/2021       HCG (If Applicable): No results found for: PREGTESTUR, PREGSERUM, HCG, HCGQUANT     ABGs: No results found for: PHART, PO2ART, JKQ8VNS, NLE6JRO, BEART, M6NFAEHM     Type & Screen (If Applicable):  No results found for: LABABO, LABRH    Drug/Infectious Status (If Applicable):  No results found for: HIV, HEPCAB    COVID-19 Screening (If Applicable):   Lab Results   Component Value Date    COVID19 Not Detected 04/13/2021           Anesthesia Evaluation  Patient summary reviewed and Nursing notes reviewed  Airway: Mallampati: II  TM distance: >3 FB   Neck ROM: full  Mouth opening: > = 3 FB Dental:          Pulmonary:Negative Pulmonary ROS and normal exam    (+) pneumonia: resolved,            Patient did not smoke on day of surgery. Cardiovascular:    (+) hypertension: no interval change, CAD:, CABG/stent: no interval change, CHF: no interval change, GIRON:, peripheral edema,                   Neuro/Psych:   Negative Neuro/Psych ROS              GI/Hepatic/Renal:   (+) morbid obesity          Endo/Other:    (+) DiabetesType II DM, well controlled, , hypothyroidism::., .                 Abdominal:   (+) obese,         Vascular: negative vascular ROS.                                        Anesthesia Plan      MAC and

## 2021-04-14 NOTE — CARE COORDINATION
Readmissions: admissions not related. This admission BRIA  Last admission Hypoglycemia- Palliative care  Pt went home with [facility], stated she understood discharge instructions. Returned to hospital when her stools became loose and more frequent.

## 2021-04-15 LAB
ABSOLUTE EOS #: 0.35 K/UL (ref 0–0.44)
ABSOLUTE IMMATURE GRANULOCYTE: 0.06 K/UL (ref 0–0.3)
ABSOLUTE LYMPH #: 1.47 K/UL (ref 1.1–3.7)
ABSOLUTE MONO #: 0.45 K/UL (ref 0.1–1.2)
ANION GAP SERPL CALCULATED.3IONS-SCNC: 7 MMOL/L (ref 9–17)
BASOPHILS # BLD: 1 % (ref 0–2)
BASOPHILS ABSOLUTE: 0.08 K/UL (ref 0–0.2)
BUN BLDV-MCNC: 11 MG/DL (ref 8–23)
BUN/CREAT BLD: ABNORMAL (ref 9–20)
CALCIUM SERPL-MCNC: 8 MG/DL (ref 8.6–10.4)
CHLORIDE BLD-SCNC: 107 MMOL/L (ref 98–107)
CO2: 28 MMOL/L (ref 20–31)
CREAT SERPL-MCNC: 0.8 MG/DL (ref 0.5–0.9)
DIFFERENTIAL TYPE: ABNORMAL
EOSINOPHILS RELATIVE PERCENT: 5 % (ref 1–4)
GFR AFRICAN AMERICAN: >60 ML/MIN
GFR NON-AFRICAN AMERICAN: >60 ML/MIN
GFR SERPL CREATININE-BSD FRML MDRD: ABNORMAL ML/MIN/{1.73_M2}
GFR SERPL CREATININE-BSD FRML MDRD: ABNORMAL ML/MIN/{1.73_M2}
GLUCOSE BLD-MCNC: 113 MG/DL (ref 65–105)
GLUCOSE BLD-MCNC: 128 MG/DL (ref 70–99)
GLUCOSE BLD-MCNC: 137 MG/DL (ref 65–105)
GLUCOSE BLD-MCNC: 159 MG/DL (ref 65–105)
GLUCOSE BLD-MCNC: 200 MG/DL (ref 65–105)
HCT VFR BLD CALC: 35 % (ref 36.3–47.1)
HEMOGLOBIN: 11.5 G/DL (ref 11.9–15.1)
IMMATURE GRANULOCYTES: 1 %
LYMPHOCYTES # BLD: 20 % (ref 24–43)
MCH RBC QN AUTO: 28.3 PG (ref 25.2–33.5)
MCHC RBC AUTO-ENTMCNC: 32.9 G/DL (ref 28.4–34.8)
MCV RBC AUTO: 86.2 FL (ref 82.6–102.9)
MONOCYTES # BLD: 6 % (ref 3–12)
NRBC AUTOMATED: 0 PER 100 WBC
PDW BLD-RTO: 15.4 % (ref 11.8–14.4)
PLATELET # BLD: 158 K/UL (ref 138–453)
PLATELET ESTIMATE: ABNORMAL
PMV BLD AUTO: 11.5 FL (ref 8.1–13.5)
POTASSIUM SERPL-SCNC: 4 MMOL/L (ref 3.7–5.3)
RBC # BLD: 4.06 M/UL (ref 3.95–5.11)
RBC # BLD: ABNORMAL 10*6/UL
SEG NEUTROPHILS: 67 % (ref 36–65)
SEGMENTED NEUTROPHILS ABSOLUTE COUNT: 4.99 K/UL (ref 1.5–8.1)
SODIUM BLD-SCNC: 142 MMOL/L (ref 135–144)
WBC # BLD: 7.4 K/UL (ref 3.5–11.3)
WBC # BLD: ABNORMAL 10*3/UL

## 2021-04-15 PROCEDURE — 1200000000 HC SEMI PRIVATE

## 2021-04-15 PROCEDURE — 6370000000 HC RX 637 (ALT 250 FOR IP): Performed by: INTERNAL MEDICINE

## 2021-04-15 PROCEDURE — 36415 COLL VENOUS BLD VENIPUNCTURE: CPT

## 2021-04-15 PROCEDURE — C9113 INJ PANTOPRAZOLE SODIUM, VIA: HCPCS | Performed by: INTERNAL MEDICINE

## 2021-04-15 PROCEDURE — 6360000002 HC RX W HCPCS: Performed by: INTERNAL MEDICINE

## 2021-04-15 PROCEDURE — 85025 COMPLETE CBC W/AUTO DIFF WBC: CPT

## 2021-04-15 PROCEDURE — 80048 BASIC METABOLIC PNL TOTAL CA: CPT

## 2021-04-15 PROCEDURE — 99232 SBSQ HOSP IP/OBS MODERATE 35: CPT | Performed by: INTERNAL MEDICINE

## 2021-04-15 PROCEDURE — 82947 ASSAY GLUCOSE BLOOD QUANT: CPT

## 2021-04-15 PROCEDURE — 2580000003 HC RX 258: Performed by: INTERNAL MEDICINE

## 2021-04-15 RX ADMIN — SODIUM CHLORIDE, PRESERVATIVE FREE 10 ML: 5 INJECTION INTRAVENOUS at 20:48

## 2021-04-15 RX ADMIN — Medication 1 CAPSULE: at 08:57

## 2021-04-15 RX ADMIN — Medication 1000 MCG: at 08:55

## 2021-04-15 RX ADMIN — INSULIN LISPRO 1 UNITS: 100 INJECTION, SOLUTION INTRAVENOUS; SUBCUTANEOUS at 20:47

## 2021-04-15 RX ADMIN — Medication 1 CAPSULE: at 12:36

## 2021-04-15 RX ADMIN — SERTRALINE 100 MG: 50 TABLET, FILM COATED ORAL at 09:46

## 2021-04-15 RX ADMIN — LEVOTHYROXINE SODIUM 125 MCG: 125 TABLET ORAL at 06:21

## 2021-04-15 RX ADMIN — Medication 1 CAPSULE: at 16:25

## 2021-04-15 RX ADMIN — INSULIN LISPRO 4 UNITS: 100 INJECTION, SOLUTION INTRAVENOUS; SUBCUTANEOUS at 12:35

## 2021-04-15 RX ADMIN — PANTOPRAZOLE SODIUM 40 MG: 40 INJECTION, POWDER, FOR SOLUTION INTRAVENOUS at 21:14

## 2021-04-15 RX ADMIN — CLOPIDOGREL 75 MG: 75 TABLET, FILM COATED ORAL at 08:57

## 2021-04-15 RX ADMIN — SODIUM CHLORIDE, PRESERVATIVE FREE 10 ML: 5 INJECTION INTRAVENOUS at 08:57

## 2021-04-15 RX ADMIN — ATORVASTATIN CALCIUM 80 MG: 80 TABLET, FILM COATED ORAL at 08:57

## 2021-04-15 RX ADMIN — PANTOPRAZOLE SODIUM 40 MG: 40 INJECTION, POWDER, FOR SOLUTION INTRAVENOUS at 08:57

## 2021-04-15 ASSESSMENT — PAIN DESCRIPTION - FREQUENCY: FREQUENCY: INTERMITTENT

## 2021-04-15 ASSESSMENT — PAIN - FUNCTIONAL ASSESSMENT: PAIN_FUNCTIONAL_ASSESSMENT: PREVENTS OR INTERFERES WITH MANY ACTIVE NOT PASSIVE ACTIVITIES

## 2021-04-15 ASSESSMENT — PAIN DESCRIPTION - PROGRESSION: CLINICAL_PROGRESSION: GRADUALLY IMPROVING

## 2021-04-15 ASSESSMENT — PAIN SCALES - GENERAL: PAINLEVEL_OUTOF10: 9

## 2021-04-15 ASSESSMENT — PAIN DESCRIPTION - LOCATION: LOCATION: LEG;FOOT

## 2021-04-15 ASSESSMENT — PAIN DESCRIPTION - ONSET: ONSET: SUDDEN

## 2021-04-15 NOTE — PLAN OF CARE
Problem: Falls - Risk of:  Goal: Will remain free from falls  Description: Will remain free from falls  Outcome: Ongoing  Goal: Absence of physical injury  Description: Absence of physical injury  Outcome: Ongoing     Problem: Skin Integrity:  Goal: Will show no infection signs and symptoms  Description: Will show no infection signs and symptoms  Outcome: Ongoing  Goal: Absence of new skin breakdown  Description: Absence of new skin breakdown  Outcome: Ongoing  Goal: Risk for impaired skin integrity will decrease  Description: Risk for impaired skin integrity will decrease  Outcome: Ongoing     Problem: Musculor/Skeletal Functional Status  Goal: Highest potential functional level  Outcome: Ongoing  Goal: Absence of falls  Outcome: Ongoing     Problem: Activity:  Goal: Ability to tolerate increased activity will improve  Description: Ability to tolerate increased activity will improve  Outcome: Ongoing     Problem:  Bowel/Gastric:  Goal: Control of bowel function will improve  Description: Control of bowel function will improve  Outcome: Ongoing  Goal: Ability to achieve a regular elimination pattern will improve  Description: Ability to achieve a regular elimination pattern will improve  Outcome: Ongoing     Problem: Nutritional:  Goal: Ability to follow a diet with enough fiber (20 to 30 grams) for normal bowel function will improve  Description: Ability to follow a diet with enough fiber (20 to 30 grams) for normal bowel function will improve  Outcome: Ongoing     Problem: Mobility - Impaired:  Goal: Mobility will improve  Description: Mobility will improve  Outcome: Ongoing

## 2021-04-15 NOTE — PROGRESS NOTES
[facility]  Internal Medicine Teaching Residency Program  Inpatient Daily Progress Note  ______________________________________________________________________________    Patient: Laura Bernard  YOB: 1935   NDW:[id number]    Acct: [de-identified]     Room: [address] [place], [place] [address]  Admit date: 4/12/2021  Today's date: 04/15/21  Number of days in the hospital: 3    SUBJECTIVE   Admitting Diagnosis: Upper GI bleed  CC: lightheadedness, melena  Pt examined at bedside. Chart & results reviewed. Patient seen and examined at bedside. No acute overnight events.  -Patient had no acute events overnight  -No melena and no further diarrhea  -Hb is 11.5 today and it was 10.6 yesterday  -Patient got the EGD yesterday with findings as described in the assessment and plan part  -Patient clopidogrel was restarted yesterday and to restart the aspirin 72 hours after the procedure  -Patient is still on IV pantoprazole 40 mg twice daily to continue for 72 hrs  -Patient is getting IV fluids at the rate of 40 mL/h    ROS:  Constitutional:  negative for chills, fevers, sweats  Respiratory:  negative for cough, dyspnea on exertion, hemoptysis, shortness of breath, wheezing  Cardiovascular:  negative for chest pain, chest pressure/discomfort, lower extremity edema, palpitations  Gastrointestinal:  negative for abdominal pain, constipation, diarrhea, nausea, vomiting  Neurological:  negative for dizziness, headache  BRIEF HISTORY   The patient is a pleasant 82 y.o. female presents with a chief complaint of diarrhea, lightheadedness, and melena. Patient endorses a 4 day history of of diarrhea with numerous episodes a day. Patient and family state diarrhea is dark/black in nature. No hematochezia or BRB. Associated lightheadedness and intermittent dizziness & poor oral intake.  Patient denies fevers, chills, nausea, vomiting, chest pain, shortness of breath, abdominal pain, dysuria, or focal neurological deficits. OBJECTIVE     Vital Signs:  BP (!) 110/59   Pulse 69   Temp 98.5 °F (36.9 °C) (Oral)   Resp 18   Ht 5' 2\" (1.575 m)   Wt 199 lb 8.3 oz (90.5 kg)   SpO2 90%   BMI 36.49 kg/m²     Temp (24hrs), Av.8 °F (36.6 °C), Min:97 °F (36.1 °C), Max:98.5 °F (36.9 °C)    In: 240   Out: 350 [Urine:350]    Physical Exam:  Constitutional: This is a well developed, well nourished, 35-39.9 - Obesity Grade II 82y.o. year old female who is alert, oriented, cooperative and in no apparent distress. Head:normocephalic and atraumatic. EENT:  PERRLA. No conjunctival injections. Septum was midline, mucosa was without erythema, exudates or cobblestoning. No thrush was noted. Neck: Supple without thyromegaly. No elevated JVP. Trachea was midline. Respiratory: CTAB. No accessory muscle use. Cardiovascular: Regular without murmur, clicks, gallops or rubs. Abdomen: nontender to palpation. No organomegaly. Lymphatic: No lymphadenopathy. Musculoskeletal: Normal curvature of the spine. No gross muscle weakness. Extremities:  No lower extremity edema, ulcerations, tenderness, varicosities or erythema. Muscle size, tone and strength are normal.  No involuntary movements are noted. Skin:  Warm and dry. Good color, turgor and pigmentation. No lesions or scars.   No cyanosis or clubbing  Neurological/Psychiatric: The patient's general behavior, level of consciousness, thought content and emotional status is normal.        Medications:  Scheduled Medications:    lactobacillus  1 capsule Oral TID WC    [Held by provider] aspirin  81 mg Oral Daily    atorvastatin  80 mg Oral Daily    clopidogrel  75 mg Oral Daily    vitamin B-12  1,000 mcg Oral Daily    levothyroxine  125 mcg Oral Daily    sertraline  100 mg Oral Daily    sodium chloride flush  5-40 mL Intravenous 2 times per day    pantoprazole  40 mg Intravenous BID    And    sodium chloride (PF)  10 mL Intravenous Daily    insulin lispro  0-12 Units Subcutaneous TID     insulin lispro  0-6 Units Subcutaneous Nightly     Continuous Infusions:    sodium chloride      dextrose      lactated ringers 40 mL/hr at 04/14/21 0507     PRN Medicationsmagnesium sulfate, 1,000 mg, PRN  metoprolol, 5 mg, Q6H PRN  sodium chloride flush, 10 mL, PRN  sodium chloride, 25 mL, PRN  potassium chloride, 40 mEq, PRN    Or  potassium alternative oral replacement, 40 mEq, PRN    Or  potassium chloride, 10 mEq, PRN  promethazine, 12.5 mg, Q6H PRN    Or  ondansetron, 4 mg, Q6H PRN  polyethylene glycol, 17 g, Daily PRN  acetaminophen, 650 mg, Q6H PRN    Or  acetaminophen, 650 mg, Q6H PRN  glucose, 15 g, PRN  dextrose, 12.5 g, PRN  glucagon (rDNA), 1 mg, PRN  dextrose, 100 mL/hr, PRN        Diagnostic Labs:  CBC:   Recent Labs     04/13/21  0602 04/14/21  0548 04/15/21  0554   WBC 11.4* 7.8 7.4   RBC 3.63* 3.72* 4.06   HGB 10.3* 10.6* 11.5*   HCT 32.1* 33.2* 35.0*   MCV 88.4 89.2 86.2   RDW 14.9* 15.3* 15.4*    150 158     BMP:   Recent Labs     04/13/21  0602 04/14/21  0548 04/15/21  0554    141 142   K 3.3* 3.3* 4.0    105 107   CO2 21 26 28   BUN 29* 18 11   CREATININE 1.10* 1.00* 0.80     BNP: No results for input(s): BNP in the last 72 hours. PT/INR:   Recent Labs     04/12/21  1324   PROTIME 10.7   INR 1.0     APTT:   Recent Labs     04/12/21  1324   APTT 23.9     CARDIAC ENZYMES: No results for input(s): CKMB, CKMBINDEX, TROPONINI in the last 72 hours.     Invalid input(s): CKTOTAL;3  FASTING LIPID PANEL:  Lab Results   Component Value Date    CHOL 173 06/29/2020    HDL 52 06/29/2020    TRIG 337 (H) 06/29/2020     LIVER PROFILE:   Recent Labs     04/12/21  1324 04/14/21  0548   AST 61* 32*   ALT 65* 37*   BILIDIR 0.16 0.13   BILITOT 0.46 0.32   ALKPHOS 235* 171*      MICROBIOLOGY:   Lab Results   Component Value Date/Time    CULTURE KLEBSIELLA PNEUMONIAE >992230 CFU/ML (A) 04/03/2021 11:07 AM       Imaging:    Xr Chest Portable    Result Date: 4/12/2021  No radiographic evidence for free intraperitoneal air but small amounts may not be apparent radiographically. If this remains a clinical concern a CT scan should be obtained. ASSESSMENT & PLAN     ASSESSMENT / PLAN:           Upper GI bleed s/p EGD:  -Secondary to severe gastritis   -S/p resection of sessile polyps leading to bleeding s/p hemoclip's applied  -Continue Protonix 40 mg IV BID for 72 hours  -Hb has been stable  -Plavix restarted yesterday  -On hold aspirin for 48 hours post procedure i.e tomorrow  -Continue IV Protonix for 72 hours    Acute on chronic anemia:  -Baseline hemoglobin is 11-12  -Due to bleeding from severe gastritis  -Hb has been stable  -Monitor CBC daily  -Transfuse if Hb is less than 7    Acute renal Failure  -Resolved  -Continue RL at 40 mL/h    DM type II  -Hold metformin  -Blood glucose well controlled  -Continue medium dose corrective algorithm  -Hypoglycemia protocol     Hypothyroidism  -Continue Synthroid 125 mcg daily. -TSH elevated, however synthroid was recently increased by PCP  -Patient will need TSH repeat in 4 weeks.       Troponemia  -flattened     Chronic Diastolic CHF   -Hold Lasix 40 mg daily due to dehydration  -Can restart on discharge or after resuscitation.      Atrial Flutter  -NSR at this time.   -Not on home anticoagulation.   -Lopressor PRN for tachycardia     CAD  -Continue Plavix  -Restart aspirin 48 hours after endoscopy from yesterday     DVT ppx: epc cuffs  GI ppx: Protonix BID    PT/OT: Consulted  Discharge planning: Home with home care    Rina Fischer MD  Internal Medicine Resident, PGY-1  [facility];  [place], [place]  4/15/2021, 7:11 AM

## 2021-04-15 NOTE — PROGRESS NOTES
Physical Therapy    DATE: 4/15/2021    NAME: Andi Brooks  MRN: [id number]   : 1935      Patient not seen this date for Physical Therapy due to:    Patient Declined: Pt refused despite max encouragement. Pt states she is too tired to participate with rehab.       Electronically signed by Eris Sharp PTA on 4/15/2021 at 10:05 AM

## 2021-04-15 NOTE — PLAN OF CARE
Problem: Falls - Risk of:  Goal: Will remain free from falls  Description: Will remain free from falls  4/15/2021 1554 by Julio C Ramirez RN  Outcome: Ongoing  4/15/2021 1456 by [place] Necessary  Outcome: Ongoing  Goal: Absence of physical injury  Description: Absence of physical injury  4/15/2021 1554 by Julio C Ramirez RN  Outcome: Ongoing  4/15/2021 1456 by [place] Necessary  Outcome: Ongoing     Problem: Skin Integrity:  Goal: Will show no infection signs and symptoms  Description: Will show no infection signs and symptoms  4/15/2021 1554 by Julio C Ramirez RN  Outcome: Ongoing  4/15/2021 1456 by [place] Necessary  Outcome: Ongoing  Goal: Absence of new skin breakdown  Description: Absence of new skin breakdown  4/15/2021 1554 by Julio C Ramirez RN  Outcome: Ongoing  4/15/2021 1456 by [place] Necessary  Outcome: Ongoing  Goal: Risk for impaired skin integrity will decrease  Description: Risk for impaired skin integrity will decrease  4/15/2021 1554 by Julio C Ramirez RN  Outcome: Ongoing  4/15/2021 1456 by [place] Necessary  Outcome: Ongoing     Problem: Activity:  Goal: Ability to tolerate increased activity will improve  Description: Ability to tolerate increased activity will improve  4/15/2021 1554 by Julio C Ramirez RN  Outcome: Ongoing  4/15/2021 1456 by [place] Necessary  Outcome: Ongoing     Problem:  Bowel/Gastric:  Goal: Control of bowel function will improve  Description: Control of bowel function will improve  4/15/2021 1554 by Julio C Ramirez RN  Outcome: Ongoing  4/15/2021 1456 by [place] Necessary  Outcome: Ongoing  Goal: Ability to achieve a regular elimination pattern will improve  Description: Ability to achieve a regular elimination pattern will improve  4/15/2021 1554 by Julio C Ramirez RN  Outcome: Ongoing  4/15/2021 1456 by [place] Necessary  Outcome: Ongoing     Problem: Mobility - Impaired:  Goal: Mobility will improve  Description: Mobility will improve  4/15/2021 1554 by Flash Baez Bernice Atkins RN  Outcome: Ongoing  4/15/2021 1456 by Sandrita Granda  Outcome: Ongoing

## 2021-04-15 NOTE — PROGRESS NOTES
clinical course  VITALS:  BP (!) 108/52   Pulse 81   Temp 98.2 °F (36.8 °C) (Oral)   Resp 18   Ht 5' 2\" (1.575 m)   Wt 199 lb 8.3 oz (90.5 kg)   SpO2 95%   BMI 36.49 kg/m²   TEMPERATURE:  Current - Temp: 98.2 °F (36.8 °C); Max - Temp  Av.2 °F (36.8 °C)  Min: 97.4 °F (36.3 °C)  Max: 98.6 °F (37 °C)    Physical Assessment:  General appearance:  alert, cooperative and no distress  Mental Status:  oriented to person, place and time and normal affect  Lungs:  clear to auscultation bilaterally, normal effort  Heart:  regular rate and rhythm, no murmur  Abdomen:  soft, nontender, nondistended, normal bowel sounds, no masses, hepatomegaly, splenomegaly  Extremities:  no edema, redness, tenderness in the calves  Skin:  no gross lesions, rashes, induration    Data Review:    Labs and Imaging:     CBC:  Recent Labs     21  1324 21  0602 21  0548 04/15/21  0554   WBC 13.0* 11.4* 7.8 7.4   HGB 12.1 10.3* 10.6* 11.5*   MCV 87.4 88.4 89.2 86.2   RDW 15.1* 14.9* 15.3* 15.4*    144 150 158       ANEMIA STUDIES:  Recent Labs     21  1520   LABIRON 16*   TIBC 197*   [id number] >2000*   FOLATE 8.7       BMP:  Recent Labs     21  0602 21  0548 04/15/21  0554    141 142   K 3.3* 3.3* 4.0    105 107   CO2 21 26 28   BUN 29* 18 11   CREATININE 1.10* 1.00* 0.80   GLUCOSE 110* 130* 128*   CALCIUM 7.0* 7.5* 8.0*       LFTS:  Recent Labs     21  1324 21  0548   ALKPHOS 235* 171*   ALT 65* 37*   AST 61* 32*   BILITOT 0.46 0.32   BILIDIR 0.16 0.13   LABALBU 3.9 2.9*       Amylase/Lipase and Ammonia:  No results for input(s): AMYLASE, LIPASE, AMMONIA in the last 72 hours.     Acute Hepatitis Panel:  No results found for: HEPBSAG, HEPCAB, HEPBIGM, HEPAIGM    HCV Genotype:  No results found for: HEPATITISCGENOTYPE    HCV Quantitative:  No results found for: HCVQNT    LIVER WORK UP:    AFP  No results found for: AFP    Alpha 1 antitrypsin   No results found for: A1A    JOSE No results found for: JOSE    AMA  No results found for: MITOAB    ASMA  No results found for: SMOOTHMUSCAB    PT/INR  Recent Labs     04/12/21  1324   PROTIME 10.7   INR 1.0       Cancer Markers:  CEA:  No results for input(s): CEA in the last 72 hours. Ca 125:  No results for input(s):  in the last 72 hours. Ca 19-9:   Invalid input(s):   AFP: No results for input(s): AFP in the last 72 hours. Lactic acid:Invalid input(s): LACTIC ACID    Radiology Review:    No results found. Principal Problem:    Upper GI bleed  Active Problems:    Chronic diastolic congestive heart failure (HCC)    Type 2 diabetes mellitus without complication, without long-term current use of insulin (HCC)    Atrial flutter (HCC)    BRIA (acute kidney injury) ([facility] [address]  Resolved Problems:    * No resolved hospital problems. *       GI Impression:    1. Symptomatic anemia with melena-resolved. -EGD completed yesterday that showed diffuse erythema and erosions in the entire stomach suggestive of moderate to severe gastritis biopsies were taken to rule out H. pylori and are pending at this time.  -Also, 4 polyps were found in the antrum with stigmata of bleeding a were biopsied and then started to bleed. This was treated with epi injections and cautery. One area required 7 hemoclips to stop the bleeding completely. 2.  Nonspecific diarrhea-solved    3. History of CAD-normally on ASA and Plavix      Plan and Recommendations:    1. Cardiac diet  2. Protonix 40 mg IV twice daily  3. Avoid NSAIDs  4. May restart Plavix today. May restart aspirin tomorrow evening  5. Monitor for active bleeding  6. Recommend daily CBC and BMP  7. Will need to follow-up tomorrow on outstanding gastric biopsies-positive for H. pylori patient will need triple treatment  8.  Will follow closely-anticipate discharge tomorrow      This plan was formulated in collaboration with Dr. Trina Gonzalez MD    Thank you for allowing me to participate in the care of your patient. Please feel free to contact me with any questions or concerns.      2 [facility]

## 2021-04-16 VITALS
HEIGHT: 62 IN | OXYGEN SATURATION: 93 % | WEIGHT: 204.81 LBS | BODY MASS INDEX: 37.69 KG/M2 | TEMPERATURE: 97.5 F | HEART RATE: 64 BPM | DIASTOLIC BLOOD PRESSURE: 54 MMHG | RESPIRATION RATE: 18 BRPM | SYSTOLIC BLOOD PRESSURE: 130 MMHG

## 2021-04-16 LAB
ABSOLUTE EOS #: 0.37 K/UL (ref 0–0.44)
ABSOLUTE IMMATURE GRANULOCYTE: 0.06 K/UL (ref 0–0.3)
ABSOLUTE LYMPH #: 1.49 K/UL (ref 1.1–3.7)
ABSOLUTE MONO #: 0.4 K/UL (ref 0.1–1.2)
ANION GAP SERPL CALCULATED.3IONS-SCNC: 8 MMOL/L (ref 9–17)
BASOPHILS # BLD: 1 % (ref 0–2)
BASOPHILS ABSOLUTE: 0.06 K/UL (ref 0–0.2)
BUN BLDV-MCNC: 8 MG/DL (ref 8–23)
BUN/CREAT BLD: ABNORMAL (ref 9–20)
CALCIUM SERPL-MCNC: 8.1 MG/DL (ref 8.6–10.4)
CHLORIDE BLD-SCNC: 107 MMOL/L (ref 98–107)
CO2: 23 MMOL/L (ref 20–31)
CREAT SERPL-MCNC: 0.76 MG/DL (ref 0.5–0.9)
DIFFERENTIAL TYPE: ABNORMAL
EOSINOPHILS RELATIVE PERCENT: 5 % (ref 1–4)
GFR AFRICAN AMERICAN: >60 ML/MIN
GFR NON-AFRICAN AMERICAN: >60 ML/MIN
GFR SERPL CREATININE-BSD FRML MDRD: ABNORMAL ML/MIN/{1.73_M2}
GFR SERPL CREATININE-BSD FRML MDRD: ABNORMAL ML/MIN/{1.73_M2}
GLUCOSE BLD-MCNC: 126 MG/DL (ref 65–105)
GLUCOSE BLD-MCNC: 149 MG/DL (ref 70–99)
HCT VFR BLD CALC: 36.5 % (ref 36.3–47.1)
HEMOGLOBIN: 11.3 G/DL (ref 11.9–15.1)
IMMATURE GRANULOCYTES: 1 %
LYMPHOCYTES # BLD: 21 % (ref 24–43)
MCH RBC QN AUTO: 28.3 PG (ref 25.2–33.5)
MCHC RBC AUTO-ENTMCNC: 31 G/DL (ref 28.4–34.8)
MCV RBC AUTO: 91.5 FL (ref 82.6–102.9)
MONOCYTES # BLD: 6 % (ref 3–12)
NRBC AUTOMATED: 0 PER 100 WBC
PDW BLD-RTO: 15.1 % (ref 11.8–14.4)
PLATELET # BLD: 150 K/UL (ref 138–453)
PLATELET ESTIMATE: ABNORMAL
PMV BLD AUTO: 11.1 FL (ref 8.1–13.5)
POTASSIUM SERPL-SCNC: 4.2 MMOL/L (ref 3.7–5.3)
RBC # BLD: 3.99 M/UL (ref 3.95–5.11)
RBC # BLD: ABNORMAL 10*6/UL
SEG NEUTROPHILS: 66 % (ref 36–65)
SEGMENTED NEUTROPHILS ABSOLUTE COUNT: 4.57 K/UL (ref 1.5–8.1)
SODIUM BLD-SCNC: 138 MMOL/L (ref 135–144)
SURGICAL PATHOLOGY REPORT: NORMAL
WBC # BLD: 7 K/UL (ref 3.5–11.3)
WBC # BLD: ABNORMAL 10*3/UL

## 2021-04-16 PROCEDURE — C9113 INJ PANTOPRAZOLE SODIUM, VIA: HCPCS | Performed by: INTERNAL MEDICINE

## 2021-04-16 PROCEDURE — 99239 HOSP IP/OBS DSCHRG MGMT >30: CPT | Performed by: INTERNAL MEDICINE

## 2021-04-16 PROCEDURE — 97110 THERAPEUTIC EXERCISES: CPT

## 2021-04-16 PROCEDURE — 6360000002 HC RX W HCPCS: Performed by: INTERNAL MEDICINE

## 2021-04-16 PROCEDURE — 6370000000 HC RX 637 (ALT 250 FOR IP): Performed by: INTERNAL MEDICINE

## 2021-04-16 PROCEDURE — 2580000003 HC RX 258: Performed by: INTERNAL MEDICINE

## 2021-04-16 PROCEDURE — 82947 ASSAY GLUCOSE BLOOD QUANT: CPT

## 2021-04-16 PROCEDURE — 85025 COMPLETE CBC W/AUTO DIFF WBC: CPT

## 2021-04-16 PROCEDURE — 97530 THERAPEUTIC ACTIVITIES: CPT

## 2021-04-16 PROCEDURE — 36415 COLL VENOUS BLD VENIPUNCTURE: CPT

## 2021-04-16 PROCEDURE — 80048 BASIC METABOLIC PNL TOTAL CA: CPT

## 2021-04-16 RX ORDER — PANTOPRAZOLE SODIUM 40 MG/1
40 TABLET, DELAYED RELEASE ORAL 2 TIMES DAILY
Qty: 122 TABLET | Refills: 0 | Status: SHIPPED | OUTPATIENT
Start: 2021-04-16 | End: 2021-06-17 | Stop reason: SDUPTHER

## 2021-04-16 RX ADMIN — Medication 1 CAPSULE: at 09:18

## 2021-04-16 RX ADMIN — Medication 1000 MCG: at 09:18

## 2021-04-16 RX ADMIN — ATORVASTATIN CALCIUM 80 MG: 80 TABLET, FILM COATED ORAL at 09:18

## 2021-04-16 RX ADMIN — INSULIN LISPRO 2 UNITS: 100 INJECTION, SOLUTION INTRAVENOUS; SUBCUTANEOUS at 09:20

## 2021-04-16 RX ADMIN — PANTOPRAZOLE SODIUM 40 MG: 40 INJECTION, POWDER, FOR SOLUTION INTRAVENOUS at 09:18

## 2021-04-16 RX ADMIN — CLOPIDOGREL 75 MG: 75 TABLET, FILM COATED ORAL at 09:18

## 2021-04-16 RX ADMIN — SERTRALINE 100 MG: 50 TABLET, FILM COATED ORAL at 09:18

## 2021-04-16 RX ADMIN — SODIUM CHLORIDE, PRESERVATIVE FREE 10 ML: 5 INJECTION INTRAVENOUS at 09:18

## 2021-04-16 RX ADMIN — LEVOTHYROXINE SODIUM 125 MCG: 125 TABLET ORAL at 06:16

## 2021-04-16 NOTE — PROGRESS NOTES
[facility]  Internal Medicine Teaching Residency Program  Inpatient Daily Progress Note  ______________________________________________________________________________    Patient: Avinash Pelletier  YOB: 1935   S:[id number]    Acct: [de-identified]     Room:   Admit date: 2021  Today's date: 21  Number of days in the hospital: 4    SUBJECTIVE   Admitting Diagnosis: Upper GI bleed  CC: lightheadedness, melena  Pt examined at bedside. Chart & results reviewed.   -No acute events happened overnight  -Vitals are stable  -Aspirin will be restarting today in the evening  -Gastroenterology want to follow-up on the biopsy results today  -Plan is for discharge today    ROS:  Constitutional:  negative for chills, fevers, sweats  Respiratory:  negative for cough, dyspnea on exertion, hemoptysis, shortness of breath, wheezing  Cardiovascular:  negative for chest pain, chest pressure/discomfort, lower extremity edema, palpitations  Gastrointestinal:  negative for abdominal pain, constipation, diarrhea, nausea, vomiting  Neurological:  negative for dizziness, headache  BRIEF HISTORY   The patient is a pleasant 82 y.o. female presents with a chief complaint of diarrhea, lightheadedness, and melena. Patient endorses a 4 day history of of diarrhea with numerous episodes a day. Patient and family state diarrhea is dark/black in nature. No hematochezia or BRB. Associated lightheadedness and intermittent dizziness & poor oral intake. Patient denies fevers, chills, nausea, vomiting, chest pain, shortness of breath, abdominal pain, dysuria, or focal neurological deficits.   OBJECTIVE     Vital Signs:  /60   Pulse 71   Temp 99.4 °F (37.4 °C) (Oral)   Resp 18   Ht 5' 2\" (1.575 m)   Wt 199 lb 8.3 oz (90.5 kg)   SpO2 90%   BMI 36.49 kg/m²     Temp (24hrs), Av.5 °F (36.9 °C), Min:97.4 °F (36.3 °C), Max:99.4 °F (37.4 °C)    In: -   Out: 350 [Urine:350] Physical Exam:  Constitutional: This is a well developed, well nourished, 35-39.9 - Obesity Grade II 82y.o. year old female who is alert, oriented, cooperative and in no apparent distress. Head:normocephalic and atraumatic. EENT:  PERRLA. No conjunctival injections. Septum was midline, mucosa was without erythema, exudates or cobblestoning. No thrush was noted. Neck: Supple without thyromegaly. No elevated JVP. Trachea was midline. Respiratory: CTAB. No accessory muscle use. Cardiovascular: Regular without murmur, clicks, gallops or rubs. Abdomen: nontender to palpation. No organomegaly. Lymphatic: No lymphadenopathy. Musculoskeletal: Normal curvature of the spine. No gross muscle weakness. Extremities:  No lower extremity edema, ulcerations, tenderness, varicosities or erythema. Muscle size, tone and strength are normal.  No involuntary movements are noted. Skin:  Warm and dry. Good color, turgor and pigmentation. No lesions or scars.   No cyanosis or clubbing  Neurological/Psychiatric: The patient's general behavior, level of consciousness, thought content and emotional status is normal.        Medications:  Scheduled Medications:    lactobacillus  1 capsule Oral TID WC    [Held by provider] aspirin  81 mg Oral Daily    atorvastatin  80 mg Oral Daily    clopidogrel  75 mg Oral Daily    vitamin B-12  1,000 mcg Oral Daily    levothyroxine  125 mcg Oral Daily    sertraline  100 mg Oral Daily    sodium chloride flush  5-40 mL Intravenous 2 times per day    pantoprazole  40 mg Intravenous BID    And    sodium chloride (PF)  10 mL Intravenous Daily    insulin lispro  0-12 Units Subcutaneous TID WC    insulin lispro  0-6 Units Subcutaneous Nightly     Continuous Infusions:    sodium chloride      dextrose       PRN Medicationsmagnesium sulfate, 1,000 mg, PRN  metoprolol, 5 mg, Q6H PRN  sodium chloride flush, 10 mL, PRN  sodium chloride, 25 mL, PRN  potassium chloride, 40 mEq, PRN Or  potassium alternative oral replacement, 40 mEq, PRN    Or  potassium chloride, 10 mEq, PRN  promethazine, 12.5 mg, Q6H PRN    Or  ondansetron, 4 mg, Q6H PRN  polyethylene glycol, 17 g, Daily PRN  acetaminophen, 650 mg, Q6H PRN    Or  acetaminophen, 650 mg, Q6H PRN  glucose, 15 g, PRN  dextrose, 12.5 g, PRN  glucagon (rDNA), 1 mg, PRN  dextrose, 100 mL/hr, PRN        Diagnostic Labs:  CBC:   Recent Labs     04/14/21  0548 04/15/21  0554   WBC 7.8 7.4   RBC 3.72* 4.06   HGB 10.6* 11.5*   HCT 33.2* 35.0*   MCV 89.2 86.2   RDW 15.3* 15.4*    158     BMP:   Recent Labs     04/14/21  0548 04/15/21  0554    142   K 3.3* 4.0    107   CO2 26 28   BUN 18 11   CREATININE 1.00* 0.80     BNP: No results for input(s): BNP in the last 72 hours. PT/INR:   No results for input(s): PROTIME, INR in the last 72 hours. APTT:   No results for input(s): APTT in the last 72 hours. CARDIAC ENZYMES: No results for input(s): CKMB, CKMBINDEX, TROPONINI in the last 72 hours. Invalid input(s): CKTOTAL;3  FASTING LIPID PANEL:  Lab Results   Component Value Date    CHOL 173 06/29/2020    HDL 52 06/29/2020    TRIG 337 (H) 06/29/2020     LIVER PROFILE:   Recent Labs     04/14/21  0548   AST 32*   ALT 37*   BILIDIR 0.13   BILITOT 0.32   ALKPHOS 171*      MICROBIOLOGY:   Lab Results   Component Value Date/Time    CULTURE KLEBSIELLA PNEUMONIAE >[id number] CFU/ML (A) 04/03/2021 11:07 AM       Imaging:    Xr Chest Portable    Result Date: 4/12/2021  No radiographic evidence for free intraperitoneal air but small amounts may not be apparent radiographically. If this remains a clinical concern a CT scan should be obtained.        ASSESSMENT & PLAN     ASSESSMENT / PLAN:           Upper GI bleed s/p EGD:  -Secondary to severe gastritis   -S/p resection of sessile polyps leading to bleeding s/p hemoclip's applied  -Continue Protonix 40 mg IV BID   -Hb has been stable  -Continue Plavix  -Restart aspirin today in the evening

## 2021-04-16 NOTE — PROGRESS NOTES
THE [facility] AT [facility]   Progress Note    Yasmine Ibrahim is a 82 y.o. female patient. Hospitalization Day:4      Chief consult reason:   Melena x 4 days, FOBT+      Subjective:  Seen and examined. Patient tolerating diet. Hemoglobin stable. No bleeding overnight. Gastric biopsy still pending. VITALS:  BP (!) 130/54   Pulse 64   Temp 97.5 °F (36.4 °C) (Oral)   Resp 18   Ht 5' 2\" (1.575 m)   Wt 204 lb 12.9 oz (92.9 kg)   SpO2 93%   BMI 37.46 kg/m²   TEMPERATURE:  Current - Temp: 97.5 °F (36.4 °C); Max - Temp  Av.3 °F (36.8 °C)  Min: 97.4 °F (36.3 °C)  Max: 99.4 °F (37.4 °C)    Physical Assessment:  General appearance:  alert, cooperative and no distress  Mental Status:  oriented to person, place and time and normal affect  Lungs:  clear to auscultation bilaterally, normal effort  Heart:  regular rate and rhythm, no murmur  Abdomen:  soft, nontender, nondistended, normal bowel sounds, no masses, hepatomegaly, splenomegaly  Extremities:  no edema, redness, tenderness in the calves  Skin:  no gross lesions, rashes, induration    Data Review:    Labs and Imaging:     CBC:  Recent Labs     21  0548 04/15/21  0554 21  0559   WBC 7.8 7.4 7.0   HGB 10.6* 11.5* 11.3*   MCV 89.2 86.2 91.5   RDW 15.3* 15.4* 15.1*    158 150       ANEMIA STUDIES:  Recent Labs     21  1520   LABIRON 16*   TIBC 197*   [id number] >2000*   FOLATE 8.7       BMP:  Recent Labs     21  0548 04/15/21  0554 21  0559    142 138   K 3.3* 4.0 4.2    107 107   CO2 26 28 23   BUN 18 11 8   CREATININE 1.00* 0.80 0.76   GLUCOSE 130* 128* 149*   CALCIUM 7.5* 8.0* 8.1*       LFTS:  Recent Labs     21  0548   ALKPHOS 171*   ALT 37*   AST 32*   BILITOT 0.32   BILIDIR 0.13   LABALBU 2.9*       Amylase/Lipase and Ammonia:  No results for input(s): AMYLASE, LIPASE, AMMONIA in the last 72 hours.     Acute Hepatitis Panel:  No results found for: HEPBSAG, HEPCAB, HEPBIGM, HEPAIGM    HCV Genotype:  No results found for: HEPATITISCGENOTYPE    HCV Quantitative:  No results found for: HCVQNT    LIVER WORK UP:    AFP  No results found for: AFP    Alpha 1 antitrypsin   No results found for: A1A    OJSE  No results found for: JOSE    AMA  No results found for: MITOAB    ASMA  No results found for: SMOOTHMUSCAB    PT/INR  No results for input(s): PROTIME, INR in the last 72 hours. Cancer Markers:  CEA:  No results for input(s): CEA in the last 72 hours. Ca 125:  No results for input(s):  in the last 72 hours. Ca 19-9:   Invalid input(s):   AFP: No results for input(s): AFP in the last 72 hours. Lactic acid:Invalid input(s): LACTIC ACID    Radiology Review:    No results found. Principal Problem:    Upper GI bleed  Active Problems:    Chronic diastolic congestive heart failure (HCC)    Type 2 diabetes mellitus without complication, without long-term current use of insulin (HCC)    Atrial flutter (HCC)    BRIA (acute kidney injury) ([facility] [address]    Superficial gastritis without hemorrhage  Resolved Problems:    * No resolved hospital problems. *       GI Impression:    1. Symptomatic anemia with melena-resolved. -EGD completed yesterday that showed diffuse erythema and erosions in the entire stomach suggestive of moderate to severe gastritis biopsies were taken to rule out H. pylori and are pending at this time.  -Also, 4 polyps were found in the antrum with stigmata of bleeding a were biopsied and then started to bleed. This was treated with epi injections and cautery. One area required 7 hemoclips to stop the bleeding completely. 2.  Nonspecific diarrhea-solved    3. History of CAD-normally on ASA and Plavix      Plan and Recommendations:    1. Cardiac diet  2. Protonix 40 mg IV twice daily-a change to p.o. at discharge  3. Avoid NSAIDs  4. Restart aspirin this evening  5. Monitor for active bleeding  6. Recommend daily CBC and BMP  7.  Patient will need to follow-up in the office in 1

## 2021-04-16 NOTE — PROGRESS NOTES
CLINICAL PHARMACY NOTE: MEDS TO [address] Select Patient?: No  Total # of Prescriptions Filled: 1   The following medications were delivered to the patient:  · Pantoprazole 40mg tablet  Total # of Interventions Completed: 0  Time Spent (min): 0    Additional Documentation: medication delivered to the pt in room 331 on 04.16.21. pt had a co pay of $3.70, paid cash

## 2021-04-16 NOTE — CARE COORDINATION
Transitional planning. Tamika Mclaughlin from [facility] aware pt D/C today. Needed information will be obtained from [facility]. HC and KATELYN complete.     Discharge 1 [facility] Case Management Department  Written by: Antonio Duke RN    Patient Name: Erika Franklin  Attending Provider: Tom Romero MD  Admit Date: 2021 12:44 PM  MRN: [id number]  Account: [de-identified]                     : 1935  Discharge Date:       Disposition: home with Carlyn Hairston RN

## 2021-04-16 NOTE — PROGRESS NOTES
Physical Therapy  Facility/Department: [facility]//MED SURG  Daily Treatment Note  NAME: Al Fajardo  : 1935  MRN: [id number]    Date of Service: 2021    Discharge Recommendations:  Patient would benefit from continued therapy after discharge   PT Equipment Recommendations  Equipment Needed: No    Assessment   Body structures, Functions, Activity limitations: Decreased functional mobility ; Decreased endurance;Decreased ROM; Decreased strength;Decreased balance; Increased pain  Assessment: Pt able to amb bed to chair ~4ft w/RW CGA, Pt is self limiting and not motivated to participate with PT. Nazanin Aldridge Recommending continued skilled physical therapy to address functional mobility deficits to return pt to prior level of independence. Prognosis: Fair  Decision Making: Medium Complexity  PT Education: Goals;PT Role;Plan of Care;General Safety;Gait Training;Home Exercise Program  REQUIRES PT FOLLOW UP: Yes  Activity Tolerance  Activity Tolerance: Patient limited by endurance; Patient limited by fatigue;Patient Tolerated treatment well     Patient Diagnosis(es): The primary encounter diagnosis was BRIA (acute kidney injury) ([facility] [address] A diagnosis of Upper GI bleed was also pertinent to this visit. has a past medical history of Arthritis, Atrial flutter ([address] CAD (coronary artery disease), CHF (congestive heart failure) ([address] Diabetes ([address] Diabetes mellitus ([address] Hyperlipidemia, Hypertension, Psychiatric problem, Thyroid cancer ([address] and Thyroid disease. has a past surgical history that includes Coronary angioplasty with stent; Hysterectomy; Cholecystectomy; Thyroidectomy; Appendectomy; back surgery; Upper gastrointestinal endoscopy (2021); and Upper gastrointestinal endoscopy (2021).     Restrictions  Restrictions/Precautions  Restrictions/Precautions: Fall Risk  Required Braces or Orthoses?: No  Position Activity Restriction  Other position/activity restrictions: up with assist  Subjective   General Response To Previous Treatment: Patient with no complaints from previous session. Family / Caregiver Present: No  Subjective  Subjective: RN and pt agreed to PT< pt awake in bed upon arrival and c/o BLE pain  Pain Screening  Patient Currently in Pain: Yes  Vital Signs  Patient Currently in Pain: Yes       Orientation  Orientation  Overall Orientation Status: Within Functional Limits       Objective   Bed mobility  Rolling to Right: Stand by assistance  Supine to Sit: Stand by assistance  Sit to Supine: (Left up in chair)  Scooting: Stand by assistance  Comment: Increased time and effort needed to complete  Transfers  Sit to Stand: Contact guard assistance  Stand to sit: Contact guard assistance  Bed to Chair: Contact guard assistance  Stand Pivot Transfers: Contact guard assistance  Comment: w/RW  Ambulation  Ambulation?: Yes(pt non-ambulatory at baseline, electric W/C d/t bad knees)  Ambulation 1  Surface: level tile  Device: Rolling Walker  Assistance: Contact guard assistance  Quality of Gait: Short shuffling steps  Distance: 4Ft bed to chair  Stairs/Curb  Stairs?: No     Balance  Posture: Fair  Sitting - Static: Good;-  Sitting - Dynamic: Good;-  Standing - Static: Fair;+  Standing - Dynamic: Fair;-  Comments: Pt sat EOB ~3mins SBA, Static standing w/RW x 30\" CGA, shaky t/o  Exercises  Upper Extremity: B bicep curls, R shoulder flex x 5, declined L shoulder flex d/t pain at IV site  Seated LE exercise program: Long Arc Quads, hip abduction/adduction, heel/toe raises, and marches.  Reps: x 10 A/AAROM   B gastroc stretches 30\"x 3                    Goals  Short term goals  Time Frame for Short term goals: 15  Short term goal 1: Pt to perform bed mobility independently  Short term goal 2: Demonstrate stand pivot to wheelchair independently  Short term goal 3: Demonstrate standing dynamic balance of good - to decrease fall risk  Short term goal 4: Tolerate 30 minutes of therapy to demo increased endurance  Patient Goals   Patient goals :  To go home    Plan    Plan  Times per week: 5-6x/week  Current Treatment Recommendations: Strengthening, Transfer Training, Endurance Training, Patient/Caregiver Education & Training, ROM, Balance Training, Gait Training, Home Exercise Program, Functional Mobility Training, Stair training, Safety Education & Training  Safety Devices  Type of devices: Call light within reach, Gait belt, Nurse notified, Patient at risk for falls, Left in chair, Chair alarm in place  Restraints  Initially in place: No     Therapy Time   Individual Concurrent Group Co-treatment   Time In 0827         Time Out 0853         Minutes 26         Timed Code Treatment Minutes: [address], PTA

## 2021-04-17 ENCOUNTER — CARE COORDINATION (OUTPATIENT)
Dept: CASE MANAGEMENT | Age: 86
End: 2021-04-17

## 2021-04-17 DIAGNOSIS — N30.00 ACUTE CYSTITIS WITHOUT HEMATURIA: Primary | ICD-10-CM

## 2021-04-17 PROCEDURE — 1111F DSCHRG MED/CURRENT MED MERGE: CPT | Performed by: INTERNAL MEDICINE

## 2021-04-19 ENCOUNTER — OFFICE VISIT (OUTPATIENT)
Dept: GASTROENTEROLOGY | Age: 86
End: 2021-04-19
Payer: MEDICARE

## 2021-04-19 VITALS
BODY MASS INDEX: 37.54 KG/M2 | HEART RATE: 87 BPM | HEIGHT: 62 IN | WEIGHT: 204 LBS | DIASTOLIC BLOOD PRESSURE: 63 MMHG | SYSTOLIC BLOOD PRESSURE: 98 MMHG

## 2021-04-19 DIAGNOSIS — K92.1 MELENA: Primary | ICD-10-CM

## 2021-04-19 DIAGNOSIS — D64.9 MILD ANEMIA: ICD-10-CM

## 2021-04-19 DIAGNOSIS — K31.7 GASTRIC POLYPS: ICD-10-CM

## 2021-04-19 DIAGNOSIS — K25.0 ACUTE GASTRIC ULCER WITH HEMORRHAGE: ICD-10-CM

## 2021-04-19 DIAGNOSIS — E66.01 MORBID OBESITY (HCC): ICD-10-CM

## 2021-04-19 PROCEDURE — 1111F DSCHRG MED/CURRENT MED MERGE: CPT | Performed by: INTERNAL MEDICINE

## 2021-04-19 PROCEDURE — 1090F PRES/ABSN URINE INCON ASSESS: CPT | Performed by: INTERNAL MEDICINE

## 2021-04-19 PROCEDURE — G8427 DOCREV CUR MEDS BY ELIG CLIN: HCPCS | Performed by: INTERNAL MEDICINE

## 2021-04-19 PROCEDURE — 99214 OFFICE O/P EST MOD 30 MIN: CPT | Performed by: INTERNAL MEDICINE

## 2021-04-19 PROCEDURE — G8400 PT W/DXA NO RESULTS DOC: HCPCS | Performed by: INTERNAL MEDICINE

## 2021-04-19 PROCEDURE — 1036F TOBACCO NON-USER: CPT | Performed by: INTERNAL MEDICINE

## 2021-04-19 PROCEDURE — 4040F PNEUMOC VAC/ADMIN/RCVD: CPT | Performed by: INTERNAL MEDICINE

## 2021-04-19 PROCEDURE — G8417 CALC BMI ABV UP PARAM F/U: HCPCS | Performed by: INTERNAL MEDICINE

## 2021-04-19 PROCEDURE — 1123F ACP DISCUSS/DSCN MKR DOCD: CPT | Performed by: INTERNAL MEDICINE

## 2021-04-19 ASSESSMENT — ENCOUNTER SYMPTOMS
RESPIRATORY NEGATIVE: 1
ALLERGIC/IMMUNOLOGIC NEGATIVE: 1
TROUBLE SWALLOWING: 0
GASTROINTESTINAL NEGATIVE: 1

## 2021-04-19 NOTE — PROGRESS NOTES
GI OFFICE FOLLOW UP    Brittney Fajardo is a 82 y.o. female evaluated via on 4/19/2021. Consent:  She and/or health care decision maker is aware that that she may receive a bill for this telephone service, depending on her insurance coverage, and has provided verbal consent to proceed: YES      INTERVAL HISTORY:   No referring provider defined for this encounter. Chief Complaint   Patient presents with    Follow-up     Patient is a hospital f/u for a EGD with Dr. Trina Gonzalez to control a bleed. Patient denies dark stools, abd pain or fatigue at this time. 1. Melena    2. Gastric polyps    3. Acute gastric ulcer with hemorrhage    4. Mild anemia    5. Morbid obesity ([address]      This patient is evaluated in my office for the first time  She is wheelchair-bound elderly was accompanied by her daughter  Her records were reviewed from [facility] and discussed with them in detail    She was hospitalized recently at [facility]  She was evaluated by gastroenterology there  It appears that patient has history significant for multiple medical issues also has been on blood thinners  Has history for morbid obesity  She was admitted there weakness fatigue tiredness and melanotic stools  An upper endoscopy was performed  Severe gastritis was noted  Patient had multiple polyps and biopsies were taken and bleeding started it appears that according to endoscopy report submucosal injections of epinephrine were given and 7 clips were placed to halt the bleeding  Patient since then has been discharged home  Her hemoglobin is mild anemic range  She is taking iron  As well as taking PPIs  Clinically feeling well  Currently denies any black stools denies any fresh rectal bleeding    Mild abdominal discomfort bloating and gas  Denies any nausea vomiting hematemesis  Apparently back on blood thinners?     No current smoking alcohol abuse illicit drug usage  She apparently has never had a colonoscopy done and does not want to have it at this time specially. HISTORY OF PRESENT ILLNESS: Roxane Christy is a 82 y.o. female with a past history remarkable for , referred for evaluation of   Chief Complaint   Patient presents with    Follow-up     Patient is a hospital f/u for a EGD with Dr. Renard Medina to control a bleed. Patient denies dark stools, abd pain or fatigue at this time. .    Past Medical,Family, and Social History reviewed and does contribute to the patient presenting condition. Patient's PMH/PSH,SH,PSYCH Hx, MEDs, ALLERGIES, and ROS were all reviewed and updated in the appropriate sections.     PAST MEDICAL HISTORY:  Past Medical History:   Diagnosis Date    Arthritis     At[facility] fl[facility]     CAD (coronary artery disease)     CHF (congestive heart failure) (HCC)     Diabetes ([facility]     Diabetes mellitus ([facility]     Hyperlipidemia     Hypertension     Psychiatric problem     Thyroid cancer ([facility]     S/P thyroidectomy; on synthroid    Thyroid disease     hypothyroid       Past Surgical History:   Procedure Laterality Date    APPENDECTOMY      BACK SURGERY      CHOLECYSTECTOMY      CORONARY ANGIOPLASTY WITH STENT PLACEMENT      HYSTERECTOMY      THYROIDECTOMY      UPPER GASTROINTESTINAL ENDOSCOPY  4/14/2021    EGD BIOPSY performed by Ernie Tam MD at [facility]  4/14/2021    EGD CONTROL HEMORRHAGE performed by Ernie Tam MD at [address]:    Current Outpatient Medications:     pantoprazole (PROTONIX) 40 MG tablet, Take 1 tablet by mouth 2 times daily, Disp: 122 tablet, Rfl: 0    metFORMIN (GLUCOPHAGE) 500 MG tablet, Take 2 tablets by mouth 2 times daily (with meals), Disp: 270 tablet, Rfl: 3    atorvastatin (LIPITOR) 80 MG tablet, Take 1 tablet by mouth daily, Disp: 30 tablet, Rfl: 3    ferrous sulfate (FE TABS) 325 (65 Fe) MG EC tablet, Take 1 tablet by mouth 2 times daily, Disp: 60 tablet, Rfl: 3    furosemide (LASIX) 40 MG tablet, Take 1 tablet by mouth daily, Disp: 30 tablet, Rfl: 3    vitamin D3 (CHOLECALCIFEROL) 25 MCG (1000 UT) TABS tablet, TAKE 1 TAB BY MOUTH ONCE A DAY, Disp: 30 tablet, Rfl: 3    blood glucose monitor strips, Test 2 times a day & as needed for symptoms of irregular blood glucose. Dispense sufficient amount for indicated testing frequency plus additional to accommodate PRN testing needs. , Disp: 100 strip, Rfl: 1    levothyroxine (SYNTHROID) 125 MCG tablet, TAKE 1 TAB BY MOUTH EVERY MORNING, Disp: 90 tablet, Rfl: 3    loratadine (CLARITIN) 10 MG tablet, TAKE 1 TAB BY MOUTH ONCE A DAY (Patient not taking: Reported on 3/29/2021), Disp: 30 tablet, Rfl: 3    sertraline (ZOLOFT) 100 MG tablet, TAKE 1 TAB BY MOUTH ONCE A DAY, Disp: 30 tablet, Rfl: 2    metoprolol tartrate (LOPRESSOR) 25 MG tablet, Take 1 tablet by mouth 2 times daily, Disp: 180 tablet, Rfl: 3    Continuous Blood Gluc  (FREESTYLE KERRY 14 DAY READER) CLAUDIO, 1 Device by Does not apply route 4 times daily (Patient not taking: Reported on 1/29/2021), Disp: 1 Device, Rfl: 0    Continuous Blood Gluc Sensor (FREESTYLE KERRY 2 SENSOR SYSTM) MISC, 4 Devices by Does not apply route every 7 days (Patient not taking: Reported on 1/29/2021), Disp: 4 each, Rfl: 0    clopidogrel (PLAVIX) 75 MG tablet, TAKE 1 TAB BY MOUTH ONCE A DAY, Disp: 90 tablet, Rfl: 1    SITagliptin (JANUVIA) 100 MG tablet, TAKE 1 TAB BY MOUTH ONCE A DAY, Disp: 90 tablet, Rfl: 3    Cyanocobalamin (VITAMIN B-12) 1000 MCG extended release tablet, TAKE 1 TAB BY MOUTH ONCE A DAY, Disp: 30 tablet, Rfl: 11    Lancets MISC, 1 each by Does not apply route 2 times daily, Disp: 100 each, Rfl: 11    diclofenac sodium (VOLTAREN) 1 % GEL, Apply 2 g topically 2 times daily (Patient not taking: Reported on 3/29/2021), Disp: 1 Tube, Rfl: 3    nystatin (MYCOSTATIN) 641159 UNIT/GM cream, Apply topically 2 times daily.  (Patient not taking: Reported on 3/29/2021), Disp: 1 Tube, Rfl: 1    docusate sodium (COLACE) 100 MG capsule, Take 1 capsule by mouth daily as needed for Constipation (Patient not taking: Reported on 1/29/2021), Disp: 30 capsule, Rfl: 0    aspirin 81 MG chewable tablet, Take 1 tablet by mouth daily, Disp: 30 tablet, Rfl: 3    ALLERGIES:   Allergies   Allergen Reactions    Strawberry Extract     Tape Camillo Mood Tape] Rash       FAMILY HISTORY:       Problem Relation Age of Onset    Cancer Mother     Cancer Father     Cancer Brother     Diabetes Brother          SOCIAL HISTORY:   Social History     Socioeconomic History    Marital status:      Spouse name: Not on file    Number of children: Not on file    Years of education: Not on file    Highest education level: Not on file   Occupational History    Not on file   Social Needs    Financial resource strain: Not hard at all   [facility] insecurity     Worry: Never true     Inability: Never true   [employer] needs     Medical: No     Non-medical: No   Tobacco Use    Smoking status: Former Smoker    Smokeless tobacco: Never Used   Substance and Sexual Activity    Alcohol use: No     Frequency: Never    Drug use: No    Sexual activity: Not Currently   Lifestyle    Physical activity     Days per week: 0 days     Minutes per session: 0 min    Stress: Not at all   Relationships    Social connections     Talks on phone: More than three times a week     Gets together: More than three times a week     Attends [employer]ism service: Never     Active member of club or organization: No     Attends meetings of clubs or organizations: Never     Relationship status:     Intimate partner violence     Fear of current or ex partner: Not on file     Emotionally abused: Not on file     Physically abused: Not on file     Forced sexual activity: Not on file   Other Topics Concern    Not on file Social History Narrative    Not on file         REVIEW OF SYSTEMS:         Review of Systems   Constitutional: Negative for appetite change and fatigue. HENT: Negative for trouble swallowing. Respiratory: Negative. Cardiovascular: Negative. Gastrointestinal: Negative. Endocrine: Negative. Genitourinary: Negative. Musculoskeletal: Positive for gait problem. Allergic/Immunologic: Negative. Neurological: Negative for headaches. Hematological: Bruises/bleeds easily. Psychiatric/Behavioral: Negative for sleep disturbance. PHYSICAL EXAMINATION: Vital signs reviewed per the nursing documentation. BP 98/63   Pulse 87   Ht 5' 2\" (1.575 m)   Wt 204 lb (92.5 kg)   BMI 37.31 kg/m²   Body mass index is 37.31 kg/m². Physical Exam  Constitutional:       Appearance: She is well-developed. Comments: Obesity  Wheelchair-bound  Anxious   HENT:      Head: Normocephalic and atraumatic. Eyes:      Conjunctiva/sclera: Conjunctivae normal.      Pupils: Pupils are equal, round, and reactive to light. Neck:      Musculoskeletal: Normal range of motion and neck supple. Cardiovascular:      Heart sounds: Normal heart sounds. Pulmonary:      Effort: Pulmonary effort is normal.      Breath sounds: Rales present. Abdominal:      General: Bowel sounds are normal.      Palpations: Abdomen is soft. Comments: Obese nontender belly bowel sounds positive   Musculoskeletal: Normal range of motion. Left lower leg: Edema present. Skin:     General: Skin is warm. Findings: Bruising and erythema present. Neurological:      Mental Status: She is alert and oriented to person, place, and time.    Psychiatric:         Behavior: Behavior normal.           LABORATORY DATA: Reviewed  Lab Results   Component Value Date    WBC 7.0 04/16/2021    HGB 11.3 (L) 04/16/2021    HCT 36.5 04/16/2021    MCV 91.5 04/16/2021     04/16/2021     04/16/2021    K 4.2 04/16/2021     not be apparent radiographically. If this remains a clinical concern a CT scan should be obtained. Assessment  1. Melena    2. Gastric polyps    3. Acute gastric ulcer with hemorrhage    4. Mild anemia    5. Morbid obesity ([address]        Plan    Patient overall seems to be doing okay at this time  She is asked to continue taking pantoprazole chemotherapy  Pt was discussed in detail about the possible side effects of proton pump inhibiter therapy. She was explained about the possibility of calcium and magnesium malabsorption and was advised to start taking calcium supplements with Vit D. Some over the counter regimens were explained to patient. Some dietary advices were also given. She has verbalized understanding and agreement to this. Continue taking iron    Check CBC in 3 months and follow-up    Pt was advised in detail about some life style and dietary modifications. She was advised about avoidance of caffeine, nicotine and chocolate. Pt was also told to stay away from any kind of fast foods, soda pops. She was also advised to avoid lots of spices, grease and fried food etc.     Instructions were also given about trying to arrange the timing, quality and quantity of food. Instructions were given about using ample amount of fiber including dietary and supplemental fiber either metamucil, bennafiber or citrucell etc.  Pt was advised about drinking ample amount of water without any colors or chemicals. Stress was given about regular exercise. Pt has verbalized understanding and agreement to these modifications. Call for any blood bleeding or melanotic stools or any issues with GI    Was discussed with the daughter    Their questions were answered  . ibs  The patient was instructed to start taking some OTC Probiotics products   These are available over the counter at the Pharmacy stores and Grocery stores  He was explained about the beneficial effects they have in the GI track  They will help to establish the good bacterial chiquis and will help with the digestion and bowel movements  The patient has verbalized understanding and agreement to this plan    More than half of patient's clinic visit time was spent in counseling about lifestyle and dietary modifications  Patient's  questions were answered in this regard as well  The patient has verbalized understanding and agreement     I communicated with the patient and/or health care decision maker about   Details of this discussion including any medical advice provided:YES      I affirm this is a Patient Initiated Episode with an Established Patient who has not had a related appointment within my department in the past 7 days or scheduled within the next 24 hours. Total Time: minutes: 21-30 minutes    Note: not billable if this call serves to triage the patient into an appointment for the relevant concern      Thank you for allowing me to participate in the care of Ms. Carrie Plascencia. For any further questions please do not hesitate to contact me. I have reviewed and agree with the ROS entered by the MA/ABIODUNN.          Lizeth Craig MD, [facility]  Board Certified in Gastroenterology and [address] [place] [address] Gastroenterology  Office #: [phone]

## 2021-04-20 ENCOUNTER — HOSPITAL ENCOUNTER (OUTPATIENT)
Age: 86
Setting detail: SPECIMEN
Discharge: HOME | End: 2021-04-20
Payer: MEDICARE

## 2021-04-20 ENCOUNTER — OFFICE VISIT (OUTPATIENT)
Dept: INTERNAL MEDICINE CLINIC | Age: 86
End: 2021-04-20
Payer: MEDICARE

## 2021-04-20 VITALS
WEIGHT: 204 LBS | OXYGEN SATURATION: 96 % | SYSTOLIC BLOOD PRESSURE: 122 MMHG | DIASTOLIC BLOOD PRESSURE: 70 MMHG | HEART RATE: 71 BPM | BODY MASS INDEX: 37.54 KG/M2 | HEIGHT: 62 IN | TEMPERATURE: 97.8 F

## 2021-04-20 DIAGNOSIS — K25.0 ACUTE GASTRIC ULCER WITH HEMORRHAGE: ICD-10-CM

## 2021-04-20 DIAGNOSIS — J18.9 SEPSIS DUE TO PNEUMONIA (HCC): ICD-10-CM

## 2021-04-20 DIAGNOSIS — I10 ESSENTIAL HYPERTENSION: Chronic | ICD-10-CM

## 2021-04-20 DIAGNOSIS — E11.9 TYPE 2 DIABETES MELLITUS WITHOUT COMPLICATION, WITHOUT LONG-TERM CURRENT USE OF INSULIN (HCC): ICD-10-CM

## 2021-04-20 DIAGNOSIS — I50.32 CHRONIC DIASTOLIC CONGESTIVE HEART FAILURE (HCC): ICD-10-CM

## 2021-04-20 DIAGNOSIS — I25.10 CORONARY ARTERY DISEASE DUE TO LIPID RICH PLAQUE: ICD-10-CM

## 2021-04-20 DIAGNOSIS — K92.2 UPPER GI BLEED: ICD-10-CM

## 2021-04-20 DIAGNOSIS — C73 THYROID CANCER (HCC): ICD-10-CM

## 2021-04-20 DIAGNOSIS — K92.1 MELENA: ICD-10-CM

## 2021-04-20 DIAGNOSIS — I48.92 ATRIAL FLUTTER, UNSPECIFIED TYPE (HCC): ICD-10-CM

## 2021-04-20 DIAGNOSIS — A41.9 SEPSIS DUE TO PNEUMONIA (HCC): ICD-10-CM

## 2021-04-20 DIAGNOSIS — Z09 HOSPITAL DISCHARGE FOLLOW-UP: Primary | ICD-10-CM

## 2021-04-20 DIAGNOSIS — Z00.00 HEALTH CARE MAINTENANCE: ICD-10-CM

## 2021-04-20 DIAGNOSIS — I25.83 CORONARY ARTERY DISEASE DUE TO LIPID RICH PLAQUE: ICD-10-CM

## 2021-04-20 DIAGNOSIS — I50.32 CHRONIC DIASTOLIC HEART FAILURE (HCC): ICD-10-CM

## 2021-04-20 LAB
ABSOLUTE EOS #: 0.37 K/UL (ref 0–0.44)
ABSOLUTE IMMATURE GRANULOCYTE: 0.07 K/UL (ref 0–0.3)
ABSOLUTE LYMPH #: 1.97 K/UL (ref 1.1–3.7)
ABSOLUTE MONO #: 0.56 K/UL (ref 0.1–1.2)
BASOPHILS # BLD: 1 % (ref 0–2)
BASOPHILS ABSOLUTE: 0.07 K/UL (ref 0–0.2)
DIFFERENTIAL TYPE: ABNORMAL
EOSINOPHILS RELATIVE PERCENT: 4 % (ref 1–4)
HCT VFR BLD CALC: 38.6 % (ref 36.3–47.1)
HEMOGLOBIN: 12 G/DL (ref 11.9–15.1)
IMMATURE GRANULOCYTES: 1 %
LYMPHOCYTES # BLD: 22 % (ref 24–43)
MCH RBC QN AUTO: 27.9 PG (ref 25.2–33.5)
MCHC RBC AUTO-ENTMCNC: 31.1 G/DL (ref 28.4–34.8)
MCV RBC AUTO: 89.8 FL (ref 82.6–102.9)
MONOCYTES # BLD: 6 % (ref 3–12)
NRBC AUTOMATED: 0 PER 100 WBC
PDW BLD-RTO: 15.4 % (ref 11.8–14.4)
PLATELET # BLD: 202 K/UL (ref 138–453)
PLATELET ESTIMATE: ABNORMAL
PMV BLD AUTO: 12.2 FL (ref 8.1–13.5)
RBC # BLD: 4.3 M/UL (ref 3.95–5.11)
RBC # BLD: ABNORMAL 10*6/UL
SEG NEUTROPHILS: 66 % (ref 36–65)
SEGMENTED NEUTROPHILS ABSOLUTE COUNT: 6.05 K/UL (ref 1.5–8.1)
WBC # BLD: 9.1 K/UL (ref 3.5–11.3)
WBC # BLD: ABNORMAL 10*3/UL

## 2021-04-20 PROCEDURE — 99495 TRANSJ CARE MGMT MOD F2F 14D: CPT | Performed by: INTERNAL MEDICINE

## 2021-04-20 PROCEDURE — 1111F DSCHRG MED/CURRENT MED MERGE: CPT | Performed by: INTERNAL MEDICINE

## 2021-04-20 RX ORDER — PANTOPRAZOLE SODIUM 20 MG/1
20 TABLET, DELAYED RELEASE ORAL DAILY
Qty: 1 TABLET | Refills: 0 | Status: ON HOLD
Start: 2021-04-20 | End: 2022-01-11 | Stop reason: SDUPTHER

## 2021-04-20 ASSESSMENT — PATIENT HEALTH QUESTIONNAIRE - PHQ9
SUM OF ALL RESPONSES TO PHQ9 QUESTIONS 1 & 2: 0
SUM OF ALL RESPONSES TO PHQ QUESTIONS 1-9: 0
1. LITTLE INTEREST OR PLEASURE IN DOING THINGS: 0
2. FEELING DOWN, DEPRESSED OR HOPELESS: 0

## 2021-04-20 NOTE — PROGRESS NOTES
Subjective:      Patient ID: Sher Osborn is a 82 y.o. female. She has not come for follow-up with me for the past few years. She has been using NSAIDs and was found to have gastric ulcer and GI bleeding and was admitted to the hospital.  This was diagnosed with EGD. She was asymptomatic and she denied any cardiorespiratory or GI complaints. She is being treated with PPIs she was advised not to use NSAIDs. To have longstanding essential hypertension coronary artery disease and diastolic heart failure and she is also known to have atrial flutter and type 2 diabetes mellitus. . Renal function during hospital stay was normal and she was taken off insulin and started on Metformin only.   It is to be noted that it has been difficult in the past to control his sugars without insulin but I will monitor her A1c      Review of Systems   Positive and Negative as described in HPI    Constitutional:  negative for  fevers, chills, sweats, fatigue, and weight loss  HEENT:  negative for vision or hearing changes,   Respiratory:  negative for shortness of breath, cough, or congestion  Cardiovascular:  negative for  chest pain, palpitations, edema  Gastrointestinal: GI bleeding with gastric ulcer  Genitourinary:  negative for frequency, dysuria  Integument/Breast:  negative for rash, skin lesions,   Musculoskeletal:  negative for muscle aches or joint pain, strength equal  Neurological:  negative for headaches, dizziness, lightheadedness, numbness, pain and tingling extremities  Behavior/Psych:  negative for depression and anxiety  Family History   Problem Relation Age of Onset    Cancer Mother     Cancer Father     Cancer Brother     Diabetes Brother      Social History     Socioeconomic History    Marital status:      Spouse name: None    Number of children: None    Years of education: None    Highest education level: None   Occupational History    None   Social Needs    Financial resource strain: Not hard at all    Food insecurity     Worry: Never true     Inability: Never true    Transportation needs     Medical: No     Non-medical: No   Tobacco Use    Smoking status: Former Smoker    Smokeless tobacco: Never Used   Substance and Sexual Activity    Alcohol use: No     Frequency: Never    Drug use: No    Sexual activity: Not Currently   Lifestyle    Physical activity     Days per week: 0 days     Minutes per session: 0 min    Stress: Not at all   Relationships    Social connections     Talks on phone: More than three times a week     Gets together: More than three times a week     Attends Pentecostal service: Never     Active member of club or organization: No     Attends meetings of clubs or organizations: Never     Relationship status:     Intimate partner violence     Fear of current or ex partner: None     Emotionally abused: None     Physically abused: None     Forced sexual activity: None   Other Topics Concern    None   Social History Narrative    None       Objective:   Physical Exam CONSTITUTIONAL: Alert and oriented   HEENT: Atraumatic, conjunctiva normal colored, no jaundice. NECK: No thyroid enlargement, no lymphadenopathy, no JVD elevation  CHEST: Normal shape and clear to auscultation bilaterally,   CVS: S1 S2 normal, no murmur, no gallop, no carotid bruit  ABDOMEN: Soft, non tender, no mass, no hepatosplenomegaly   NEUROLOGICAL: Alert, CN 2-12 intact, no motor deficits   EXTREMITIES: No pedal edema, no calf tenderness    SKIN: No obvious lesions or rash     Assessment / Plan:      Diagnosis Orders   1. Hospital discharge follow-up     2. Chronic diastolic heart failure (HCC) =  Asymptomatic      3. Sepsis due to pneumonia ([facility]     4. Thyroid cancer ([facility]     5. Health care maintenance      Due annual wellness,eye exam, and dtap vaccine   6. Essential hypertension     7. Coronary artery disease due to lipid rich plaque = no cardiac symptoms    8.  Chronic diastolic congestive heart failure ([facility]     9. Atrial flutter, unspecified type ([facility] = equal heart rate 72    10. Acute gastric ulcer with hemorrhage     11. Upper GI bleed = he is being treated with PPIs and was advised to stay off NSAIDs    12. Type 2 diabetes mellitus without complication, without long-term current use of insulin (HCC) = renal function was normal and she is on Metformin       Return in about 4 weeks (around 5/18/2021) for Follow Up, diabetes mellitus, hypertension, CAD. No orders of the defined types were placed in this encounter. Orders Placed This Encounter   Medications    pantoprazole (PROTONIX) 20 MG tablet     Sig: Take 1 tablet by mouth daily     Dispense:  1 tablet     Refill:  0      Visit Information    Have you changed or started any medications since your last visit including any over-the-counter medicines, vitamins, or herbal medicines? no   Are you having any side effects from any of your medications? -  no  Have you stopped taking any of your medications? Is so, why? -  no    Have you seen any other physician or provider since your last visit? Yes - Records Obtained  Have you had any other diagnostic tests since your last visit? Yes - Records Obtained  Have you been seen in the emergency room and/or had an admission to a hospital since we last saw you? Yes - Records Obtained  Have you had your routine dental cleaning in the past 6 months? no    Have you activated your [employer] account? If not, what are your barriers?  Yes     Patient Care Team:  Tremayne Crockett MD as PCP - General (Internal Medicine)  Tremayne Crockett MD as PCP - [facility]  Randy Manuel MD as Consulting Physician (Gastroenterology)  Jessica Vallecillo MD as Consulting Physician (Pulmonology)  Bernard Steele RN as Care Transitions Nurse    Medical History Review  Past Medical, Family, and Social History reviewed and does not contribute to the patient presenting condition    Health Maintenance   Topic Date Due   

## 2021-04-21 NOTE — PROGRESS NOTES
[address] [facility]     Department of Internal Medicine - Staff Internal Medicine Teaching Service    INPATIENT DISCHARGE SUMMARY      Patient Identification:  Nia Dela Cruz is a 82 y.o. female. :  1935  MRN: [id number]     Acct: [de-identified]   PCP: Mackenzie Conti MD  Admit Date:  2021  Discharge date and time: 2021 12:56 PM   Attending Provider: No att. providers found                                     [address] Problem Lists:  Principal Problem:    Upper GI bleed  Active Problems:    Chronic diastolic congestive heart failure ([facility] [address]    Type 2 diabetes mellitus without complication, without long-term current use of insulin (HCC)    Atrial flutter (HCC)    BRIA (acute kidney injury) ([facility] [address]    Superficial gastritis without hemorrhage  Resolved Problems:    * No resolved hospital problems. *      HOSPITAL STAY     Brief Inpatient course: Nia Dela Cruz is a pleasant 82 y. o. female presents with a chief complaint of diarrhea, lightheadedness, and melena. Patient endorses a 4 day history of of diarrhea with numerous episodes a day. Patient and family state diarrhea is dark/black in nature. No hematochezia or BRB. Associated lightheadedness and intermittent dizziness & poor oral intake. Patient denies fevers, chills, nausea, vomiting, chest pain, shortness of breath, abdominal pain, dysuria, or focal neurological deficits.   Patient was managed for the following problems:    Upper GI bleed s/p EGD:  -Secondary to severe gastritis   -S/p resection of sessile polyps leading to bleeding s/p hemoclip's applied  -Continue pantoprazole 40 mg twice daily     Acute on chronic anemia:  -Baseline hemoglobin is 11-12  -Due to bleeding from severe gastritis  -Hb has been stable  -Monitor CBC daily       Acute renal Failure  -Resolved       DM type II  -Patient is on Metformin 1 g twice daily and sitagliptin 100 mg daily       Hypothyroidism  -Continue Synthroid 125 mcg daily.   -Follow-up with PCP        Chronic Diastolic CHF   -Furosemide 40 mg daily     Atrial Flutter  -NSR at this time.        CAD  -Continue Plavix and aspirin  Procedures/ Significant Interventions:        Consults:     Consults:     Final Specialist Recommendations/Findings:   IP CONSULT TO INTERNAL MEDICINE  IP CONSULT TO SOCIAL WORK  IP CONSULT TO GI  IP CONSULT TO HOME CARE NEEDS      Any Hospital Acquired Infections: none    Discharge Functional Status:  stable    DISCHARGE PLAN     Disposition: home    Patient Instructions:   Discharge Medication List as of 4/16/2021 11:47 AM      START taking these medications    Details   pantoprazole (PROTONIX) 40 MG tablet Take 1 tablet by mouth 2 times daily, Disp-122 tablet, R-0Normal         CONTINUE these medications which have NOT CHANGED    Details   metFORMIN (GLUCOPHAGE) 500 MG tablet Take 2 tablets by mouth 2 times daily (with meals), Disp-270 tablet, R-3Normal      atorvastatin (LIPITOR) 80 MG tablet Take 1 tablet by mouth daily, Disp-30 tablet, R-3Normal      ferrous sulfate (FE TABS) 325 (65 Fe) MG EC tablet Take 1 tablet by mouth 2 times daily, Disp-60 tablet, R-3Normal      furosemide (LASIX) 40 MG tablet Take 1 tablet by mouth daily, Disp-30 tablet, R-3Normal      vitamin D3 (CHOLECALCIFEROL) 25 MCG (1000 UT) TABS tablet TAKE 1 TAB BY MOUTH ONCE A DAY, Disp-30 tablet, R-3Normal      blood glucose monitor strips Test 2 times a day & as needed for symptoms of irregular blood glucose. Dispense sufficient amount for indicated testing frequency plus additional to accommodate PRN testing needs. , Disp-100 strip, R-1, Normal      levothyroxine (SYNTHROID) 125 MCG tablet TAKE 1 TAB BY MOUTH EVERY MORNING, Disp-90 tablet, R-3Normal      loratadine (CLARITIN) 10 MG tablet TAKE 1 TAB BY MOUTH ONCE A DAY, Disp-30 tablet, R-3Normal      sertraline (ZOLOFT) 100 MG tablet TAKE 1 TAB BY MOUTH ONCE A DAY, Disp-30 tablet, R-2Normal      metoprolol tartrate (LOPRESSOR) 25 MG tablet Take 1 tablet by mouth 2 times daily, Disp-180 tablet, R-3Normal      Continuous Blood Gluc  (FREESTYLE KERRY 14 DAY READER) CLAUDIO 1 Device by Does not apply route 4 times daily, Disp-1 Device,R-0Normal      Continuous Blood Gluc Sensor (FREESTYLE KERRY 2 SENSOR SYSTM) MISC 4 Devices by Does not apply route every 7 days, Disp-4 each,R-0Normal      clopidogrel (PLAVIX) 75 MG tablet TAKE 1 TAB BY MOUTH ONCE A DAY, Disp-90 tablet, R-1Normal      SITagliptin (JANUVIA) 100 MG tablet TAKE 1 TAB BY MOUTH ONCE A DAY, Disp-90 tablet, R-3Normal      Cyanocobalamin (VITAMIN B-12) 1000 MCG extended release tablet TAKE 1 TAB BY MOUTH ONCE A DAY, Disp-30 tablet, R-11Normal      Lancets MISC 2 TIMES DAILY Starting Fri 7/5/2019, Disp-100 each, R-11, Normal      diclofenac sodium (VOLTAREN) 1 % GEL Apply 2 g topically 2 times daily, Topical, 2 TIMES DAILY Starting Tue 12/18/2018, Disp-1 Tube, R-3, Normal      nystatin (MYCOSTATIN) 505980 UNIT/GM cream Apply topically 2 times daily. , Disp-1 Tube, R-1, Normal      docusate sodium (COLACE) 100 MG capsule Take 1 capsule by mouth daily as needed for Constipation, Disp-30 capsule, R-0Normal      aspirin 81 MG chewable tablet Take 1 tablet by mouth daily, Disp-30 tablet, R-3             Activity: activity as tolerated    Diet: cardiac diet and diabetic diet    Follow-up:    Jennifer Muro MD  [address] [place] [address] [facility]  [phone]    Schedule an appointment as soon as possible for a visit  Follow up in office in 1-2 weeks    Socorro Kwok [address] [place] [address] [address] [place] [address]  [phone]    Schedule an appointment as soon as possible for a visit in 1 week  Hospital Discharge. Patient Instructions: Follow up with PCP in 1 week. Call for appointment. Follow up with Gi in 1 week. Call for appointment. Protonix 40 mg twice daily for 8 weeks. Return immediately for new or worsening concerns. Follow up labs:   Follow up imaging:     Note that over 30 minutes was spent in preparing discharge papers, discussing discharge with patient, medication review, etc.      Maximiliano Bejarano MD, MD  Internal Medicine Resident, PGY-1  [facility];  [place], [place]  4/21/2021, 1:58 PM

## 2021-04-21 NOTE — DISCHARGE SUMMARY
BY MOUTH ONCE A DAY, Disp-30 tablet, R-2Normal       metoprolol tartrate (LOPRESSOR) 25 MG tablet Take 1 tablet by mouth 2 times daily, Disp-180 tablet, R-3Normal       Continuous Blood Gluc  (FREESTYLE KERRY 14 DAY READER) CLAUDIO 1 Device by Does not apply route 4 times daily, Disp-1 Device,R-0Normal       Continuous Blood Gluc Sensor (FREESTYLE KERRY 2 SENSOR SYSTM) MISC 4 Devices by Does not apply route every 7 days, Disp-4 each,R-0Normal       clopidogrel (PLAVIX) 75 MG tablet TAKE 1 TAB BY MOUTH ONCE A DAY, Disp-90 tablet, R-1Normal       SITagliptin (JANUVIA) 100 MG tablet TAKE 1 TAB BY MOUTH ONCE A DAY, Disp-90 tablet, R-3Normal       Cyanocobalamin (VITAMIN B-12) 1000 MCG extended release tablet TAKE 1 TAB BY MOUTH ONCE A DAY, Disp-30 tablet, R-11Normal       Lancets MISC 2 TIMES DAILY Starting Fri 7/5/2019, Disp-100 each, R-11, Normal       diclofenac sodium (VOLTAREN) 1 % GEL Apply 2 g topically 2 times daily, Topical, 2 TIMES DAILY Starting Tue 12/18/2018, Disp-1 Tube, R-3, Normal       nystatin (MYCOSTATIN) 922912 UNIT/GM cream Apply topically 2 times daily. , Disp-1 Tube, R-1, Normal       docusate sodium (COLACE) 100 MG capsule Take 1 capsule by mouth daily as needed for Constipation, Disp-30 capsule, R-0Normal       aspirin 81 MG chewable tablet Take 1 tablet by mouth daily, Disp-30 tablet, R-3                 Activity: activity as tolerated     Diet: cardiac diet and diabetic diet     Follow-up:    Palak Smith MD  [address] [facility]  [phone]     Schedule an appointment as soon as possible for a visit  Follow up in office in 1-2 weeks     Kleber Mar [address] [place], [place] [phone]     Schedule an appointment as soon as possible for a visit in 1 week  Hospital Discharge.         Patient Instructions: Follow up with PCP in 1 week. Call for appointment. Follow up with Gi in 1 week. Call for appointment.   Protonix 40 mg twice daily for 8 weeks. Return immediately for new or worsening concerns. Follow up labs: Follow up imaging:      Note that over 30 minutes was spent in preparing discharge papers, discussing discharge with patient, medication review, etc.  Rk King MD, MD  Internal Medicine Resident, PGY-1  [facility], [address] [place] [address] Drive  4/21/2021, 1:58 PM

## 2021-04-23 ENCOUNTER — CARE COORDINATION (OUTPATIENT)
Dept: CASE MANAGEMENT | Age: 86
End: 2021-04-23

## 2021-04-23 NOTE — CARE COORDINATION
[facility] Transitions Follow Up Call    2021    Patient: Shanna Every  Patient : 1935   MRN: <[id number]>  Reason for Admission:   Discharge Date: 21 RARS: Readmission Risk Score: 27       Attempted to reach patient by phone regarding follow up; BPCI-A. HIPAA compliant message left on patient's voicemail; CTN contact information provided.      Mo Brown RN

## 2021-04-30 ENCOUNTER — CARE COORDINATION (OUTPATIENT)
Dept: CASE MANAGEMENT | Age: 86
End: 2021-04-30

## 2021-04-30 NOTE — CARE COORDINATION
[facility] Transitions Follow Up Call    2021    Patient: Maylin Herrera  Patient : 1935   MRN: <[id number]>  Reason for Admission:   Discharge Date: 21 RARS: Readmission Risk Score: 27       Attempted to reach patient by phone regarding follow up; BPCI-A. HIPAA compliant message left on patient's voicemail; CTN contact information provided.      Ric Slade RN

## 2021-05-07 ENCOUNTER — CARE COORDINATION (OUTPATIENT)
Dept: CASE MANAGEMENT | Age: 86
End: 2021-05-07

## 2021-05-07 NOTE — CARE COORDINATION
[facility] [address] Transitions Follow Up Call    2021    Patient: Chelle Del Valle  Patient : 1935   MRN: <[id number]>  Reason for Admission:   Discharge Date: 21 RARS: Readmission Risk Score: 27         Spoke with: Patient, Felicia Ocasio. Care Transitions Subsequent and Final Call    Subsequent and Final Calls  Do you have any ongoing symptoms?: Yes  -occasional dizziness  Do you have any questions related to your medications?: No  Do you currently have any active services?: Yes  Are you currently active with any services?: Home Health  -services continue  Do you have any needs or concerns that I can assist you with?: No  Identified Barriers: Impairment  Care Transitions Interventions: Discussed diet and nutritional needs; patient is interested in speaking with [address] Registered Dietician. -CTN will place referral.     Registered Dietician: Completed      Patient is pleasant in conversation.  -denies any bleeding, chest pain, palpitations, shortness of breath, or lightheaded   -monitors weight and reports minimal fluctuations   -denies any swelling   -reports fair appetite   -normal bladder/bowel elimination   -utilizes power chair; denies falls   -taking medications as prescribed   -BS range 101-137  -patient reports she is able to afford cost of medications   -able to utilize cell phone for emergencies    Emotional support provided; discussed will continue to follow.           Follow Up  Future Appointments   Date Time Provider Estrellita Segura   6/10/2021  3:15 PM Nabeel Gonzalez MD [address]   2021 11:30 AM Sammie Kamara MD [facility] Nadeem Torres RN

## 2021-05-10 ENCOUNTER — CARE COORDINATION (OUTPATIENT)
Dept: CARE COORDINATION | Age: 86
End: 2021-05-10

## 2021-05-10 NOTE — CARE COORDINATION
Contacted Jennine Gowers and left voicemail regarding Dietitian referral. Left call back number and will follow up as appropriate.          [address] [facility], [address] [place] [address]

## 2021-05-11 DIAGNOSIS — F32.A DEPRESSION, UNSPECIFIED DEPRESSION TYPE: ICD-10-CM

## 2021-05-11 RX ORDER — SERTRALINE HYDROCHLORIDE 100 MG/1
TABLET, FILM COATED ORAL
Qty: 30 TABLET | Refills: 2 | Status: SHIPPED | OUTPATIENT
Start: 2021-05-11 | End: 2021-06-17 | Stop reason: SDUPTHER

## 2021-05-12 ENCOUNTER — CARE COORDINATION (OUTPATIENT)
Dept: CASE MANAGEMENT | Age: 86
End: 2021-05-12

## 2021-05-12 NOTE — CARE COORDINATION
[facility] [address] Transitions Follow Up Call    2021    Patient: Constantin Bird  Patient : 1935   MRN: <[id number]>  Reason for Admission:   Discharge Date: 21 RARS: Readmission Risk Score: 27       Attempted to reach patient by phone regarding follow up; BPCI-A. HIPAA compliant message left on patient's voicemail; CTN contact information provided.        Mikhail Chou RN

## 2021-05-14 ENCOUNTER — CARE COORDINATION (OUTPATIENT)
Dept: CARE COORDINATION | Age: 86
End: 2021-05-14

## 2021-05-14 NOTE — CARE COORDINATION
Contacted Latoya President and left voicemail regarding Dietitian referral. Left call back number. RD outreached 5/10 and today 5/14- left VM both outreaches. RD will update RN, Lisa Avalos. RD will continue to follow/assist with patient return call.        [address] [facility], [address] [place] [address]

## 2021-05-18 ENCOUNTER — CARE COORDINATION (OUTPATIENT)
Dept: CASE MANAGEMENT | Age: 86
End: 2021-05-18

## 2021-05-18 NOTE — CARE COORDINATION
[facility] [address] Transitions Follow Up Call    2021    Patient: Nabeel Castillo  Patient : 1935   MRN: <[id number]>  Reason for Admission:   Discharge Date: 21 RARS: Readmission Risk Score: 27    Attempted to contact patient for BPCI-A follow up. Unable to reach patient. Left message with contact information and request for call back.       Follow Up  Future Appointments   Date Time Provider Estrellita Segura   6/10/2021  3:15 PM Hayden Archibald MD [address]   2021 11:30 AM [facility]

## 2021-05-25 ENCOUNTER — CARE COORDINATION (OUTPATIENT)
Dept: CASE MANAGEMENT | Age: 86
End: 2021-05-25

## 2021-05-25 NOTE — CARE COORDINATION
[facility] [address] Transitions Follow Up Call    2021    Patient: Latoya President  Patient : 1935   MRN: <[id number]>  Reason for Admission:   Discharge Date: 21 RARS: Readmission Risk Score: 27       Attempted to reach patient by phone regarding follow up; BPCI-A. HIPAA compliant message left on patient's voicemail; CTN contact information provided.      Lisa Avalos RN

## 2021-06-02 ENCOUNTER — CARE COORDINATION (OUTPATIENT)
Dept: CASE MANAGEMENT | Age: 86
End: 2021-06-02

## 2021-06-02 DIAGNOSIS — E11.8 TYPE 2 DIABETES MELLITUS WITH COMPLICATION, WITHOUT LONG-TERM CURRENT USE OF INSULIN (HCC): ICD-10-CM

## 2021-06-02 NOTE — CARE COORDINATION
[facility] [address] Transitions Follow Up Call    2021    Patient: Kevin Mena  Patient : 1935   MRN: <[id number]>  Reason for Admission:   Discharge Date: 21 RARS: Readmission Risk Score: 27       Attempted to reach patient by phone regarding follow up; BPCI-A. HIPAA compliant message left on patient's voicemail; CTN contact information provided.      Renuka Howell RN

## 2021-06-10 ENCOUNTER — OFFICE VISIT (OUTPATIENT)
Dept: INTERNAL MEDICINE CLINIC | Age: 86
End: 2021-06-10
Payer: MEDICARE

## 2021-06-10 VITALS
WEIGHT: 204 LBS | DIASTOLIC BLOOD PRESSURE: 84 MMHG | TEMPERATURE: 97.3 F | SYSTOLIC BLOOD PRESSURE: 128 MMHG | BODY MASS INDEX: 37.54 KG/M2 | HEIGHT: 62 IN

## 2021-06-10 DIAGNOSIS — E11.8 TYPE 2 DIABETES MELLITUS WITH COMPLICATION, WITHOUT LONG-TERM CURRENT USE OF INSULIN (HCC): Primary | ICD-10-CM

## 2021-06-10 DIAGNOSIS — M54.50 LUMBAR PAIN: ICD-10-CM

## 2021-06-10 DIAGNOSIS — Z00.00 ROUTINE GENERAL MEDICAL EXAMINATION AT A HEALTH CARE FACILITY: ICD-10-CM

## 2021-06-10 PROCEDURE — 1123F ACP DISCUSS/DSCN MKR DOCD: CPT | Performed by: INTERNAL MEDICINE

## 2021-06-10 PROCEDURE — 4040F PNEUMOC VAC/ADMIN/RCVD: CPT | Performed by: INTERNAL MEDICINE

## 2021-06-10 PROCEDURE — G0438 PPPS, INITIAL VISIT: HCPCS | Performed by: INTERNAL MEDICINE

## 2021-06-10 RX ORDER — TRAMADOL HYDROCHLORIDE 50 MG/1
50 TABLET ORAL EVERY 6 HOURS PRN
Qty: 20 TABLET | Refills: 0 | Status: SHIPPED | OUTPATIENT
Start: 2021-06-10 | End: 2021-06-15

## 2021-06-10 RX ORDER — FLASH GLUCOSE SCANNING READER
1 EACH MISCELLANEOUS DAILY
Qty: 1 EACH | Refills: 0 | Status: SHIPPED | OUTPATIENT
Start: 2021-06-10 | End: 2021-11-09 | Stop reason: SDUPTHER

## 2021-06-10 RX ORDER — FLASH GLUCOSE SENSOR
1 KIT MISCELLANEOUS
Qty: 2 EACH | Refills: 2 | Status: SHIPPED | OUTPATIENT
Start: 2021-06-10 | End: 2021-11-09 | Stop reason: SDUPTHER

## 2021-06-10 SDOH — ECONOMIC STABILITY: FOOD INSECURITY: WITHIN THE PAST 12 MONTHS, YOU WORRIED THAT YOUR FOOD WOULD RUN OUT BEFORE YOU GOT MONEY TO BUY MORE.: SOMETIMES TRUE

## 2021-06-10 SDOH — ECONOMIC STABILITY: FOOD INSECURITY: WITHIN THE PAST 12 MONTHS, THE FOOD YOU BOUGHT JUST DIDN'T LAST AND YOU DIDN'T HAVE MONEY TO GET MORE.: OFTEN TRUE

## 2021-06-10 ASSESSMENT — LIFESTYLE VARIABLES: HOW OFTEN DO YOU HAVE A DRINK CONTAINING ALCOHOL: 0

## 2021-06-10 ASSESSMENT — PATIENT HEALTH QUESTIONNAIRE - PHQ9
1. LITTLE INTEREST OR PLEASURE IN DOING THINGS: 0
SUM OF ALL RESPONSES TO PHQ QUESTIONS 1-9: 1
SUM OF ALL RESPONSES TO PHQ9 QUESTIONS 1 & 2: 1
2. FEELING DOWN, DEPRESSED OR HOPELESS: 1

## 2021-06-10 ASSESSMENT — SOCIAL DETERMINANTS OF HEALTH (SDOH): HOW HARD IS IT FOR YOU TO PAY FOR THE VERY BASICS LIKE FOOD, HOUSING, MEDICAL CARE, AND HEATING?: SOMEWHAT HARD

## 2021-06-10 NOTE — PROGRESS NOTES
GASTROINTESTINAL ENDOSCOPY  4/14/2021    EGD BIOPSY performed by Chelsea Dodson MD at [address] [place] [address] ENDOSCOPY  4/14/2021    EGD CONTROL HEMORRHAGE performed by Chelsea Dodson MD at hospitals Endoscopy       Family History   Problem Relation Age of Onset    Cancer Mother     Cancer Father     Cancer Brother     Diabetes Brother        CareTeam (Including outside providers/suppliers regularly involved in providing care):   Patient Care Team:  Osmar Reilly MD as PCP - General (Internal Medicine)  Osmar Reilly MD as PCP - [facility]  Maia Benavidez MD as Consulting Physician (Gastroenterology)  Rachel Olivo MD as Consulting Physician (Pulmonology)  Alyx Cross RN as Care Transitions Nurse    Wt Readings from Last 3 Encounters:   06/10/21 204 lb (92.5 kg)   04/20/21 204 lb (92.5 kg)   04/19/21 204 lb (92.5 kg)     Vitals:    06/10/21 1514   BP: 128/84   Temp: 97.3 °F (36.3 °C)   Weight: 204 lb (92.5 kg)   Height: 5' 2\" (1.575 m)     Body mass index is 37.31 kg/m². Based upon direct observation of the patient, evaluation of cognition reveals recent and remote memory intact. patient is here for evaluation of multiple medical problems.  She has hypertension, diabetes, hypothyroidism, depression, CHF.  Patient is along with her daughter, Compliant with Her Diet and Medication, Most of blood Sugar is less than 200     General Appearance: alert and oriented to person, place and time, well developed and well- nourished, in no acute distress, central obesity , Wheel Chair Bound   Skin: warm and dry, no rash or erythema  Head: normocephalic and atraumatic  Eyes: pupils equal, round, and reactive to light, extraocular eye movements intact, conjunctivae normal  ENT: tympanic membrane, external ear and ear canal normal bilaterally, nose without deformity, nasal mucosa and turbinates normal without polyps  Neck: supple and non-tender without mass, no thyromegaly or thyroid Health Maintenance   Topic Date Due    COVID-19 Vaccine (1) Never done    DTaP/Tdap/Td vaccine (1 - Tdap) Never done    Shingles Vaccine (1 of 2) Never done    Diabetic retinal exam  09/02/2016    Annual Wellness Visit (AWV)  Never done    Flu vaccine (Season Ended) 09/01/2021    A1C test (Diabetic or Prediabetic)  09/29/2021    TSH testing  04/03/2022    Potassium monitoring  04/16/2022    Creatinine monitoring  04/16/2022    Pneumococcal 65+ years Vaccine  Completed    Hepatitis A vaccine  Aged Out    Hib vaccine  Aged Out    Meningococcal (ACWY) vaccine  Aged Out     Recommendations for Kaybus Due: see orders and patient instructions/AVS.  . Recommended screening schedule for the next 5-10 years is provided to the patient in written form: see Patient Instructions/AVS.    There are no diagnoses linked to this encounter.

## 2021-06-10 NOTE — PATIENT INSTRUCTIONS
Patient Education        10 Ways to Stay Safe at the Hospital   Prevent infections. Wash your hands often. Ask everyone who comes into your room to wash their hands too. Keep a bottle of hand  near your bed. Ask your doctor if you are up-to-date on your vaccines, especially flu and pneumococcal vaccines. And learn about the symptoms of sepsis. Know your medicines. Make sure that the hospital staff knows about all medicines and natural health products you take. Keep a detailed list with you. And follow your doctor's instructions for any medicines you take. Help avoid medicine errors. Every time someone gives you a medicine, make sure that the person first checks your hospital ID bracelet. Be sure that he or she has checked that you are getting the correct medicine and the right dose. Ask about side effects to watch for. Use caution so you don't fall. Call a nurse before you get out of bed for the first time. Get up slowly, and wear socks, slippers, or shoes that won't slip. Prevent blood clots. Get out of bed as soon as you safely can. If you can't get out of bed, exercise your lower leg muscles by stretching your calves. You can point and flex your toes or draw circles with your feet. Ask about special compression stockings to help blood flow in your legs and feet. Prevent bedsores. Change your position in bed often, or shift your weight. When it's safe to do so, move around as much as you can. Check your skin every day, and try to stay clean. Speak up if you're in pain. Let someone know if you feel more pain or if you're having side effects from your pain medicine. Do not smoke or use other tobacco products while you are in the hospital.  It can be a good time to think about quitting for good. If you need help quitting, talk to your doctor about stop-smoking programs and medicines. Be involved in your care.   Ask about treatments, medicines, and ways to stay safe and allergies or reactions you have to medicines. If you take blood-thinner medicines, make sure that your care team knows this. · Every time someone gives you a medicine, make sure that the person checks your hospital ID bracelet first. Be sure that he or she has checked that you are getting the correct medicine and the right dose. · Ask all health care workers and visitors who have direct contact with you to wash their hands. This helps prevent the spread of infections in the hospital.  Before surgery  · Make sure that you, your doctor, and your surgeon agree on what will be done. If you don't understand what will happen, ask questions at any time. · Be sure that the surgeon knows the exact spot of the surgery. Most hospitals require surgeons to write their initials on the surgery site. If you are having surgery on your left knee, for example, the surgeon should write on your left knee. · Be sure that someone has asked your name and date of birth before your surgery. Going home  · Make sure that you take home a list of medicines. It should include directions for how to take the medicines. Make sure that it is safe to take any new medicines on your list with any regular medicines you take. · Ask if any of the medicines will make you dizzy. These medicines could increase your risk of falls. You will need to be more careful. · Be sure that you have directions for all of your care at home. You will get other instructions for that. Where can you learn more? Go to [email or web address] and sign in to your [employer] account. Enter H800 in the Agolo box to learn more about [facility] Stay: Care Instructions. \"     If you do not have an account, please click on the \"Sign Up Now\" link. Current as of: May 27, 2020               Content Version: 12.8  © 2006-2021 Healthwise, Incorporated. Care instructions adapted under license by [facility] ([facility]).  If you have questions about a medical condition or this instruction, always ask your healthcare professional. [place] [address] any warranty or liability for your use of this information. Personalized Preventive Plan for Yasmine Ibrahim - 6/10/2021  Medicare offers a range of preventive health benefits. Some of the tests and screenings are paid in full while other may be subject to a deductible, co-insurance, and/or copay. Some of these benefits include a comprehensive review of your medical history including lifestyle, illnesses that may run in your family, and various assessments and screenings as appropriate. After reviewing your medical record and screening and assessments performed today your provider may have ordered immunizations, labs, imaging, and/or referrals for you. A list of these orders (if applicable) as well as your Preventive Care list are included within your After Visit Summary for your review. Other Preventive Recommendations:    · A preventive eye exam performed by an eye specialist is recommended every 1-2 years to screen for glaucoma; cataracts, macular degeneration, and other eye disorders. · A preventive dental visit is recommended every 6 months. · Try to get at least 150 minutes of exercise per week or 10,000 steps per day on a pedometer . · Order or download the FREE \"Exercise & Physical Activity: Your Everyday Guide\" from [employer]. Call 4-864.319.1719 or search [employer] online. · You need 2186-1131 mg of calcium and 4813-9942 IU of vitamin D per day. It is possible to meet your calcium requirement with diet alone, but a vitamin D supplement is usually necessary to meet this goal.  · When exposed to the sun, use a sunscreen that protects against both UVA and UVB radiation with an SPF of 30 or greater. Reapply every 2 to 3 hours or after sweating, drying off with a towel, or swimming. · Always wear a seat belt when traveling in a car. Always wear a helmet when riding a bicycle or motorcycle.

## 2021-06-10 NOTE — PROGRESS NOTES
Subjective:      Patient ID: Estefanía Marinelli is a 82 y.o. female. HPI    Review of Systems    Objective:   Physical Exam    Assessment / Plan:           Visit Information    Have you changed or started any medications since your last visit including any over-the-counter medicines, vitamins, or herbal medicines? no   Are you having any side effects from any of your medications? -  no  Have you stopped taking any of your medications? Is so, why? -  no    Have you seen any other physician or provider since your last visit? No  Have you had any other diagnostic tests since your last visit? Yes - Records Requested  Have you been seen in the emergency room and/or had an admission to a hospital since we last saw you? No  Have you had your routine dental cleaning in the past 6 months? no    Have you activated your [employer] account? If not, what are your barriers?  Yes     Patient Care Team:  Pilo Champion MD as PCP - General (Internal Medicine)  Pilo Champion MD as PCP - [facility]  Kamari England MD as Consulting Physician (Gastroenterology)  Madison Bradford MD as Consulting Physician (Pulmonology)  Darcie Zeng RN as Care Transitions Nurse    Medical History Review  Past Medical, Family, and Social History reviewed and does not contribute to the patient presenting condition    Health Maintenance   Topic Date Due    COVID-19 Vaccine (1) Never done    DTaP/Tdap/Td vaccine (1 - Tdap) Never done    Shingles Vaccine (1 of 2) Never done    Diabetic retinal exam  09/02/2016    Annual Wellness Visit (AWV)  Never done    Flu vaccine (Season Ended) 09/01/2021    A1C test (Diabetic or Prediabetic)  09/29/2021    TSH testing  04/03/2022    Potassium monitoring  04/16/2022    Creatinine monitoring  04/16/2022    Pneumococcal 65+ years Vaccine  Completed    Hepatitis A vaccine  Aged Out    Hib vaccine  Aged Out    Meningococcal (ACWY) vaccine  Aged Out

## 2021-06-16 ENCOUNTER — CARE COORDINATION (OUTPATIENT)
Dept: CASE MANAGEMENT | Age: 86
End: 2021-06-16

## 2021-06-16 NOTE — CARE COORDINATION
Attempted to reach pt for BPCI-A follow up call. Left message requesting call back.     Sharyle Familia RN  Care Transition Coordinator  [phone]

## 2021-06-17 DIAGNOSIS — F32.A DEPRESSION, UNSPECIFIED DEPRESSION TYPE: ICD-10-CM

## 2021-06-17 RX ORDER — SERTRALINE HYDROCHLORIDE 100 MG/1
TABLET, FILM COATED ORAL
Qty: 90 TABLET | Refills: 3 | Status: SHIPPED | OUTPATIENT
Start: 2021-06-17 | End: 2022-04-12 | Stop reason: SDUPTHER

## 2021-06-17 RX ORDER — PANTOPRAZOLE SODIUM 40 MG/1
40 TABLET, DELAYED RELEASE ORAL 2 TIMES DAILY
Qty: 180 TABLET | Refills: 3 | Status: ON HOLD | OUTPATIENT
Start: 2021-06-17 | End: 2022-01-11 | Stop reason: HOSPADM

## 2021-06-17 NOTE — TELEPHONE ENCOUNTER
Medication: Protonix,   Last visit: 06/10/21  Next visit: 9/10/2021  Last refill:  4/16/21  Pharmacy: [address]

## 2021-06-23 ENCOUNTER — CARE COORDINATION (OUTPATIENT)
Dept: CASE MANAGEMENT | Age: 86
End: 2021-06-23

## 2021-06-23 NOTE — CARE COORDINATION
[facility] Transitions Follow Up Call    2021    Patient: Brittney Fajardo  Patient : 1935   MRN: <[id number]>  Reason for Admission:   Discharge Date: 21 RARS: Readmission Risk Score: 27    Attempted to contact patient for BPCI-A follow up. Unable to reach patient. Left message with contact information and request for call back.       Follow Up  Future Appointments   Date Time Provider Estrellita Segura   2021 11:30 AM Enio Carcamo MD [facility]   9/10/2021  1:30 PM Raghav Montelongo MD [facility]       Libia Astorga, RN

## 2021-06-30 ENCOUNTER — HOSPITAL ENCOUNTER (OUTPATIENT)
Dept: GENERAL RADIOLOGY | Age: 86
Discharge: HOME | End: 2021-07-02
Payer: MEDICARE

## 2021-06-30 ENCOUNTER — HOSPITAL ENCOUNTER (OUTPATIENT)
Age: 86
Discharge: HOME | End: 2021-07-02
Payer: MEDICARE

## 2021-06-30 DIAGNOSIS — M54.50 LUMBAR PAIN: ICD-10-CM

## 2021-06-30 PROCEDURE — 72100 X-RAY EXAM L-S SPINE 2/3 VWS: CPT

## 2021-07-01 ENCOUNTER — CARE COORDINATION (OUTPATIENT)
Dept: CASE MANAGEMENT | Age: 86
End: 2021-07-01

## 2021-07-01 NOTE — CARE COORDINATION
[facility] Transitions Follow Up Call    2021    Patient: Chivo Palomino  Patient : 1935   MRN: <[id number]>  Reason for Admission:   Discharge Date: 21 RARS: Readmission Risk Score: 27    Attempted to contact patient for BPCI-A follow up. Unable to reach patient. Call was picked up. When CTN said hello, no response. CTN said hello a second time but no response. Will try again at a later time. Contacted [facility] to verify whether or not patient is still receiving home health services. Spoke with [facility] [place]. She stated patient was discharged on 21.       Follow Up  Future Appointments   Date Time Provider Estrellita Segura   2021 11:30 AM Maria Fernanda Cade MD [facility]   9/10/2021  1:30 PM Chetna Myers MD [facility]       John Ferrer RN

## 2021-07-08 RX ORDER — LORATADINE 10 MG/1
TABLET ORAL
Qty: 90 TABLET | Refills: 1 | Status: SHIPPED | OUTPATIENT
Start: 2021-07-08 | End: 2022-02-08 | Stop reason: SDUPTHER

## 2021-07-13 DIAGNOSIS — I10 ESSENTIAL HYPERTENSION: ICD-10-CM

## 2021-07-15 ENCOUNTER — CARE COORDINATION (OUTPATIENT)
Dept: CASE MANAGEMENT | Age: 86
End: 2021-07-15

## 2021-07-15 NOTE — CARE COORDINATION
[facility] Transitions Follow Up Call    7/15/2021    Patient: Manjeet Delgado  Patient : 1935   MRN: <[id number]>  Reason for Admission:   Discharge Date: 21 RARS: Readmission Risk Score: 27    Attempted to contact patient for final BPCI-A follow up. Unable to reach patient. Left message with contact information and request for call back. BPCI-A bundle ending. CTN signing off.       Follow Up  Future Appointments   Date Time Provider Estrellita Segura   2021 11:30 AM Joni Carpio MD [facility]   9/10/2021  1:30 PM Darcie Yi MD [facility]       Boone Ralph RN

## 2021-07-23 ENCOUNTER — TELEPHONE (OUTPATIENT)
Dept: GASTROENTEROLOGY | Age: 86
End: 2021-07-23

## 2021-07-29 RX ORDER — CLOPIDOGREL BISULFATE 75 MG/1
TABLET ORAL
Qty: 90 TABLET | Refills: 3 | Status: SHIPPED | OUTPATIENT
Start: 2021-07-29 | End: 2022-08-11

## 2021-07-29 NOTE — TELEPHONE ENCOUNTER
Medication: Plavix  Last visit: 6/10/21  Next visit: 9/10/2021  Last refill: 6/19/20  Pharmacy: [address]

## 2021-08-10 DIAGNOSIS — I25.83 CORONARY ARTERY DISEASE DUE TO LIPID RICH PLAQUE: ICD-10-CM

## 2021-08-10 DIAGNOSIS — M79.89 LEFT LEG SWELLING: ICD-10-CM

## 2021-08-10 DIAGNOSIS — I25.10 CORONARY ARTERY DISEASE DUE TO LIPID RICH PLAQUE: ICD-10-CM

## 2021-08-10 DIAGNOSIS — E55.9 VITAMIN D DEFICIENCY: ICD-10-CM

## 2021-08-10 DIAGNOSIS — D50.9 IRON DEFICIENCY ANEMIA, UNSPECIFIED IRON DEFICIENCY ANEMIA TYPE: ICD-10-CM

## 2021-08-10 RX ORDER — FUROSEMIDE 40 MG/1
40 TABLET ORAL DAILY
Qty: 30 TABLET | Refills: 4 | Status: ON HOLD | OUTPATIENT
Start: 2021-08-10 | End: 2022-01-11 | Stop reason: SDUPTHER

## 2021-08-10 RX ORDER — MELATONIN
Qty: 30 TABLET | Refills: 4 | Status: SHIPPED | OUTPATIENT
Start: 2021-08-10 | End: 2022-02-08 | Stop reason: SDUPTHER

## 2021-08-10 RX ORDER — FERROUS SULFATE 325(65) MG
TABLET ORAL
Qty: 60 TABLET | Refills: 3 | Status: SHIPPED | OUTPATIENT
Start: 2021-08-10 | End: 2021-12-17

## 2021-08-10 RX ORDER — ATORVASTATIN CALCIUM 80 MG/1
80 TABLET, FILM COATED ORAL DAILY
Qty: 30 TABLET | Refills: 4 | Status: SHIPPED | OUTPATIENT
Start: 2021-08-10 | End: 2022-02-08 | Stop reason: SDUPTHER

## 2021-09-10 ENCOUNTER — TELEPHONE (OUTPATIENT)
Dept: INTERNAL MEDICINE CLINIC | Age: 86
End: 2021-09-10

## 2021-10-05 ENCOUNTER — OFFICE VISIT (OUTPATIENT)
Dept: GASTROENTEROLOGY | Age: 86
End: 2021-10-05
Payer: MEDICARE

## 2021-10-05 VITALS
TEMPERATURE: 98.7 F | HEIGHT: 62 IN | SYSTOLIC BLOOD PRESSURE: 105 MMHG | DIASTOLIC BLOOD PRESSURE: 64 MMHG | WEIGHT: 200.2 LBS | OXYGEN SATURATION: 94 % | BODY MASS INDEX: 36.84 KG/M2 | HEART RATE: 70 BPM

## 2021-10-05 DIAGNOSIS — Z92.29 HX OF LONG TERM USE OF BLOOD THINNERS: ICD-10-CM

## 2021-10-05 DIAGNOSIS — K31.7 GASTRIC POLYPS: ICD-10-CM

## 2021-10-05 DIAGNOSIS — Z83.79: ICD-10-CM

## 2021-10-05 DIAGNOSIS — D50.8 OTHER IRON DEFICIENCY ANEMIA: Primary | ICD-10-CM

## 2021-10-05 PROBLEM — Z79.01 HX OF LONG TERM USE OF BLOOD THINNERS: Status: ACTIVE | Noted: 2021-10-05

## 2021-10-05 PROBLEM — D64.9 ABSOLUTE ANEMIA: Status: ACTIVE | Noted: 2021-10-05

## 2021-10-05 PROCEDURE — G8427 DOCREV CUR MEDS BY ELIG CLIN: HCPCS | Performed by: INTERNAL MEDICINE

## 2021-10-05 PROCEDURE — G8417 CALC BMI ABV UP PARAM F/U: HCPCS | Performed by: INTERNAL MEDICINE

## 2021-10-05 PROCEDURE — 1090F PRES/ABSN URINE INCON ASSESS: CPT | Performed by: INTERNAL MEDICINE

## 2021-10-05 PROCEDURE — G8484 FLU IMMUNIZE NO ADMIN: HCPCS | Performed by: INTERNAL MEDICINE

## 2021-10-05 PROCEDURE — 4040F PNEUMOC VAC/ADMIN/RCVD: CPT | Performed by: INTERNAL MEDICINE

## 2021-10-05 PROCEDURE — 1123F ACP DISCUSS/DSCN MKR DOCD: CPT | Performed by: INTERNAL MEDICINE

## 2021-10-05 PROCEDURE — G8400 PT W/DXA NO RESULTS DOC: HCPCS | Performed by: INTERNAL MEDICINE

## 2021-10-05 PROCEDURE — 1036F TOBACCO NON-USER: CPT | Performed by: INTERNAL MEDICINE

## 2021-10-05 PROCEDURE — 99214 OFFICE O/P EST MOD 30 MIN: CPT | Performed by: INTERNAL MEDICINE

## 2021-10-05 ASSESSMENT — ENCOUNTER SYMPTOMS
ALLERGIC/IMMUNOLOGIC NEGATIVE: 1
TROUBLE SWALLOWING: 0
RESPIRATORY NEGATIVE: 1
GASTROINTESTINAL NEGATIVE: 1

## 2021-10-05 NOTE — PROGRESS NOTES
GI OFFICE FOLLOW UP    Loreto John is a 82 y.o. female evaluated via on 10/5/2021. Consent:  She and/or health care decision maker is aware that that she may receive a bill for this telephone service, depending on her insurance coverage, and has provided verbal consent to proceed: YES      INTERVAL HISTORY:   No referring provider defined for this encounter. Chief Complaint   Patient presents with    Follow-up     Patient is here today to f/u on lab       1. Other iron deficiency anemia    2. Gastric polyps    3. Family history of GI bleeding    4. Hx of long term use of blood thinners      This patient is evaluated in my office as a follow-up accompanied by her daughter she is wheelchair-bound has history significant multiple chronic medical issues and elderly at 82years old she has been on chronic blood thinners patient has been hospitalized in [place] in the past apparently bleeding was seen from extensive polyps in the stomach        She was treated for that she has been on iron pills her hemoglobin in April was okay      No recent hemoglobin levels available  Patient denies any significant bleeding black stools nausea vomiting hematemesis significant abdominal pain or any other significant GI issues at this time      HISTORY OF PRESENT ILLNESS: Niall Woods is a 82 y.o. female with a past history remarkable for , referred for evaluation of   Chief Complaint   Patient presents with    Follow-up     Patient is here today to f/u on lab   . Past Medical,Family, and Social History reviewed and does contribute to the patient presenting condition. Patient's PMH/PSH,SH,PSYCH Hx, MEDs, ALLERGIES, and ROS were all reviewed and updated in the appropriate sections.     PAST MEDICAL HISTORY:  Past Medical History:   Diagnosis Date    Arthritis     Atrial flutter ([address]     CAD (coronary artery disease)     CHF (congestive heart failure) (HCC)     Diabetes [facility]     Diabetes mellitus [facility]     Hyperlipidemia     Hypertension     Psychiatric problem     Thy[facility]     S/P thyroidectomy; on synthroid    Thyroid disease     hypothyroid       Past Surgical History:   Procedure Laterality Date    APPENDECTOMY      BACK SURGERY      CHOLECYSTECTOMY      CORONARY ANGIOPLASTY WITH STENT PLACEMENT      HYSTERECTOMY      THYROIDECTOMY      UPPER GASTROINTESTINAL ENDOSCOPY  4/14/2021    EGD BIOPSY performed by Yvette Perez MD at [address] [facility] ENDOSCOPY  4/14/2021    EGD CONTROL HEMORRHAGE performed by Yvette Perez MD at [address] [facility] [address]:    Current Outpatient Medications:     FEROSUL 325 (65 Fe) MG tablet, TAKE 1 TABLET BY MOUTH 2 TIMES DAILY , Disp: 60 tablet, Rfl: 3    furosemide (LASIX) 40 MG tablet, TAKE 1 TABLET BY MOUTH DAILY , Disp: 30 tablet, Rfl: 4    atorvastatin (LIPITOR) 80 MG tablet, TAKE 1 TABLET BY MOUTH DAILY , Disp: 30 tablet, Rfl: 4    vitamin D3 (CHOLECALCIFEROL) 25 MCG (1000 UT) TABS tablet, TAKE 1 TAB BY MOUTH ONCE A DAY , Disp: 30 tablet, Rfl: 4    clopidogrel (PLAVIX) 75 MG tablet, TAKE 1 TAB BY MOUTH ONCE A DAY, Disp: 90 tablet, Rfl: 3    metFORMIN (GLUCOPHAGE) 500 MG tablet, Take 2 tablets by mouth 2 times daily (with meals), Disp: 360 tablet, Rfl: 3    loratadine (CLARITIN) 10 MG tablet, TAKE 1 TABLET BY MOUTH TWICE A DAY AS NEEDED, Disp: 90 tablet, Rfl: 1    pantoprazole (PROTONIX) 40 MG tablet, Take 1 tablet by mouth 2 times daily, Disp: 180 tablet, Rfl: 3    sertraline (ZOLOFT) 100 MG tablet, Take 1 tablet by Mouth Once a Day, Disp: 90 tablet, Rfl: 3    Continuous Blood Gluc  (FREESTYLE KERRY 2 READER) CLAUDIO, 1 Device by Does not apply route daily, Disp: 1 each, Rfl: 0    Continuous Blood Gluc Sensor (FREESTYLE KERRY 2 SENSOR) MISC, 1 Device by Does not apply route every 14 days, Disp: 2 each, Rfl: 2    SITagliptin (JANUVIA) 100 MG tablet, TAKE 1 TAB BY MOUTH ONCE A DAY, Disp: 90 tablet, Rfl: 3    pantoprazole (PROTONIX) 20 MG tablet, Take 1 tablet by mouth daily, Disp: 1 tablet, Rfl: 0    blood glucose monitor strips, Test 2 times a day & as needed for symptoms of irregular blood glucose. Dispense sufficient amount for indicated testing frequency plus additional to accommodate PRN testing needs. , Disp: 100 strip, Rfl: 1    levothyroxine (SYNTHROID) 125 MCG tablet, TAKE 1 TAB BY MOUTH EVERY MORNING, Disp: 90 tablet, Rfl: 3    metoprolol tartrate (LOPRESSOR) 25 MG tablet, Take 1 tablet by mouth 2 times daily, Disp: 180 tablet, Rfl: 3    Cyanocobalamin (VITAMIN B-12) 1000 MCG extended release tablet, TAKE 1 TAB BY MOUTH ONCE A DAY, Disp: 30 tablet, Rfl: 11    Lancets MISC, 1 each by Does not apply route 2 times daily, Disp: 100 each, Rfl: 11    aspirin 81 MG chewable tablet, Take 1 tablet by mouth daily, Disp: 30 tablet, Rfl: 3    Continuous Blood Gluc  (FREESTYLE KERRY 14 DAY READER) CLAUDIO, 1 Device by Does not apply route 4 times daily (Patient not taking: Reported on 1/29/2021), Disp: 1 Device, Rfl: 0    Continuous Blood Gluc Sensor (FREESTYLE KERRY 2 SENSOR SYSTM) MISC, 4 Devices by Does not apply route every 7 days (Patient not taking: Reported on 1/29/2021), Disp: 4 each, Rfl: 0    diclofenac sodium (VOLTAREN) 1 % GEL, Apply 2 g topically 2 times daily (Patient not taking: Reported on 3/29/2021), Disp: 1 Tube, Rfl: 3    nystatin (MYCOSTATIN) 552431 UNIT/GM cream, Apply topically 2 times daily.  (Patient not taking: Reported on 3/29/2021), Disp: 1 Tube, Rfl: 1    docusate sodium (COLACE) 100 MG capsule, Take 1 capsule by mouth daily as needed for Constipation (Patient not taking: Reported on 1/29/2021), Disp: 30 capsule, Rfl: 0    ALLERGIES:   Allergies   Allergen Reactions    Strawberry Extract     Tape [place] Tape] Rash       FAMILY HISTORY: Problem Relation Age of Onset    Cancer Mother     Cancer Father     Cancer Brother     Diabetes Brother          SOCIAL HISTORY:   Social History     Socioeconomic History    Marital status:      Spouse name: Not on file    Number of children: Not on file    Years of education: Not on file    Highest education level: Not on file   Occupational History    Not on file   Tobacco Use    Smoking status: Former Smoker    Smokeless tobacco: Never Used   Vaping Use    Vaping Use: Never used   Substance and Sexual Activity    Alcohol use: No    Drug use: No    Sexual activity: Not Currently   Other Topics Concern    Not on file   Social History Narrative    Not on file     Social Determinants of Health     Financial Resource Strain: Medium Risk    Difficulty of Paying Living Expenses: Somewhat hard   Food Insecurity: Food Insecurity Present    Worried About Running Out of Food in the Last Year: Sometimes true    Portia of Food in the Last Year: Often true   Transportation Needs:     Lack of Transportation (Medical):  Lack of Transportation (Non-Medical):    Physical Activity:     Days of Exercise per Week:     Minutes of Exercise per Session:    Stress:     Feeling of Stress :    Social Connections:     Frequency of Communication with Friends and Family:     Frequency of Social Gatherings with Friends and Family:     Attends Bahai Services:     Active Member of Clubs or Organizations:     Attends Club or Organization Meetings:     Marital Status:    Intimate Partner Violence:     Fear of Current or Ex-Partner:     Emotionally Abused:     Physically Abused:     Sexually Abused:          REVIEW OF SYSTEMS:         Review of Systems   Constitutional: Negative for appetite change and fatigue. HENT: Negative for trouble swallowing. Respiratory: Negative. Cardiovascular: Negative. Gastrointestinal: Negative. Endocrine: Negative. Genitourinary: Negative. 04/12/2021         Lab Results   Component Value Date    RBC 4.30 04/20/2021    HGB 12.0 04/20/2021    MCV 89.8 04/20/2021    MCH 27.9 04/20/2021    MCHC 31.1 04/20/2021    RDW 15.4 (H) 04/20/2021    MPV 12.2 04/20/2021    BASOPCT 1 04/20/2021    LYMPHSABS 1.97 04/20/2021    MONOSABS 0.56 04/20/2021    NEUTROABS 6.05 04/20/2021    EOSABS 0.37 04/20/2021    BASOSABS 0.07 04/20/2021         DIAGNOSTIC TESTING:     No results found. Assessment  1. Other iron deficiency anemia    2. Gastric polyps    3. Family history of GI bleeding    4. Hx of long term use of blood thinners        Plan    Continue taking iron pills    Check hemoglobin  Call for any bleeding black stools      See her back in 6 months      Continue taking PPI at this time    Pt was discussed in detail about the possible side effects of proton pump inhibiter therapy. She was explained about the possibility of calcium and magnesium malabsorption and was advised to start taking calcium supplements with Vit D. Some over the counter regimens were explained to patient. Some dietary advices were also given. She has verbalized understanding and agreement to this. Pt was advised in detail about some life style and dietary modifications. She was advised about avoidance of caffeine, nicotine and chocolate. Pt was also told to stay away from any kind of fast foods, soda pops. She was also advised to avoid lots of spices, grease and fried food etc.     Instructions were also given about trying to arrange the timing, quality and quantity of food. Instructions were given about using ample amount of fiber including dietary and supplemental fiber either metamucil, bennafiber or citrucell etc.  Pt was advised about drinking ample amount of water without any colors or chemicals. Stress was given about regular exercise. Pt has verbalized understanding and agreement to these modifications.       More than half of patient's clinic visit time was spent in

## 2021-10-28 DIAGNOSIS — E53.8 VITAMIN B 12 DEFICIENCY: ICD-10-CM

## 2021-10-28 RX ORDER — CYANOCOBALAMIN (VITAMIN B-12) 1000 MCG
TABLET, EXTENDED RELEASE ORAL
Qty: 30 TABLET | Refills: 11 | Status: SHIPPED | OUTPATIENT
Start: 2021-10-28

## 2021-11-09 ENCOUNTER — OFFICE VISIT (OUTPATIENT)
Dept: INTERNAL MEDICINE CLINIC | Age: 86
End: 2021-11-09
Payer: MEDICARE

## 2021-11-09 VITALS
OXYGEN SATURATION: 99 % | HEART RATE: 69 BPM | WEIGHT: 200 LBS | BODY MASS INDEX: 36.8 KG/M2 | HEIGHT: 62 IN | DIASTOLIC BLOOD PRESSURE: 84 MMHG | SYSTOLIC BLOOD PRESSURE: 112 MMHG

## 2021-11-09 DIAGNOSIS — I25.10 CORONARY ARTERY DISEASE DUE TO LIPID RICH PLAQUE: ICD-10-CM

## 2021-11-09 DIAGNOSIS — Z23 FLU VACCINE NEED: ICD-10-CM

## 2021-11-09 DIAGNOSIS — E11.8 TYPE 2 DIABETES MELLITUS WITH COMPLICATION, WITHOUT LONG-TERM CURRENT USE OF INSULIN (HCC): Primary | ICD-10-CM

## 2021-11-09 DIAGNOSIS — I25.83 CORONARY ARTERY DISEASE DUE TO LIPID RICH PLAQUE: ICD-10-CM

## 2021-11-09 DIAGNOSIS — I10 ESSENTIAL HYPERTENSION: ICD-10-CM

## 2021-11-09 LAB — HBA1C MFR BLD: 14 %

## 2021-11-09 PROCEDURE — G8484 FLU IMMUNIZE NO ADMIN: HCPCS | Performed by: NURSE PRACTITIONER

## 2021-11-09 PROCEDURE — 1123F ACP DISCUSS/DSCN MKR DOCD: CPT | Performed by: NURSE PRACTITIONER

## 2021-11-09 PROCEDURE — 99214 OFFICE O/P EST MOD 30 MIN: CPT | Performed by: NURSE PRACTITIONER

## 2021-11-09 PROCEDURE — 90694 VACC AIIV4 NO PRSRV 0.5ML IM: CPT | Performed by: NURSE PRACTITIONER

## 2021-11-09 PROCEDURE — 1090F PRES/ABSN URINE INCON ASSESS: CPT | Performed by: NURSE PRACTITIONER

## 2021-11-09 PROCEDURE — G0008 ADMIN INFLUENZA VIRUS VAC: HCPCS | Performed by: NURSE PRACTITIONER

## 2021-11-09 PROCEDURE — 1036F TOBACCO NON-USER: CPT | Performed by: NURSE PRACTITIONER

## 2021-11-09 PROCEDURE — G8400 PT W/DXA NO RESULTS DOC: HCPCS | Performed by: NURSE PRACTITIONER

## 2021-11-09 PROCEDURE — G8427 DOCREV CUR MEDS BY ELIG CLIN: HCPCS | Performed by: NURSE PRACTITIONER

## 2021-11-09 PROCEDURE — 83036 HEMOGLOBIN GLYCOSYLATED A1C: CPT | Performed by: NURSE PRACTITIONER

## 2021-11-09 PROCEDURE — 4040F PNEUMOC VAC/ADMIN/RCVD: CPT | Performed by: NURSE PRACTITIONER

## 2021-11-09 PROCEDURE — G8417 CALC BMI ABV UP PARAM F/U: HCPCS | Performed by: NURSE PRACTITIONER

## 2021-11-09 RX ORDER — FLASH GLUCOSE SCANNING READER
1 EACH MISCELLANEOUS DAILY
Qty: 1 EACH | Refills: 0 | Status: SHIPPED | OUTPATIENT
Start: 2021-11-09 | End: 2021-11-23 | Stop reason: SDUPTHER

## 2021-11-09 RX ORDER — FLASH GLUCOSE SENSOR
1 KIT MISCELLANEOUS
Qty: 2 EACH | Refills: 2 | Status: SHIPPED | OUTPATIENT
Start: 2021-11-09 | End: 2021-11-11 | Stop reason: SDUPTHER

## 2021-11-09 RX ORDER — INSULIN GLARGINE 100 [IU]/ML
35 INJECTION, SOLUTION SUBCUTANEOUS NIGHTLY
Qty: 5 PEN | Refills: 1 | Status: SHIPPED | OUTPATIENT
Start: 2021-11-09 | End: 2021-11-23

## 2021-11-10 DIAGNOSIS — E11.8 TYPE 2 DIABETES MELLITUS WITH COMPLICATION, WITHOUT LONG-TERM CURRENT USE OF INSULIN (HCC): ICD-10-CM

## 2021-11-10 NOTE — TELEPHONE ENCOUNTER
Patient requesting this Rx to be resent to [facility]

## 2021-11-11 RX ORDER — FLASH GLUCOSE SENSOR
1 KIT MISCELLANEOUS
Qty: 2 EACH | Refills: 2 | Status: SHIPPED | OUTPATIENT
Start: 2021-11-11 | End: 2021-11-23 | Stop reason: SDUPTHER

## 2021-11-23 ENCOUNTER — OFFICE VISIT (OUTPATIENT)
Dept: INTERNAL MEDICINE CLINIC | Age: 86
End: 2021-11-23
Payer: MEDICARE

## 2021-11-23 ENCOUNTER — HOSPITAL ENCOUNTER (OUTPATIENT)
Age: 86
Setting detail: SPECIMEN
Discharge: HOME | End: 2021-11-23

## 2021-11-23 VITALS
BODY MASS INDEX: 36.86 KG/M2 | HEART RATE: 58 BPM | DIASTOLIC BLOOD PRESSURE: 62 MMHG | OXYGEN SATURATION: 97 % | WEIGHT: 200.3 LBS | SYSTOLIC BLOOD PRESSURE: 118 MMHG | HEIGHT: 62 IN

## 2021-11-23 DIAGNOSIS — E53.8 VITAMIN B 12 DEFICIENCY: ICD-10-CM

## 2021-11-23 DIAGNOSIS — D51.9 ANEMIA DUE TO VITAMIN B12 DEFICIENCY, UNSPECIFIED B12 DEFICIENCY TYPE: ICD-10-CM

## 2021-11-23 DIAGNOSIS — H61.22 HEARING LOSS DUE TO CERUMEN IMPACTION, LEFT: ICD-10-CM

## 2021-11-23 DIAGNOSIS — E11.8 TYPE 2 DIABETES MELLITUS WITH COMPLICATION, WITHOUT LONG-TERM CURRENT USE OF INSULIN (HCC): Primary | ICD-10-CM

## 2021-11-23 LAB
CHP ED QC CHECK: NORMAL
GLUCOSE BLD-MCNC: 379 MG/DL
HCT VFR BLD CALC: 40.8 % (ref 36.3–47.1)
HEMOGLOBIN: 12.8 G/DL (ref 11.9–15.1)
MCH RBC QN AUTO: 27.9 PG (ref 25.2–33.5)
MCHC RBC AUTO-ENTMCNC: 31.4 G/DL (ref 28.4–34.8)
MCV RBC AUTO: 89.1 FL (ref 82.6–102.9)
NRBC AUTOMATED: 0 PER 100 WBC
PDW BLD-RTO: 14.6 % (ref 11.8–14.4)
PLATELET # BLD: 181 K/UL (ref 138–453)
PMV BLD AUTO: 12 FL (ref 8.1–13.5)
RBC # BLD: 4.58 M/UL (ref 3.95–5.11)
VITAMIN B-12: 674 PG/ML (ref 232–1245)
WBC # BLD: 10.1 K/UL (ref 3.5–11.3)

## 2021-11-23 PROCEDURE — 1036F TOBACCO NON-USER: CPT | Performed by: NURSE PRACTITIONER

## 2021-11-23 PROCEDURE — 82962 GLUCOSE BLOOD TEST: CPT | Performed by: NURSE PRACTITIONER

## 2021-11-23 PROCEDURE — G8417 CALC BMI ABV UP PARAM F/U: HCPCS | Performed by: NURSE PRACTITIONER

## 2021-11-23 PROCEDURE — G8427 DOCREV CUR MEDS BY ELIG CLIN: HCPCS | Performed by: NURSE PRACTITIONER

## 2021-11-23 PROCEDURE — G8484 FLU IMMUNIZE NO ADMIN: HCPCS | Performed by: NURSE PRACTITIONER

## 2021-11-23 PROCEDURE — 1090F PRES/ABSN URINE INCON ASSESS: CPT | Performed by: NURSE PRACTITIONER

## 2021-11-23 PROCEDURE — G8400 PT W/DXA NO RESULTS DOC: HCPCS | Performed by: NURSE PRACTITIONER

## 2021-11-23 PROCEDURE — 4040F PNEUMOC VAC/ADMIN/RCVD: CPT | Performed by: NURSE PRACTITIONER

## 2021-11-23 PROCEDURE — 99214 OFFICE O/P EST MOD 30 MIN: CPT | Performed by: NURSE PRACTITIONER

## 2021-11-23 PROCEDURE — 1123F ACP DISCUSS/DSCN MKR DOCD: CPT | Performed by: NURSE PRACTITIONER

## 2021-11-23 RX ORDER — FLASH GLUCOSE SCANNING READER
1 EACH MISCELLANEOUS DAILY
Qty: 1 EACH | Refills: 0 | Status: SHIPPED | OUTPATIENT
Start: 2021-11-23

## 2021-11-23 RX ORDER — INSULIN GLARGINE 100 [IU]/ML
40 INJECTION, SOLUTION SUBCUTANEOUS NIGHTLY
Qty: 5 PEN | Refills: 1 | Status: SHIPPED | OUTPATIENT
Start: 2021-11-23 | End: 2021-12-08

## 2021-11-23 RX ORDER — CYANOCOBALAMIN (VITAMIN B-12) 1000 MCG
TABLET, EXTENDED RELEASE ORAL
Qty: 30 TABLET | Refills: 11 | Status: CANCELLED | OUTPATIENT
Start: 2021-11-23

## 2021-11-23 RX ORDER — FLASH GLUCOSE SENSOR
1 KIT MISCELLANEOUS
Qty: 2 EACH | Refills: 2 | Status: SHIPPED | OUTPATIENT
Start: 2021-11-23

## 2021-11-23 ASSESSMENT — ENCOUNTER SYMPTOMS
TROUBLE SWALLOWING: 0
VOMITING: 0
RHINORRHEA: 0
ABDOMINAL PAIN: 0
CONSTIPATION: 0
COLOR CHANGE: 0
WHEEZING: 0
SHORTNESS OF BREATH: 0
SORE THROAT: 0
COUGH: 0
DIARRHEA: 0
NAUSEA: 0
CHEST TIGHTNESS: 0

## 2021-11-23 NOTE — PROGRESS NOTES
Visit Information    Have you changed or started any medications since your last visit including any over-the-counter medicines, vitamins, or herbal medicines? no   Are you having any side effects from any of your medications? -  no  Have you stopped taking any of your medications? Is so, why? -  no    Have you seen any other physician or provider since your last visit? No  Have you had any other diagnostic tests since your last visit? No  Have you been seen in the emergency room and/or had an admission to a hospital since we last saw you? No  Have you had your routine dental cleaning in the past 6 months? no    Have you activated your [employer] account? If not, what are your barriers?  Yes     Patient Care Team:  Kasandra Garcia MD as PCP - General (Internal Medicine)  Kasandra Garcia MD as PCP - [facility]  Janina Smith MD as Consulting Physician (Gastroenterology)  Sarah Delatorre MD as Consulting Physician (Pulmonology)    Medical History Review  Past Medical, Family, and Social History reviewed and does contribute to the patient presenting condition    Health Maintenance   Topic Date Due    DTaP/Tdap/Td vaccine (1 - Tdap) Never done    Shingles Vaccine (1 of 2) Never done    Diabetic retinal exam  09/02/2016    Diabetic foot exam  01/07/2021    Lipid screen  06/29/2021    COVID-19 Vaccine (2 - Booster for Patricia series) 07/08/2021    TSH testing  04/03/2022    Potassium monitoring  04/16/2022    Creatinine monitoring  04/16/2022    A1C test (Diabetic or Prediabetic)  05/09/2022    Annual Wellness Visit (AWV)  06/11/2022    Flu vaccine  Completed    Pneumococcal 65+ years Vaccine  Completed    DEXA (modify frequency per FRAX score)  Addressed    Hepatitis A vaccine  Aged Out    Hib vaccine  Aged Out    Meningococcal (ACWY) vaccine  Aged Out         [facility]. [address] [place] [id number]  Dept: [phone]  Dept Fax: [phone]    OfficeProgress/Follow Up Note  Date of patient's visit: 12/10/2021  Patient's Name:  Nayeli Pelletier YOB: 1935            Patient Care Team:  Anjali Kendrick MD as PCP - General (Internal Medicine)  Anjali Kendrick MD as PCP - [facility]  Rk Polk MD as Consulting Physician (Gastroenterology)  Salvador Aldridge MD as Consulting Physician (Pulmonology)    REASON FOR VISIT:  Routine outpatient follow up    HISTORY OF PRESENT ILLNESS:        History was obtained from the patient.      Nayeli Pelletier is a 82 y.o. female here today for 2 Week Follow-Up (patient doesn't check her sugars) and Ear Fullness (bilateral )    DM  Onset several years  Noncompliant with diabetic diet and checking blood sugars  Compliant with medication regimen  Denies hypoglycemia, visual disturbance, fatigue    B/l otalgia  Denies fevers, chills, URI symptoms, otorrhea   Endorses decreased hearing b/l    Patient Active Problem List   Diagnosis    Acute kidney injury ([address]    Anemia due to vitamin B12 deficiency    CAD (coronary artery disease)    Thyroid cancer ([address]    Hypothyroidism    Essential hypertension    Type 2 diabetes mellitus with complication, without long-term current use of insulin (HCC)    Transaminitis    Acute cystitis without hematuria    Urinary tract infection without hematuria    Chronic diastolic congestive heart failure (HCC)    Otalgia of both ears    Bilateral swelling of feet and ankles    Dyslipidemia    Type 2 diabetes mellitus without complication, without long-term current use of insulin (HCC)    Bilateral non-suppurative otitis media    Hypochromic-microcytic anemia    Pneumonia of right lung due to infectious organism    Tracheobronchitis    Frequency of micturition    Pneumonia    Hypoalbuminemia    Lactic acid acidosis    Hyponatremia    Hyperglycemia    Hypocalcemia    Obesity, Class III, BMI 40-49.9 (morbid obesity) ([address]    [facility]eps[facility] to pneumonia [facility]    Cellulitis    Hypophosphatemia    Obesity    Atrial flutter ([facility]    Hypoglycemia    Encounter for palliative care    BRIA (acute kidney injury) ([facility]    Upper GI bleed    Superficial gastritis without hemorrhage    Morbid obesity (HCC)    Mild anemia    Acute gastric ulcer with hemorrhage    Melena    Gastric polyps    Family history of GI bleeding    Absolute anemia    Hx of long term use of blood thinners       Health Maintenance Due   Topic Date Due    DTaP/Tdap/Td vaccine (1 - Tdap) Never done    Shingles Vaccine (1 of 2) Never done    Diabetic retinal exam  09/02/2016    Diabetic foot exam  01/07/2021    Lipid screen  06/29/2021    COVID-19 Vaccine (2 - Booster for [employer] series) 07/08/2021       MEDICATIONS:      Current Outpatient Medications   Medication Sig Dispense Refill    Continuous Blood Gluc Sensor (FREESTYLE KERRY 2 SENSOR) MISC 1 Device by Does not apply route every 14 days 2 each 2    Continuous Blood Gluc  (FREESTYLE KERRY 2 READER) CLAUDIO 1 Device by Does not apply route daily 1 each 0    carbamide peroxide (DEBROX) 6.5 % otic solution Place 5 drops into the left ear 2 times daily 15 mL 0    Cyanocobalamin (VITAMIN B-12) 1000 MCG extended release tablet TAKE 1 TAB BY MOUTH ONCE A DAY  30 tablet 11    FEROSUL 325 (65 Fe) MG tablet TAKE 1 TABLET BY MOUTH 2 TIMES DAILY  60 tablet 3    furosemide (LASIX) 40 MG tablet TAKE 1 TABLET BY MOUTH DAILY  30 tablet 4    atorvastatin (LIPITOR) 80 MG tablet TAKE 1 TABLET BY MOUTH DAILY  30 tablet 4    vitamin D3 (CHOLECALCIFEROL) 25 MCG (1000 UT) TABS tablet TAKE 1 TAB BY MOUTH ONCE A DAY  30 tablet 4    clopidogrel (PLAVIX) 75 MG tablet TAKE 1 TAB BY MOUTH ONCE A DAY 90 tablet 3    metFORMIN (GLUCOPHAGE) 500 MG tablet Take 2 tablets by mouth 2 times daily (with meals) 360 tablet 3    loratadine (CLARITIN) 10 MG tablet TAKE 1 TABLET BY MOUTH TWICE A DAY AS NEEDED 90 tablet 1    pantoprazole (PROTONIX) 40 MG tablet Take 1 tablet by mouth 2 times daily 180 tablet 3    sertraline (ZOLOFT) 100 MG tablet Take 1 tablet by Mouth Once a Day 90 tablet 3    SITagliptin (JANUVIA) 100 MG tablet TAKE 1 TAB BY MOUTH ONCE A DAY 90 tablet 3    pantoprazole (PROTONIX) 20 MG tablet Take 1 tablet by mouth daily 1 tablet 0    blood glucose monitor strips Test 2 times a day & as needed for symptoms of irregular blood glucose. Dispense sufficient amount for indicated testing frequency plus additional to accommodate PRN testing needs. 100 strip 1    levothyroxine (SYNTHROID) 125 MCG tablet TAKE 1 TAB BY MOUTH EVERY MORNING 90 tablet 3    metoprolol tartrate (LOPRESSOR) 25 MG tablet Take 1 tablet by mouth 2 times daily 180 tablet 3    Lancets MISC 1 each by Does not apply route 2 times daily 100 each 11    aspirin 81 MG chewable tablet Take 1 tablet by mouth daily 30 tablet 3    insulin glargine (BASAGLAR KWIKPEN) 100 UNIT/ML injection pen Inject 42 Units into the skin nightly 5 pen 5    ciprofloxacin-dexamethasone (CIPRODEX) 0.3-0.1 % otic suspension Place 4 drops into the left ear 2 times daily for 7 days 7.5 mL 0    Continuous Blood Gluc  (FREESTYLE KERRY 14 DAY READER) CLAUDIO 1 Device by Does not apply route 4 times daily 1 Device 0    Continuous Blood Gluc Sensor (FREESTYLE KERRY 2 SENSOR SYSTM) MISC 4 Devices by Does not apply route every 7 days 4 each 0    diclofenac sodium (VOLTAREN) 1 % GEL Apply 2 g topically 2 times daily 1 Tube 3    nystatin (MYCOSTATIN) 478774 UNIT/GM cream Apply topically 2 times daily. 1 Tube 1    docusate sodium (COLACE) 100 MG capsule Take 1 capsule by mouth daily as needed for Constipation 30 capsule 0     No current facility-administered medications for this visit. ALLERGIES:      Allergies   Allergen Reactions    Strawberry Extract     Tape Janeen [place] Tape] Rash         SOCIAL HISTORY    Reviewed and no change from previous record.      Katey Durand  reports that she has quit smoking. She has never used smokeless tobacco.    FAMILY HISTORY:    Reviewed and No change from previous visit    REVIEW OF SYSTEMS:    Review of Systems   Constitutional: Negative for chills, diaphoresis, fatigue, fever and unexpected weight change. HENT: Positive for ear pain. Negative for congestion, ear discharge, postnasal drip, rhinorrhea, sneezing, sore throat and trouble swallowing. Eyes: Negative for visual disturbance. Respiratory: Negative for cough, chest tightness, shortness of breath and wheezing. Cardiovascular: Negative for chest pain. Gastrointestinal: Negative for constipation, diarrhea, nausea and vomiting. Endocrine: Negative for polydipsia, polyphagia and polyuria. Genitourinary: Negative for difficulty urinating, dysuria, flank pain, frequency and urgency. Musculoskeletal: Negative for arthralgias. Skin: Negative for color change and rash. Neurological: Negative for dizziness, tremors, syncope, weakness and numbness. Psychiatric/Behavioral: Negative for confusion and hallucinations. PHYSICAL EXAM:      Vitals:    11/23/21 1006   BP: 118/62   Pulse: 58   SpO2: 97%   Weight: 200 lb 4.8 oz (90.9 kg)   Height: 5' 2\" (1.575 m)     BP Readings from Last 3 Encounters:   12/08/21 110/64   11/23/21 118/62   11/09/21 112/84      Wt Readings from Last 3 Encounters:   12/08/21 200 lb (90.7 kg)   11/23/21 200 lb 4.8 oz (90.9 kg)   11/09/21 200 lb (90.7 kg)       Physical Exam  Vitals reviewed. Constitutional:       Appearance: Normal appearance. She is obese. HENT:      Head: Normocephalic. Right Ear: Tympanic membrane, ear canal and external ear normal. There is no impacted cerumen. Left Ear: There is impacted cerumen. Cardiovascular:      Rate and Rhythm: Normal rate and regular rhythm. Pulses: Normal pulses. Heart sounds: Normal heart sounds. Pulmonary:      Effort: Pulmonary effort is normal.      Breath sounds: Normal breath sounds. INSTRUCTIONS:   Return in about 2 weeks (around 12/7/2021) for DM. Sabina Chan received counseling on the following healthy behaviors: nutrition, exercise and medication adherence    Discussed use, benefit, and side effects of prescribed medications. Barriers to medication compliance addressed. All patient questions answered. Patient voiced understanding. Patient given educational materials - see patient instructions    STEFAN Stone - CNP   [facility]  12/10/2021, 8:50 AM    Please note that this chart was generated using voice recognition Dragon dictation software. Although everyeffort was made to ensure the accuracy of this automated transcription, some errors in transcription may have occurred.

## 2021-12-08 ENCOUNTER — OFFICE VISIT (OUTPATIENT)
Dept: INTERNAL MEDICINE CLINIC | Age: 86
End: 2021-12-08
Payer: MEDICARE

## 2021-12-08 VITALS
SYSTOLIC BLOOD PRESSURE: 110 MMHG | OXYGEN SATURATION: 97 % | HEART RATE: 58 BPM | WEIGHT: 200 LBS | BODY MASS INDEX: 36.8 KG/M2 | DIASTOLIC BLOOD PRESSURE: 64 MMHG | HEIGHT: 62 IN

## 2021-12-08 DIAGNOSIS — H60.332 ACUTE SWIMMER'S EAR OF LEFT SIDE: ICD-10-CM

## 2021-12-08 DIAGNOSIS — E11.8 TYPE 2 DIABETES MELLITUS WITH COMPLICATION, WITHOUT LONG-TERM CURRENT USE OF INSULIN (HCC): Primary | ICD-10-CM

## 2021-12-08 PROCEDURE — G8427 DOCREV CUR MEDS BY ELIG CLIN: HCPCS | Performed by: NURSE PRACTITIONER

## 2021-12-08 PROCEDURE — G8484 FLU IMMUNIZE NO ADMIN: HCPCS | Performed by: NURSE PRACTITIONER

## 2021-12-08 PROCEDURE — G8400 PT W/DXA NO RESULTS DOC: HCPCS | Performed by: NURSE PRACTITIONER

## 2021-12-08 PROCEDURE — 1123F ACP DISCUSS/DSCN MKR DOCD: CPT | Performed by: NURSE PRACTITIONER

## 2021-12-08 PROCEDURE — 1036F TOBACCO NON-USER: CPT | Performed by: NURSE PRACTITIONER

## 2021-12-08 PROCEDURE — 1090F PRES/ABSN URINE INCON ASSESS: CPT | Performed by: NURSE PRACTITIONER

## 2021-12-08 PROCEDURE — 99214 OFFICE O/P EST MOD 30 MIN: CPT | Performed by: NURSE PRACTITIONER

## 2021-12-08 PROCEDURE — 4130F TOPICAL PREP RX AOE: CPT | Performed by: NURSE PRACTITIONER

## 2021-12-08 PROCEDURE — 4040F PNEUMOC VAC/ADMIN/RCVD: CPT | Performed by: NURSE PRACTITIONER

## 2021-12-08 PROCEDURE — G8417 CALC BMI ABV UP PARAM F/U: HCPCS | Performed by: NURSE PRACTITIONER

## 2021-12-08 RX ORDER — INSULIN GLARGINE 100 [IU]/ML
42 INJECTION, SOLUTION SUBCUTANEOUS NIGHTLY
Qty: 5 PEN | Refills: 5 | Status: ON HOLD | OUTPATIENT
Start: 2021-12-08 | End: 2022-01-21 | Stop reason: SDUPTHER

## 2021-12-08 RX ORDER — CIPROFLOXACIN AND DEXAMETHASONE 3; 1 MG/ML; MG/ML
4 SUSPENSION/ DROPS AURICULAR (OTIC) 2 TIMES DAILY
Qty: 7.5 ML | Refills: 0 | Status: SHIPPED | OUTPATIENT
Start: 2021-12-08 | End: 2021-12-15

## 2021-12-08 ASSESSMENT — ENCOUNTER SYMPTOMS
COLOR CHANGE: 0
ABDOMINAL PAIN: 0
CHEST TIGHTNESS: 0
SORE THROAT: 0
SHORTNESS OF BREATH: 0
CONSTIPATION: 0
NAUSEA: 0
VOMITING: 0
RHINORRHEA: 0
DIARRHEA: 0
COUGH: 0
WHEEZING: 0
TROUBLE SWALLOWING: 0

## 2021-12-08 NOTE — PROGRESS NOTES
Visit Information    Have you changed or started any medications since your last visit including any over-the-counter medicines, vitamins, or herbal medicines? no   Are you having any side effects from any of your medications? -  no  Have you stopped taking any of your medications? Is so, why? -  no    Have you seen any other physician or provider since your last visit? No  Have you had any other diagnostic tests since your last visit? No  Have you been seen in the emergency room and/or had an admission to a hospital since we last saw you? No  Have you had your routine dental cleaning in the past 6 months? no    Have you activated your [employer] account? If not, what are your barriers?  Yes     Patient Care Team:  Carol Meyer MD as PCP - General (Internal Medicine)  Carol Meyer MD as PCP - [address] [place] [address] Provider  Jevon Mckeon MD as Consulting Physician (Gastroenterology)  Jayce Whitehead MD as Consulting Physician (Pulmonology)    Medical History Review  Past Medical, Family, and Social History reviewed and does contribute to the patient presenting condition    Health Maintenance   Topic Date Due    DTaP/Tdap/Td vaccine (1 - Tdap) Never done    Shingles Vaccine (1 of 2) Never done    Diabetic retinal exam  09/02/2016    Diabetic foot exam  01/07/2021    Lipid screen  06/29/2021    COVID-19 Vaccine (2 - Booster for Patricia series) 07/08/2021    TSH testing  04/03/2022    Potassium monitoring  04/16/2022    Creatinine monitoring  04/16/2022    A1C test (Diabetic or Prediabetic)  05/09/2022    Annual Wellness Visit (AWV)  06/11/2022    Flu vaccine  Completed    Pneumococcal 65+ years Vaccine  Completed    DEXA (modify frequency per FRAX score)  Addressed    Hepatitis A vaccine  Aged Out    Hib vaccine  Aged Out    Meningococcal (ACWY) vaccine  Aged Out         [address] [facility]. [address]  [place] [address]  Dept: [phone]  Dept Fax: [phone]    OfficeProgress/Follow Up Note  Date of patient's visit: 12/8/2021  Patient's Name:  Miya Perez YOB: 1935            Patient Care Team:  Orly Dumont MD as PCP - General (Internal Medicine)  Orly Dumont MD as PCP - [facility]  Cricket Schulte MD as Consulting Physician (Gastroenterology)  Blas Medel MD as Consulting Physician (Pulmonology)    REASON FOR VISIT:  Routine outpatient follow up    HISTORY OF PRESENT ILLNESS:        History was obtained from the patient. Miya Perez is a 82 y.o. female here today for Diabetes (11/9/21 a1c 13.0, sugar this morning was 190 )    Diabetes   Duration several years  Modifying factors on Metformin, Basiglar, Januvia. Severity uncontrolled  Symptoms exacerbated by high carbohydrate diet, medication noncompliance and decreased physical activity  Symptoms alleviated by healthy low carbohydrate diet and medication compliance  Denies hypoglycemia, visual disturbance, headaches, fatigue, or lightheadedness. Diabetes follow up.  Blood sugars  low as 107 high as 190    Patient Active Problem List   Diagnosis    [facility]    Anemia due to vitamin B12 deficiency    CAD (coronary artery disease)    Thyroid cancer ([facility]    Hypothyroidism    Essential hypertension    Type 2 diabetes mellitus with complication, without long-term current use of insulin (HCC)    Transaminitis    Acute cystitis without hematuria    Urinary tract infection without hematuria    Chronic diastolic congestive heart failure (HCC)    Otalgia of both ears    Bilateral swelling of feet and ankles    Dyslipidemia    Type 2 diabetes mellitus without complication, without long-term current use of insulin (HCC)    Bilateral non-suppurative otitis media    Hypochromic-microcytic anemia    Pneumonia of right lung due to infectious organism    Tracheobronchitis    Frequency of micturition    Pneumonia    Hypoalbuminemia  Lactic acid acidosis    Hyponatremia    Hyperglycemia    Hypocalcemia    Obesity, Class III, BMI 40-49.9 (morbid obesity) (HCC)    Sepsis due to pneumonia (HCC)    Cellulitis    Hypophosphatemia    Obesity    Atrial flutter (HCC)    Hypoglycemia    Encounter for palliative care    B[facility]A (acute [facility] injury) [facility]    Upper GI bleed    Superficial gastritis without hemorrhage    Morbid obesity (HCC)    Mild anemia    Acute gastric ulcer with hemorrhage    Melena    Gastric polyps    Family history of GI bleeding    Absolute anemia    Hx of long term use of blood thinners       Health Maintenance Due   Topic Date Due    DTaP/Tdap/Td vaccine (1 - Tdap) Never done    Shingles Vaccine (1 of 2) Never done    Diabetic retinal exam  09/02/2016    Diabetic foot exam  01/07/2021    Lipid screen  06/29/2021    COVID-19 Vaccine (2 - Booster for [employer] series) 07/08/2021       MEDICATIONS:      Current Outpatient Medications   Medication Sig Dispense Refill    insulin glargine (BASAGLAR KWIKPEN) 100 UNIT/ML injection pen Inject 42 Units into the skin nightly 5 pen 5    ciprofloxacin-dexamethasone (CIPRODEX) 0.3-0.1 % otic suspension Place 4 drops into the left ear 2 times daily for 7 days 7.5 mL 0    Continuous Blood Gluc Sensor (FREESTYLE KERRY 2 SENSOR) MISC 1 Device by Does not apply route every 14 days 2 each 2    Continuous Blood Gluc  (FREESTYLE KERRY 2 READER) CLAUDIO 1 Device by Does not apply route daily 1 each 0    carbamide peroxide (DEBROX) 6.5 % otic solution Place 5 drops into the left ear 2 times daily 15 mL 0    Cyanocobalamin (VITAMIN B-12) 1000 MCG extended release tablet TAKE 1 TAB BY MOUTH ONCE A DAY  30 tablet 11    FEROSUL 325 (65 Fe) MG tablet TAKE 1 TABLET BY MOUTH 2 TIMES DAILY  60 tablet 3    furosemide (LASIX) 40 MG tablet TAKE 1 TABLET BY MOUTH DAILY  30 tablet 4    atorvastatin (LIPITOR) 80 MG tablet TAKE 1 TABLET BY MOUTH DAILY  30 tablet 4    vitamin D3 Strawberry Extract     Tape Cle[place] Tape] Rash         SOCIAL HISTORY    Reviewed and no change from previous record. Candy Gao  reports that she has quit smoking. She has never used smokeless tobacco.    FAMILY HISTORY:    Reviewed and No change from previous visit    REVIEW OF SYSTEMS:    Review of Systems   Constitutional: Negative for chills, diaphoresis, fatigue, fever and unexpected weight change. HENT: Positive for ear pain (L ear pain). Negative for congestion, postnasal drip, rhinorrhea, sneezing, sore throat and trouble swallowing. Eyes: Negative for visual disturbance. Respiratory: Negative for cough, chest tightness, shortness of breath and wheezing. Cardiovascular: Negative for chest pain. Gastrointestinal: Negative for abdominal pain, constipation, diarrhea, nausea and vomiting. Endocrine: Negative for polydipsia, polyphagia and polyuria. Genitourinary: Negative for difficulty urinating, dysuria, flank pain, frequency and urgency. Musculoskeletal: Negative for arthralgias. Skin: Negative for color change and rash. Neurological: Negative for dizziness, tremors, syncope, weakness and numbness. Psychiatric/Behavioral: Negative for confusion and hallucinations. PHYSICAL EXAM:      Vitals:    12/08/21 1047   BP: 110/64   Pulse: 58   SpO2: 97%   Weight: 200 lb (90.7 kg)   Height: 5' 2\" (1.575 m)     BP Readings from Last 3 Encounters:   12/08/21 110/64   11/23/21 118/62   11/09/21 112/84      Wt Readings from Last 3 Encounters:   12/08/21 200 lb (90.7 kg)   11/23/21 200 lb 4.8 oz (90.9 kg)   11/09/21 200 lb (90.7 kg)       Physical Exam  Vitals reviewed. Constitutional:       Appearance: Normal appearance. She is obese. HENT:      Head: Normocephalic. Right Ear: Hearing, tympanic membrane, ear canal and external ear normal.      Left Ear: Drainage (white) and tenderness present. A middle ear effusion is present.    Cardiovascular:      Rate and Rhythm: Normal rate and regular rhythm. Pulses: Normal pulses. Heart sounds: Normal heart sounds. Pulmonary:      Effort: Pulmonary effort is normal.      Breath sounds: Normal breath sounds. Musculoskeletal:      Comments: Kyphosis noted, in wheelchair   Skin:     General: Skin is warm and dry. Neurological:      General: No focal deficit present. Mental Status: She is alert and oriented to person, place, and time. Psychiatric:         Mood and Affect: Mood normal.         Behavior: Behavior normal.         Thought Content: Thought content normal.         Judgment: Judgment normal.          LABORATORY FINDINGS:    CBC:  Lab Results   Component Value Date    WBC 10.1 11/23/2021    HGB 12.8 11/23/2021     11/23/2021     02/03/2012       BMP:    Lab Results   Component Value Date     04/16/2021    K 4.2 04/16/2021     04/16/2021    CO2 23 04/16/2021    BUN 8 04/16/2021    CREATININE 0.76 04/16/2021    GLUCOSE 379 11/23/2021    GLUCOSE 380 05/14/2012       HEMOGLOBIN A1C:   Lab Results   Component Value Date    LABA1C 14.0 11/09/2021       FASTING LIPID PANEL:  Lab Results   Component Value Date    CHOL 173 06/29/2020    HDL 52 06/29/2020    TRIG 337 (H) 06/29/2020       ASSESSMENT AND PLAN:      1. Type 2 diabetes mellitus with complication, without long-term current use of insulin (HCC)  - increase insulin dose and continue current medication regimen  - written as well as verbal diabetic education provided to the patient provided  - insulin glargine (BASAGLAR KWIKPEN) 100 UNIT/ML injection pen; Inject 42 Units into the skin nightly  Dispense: 5 pen; Refill: 5    2. Acute swimmer's ear of left side  - ciprofloxacin-dexamethasone (CIPRODEX) 0.3-0.1 % otic suspension; Place 4 drops into the left ear 2 times daily for 7 days  Dispense: 7.5 mL; Refill: 0      FOLLOW UP AND INSTRUCTIONS:   Return in about 2 weeks (around 12/22/2021) for ear check and DM.     Mikhail Grossman received counseling on the following healthy behaviors: nutrition, exercise and medication adherence    Discussed use, benefit, and side effects of prescribed medications. Barriers to medication compliance addressed. All patient questions answered. Patient voiced understanding. Patient given educational materials - see patient instructions    STEFAN Montesinos - CNP   [facility]  12/8/2021, 5:02 PM    Please note that this chart was generated using voice recognition Dragon dictation software. Although everyeffort was made to ensure the accuracy of this automated transcription, some errors in transcription may have occurred.

## 2021-12-10 ASSESSMENT — ENCOUNTER SYMPTOMS
SHORTNESS OF BREATH: 0
COUGH: 0
CONSTIPATION: 0
SORE THROAT: 0
RHINORRHEA: 0
NAUSEA: 0
DIARRHEA: 0
VOMITING: 0
WHEEZING: 0
TROUBLE SWALLOWING: 0
CHEST TIGHTNESS: 0
COLOR CHANGE: 0

## 2021-12-17 DIAGNOSIS — D50.9 IRON DEFICIENCY ANEMIA, UNSPECIFIED IRON DEFICIENCY ANEMIA TYPE: ICD-10-CM

## 2021-12-17 RX ORDER — FERROUS SULFATE 325(65) MG
TABLET ORAL
Qty: 60 TABLET | Refills: 3 | Status: SHIPPED | OUTPATIENT
Start: 2021-12-17 | End: 2022-02-23 | Stop reason: SDUPTHER

## 2021-12-23 ENCOUNTER — TELEPHONE (OUTPATIENT)
Dept: INTERNAL MEDICINE CLINIC | Age: 86
End: 2021-12-23

## 2021-12-23 NOTE — TELEPHONE ENCOUNTER
Mailed patient no show appointment letter  #1 for the date of 12/22/2021 , scheduled with Osiris Kahn NP.

## 2022-01-08 ENCOUNTER — APPOINTMENT (OUTPATIENT)
Dept: CT IMAGING | Age: 87
DRG: 178 | End: 2022-01-08
Payer: MEDICARE

## 2022-01-08 ENCOUNTER — HOSPITAL ENCOUNTER (INPATIENT)
Age: 87
LOS: 3 days | Discharge: HOME HEALTH | DRG: 178 | End: 2022-01-11
Attending: EMERGENCY MEDICINE | Admitting: INTERNAL MEDICINE
Payer: MEDICARE

## 2022-01-08 ENCOUNTER — APPOINTMENT (OUTPATIENT)
Dept: GENERAL RADIOLOGY | Age: 87
DRG: 178 | End: 2022-01-08
Payer: MEDICARE

## 2022-01-08 DIAGNOSIS — I10 HYPERTENSION: ICD-10-CM

## 2022-01-08 DIAGNOSIS — U07.1 COVID-19: Primary | ICD-10-CM

## 2022-01-08 DIAGNOSIS — N17.9 AKI (ACUTE KIDNEY INJURY) (HCC): ICD-10-CM

## 2022-01-08 DIAGNOSIS — I50.9 CONGESTIVE HEART FAILURE, UNSPECIFIED HF CHRONICITY, UNSPECIFIED HEART FAILURE TYPE (HCC): ICD-10-CM

## 2022-01-08 DIAGNOSIS — E03.9 HYPOTHYROIDISM, UNSPECIFIED TYPE: ICD-10-CM

## 2022-01-08 DIAGNOSIS — M79.89 LEFT LEG SWELLING: ICD-10-CM

## 2022-01-08 DIAGNOSIS — K25.0 ACUTE GASTRIC ULCER WITH HEMORRHAGE: ICD-10-CM

## 2022-01-08 DIAGNOSIS — N30.00 ACUTE CYSTITIS WITHOUT HEMATURIA: ICD-10-CM

## 2022-01-08 PROBLEM — E87.6 HYPOKALEMIA: Status: ACTIVE | Noted: 2022-01-08

## 2022-01-08 LAB
-: ABNORMAL
ABSOLUTE EOS #: 0.1 K/UL (ref 0–0.44)
ABSOLUTE IMMATURE GRANULOCYTE: 0.08 K/UL (ref 0–0.3)
ABSOLUTE LYMPH #: 1.83 K/UL (ref 1.1–3.7)
ABSOLUTE MONO #: 0.85 K/UL (ref 0.1–1.2)
ALLEN TEST: ABNORMAL
AMORPHOUS: ABNORMAL
ANION GAP SERPL CALCULATED.3IONS-SCNC: 14 MMOL/L (ref 9–17)
BACTERIA: ABNORMAL
BASOPHILS # BLD: 1 % (ref 0–2)
BASOPHILS ABSOLUTE: 0.09 K/UL (ref 0–0.2)
BILIRUBIN URINE: NEGATIVE
BNP INTERPRETATION: NORMAL
BUN BLDV-MCNC: 18 MG/DL (ref 8–23)
BUN/CREAT BLD: ABNORMAL (ref 9–20)
C-REACTIVE PROTEIN: 17.1 MG/L (ref 0–5)
CALCIUM SERPL-MCNC: 8.5 MG/DL (ref 8.6–10.4)
CARBOXYHEMOGLOBIN: 2.5 % (ref 0–5)
CASTS UA: ABNORMAL /LPF (ref 0–8)
CHLORIDE BLD-SCNC: 102 MMOL/L (ref 98–107)
CO2: 21 MMOL/L (ref 20–31)
COLOR: YELLOW
COMMENT UA: ABNORMAL
CREAT SERPL-MCNC: 1.2 MG/DL (ref 0.5–0.9)
CRYSTALS, UA: ABNORMAL /HPF
D-DIMER QUANTITATIVE: 1.77 MG/L FEU
DIFFERENTIAL TYPE: ABNORMAL
EOSINOPHILS RELATIVE PERCENT: 1 % (ref 1–4)
EPITHELIAL CELLS UA: ABNORMAL /HPF (ref 0–5)
FIBRINOGEN: 354 MG/DL (ref 140–420)
FIO2: ABNORMAL
GFR AFRICAN AMERICAN: 52 ML/MIN
GFR NON-AFRICAN AMERICAN: 43 ML/MIN
GFR SERPL CREATININE-BSD FRML MDRD: ABNORMAL ML/MIN/{1.73_M2}
GFR SERPL CREATININE-BSD FRML MDRD: ABNORMAL ML/MIN/{1.73_M2}
GLUCOSE BLD-MCNC: 123 MG/DL (ref 70–99)
GLUCOSE URINE: NEGATIVE
HCO3 VENOUS: 24.4 MMOL/L (ref 24–30)
HCT VFR BLD CALC: 42 % (ref 36.3–47.1)
HEMOGLOBIN: 13.2 G/DL (ref 11.9–15.1)
IMMATURE GRANULOCYTES: 1 %
KETONES, URINE: ABNORMAL
LACTATE DEHYDROGENASE: 189 U/L (ref 135–214)
LEUKOCYTE ESTERASE, URINE: ABNORMAL
LYMPHOCYTES # BLD: 21 % (ref 24–43)
MCH RBC QN AUTO: 27.8 PG (ref 25.2–33.5)
MCHC RBC AUTO-ENTMCNC: 31.4 G/DL (ref 28.4–34.8)
MCV RBC AUTO: 88.4 FL (ref 82.6–102.9)
METHEMOGLOBIN: ABNORMAL % (ref 0–1.5)
MODE: ABNORMAL
MONOCYTES # BLD: 10 % (ref 3–12)
MUCUS: ABNORMAL
NEGATIVE BASE EXCESS, VEN: 1 MMOL/L (ref 0–2)
NITRITE, URINE: POSITIVE
NOTIFICATION TIME: ABNORMAL
NOTIFICATION: ABNORMAL
NRBC AUTOMATED: 0 PER 100 WBC
O2 DEVICE/FLOW/%: ABNORMAL
O2 SAT, VEN: 52 % (ref 60–85)
OTHER OBSERVATIONS UA: ABNORMAL
OXYHEMOGLOBIN: ABNORMAL % (ref 95–98)
PATIENT TEMP: 37
PCO2, VEN, TEMP ADJ: ABNORMAL MMHG (ref 39–55)
PCO2, VEN: 45.9 (ref 39–55)
PDW BLD-RTO: 13.9 % (ref 11.8–14.4)
PEEP/CPAP: ABNORMAL
PH UA: 5 (ref 5–8)
PH VENOUS: 7.35 (ref 7.32–7.42)
PH, VEN, TEMP ADJ: ABNORMAL (ref 7.32–7.42)
PLATELET # BLD: 160 K/UL (ref 138–453)
PLATELET ESTIMATE: ABNORMAL
PMV BLD AUTO: 11.7 FL (ref 8.1–13.5)
PO2, VEN, TEMP ADJ: ABNORMAL MMHG (ref 30–50)
PO2, VEN: 30 (ref 30–50)
POSITIVE BASE EXCESS, VEN: ABNORMAL MMOL/L (ref 0–2)
POTASSIUM SERPL-SCNC: 3.6 MMOL/L (ref 3.7–5.3)
PRO-BNP: 186 PG/ML
PROCALCITONIN: 0.1 NG/ML
PROTEIN UA: ABNORMAL
PSV: ABNORMAL
PT. POSITION: ABNORMAL
RBC # BLD: 4.75 M/UL (ref 3.95–5.11)
RBC # BLD: ABNORMAL 10*6/UL
RBC UA: ABNORMAL /HPF (ref 0–4)
RENAL EPITHELIAL, UA: ABNORMAL /HPF
RESPIRATORY RATE: ABNORMAL
SAMPLE SITE: ABNORMAL
SARS-COV-2, RAPID: DETECTED
SEG NEUTROPHILS: 66 % (ref 36–65)
SEGMENTED NEUTROPHILS ABSOLUTE COUNT: 5.72 K/UL (ref 1.5–8.1)
SET RATE: ABNORMAL
SODIUM BLD-SCNC: 137 MMOL/L (ref 135–144)
SPECIFIC GRAVITY UA: 1.02 (ref 1–1.03)
SPECIMEN DESCRIPTION: ABNORMAL
TEXT FOR RESPIRATORY: ABNORMAL
THYROXINE, FREE: 0.32 NG/DL (ref 0.93–1.7)
TOTAL HB: ABNORMAL G/DL (ref 12–16)
TOTAL RATE: ABNORMAL
TRICHOMONAS: ABNORMAL
TROPONIN INTERP: ABNORMAL
TROPONIN INTERP: ABNORMAL
TROPONIN T: ABNORMAL NG/ML
TROPONIN T: ABNORMAL NG/ML
TROPONIN, HIGH SENSITIVITY: 21 NG/L (ref 0–14)
TROPONIN, HIGH SENSITIVITY: 23 NG/L (ref 0–14)
TSH SERPL DL<=0.05 MIU/L-ACNC: 15.03 MIU/L (ref 0.3–5)
TURBIDITY: CLEAR
URINE HGB: ABNORMAL
UROBILINOGEN, URINE: NORMAL
VT: ABNORMAL
WBC # BLD: 8.7 K/UL (ref 3.5–11.3)
WBC # BLD: ABNORMAL 10*3/UL
WBC UA: ABNORMAL /HPF (ref 0–5)
YEAST: ABNORMAL

## 2022-01-08 PROCEDURE — 2580000003 HC RX 258: Performed by: STUDENT IN AN ORGANIZED HEALTH CARE EDUCATION/TRAINING PROGRAM

## 2022-01-08 PROCEDURE — 87635 SARS-COV-2 COVID-19 AMP PRB: CPT

## 2022-01-08 PROCEDURE — 6370000000 HC RX 637 (ALT 250 FOR IP): Performed by: STUDENT IN AN ORGANIZED HEALTH CARE EDUCATION/TRAINING PROGRAM

## 2022-01-08 PROCEDURE — 6360000002 HC RX W HCPCS: Performed by: STUDENT IN AN ORGANIZED HEALTH CARE EDUCATION/TRAINING PROGRAM

## 2022-01-08 PROCEDURE — 36415 COLL VENOUS BLD VENIPUNCTURE: CPT

## 2022-01-08 PROCEDURE — 87086 URINE CULTURE/COLONY COUNT: CPT

## 2022-01-08 PROCEDURE — 84145 PROCALCITONIN (PCT): CPT

## 2022-01-08 PROCEDURE — 87077 CULTURE AEROBIC IDENTIFY: CPT

## 2022-01-08 PROCEDURE — 96365 THER/PROPH/DIAG IV INF INIT: CPT

## 2022-01-08 PROCEDURE — 71045 X-RAY EXAM CHEST 1 VIEW: CPT

## 2022-01-08 PROCEDURE — 84484 ASSAY OF TROPONIN QUANT: CPT

## 2022-01-08 PROCEDURE — 71260 CT THORAX DX C+: CPT

## 2022-01-08 PROCEDURE — 2580000003 HC RX 258

## 2022-01-08 PROCEDURE — 99222 1ST HOSP IP/OBS MODERATE 55: CPT | Performed by: INTERNAL MEDICINE

## 2022-01-08 PROCEDURE — 96375 TX/PRO/DX INJ NEW DRUG ADDON: CPT

## 2022-01-08 PROCEDURE — 87186 SC STD MICRODIL/AGAR DIL: CPT

## 2022-01-08 PROCEDURE — 6360000004 HC RX CONTRAST MEDICATION: Performed by: STUDENT IN AN ORGANIZED HEALTH CARE EDUCATION/TRAINING PROGRAM

## 2022-01-08 PROCEDURE — 6370000000 HC RX 637 (ALT 250 FOR IP)

## 2022-01-08 PROCEDURE — 84443 ASSAY THYROID STIM HORMONE: CPT

## 2022-01-08 PROCEDURE — 85025 COMPLETE CBC W/AUTO DIFF WBC: CPT

## 2022-01-08 PROCEDURE — 82805 BLOOD GASES W/O2 SATURATION: CPT

## 2022-01-08 PROCEDURE — 1200000000 HC SEMI PRIVATE

## 2022-01-08 PROCEDURE — 85384 FIBRINOGEN ACTIVITY: CPT

## 2022-01-08 PROCEDURE — 86140 C-REACTIVE PROTEIN: CPT

## 2022-01-08 PROCEDURE — 93005 ELECTROCARDIOGRAM TRACING: CPT | Performed by: STUDENT IN AN ORGANIZED HEALTH CARE EDUCATION/TRAINING PROGRAM

## 2022-01-08 PROCEDURE — 82728 ASSAY OF FERRITIN: CPT

## 2022-01-08 PROCEDURE — 6360000002 HC RX W HCPCS

## 2022-01-08 PROCEDURE — 84439 ASSAY OF FREE THYROXINE: CPT

## 2022-01-08 PROCEDURE — 83880 ASSAY OF NATRIURETIC PEPTIDE: CPT

## 2022-01-08 PROCEDURE — 80048 BASIC METABOLIC PNL TOTAL CA: CPT

## 2022-01-08 PROCEDURE — 81001 URINALYSIS AUTO W/SCOPE: CPT

## 2022-01-08 PROCEDURE — 99285 EMERGENCY DEPT VISIT HI MDM: CPT

## 2022-01-08 PROCEDURE — 85379 FIBRIN DEGRADATION QUANT: CPT

## 2022-01-08 PROCEDURE — 83615 LACTATE (LD) (LDH) ENZYME: CPT

## 2022-01-08 RX ORDER — SODIUM CHLORIDE 9 MG/ML
25 INJECTION, SOLUTION INTRAVENOUS PRN
Status: DISCONTINUED | OUTPATIENT
Start: 2022-01-08 | End: 2022-01-11 | Stop reason: HOSPADM

## 2022-01-08 RX ORDER — ONDANSETRON 2 MG/ML
4 INJECTION INTRAMUSCULAR; INTRAVENOUS EVERY 6 HOURS PRN
Status: DISCONTINUED | OUTPATIENT
Start: 2022-01-08 | End: 2022-01-11 | Stop reason: HOSPADM

## 2022-01-08 RX ORDER — POTASSIUM CHLORIDE 20 MEQ/1
40 TABLET, EXTENDED RELEASE ORAL ONCE
Status: COMPLETED | OUTPATIENT
Start: 2022-01-08 | End: 2022-01-08

## 2022-01-08 RX ORDER — ONDANSETRON 4 MG/1
4 TABLET, ORALLY DISINTEGRATING ORAL EVERY 8 HOURS PRN
Status: DISCONTINUED | OUTPATIENT
Start: 2022-01-08 | End: 2022-01-08

## 2022-01-08 RX ORDER — ONDANSETRON 4 MG/1
4 TABLET, ORALLY DISINTEGRATING ORAL EVERY 8 HOURS PRN
Status: DISCONTINUED | OUTPATIENT
Start: 2022-01-08 | End: 2022-01-11 | Stop reason: HOSPADM

## 2022-01-08 RX ORDER — ONDANSETRON 2 MG/ML
4 INJECTION INTRAMUSCULAR; INTRAVENOUS EVERY 6 HOURS PRN
Status: DISCONTINUED | OUTPATIENT
Start: 2022-01-08 | End: 2022-01-08

## 2022-01-08 RX ORDER — SODIUM CHLORIDE 0.9 % (FLUSH) 0.9 %
5-40 SYRINGE (ML) INJECTION PRN
Status: DISCONTINUED | OUTPATIENT
Start: 2022-01-08 | End: 2022-01-11 | Stop reason: HOSPADM

## 2022-01-08 RX ORDER — ASPIRIN 81 MG/1
81 TABLET, CHEWABLE ORAL DAILY
Status: DISCONTINUED | OUTPATIENT
Start: 2022-01-08 | End: 2022-01-11 | Stop reason: HOSPADM

## 2022-01-08 RX ORDER — ACETAMINOPHEN 325 MG/1
650 TABLET ORAL EVERY 6 HOURS PRN
Status: DISCONTINUED | OUTPATIENT
Start: 2022-01-08 | End: 2022-01-11 | Stop reason: HOSPADM

## 2022-01-08 RX ORDER — SODIUM CHLORIDE 0.9 % (FLUSH) 0.9 %
10 SYRINGE (ML) INJECTION PRN
Status: DISCONTINUED | OUTPATIENT
Start: 2022-01-08 | End: 2022-01-11 | Stop reason: HOSPADM

## 2022-01-08 RX ORDER — ACETAMINOPHEN 650 MG/1
650 SUPPOSITORY RECTAL EVERY 6 HOURS PRN
Status: DISCONTINUED | OUTPATIENT
Start: 2022-01-08 | End: 2022-01-11 | Stop reason: HOSPADM

## 2022-01-08 RX ORDER — FUROSEMIDE 10 MG/ML
40 INJECTION INTRAMUSCULAR; INTRAVENOUS ONCE
Status: COMPLETED | OUTPATIENT
Start: 2022-01-08 | End: 2022-01-08

## 2022-01-08 RX ORDER — LEVOTHYROXINE SODIUM 0.12 MG/1
125 TABLET ORAL DAILY
Status: DISCONTINUED | OUTPATIENT
Start: 2022-01-08 | End: 2022-01-11

## 2022-01-08 RX ORDER — SENNOSIDES 8.8 MG/5ML
5 LIQUID ORAL 2 TIMES DAILY PRN
Status: DISCONTINUED | OUTPATIENT
Start: 2022-01-08 | End: 2022-01-11 | Stop reason: HOSPADM

## 2022-01-08 RX ORDER — HEPARIN SODIUM 5000 [USP'U]/ML
5000 INJECTION, SOLUTION INTRAVENOUS; SUBCUTANEOUS EVERY 8 HOURS SCHEDULED
Status: DISCONTINUED | OUTPATIENT
Start: 2022-01-08 | End: 2022-01-11 | Stop reason: HOSPADM

## 2022-01-08 RX ORDER — NICOTINE POLACRILEX 4 MG
15 LOZENGE BUCCAL PRN
Status: DISCONTINUED | OUTPATIENT
Start: 2022-01-08 | End: 2022-01-11 | Stop reason: HOSPADM

## 2022-01-08 RX ORDER — DEXTROSE MONOHYDRATE 25 G/50ML
12.5 INJECTION, SOLUTION INTRAVENOUS PRN
Status: DISCONTINUED | OUTPATIENT
Start: 2022-01-08 | End: 2022-01-11 | Stop reason: HOSPADM

## 2022-01-08 RX ORDER — ATORVASTATIN CALCIUM 80 MG/1
80 TABLET, FILM COATED ORAL DAILY
Status: DISCONTINUED | OUTPATIENT
Start: 2022-01-08 | End: 2022-01-11 | Stop reason: HOSPADM

## 2022-01-08 RX ORDER — POLYETHYLENE GLYCOL 3350 17 G/17G
17 POWDER, FOR SOLUTION ORAL DAILY PRN
Status: DISCONTINUED | OUTPATIENT
Start: 2022-01-08 | End: 2022-01-11 | Stop reason: HOSPADM

## 2022-01-08 RX ORDER — SODIUM CHLORIDE 0.9 % (FLUSH) 0.9 %
10 SYRINGE (ML) INJECTION EVERY 12 HOURS SCHEDULED
Status: DISCONTINUED | OUTPATIENT
Start: 2022-01-08 | End: 2022-01-11 | Stop reason: HOSPADM

## 2022-01-08 RX ORDER — DEXTROSE MONOHYDRATE 50 MG/ML
100 INJECTION, SOLUTION INTRAVENOUS PRN
Status: DISCONTINUED | OUTPATIENT
Start: 2022-01-08 | End: 2022-01-11 | Stop reason: HOSPADM

## 2022-01-08 RX ORDER — MAGNESIUM SULFATE 1 G/100ML
1000 INJECTION INTRAVENOUS PRN
Status: DISCONTINUED | OUTPATIENT
Start: 2022-01-08 | End: 2022-01-11 | Stop reason: HOSPADM

## 2022-01-08 RX ORDER — SODIUM CHLORIDE 0.9 % (FLUSH) 0.9 %
5-40 SYRINGE (ML) INJECTION EVERY 12 HOURS SCHEDULED
Status: DISCONTINUED | OUTPATIENT
Start: 2022-01-08 | End: 2022-01-11 | Stop reason: HOSPADM

## 2022-01-08 RX ORDER — CLOPIDOGREL BISULFATE 75 MG/1
75 TABLET ORAL DAILY
Status: DISCONTINUED | OUTPATIENT
Start: 2022-01-08 | End: 2022-01-11 | Stop reason: HOSPADM

## 2022-01-08 RX ADMIN — POTASSIUM CHLORIDE 40 MEQ: 1500 TABLET, EXTENDED RELEASE ORAL at 13:47

## 2022-01-08 RX ADMIN — ACETAMINOPHEN 650 MG: 325 TABLET ORAL at 21:59

## 2022-01-08 RX ADMIN — HEPARIN SODIUM 5000 UNITS: 5000 INJECTION INTRAVENOUS; SUBCUTANEOUS at 22:59

## 2022-01-08 RX ADMIN — SODIUM CHLORIDE, PRESERVATIVE FREE 10 ML: 5 INJECTION INTRAVENOUS at 21:47

## 2022-01-08 RX ADMIN — IOPAMIDOL 85 ML: 755 INJECTION, SOLUTION INTRAVENOUS at 09:34

## 2022-01-08 RX ADMIN — HEPARIN SODIUM 5000 UNITS: 5000 INJECTION INTRAVENOUS; SUBCUTANEOUS at 16:29

## 2022-01-08 RX ADMIN — ENOXAPARIN SODIUM 40 MG: 100 INJECTION SUBCUTANEOUS at 13:47

## 2022-01-08 RX ADMIN — FUROSEMIDE 40 MG: 10 INJECTION INTRAMUSCULAR; INTRAVENOUS at 10:13

## 2022-01-08 RX ADMIN — SODIUM CHLORIDE, PRESERVATIVE FREE 10 ML: 5 INJECTION INTRAVENOUS at 21:46

## 2022-01-08 RX ADMIN — CEFTRIAXONE SODIUM 1000 MG: 1 INJECTION, POWDER, FOR SOLUTION INTRAMUSCULAR; INTRAVENOUS at 10:13

## 2022-01-08 ASSESSMENT — ENCOUNTER SYMPTOMS
WHEEZING: 0
ABDOMINAL PAIN: 0
RHINORRHEA: 0
VOMITING: 0
BLOOD IN STOOL: 0
SHORTNESS OF BREATH: 1
CHEST TIGHTNESS: 0
DIARRHEA: 0
SORE THROAT: 0
NAUSEA: 0
CONSTIPATION: 0
COUGH: 1

## 2022-01-08 ASSESSMENT — PAIN DESCRIPTION - PAIN TYPE: TYPE: ACUTE PAIN

## 2022-01-08 ASSESSMENT — PAIN DESCRIPTION - LOCATION: LOCATION: CHEST;LEG

## 2022-01-08 ASSESSMENT — PAIN SCALES - GENERAL
PAINLEVEL_OUTOF10: 0
PAINLEVEL_OUTOF10: 0
PAINLEVEL_OUTOF10: 7

## 2022-01-08 NOTE — ED PROVIDER NOTES
[facility] ED  eMERGENCY dEPARTMENT eNCOUnter   Attending Attestation     Pt Name: Nabeel Castillo  MRN: [id number]  Marciagftoney 1935  Date of evaluation: 1/8/22       Nabeel Castillo is a 82 y.o. female who presents with Extremity Weakness and Chest Pain      History: Patient presents with diffuse weakness. Patient slumped down to the ground earlier today did not fall. EMS was called for lift assist but felt worse and required transport to this emergency department. Exam: Heart rate and rhythm are regular. Lungs are clear to auscultation bilaterally. Abdomen is soft, nontender. Plan for cardiac work-up. We will check a Covid test.  Likely admission because she  is a fall risk with Covid. Patient also has UTI. EKG shows likely sinus rhythm with right bundle branch block. No ST elevation or depression that is new. EKG looks similar to previous. No changes. I performed a history and physical examination of the patient and discussed management with the resident. I reviewed the residents note and agree with the documented findings and plan of care. Any areas of disagreement are noted on the chart. I was personally present for the key portions of any procedures. I have documented in the chart those procedures where I was not present during the key portions. I have personally reviewed all images and agree with the resident's interpretation. I have reviewed the emergency nurses triage note. I agree with the chief complaint, past medical history, past surgical history, allergies, medications, social and family history as documented unless otherwise noted below. Documentation of the HPI, Physical Exam and Medical Decision Making performed by medical students or scribes is based on my personal performance of the HPI, PE and MDM.  For Phys Assistant/ Nurse Practitioner cases/documentation I have had a face to face evaluation of this patient and have completed at least one if not all key elements of the E/M (history, physical exam, and MDM). Additional findings are as noted. For APC cases I have personally evaluated and examined the patient in conjunction with the APC and agree with the treatment plan and disposition of the patient as recorded by the APC.     Baltazar Aggarwal MD  Attending Emergency  Physician       Rebecca Payne MD  01/08/22 [id number]

## 2022-01-08 NOTE — ED NOTES
Bed: 21  Expected date:   Expected time:   Means of arrival:   Comments:  Medic Dhara Hunt, AMARJIT  01/08/22 [id number]

## 2022-01-08 NOTE — ED NOTES
Pt. To ER room 21 via stretcher with TFD from home  Pt. Presents with bilateral lower extremity weakness and swelling; per daughter who lives with patient, she was trying to get pt to the chair from the bed and had to lower her from the floor as the pt was too weak to stand a pivot  Pt. Also reports chest pain, has a history of CHF, pt denies previous hx of MI or stroke  Pt. Has open wounds on bilateral lower extremities and under her breasts and in his peritoneal area  Pt. Underwear and pad were soiled on arrival; pt states daughter cares for her at home and she feels safe in her environment  Pt. A/Ox4, RR even and unlabored, NAD  Purewick placed with pt consent, urine sample obtained prior, sent to lab via tube system  IV access established, labs drawn and sent to lab, EKG obtained and shows a right bundle branch block, unknown if new  Pt.  On full cardiac monitor  Awaiting further orders  Will continue to monitor        Jeffry Watts RN  01/08/22 1237

## 2022-01-08 NOTE — H&P
[facility]     Department of Internal Medicine - Staff Internal Medicine Teaching Service          ADMISSION NOTE/HISTORY AND PHYSICAL EXAMINATION   Date: 1/8/2022  Patient Name: Isma Dewitt  Date of admission: 1/8/2022  8:11 AM  YOB: 1935  PCP: Leisa España MD  History Obtained From:  patient, electronic medical record    CHIEF COMPLAINT     Chief complaint:     HISTORY OF PRESENTING ILLNESS     A 82 y.o. female with PMH significant of   CAD (s/p on 10/08/12 PTCA/ PEPITO to LAD, EF 45%.)  Chronic diastolic CHF  Thyroid disease  HTN, DM, HLD  - is brought in by EMS with the chief complaint of generalized weakness. She is Covid vaccinated (J&J) but for last 2 weeks, she has been having fatigue, malaise, shortness of breath, chest pain, cough and congestion. She lost balance 3-4 times in the last 3 days. Even this morning, when she was on bed, watching TV, she felt lightheaded, nauseous, had chest pain and she was about to fall down, and the daughter helped her lower down and called EMS. When EMS brought her to the ER, her underwear and pad were soiled. She has been having dizziness but never lost consciousness. She denies any palpitation. She denies any incidence of bowel or bladder incontinence associated with lightheadedness for  She states that chest pain is more substernal and left-sided, dull in character, non-radiating, around 4-5/10 in intensity, associated with no aggravating or relieving factors. She lives with her daughter, who helps her, in her activities of daily living. She moves somewhat in the house with the help of wheelchair. She also has been having bilateral lower extremity +2 pedal edema. She complains of 8/10 pain in legs, states that started around 2 weeks back. She also has orthopnea and PND episodes. She also endorses cough, bringing up white yellowish phlegm, poor appetite, some myalgias and arthralgias.   But she denies fever, chills, or sweating. She denies any diarrhea or constipation. No burning micturition/urgency/frequency. Patient states that her daughter helps her with management of daily medications and she tries to be compliant with all of them. Work-up in ER  CBC-unremarkable  BMP-creatinine 1.0, potassium-3.6, replaced  VBG-unremarkable except O2 52  D-dimer 1.77-> CT PE  UA-significant of UTI. TSH-15.03, T3-0.32  Chest x-ray showed no acute cardiopulmonary process  proBNP 186  Troponin 21-23-  Inflammatory markers-CRP 17.1, , pro-Kyle 0.1  EKG showed no acute ST or T wave abnormality. On my evaluation, she is hemodynamically stable, saturating well on room air. She has tremors present of tongue. She answers all questions appropriately. She is alert and oriented.       Review of Systems:  General ROS: Completed and except as mentioned above were negative   HEENT ROS: Completed and except as mentioned above were negative   Allergy and Immunology ROS:  Completed and except as mentioned above were negative  Hematological and Lymphatic ROS:  Completed and except as mentioned above were negative  Respiratory ROS:  Completed and except as mentioned above were negative  Cardiovascular ROS:  Completed and except as mentioned above were negative  Gastrointestinal ROS: Completed and except as mentioned above were negative  Genito-Urinary ROS:  Completed and except as mentioned above were negative  Musculoskeletal ROS:  Completed and except as mentioned above were negative  Neurological ROS:  Completed and except as mentioned above were negative  Skin & Dermatological ROS:  Completed and except as mentioned above were negative  Psychological ROS:  Completed and except as mentioned above were negative    PAST MEDICAL HISTORY     Past Medical History:   Diagnosis Date    Arthritis     Atrial flutter ([facility]     CAD (coronary artery disease)     CHF (congestive heart failure) ([facility]     Diabetes ([facility]     [facility] mellitus [facility]     Hyperlipidemia     Hypertension     Psychiatric problem     Thyroid cancer ([facility]     S/P thyroidectomy; on synthroid    Thyroid disease     hypothyroid       PAST SURGICAL HISTORY     Past Surgical History:   Procedure Laterality Date    APPENDECTOMY      BACK SURGERY      CHOLECYSTECTOMY      CORONARY ANGIOPLASTY WITH STENT PLACEMENT      HYSTERECTOMY      THYROIDECTOMY      UPPER GASTROINTESTINAL ENDOSCOPY  4/14/2021    EGD BIOPSY performed by Kirstie Meléndez MD at [facility] ENDOSCOPY  4/14/2021    EGD CONTROL HEMORRHAGE performed by Kirstie Meléndez MD at [address] extract and Tape Arty Holler tape]    MEDICATIONS PRIOR TO ADMISSION     Prior to Admission medications    Medication Sig Start Date End Date Taking?  Authorizing Provider   pantoprazole (PROTONIX) 40 MG tablet Take 1 tablet by mouth 2 times daily 6/17/21 8/17/21  Washington Petit MD   FEROSUL 325 (65 Fe) MG tablet TAKE 1 TABLET BY MOUTH 2 TIMES DAILY 12/17/21   Washington Petit MD   insulin glargine (BASAGLAR KWIKPEN) 100 UNIT/ML injection pen Inject 42 Units into the skin nightly 12/8/21   STEFAN Berry CNP   Continuous Blood Gluc Sensor (FREESTYLE KERRY 2 SENSOR) MISC 1 Device by Does not apply route every 14 days 11/23/21   STEFAN Berry CNP   Continuous Blood Gluc  (FREESTYLE KERRY 2 READER) CLAUDIO 1 Device by Does not apply route daily 11/23/21   STEFAN Berry CNP   Cyanocobalamin (VITAMIN B-12) 1000 MCG extended release tablet TAKE 1 TAB BY MOUTH ONCE A DAY  10/28/21   Washington Petit MD   furosemide (LASIX) 40 MG tablet TAKE 1 TABLET BY MOUTH DAILY  8/10/21   Washington Petit MD   atorvastatin (LIPITOR) 80 MG tablet TAKE 1 TABLET BY MOUTH DAILY  8/10/21   Washington Petit MD   vitamin D3 (CHOLECALCIFEROL) 25 MCG (1000 UT) TABS tablet TAKE 1 TAB BY MOUTH ONCE A DAY  8/10/21   Washington Petit MD   clopidogrel (PLAVIX) 75 MG tablet TAKE 1 TAB BY MOUTH ONCE A DAY 7/29/21   Harvey Faust MD   metFORMIN (GLUCOPHAGE) 500 MG tablet Take 2 tablets by mouth 2 times daily (with meals) 7/13/21   Harvey Faust MD   loratadine (CLARITIN) 10 MG tablet TAKE 1 TABLET BY MOUTH TWICE A DAY AS NEEDED 7/8/21   Irving Garcia MD   sertraline (ZOLOFT) 100 MG tablet Take 1 tablet by Mouth Once a Day 6/17/21   Harvey Faust MD   SITagliptin (JANUVIA) 100 MG tablet TAKE 1 TAB BY MOUTH ONCE A DAY 6/3/21   Harvey Faust MD   pantoprazole (PROTONIX) 20 MG tablet Take 1 tablet by mouth daily 4/20/21   Bruno Quick MD   blood glucose monitor strips Test 2 times a day & as needed for symptoms of irregular blood glucose. Dispense sufficient amount for indicated testing frequency plus additional to accommodate PRN testing needs. 3/29/21   Harvey Faust MD   levothyroxine (SYNTHROID) 125 MCG tablet TAKE 1 TAB BY MOUTH EVERY MORNING 3/29/21   Harvey Faust MD   metoprolol tartrate (LOPRESSOR) 25 MG tablet Take 1 tablet by mouth 2 times daily 12/10/20   Irving Garcia MD   Continuous Blood Gluc  (FREESTYLE KERRY 14 DAY READER) CLAUDIO 1 Device by Does not apply route 4 times daily 11/30/20   Harvey Faust MD   Continuous Blood Gluc Sensor (FREESTYLE KERRY 2 SENSOR SYSTM) MISC 4 Devices by Does not apply route every 7 days 11/30/20   Harvey Faust MD   Lancets MISC 1 each by Does not apply route 2 times daily 7/5/19   Harvey Faust MD   diclofenac sodium (VOLTAREN) 1 % GEL Apply 2 g topically 2 times daily 12/18/18   Harvey Faust MD   nystatin (MYCOSTATIN) 425942 UNIT/GM cream Apply topically 2 times daily.  5/7/18   Harvey Faust MD   docusate sodium (COLACE) 100 MG capsule Take 1 capsule by mouth daily as needed for Constipation 8/18/17   Harvey Faust MD   aspirin 81 MG chewable tablet Take 1 tablet by mouth daily 9/5/16   Sharla Galvez MD       SOCIAL HISTORY     Tobacco: Patient denies  Alcohol: Used to drink socially years back  Illicits: Patient denies. FAMILY HISTORY     Family History   Problem Relation Age of Onset    Cancer Mother     Cancer Father     Cancer Brother     Diabetes Brother        PHYSICAL EXAM     Vitals: BP (!) 115/55   Pulse 89   Temp 98.3 °F (36.8 °C) (Oral)   Resp 18   Ht 5' 2\" (1.575 m)   Wt 200 lb (90.7 kg)   SpO2 92%   BMI 36.58 kg/m²   Tmax: Temp (24hrs), Av.3 °F (36.8 °C), Min:98.3 °F (36.8 °C), Max:98.3 °F (36.8 °C)    Last Body weight:   Wt Readings from Last 3 Encounters:   22 200 lb (90.7 kg)   21 200 lb (90.7 kg)   21 200 lb 4.8 oz (90.9 kg)     Body Mass Index : Body mass index is 36.58 kg/m². PHYSICAL EXAMINATION:  Constitutional: This is a well developed, well nourished, 35-39.9 - Obesity Grade II 82y.o. year old female who is alert, oriented, cooperative and in no apparent distress. Head:normocephalic and atraumatic. EENT:  PERRLA. No conjunctival injections. Septum was midline, mucosa was without erythema, exudates or cobblestoning. No thrush was noted. Neck: Supple without thyromegaly. No elevated JVP. Trachea was midline. Respiratory: Chest was symmetrical without dullness to percussion. Breath sounds bilaterally were clear to auscultation. There were no wheezes, rhonchi or rales. There is no intercostal retraction or use of accessory muscles. No egophony noted. Cardiovascular: Regular without murmur, clicks, gallops or rubs. Abdomen: Slightly rounded and soft without organomegaly. No rebound, rigidity or guarding was appreciated. Lymphatic: No lymphadenopathy. Musculoskeletal: Normal curvature of the spine. No gross muscle weakness. Extremities: +3 grade pitting lower extremity edema. No varicosities or erythema. Muscle size, tone and strength are normal.  No involuntary movements are noted. Skin:  Warm and dry. Good color, turgor and pigmentation. No lesions or scars.   No cyanosis or clubbing  Neurological/Psychiatric: The patient's general behavior, level of consciousness, thought content and emotional status is normal.          INVESTIGATIONS     Laboratory Testing:     Recent Results (from the past 24 hour(s))   EKG 12 Lead    Collection Time: 01/08/22  8:16 AM   Result Value Ref Range    Ventricular Rate 79 BPM    Atrial Rate 79 BPM    P-R Interval 196 ms    QRS Duration 140 ms    Q-T Interval 452 ms    QTc Calculation (Bazett) 518 ms    P Axis -16 degrees    R Axis -109 degrees    T Axis 39 degrees   CBC Auto Differential    Collection Time: 01/08/22  8:20 AM   Result Value Ref Range    WBC 8.7 3.5 - 11.3 k/uL    RBC 4.75 3.95 - 5.11 m/uL    Hemoglobin 13.2 11.9 - 15.1 g/dL    Hematocrit 42.0 36.3 - 47.1 %    MCV 88.4 82.6 - 102.9 fL    MCH 27.8 25.2 - 33.5 pg    MCHC 31.4 28.4 - 34.8 g/dL    RDW 13.9 11.8 - 14.4 %    Platelets 674 534 - 034 k/uL    MPV 11.7 8.1 - 13.5 fL    NRBC Automated 0.0 0.0 per 100 WBC    Differential Type NOT REPORTED     Seg Neutrophils 66 (H) 36 - 65 %    Lymphocytes 21 (L) 24 - 43 %    Monocytes 10 3 - 12 %    Eosinophils % 1 1 - 4 %    Basophils 1 0 - 2 %    Immature Granulocytes 1 (H) 0 %    Segs Absolute 5.72 1.50 - 8.10 k/uL    Absolute Lymph # 1.83 1.10 - 3.70 k/uL    Absolute Mono # 0.85 0.10 - 1.20 k/uL    Absolute Eos # 0.10 0.00 - 0.44 k/uL    Basophils Absolute 0.09 0.00 - 0.20 k/uL    Absolute Immature Granulocyte 0.08 0.00 - 0.30 k/uL    WBC Morphology NOT REPORTED     RBC Morphology NOT REPORTED     Platelet Estimate NOT REPORTED    Basic Metabolic Panel w/ Reflex to MG    Collection Time: 01/08/22  8:20 AM   Result Value Ref Range    Glucose 123 (H) 70 - 99 mg/dL    BUN 18 8 - 23 mg/dL    CREATININE 1.20 (H) 0.50 - 0.90 mg/dL    Bun/Cre Ratio NOT REPORTED 9 - 20    Calcium 8.5 (L) 8.6 - 10.4 mg/dL    Sodium 137 135 - 144 mmol/L    Potassium 3.6 (L) 3.7 - 5.3 mmol/L    Chloride 102 98 - 107 mmol/L    CO2 21 20 - 31 mmol/L    Anion Gap 14 9 - 17 mmol/L    GFR Non-African American 43 (L) >60 mL/min    GFR  52 (L) >60 mL/min    GFR Comment          GFR Staging NOT REPORTED    Troponin    Collection Time: 01/08/22  8:20 AM   Result Value Ref Range    Troponin, High Sensitivity 21 (H) 0 - 14 ng/L    Troponin T NOT REPORTED <0.03 ng/mL    Troponin Interp NOT REPORTED    TSH with Reflex    Collection Time: 01/08/22  8:20 AM   Result Value Ref Range    TSH 15.03 (H) 0.30 - 5.00 mIU/L   Brain Natriuretic Peptide    Collection Time: 01/08/22  8:20 AM   Result Value Ref Range    Pro- <300 pg/mL    BNP Interpretation NOT REPORTED    Urinalysis Reflex to Culture    Collection Time: 01/08/22  8:20 AM    Specimen: Urine, clean catch   Result Value Ref Range    Color, UA Yellow Yellow    Turbidity UA Clear Clear    Glucose, Ur NEGATIVE NEGATIVE    Bilirubin Urine NEGATIVE NEGATIVE    Ketones, Urine TRACE (A) NEGATIVE    Specific Gravity, UA 1.018 1.005 - 1.030    Urine Hgb TRACE (A) NEGATIVE    pH, UA 5.0 5.0 - 8.0    Protein, UA TRACE (A) NEGATIVE    Urobilinogen, Urine Normal Normal    Nitrite, Urine POSITIVE (A) NEGATIVE    Leukocyte Esterase, Urine MODERATE (A) NEGATIVE    Urinalysis Comments NOT REPORTED    Blood Gas, Venous    Collection Time: 01/08/22  8:20 AM   Result Value Ref Range    pH, Uday 7.346 7.320 - 7.420    pCO2, Uday 45.9 39 - 55    pO2, Uday 30.0 30 - 50    HCO3, Venous 24.4 24 - 30 mmol/L    Positive Base Excess, Uday NOT REPORTED 0.0 - 2.0 mmol/L    Negative Base Excess, Uday 1.0 0.0 - 2.0 mmol/L    O2 Sat, Uday 52.0 (L) 60.0 - 85.0 %    Total Hb NOT REPORTED 12.0 - 16.0 g/dl    Oxyhemoglobin NOT REPORTED 95.0 - 98.0 %    Carboxyhemoglobin 2.5 0 - 5 %    Methemoglobin NOT REPORTED 0.0 - 1.5 %    Pt Temp 37.0     pH, Uday, Temp Adj NOT REPORTED 7.320 - 7.420    pCO2, Uday, Temp Adj NOT REPORTED 39 - 55 mmHg    pO2, Uday, Temp Adj NOT REPORTED 30 - 50 mmHg    O2 Device/Flow/% NOT REPORTED     Respiratory Rate NOT REPORTED     Redd Test NOT REPORTED     Sample Site NOT REPORTED Pt. Position NOT REPORTED     Mode NOT REPORTED     Set Rate NOT REPORTED     Total Rate NOT REPORTED     VT NOT REPORTED     FIO2 UNKNOWN     Peep/Cpap NOT REPORTED     PSV NOT REPORTED     Text for Respiratory NOT REPORTED     NOTIFICATION NOT REPORTED     NOTIFICATION TIME NOT REPORTED    D-Dimer, Quantitative    Collection Time: 01/08/22  8:20 AM   Result Value Ref Range    D-Dimer, Quant 1.77 mg/L FEU   Microscopic Urinalysis    Collection Time: 01/08/22  8:20 AM   Result Value Ref Range    -          WBC, UA 20 TO 50 0 - 5 /HPF    RBC, UA 2 TO 5 0 - 4 /HPF    Casts UA  0 - 8 /LPF     2 TO 5 HYALINE Reference range defined for non-centrifuged specimen. Crystals, UA NOT REPORTED None /HPF    Epithelial Cells UA 2 TO 5 0 - 5 /HPF    Renal Epithelial, UA NOT REPORTED 0 /HPF    Bacteria, UA MODERATE (A) None    Mucus, UA NOT REPORTED None    Trichomonas, UA NOT REPORTED None    Amorphous, UA NOT REPORTED None    Other Observations UA NOT REPORTED NOT REQ. Yeast, UA NOT REPORTED None   T4, Free    Collection Time: 01/08/22  8:20 AM   Result Value Ref Range    Thyroxine, Free 0.32 (L) 0.93 - 1.70 ng/dL   C-Reactive Protein    Collection Time: 01/08/22  8:20 AM   Result Value Ref Range    CRP 17.1 (H) 0.0 - 5.0 mg/L   Fibrinogen    Collection Time: 01/08/22  8:20 AM   Result Value Ref Range    Fibrinogen 354 140 - 420 mg/dL   Lactate Dehydrogenase    Collection Time: 01/08/22  8:20 AM   Result Value Ref Range     135 - 214 U/L   Procalcitonin    Collection Time: 01/08/22  8:20 AM   Result Value Ref Range    Procalcitonin 0.10 (H) <0.09 ng/mL   COVID-19, Rapid    Collection Time: 01/08/22  8:29 AM    Specimen: Nasopharyngeal Swab   Result Value Ref Range    Specimen Description . NASOPHARYNGEAL SWAB     SARS-CoV-2, Rapid DETECTED (A) Not Detected   Troponin    Collection Time: 01/08/22  9:19 AM   Result Value Ref Range    Troponin, High Sensitivity 23 (H) 0 - 14 ng/L    Troponin T NOT REPORTED <0.03 ng/mL Troponin Interp NOT REPORTED        Imaging:   XR CHEST PORTABLE    Result Date: 1/8/2022  No acute process. Stable cardiomegaly. CT CHEST PULMONARY EMBOLISM W CONTRAST    Result Date: 1/8/2022  No evidence of pulmonary embolism or acute pulmonary abnormality. RECOMMENDATIONS: Unavailable       ASSESSMENT & PLAN       IMPRESSION  This is a 82 y.o. female who presented with generalized weakness and found to have Covid infection, UTI, and BRIA. Patient admitted to inpatient status to evaluate and manage the same. COVID-19  She is generally vaccinated  Currently saturating well on room air  We will monitor    Urinary tract infection  Acute cystitis without hematuria  Nitrates and leukocyte esterase positive  We will follow report of urine culture  Started on Rocephin    Acute kidney injury ([facility]  Creatinine 1.2  Will monitor    Hypothyroidism uncontrolled  Will confirm compliance with the daughter  PMH of thyroid cancer s/p thyroidectomy, on Synthroid  TSH 15.03, T3 0.32  Home dose resumed of Synthroid 125 MCG    Type 2 diabetes mellitus with complication, without long-term current use of insulin ([facility]  She is on 42 units of Lantus at home nightly. Will resume as appropriate  On sliding scale of insulin  Hypoglycemia protocol in place    Chronic diastolic congestive heart failure (HCC)  bilateral swelling of feet and ankles  2D echo in 2016-preserved ejection fraction 60-65% and grade 1 diastolic dysfunction  She is on 40 mg p.o. Lasix at home  She received IV Lasix 40 mg. Hypertension  On Lopressor 25 mg at home  We will resume as appropriate    CAD  Continue aspirin, Plavix, statin. Will resume beta-blocker when appropriate    Hyperlipidemia  On atorvastatin 80 mg    H/o atrial flutter  Currently normal sinus rhythm    Hypokalemia  Potassium 3.5, replaced      DVT ppx:   On heparin    GI ppx:   Not indicated    PT/OT/SW:  Consulted    Discharge Planning:   to assist in discharge planning    Blake Anderson MD  Internal Medicine Resident, PGY-1  [facility];  [place], [place]  1/8/2022, 3:13 PM

## 2022-01-08 NOTE — PROGRESS NOTES
Attending Physician Statement  Please refer to resident note for details. I have discussed the case, including pertinent history and exam findings with the resident and the team.  I have seen and examined the patient and the key elements of the encounter have been performed by me. I agree with the assessment, plan and orders as documented by the resident. Review of Systems:   In addition to the pertinent positives and negatives as stated within HPI and the review of systems as documented in their notes, all other systems were reviewed when able to and are reported negative. 82years old female who came to the hospital because of chest pain and fatigue. Patient was found to be Covid positive. Troponins have been close to her baseline. EKG did not show any acute changes.   Patient  Admit the patient inpatient  Monitor troponins  Monitor vitals  Replace potassium  Antibiotics for UTI  ID evaluation  TSH is elevated, will attempt to get in touch with family about compliance  Insulin sliding scale  DVT prophylaxis      Karan Warren MD  Attending Physician, Internal Medicine Service    Internal Medicine Residency Program  1/8/2022, 10:31 PM

## 2022-01-08 NOTE — ED PROVIDER NOTES
[facility]  ED  Emergency Department Encounter  EmergencyMedicine Resident     Pt Sylvain Martinez  MRN: [id number]  [place] 1935  Date of evaluation: 1/8/22  PCP:  Tom Casillas MD    This patient was evaluated in the Emergency Department for symptoms described in the history of present illness. The patient was evaluated in the context of the global COVID-19 pandemic, which necessitated consideration that the patient might be at risk for infection with the SARS-CoV-2 virus that causes COVID-19. Institutional protocols and algorithms that pertain to the evaluation of patients at risk for COVID-19 are in a state of rapid change based on information released by regulatory bodies including the CDC and federal and state organizations. These policies and algorithms were followed during the patient's care in the ED. CHIEF COMPLAINT       Chief Complaint   Patient presents with    Extremity Weakness    Chest Pain       HISTORY OF PRESENT ILLNESS  (Location/Symptom, Timing/Onset, Context/Setting, Quality, Duration, Modifying Factors, Severity.)      Ananda Collado is a 82 y.o. female who presents with chest pain. Patient presents to the emergency department with 2 weeks of progressive worsening shortness of breath, chest pain that is anterior, achy, nonradiating, mild to moderate severity, associated with bilateral lower extremity swelling and pain, PND, orthopnea, generalized weakness, generalized fatigue, cough, congestion. Patient denies fevers, chills, rhinorrhea, sore throat, diaphoresis, palpitations, abdominal pain, nausea, vomiting, diarrhea, dysuria, pregnancy, urgency. Patient has a history of HTN, HLD, DM, CAD, CHF, atrial flutter, thyroid dysfunction, not on any anticoagulation medication. Patient is vaccinated with J&J Covid vaccine. Family called EMS for lift assist, were concerned about UTI and patient.     PAST MEDICAL / SURGICAL / SOCIAL / FAMILY HISTORY      has a past medical history of Arthritis, Atrial flutter ([facility] [address] CAD (coronary artery disease), CHF (congestive heart failure) ([facility] [address] Diabetes ([facility] [address] Diabetes mellitus ([facility] [address] Hyperlipidemia, Hypertension, Psychiatric problem, Thyroid cancer ([facility] [address] and Thyroid disease. has a past surgical history that includes Coronary angioplasty with stent; Hysterectomy; Cholecystectomy; Thyroidectomy; Appendectomy; back surgery; Upper gastrointestinal endoscopy (4/14/2021); and Upper gastrointestinal endoscopy (4/14/2021). Social History     Socioeconomic History    Marital status:      Spouse name: Not on file    Number of children: Not on file    Years of education: Not on file    Highest education level: Not on file   Occupational History    Not on file   Tobacco Use    Smoking status: Former Smoker    Smokeless tobacco: Never Used   Vaping Use    Vaping Use: Never used   Substance and Sexual Activity    Alcohol use: No    Drug use: No    Sexual activity: Not Currently   Other Topics Concern    Not on file   Social History Narrative    Not on file     Social Determinants of Health     Financial Resource Strain: Medium Risk    Difficulty of Paying Living Expenses: Somewhat hard   Food Insecurity: Food Insecurity Present    Worried About Running Out of Food in the Last Year: Sometimes true    Portia of Food in the Last Year: Often true   Transportation Needs:     Lack of Transportation (Medical): Not on file    Lack of Transportation (Non-Medical):  Not on file   Physical Activity:     Days of Exercise per Week: Not on file    Minutes of Exercise per Session: Not on file   Stress:     Feeling of Stress : Not on file   Social Connections:     Frequency of Communication with Friends and Family: Not on file    Frequency of Social Gatherings with Friends and Family: Not on file    Attends Latter-day Services: Not on file    Active Member of Clubs or Organizations: Not on file    Attends Club or Organization Meetings: Not on file    Marital Status: Not on file   Intimate Partner Violence:     Fear of Current or Ex-Partner: Not on file    Emotionally Abused: Not on file    Physically Abused: Not on file    Sexually Abused: Not on file   Housing Stability:     Unable to Pay for Housing in the Last Year: Not on file    Number of Mariam in the Last Year: Not on file    Unstable Housing in the Last Year: Not on file       Family History   Problem Relation Age of Onset    Cancer Mother     Cancer Father     Cancer Brother     Diabetes Brother        Allergies:  Strawberry extract and Tape [adhesive tape]    Home Medications:  Prior to Admission medications    Medication Sig Start Date End Date Taking?  Authorizing Provider   pantoprazole (PROTONIX) 40 MG tablet Take 1 tablet by mouth 2 times daily 6/17/21 8/17/21  Shayna Juarez MD   FEROSUL 325 (65 Fe) MG tablet TAKE 1 TABLET BY MOUTH 2 TIMES DAILY 12/17/21   Shayna Juarez MD   insulin glargine (BASAGLAR KWIKPEN) 100 UNIT/ML injection pen Inject 42 Units into the skin nightly 12/8/21   STEFAN Christiansen CNP   Continuous Blood Gluc Sensor (FREESTYLE KERRY 2 SENSOR) MISC 1 Device by Does not apply route every 14 days 11/23/21   STEFAN Christiansen CNP   Continuous Blood Gluc  (FREESTYLE KERRY 2 READER) CLAUDIO 1 Device by Does not apply route daily 11/23/21   STEFAN Christiansen CNP   Cyanocobalamin (VITAMIN B-12) 1000 MCG extended release tablet TAKE 1 TAB BY MOUTH ONCE A DAY  10/28/21   Shayna Juarez MD   furosemide (LASIX) 40 MG tablet TAKE 1 TABLET BY MOUTH DAILY  8/10/21   Shayna Juarez MD   atorvastatin (LIPITOR) 80 MG tablet TAKE 1 TABLET BY MOUTH DAILY  8/10/21   Shayna Juarez MD   vitamin D3 (CHOLECALCIFEROL) 25 MCG (1000 UT) TABS tablet TAKE 1 TAB BY MOUTH ONCE A DAY  8/10/21   Shayna Juarez MD   clopidogrel (PLAVIX) 75 MG tablet TAKE 1 TAB BY MOUTH ONCE A DAY 7/29/21   Shayna Juarez MD   metFORMIN (GLUCOPHAGE) 500 MG tablet Take 2 tablets by mouth 2 times daily (with meals) 7/13/21   Agueda Lorenzo MD   loratadine (CLARITIN) 10 MG tablet TAKE 1 TABLET BY MOUTH TWICE A DAY AS NEEDED 7/8/21   Valentina Briceno MD   sertraline (ZOLOFT) 100 MG tablet Take 1 tablet by Mouth Once a Day 6/17/21   Agueda Lorenzo MD   SITagliptin (JANUVIA) 100 MG tablet TAKE 1 TAB BY MOUTH ONCE A DAY 6/3/21   Agueda Lorenzo MD   pantoprazole (PROTONIX) 20 MG tablet Take 1 tablet by mouth daily 4/20/21   Betty Rodríguez MD   blood glucose monitor strips Test 2 times a day & as needed for symptoms of irregular blood glucose. Dispense sufficient amount for indicated testing frequency plus additional to accommodate PRN testing needs. 3/29/21   Agueda Lorenzo MD   levothyroxine (SYNTHROID) 125 MCG tablet TAKE 1 TAB BY MOUTH EVERY MORNING 3/29/21   Agueda Lorenzo MD   metoprolol tartrate (LOPRESSOR) 25 MG tablet Take 1 tablet by mouth 2 times daily 12/10/20   Valentina Briceno MD   Continuous Blood Gluc  (FREESTYLE KERRY 14 DAY READER) CLAUDIO 1 Device by Does not apply route 4 times daily 11/30/20   Agueda Lorenzo MD   Continuous Blood Gluc Sensor (FREESTYLE KERRY 2 SENSOR SYSTM) MISC 4 Devices by Does not apply route every 7 days 11/30/20   Agueda Lorenzo MD   Lancets MISC 1 each by Does not apply route 2 times daily 7/5/19   Agueda Lorenzo MD   diclofenac sodium (VOLTAREN) 1 % GEL Apply 2 g topically 2 times daily 12/18/18   Agueda Lorenzo MD   nystatin (MYCOSTATIN) 876770 UNIT/GM cream Apply topically 2 times daily. 5/7/18   Agueda Lorenzo MD   docusate sodium (COLACE) 100 MG capsule Take 1 capsule by mouth daily as needed for Constipation 8/18/17   Agueda Lorenzo MD   aspirin 81 MG chewable tablet Take 1 tablet by mouth daily 9/5/16   Vinny Grossman MD       REVIEW OF SYSTEMS    (2-9 systems for level 4, 10 or more for level 5)      Review of Systems   Constitutional: Positive for fatigue. Negative for chills, diaphoresis and fever.    HENT: Positive for congestion. Negative for rhinorrhea and sore throat. Eyes: Negative for visual disturbance. Respiratory: Positive for cough and shortness of breath. Negative for chest tightness and wheezing. Cardiovascular: Positive for chest pain and leg swelling. Negative for palpitations. PND, orthopnea   Gastrointestinal: Negative for abdominal pain, blood in stool, constipation, diarrhea, nausea and vomiting. Genitourinary: Negative for dysuria and hematuria. Musculoskeletal: Negative for neck pain and neck stiffness. Skin: Negative for pallor and rash. Neurological: Positive for weakness. Negative for dizziness, syncope, light-headedness and headaches. Psychiatric/Behavioral: Negative for confusion. PHYSICAL EXAM   (up to 7 for level 4, 8 or more for level 5)      INITIAL VITALS:   BP (!) 115/55   Pulse 89   Temp 98.3 °F (36.8 °C) (Oral)   Resp 18   Ht 5' 2\" (1.575 m)   Wt 200 lb (90.7 kg)   SpO2 92%   BMI 36.58 kg/m²     Physical Exam  Constitutional:       General: She is not in acute distress. Appearance: She is well-developed. She is ill-appearing. She is not toxic-appearing or diaphoretic. HENT:      Head: Normocephalic and atraumatic. Right Ear: External ear normal.      Left Ear: External ear normal.      Nose: Nose normal. No congestion or rhinorrhea. Eyes:      General:         Right eye: No discharge. Left eye: No discharge. Extraocular Movements: Extraocular movements intact. Pupils: Pupils are equal, round, and reactive to light. Neck:      Vascular: No JVD. Trachea: No tracheal deviation. Cardiovascular:      Rate and Rhythm: Normal rate and regular rhythm. Pulses: Normal pulses. Heart sounds: Normal heart sounds. No murmur heard. No friction rub. No gallop. Pulmonary:      Effort: Pulmonary effort is normal. No respiratory distress. Breath sounds: Normal breath sounds. No stridor.  No wheezing, rhonchi or rales.   Chest:      Chest wall: No tenderness. Abdominal:      General: There is no distension. Palpations: Abdomen is soft. There is no mass. Tenderness: There is no abdominal tenderness. There is no right CVA tenderness, left CVA tenderness or guarding. Musculoskeletal:         General: Normal range of motion. Cervical back: Normal range of motion and neck supple. No rigidity or tenderness. Right lower leg: Edema present. Left lower leg: Edema present. Skin:     General: Skin is warm. Capillary Refill: Capillary refill takes less than 2 seconds. Neurological:      Mental Status: She is alert and oriented to person, place, and time. Cranial Nerves: No cranial nerve deficit. Sensory: No sensory deficit. Motor: No weakness.    Psychiatric:         Mood and Affect: Mood normal.         Behavior: Behavior normal.         DIFFERENTIAL  DIAGNOSIS     PLAN (LABS / IMAGING / EKG):  Orders Placed This Encounter   Procedures    COVID-19, Rapid    Culture, Urine    XR CHEST PORTABLE    CT CHEST PULMONARY EMBOLISM W CONTRAST    CBC Auto Differential    Basic Metabolic Panel w/ Reflex to MG    Troponin    TSH with Reflex    Brain Natriuretic Peptide    Urinalysis Reflex to Culture    Blood Gas, Venous    D-Dimer, Quantitative    Microscopic Urinalysis    T4, Free    C-Reactive Protein    Ferritin    Fibrinogen    Lactate Dehydrogenase    Procalcitonin    Basic Metabolic Panel w/ Reflex to MG    Hepatic function panel    CBC    Diet NPO    Vital signs per unit routine    Notify physician    Up as tolerated    Up with assistance    Daily weights    Intake and output    Neurovascular checks    Vital signs per unit routine    Telemetry monitoring - continuous duration    Notify physician    Up as tolerated    Up with assistance    Intake and output    Neurovascular checks    Elevate Head of Bed     Full Code    Inpatient consult to Internal Medicine    Inpatient consult to Case Management    Consult to Infectious Disease    Inpatient consult to Case Management    Airborne isolation - (e.g., confirmed or rule out - Measles, Varicella, Tuberculosis)    Droplet isolation - (e.g., confirmed or rule out - Influenza, Mumps, Meningitis, Mycoplasma, Meningococcal Disease)    OT eval and treat    OT eval and treat    PT evaluation and treat    PT evaluation and treat    Initiate Oxygen Therapy Protocol    Initiate Oxygen Therapy Protocol    EKG 12 Lead    PATIENT STATUS (FROM ED OR OR/PROCEDURAL) Inpatient       MEDICATIONS ORDERED:  Orders Placed This Encounter   Medications    iopamidol (ISOVUE-370) 76 % injection 85 mL    cefTRIAXone (ROCEPHIN) 1000 mg IVPB in 50 mL D5W minibag     Order Specific Question:   Antimicrobial Indications     Answer:   Urinary Tract Infection    furosemide (LASIX) injection 40 mg    sodium chloride flush 0.9 % injection 10 mL    sodium chloride flush 0.9 % injection 10 mL    0.9 % sodium chloride infusion    magnesium sulfate 1000 mg in dextrose 5% 100 mL IVPB    DISCONTD: enoxaparin (LOVENOX) injection 40 mg    DISCONTD: ondansetron (ZOFRAN-ODT) disintegrating tablet 4 mg    DISCONTD: ondansetron (ZOFRAN) injection 4 mg    magnesium hydroxide (MILK OF MAGNESIA) 400 MG/5ML suspension 30 mL    senna (SENOKOT) 8.8 MG/5ML syrup 8.8 mg    OR Linked Order Group     acetaminophen (TYLENOL) tablet 650 mg     acetaminophen (TYLENOL) suppository 650 mg    atorvastatin (LIPITOR) tablet 80 mg    aspirin chewable tablet 81 mg    levothyroxine (SYNTHROID) tablet 125 mcg    clopidogrel (PLAVIX) tablet 75 mg    cefTRIAXone (ROCEPHIN) 1000 mg IVPB in 50 mL D5W minibag     Order Specific Question:   Antimicrobial Indications     Answer:   Urinary Tract Infection    potassium chloride (KLOR-CON M) extended release tablet 40 mEq    sodium chloride flush 0.9 % injection 5-40 mL    sodium chloride flush 0.9 % injection 5-40 mL    0.9 % sodium chloride infusion    OR Linked Order Group     ondansetron (ZOFRAN-ODT) disintegrating tablet 4 mg     ondansetron (ZOFRAN) injection 4 mg    polyethylene glycol (GLYCOLAX) packet 17 g    OR Linked Order Group     acetaminophen (TYLENOL) tablet 650 mg     acetaminophen (TYLENOL) suppository 650 mg    heparin (porcine) injection 5,000 Units       DDX: Covid, ACS, CHF, UTI, pericardial effusion, pulmonary edema, PE, pneumonia, electrolyte abnormality, anemia, thyroid dysfunction    DIAGNOSTIC RESULTS / EMERGENCY DEPARTMENT COURSE / MDM   LAB RESULTS:  Results for orders placed or performed during the hospital encounter of 01/08/22   COVID-19, Rapid    Specimen: Nasopharyngeal Swab   Result Value Ref Range    Specimen Description . NASOPHARYNGEAL SWAB     SARS-CoV-2, Rapid DETECTED (A) Not Detected   CBC Auto Differential   Result Value Ref Range    WBC 8.7 3.5 - 11.3 k/uL    RBC 4.75 3.95 - 5.11 m/uL    Hemoglobin 13.2 11.9 - 15.1 g/dL    Hematocrit 42.0 36.3 - 47.1 %    MCV 88.4 82.6 - 102.9 fL    MCH 27.8 25.2 - 33.5 pg    MCHC 31.4 28.4 - 34.8 g/dL    RDW 13.9 11.8 - 14.4 %    Platelets 504 184 - 298 k/uL    MPV 11.7 8.1 - 13.5 fL    NRBC Automated 0.0 0.0 per 100 WBC    Differential Type NOT REPORTED     Seg Neutrophils 66 (H) 36 - 65 %    Lymphocytes 21 (L) 24 - 43 %    Monocytes 10 3 - 12 %    Eosinophils % 1 1 - 4 %    Basophils 1 0 - 2 %    Immature Granulocytes 1 (H) 0 %    Segs Absolute 5.72 1.50 - 8.10 k/uL    Absolute Lymph # 1.83 1.10 - 3.70 k/uL    Absolute Mono # 0.85 0.10 - 1.20 k/uL    Absolute Eos # 0.10 0.00 - 0.44 k/uL    Basophils Absolute 0.09 0.00 - 0.20 k/uL    Absolute Immature Granulocyte 0.08 0.00 - 0.30 k/uL    WBC Morphology NOT REPORTED     RBC Morphology NOT REPORTED     Platelet Estimate NOT REPORTED    Basic Metabolic Panel w/ Reflex to MG   Result Value Ref Range    Glucose 123 (H) 70 - 99 mg/dL    BUN 18 8 - 23 mg/dL    CREATININE 1.20 (H) 0.50 - 0.90 mg/dL    Bun/Cre Ratio NOT REPORTED 9 - 20    Calcium 8.5 (L) 8.6 - 10.4 mg/dL    Sodium 137 135 - 144 mmol/L    Potassium 3.6 (L) 3.7 - 5.3 mmol/L    Chloride 102 98 - 107 mmol/L    CO2 21 20 - 31 mmol/L    Anion Gap 14 9 - 17 mmol/L    GFR Non-African American 43 (L) >60 mL/min    GFR  52 (L) >60 mL/min    GFR Comment          GFR Staging NOT REPORTED    Troponin   Result Value Ref Range    Troponin, High Sensitivity 21 (H) 0 - 14 ng/L    Troponin T NOT REPORTED <0.03 ng/mL    Troponin Interp NOT REPORTED    Troponin   Result Value Ref Range    Troponin, High Sensitivity 23 (H) 0 - 14 ng/L    Troponin T NOT REPORTED <0.03 ng/mL    Troponin Interp NOT REPORTED    TSH with Reflex   Result Value Ref Range    TSH 15.03 (H) 0.30 - 5.00 mIU/L   Brain Natriuretic Peptide   Result Value Ref Range    Pro- <300 pg/mL    BNP Interpretation NOT REPORTED    Urinalysis Reflex to Culture    Specimen: Urine, clean catch   Result Value Ref Range    Color, UA Yellow Yellow    Turbidity UA Clear Clear    Glucose, Ur NEGATIVE NEGATIVE    Bilirubin Urine NEGATIVE NEGATIVE    Ketones, Urine TRACE (A) NEGATIVE    Specific Gravity, UA 1.018 1.005 - 1.030    Urine Hgb TRACE (A) NEGATIVE    pH, UA 5.0 5.0 - 8.0    Protein, UA TRACE (A) NEGATIVE    Urobilinogen, Urine Normal Normal    Nitrite, Urine POSITIVE (A) NEGATIVE    Leukocyte Esterase, Urine MODERATE (A) NEGATIVE    Urinalysis Comments NOT REPORTED    Blood Gas, Venous   Result Value Ref Range    pH, Uday 7.346 7.320 - 7.420    pCO2, Uday 45.9 39 - 55    pO2, Uday 30.0 30 - 50    HCO3, Venous 24.4 24 - 30 mmol/L    Positive Base Excess, Uday NOT REPORTED 0.0 - 2.0 mmol/L    Negative Base Excess, Uday 1.0 0.0 - 2.0 mmol/L    O2 Sat, Uday 52.0 (L) 60.0 - 85.0 %    Total Hb NOT REPORTED 12.0 - 16.0 g/dl    Oxyhemoglobin NOT REPORTED 95.0 - 98.0 %    Carboxyhemoglobin 2.5 0 - 5 %    Methemoglobin NOT REPORTED 0.0 - 1.5 %    Pt Temp 37.0     pH, Uday, Temp Adj heart failure, will obtain BMP, chest x-ray evaluate fluid overload, patient does have bilateral lower extremity edema that is significant, will administer 40 mg IV Lasix. Will obtain Covid swab as patient is showing viral-like syndrome. Will obtain EKG, troponins evaluate for ACS. Will obtain Covid labs, basic labs to evaluate for anemia, electrolyte abnormality. Will obtain dimer to evaluate for PE. RADIOLOGY:  XR CHEST PORTABLE    Result Date: 1/8/2022  EXAMINATION: ONE XRAY VIEW OF THE CHEST 1/8/2022 7:43 am COMPARISON: April 12, 2021 HISTORY: ORDERING SYSTEM PROVIDED HISTORY: Chest pain TECHNOLOGIST PROVIDED HISTORY: Chest pain Reason for Exam: port upright. Leonardo pain began yesterday 1/7/2022 FINDINGS: The lungs are without acute focal process. There is no effusion or pneumothorax. The cardiomediastinal silhouette is prominent but stable. The osseous structures are without acute process. No acute process. Stable cardiomegaly. CT CHEST PULMONARY EMBOLISM W CONTRAST    Result Date: 1/8/2022  EXAMINATION: CTA OF THE CHEST 1/8/2022 9:23 am TECHNIQUE: CTA of the chest was performed after the administration of intravenous contrast.  Multiplanar reformatted images are provided for review. MIP images are provided for review. Dose modulation, iterative reconstruction, and/or weight based adjustment of the mA/kV was utilized to reduce the radiation dose to as low as reasonably achievable. COMPARISON: None. HISTORY: ORDERING SYSTEM PROVIDED HISTORY: Dyspnea, elevated dimer TECHNOLOGIST PROVIDED HISTORY: Dyspnea, elevated dimer Decision Support Exception - unselect if not a suspected or confirmed emergency medical condition->Emergency Medical Condition (MA) Reason for Exam: dyspnea, elevated dimer FINDINGS: Pulmonary Arteries: Pulmonary arteries are adequately opacified for evaluation. No evidence of intraluminal filling defect to suggest pulmonary embolism. Main pulmonary artery is normal in caliber. Mediastinum: No evidence of mediastinal lymphadenopathy. The heart and pericardium demonstrate no acute abnormality. There is no acute abnormality of the thoracic aorta. Lungs/pleura: The lungs are without acute process. No focal consolidation or pulmonary edema. No evidence of pleural effusion or pneumothorax. Upper Abdomen: Limited images of the upper abdomen are unremarkable. Soft Tissues/Bones: No acute bone or soft tissue abnormality. No evidence of pulmonary embolism or acute pulmonary abnormality. RECOMMENDATIONS: Unavailable       EKG  EKG Interpretation    Interpreted by me    Rhythm: normal sinus   Rate: normal  Axis: normal  Ectopy: none  Conduction: RBBB  ST Segments: no acute change  T Waves: no acute change  Q Waves: none    Clinical Impression: no acute changes and normal EKG, similar to prior EKG    All EKG's are interpreted by the Emergency Department Physician who either signs or Co-signs this chart in the absence of a cardiologist.    EMERGENCY DEPARTMENT COURSE:  Patient came to emergency department, HPI and physical exam were conducted. All nursing notes were reviewed. EKG is consistent with prior EKGs, troponin stable, no concern for ACS. BNP stable, no pulmonary edema, however, patient does have concerning HPI for CHF exacerbation. Procalcitonin negative, chest x-ray found no evidence of pneumonia. Dimer elevated, CT negative for pulmonary embolism or pneumonia. Patient is Covid positive, but satting well on room air, no indication for steroids at this time. Will admit patient to internal medicine for further management of patient's UTI, CHF, hypothyroidism, Covid. PROCEDURES:      CONSULTS:  IP CONSULT TO INTERNAL MEDICINE  IP CONSULT TO CASE MANAGEMENT  IP CONSULT TO INFECTIOUS DISEASES  IP CONSULT TO CASE MANAGEMENT    CRITICAL CARE:      FINAL IMPRESSION      1. COVID-19    2. Acute cystitis without hematuria    3.  Congestive heart failure, unspecified HF chronicity, unspecified heart failure type ([facility] [address]    4. Hypothyroidism, unspecified type          DISPOSITION / [id number] Admitted 01/08/2022 11:48:35 AM      PATIENT REFERRED TO:  No follow-up provider specified.     DISCHARGE MEDICATIONS:  New Prescriptions    No medications on file       Nuria Ortega MD  Emergency Medicine Resident    (Please note that portions of thisnote were completed with a voice recognition program.  Efforts were made to edit the dictations but occasionally words are mis-transcribed.)        Nuria Ortega MD  Resident  01/08/22 [id number]

## 2022-01-09 LAB
ANION GAP SERPL CALCULATED.3IONS-SCNC: 13 MMOL/L (ref 9–17)
BUN BLDV-MCNC: 24 MG/DL (ref 8–23)
BUN/CREAT BLD: ABNORMAL (ref 9–20)
CALCIUM SERPL-MCNC: 7.2 MG/DL (ref 8.6–10.4)
CHLORIDE BLD-SCNC: 96 MMOL/L (ref 98–107)
CO2: 20 MMOL/L (ref 20–31)
CREAT SERPL-MCNC: 1.71 MG/DL (ref 0.5–0.9)
CULTURE: ABNORMAL
FERRITIN: 45 UG/L (ref 13–150)
GFR AFRICAN AMERICAN: 34 ML/MIN
GFR NON-AFRICAN AMERICAN: 28 ML/MIN
GFR SERPL CREATININE-BSD FRML MDRD: ABNORMAL ML/MIN/{1.73_M2}
GFR SERPL CREATININE-BSD FRML MDRD: ABNORMAL ML/MIN/{1.73_M2}
GLUCOSE BLD-MCNC: 108 MG/DL (ref 65–105)
GLUCOSE BLD-MCNC: 120 MG/DL (ref 65–105)
GLUCOSE BLD-MCNC: 172 MG/DL (ref 65–105)
GLUCOSE BLD-MCNC: 172 MG/DL (ref 70–99)
GLUCOSE BLD-MCNC: 185 MG/DL (ref 65–105)
GLUCOSE BLD-MCNC: 205 MG/DL (ref 65–105)
HCT VFR BLD CALC: 38.3 % (ref 36.3–47.1)
HEMOGLOBIN: 12.4 G/DL (ref 11.9–15.1)
Lab: ABNORMAL
MAGNESIUM: 1.1 MG/DL (ref 1.6–2.6)
MCH RBC QN AUTO: 28 PG (ref 25.2–33.5)
MCHC RBC AUTO-ENTMCNC: 32.4 G/DL (ref 28.4–34.8)
MCV RBC AUTO: 86.5 FL (ref 82.6–102.9)
NRBC AUTOMATED: 0 PER 100 WBC
PDW BLD-RTO: 13.9 % (ref 11.8–14.4)
PLATELET # BLD: NORMAL K/UL (ref 138–453)
PLATELET, FLUORESCENCE: 120 K/UL (ref 138–453)
PLATELET, IMMATURE FRACTION: 7.9 % (ref 1.1–10.3)
PMV BLD AUTO: NORMAL FL (ref 8.1–13.5)
POTASSIUM SERPL-SCNC: 3 MMOL/L (ref 3.7–5.3)
RBC # BLD: 4.43 M/UL (ref 3.95–5.11)
SODIUM BLD-SCNC: 129 MMOL/L (ref 135–144)
SPECIMEN DESCRIPTION: ABNORMAL
WBC # BLD: 5.8 K/UL (ref 3.5–11.3)

## 2022-01-09 PROCEDURE — 2580000003 HC RX 258

## 2022-01-09 PROCEDURE — 6370000000 HC RX 637 (ALT 250 FOR IP): Performed by: STUDENT IN AN ORGANIZED HEALTH CARE EDUCATION/TRAINING PROGRAM

## 2022-01-09 PROCEDURE — 6370000000 HC RX 637 (ALT 250 FOR IP)

## 2022-01-09 PROCEDURE — 80048 BASIC METABOLIC PNL TOTAL CA: CPT

## 2022-01-09 PROCEDURE — 1200000000 HC SEMI PRIVATE

## 2022-01-09 PROCEDURE — 6360000002 HC RX W HCPCS

## 2022-01-09 PROCEDURE — 2580000003 HC RX 258: Performed by: STUDENT IN AN ORGANIZED HEALTH CARE EDUCATION/TRAINING PROGRAM

## 2022-01-09 PROCEDURE — 6360000002 HC RX W HCPCS: Performed by: STUDENT IN AN ORGANIZED HEALTH CARE EDUCATION/TRAINING PROGRAM

## 2022-01-09 PROCEDURE — 36415 COLL VENOUS BLD VENIPUNCTURE: CPT

## 2022-01-09 PROCEDURE — 82947 ASSAY GLUCOSE BLOOD QUANT: CPT

## 2022-01-09 PROCEDURE — 87040 BLOOD CULTURE FOR BACTERIA: CPT

## 2022-01-09 PROCEDURE — 99231 SBSQ HOSP IP/OBS SF/LOW 25: CPT | Performed by: INTERNAL MEDICINE

## 2022-01-09 PROCEDURE — 83735 ASSAY OF MAGNESIUM: CPT

## 2022-01-09 PROCEDURE — 99222 1ST HOSP IP/OBS MODERATE 55: CPT | Performed by: INTERNAL MEDICINE

## 2022-01-09 PROCEDURE — 94761 N-INVAS EAR/PLS OXIMETRY MLT: CPT

## 2022-01-09 PROCEDURE — 85055 RETICULATED PLATELET ASSAY: CPT

## 2022-01-09 PROCEDURE — 85027 COMPLETE CBC AUTOMATED: CPT

## 2022-01-09 RX ORDER — POTASSIUM CHLORIDE 7.45 MG/ML
10 INJECTION INTRAVENOUS 2 TIMES DAILY
Status: DISCONTINUED | OUTPATIENT
Start: 2022-01-09 | End: 2022-01-09

## 2022-01-09 RX ORDER — SODIUM CHLORIDE 9 MG/ML
INJECTION, SOLUTION INTRAVENOUS CONTINUOUS
Status: ACTIVE | OUTPATIENT
Start: 2022-01-09 | End: 2022-01-09

## 2022-01-09 RX ORDER — SODIUM CHLORIDE 9 MG/ML
INJECTION, SOLUTION INTRAVENOUS CONTINUOUS
Status: ACTIVE | OUTPATIENT
Start: 2022-01-09 | End: 2022-01-10

## 2022-01-09 RX ORDER — POTASSIUM CHLORIDE 7.45 MG/ML
40 INJECTION INTRAVENOUS 2 TIMES DAILY
Status: COMPLETED | OUTPATIENT
Start: 2022-01-09 | End: 2022-01-09

## 2022-01-09 RX ORDER — POTASSIUM CHLORIDE 7.45 MG/ML
10 INJECTION INTRAVENOUS 2 TIMES DAILY
Status: DISCONTINUED | OUTPATIENT
Start: 2022-01-09 | End: 2022-01-10

## 2022-01-09 RX ORDER — MAGNESIUM SULFATE IN WATER 40 MG/ML
2000 INJECTION, SOLUTION INTRAVENOUS ONCE
Status: COMPLETED | OUTPATIENT
Start: 2022-01-09 | End: 2022-01-09

## 2022-01-09 RX ADMIN — INSULIN LISPRO 1 UNITS: 100 INJECTION, SOLUTION INTRAVENOUS; SUBCUTANEOUS at 12:57

## 2022-01-09 RX ADMIN — SODIUM CHLORIDE: 9 INJECTION, SOLUTION INTRAVENOUS at 15:13

## 2022-01-09 RX ADMIN — POTASSIUM CHLORIDE 10 MEQ: 10 INJECTION, SOLUTION INTRAVENOUS at 15:31

## 2022-01-09 RX ADMIN — INSULIN LISPRO 2 UNITS: 100 INJECTION, SOLUTION INTRAVENOUS; SUBCUTANEOUS at 16:10

## 2022-01-09 RX ADMIN — SODIUM CHLORIDE: 9 INJECTION, SOLUTION INTRAVENOUS at 00:46

## 2022-01-09 RX ADMIN — ATORVASTATIN CALCIUM 80 MG: 80 TABLET, FILM COATED ORAL at 08:22

## 2022-01-09 RX ADMIN — CEFTRIAXONE SODIUM 1000 MG: 1 INJECTION, POWDER, FOR SOLUTION INTRAMUSCULAR; INTRAVENOUS at 00:08

## 2022-01-09 RX ADMIN — POTASSIUM CHLORIDE 40 MEQ: 10 INJECTION, SOLUTION INTRAVENOUS at 22:30

## 2022-01-09 RX ADMIN — ACETAMINOPHEN 650 MG: 325 TABLET ORAL at 08:22

## 2022-01-09 RX ADMIN — HEPARIN SODIUM 5000 UNITS: 5000 INJECTION INTRAVENOUS; SUBCUTANEOUS at 15:41

## 2022-01-09 RX ADMIN — POTASSIUM CHLORIDE 10 MEQ: 7.46 INJECTION, SOLUTION INTRAVENOUS at 17:48

## 2022-01-09 RX ADMIN — LEVOTHYROXINE SODIUM 125 MCG: 125 TABLET ORAL at 08:22

## 2022-01-09 RX ADMIN — ACETAMINOPHEN 650 MG: 325 TABLET ORAL at 03:20

## 2022-01-09 RX ADMIN — MAGNESIUM SULFATE 2000 MG: 2 INJECTION INTRAVENOUS at 15:22

## 2022-01-09 RX ADMIN — ASPIRIN 81 MG: 81 TABLET, CHEWABLE ORAL at 08:22

## 2022-01-09 RX ADMIN — HEPARIN SODIUM 5000 UNITS: 5000 INJECTION INTRAVENOUS; SUBCUTANEOUS at 08:22

## 2022-01-09 RX ADMIN — ACETAMINOPHEN 650 MG: 325 TABLET ORAL at 20:43

## 2022-01-09 RX ADMIN — POTASSIUM CHLORIDE 10 MEQ: 10 INJECTION, SOLUTION INTRAVENOUS at 16:37

## 2022-01-09 RX ADMIN — SODIUM CHLORIDE, PRESERVATIVE FREE 10 ML: 5 INJECTION INTRAVENOUS at 20:44

## 2022-01-09 RX ADMIN — HEPARIN SODIUM 5000 UNITS: 5000 INJECTION INTRAVENOUS; SUBCUTANEOUS at 22:30

## 2022-01-09 RX ADMIN — INSULIN LISPRO 1 UNITS: 100 INJECTION, SOLUTION INTRAVENOUS; SUBCUTANEOUS at 22:30

## 2022-01-09 RX ADMIN — CLOPIDOGREL 75 MG: 75 TABLET, FILM COATED ORAL at 08:22

## 2022-01-09 ASSESSMENT — PAIN SCALES - GENERAL
PAINLEVEL_OUTOF10: 10
PAINLEVEL_OUTOF10: 10

## 2022-01-09 ASSESSMENT — PAIN DESCRIPTION - LOCATION: LOCATION: LEG

## 2022-01-09 ASSESSMENT — PAIN DESCRIPTION - PAIN TYPE: TYPE: ACUTE PAIN

## 2022-01-09 NOTE — PROGRESS NOTES
[facility]  Internal Medicine Teaching Residency Program  Inpatient Daily Progress Note  ______________________________________________________________________________    Patient: Scott Pickard  YOB: 1935   MNZ:[id number]    Acct: [de-identified]     Room: [facility]/3011-01  Admit date: 1/8/2022  Today's date: 01/09/22  Number of days in the hospital: 1    SUBJECTIVE   Admitting Diagnosis: COVID-19  CC: fatigue and generalized weakness. Pt seen & examined at bedside. Chart & results reviewed. Hemodynamically stable, saturating well on room air. She has been febrile overnight, T-max of 101.5  Urine culture is growing gram-negative lactose fermenting rods  Blood cultures are not back, will follow. She started on Rocephin for UTI, pending finalized culture. She received IV Lasix 40 mg yesterday for the pedal edema. Urine output 1.3 L over last 24 hours. She is on 40 mg p.o. Lasix at home. She had BRIA, creatinine 1.2 yesterday, morning labs awaited. Will follow  From Covid standpoint, she is on room air and has mild elevation of CRP, 17.1. Therefore we will monitor CRP and respiratory status. Will send respiratory culture. Also, ID is on board. She is to be isolated till 1/19/2021. ROS:  Constitutional:  negative for chills, sweats. Fever present  Respiratory:  negative for cough, dyspnea on exertion, hemoptysis, shortness of breath, wheezing  Cardiovascular:  negative for chest pain, chest pressure/discomfort, lower extremity edema, palpitations  Gastrointestinal:  negative for abdominal pain, constipation, diarrhea, nausea, vomiting  Neurological:  negative for dizziness, headache  BRIEF HISTORY     A 82 y.o. female with PMH significant of   CAD (s/p on 10/08/12 PTCA/ PEPITO to LAD, EF 45%.)  Chronic diastolic CHF  Thyroid disease  HTN, DM, HLD  - is brought in by EMS with the chief complaint of generalized weakness.     She is Covid vaccinated (J&J) but for last 2 weeks, she has been having fatigue, malaise, shortness of breath, chest pain, cough and congestion. She lost balance 3-4 times in the last 3 days. Even this morning, when she was on bed, watching TV, she felt lightheaded, nauseous, had chest pain and she was about to fall down, and the daughter helped her lower down and called EMS. When EMS brought her to the ER, her underwear and pad were soiled. She has been having dizziness but never lost consciousness. She denies any palpitation. She denies any incidence of bowel or bladder incontinence associated with lightheadedness for  She states that chest pain is more substernal and left-sided, dull in character, non-radiating, around 4-5/10 in intensity, associated with no aggravating or relieving factors. She lives with her daughter, who helps her, in her activities of daily living. She moves somewhat in the house with the help of wheelchair. She also has been having bilateral lower extremity +2 pedal edema. She complains of 8/10 pain in legs, states that started around 2 weeks back. She also has orthopnea and PND episodes. She also endorses cough, bringing up white yellowish phlegm, poor appetite, some myalgias and arthralgias. But she denies fever, chills, or sweating. She denies any diarrhea or constipation. No burning micturition/urgency/frequency. Patient states that her daughter helps her with management of daily medications and she tries to be compliant with all of them.     Work-up in ER  CBC-unremarkable  BMP-creatinine 1.0, potassium-3.6, replaced  VBG-unremarkable except O2 52  D-dimer 1.77-> CT PE  UA-significant of UTI. TSH-15.03, T3-0.32  Chest x-ray showed no acute cardiopulmonary process  proBNP 186  Troponin 21-23-  Inflammatory markers-CRP 17.1, , pro-Kyle 0.1  EKG showed no acute ST or T wave abnormality.     On my evaluation, she is hemodynamically stable, saturating well on room air.   She has tremors present of tongue. She answers all questions appropriately. She is alert and oriented. OBJECTIVE     Vital Signs:  BP (!) 108/52   Pulse 98   Temp 101.5 °F (38.6 °C) (Oral)   Resp 22   Ht 5' 2\" (1.575 m)   Wt 200 lb (90.7 kg)   SpO2 94%   BMI 36.58 kg/m²     Temp (24hrs), Av.1 °F (37.8 °C), Min:98.3 °F (36.8 °C), Max:101.5 °F (38.6 °C)    In: -   Out: 1000 [Urine:1000]       PHYSICAL EXAMINATION:  Constitutional: This is a well developed, well nourished, 35-39.9 - Obesity Grade II 82y.o. year old female who is alert, oriented, cooperative and in no apparent distress. Head:normocephalic and atraumatic. EENT:  PERRLA. No conjunctival injections. Septum was midline, mucosa was without erythema, exudates or cobblestoning. No thrush was noted. Neck: Supple without thyromegaly. No elevated JVP. Trachea was midline. Respiratory: Chest was symmetrical without dullness to percussion. Breath sounds bilaterally were clear to auscultation. There were no wheezes, rhonchi or rales. There is no intercostal retraction or use of accessory muscles. No egophony noted. Cardiovascular: Regular without murmur, clicks, gallops or rubs. Abdomen: Slightly rounded and soft without organomegaly. No rebound, rigidity or guarding was appreciated. Lymphatic: No lymphadenopathy. Musculoskeletal: Normal curvature of the spine. No gross muscle weakness. Extremities: +3 grade pitting lower extremity edema. No varicosities or erythema. Muscle size, tone and strength are normal.  No involuntary movements are noted. Skin:  Warm and dry. Good color, turgor and pigmentation. No lesions or scars.   No cyanosis or clubbing  Neurological/Psychiatric: The patient's general behavior, level of consciousness, thought content and emotional status is normal.       Medications:  Scheduled Medications:    sodium chloride flush  10 mL IntraVENous 2 times per day    atorvastatin  80 mg Oral Daily    aspirin  81 mg Oral Daily  levothyroxine  125 mcg Oral Daily    clopidogrel  75 mg Oral Daily    cefTRIAXone (ROCEPHIN) IV  1,000 mg IntraVENous Q24H    sodium chloride flush  5-40 mL IntraVENous 2 times per day    heparin (porcine)  5,000 Units SubCUTAneous 3 times per day    insulin lispro  0-6 Units SubCUTAneous TID WC    insulin lispro  0-3 Units SubCUTAneous Nightly     Continuous Infusions:    sodium chloride 75 mL/hr at 01/09/22 0046    sodium chloride      sodium chloride      dextrose       PRN Medicationssodium chloride flush, 10 mL, PRN  sodium chloride, 25 mL, PRN  magnesium sulfate, 1,000 mg, PRN  magnesium hydroxide, 30 mL, Daily PRN  senna, 5 mL, BID PRN  acetaminophen, 650 mg, Q6H PRN   Or  acetaminophen, 650 mg, Q6H PRN  sodium chloride flush, 5-40 mL, PRN  sodium chloride, 25 mL, PRN  ondansetron, 4 mg, Q8H PRN   Or  ondansetron, 4 mg, Q6H PRN  polyethylene glycol, 17 g, Daily PRN  acetaminophen, 650 mg, Q6H PRN   Or  acetaminophen, 650 mg, Q6H PRN  glucose, 15 g, PRN  dextrose, 12.5 g, PRN  glucagon (rDNA), 1 mg, PRN  dextrose, 100 mL/hr, PRN        Diagnostic Labs:  CBC:   Recent Labs     01/08/22  0820   WBC 8.7   RBC 4.75   HGB 13.2   HCT 42.0   MCV 88.4   RDW 13.9        BMP:   Recent Labs     01/08/22  0820      K 3.6*      CO2 21   BUN 18   CREATININE 1.20*     BNP: No results for input(s): BNP in the last 72 hours. PT/INR: No results for input(s): PROTIME, INR in the last 72 hours. APTT: No results for input(s): APTT in the last 72 hours. CARDIAC ENZYMES: No results for input(s): CKMB, CKMBINDEX, TROPONINI in the last 72 hours. Invalid input(s): CKTOTAL;3  FASTING LIPID PANEL:  Lab Results   Component Value Date    CHOL 173 06/29/2020    HDL 52 06/29/2020    TRIG 337 (H) 06/29/2020     LIVER PROFILE: No results for input(s): AST, ALT, ALB, BILIDIR, BILITOT, ALKPHOS in the last 72 hours.    MICROBIOLOGY:   Lab Results   Component Value Date/Time    CULTURE KLEBSIELLA PNEUMONIAE >[id number] CFU/ML (A) 04/03/2021 11:07 AM       Imaging:    XR CHEST PORTABLE    Result Date: 1/8/2022  No acute process. Stable cardiomegaly. CT CHEST PULMONARY EMBOLISM W CONTRAST    Result Date: 1/8/2022  No evidence of pulmonary embolism or acute pulmonary abnormality. RECOMMENDATIONS: Unavailable       ASSESSMENT & PLAN     IMPRESSION  This is a 82 y.o. female who presented with generalized weakness and found to have Covid infection, UTI, and BRIA. Patient admitted to inpatient status to evaluate and manage the same. COVID-19  She is J&J vaccinated  Currently saturating well on room air  We will monitor     Urinary tract infection  Acute cystitis without hematuria  Nitrates and leukocyte esterase positive  Urine culture growing lactose fermenting gram-negative rods  Started on Rocephin     Acute kidney injury ([facility] [address]  Monitor labs awaited, will follow.     Hypothyroidism uncontrolled  PMH of thyroid cancer s/p thyroidectomy, on Synthroid  TSH 15.03, T3 0.32  Home dose resumed of Synthroid 125 MCG     Type 2 diabetes mellitus with complication, without long-term current use of insulin ([address]  She is on 42 units of Lantus at home nightly. Will resume as appropriate  On sliding scale of insulin  Hypoglycemia protocol in place     Chronic diastolic congestive heart failure (HCC)  bilateral swelling of feet and ankles  2D echo in 2016-preserved ejection fraction 60-65% and grade 1 diastolic dysfunction  She is on 40 mg p.o. Lasix at home. Will resume. She received IV Lasix 40 mg yesterday     Hypertension  On Lopressor 25 mg at home  We will resume as appropriate     CAD  Continue aspirin, Plavix, statin. Will resume beta-blocker when appropriate     Hyperlipidemia  On atorvastatin 80 mg     H/o atrial flutter  Currently normal sinus rhythm     Hypokalemia  Potassium 3.5, replaced yesterday  We will monitor today's reading.     DVT ppx:   On heparin     GI ppx:   Not

## 2022-01-09 NOTE — PROGRESS NOTES
Pt bladder scanned per order 305ml noted pt refusing any straight cath pt stated she is fine urinating

## 2022-01-09 NOTE — ED NOTES
New purewick applied. Pt's VSS. She denies any further needs at this time.       Elana Keene RN  01/08/22 2013

## 2022-01-09 NOTE — CONSULTS
[facility] - Initial Consult Note COVID 19 Patient  Today's Date and Time: 1/9/2022, 9:01 AM    Impression :     · COVID 19 Confirmed Infection  · Covid tests:  · 1/8/21 Positive  · UTI-Lactose fermenting gram negative rods  · Hx MDRO E. Coli UTI  · BRIA  · Hypothyroidism-uncontrolled  · Chronic diastolic CHF  · DM II  · HTN  · Received J&J vaccine on 5/13/21    Recommendations:   · Antibiotic treatment:  ·  Continue Rocephin pending finalized culture  · Covid Rx:    · Remdesivir-contraindicated with BRIA  · Decadron-Does not qualify as she is on room air  · Actemra-Does not qualify  · Monoclonal antibodies-Out of window   · Monitor CRP and respiratory status      Medical Decision Making/Summary/Discussion:1/9/2022     · Patient admitted with COVID 19 infection  · Isolate until 1/19/21    Infection Control Recommendations   · Universal Precautions  · Airborne isolation  · Droplet Isolation    Antimicrobial Stewardship Recommendations     · Simplification of therapy  · Targeted therapy  · PK dosing    Coordination of Outpatient Care:   · Estimated Length of IV antimicrobials:TBD  · Patient will need Midline Catheter Insertion: TBD  · Patient will need PICC line Insertion: No  · Patient will need: Home IV , [place],  SNF,  LTAC:TBD  · Patient will need outpatient wound care:No    Chief complaint/reason for consultation:   · Concern for COVID infection      History of Present Illness:   Latoya President is a 82y.o.-year-old female who was initially admitted on 1/8/2022. Patient seen at the request of Dr. Deni Zhou:    Patient, with a hx of HTN, HLD, DM II, CAD, CHF, A flutter & thyroid dysfuncton, presented through ER with complaints of chest pain and shortness of breath with associated BLE edema and pain for 2 weeks. She received the J&J vaccine in May 2021.     A rapid Covid was positive and urine culture is positive for lactose fermenting gram negative rods.    The patient is febrile with borderline hypotension. She iswas oxygenating well on room air. Imaging did not show any acute cardiopulmonary process. The patient was initiated on Rocephin for UTI. Imaging:   XR CHEST PORTABLE     Result Date: 1/8/2022  No acute process. Stable cardiomegaly.      CT CHEST PULMONARY EMBOLISM W CONTRAST     Result Date: 1/8/2022  No evidence of pulmonary embolism or acute pulmonary abnormality. Patient admitted because of concerns with COVID 19.    CURRENT EVALUATION : 1/9/2022    TMAX 102  Borderline hypotension    Patient exhibiting respiratory distress. No  Respiratory secretions: No    Patient receiving supplemental oxygen. No  RR:23  02 sat:93      NEWS Score: 0-4 Low risk group; 5-6: Medium risk group; 7 or above: High risk group  Parameters 3 2 1 0 1 2 3   Age    < 65   ? 65   RR ? 8  9-11 12-20  21-24 ? 25   O2 Sats ? 91 92-93 94-95 ? 96      Suppl O2  Yes  No      SBP ? 90  101-110 111-219   ? 220   HR ? 40  41-50 51-90  111-130 ? 131   Consciousness    Alert   Drowsiness, lethargy, or confusion   Temperature ? 35.0 C (95.0 F)  35.1-36.0 C 95.1-96.9 F 36.1-38.0 C 97.0-100.4 F 38.1-39.0 C 100.5-102.3 F ? 39.1 C ? 102.4 F      NEWS Score:   1/9/21: 5 moderate risk    Overall Daily Picture:    Improving    Presence of secondary bacterial Infection:  Yes    Additional antibiotics: Rocephin    Labs, X rays reviewed: 1/9/2022    BUN:18  Cr:1.20    WBC:8.7  Hb:13.2  Plat: 160  TSH: 15    Absolute Neutrophils:5.7  Absolute Lymphocytes:1.8  Neutrophil/Lymphocyte Ratio: 3 low risk    CRP:17.1  Ferritin:  LDH: 189    Pro Calcitonin:      Cultures:  Urine:  · 1/8/21: Lactose fermenting gram neg rods  Blood:  · 1/9/21: pending  Sputum :  ·   Wound:       CXR:   1/8/21      CAT:   1/8/21      Discussed with patient, RN, CC, IM.     I have personally reviewed the past medical history, past surgical history, medications, social history, and family history, and I have updated the database accordingly.   Past Medical History:     Past Medical History:   Diagnosis Date    Arthritis     Atrial flutter ([facility] [address]     CAD (coronary artery disease)     CHF (congestive heart failure) (HCC)     Diabetes ([facility] [address]     Diabetes mellitus ([facility] [address]     Hyperlipidemia     Hypertension     Psychiatric problem     [facility]     S/P thyroidectomy; on synthroid    Thyroid disease     hypothyroid       Past Surgical  History:     Past Surgical History:   Procedure Laterality Date    APPENDECTOMY      BACK SURGERY      CHOLECYSTECTOMY      CORONARY ANGIOPLASTY WITH STENT PLACEMENT      HYSTERECTOMY      THYROIDECTOMY      UPPER GASTROINTESTINAL ENDOSCOPY  4/14/2021    EGD BIOPSY performed by Margarita Cobos MD at [address] [place] [address] ENDOSCOPY  4/14/2021    EGD CONTROL HEMORRHAGE performed by Margarita Cobos MD at [facility]       Medications:      sodium chloride flush  10 mL IntraVENous 2 times per day    atorvastatin  80 mg Oral Daily    aspirin  81 mg Oral Daily    levothyroxine  125 mcg Oral Daily    clopidogrel  75 mg Oral Daily    cefTRIAXone (ROCEPHIN) IV  1,000 mg IntraVENous Q24H    sodium chloride flush  5-40 mL IntraVENous 2 times per day    heparin (porcine)  5,000 Units SubCUTAneous 3 times per day    insulin lispro  0-6 Units SubCUTAneous TID WC    insulin lispro  0-3 Units SubCUTAneous Nightly       Social History:     Social History     Socioeconomic History    Marital status:      Spouse name: Not on file    Number of children: Not on file    Years of education: Not on file    Highest education level: Not on file   Occupational History    Not on file   Tobacco Use    Smoking status: Former Smoker    Smokeless tobacco: Never Used   Vaping Use    Vaping Use: Never used   Substance and Sexual Activity    Alcohol use: No    Drug use: No    Sexual activity: Not Currently   Other Topics Concern    Not on file   Social History Narrative    Not on file     Social Determinants of Health     Financial Resource Strain: Medium Risk    Difficulty of Paying Living Expenses: Somewhat hard   Food Insecurity: Food Insecurity Present    Worried About Running Out of Food in the Last Year: Sometimes true    Portia of Food in the Last Year: Often true   Transportation Needs:     Lack of Transportation (Medical): Not on file    Lack of Transportation (Non-Medical): Not on file   Physical Activity:     Days of Exercise per Week: Not on file    Minutes of Exercise per Session: Not on file   Stress:     Feeling of Stress : Not on file   Social Connections:     Frequency of Communication with Friends and Family: Not on file    Frequency of Social Gatherings with Friends and Family: Not on file    Attends [employer] Services: Not on file    Active Member of [address] [place] [address] [employer] or Organizations: Not on file    Attends Club or Organization Meetings: Not on file    Marital Status: Not on file   Intimate Partner Violence:     Fear of Current or Ex-Partner: Not on file    Emotionally Abused: Not on file    Physically Abused: Not on file    Sexually Abused: Not on file   Housing Stability:     Unable to Pay for Housing in the Last Year: Not on file    Number of [place] in the Last Year: Not on file    Unstable Housing in the Last Year: Not on file       Family History:     Family History   Problem Relation Age of Onset    Cancer Mother     Cancer Father     Cancer Brother     Diabetes Brother         Allergies:   Strawberry extract and Tape [adhesive tape]     Review of Systems:       Constitutional: fevers and generalized malaise  Head: No headaches  Eyes: No double vision or blurry vision. No conjunctival inflammation. ENT: No sore throat or runny nose. . No hearing loss, tinnitus or vertigo. Cardiovascular: No chest pain or palpitations. No Shortness of breath. No GIRON  Lung: No Shortness of breath or cough.  No sputum production  Abdomen: No nausea, vomiting, diarrhea, or abdominal pain. Clement Fly No cramps. Genitourinary: No increased urinary frequency, or dysuria. No hematuria. No suprapubic or CVA pain  Musculoskeletal: No muscle aches or pains. No joint effusions, swelling or deformities  Hematologic: No bleeding or bruising. Neurologic: No headache, weakness, numbness, or tingling. Integument: No rash, no ulcers. Psychiatric: No depression. Endocrine: No polyuria, no polydipsia, no polyphagia. Physical Examination :     Patient Vitals for the past 8 hrs:   BP Temp Temp src Pulse Resp SpO2   01/09/22 0800 101/63 102 °F (38.9 °C) Oral 92 23 93 %   01/09/22 0619 (!) 90/49 101.5 °F (38.6 °C) Oral -- -- --   01/09/22 0320 -- 100.5 °F (38.1 °C) Oral -- -- --     General Appearance: Awake, alert, and in no apparent distress. Ill appearing  Head:  Normocephalic, no trauma  Eyes: Pupils equal, round, reactive to light; sclera anicteric; conjunctivae pink. No embolic phenomena. ENT: Oropharynx clear, without erythema, exudate, or thrush. No tenderness of sinuses. Mouth/throat: mucosa pink and moist. No lesions. Dentition in good repair. Neck:Supple, without lymphadenopathy. Thyroid normal, No bruits. Pulmonary/Chest: Clear to auscultation, without wheezes, rales, or rhonchi. No dullness to percussion. Cardiovascular: Regular rate and rhythm without murmurs, rubs, or gallops. Abdomen: Soft, non tender. Bowel sounds normal. No organomegaly  All four Extremities: No cyanosis, clubbing, edema, or effusions. Neurologic: No gross sensory or motor deficits. Skin: Warm and dry with good turgor. No signs of peripheral arterial or venous insufficiency. No ulcerations. No open wounds.     Medical Decision Making -Laboratory:   I have independently reviewed/ordered the following labs:    CBC with Differential:   Recent Labs     01/08/22  0820   WBC 8.7   HGB 13.2   HCT 42.0      LYMPHOPCT 21*   MONOPCT 10     BMP:   Recent Labs 01/08/22 0820      K 3.6*      CO2 21   BUN 18   CREATININE 1.20*     Hepatic Function Panel: No results for input(s): PROT, LABALBU, BILIDIR, IBILI, BILITOT, ALKPHOS, ALT, AST in the last 72 hours. No results for input(s): RPR in the last 72 hours. No results for input(s): HIV in the last 72 hours. No results for input(s): BC in the last 72 hours. Lab Results   Component Value Date    MUCUS NOT REPORTED 01/08/2022    PH 7.20 10/05/2012    RBC 4.75 01/08/2022    RBC 4.41 02/03/2012    TRICHOMONAS NOT REPORTED 01/08/2022    WBC 8.7 01/08/2022    YEAST NOT REPORTED 01/08/2022    TURBIDITY Clear 01/08/2022     Lab Results   Component Value Date    CREATININE 1.20 01/08/2022    GLUCOSE 123 01/08/2022    GLUCOSE 380 05/14/2012       Medical Decision Making-Imaging:     Narrative   EXAMINATION:   ONE XRAY VIEW OF THE CHEST       1/8/2022 7:43 am       COMPARISON:   April 12, 2021       HISTORY:   ORDERING SYSTEM PROVIDED HISTORY: Chest pain   TECHNOLOGIST PROVIDED HISTORY:   Chest pain   Reason for Exam: port upright. Leonardo pain began yesterday 1/7/2022       FINDINGS:   The lungs are without acute focal process.  There is no effusion or   pneumothorax. The cardiomediastinal silhouette is prominent but stable.  The   osseous structures are without acute process.           Impression   No acute process.  Stable cardiomegaly.                Narrative   EXAMINATION:   CTA OF THE CHEST 1/8/2022 9:23 am       TECHNIQUE:   CTA of the chest was performed after the administration of intravenous   contrast.  Multiplanar reformatted images are provided for review.  MIP   images are provided for review.  Dose modulation, iterative reconstruction,   and/or weight based adjustment of the mA/kV was utilized to reduce the   radiation dose to as low as reasonably achievable.       COMPARISON:   None.       HISTORY:   ORDERING SYSTEM PROVIDED HISTORY: Dyspnea, elevated dimer   TECHNOLOGIST PROVIDED HISTORY:   Dyspnea, elevated dimer   Decision Support Exception - unselect if not a suspected or confirmed   emergency medical condition->Emergency Medical Condition (MA)   Reason for Exam: dyspnea, elevated dimer       FINDINGS:   Pulmonary Arteries: Pulmonary arteries are adequately opacified for   evaluation.  No evidence of intraluminal filling defect to suggest pulmonary   embolism.  Main pulmonary artery is normal in caliber.       Mediastinum: No evidence of mediastinal lymphadenopathy.  The heart and   pericardium demonstrate no acute abnormality.  There is no acute abnormality   of the thoracic aorta.       Lungs/pleura: The lungs are without acute process.  No focal consolidation or   pulmonary edema.  No evidence of pleural effusion or pneumothorax.       Upper Abdomen: Limited images of the upper abdomen are unremarkable.       Soft Tissues/Bones: No acute bone or soft tissue abnormality.           Impression   No evidence of pulmonary embolism or acute pulmonary abnormality.       RECOMMENDATIONS:   Unavailable                   Medical Decision Sjekfi-[id number]-[id number]:       Medical Decision Making-Other:     Note:  · Labs, medications, radiologic studies were reviewed with personal review of films  · Large amounts of data were reviewed  · Discussed with nursing Staff, Discharge planner  · Infection Control and Prevention measures reviewed  · All prior entries were reviewed  · Administer medications as ordered  · Prognosis: Guarded  · Discharge planning reviewed      Thank you for allowing us to participate in the care of this patient. Please call with questions. Kyra Malloy, APRN - CNP     ATTESTATION:    I have discussed the case, including pertinent history and exam findings with the APRN. I have evaluated the  History, physical findings and pictures of the patient and the key elements of the encounter have been performed by me.  I have reviewed the laboratory data, other diagnostic studies and discussed them with the APRN. I have updated the medical record where necessary. I agree with the assessment, plan and orders as documented by the APRN.     Lew Zurita MD.      Pager: [phone] - Office: [phone]

## 2022-01-09 NOTE — CARE COORDINATION
Case Management Initial Discharge [facility],             Met with: daughter, Collin Mauricio, via telephone to discuss discharge plans. Information verified: address, contacts, phone number, , insurance Yes  Insurance Provider: Medicare, Medicaid    Emergency Contact/Next of Kin name & number: Collin Mauricio (daughter) [phone]  Who are involved in patient's support system? family    PCP: Pablo Desir MD  Date of last visit: 3 weeks      Discharge Planning    Living Arrangements:  [address] 1 [address]  4 stairs to climb to get into front door    Patient able to perform ADL's:Independent    Current Services (outpatient & in home) none  DME equipment: electric wheelchair  DME provider:     Is patient receiving oral anticoagulation therapy? No    Potential Assistance Needed:  Durable Medical Equipment    Patient agreeable to home care: No  [place] of choice provided:  n/a    Prior SNF/Rehab Placement and Facility: no  Agreeable to SNF/Rehab: No  [place] of choice provided: n/a     Evaluation: no    Expected Discharge date:       Patient expects to be discharged to: If home: is the family and/or caregiver wiling & able to provide support at home? yes  Who will be providing this support? Amanda    Follow Up Appointment: Best Day/ Time:      Transportation provider: family  Transportation arrangements needed for discharge: No Collin Mauricio will take home    Readmission Risk              Risk of Unplanned Readmission:  27             Does patient have a readmission risk score greater than 14?: Yes  If yes, follow-up appointment must be made within 7 days of discharge. Goals of Care:       Educated Collin Mauricio on transitional options, provided freedom of choice and are agreeable with plan      Discharge Plan: home with [place]. Requesting hospital bed.  PS sent to Dr Ping Horowitz          Electronically signed by Jamie Saba RN on 22 at 10:21 AM EST

## 2022-01-10 LAB
ANION GAP SERPL CALCULATED.3IONS-SCNC: 13 MMOL/L (ref 9–17)
BUN BLDV-MCNC: 27 MG/DL (ref 8–23)
BUN/CREAT BLD: ABNORMAL (ref 9–20)
CALCIUM SERPL-MCNC: 6.8 MG/DL (ref 8.6–10.4)
CHLORIDE BLD-SCNC: 98 MMOL/L (ref 98–107)
CO2: 18 MMOL/L (ref 20–31)
CREAT SERPL-MCNC: 1.48 MG/DL (ref 0.5–0.9)
EKG ATRIAL RATE: 79 BPM
EKG P AXIS: -16 DEGREES
EKG P-R INTERVAL: 196 MS
EKG Q-T INTERVAL: 452 MS
EKG QRS DURATION: 140 MS
EKG QTC CALCULATION (BAZETT): 518 MS
EKG R AXIS: -109 DEGREES
EKG T AXIS: 39 DEGREES
EKG VENTRICULAR RATE: 79 BPM
GFR AFRICAN AMERICAN: 41 ML/MIN
GFR NON-AFRICAN AMERICAN: 34 ML/MIN
GFR SERPL CREATININE-BSD FRML MDRD: ABNORMAL ML/MIN/{1.73_M2}
GFR SERPL CREATININE-BSD FRML MDRD: ABNORMAL ML/MIN/{1.73_M2}
GLUCOSE BLD-MCNC: 163 MG/DL (ref 65–105)
GLUCOSE BLD-MCNC: 165 MG/DL (ref 65–105)
GLUCOSE BLD-MCNC: 171 MG/DL (ref 70–99)
HCT VFR BLD CALC: 38.2 % (ref 36.3–47.1)
HEMOGLOBIN: 12.2 G/DL (ref 11.9–15.1)
MAGNESIUM: 1.7 MG/DL (ref 1.6–2.6)
MCH RBC QN AUTO: 27.5 PG (ref 25.2–33.5)
MCHC RBC AUTO-ENTMCNC: 31.9 G/DL (ref 28.4–34.8)
MCV RBC AUTO: 86.2 FL (ref 82.6–102.9)
NRBC AUTOMATED: 0 PER 100 WBC
PDW BLD-RTO: 13.9 % (ref 11.8–14.4)
PLATELET # BLD: 116 K/UL (ref 138–453)
PMV BLD AUTO: 12.4 FL (ref 8.1–13.5)
POTASSIUM SERPL-SCNC: 3.4 MMOL/L (ref 3.7–5.3)
RBC # BLD: 4.43 M/UL (ref 3.95–5.11)
SODIUM BLD-SCNC: 129 MMOL/L (ref 135–144)
WBC # BLD: 4.9 K/UL (ref 3.5–11.3)

## 2022-01-10 PROCEDURE — 6370000000 HC RX 637 (ALT 250 FOR IP): Performed by: NURSE PRACTITIONER

## 2022-01-10 PROCEDURE — 6370000000 HC RX 637 (ALT 250 FOR IP): Performed by: STUDENT IN AN ORGANIZED HEALTH CARE EDUCATION/TRAINING PROGRAM

## 2022-01-10 PROCEDURE — 2580000003 HC RX 258

## 2022-01-10 PROCEDURE — 93010 ELECTROCARDIOGRAM REPORT: CPT | Performed by: INTERNAL MEDICINE

## 2022-01-10 PROCEDURE — 6370000000 HC RX 637 (ALT 250 FOR IP)

## 2022-01-10 PROCEDURE — 93970 EXTREMITY STUDY: CPT

## 2022-01-10 PROCEDURE — 1200000000 HC SEMI PRIVATE

## 2022-01-10 PROCEDURE — 6370000000 HC RX 637 (ALT 250 FOR IP): Performed by: INTERNAL MEDICINE

## 2022-01-10 PROCEDURE — 82947 ASSAY GLUCOSE BLOOD QUANT: CPT

## 2022-01-10 PROCEDURE — 99232 SBSQ HOSP IP/OBS MODERATE 35: CPT | Performed by: INTERNAL MEDICINE

## 2022-01-10 PROCEDURE — 6360000002 HC RX W HCPCS: Performed by: STUDENT IN AN ORGANIZED HEALTH CARE EDUCATION/TRAINING PROGRAM

## 2022-01-10 PROCEDURE — 36415 COLL VENOUS BLD VENIPUNCTURE: CPT

## 2022-01-10 PROCEDURE — 2580000003 HC RX 258: Performed by: STUDENT IN AN ORGANIZED HEALTH CARE EDUCATION/TRAINING PROGRAM

## 2022-01-10 PROCEDURE — 6360000002 HC RX W HCPCS

## 2022-01-10 PROCEDURE — 83735 ASSAY OF MAGNESIUM: CPT

## 2022-01-10 PROCEDURE — 80048 BASIC METABOLIC PNL TOTAL CA: CPT

## 2022-01-10 PROCEDURE — 85027 COMPLETE CBC AUTOMATED: CPT

## 2022-01-10 PROCEDURE — 2500000003 HC RX 250 WO HCPCS: Performed by: STUDENT IN AN ORGANIZED HEALTH CARE EDUCATION/TRAINING PROGRAM

## 2022-01-10 RX ORDER — FUROSEMIDE 10 MG/ML
40 INJECTION INTRAMUSCULAR; INTRAVENOUS ONCE
Status: COMPLETED | OUTPATIENT
Start: 2022-01-10 | End: 2022-01-10

## 2022-01-10 RX ORDER — INSULIN GLARGINE 100 [IU]/ML
5 INJECTION, SOLUTION SUBCUTANEOUS NIGHTLY
Status: DISCONTINUED | OUTPATIENT
Start: 2022-01-10 | End: 2022-01-11 | Stop reason: HOSPADM

## 2022-01-10 RX ORDER — CEPHALEXIN 250 MG/1
250 CAPSULE ORAL EVERY 8 HOURS SCHEDULED
Status: DISCONTINUED | OUTPATIENT
Start: 2022-01-10 | End: 2022-01-11 | Stop reason: HOSPADM

## 2022-01-10 RX ORDER — MELATONIN
1000 DAILY
Status: CANCELLED | OUTPATIENT
Start: 2022-01-10

## 2022-01-10 RX ORDER — CALCIUM GLUCONATE 20 MG/ML
2000 INJECTION, SOLUTION INTRAVENOUS ONCE
Status: COMPLETED | OUTPATIENT
Start: 2022-01-10 | End: 2022-01-10

## 2022-01-10 RX ORDER — CEPHALEXIN 250 MG/1
250 CAPSULE ORAL EVERY 6 HOURS SCHEDULED
Status: DISCONTINUED | OUTPATIENT
Start: 2022-01-10 | End: 2022-01-10

## 2022-01-10 RX ORDER — PANTOPRAZOLE SODIUM 20 MG/1
20 TABLET, DELAYED RELEASE ORAL DAILY
Status: CANCELLED | OUTPATIENT
Start: 2022-01-10

## 2022-01-10 RX ADMIN — INSULIN LISPRO 1 UNITS: 100 INJECTION, SOLUTION INTRAVENOUS; SUBCUTANEOUS at 12:00

## 2022-01-10 RX ADMIN — POTASSIUM BICARBONATE 40 MEQ: 782 TABLET, EFFERVESCENT ORAL at 12:53

## 2022-01-10 RX ADMIN — LEVOTHYROXINE SODIUM 125 MCG: 125 TABLET ORAL at 08:51

## 2022-01-10 RX ADMIN — SODIUM CHLORIDE, PRESERVATIVE FREE 10 ML: 5 INJECTION INTRAVENOUS at 08:52

## 2022-01-10 RX ADMIN — CEPHALEXIN 250 MG: 250 CAPSULE ORAL at 14:00

## 2022-01-10 RX ADMIN — INSULIN GLARGINE 5 UNITS: 100 INJECTION, SOLUTION SUBCUTANEOUS at 21:18

## 2022-01-10 RX ADMIN — CEFTRIAXONE SODIUM 2000 MG: 2 INJECTION, POWDER, FOR SOLUTION INTRAMUSCULAR; INTRAVENOUS at 00:48

## 2022-01-10 RX ADMIN — SODIUM CHLORIDE, PRESERVATIVE FREE 10 ML: 5 INJECTION INTRAVENOUS at 21:18

## 2022-01-10 RX ADMIN — INSULIN LISPRO 1 UNITS: 100 INJECTION, SOLUTION INTRAVENOUS; SUBCUTANEOUS at 08:00

## 2022-01-10 RX ADMIN — HEPARIN SODIUM 5000 UNITS: 5000 INJECTION INTRAVENOUS; SUBCUTANEOUS at 21:18

## 2022-01-10 RX ADMIN — ASPIRIN 81 MG: 81 TABLET, CHEWABLE ORAL at 08:51

## 2022-01-10 RX ADMIN — ACETAMINOPHEN 650 MG: 325 TABLET ORAL at 21:55

## 2022-01-10 RX ADMIN — INSULIN LISPRO 2 UNITS: 100 INJECTION, SOLUTION INTRAVENOUS; SUBCUTANEOUS at 16:00

## 2022-01-10 RX ADMIN — INSULIN LISPRO 1 UNITS: 100 INJECTION, SOLUTION INTRAVENOUS; SUBCUTANEOUS at 21:18

## 2022-01-10 RX ADMIN — FUROSEMIDE 40 MG: 10 INJECTION, SOLUTION INTRAMUSCULAR; INTRAVENOUS at 12:54

## 2022-01-10 RX ADMIN — ATORVASTATIN CALCIUM 80 MG: 80 TABLET, FILM COATED ORAL at 08:51

## 2022-01-10 RX ADMIN — HEPARIN SODIUM 5000 UNITS: 5000 INJECTION INTRAVENOUS; SUBCUTANEOUS at 14:57

## 2022-01-10 RX ADMIN — CALCIUM GLUCONATE 2000 MG: 20 INJECTION, SOLUTION INTRAVENOUS at 12:52

## 2022-01-10 RX ADMIN — CLOPIDOGREL 75 MG: 75 TABLET, FILM COATED ORAL at 08:51

## 2022-01-10 RX ADMIN — POTASSIUM CHLORIDE 10 MEQ: 7.46 INJECTION, SOLUTION INTRAVENOUS at 08:51

## 2022-01-10 RX ADMIN — CEPHALEXIN 250 MG: 250 CAPSULE ORAL at 21:18

## 2022-01-10 ASSESSMENT — PAIN SCALES - GENERAL
PAINLEVEL_OUTOF10: 0
PAINLEVEL_OUTOF10: 10
PAINLEVEL_OUTOF10: 0

## 2022-01-10 ASSESSMENT — PAIN DESCRIPTION - LOCATION: LOCATION: HEAD

## 2022-01-10 ASSESSMENT — PAIN DESCRIPTION - PAIN TYPE: TYPE: ACUTE PAIN

## 2022-01-10 ASSESSMENT — PAIN DESCRIPTION - DESCRIPTORS: DESCRIPTORS: ACHING

## 2022-01-10 NOTE — PROGRESS NOTES
[facility]  Internal Medicine Teaching Residency Program  Inpatient Daily Progress Note  ______________________________________________________________________________    Patient: Chelle Del Valle  YOB: 1935   DKO:[id number]    Acct: [de-identified]     Room: [facility]  Admit date: 1/8/2022  Today's date: 01/10/22  Number of days in the hospital: 2    SUBJECTIVE   Admitting Diagnosis: COVID-19  CC: fatigue and generalized weakness. Pt seen & examined at bedside. Chart & results reviewed. Hemodynamically stable, saturating well on room air. Fever improving, T-max of 100  Urine culture is growing gram-negative lactose fermenting rods- E.coli  No growth in Blood cultures so far  She is on Rocephin for UTI, pending finalized culture. She is on  IV Lasix 40 mg  output 1.3 L over last 24 hours. She is on 40 mg p.o. Lasix at home. Improving BRIA, creatinine 1.7->1.48  K 3.4, Mg 1.7- will replace. From Covid standpoint, she is on room air and has mild elevation of CRP, 17.1. Therefore we will monitor CRP and respiratory status. Will send respiratory culture. She is to be isolated till 1/19/2021. ROS:  Constitutional:  negative for chills, sweats. Fever present  Respiratory:  negative for cough, dyspnea on exertion, hemoptysis, shortness of breath, wheezing  Cardiovascular:  negative for chest pain, chest pressure/discomfort, lower extremity edema, palpitations  Gastrointestinal:  negative for abdominal pain, constipation, diarrhea, nausea, vomiting  Neurological:  negative for dizziness, headache  BRIEF HISTORY     A 82 y.o. female with PMH significant of   CAD (s/p on 10/08/12 PTCA/ PEPITO to LAD, EF 45%.)  Chronic diastolic CHF  Thyroid disease  HTN, DM, HLD  - is brought in by EMS with the chief complaint of generalized weakness.     She is Covid vaccinated (J&J) but for last 2 weeks, she has been having fatigue, malaise, shortness of breath, chest pain, cough and congestion. She lost balance 3-4 times in the last 3 days. Even this morning, when she was on bed, watching TV, she felt lightheaded, nauseous, had chest pain and she was about to fall down, and the daughter helped her lower down and called EMS. When EMS brought her to the ER, her underwear and pad were soiled. She has been having dizziness but never lost consciousness. She denies any palpitation. She denies any incidence of bowel or bladder incontinence associated with lightheadedness for  She states that chest pain is more substernal and left-sided, dull in character, non-radiating, around 4-5/10 in intensity, associated with no aggravating or relieving factors. She lives with her daughter, who helps her, in her activities of daily living. She moves somewhat in the house with the help of wheelchair. She also has been having bilateral lower extremity +2 pedal edema. She complains of 8/10 pain in legs, states that started around 2 weeks back. She also has orthopnea and PND episodes. She also endorses cough, bringing up white yellowish phlegm, poor appetite, some myalgias and arthralgias. But she denies fever, chills, or sweating. She denies any diarrhea or constipation. No burning micturition/urgency/frequency. Patient states that her daughter helps her with management of daily medications and she tries to be compliant with all of them.     Work-up in ER  CBC-unremarkable  BMP-creatinine 1.0, potassium-3.6, replaced  VBG-unremarkable except O2 52  D-dimer 1.77-> CT PE  UA-significant of UTI. TSH-15.03, T3-0.32  Chest x-ray showed no acute cardiopulmonary process  proBNP 186  Troponin 21-23-  Inflammatory markers-CRP 17.1, , pro-Kyle 0.1  EKG showed no acute ST or T wave abnormality.     On my evaluation, she is hemodynamically stable, saturating well on room air. She has tremors present of tongue. She answers all questions appropriately.   She is alert and oriented. OBJECTIVE     Vital Signs:  /62   Pulse 88   Temp 98.7 °F (37.1 °C) (Oral)   Resp 20   Ht 5' 2\" (1.575 m)   Wt 200 lb (90.7 kg)   SpO2 93%   BMI 36.58 kg/m²     Temp (24hrs), Av.4 °F (37.4 °C), Min:98.6 °F (37 °C), Max:100.5 °F (38.1 °C)    In: 1039   Out: 300 [Urine:300]       PHYSICAL EXAMINATION:  Constitutional: This is a well developed, well nourished, 35-39.9 - Obesity Grade II 82y.o. year old female who is alert, oriented, cooperative and in no apparent distress. Head:normocephalic and atraumatic. EENT:  PERRLA. No conjunctival injections. Septum was midline, mucosa was without erythema, exudates or cobblestoning. No thrush was noted. Neck: Supple without thyromegaly. No elevated JVP. Trachea was midline. Respiratory: Chest was symmetrical without dullness to percussion. Breath sounds bilaterally were clear to auscultation. There were no wheezes, rhonchi or rales. There is no intercostal retraction or use of accessory muscles. No egophony noted. Cardiovascular: Regular without murmur, clicks, gallops or rubs. Abdomen: Slightly rounded and soft without organomegaly. No rebound, rigidity or guarding was appreciated. Lymphatic: No lymphadenopathy. Musculoskeletal: Normal curvature of the spine. No gross muscle weakness. Extremities: +3 grade pitting lower extremity edema. No varicosities or erythema. Muscle size, tone and strength are normal.  No involuntary movements are noted. Skin:  Warm and dry. Good color, turgor and pigmentation. No lesions or scars.   No cyanosis or clubbing  Neurological/Psychiatric: The patient's general behavior, level of consciousness, thought content and emotional status is normal.       Medications:  Scheduled Medications:    insulin glargine  5 Units SubCUTAneous Nightly    cephALEXin  250 mg Oral 3 times per day    potassium chloride  10 mEq IntraVENous BID    sodium chloride flush  10 mL IntraVENous 2 times per day rhythm     Hypokalemia  Potassium 3.4, replacing    Hypomagnesemia  Mg 1.7, replacing     DVT ppx: On heparin     GI ppx:   Not indicated     PT/OT/SW:  Consulted     Discharge Planning:   to assist in discharge planning      Rk Marie  Internal Medicine Resident, PGY-1  [address], [place]  1/10/2022, 6:19 AM

## 2022-01-10 NOTE — PROGRESS NOTES
[facility] - Initial Consult Note COVID 19 Patient  Today's Date and Time: 1/10/2022, 10:20 AM    Impression :     · COVID 19 Confirmed Infection  · Covid tests:  · 1/8/21 Positive  · UTI-E. Coli  · Hx MDRO E. Coli UTI  · BRIA  · Hypothyroidism-uncontrolled  · Chronic diastolic CHF  · DM II  · HTN  · Received J&J vaccine on 5/13/21    Recommendations:   · Antibiotic treatment:  · Continue Rocephin pending finalized culture  · Urine culture growing E. Coli >100,000  · Abx switched to Keflex 250 mg Q 8 hrs. Stop date 1-17-22  · Covid Rx:    · Remdesivir-contraindicated with BRIA  · Decadron-Does not qualify as she is on room air  · Actemra-Does not qualify  · Monoclonal antibodies-Out of window   · Monitor CRP and respiratory status  · She should receive vaccine booster prior to discharge. · ID wise patient is stable. Cephalexin requested through 1-17-22  · Covid Rx: completed, except for vaccine booster. · ID will sign off at this point    Medical Decision Making/Summary/Discussion:1/10/2022     · Patient admitted with COVID 19 infection  · Isolate until 1/19/21    Infection Control Recommendations   · Universal Precautions  · Airborne isolation  · Droplet Isolation    Antimicrobial Stewardship Recommendations     · Simplification of therapy  · Targeted therapy  · PK dosing    Coordination of Outpatient Care:   · Estimated Length of IV antimicrobials:TBD  · Patient will need Midline Catheter Insertion: TBD  · Patient will need PICC line Insertion: No  · Patient will need: Home IV , [place],  SNF,  LTAC:TBD  · Patient will need outpatient wound care:No    Chief complaint/reason for consultation:   · Concern for COVID infection      History of Present Illness:   Rufina Molina is a 82y.o.-year-old female who was initially admitted on 1/8/2022.  Patient seen at the request of Dr. Jose A Culver:    Patient, with a hx of HTN, HLD, DM II, CAD, CHF, A flutter & thyroid dysfuncton, presented through ER with complaints of chest pain and shortness of breath with associated BLE edema and pain for 2 weeks. She received the J&J vaccine in May 2021. A rapid Covid was positive and urine culture is positive for lactose fermenting gram negative rods. The patient is febrile with borderline hypotension. She iswas oxygenating well on room air. Imaging did not show any acute cardiopulmonary process. The patient was initiated on Rocephin for UTI. Imaging:   XR CHEST PORTABLE     Result Date: 1/8/2022  No acute process. Stable cardiomegaly.      CT CHEST PULMONARY EMBOLISM W CONTRAST     Result Date: 1/8/2022  No evidence of pulmonary embolism or acute pulmonary abnormality. Patient admitted because of concerns with COVID 19.    CURRENT EVALUATION : 1/10/2022    Afebrile  VS stable    Venous duplex of BLE being completed    Hyponatremia  Creatinine is improving    Keflex for UTI. Stop date 1-17-22    Patient exhibiting respiratory distress. No  Respiratory secretions: No    Patient receiving supplemental oxygen. No  RR:23-->20  02 sat:93-->95      NEWS Score: 0-4 Low risk group; 5-6: Medium risk group; 7 or above: High risk group  Parameters 3 2 1 0 1 2 3   Age    < 65   ? 65   RR ? 8  9-11 12-20  21-24 ? 25   O2 Sats ?  91 92-93 94-95 ? 96      Suppl O2  Yes  No      SBP ? 90  101-110 111-219   ? 220   HR ? 40  41-50 51-90  111-130 ? 131   Consciousness    Alert   Drowsiness, lethargy, or confusion   Temperature ? 35.0 C (95.0 F)  35.1-36.0 C 95.1-96.9 F 36.1-38.0 C 97.0-100.4 F 38.1-39.0 C 100.5-102.3 F ? 39.1 C ? 102.4 F      NEWS Score:   1/9/21: 5 moderate risk    Overall Daily Picture:    Improving    Presence of secondary bacterial Infection:  Yes    Additional antibiotics: Keflex    Labs, X rays reviewed: 1/10/2022    BUN:18-->27  Cr:1.20-->1.48    WBC:8.7-->4.9  Hb:13.2-->12.2  Plat: 160-->116  TSH: 15    Absolute Neutrophils:5.7  Absolute Lymphocytes:1.8  Neutrophil/Lymphocyte Ratio: 3 low risk    CRP:17.1  Ferritin:  LDH: 189    Pro Calcitonin:      Cultures:  Urine:  · 1/8/21: e. coli  Blood:  · 1/9/21: pending  Sputum :  ·   Wound:       CXR:   1/8/21      CAT:   1/8/21      Discussed with patient, RN, CC, IM. I have personally reviewed the past medical history, past surgical history, medications, social history, and family history, and I have updated the database accordingly.   Past Medical History:     Past Medical History:   Diagnosis Date    Arthritis     [facility]     CAD (coronary artery disease)     CHF (congestive heart failure) (HCC)     Diabetes ([facility]     Diabetes mellitus ([facility]     Hyperlipidemia     Hypertension     Psychiatric problem     [facility]     S/P thyroidectomy; on synthroid    Thyroid disease     hypothyroid       Past Surgical  History:     Past Surgical History:   Procedure Laterality Date    APPENDECTOMY      BACK SURGERY      CHOLECYSTECTOMY      CORONARY ANGIOPLASTY WITH STENT PLACEMENT      HYSTERECTOMY      THYROIDECTOMY      UPPER GASTROINTESTINAL ENDOSCOPY  4/14/2021    EGD BIOPSY performed by Darvin Gregg MD at [address] [place] [address] ENDOSCOPY  4/14/2021    EGD CONTROL HEMORRHAGE performed by Darvin Gregg MD at [facility]       Medications:      insulin glargine  5 Units SubCUTAneous Nightly    cefTRIAXone (ROCEPHIN) IV  2,000 mg IntraVENous Q24H    potassium chloride  10 mEq IntraVENous BID    sodium chloride flush  10 mL IntraVENous 2 times per day    atorvastatin  80 mg Oral Daily    aspirin  81 mg Oral Daily    levothyroxine  125 mcg Oral Daily    clopidogrel  75 mg Oral Daily    sodium chloride flush  5-40 mL IntraVENous 2 times per day    heparin (porcine)  5,000 Units SubCUTAneous 3 times per day    insulin lispro  0-6 Units SubCUTAneous TID     insulin lispro  0-3 Units SubCUTAneous Nightly       Social History: Social History     Socioeconomic History    Marital status:      Spouse name: Not on file    Number of children: Not on file    Years of education: Not on file    Highest education level: Not on file   Occupational History    Not on file   Tobacco Use    Smoking status: Former Smoker    Smokeless tobacco: Never Used   Vaping Use    Vaping Use: Never used   Substance and Sexual Activity    Alcohol use: No    Drug use: No    Sexual activity: Not Currently   Other Topics Concern    Not on file   Social History Narrative    Not on file     Social Determinants of Health     Financial Resource Strain: Medium Risk    Difficulty of Paying Living Expenses: Somewhat hard   Food Insecurity: Food Insecurity Present    Worried About Running Out of Food in the Last Year: Sometimes true    Portia of Food in the Last Year: Often true   Transportation Needs:     Lack of Transportation (Medical): Not on file    Lack of Transportation (Non-Medical):  Not on file   Physical Activity:     Days of Exercise per Week: Not on file    Minutes of Exercise per Session: Not on file   Stress:     Feeling of Stress : Not on file   Social Connections:     Frequency of Communication with Friends and Family: Not on file    Frequency of Social Gatherings with Friends and Family: Not on file    Attends Denominational Services: Not on file    Active Member of [address] [place] [address] [employer] or Organizations: Not on file    Attends Club or Organization Meetings: Not on file    Marital Status: Not on file   Intimate Partner Violence:     Fear of Current or Ex-Partner: Not on file    Emotionally Abused: Not on file    Physically Abused: Not on file    Sexually Abused: Not on file   Housing Stability:     Unable to Pay for Housing in the Last Year: Not on file    Number of [place] in the Last Year: Not on file    Unstable Housing in the Last Year: Not on file       Family History:     Family History   Problem Relation Age of Onset    Cancer Mother     Cancer Father     Cancer Brother     Diabetes Brother         Allergies:   Strawberry extract and Tape [adhesive tape]     Review of Systems:       Constitutional: fevers and generalized malaise  Head: No headaches  Eyes: No double vision or blurry vision. No conjunctival inflammation. ENT: No sore throat or runny nose. . No hearing loss, tinnitus or vertigo. Cardiovascular: No chest pain or palpitations. No Shortness of breath. No GIRON  Lung: No Shortness of breath or cough. No sputum production  Abdomen: No nausea, vomiting, diarrhea, or abdominal pain. Brenden Punt No cramps. Genitourinary: No increased urinary frequency, or dysuria. No hematuria. No suprapubic or CVA pain  Musculoskeletal: No muscle aches or pains. No joint effusions, swelling or deformities  Hematologic: No bleeding or bruising. Neurologic: No headache, weakness, numbness, or tingling. Integument: No rash, no ulcers. Psychiatric: No depression. Endocrine: No polyuria, no polydipsia, no polyphagia. Physical Examination :     Patient Vitals for the past 8 hrs:   BP Temp Temp src Pulse Resp SpO2   01/10/22 0800 121/62 98.7 °F (37.1 °C) Oral 88 20 93 %   01/10/22 0755 107/64 98.7 °F (37.1 °C) Oral 87 21 93 %   01/10/22 0515 (!) 105/57 98.6 °F (37 °C) Oral 89 -- --     General Appearance: Awake, alert, and in no apparent distress. Ill appearing  Head:  Normocephalic, no trauma  Eyes: Pupils equal, round, reactive to light; sclera anicteric; conjunctivae pink. No embolic phenomena. ENT: Oropharynx clear, without erythema, exudate, or thrush. No tenderness of sinuses. Mouth/throat: mucosa pink and moist. No lesions. Dentition in good repair. Neck:Supple, without lymphadenopathy. Thyroid normal, No bruits. Pulmonary/Chest: Clear to auscultation, without wheezes, rales, or rhonchi. No dullness to percussion. Cardiovascular: Regular rate and rhythm without murmurs, rubs, or gallops. Abdomen: Soft, non tender.  Bowel sounds normal. No organomegaly  All four Extremities: No cyanosis, clubbing, edema, or effusions. Neurologic: No gross sensory or motor deficits. Skin: Warm and dry with good turgor. No signs of peripheral arterial or venous insufficiency. No ulcerations. No open wounds. Medical Decision Making -Laboratory:   I have independently reviewed/ordered the following labs:    CBC with Differential:   Recent Labs     01/08/22  0820 01/08/22  0820 01/09/22  1232 01/10/22  0636   WBC 8.7   < > 5.8 4.9   HGB 13.2   < > 12.4 12.2   HCT 42.0   < > 38.3 38.2      < > See Reflexed IPF Result 116*   LYMPHOPCT 21*  --   --   --    MONOPCT 10  --   --   --     < > = values in this interval not displayed. BMP:   Recent Labs     01/09/22  1232 01/10/22  0636   * 129*   K 3.0* 3.4*   CL 96* 98   CO2 20 18*   BUN 24* 27*   CREATININE 1.71* 1.48*   MG 1.1* 1.7     Hepatic Function Panel: No results for input(s): PROT, LABALBU, BILIDIR, IBILI, BILITOT, ALKPHOS, ALT, AST in the last 72 hours. No results for input(s): RPR in the last 72 hours. No results for input(s): HIV in the last 72 hours. No results for input(s): BC in the last 72 hours. Lab Results   Component Value Date    MUCUS NOT REPORTED 01/08/2022    PH 7.20 10/05/2012    RBC 4.43 01/10/2022    RBC 4.41 02/03/2012    TRICHOMONAS NOT REPORTED 01/08/2022    WBC 4.9 01/10/2022    YEAST NOT REPORTED 01/08/2022    TURBIDITY Clear 01/08/2022     Lab Results   Component Value Date    CREATININE 1.48 01/10/2022    GLUCOSE 171 01/10/2022    GLUCOSE 380 05/14/2012       Medical Decision Making-Imaging:     Narrative   EXAMINATION:   ONE XRAY VIEW OF THE CHEST       1/8/2022 7:43 am       COMPARISON:   April 12, 2021       HISTORY:   ORDERING SYSTEM PROVIDED HISTORY: Chest pain   TECHNOLOGIST PROVIDED HISTORY:   Chest pain   Reason for Exam: port upright.  Leonardo pain began yesterday 1/7/2022       FINDINGS:   The lungs are without acute focal process.  There is no effusion or pneumothorax. The cardiomediastinal silhouette is prominent but stable.  The   osseous structures are without acute process.           Impression   No acute process.  Stable cardiomegaly.                Narrative   EXAMINATION:   CTA OF THE CHEST 1/8/2022 9:23 am       TECHNIQUE:   CTA of the chest was performed after the administration of intravenous   contrast.  Multiplanar reformatted images are provided for review.  MIP   images are provided for review. Dose modulation, iterative reconstruction,   and/or weight based adjustment of the mA/kV was utilized to reduce the   radiation dose to as low as reasonably achievable.       COMPARISON:   None.       HISTORY:   ORDERING SYSTEM PROVIDED HISTORY: Dyspnea, elevated dimer   TECHNOLOGIST PROVIDED HISTORY:   Dyspnea, elevated dimer   Decision Support Exception - unselect if not a suspected or confirmed   emergency medical condition->Emergency Medical Condition (MA)   Reason for Exam: dyspnea, elevated dimer       FINDINGS:   Pulmonary Arteries: Pulmonary arteries are adequately opacified for   evaluation.  No evidence of intraluminal filling defect to suggest pulmonary   embolism.  Main pulmonary artery is normal in caliber.       Mediastinum: No evidence of mediastinal lymphadenopathy.  The heart and   pericardium demonstrate no acute abnormality.  There is no acute abnormality   of the thoracic aorta.       Lungs/pleura:  The lungs are without acute process.  No focal consolidation or   pulmonary edema.  No evidence of pleural effusion or pneumothorax.       Upper Abdomen: Limited images of the upper abdomen are unremarkable.       Soft Tissues/Bones: No acute bone or soft tissue abnormality.           Impression   No evidence of pulmonary embolism or acute pulmonary abnormality.       RECOMMENDATIONS:   Unavailable                   Medical Decision [id number]:       Medical Decision Making-Other:     Note:  · Labs, medications, radiologic studies were reviewed with personal review of films  · Large amounts of data were reviewed  · Discussed with nursing Staff, Discharge planner  · Infection Control and Prevention measures reviewed  · All prior entries were reviewed  · Administer medications as ordered  · Prognosis: Guarded  · Discharge planning reviewed      Thank you for allowing us to participate in the care of this patient. Please call with questions. STEFAN Felton - CNP     .   Pager: [phone] - Office: [phone]

## 2022-01-10 NOTE — PROGRESS NOTES
Occupational [address] [employer]  Occupational Therapy Not Seen Note    DATE: 1/10/2022    NAME: Len Jackson  MRN: [id number]   : 1935      Patient not seen this date for Occupational Therapy due to:    Testing: Awaiting results from BLE dopplers prior to OT eval.    Next Scheduled Treatment: Attempt in pm as appropriate.     Electronically signed by Harley Palma OT on 1/10/2022 at 8:13 AM

## 2022-01-10 NOTE — PROGRESS NOTES
Physical Therapy        Physical Therapy Cancel Note      DATE: 1/10/2022    NAME: Len Jackson  MRN: [id number]   : 1935      Patient not seen this date for Physical Therapy due to: Other: Pending dopplers ordered for LE pain.        Electronically signed by Jose Griffin PT on 1/10/2022 at 12:21 PM

## 2022-01-11 VITALS
HEIGHT: 62 IN | OXYGEN SATURATION: 94 % | TEMPERATURE: 99 F | WEIGHT: 199.3 LBS | BODY MASS INDEX: 36.67 KG/M2 | DIASTOLIC BLOOD PRESSURE: 102 MMHG | SYSTOLIC BLOOD PRESSURE: 119 MMHG | HEART RATE: 85 BPM | RESPIRATION RATE: 19 BRPM

## 2022-01-11 LAB
ABSOLUTE EOS #: 0.48 K/UL (ref 0–0.44)
ABSOLUTE IMMATURE GRANULOCYTE: 0 K/UL (ref 0–0.3)
ABSOLUTE LYMPH #: 0.62 K/UL (ref 1.1–3.7)
ABSOLUTE MONO #: 0.34 K/UL (ref 0.1–1.2)
ANION GAP SERPL CALCULATED.3IONS-SCNC: 19 MMOL/L (ref 9–17)
BASOPHILS # BLD: 1 % (ref 0–2)
BASOPHILS ABSOLUTE: 0.05 K/UL (ref 0–0.2)
BUN BLDV-MCNC: 27 MG/DL (ref 8–23)
BUN/CREAT BLD: ABNORMAL (ref 9–20)
CALCIUM SERPL-MCNC: 7.3 MG/DL (ref 8.6–10.4)
CHLORIDE BLD-SCNC: 97 MMOL/L (ref 98–107)
CO2: 17 MMOL/L (ref 20–31)
CREAT SERPL-MCNC: 1.31 MG/DL (ref 0.5–0.9)
DIFFERENTIAL TYPE: ABNORMAL
EOSINOPHILS RELATIVE PERCENT: 10 % (ref 1–4)
GFR AFRICAN AMERICAN: 47 ML/MIN
GFR NON-AFRICAN AMERICAN: 39 ML/MIN
GFR SERPL CREATININE-BSD FRML MDRD: ABNORMAL ML/MIN/{1.73_M2}
GFR SERPL CREATININE-BSD FRML MDRD: ABNORMAL ML/MIN/{1.73_M2}
GLUCOSE BLD-MCNC: 170 MG/DL (ref 65–105)
GLUCOSE BLD-MCNC: 170 MG/DL (ref 70–99)
GLUCOSE BLD-MCNC: 204 MG/DL (ref 65–105)
HCT VFR BLD CALC: 37.7 % (ref 36.3–47.1)
HEMOGLOBIN: 12 G/DL (ref 11.9–15.1)
IMMATURE GRANULOCYTES: 0 %
LYMPHOCYTES # BLD: 13 % (ref 24–43)
MCH RBC QN AUTO: 27.7 PG (ref 25.2–33.5)
MCHC RBC AUTO-ENTMCNC: 31.8 G/DL (ref 28.4–34.8)
MCV RBC AUTO: 87.1 FL (ref 82.6–102.9)
MONOCYTES # BLD: 7 % (ref 3–12)
MORPHOLOGY: NORMAL
NRBC AUTOMATED: 0 PER 100 WBC
PDW BLD-RTO: 14 % (ref 11.8–14.4)
PLATELET # BLD: ABNORMAL K/UL (ref 138–453)
PLATELET ESTIMATE: ABNORMAL
PLATELET, FLUORESCENCE: 130 K/UL (ref 138–453)
PLATELET, IMMATURE FRACTION: 5.1 % (ref 1.1–10.3)
PMV BLD AUTO: ABNORMAL FL (ref 8.1–13.5)
POTASSIUM SERPL-SCNC: 3.8 MMOL/L (ref 3.7–5.3)
RBC # BLD: 4.33 M/UL (ref 3.95–5.11)
RBC # BLD: ABNORMAL 10*6/UL
SEG NEUTROPHILS: 69 % (ref 36–65)
SEGMENTED NEUTROPHILS ABSOLUTE COUNT: 3.31 K/UL (ref 1.5–8.1)
SODIUM BLD-SCNC: 133 MMOL/L (ref 135–144)
WBC # BLD: 4.8 K/UL (ref 3.5–11.3)
WBC # BLD: ABNORMAL 10*3/UL

## 2022-01-11 PROCEDURE — 6370000000 HC RX 637 (ALT 250 FOR IP): Performed by: INTERNAL MEDICINE

## 2022-01-11 PROCEDURE — 80048 BASIC METABOLIC PNL TOTAL CA: CPT

## 2022-01-11 PROCEDURE — 85055 RETICULATED PLATELET ASSAY: CPT

## 2022-01-11 PROCEDURE — 6360000002 HC RX W HCPCS

## 2022-01-11 PROCEDURE — 6370000000 HC RX 637 (ALT 250 FOR IP): Performed by: STUDENT IN AN ORGANIZED HEALTH CARE EDUCATION/TRAINING PROGRAM

## 2022-01-11 PROCEDURE — 36415 COLL VENOUS BLD VENIPUNCTURE: CPT

## 2022-01-11 PROCEDURE — 97530 THERAPEUTIC ACTIVITIES: CPT

## 2022-01-11 PROCEDURE — 82947 ASSAY GLUCOSE BLOOD QUANT: CPT

## 2022-01-11 PROCEDURE — 97162 PT EVAL MOD COMPLEX 30 MIN: CPT

## 2022-01-11 PROCEDURE — 6370000000 HC RX 637 (ALT 250 FOR IP)

## 2022-01-11 PROCEDURE — 2580000003 HC RX 258: Performed by: STUDENT IN AN ORGANIZED HEALTH CARE EDUCATION/TRAINING PROGRAM

## 2022-01-11 PROCEDURE — 99239 HOSP IP/OBS DSCHRG MGMT >30: CPT | Performed by: INTERNAL MEDICINE

## 2022-01-11 PROCEDURE — 85025 COMPLETE CBC W/AUTO DIFF WBC: CPT

## 2022-01-11 PROCEDURE — 97166 OT EVAL MOD COMPLEX 45 MIN: CPT

## 2022-01-11 PROCEDURE — 97535 SELF CARE MNGMENT TRAINING: CPT

## 2022-01-11 RX ORDER — LEVOTHYROXINE SODIUM 0.07 MG/1
150 TABLET ORAL DAILY
Status: DISCONTINUED | OUTPATIENT
Start: 2022-01-12 | End: 2022-01-11 | Stop reason: HOSPADM

## 2022-01-11 RX ORDER — LEVOTHYROXINE SODIUM 0.15 MG/1
150 TABLET ORAL DAILY
Qty: 30 TABLET | Refills: 3 | Status: SHIPPED | OUTPATIENT
Start: 2022-01-12 | End: 2022-05-23 | Stop reason: SDUPTHER

## 2022-01-11 RX ORDER — FUROSEMIDE 40 MG/1
20 TABLET ORAL DAILY
Qty: 30 TABLET | Refills: 4 | Status: ON HOLD | OUTPATIENT
Start: 2022-01-11 | End: 2022-01-21 | Stop reason: HOSPADM

## 2022-01-11 RX ORDER — PANTOPRAZOLE SODIUM 40 MG/1
40 TABLET, DELAYED RELEASE ORAL DAILY
Qty: 60 TABLET | Refills: 0 | Status: SHIPPED | OUTPATIENT
Start: 2022-01-11 | End: 2022-07-05

## 2022-01-11 RX ORDER — CEPHALEXIN 250 MG/1
250 CAPSULE ORAL EVERY 8 HOURS SCHEDULED
Qty: 20 CAPSULE | Refills: 0 | Status: SHIPPED | OUTPATIENT
Start: 2022-01-11 | End: 2022-01-21 | Stop reason: HOSPADM

## 2022-01-11 RX ADMIN — ATORVASTATIN CALCIUM 80 MG: 80 TABLET, FILM COATED ORAL at 11:05

## 2022-01-11 RX ADMIN — CEPHALEXIN 250 MG: 250 CAPSULE ORAL at 06:17

## 2022-01-11 RX ADMIN — CLOPIDOGREL 75 MG: 75 TABLET, FILM COATED ORAL at 08:16

## 2022-01-11 RX ADMIN — INSULIN LISPRO 1 UNITS: 100 INJECTION, SOLUTION INTRAVENOUS; SUBCUTANEOUS at 08:00

## 2022-01-11 RX ADMIN — ASPIRIN 81 MG: 81 TABLET, CHEWABLE ORAL at 08:16

## 2022-01-11 RX ADMIN — CEPHALEXIN 250 MG: 250 CAPSULE ORAL at 15:37

## 2022-01-11 RX ADMIN — SODIUM CHLORIDE, PRESERVATIVE FREE 10 ML: 5 INJECTION INTRAVENOUS at 08:16

## 2022-01-11 RX ADMIN — HEPARIN SODIUM 5000 UNITS: 5000 INJECTION INTRAVENOUS; SUBCUTANEOUS at 15:37

## 2022-01-11 RX ADMIN — ACETAMINOPHEN 650 MG: 325 TABLET ORAL at 03:42

## 2022-01-11 RX ADMIN — LEVOTHYROXINE SODIUM 125 MCG: 125 TABLET ORAL at 08:16

## 2022-01-11 RX ADMIN — INSULIN LISPRO 2 UNITS: 100 INJECTION, SOLUTION INTRAVENOUS; SUBCUTANEOUS at 13:00

## 2022-01-11 RX ADMIN — HEPARIN SODIUM 5000 UNITS: 5000 INJECTION INTRAVENOUS; SUBCUTANEOUS at 06:17

## 2022-01-11 ASSESSMENT — PAIN SCALES - GENERAL
PAINLEVEL_OUTOF10: 3
PAINLEVEL_OUTOF10: 0
PAINLEVEL_OUTOF10: 8
PAINLEVEL_OUTOF10: 0
PAINLEVEL_OUTOF10: 8

## 2022-01-11 ASSESSMENT — PAIN DESCRIPTION - PAIN TYPE
TYPE: ACUTE PAIN
TYPE: ACUTE PAIN

## 2022-01-11 ASSESSMENT — PAIN DESCRIPTION - DESCRIPTORS
DESCRIPTORS: ACHING;DISCOMFORT;SORE
DESCRIPTORS: ACHING;DISCOMFORT

## 2022-01-11 ASSESSMENT — ENCOUNTER SYMPTOMS: TACHYPNEA: 1

## 2022-01-11 ASSESSMENT — PAIN DESCRIPTION - FREQUENCY: FREQUENCY: CONTINUOUS

## 2022-01-11 ASSESSMENT — PAIN DESCRIPTION - ORIENTATION
ORIENTATION: RIGHT;LEFT
ORIENTATION: RIGHT;LEFT;DISTAL

## 2022-01-11 ASSESSMENT — PAIN DESCRIPTION - LOCATION
LOCATION: LEG;ANKLE
LOCATION: LEG

## 2022-01-11 ASSESSMENT — PAIN - FUNCTIONAL ASSESSMENT: PAIN_FUNCTIONAL_ASSESSMENT: PREVENTS OR INTERFERES SOME ACTIVE ACTIVITIES AND ADLS

## 2022-01-11 NOTE — DISCHARGE INSTR - COC
Continuity of Care Form    Patient Name: Kaity Leger   :  1935  MRN:  [id number]    Admit date:  2022  Discharge date:  2022    Code Status Order: Full Code   Advance Directives:      Admitting Physician:  Alphonso Klinefelter, MD  PCP: Washington Petit MD    Discharging Nurse: [facility] Unit/Room#: 6423/3809-63  Discharging Unit Phone Number: [phone]    Emergency Contact:   Extended Emergency Contact Information  Primary Emergency Contact: Qing Reece  Address: [address] [facility] [address] [facility], [address] [facility] Phone: [phone]  Mobile Phone: [phone]  Relation: Child  Secondary Emergency Contact: Ziyad Chadwick  Address: [address] [place] [address], [address] [facility] [address] [facility] Phone: [phone]  Mobile Phone: [phone]  Relation: Child    Past Surgical History:  Past Surgical History:   Procedure Laterality Date    APPENDECTOMY      BACK SURGERY      CHOLECYSTECTOMY      CORONARY ANGIOPLASTY WITH STENT PLACEMENT      HYSTERECTOMY      THYROIDECTOMY      UPPER GASTROINTESTINAL ENDOSCOPY  2021    EGD BIOPSY performed by Kirstie Meléndez MD at [facility] ENDOSCOPY  2021    EGD CONTROL HEMORRHAGE performed by Kirstie Meléndez MD at [facility] Endoscopy       Immunization History:   Immunization History   Administered Date(s) Administered    COVID-19, J&J, PF, 0.5 mL 2021    Influenza Vaccine, unspecified formulation 2014    Influenza Virus Vaccine 2014    Influenza, High Dose (Fluzone 65 yrs and older) 2016, 10/10/2017    Influenza, Quadv, adjuvanted, 65 yrs +, IM, PF (Fluad) 2021    Pneumococcal Conjugate 13-valent ([address]) 2016    Pneumococcal Polysaccharide (Spcoblbtd99) 02/15/2018       Active Problems:  Patient Active Problem List   Diagnosis Code    Acute kidney injury ([facility] [address] N17.9    Anemia due to vitamin B12 deficiency D51.9    CAD (coronary artery disease) I25.10 organism)  09/06/16 09/06/16 Annelise Nagel RN        E. Coli - urine 9/2016      Resolved    COVID-19 (Rule Out) 01/08/22 01/08/22 01/08/22 COVID-19, Rapid (Ordered)   01/08/22 Rule-Out Test Resulted    C-diff Rule Out 04/12/21 04/12/21 04/12/21 Gastrointestinal Panel, Molecular (Ordered)   04/15/21 Francesco Naik RN            Nurse Assessment:  Last Vital Signs: BP (!) 119/102   Pulse 85   Temp 99 °F (37.2 °C) (Oral)   Resp 19   Ht 5' 2\" (1.575 m)   Wt 199 lb 4.7 oz (90.4 kg)   SpO2 94%   BMI 36.45 kg/m²     Last documented pain score (0-10 scale): Pain Level: 8  Last Weight:   Wt Readings from Last 1 Encounters:   01/11/22 199 lb 4.7 oz (90.4 kg)     Mental Status:  oriented, alert, coherent, and able to concentrate and follow conversation    IV Access:  - None    Nursing Mobility/ADLs:  Walking   Assisted- heavy assist x2+  Transfer  Assisted  Bathing  Assisted  Dressing  Assisted  Toileting  Assisted  Feeding  [address] [facility] Delivery   whole    Wound Care Documentation and Therapy:        Elimination:  Continence: Bowel: No  Bladder: No  Urinary Catheter: None   Colostomy/Ileostomy/Ileal Conduit: No       Date of Last BM: ***    Intake/Output Summary (Last 24 hours) at 1/11/2022 1310  Last data filed at 1/11/2022 0600  Gross per 24 hour   Intake 360 ml   Output 1250 ml   Net -890 ml     I/O last 3 completed shifts: In: 360 [P.O.:360]  Out: 1250 [Urine:1250]    Safety Concerns: At Risk for Falls    Impairments/Disabilities:      Vision and Hearing    Nutrition Therapy:  Current Nutrition Therapy:   - Oral Diet:  Carb Control 4 carbs/meal (1800kcals/day)    Routes of Feeding: Oral  Liquids: Thin Liquids  Daily Fluid Restriction: no  Last Modified Barium Swallow with Video (Video Swallowing Test): not done    Treatments at the Time of Hospital Discharge:   Respiratory Treatments: ***  Oxygen Therapy:  is not on home oxygen therapy.   Ventilator:    - No ventilator

## 2022-01-11 NOTE — CARE COORDINATION
Received call from patient's bedside nurse Lakeshia Vera RN who relates patient did poorly with PT/OT. Spoke with patient's daughter Shaquille Quick, who is refusing SNF, states patient will go home with her sister Freddie Kulkarni. They are requesting referral to [facility] for home care and a hospital bed. PS Dr. Edmund Duncan to request orders    [phone] spoke with Halima Amaya at [facility], no hospital beds. Spoke with Master Marin with [facility], she will check if they have a hospital bed and call me back    [phone] return call from Master Marin at [facility], they have no hospital beds. 1357 received call from Deysi Abdi at [facility], they can start patient on Monday    1418 hospital bed referral faxed to [address] [facility] [address], left message with request return call    [phone] spoke with patient's daughter Shaquille Quick to let her know I was unable to find a provider for a hospital bed today, referral is pending to [facility] [address] [facility]. Will Ynes relates she is comfortable taking patient home, I gave her the number for [address] to follow up tomorrow. I also let her know [facility] would be able to see patient Monday, Will Ynes states she is ok with that. Left Vm for Karen at [facility] to let her know patient will DC today.   Sarah Gonzalez, [address] call back from Deysi Abdi at [facility] confirming they can accept patient    [phone] call from [address], bed ready to be delivered tomorrow  Discharge [address] [facility] Case Management Department  Written by: Lizzy Post RN    Patient Name: Len Jackson  Attending Provider: Rao Cochran MD  Admit Date: 2022  8:11 AM  MRN: [id number]  Account: [de-identified]                     : 1935  Discharge Date:   2022      Disposition: home with daughter and Gopi Manley RN

## 2022-01-11 NOTE — PROGRESS NOTES
Notified that patient was due to go home today with daughter. Per therapy pt did very poor with transferring to the chair. 2+ assist. Patient very weak.  Will await to find out what the family would like to do

## 2022-01-11 NOTE — PLAN OF CARE
Problem: Airway Clearance - Ineffective  Goal: Achieve or maintain patent airway  1/11/2022 1752 by Gavin Wall RN  Outcome: Completed  1/11/2022 0644 by Bryanna Davila RN  Outcome: Ongoing     Problem: Gas Exchange - Impaired  Goal: Absence of hypoxia  1/11/2022 1752 by Gavin Wall RN  Outcome: Completed  1/11/2022 0644 by Bryanna Davila RN  Outcome: Ongoing  Goal: Promote optimal lung function  1/11/2022 1752 by Gavin Wall RN  Outcome: Completed  1/11/2022 0644 by Bryanna Davila RN  Outcome: Ongoing     Problem: Breathing Pattern - Ineffective  Goal: Ability to achieve and maintain a regular respiratory rate  1/11/2022 1752 by Gavin Wall RN  Outcome: Completed  1/11/2022 0644 by Bryanna Davila RN  Outcome: Ongoing     Problem:  Body Temperature -  Risk of, Imbalanced  Goal: Ability to maintain a body temperature within defined limits  1/11/2022 1752 by Gavin Wall RN  Outcome: Completed  1/11/2022 0644 by Bryanna Davila RN  Outcome: Ongoing  Goal: Will regain or maintain usual level of consciousness  1/11/2022 1752 by Gavin Wall RN  Outcome: Completed  1/11/2022 0644 by Bryanna Davila RN  Outcome: Ongoing  Goal: Complications related to the disease process, condition or treatment will be avoided or minimized  1/11/2022 1752 by Gavin Wall RN  Outcome: Completed  1/11/2022 0644 by Bryanna Davila RN  Outcome: Ongoing     Problem: Isolation Precautions - Risk of Spread of Infection  Goal: Prevent transmission of infection  1/11/2022 1752 by Gavin Wall RN  Outcome: Completed  1/11/2022 0644 by Bryanna Davila RN  Outcome: Ongoing     Problem: Nutrition Deficits  Goal: Optimize nutritional status  1/11/2022 1752 by Gavin Wall RN  Outcome: Completed  1/11/2022 0644 by Bryanna Davila RN  Outcome: Ongoing     Problem: Risk for Fluid Volume Deficit  Goal: Maintain normal heart rhythm  1/11/2022 1752 by Gavin Wall RN  Outcome: Completed  1/11/2022 0092 by Bryanna Davila RN  Outcome: Ongoing  Goal: Maintain absence of muscle cramping  1/11/2022 1752 by Gavin Wall RN  Outcome: Completed  1/11/2022 0644 by Bryanna Davila RN  Outcome: Ongoing  Goal: Maintain normal serum potassium, sodium, calcium, phosphorus, and pH  1/11/2022 1752 by Gavin Wall RN  Outcome: Completed  1/11/2022 0644 by Bryanna Davila RN  Outcome: Ongoing     Problem: Loneliness or Risk for Loneliness  Goal: Demonstrate positive use of time alone when socialization is not possible  1/11/2022 1752 by Gavin Wall RN  Outcome: Completed  1/11/2022 0644 by Bryanna Davila RN  Outcome: Ongoing     Problem: Fatigue  Goal: Verbalize increase energy and improved vitality  1/11/2022 1752 by Gavin Wall RN  Outcome: Completed  1/11/2022 0644 by Bryanna Davila RN  Outcome: Ongoing     Problem: Patient Education: Go to Patient Education Activity  Goal: Patient/Family Education  1/11/2022 1752 by Gavni Wall RN  Outcome: Completed  1/11/2022 0644 by Bryanna Davila RN  Outcome: Ongoing     Problem: Falls - Risk of:  Goal: Will remain free from falls  Description: Will remain free from falls  1/11/2022 1752 by Gavin Wall RN  Outcome: Completed  1/11/2022 0644 by Bryanna Davila RN  Outcome: Ongoing  Goal: Absence of physical injury  Description: Absence of physical injury  1/11/2022 1752 by Gavin Wall RN  Outcome: Completed  1/11/2022 0644 by Bryanna Davila RN  Outcome: Ongoing     Problem: Skin Integrity:  Goal: Will show no infection signs and symptoms  Description: Will show no infection signs and symptoms  1/11/2022 1752 by Gavin Wall RN  Outcome: Completed  1/11/2022 0644 by Bryanna Davila RN  Outcome: Ongoing  Goal: Absence of new skin breakdown  Description: Absence of new skin breakdown  1/11/2022 1752 by Gavin Wall RN  Outcome: Completed  1/11/2022 0644 by Bryanna Davila RN  Outcome: Ongoing     Problem: Pain:  Description: Pain management should include both nonpharmacologic and pharmacologic interventions.   Goal: Pain level will decrease  Description: Pain level will decrease  Outcome: Completed  Goal: Control of acute pain  Description: Control of acute pain  Outcome: Completed  Goal: Control of chronic pain  Description: Control of chronic pain  Outcome: Completed

## 2022-01-11 NOTE — PROGRESS NOTES
[facility]  Internal Medicine Teaching Residency Program  Inpatient Daily Progress Note  ______________________________________________________________________________    Patient: Jhon Luo  YOB: 1935   LE    Acct: [de-identified]     Room: [facility]  Admit date: 2022  Today's date: 22  Number of days in the hospital: 3    SUBJECTIVE   Admitting Diagnosis: COVID-19  CC: fatigue and generalized weakness. And examined at bedside. Chart and results reviewed. Blood pressure is 100/50. SPO2 94% on room air. Still having bilateral leg redness with some swelling. Doppler lower extremity is negative for superficial or deep venous thrombosis. Labs reviewed and evaluated. BRIA improving. She is to be isolated till 2021. ROS:  Constitutional:  negative for chills, sweats. Fever present  Respiratory:  negative for cough, dyspnea on exertion, hemoptysis, shortness of breath, wheezing  Cardiovascular:  negative for chest pain, chest pressure/discomfort, lower extremity edema, palpitations  Gastrointestinal:  negative for abdominal pain, constipation, diarrhea, nausea, vomiting  Neurological:  negative for dizziness, headache  BRIEF HISTORY     A 82 y.o. female with PMH significant of   CAD (s/p on 10/08/12 PTCA/ PEPITO to LAD, EF 45%.)  Chronic diastolic CHF  Thyroid disease  HTN, DM, HLD  - is brought in by EMS with the chief complaint of generalized weakness. She is Covid vaccinated (J&J) but for last 2 weeks, she has been having fatigue, malaise, shortness of breath, chest pain, cough and congestion. She lost balance 3-4 times in the last 3 days. Even this morning, when she was on bed, watching TV, she felt lightheaded, nauseous, had chest pain and she was about to fall down, and the daughter helped her lower down and called EMS. When EMS brought her to the ER, her underwear and pad were soiled.    She has been having dizziness but never lost consciousness. She denies any palpitation. She denies any incidence of bowel or bladder incontinence associated with lightheadedness for  She states that chest pain is more substernal and left-sided, dull in character, non-radiating, around 4-5/10 in intensity, associated with no aggravating or relieving factors. She lives with her daughter, who helps her, in her activities of daily living. She moves somewhat in the house with the help of wheelchair. She also has been having bilateral lower extremity +2 pedal edema. She complains of 8/10 pain in legs, states that started around 2 weeks back. She also has orthopnea and PND episodes. She also endorses cough, bringing up white yellowish phlegm, poor appetite, some myalgias and arthralgias. But she denies fever, chills, or sweating. She denies any diarrhea or constipation. No burning micturition/urgency/frequency. Patient states that her daughter helps her with management of daily medications and she tries to be compliant with all of them.     Work-up in ER  CBC-unremarkable  BMP-creatinine 1.0, potassium-3.6, replaced  VBG-unremarkable except O2 52  D-dimer 1.77-> CT PE  UA-significant of UTI. TSH-15.03, T3-0.32  Chest x-ray showed no acute cardiopulmonary process  proBNP 186  Troponin 21-23-  Inflammatory markers-CRP 17.1, , pro-Kyle 0.1  EKG showed no acute ST or T wave abnormality.     On my evaluation, she is hemodynamically stable, saturating well on room air. She has tremors present of tongue. She answers all questions appropriately. She is alert and oriented.     OBJECTIVE     Vital Signs:  BP (!) 119/102   Pulse 85   Temp 99 °F (37.2 °C) (Oral)   Resp 19   Ht 5' 2\" (1.575 m)   Wt 199 lb 4.7 oz (90.4 kg)   SpO2 94%   BMI 36.45 kg/m²     Temp (24hrs), Av.4 °F (36.9 °C), Min:97.8 °F (36.6 °C), Max:99.6 °F (37.6 °C)    In: 360   Out: 1250 [Urine:1250]    PHYSICAL EXAMINATION:  Constitutional: This is a well developed, well nourished, 35-39.9 - Obesity Grade II 82y.o. year old female who is alert, oriented, cooperative and in no apparent distress. Head:normocephalic and atraumatic. EENT:  PERRLA. No conjunctival injections. Septum was midline, mucosa was without erythema, exudates or cobblestoning. No thrush was noted. Neck: Supple without thyromegaly. No elevated JVP. Trachea was midline. Respiratory: Chest was symmetrical without dullness to percussion. Breath sounds bilaterally were clear to auscultation. There were no wheezes, rhonchi or rales. There is no intercostal retraction or use of accessory muscles. No egophony noted. Cardiovascular: Regular without murmur, clicks, gallops or rubs. Abdomen: Slightly rounded and soft without organomegaly. No rebound, rigidity or guarding was appreciated. Lymphatic: No lymphadenopathy. Musculoskeletal: Normal curvature of the spine. No gross muscle weakness. Extremities: +3 grade pitting lower extremity edema. Having some redness and skin changes. No varicosities noted. .  Muscle size, tone and strength are normal.  No involuntary movements are noted. Skin:  Warm and dry. Good color, turgor and pigmentation. No lesions or scars.   No cyanosis or clubbing  Neurological/Psychiatric: The patient's general behavior, level of consciousness, thought content and emotional status is normal.       Medications:  Scheduled Medications:    insulin glargine  5 Units SubCUTAneous Nightly    cephALEXin  250 mg Oral 3 times per day    [START ON 1/12/2022] COVID-19 mRNA Vacc (PFIZER)  0.3 mL IntraMUSCular Once    sodium chloride flush  10 mL IntraVENous 2 times per day    atorvastatin  80 mg Oral Daily    aspirin  81 mg Oral Daily    levothyroxine  125 mcg Oral Daily    clopidogrel  75 mg Oral Daily    sodium chloride flush  5-40 mL IntraVENous 2 times per day    heparin (porcine)  5,000 Units SubCUTAneous 3 times per day    insulin lispro  0-6 Units SubCUTAneous TID WC    insulin lispro  0-3 Units SubCUTAneous Nightly     Continuous Infusions:    sodium chloride      sodium chloride      dextrose       PRN Medicationssodium chloride flush, 10 mL, PRN  sodium chloride, 25 mL, PRN  magnesium sulfate, 1,000 mg, PRN  magnesium hydroxide, 30 mL, Daily PRN  senna, 5 mL, BID PRN  acetaminophen, 650 mg, Q6H PRN   Or  acetaminophen, 650 mg, Q6H PRN  sodium chloride flush, 5-40 mL, PRN  sodium chloride, 25 mL, PRN  ondansetron, 4 mg, Q8H PRN   Or  ondansetron, 4 mg, Q6H PRN  polyethylene glycol, 17 g, Daily PRN  acetaminophen, 650 mg, Q6H PRN   Or  acetaminophen, 650 mg, Q6H PRN  glucose, 15 g, PRN  dextrose, 12.5 g, PRN  glucagon (rDNA), 1 mg, PRN  dextrose, 100 mL/hr, PRN        Diagnostic Labs:  CBC:   Recent Labs     01/09/22  1232 01/10/22  0636 01/11/22  0603   WBC 5.8 4.9 4.8   RBC 4.43 4.43 4.33   HGB 12.4 12.2 12.0   HCT 38.3 38.2 37.7   MCV 86.5 86.2 87.1   RDW 13.9 13.9 14.0   PLT See Reflexed IPF Result 116* See Reflexed IPF Result     BMP:   Recent Labs     01/09/22  1232 01/10/22  0636 01/11/22  0603   * 129* 133*   K 3.0* 3.4* 3.8   CL 96* 98 97*   CO2 20 18* 17*   BUN 24* 27* 27*   CREATININE 1.71* 1.48* 1.31*     BNP: No results for input(s): BNP in the last 72 hours. PT/INR: No results for input(s): PROTIME, INR in the last 72 hours. APTT: No results for input(s): APTT in the last 72 hours. CARDIAC ENZYMES: No results for input(s): CKMB, CKMBINDEX, TROPONINI in the last 72 hours. Invalid input(s): CKTOTAL;3  FASTING LIPID PANEL:  Lab Results   Component Value Date    CHOL 173 06/29/2020    HDL 52 06/29/2020    TRIG 337 (H) 06/29/2020     LIVER PROFILE: No results for input(s): AST, ALT, ALB, BILIDIR, BILITOT, ALKPHOS in the last 72 hours.    MICROBIOLOGY:   Lab Results   Component Value Date/Time    CULTURE NO GROWTH 2 DAYS 01/09/2022 12:32 PM    CULTURE NO GROWTH 2 DAYS 01/09/2022 12:32 PM       Imaging:    XR CHEST PORTABLE    Result Date: 1/8/2022  No acute process. Stable cardiomegaly. CT CHEST PULMONARY EMBOLISM W CONTRAST    Result Date: 1/8/2022  No evidence of pulmonary embolism or acute pulmonary abnormality. RECOMMENDATIONS: Unavailable       ASSESSMENT & PLAN     IMPRESSION  This is a 82 y.o. female who presented with generalized weakness and found to have Covid infection, UTI, and BRIA. Patient admitted to inpatient status to evaluate and manage the same. COVID-19  She is J&J vaccinated  Currently saturating well on room air. No specific treatment as per ID for COVID-19 infection. ID recommended COVID-19 vaccine booster prior to discharge.      Urinary tract infection  Acute cystitis without hematuria  Nitrates and leukocyte esterase positive  Urine culture growing lactose fermenting gram-negative rods  Received Rocephin. Changed to p.o. Keflex and will continue on discharge.     Acute kidney injury ([address]  Improving  1.7->1.48->1.31 creatinine     Hypothyroidism uncontrolled  PMH of thyroid cancer s/p thyroidectomy, on Synthroid  TSH 15.03, T3 0.32  Will increase the dose of Synthroid from 125 mcg over 150 mcg.     Type 2 diabetes mellitus with complication, without long-term current use of insulin ([address]  She is on 42 units of Lantus at home nightly. Will resume as appropriate  On sliding scale of insulin  Hypoglycemia protocol in place     Chronic diastolic congestive heart failure (HCC)  bilateral swelling of feet and ankles  2D echo in 2016-preserved ejection fraction 60-65% and grade 1 diastolic dysfunction  She is on 40 mg p.o. Lasix at home. She is currently on IV Lasix 40 mg. Will change to p.o. Lasix on discharge.     Hypertension  On Lopressor 25 mg at home  We will resume as appropriate     CAD  Continue aspirin, Plavix, statin.   Will resume beta-blocker when appropriate     Hyperlipidemia  On atorvastatin 80 mg     H/o atrial flutter  Currently normal sinus rhythm     Hypokalemia-resolved  Potassium 3.4, replacing    Hypomagnesemia-resolved  Mg 1.7, replacing     DVT ppx:   On heparin     GI ppx:   Not indicated     PT/OT/SW:  Consulted      Discharge Planning:   to assist in discharge planning    Eli Kelly MD  Internal Medicine Resident, PGY-1  [facility]  1/11/2022,1:09 PM

## 2022-01-11 NOTE — PROGRESS NOTES
CLINICAL PHARMACY NOTE: MEDS TO BEDS    Total # of Prescriptions Filled: 3   The following medications were delivered to the patient:  · KEFLEX 250  · LEVOTHYROXINE 150  · LASIX 20     Additional Documentation:    MEDS BEING PICKED UP FROM PHARMACY BY DAUGHTER, PAID FOR MEDS OVER THE PHONE WITH PARESH

## 2022-01-11 NOTE — PROGRESS NOTES
Occupational Therapy   Occupational Therapy Initial Assessment  Date: 2022   Patient Name: Isma Dewitt  MRN: [id number]     : 1935    Date of Service: 2022    Chief Complaint   Patient presents with    Extremity Weakness    Chest Pain     Discharge Recommendations:  Patient would benefit from continued therapy after discharge (Pt unsafe to return to prior living arrangement; strict 24hr assistance including high levels of physical assistance x2 and continued therapy services at discharge)    Assessment   Performance deficits / Impairments: Decreased functional mobility ; Decreased ADL status; Decreased balance;Decreased safe awareness;Decreased cognition  Prognosis: Good;Fair  Decision Making: Medium Complexity  OT Education: OT Role;Plan of Care;Precautions;Transfer Training;Energy Conservation  REQUIRES OT FOLLOW UP: Yes  Activity Tolerance  Activity Tolerance: Patient limited by fatigue;Patient limited by pain  Safety Devices  Safety Devices in place: Yes  Type of devices: Nurse notified;Call light within reach; Left in chair;Chair alarm in place  Restraints  Initially in place: No           Patient Diagnosis(es): The primary encounter diagnosis was COVID-19. Diagnoses of Acute cystitis without hematuria, Congestive heart failure, unspecified HF chronicity, unspecified heart failure type ([address] Hypothyroidism, unspecified type, BRIA (acute kidney injury) ([address] Acute gastric ulcer with hemorrhage, Left leg swelling, and Hypertension were also pertinent to this visit. has a past medical history of Arthritis, Atrial flutter ([address] CAD (coronary artery disease), CHF (congestive heart failure) ([address] Diabetes ([address] Diabetes mellitus ([address] Hyperlipidemia, Hypertension, Psychiatric problem, Thyroid cancer ([address] and Thyroid disease. has a past surgical history that includes Coronary angioplasty with stent; Hysterectomy; Cholecystectomy; Thyroidectomy; Appendectomy; back surgery;  Upper gastrointestinal endoscopy (4/14/2021); and Upper gastrointestinal endoscopy (4/14/2021). Restrictions  Restrictions/Precautions  Restrictions/Precautions: Fall Risk,Isolation,Up as Tolerated (COVID+)  Required Braces or Orthoses?: No  Position Activity Restriction  Other position/activity restrictions: up with assist, recent frequent falls    Subjective   General  Patient assessed for rehabilitation services?: Yes  Family / Caregiver Present: No  General Comment  Comments: RN ok'd for therapy this AM. Pt agreeable to participate in session and pleasant/cooperative throughout. Patient Currently in Pain: Yes  Pain Assessment  Pain Assessment: 0-10  Pain Level: 8  Pain Type: Acute pain  Pain Location: Leg;Ankle  Pain Orientation: Right;Left  Pain Descriptors: Aching;Discomfort  Functional Pain Assessment: Prevents or interferes some active activities and ADLs  Non-Pharmaceutical Pain Intervention(s): Ambulation/Increased Activity; Distraction;Repositioned  Response to Pain Intervention: Patient Satisfied    Social/Functional History  Social/Functional History  Lives With: Daughter,Son  Type of Home: House  Home Layout: One level  Home Access: Stairs to enter with rails (grandchildren assist with stairs)  Entrance Stairs - Number of Steps: 4  Entrance Stairs - Rails: Both  Bathroom Shower/Tub:  (pt sponge bathes while sitting)  Bathroom Toilet: Standard  Bathroom Accessibility: Wheelchair accessible  Home Equipment: Wheelchair-electric  ADL Assistance: Needs assistance (dtr assists occasionally with toilet transfers, lower body dressing/bathing)  Homemaking Assistance: Needs assistance  Homemaking Responsibilities: No  Ambulation Assistance: Needs assistance  Transfer Assistance: Needs assistance  Active : No  Patient's  Info: family  Mode of Transportation: Car (pt reports difficulty with [employer] and SUV transfers)  Occupation: Retired  Leisure & Hobbies: Enjoys playing [place]  Additional Comments: Pt reports dtr works, otherwise able to assist PRN; when dtr works, friend comes to provide assistance PRN. Objective   Vision: Impaired  Vision Exceptions: Wears glasses for reading  Hearing: Exceptions to [facility]  Hearing Exceptions: Hard of hearing/hearing concerns      Orientation  Overall Orientation Status: Within Functional Limits        Balance  Sitting Balance: Contact guard assistance (seated EOB ~10 minutes)     ADL  Feeding: Increased time to complete;Setup  Grooming: Increased time to complete;Minimal assistance  UE Bathing: Increased time to complete; Moderate assistance  LE Bathing: Increased time to complete;Maximum assistance  UE Dressing: Moderate assistance; Increased time to complete  LE Dressing: Maximum assistance; Increased time to complete  Toileting: Maximum assistance; Increased time to complete     Tone RUE  RUE Tone: Normotonic  Tone LUE  LUE Tone: Normotonic  Coordination  Movements Are Fluid And Coordinated: Yes        Bed mobility  Supine to Sit: Maximum assistance (for trunk progression)  Sit to Supine:  (retired to bedside chair following mobility)  Scooting: Maximal assistance  Comment: HOB raised ~45*, increased time/effort to perform     Transfers  Stand Pivot Transfers: Maximum assistance;2 Person assistance (from bed > chair; face to face assist)  Sit to stand: Maximum assistance;2 Person assistance  Stand to sit: Maximum assistance;2 Person assistance  Transfer Comments: Mod verbal/tactile cues for initiaiton/sequencing/safety awareness; 2x attempts to clear bottom from bed; significant increased time/effort        Cognition  Overall Cognitive Status: Exceptions  Arousal/Alertness: Appropriate responses to stimuli  Following Commands:  Follows multistep commands with repitition  Attention Span: Attends with cues to redirect  Safety Judgement: Decreased awareness of need for safety;Decreased awareness of need for assistance  Problem Solving: Assistance required to generate solutions  Insights: Decreased awareness of deficits  Initiation: Requires cues for some  Sequencing: Requires cues for some           Sensation  Overall Sensation Status: WFL (pt denies numbness and tingling)        LUE AROM (degrees)  LUE AROM : WFL  RUE AROM (degrees)  RUE AROM : WFL  LUE Strength  Gross LUE Strength:  (grossly 4/5)  RUE Strength  Gross RUE Strength:  (grossly 4/5)         Plan   Plan  Times per week: 3-4x/wk  Current Treatment Recommendations: Endurance Training,Wheelchair Mobility Training,Self-Care / ADL,Safety Education & Training,Patient/Caregiver Education & Training,Equipment Evaluation, Education, & procurement (Functional Transfer Training)      AM-PAC Score   AM-PAC Inpatient Daily Activity Raw Score: 15 (01/11/22 1619)  AM-PAC Inpatient ADL T-Scale Score : 34.69 (01/11/22 1619)  ADL Inpatient CMS 0-100% Score: 56.46 (01/11/22 1619)  ADL Inpatient CMS G-Code Modifier : CK (01/11/22 1619)    Goals  Short term goals  Time Frame for Short term goals: Pt will, by discharge:  Short term goal 1: Perform functional transfers with min A using least restrictive AD  Short term goal 2: Perform grooming/UB ADL tasks with setup and  Short term goal 3: Perform toileting/LB ADL tasks with min A using AE/DME PRN  Short term goal 4: Demo 2+ minutes standing tolerance with min A using least restrictive AD  Short term goal 5:  Independently demo good safety awareness during engagement in all ADLs and functional transfers/functional mobility       Therapy Time   Individual Concurrent Group Co-treatment   Time In 1124         Time Out 1200         Minutes 36         Timed Code Treatment Minutes: [address] [facility] [place] OTR/L

## 2022-01-11 NOTE — PLAN OF CARE
AMARJIT Walter  Outcome: Ongoing  Goal: Maintain absence of muscle cramping  1/11/2022 0644 by Harrison Walker RN  Outcome: Ongoing  1/10/2022 1833 by Kody Rivas RN  Outcome: Ongoing  Goal: Maintain normal serum potassium, sodium, calcium, phosphorus, and pH  1/11/2022 0644 by Harrison Walker RN  Outcome: Ongoing  1/10/2022 1833 by Kody Rivas RN  Outcome: Ongoing     Problem: Loneliness or Risk for Loneliness  Goal: Demonstrate positive use of time alone when socialization is not possible  1/11/2022 0644 by Harrison Walker RN  Outcome: Ongoing  1/10/2022 1833 by Kody Rivas RN  Outcome: Ongoing     Problem: Fatigue  Goal: Verbalize increase energy and improved vitality  1/11/2022 0644 by Harrison Walker RN  Outcome: Ongoing  1/10/2022 1833 by Kody Rivas RN  Outcome: Ongoing     Problem: Patient Education: Go to Patient Education Activity  Goal: Patient/Family Education  1/11/2022 2504 by Harrison Walker RN  Outcome: Ongoing  1/10/2022 1833 by Kody Rivas RN  Outcome: Ongoing     Problem: Falls - Risk of:  Goal: Will remain free from falls  Description: Will remain free from falls  1/11/2022 0644 by Harrison Walker RN  Outcome: Ongoing  1/10/2022 1833 by Kody Rivas RN  Outcome: Ongoing  Goal: Absence of physical injury  Description: Absence of physical injury  1/11/2022 0644 by Harrison Walker RN  Outcome: Ongoing  1/10/2022 1833 by Kody Rivas RN  Outcome: Ongoing     Problem: Skin Integrity:  Goal: Will show no infection signs and symptoms  Description: Will show no infection signs and symptoms  1/11/2022 0644 by Harrison Walker RN  Outcome: Ongoing  1/10/2022 1833 by Kody Rivas RN  Outcome: Ongoing  Goal: Absence of new skin breakdown  Description: Absence of new skin breakdown  1/11/2022 0644 by Harrison Walker RN  Outcome: Ongoing  1/10/2022 1833 by Kody Rivas RN  Outcome: Ongoing

## 2022-01-11 NOTE — PROGRESS NOTES
[facility], the patient's daughter has been updated on the status of the patient. All questions and concerned has been addressed at this time.

## 2022-01-11 NOTE — PLAN OF CARE
Paulo Bowen was evaluated today and a DME order was entered for a semi-electric hospital bed because she requires assistance for positioning needs not possible in an ordinary bed, requirement of elevation of head of bed more than 30 degrees for the diagnosis of CHFpEF. Patient requires frequent and immediate positioning changes for relief of pain and care needs related to medical diagnoses. Patient needs variability of bed height requirements to perform patient transfers and for eating, personal cares, ambulating, grooming and hygiene. Current body Weight: 199 lb 4.7 oz (90.4 kg). The need for this equipment was discussed with the patient and she understands and is in agreement.     Electronically signed by Marti Delarosa MD on 1/11/2022 at 12:59 PM

## 2022-01-11 NOTE — PROGRESS NOTES
Physical Therapy    Facility/Department: [facility] 3  Initial Assessment    NAME: Rufina Molina  : 1935  MRN: [id number]  Chief Complaint   Patient presents with    Extremity Weakness    Chest Pain     Date of Service: 2022    Discharge Recommendations: Further therapy recommended at discharge. PT Equipment Recommendations  Equipment Needed: No (unsafe to attempt transfers and ambulation at this time)    Assessment   Body structures, Functions, Activity limitations: Decreased functional mobility ; Decreased balance;Decreased endurance;Decreased safe awareness;Decreased strength; Increased pain;Decreased posture  Assessment: The pt required Max A for bed mobility, Max A x2 for stand pivot transfer. Recommend continued therapy to address deficits as the pt is unsafe to return to prior living arrangement due to high fall risk. Prognosis: Good  Decision Making: Medium Complexity  PT Education: Goals;PT Role;Plan of Care; Functional Mobility Training  REQUIRES PT FOLLOW UP: Yes  Activity Tolerance  Activity Tolerance: Patient limited by endurance       Patient Diagnosis(es): The primary encounter diagnosis was COVID-19. Diagnoses of Acute cystitis without hematuria, Congestive heart failure, unspecified HF chronicity, unspecified heart failure type ([address] Hypothyroidism, unspecified type, BRIA (acute kidney injury) ([address] Acute gastric ulcer with hemorrhage, Left leg swelling, and Hypertension were also pertinent to this visit. has a past medical history of Arthritis, Atrial flutter ([address] CAD (coronary artery disease), CHF (congestive heart failure) ([address] Diabetes ([address] Diabetes mellitus ([address] Hyperlipidemia, Hypertension, Psychiatric problem, Thyroid cancer ([address] and Thyroid disease. has a past surgical history that includes Coronary angioplasty with stent; Hysterectomy; Cholecystectomy; Thyroidectomy; Appendectomy; back surgery;  Upper gastrointestinal endoscopy (2021); and Upper assistance  Transfer Assistance: Needs assistance  Active : No  Patient's  Info: family  Mode of Transportation: Car (pt reports difficulty with van and SUV transfers)  Occupation: Retired  Leisure & Hobbies: Enjoys playing [employer]  Additional Comments: Pt reports dtr works, otherwise able to assist PRN; when dtr works, friend comes to provide assistance PRN. Cognition   Cognition  Overall Cognitive Status: Exceptions  Arousal/Alertness: Appropriate responses to stimuli  Following Commands:  Follows multistep commands with repitition  Attention Span: Attends with cues to redirect  Safety Judgement: Decreased awareness of need for safety;Decreased awareness of need for assistance  Problem Solving: Assistance required to generate solutions  Insights: Decreased awareness of deficits  Initiation: Requires cues for some  Sequencing: Requires cues for some    Objective          Joint Mobility  Spine: WFL  ROM RLE: WFL  ROM LLE: WFL  ROM RUE: WFL  ROM LUE: WFL  Strength RLE  Strength RLE: WFL  Comment: poor tolerance to knee extension MMT due to significant pain  Strength LLE  Strength LLE: WFL  Comment: poor tolerance to knee extension MMT due to significant pain  Strength RUE  Strength RUE: WFL  Strength LUE  Strength LUE: WFL  Tone RLE  RLE Tone: Normotonic  Tone LLE  LLE Tone: Normotonic  Motor Control  Gross Motor?: WFL  Sensation  Overall Sensation Status: WFL (pt denies numbness and tingling)  Bed mobility  Supine to Sit: Maximum assistance (for trunk progression)  Sit to Supine:  (retired to bedside chair following mobility)  Scooting: Maximal assistance  Comment: HOB elevated ~45 degrees; increased time and effort required  Transfers  Sit to Stand: Maximum Assistance;2 Person Assistance  Stand to sit: Maximum Assistance;2 Person Assistance  Stand Pivot Transfers: Maximum Assistance;2 Person Assistance  Comment: stand pivot transfer performed face to face with additonal therapist located posterior/lateral to assist with B hips.  Instructed RN to utilize ira king with 2 assist for return to bed  Ambulation  Ambulation?: No  Stairs/Curb  Stairs?: No     Balance  Posture: Fair  Sitting - Static: Fair  Sitting - Dynamic: Fair  Standing - Static: Poor  Standing - Dynamic: Poor  Comments: standing balance assessed with BUE support during face to face transfer        Plan   Plan  Times per week: 5-6x/wk  Current Treatment Recommendations: Strengthening,ROM,Balance Training,Functional Mobility Training,Transfer Training,Gait Training,Stair training,Endurance Training,Home Exercise Program,Safety Education & Training,Patient/Caregiver Education & Training  Safety Devices  Type of devices: Nurse notified,Call light within reach,Gait belt,Left in chair    AM-PAC Score  AM-PAC Inpatient Mobility Raw Score : 10 (01/11/22 1554)  AM-PAC Inpatient T-Scale Score : 32.29 (01/11/22 1554)  Mobility Inpatient CMS 0-100% Score: 76.75 (01/11/22 1554)  Mobility Inpatient CMS G-Code Modifier : CL (01/11/22 1554)          Goals  Short term goals  Time Frame for Short term goals: 14 visits  Short term goal 1: Perform bed mobility with Min A  Short term goal 2: Perform sit to stand and stand pivot transfer with Min A  Short term goal 3: Demo Fair- standing balance to decrease risk of falls       Therapy Time   Individual Concurrent Group Co-treatment   Time In 1124         Time Out 1200         Minutes 36         Timed Code Treatment Minutes: 10 Minutes       Arlenkamran Muller, PT

## 2022-01-11 NOTE — CARE COORDINATION
Patient/family seen: Yes       Informed patient/family of [facility]PCI-A Medical Bundle Program with potential outreach by either Care Transitions Team or naviHealth Team based on hospital admission and location.        BPCI-A Notification Letter given: Yes, Mailed         Current discharge plan: home with home care

## 2022-01-12 ENCOUNTER — CARE COORDINATION (OUTPATIENT)
Dept: CASE MANAGEMENT | Age: 87
End: 2022-01-12

## 2022-01-12 DIAGNOSIS — I50.32 CHRONIC DIASTOLIC CONGESTIVE HEART FAILURE (HCC): Primary | ICD-10-CM

## 2022-01-12 PROCEDURE — 1111F DSCHRG MED/CURRENT MED MERGE: CPT | Performed by: INTERNAL MEDICINE

## 2022-01-12 NOTE — CARE COORDINATION
[facility] Transitions Initial Follow Up Call    Call within 2 business days of discharge: Yes    Patient: Fe Molina Patient : 1935   MRN: [id number]  Reason for Admission: COVID 19, CHF  Discharge Date: 22 RARS: Readmission Risk Score: 17.6 ( )      Last Discharge [facility]       Complaint Diagnosis Description Type Department Provider    22 Extremity Weakness; Chest Pain COVID-19 . .. ED to Hosp-Admission (Discharged) (ADMITTED) [facility] Abelardo Arnold MD; Heraclio Collier,... Spoke with: Rebecca Katz daughters    Facility: [facility]    Non-face-to-face services provided:  Scheduled appointment with PCP-daughter will call to schedule, declines assistance  Obtained and reviewed discharge summary and/or continuity of care documents     Spoke with both daughters. Patient is up eating breakfast this am, daughter states she is still a little \"groggy\" and has some swelling to her bilateral lower extremities. They deny she has any shortness of breath, cough, f/c, n/v, chest pains or other symptoms. Discussed with daughters if patient is able to get on scale to try to weight self daily after voiding to monitor for weight gains. Reviewed both CHF and COVID zone tool sheet with daughters. Patient to remain in isolation until . [facility] home care to follow, they will not do [facility] until next week d/t staffing for COVID patients. Hospital bed being delivered at this time. Patient has all medications, reviewed with Ernestina Padilla who sets them up for her. Contact information provided for CTN and expressed to call with any questions or concerns. Patient had COVID vaccine. Patient contacted regarding COVID-19 diagnosis. Discussed COVID-19 related testing which was available at this time. Test results were positive. Patient informed of results, if available? Yes. Care Transition Nurse contacted the family by telephone to perform post discharge assessment.  Call within 2 business days of discharge: Yes. Verified name and  with family as identifiers. Provided introduction to self, and explanation of the CTN/ACM role, and reason for call due to risk factors for infection and/or exposure to COVID-19. Symptoms reviewed with family who verbalized the following symptoms: fatigue and no worsening symptoms. Due to no new or worsening symptoms encounter was not routed to provider for escalation. Discussed follow-up appointments. If no appointment was previously scheduled, appointment scheduling offered: Yes. [facility] follow up appointment(s):   Future Appointments   Date Time Provider Estrellita Segura   2022 12:15 PM Artemio Borjas MD [facility]     [id number] [address] follow up appointment(s): NA    Non-face-to-face services provided:  Scheduled appointment with PCP-Daughter is calling to schedule follow up  Obtained and reviewed discharge summary and/or continuity of care documents     Advance Care Planning:   Does patient have an Advance Directive:  reviewed and current. Educated patient about risk for severe COVID-19 due to risk factors according to CDC guidelines. CTN reviewed discharge instructions, medical action plan and red flag symptoms with the family who verbalized understanding. Discussed COVID vaccination status: Yes. Education provided on COVID-19 vaccination as appropriate. Discussed exposure protocols and quarantine with CDC Guidelines. Family was given an opportunity to verbalize any questions and concerns and agrees to contact CTN or health care provider for questions related to their healthcare. Reviewed and educated family on any new and changed medications related to discharge diagnosis     Was patient discharged with a pulse oximeter? No Discussed and confirmed pulse oximeter discharge instructions and when to notify provider or seek emergency care. CTN provided contact information.  Plan for follow-up call in 5-7 days based on severity of symptoms and risk factors.       Care Transitions 24 Hour Call    Schedule Follow Up Appointment with PCP: Declined  Do you have any ongoing symptoms?: Yes  Patient-reported symptoms: Other, Fatigue (Comment: bilateral LE edema)  Do you have a copy of your discharge instructions?: Yes  Do you have all of your prescriptions and are they filled?: Yes  Have you been contacted by a [address]?: No  Have you scheduled your follow up appointment?: No  Were you discharged with any Home Care or Post Acute Services: Yes  Post Acute Services: [address] [place] [address] Fiilberto Sexton ([facility] [phone])  Patient DME: Wheelchair  Do you have support at home?: Child  Do you feel like you have everything you need to keep you well at home?: Yes  Care Transitions Interventions    Registered Dietician: Completed           Follow Up  Future Appointments   Date Time Provider Estrellita Segura   4/1/2022 12:15 PM Ayan Moore MD [id number], AMARJIT

## 2022-01-13 NOTE — DISCHARGE SUMMARY
[facility]     Department of Internal Medicine - Staff Internal Medicine Teaching Service    INPATIENT DISCHARGE SUMMARY      Patient Identification:  Jose Belle is a 82 y.o. female. :  1935  MRN: [id number]     Acct: [de-identified]   PCP: Jaspreet Nogueira MD  Admit Date:  2022  Discharge date and time: 2022  3:52 PM   Attending Provider: No att. providers found                                     [address] Problem Lists:  Principal Problem:    COVID-19  Active Problems:    Acute kidney injury ([address]    Hypothyroidism uncontrolled    Type 2 diabetes mellitus with complication, without long-term current use of insulin (HCC)    Acute cystitis without hematuria    Chronic diastolic congestive heart failure (HCC)    Bilateral swelling of feet and ankles    Hypokalemia  Resolved Problems:    * No resolved hospital problems. *      HOSPITAL STAY     Brief Inpatient course: Jose Belle is a 82 y.o. female who was admitted for the management of COVID-19, presented to the emergency department with fatigue, malaise, shortness of breath, chest pain, cough and congestion. She tested positive for COVID but didn't need oxygen support or anti covid treatment. ID was following her and recommended COVID-19 vaccine booster prior to discharge. She was found to have UTI and was initially treated with CTX and was subsequently changed to keflex on dc. Urine Cx showed lactose fermenting E. Coli gram-negative rods sensitive to keflex. Her chest pain was atypical and troponin's were found flat with no significant changes in EKG. Pt did have BRIA on admission which was resolved later. Her home medicines were resumed and she was clinically stabilized.      Procedures/ Significant Interventions:    None    Consults:     Consults:     Final Specialist Recommendations/Findings:   IP CONSULT TO INTERNAL MEDICINE  IP CONSULT TO CASE MANAGEMENT  IP CONSULT TO INFECTIOUS DISEASES  IP CONSULT TO CASE MANAGEMENT  IP CONSULT TO HOME CARE NEEDS      Any Hospital Acquired Infections: none    Discharge Functional Status:  stable    DISCHARGE PLAN     Disposition: home with home care. Patient Instructions:   Discharge Medication List as of 1/11/2022  2:52 PM      START taking these medications    Details   cephALEXin (KEFLEX) 250 MG capsule Take 1 capsule by mouth every 8 hours for 20 doses, Disp-20 capsule, R-0Normal         CONTINUE these medications which have CHANGED    Details   pantoprazole (PROTONIX) 40 MG tablet Take 1 tablet by mouth daily, Disp-60 tablet, R-0Normal      levothyroxine (SYNTHROID) 150 MCG tablet Take 1 tablet by mouth Daily, Disp-30 tablet, R-3Normal      furosemide (LASIX) 40 MG tablet Take 0.5 tablets by mouth daily, Disp-30 tablet, R-4Normal      metoprolol tartrate (LOPRESSOR) 25 MG tablet Take 0.5 tablets by mouth 2 times daily, Disp-180 tablet, R-3Normal         CONTINUE these medications which have NOT CHANGED    Details   FEROSUL 325 (65 Fe) MG tablet TAKE 1 TABLET BY MOUTH 2 TIMES DAILY, Disp-60 tablet, R-3Normal      insulin glargine (BASAGLAR KWIKPEN) 100 UNIT/ML injection pen Inject 42 Units into the skin nightly, Disp-5 pen, R-5Normal      !! Continuous Blood Gluc Sensor (FREESTYLE KERRY 2 SENSOR) MISC 1 Device by Does not apply route every 14 days, Disp-2 each, R-2Normal      !!  Continuous Blood Gluc  (FREESTYLE KERRY 2 READER) CLAUDIO 1 Device by Does not apply route daily, Disp-1 each, R-0Normal      Cyanocobalamin (VITAMIN B-12) 1000 MCG extended release tablet TAKE 1 TAB BY MOUTH ONCE A DAY , Disp-30 tablet, R-11Normal      atorvastatin (LIPITOR) 80 MG tablet TAKE 1 TABLET BY MOUTH DAILY , Disp-30 tablet, R-4Normal      vitamin D3 (CHOLECALCIFEROL) 25 MCG (1000 UT) TABS tablet TAKE 1 TAB BY MOUTH ONCE A DAY , Disp-30 tablet, R-4Normal      clopidogrel (PLAVIX) 75 MG tablet TAKE 1 TAB BY MOUTH ONCE A DAY, Disp-90 tablet, R-3Normal      loratadine (CLARITIN) 10 MG tablet TAKE 1 TABLET BY MOUTH TWICE A DAY AS NEEDED, Disp-90 tablet, R-1Normal      sertraline (ZOLOFT) 100 MG tablet Take 1 tablet by Mouth Once a Day, Disp-90 tablet, R-3Normal      SITagliptin (JANUVIA) 100 MG tablet TAKE 1 TAB BY MOUTH ONCE A DAY, Disp-90 tablet, R-3Normal      blood glucose monitor strips Test 2 times a day & as needed for symptoms of irregular blood glucose. Dispense sufficient amount for indicated testing frequency plus additional to accommodate PRN testing needs. , Disp-100 strip, R-1, Normal      !! Continuous Blood Gluc  (FREESTYLE KERRY 14 DAY READER) CLAUDIO 1 Device by Does not apply route 4 times daily, Disp-1 Device,R-0Normal      !! Continuous Blood Gluc Sensor (FREESTYLE KERRY 2 SENSOR SYSTM) MISC 4 Devices by Does not apply route every 7 days, Disp-4 each,R-0Normal      Lancets MISC 2 TIMES DAILY Starting Fri 7/5/2019, Disp-100 each, R-11, Normal      nystatin (MYCOSTATIN) 198115 UNIT/GM cream Apply topically 2 times daily. , Disp-1 Tube, R-1, Normal      docusate sodium (COLACE) 100 MG capsule Take 1 capsule by mouth daily as needed for Constipation, Disp-30 capsule, R-0Normal      aspirin 81 MG chewable tablet Take 1 tablet by mouth daily, Disp-30 tablet, R-3       !! - Potential duplicate medications found. Please discuss with provider.       STOP taking these medications       metFORMIN (GLUCOPHAGE) 500 MG tablet Comments:   Reason for Stopping:         diclofenac sodium (VOLTAREN) 1 % GEL Comments:   Reason for Stopping:               Activity: activity as tolerated    Diet: diabetic diet and low fat, low cholesterol diet    Follow-up:    Honorio Officer, [address]  Yassine Daly [address] [place] [address]    Schedule an appointment as soon as possible for a visit in 1 week  for hospitalization follow up    [address] [place] [address]  [phone]        Patient Instructions: Please follow-up with the primary care physician within 1-2 week of discharge for diabetes medication, diuretic, thyroid and beta blocker adjustments. Follow up with BMP blood work to assess resolution of Acute kidney injury with your PCP. Started on p.o. Keflex for urinary tract infection. Please take all your medicine as prescribed. Your TSH and free T4 was not in the target range hence Synthroid dose is increased to 150 mcg. Metformin is withheld temporarily given BRIA. And you are advised to take BMP within 1 week of discharge. Remain isolated till 1/19/2021. In case of worsening symptoms, seek medical attention and come to the emergency department. Follow up labs: CBC BMP  Follow up imaging: None    Note that over 30 minutes was spent in preparing discharge papers, discussing discharge with patient, medication review, etc.      Latoya Hyde MD,  Internal Medicine Resident, PGY-1  [facility];  [place], [place]  1/13/2022, 6:23 PM

## 2022-01-18 ENCOUNTER — APPOINTMENT (OUTPATIENT)
Dept: GENERAL RADIOLOGY | Age: 87
DRG: 177 | End: 2022-01-18
Payer: MEDICARE

## 2022-01-18 ENCOUNTER — HOSPITAL ENCOUNTER (INPATIENT)
Age: 87
LOS: 2 days | Discharge: HOME HEALTH | DRG: 177 | End: 2022-01-21
Attending: EMERGENCY MEDICINE | Admitting: INTERNAL MEDICINE
Payer: MEDICARE

## 2022-01-18 ENCOUNTER — TELEPHONE (OUTPATIENT)
Dept: INTERNAL MEDICINE CLINIC | Age: 87
End: 2022-01-18

## 2022-01-18 DIAGNOSIS — N17.9 AKI (ACUTE KIDNEY INJURY) (HCC): Primary | ICD-10-CM

## 2022-01-18 DIAGNOSIS — E11.8 TYPE 2 DIABETES MELLITUS WITH COMPLICATION, WITHOUT LONG-TERM CURRENT USE OF INSULIN (HCC): ICD-10-CM

## 2022-01-18 DIAGNOSIS — I87.8 VENOUS STASIS: ICD-10-CM

## 2022-01-18 DIAGNOSIS — U07.1 COVID: Primary | ICD-10-CM

## 2022-01-18 LAB
ALBUMIN SERPL-MCNC: 3.6 G/DL (ref 3.5–5.2)
ALBUMIN/GLOBULIN RATIO: 1 (ref 1–2.5)
ALP BLD-CCNC: 164 U/L (ref 35–104)
ALT SERPL-CCNC: 36 U/L (ref 5–33)
ANION GAP SERPL CALCULATED.3IONS-SCNC: 15 MMOL/L (ref 9–17)
AST SERPL-CCNC: 39 U/L
BILIRUB SERPL-MCNC: 0.49 MG/DL (ref 0.3–1.2)
BILIRUBIN DIRECT: 0.17 MG/DL
BILIRUBIN, INDIRECT: 0.32 MG/DL (ref 0–1)
BNP INTERPRETATION: ABNORMAL
BUN BLDV-MCNC: 26 MG/DL (ref 8–23)
BUN/CREAT BLD: ABNORMAL (ref 9–20)
CALCIUM SERPL-MCNC: 8.1 MG/DL (ref 8.6–10.4)
CHLORIDE BLD-SCNC: 94 MMOL/L (ref 98–107)
CO2: 23 MMOL/L (ref 20–31)
CREAT SERPL-MCNC: 1.34 MG/DL (ref 0.5–0.9)
GFR AFRICAN AMERICAN: 46 ML/MIN
GFR NON-AFRICAN AMERICAN: 38 ML/MIN
GFR SERPL CREATININE-BSD FRML MDRD: ABNORMAL ML/MIN/{1.73_M2}
GFR SERPL CREATININE-BSD FRML MDRD: ABNORMAL ML/MIN/{1.73_M2}
GLOBULIN: ABNORMAL G/DL (ref 1.5–3.8)
GLUCOSE BLD-MCNC: 226 MG/DL (ref 70–99)
PLATELET, FLUORESCENCE: 187 K/UL (ref 138–453)
PLATELET, IMMATURE FRACTION: 3.9 % (ref 1.1–10.3)
POTASSIUM SERPL-SCNC: 4.1 MMOL/L (ref 3.7–5.3)
PRO-BNP: 418 PG/ML
SODIUM BLD-SCNC: 132 MMOL/L (ref 135–144)
TOTAL PROTEIN: 7.2 G/DL (ref 6.4–8.3)
TROPONIN INTERP: ABNORMAL
TROPONIN INTERP: ABNORMAL
TROPONIN T: ABNORMAL NG/ML
TROPONIN T: ABNORMAL NG/ML
TROPONIN, HIGH SENSITIVITY: 27 NG/L (ref 0–14)
TROPONIN, HIGH SENSITIVITY: 31 NG/L (ref 0–14)

## 2022-01-18 PROCEDURE — 99285 EMERGENCY DEPT VISIT HI MDM: CPT

## 2022-01-18 PROCEDURE — 93005 ELECTROCARDIOGRAM TRACING: CPT | Performed by: STUDENT IN AN ORGANIZED HEALTH CARE EDUCATION/TRAINING PROGRAM

## 2022-01-18 PROCEDURE — 2580000003 HC RX 258: Performed by: STUDENT IN AN ORGANIZED HEALTH CARE EDUCATION/TRAINING PROGRAM

## 2022-01-18 PROCEDURE — 80048 BASIC METABOLIC PNL TOTAL CA: CPT

## 2022-01-18 PROCEDURE — 85055 RETICULATED PLATELET ASSAY: CPT

## 2022-01-18 PROCEDURE — 80076 HEPATIC FUNCTION PANEL: CPT

## 2022-01-18 PROCEDURE — 71045 X-RAY EXAM CHEST 1 VIEW: CPT

## 2022-01-18 PROCEDURE — 85025 COMPLETE CBC W/AUTO DIFF WBC: CPT

## 2022-01-18 PROCEDURE — 84484 ASSAY OF TROPONIN QUANT: CPT

## 2022-01-18 PROCEDURE — 83880 ASSAY OF NATRIURETIC PEPTIDE: CPT

## 2022-01-18 RX ORDER — 0.9 % SODIUM CHLORIDE 0.9 %
500 INTRAVENOUS SOLUTION INTRAVENOUS ONCE
Status: COMPLETED | OUTPATIENT
Start: 2022-01-18 | End: 2022-01-18

## 2022-01-18 RX ADMIN — SODIUM CHLORIDE 500 ML: 9 INJECTION, SOLUTION INTRAVENOUS at 21:21

## 2022-01-18 ASSESSMENT — ENCOUNTER SYMPTOMS
DIARRHEA: 1
EYES NEGATIVE: 1
COUGH: 0
SHORTNESS OF BREATH: 0
VOMITING: 1
NAUSEA: 1

## 2022-01-18 NOTE — TELEPHONE ENCOUNTER
Wendy with [facility] calling to inform provider that they will be following patient. Patient was discharged from the hospital with an order for a BMP. Nurse would like to know if PCP would like this drawn, if yes, an ICD-10 code will be needed. Wendy also reports significant bilateral lower extremity swelling. No discoloration or warmth. Would like to know if patients legs need to be wrapped, seems to be a chronic issue.      Please advise

## 2022-01-19 PROBLEM — U07.1 PNEUMONIA DUE TO COVID-19 VIRUS: Status: ACTIVE | Noted: 2022-01-19

## 2022-01-19 PROBLEM — J12.82 PNEUMONIA DUE TO COVID-19 VIRUS: Status: ACTIVE | Noted: 2022-01-19

## 2022-01-19 LAB
ABSOLUTE EOS #: 0 K/UL (ref 0–0.4)
ABSOLUTE IMMATURE GRANULOCYTE: 0.32 K/UL (ref 0–0.3)
ABSOLUTE LYMPH #: 1.34 K/UL (ref 1–4.8)
ABSOLUTE MONO #: 0.38 K/UL (ref 0.1–0.8)
BASOPHILS # BLD: 0 % (ref 0–2)
BASOPHILS ABSOLUTE: 0 K/UL (ref 0–0.2)
D-DIMER QUANTITATIVE: 1.42 MG/L FEU
DIFFERENTIAL TYPE: ABNORMAL
EKG ATRIAL RATE: 67 BPM
EKG P AXIS: 23 DEGREES
EKG P-R INTERVAL: 232 MS
EKG Q-T INTERVAL: 466 MS
EKG QRS DURATION: 162 MS
EKG QTC CALCULATION (BAZETT): 492 MS
EKG R AXIS: -74 DEGREES
EKG T AXIS: 38 DEGREES
EKG VENTRICULAR RATE: 67 BPM
EOSINOPHILS RELATIVE PERCENT: 0 % (ref 1–4)
FERRITIN: 243 UG/L (ref 13–150)
GLUCOSE BLD-MCNC: 192 MG/DL (ref 65–105)
GLUCOSE BLD-MCNC: 216 MG/DL (ref 65–105)
GLUCOSE BLD-MCNC: 237 MG/DL (ref 65–105)
HCT VFR BLD CALC: 36.2 % (ref 36.3–47.1)
HEMOGLOBIN: 11.9 G/DL (ref 11.9–15.1)
IMMATURE GRANULOCYTES: 5 %
LYMPHOCYTES # BLD: 21 % (ref 24–44)
MCH RBC QN AUTO: 27.9 PG (ref 25.2–33.5)
MCHC RBC AUTO-ENTMCNC: 32.9 G/DL (ref 28.4–34.8)
MCV RBC AUTO: 85 FL (ref 82.6–102.9)
MONOCYTES # BLD: 6 % (ref 1–7)
MORPHOLOGY: NORMAL
NRBC AUTOMATED: 0 PER 100 WBC
PDW BLD-RTO: 14.1 % (ref 11.8–14.4)
PLATELET # BLD: ABNORMAL K/UL (ref 138–453)
PLATELET ESTIMATE: ABNORMAL
PMV BLD AUTO: ABNORMAL FL (ref 8.1–13.5)
RBC # BLD: 4.26 M/UL (ref 3.95–5.11)
RBC # BLD: ABNORMAL 10*6/UL
SEG NEUTROPHILS: 68 % (ref 36–66)
SEGMENTED NEUTROPHILS ABSOLUTE COUNT: 4.36 K/UL (ref 1.8–7.7)
WBC # BLD: 6.4 K/UL (ref 3.5–11.3)
WBC # BLD: ABNORMAL 10*3/UL

## 2022-01-19 PROCEDURE — 6360000002 HC RX W HCPCS: Performed by: NURSE PRACTITIONER

## 2022-01-19 PROCEDURE — 6370000000 HC RX 637 (ALT 250 FOR IP): Performed by: NURSE PRACTITIONER

## 2022-01-19 PROCEDURE — 83615 LACTATE (LD) (LDH) ENZYME: CPT

## 2022-01-19 PROCEDURE — 84145 PROCALCITONIN (PCT): CPT

## 2022-01-19 PROCEDURE — 99222 1ST HOSP IP/OBS MODERATE 55: CPT | Performed by: INTERNAL MEDICINE

## 2022-01-19 PROCEDURE — 3E0333Z INTRODUCTION OF ANTI-INFLAMMATORY INTO PERIPHERAL VEIN, PERCUTANEOUS APPROACH: ICD-10-PCS | Performed by: STUDENT IN AN ORGANIZED HEALTH CARE EDUCATION/TRAINING PROGRAM

## 2022-01-19 PROCEDURE — 6370000000 HC RX 637 (ALT 250 FOR IP): Performed by: STUDENT IN AN ORGANIZED HEALTH CARE EDUCATION/TRAINING PROGRAM

## 2022-01-19 PROCEDURE — 2580000003 HC RX 258: Performed by: NURSE PRACTITIONER

## 2022-01-19 PROCEDURE — 99221 1ST HOSP IP/OBS SF/LOW 40: CPT | Performed by: INTERNAL MEDICINE

## 2022-01-19 PROCEDURE — 6360000002 HC RX W HCPCS: Performed by: STUDENT IN AN ORGANIZED HEALTH CARE EDUCATION/TRAINING PROGRAM

## 2022-01-19 PROCEDURE — 82947 ASSAY GLUCOSE BLOOD QUANT: CPT

## 2022-01-19 PROCEDURE — 85379 FIBRIN DEGRADATION QUANT: CPT

## 2022-01-19 PROCEDURE — 6370000000 HC RX 637 (ALT 250 FOR IP): Performed by: INTERNAL MEDICINE

## 2022-01-19 PROCEDURE — 93010 ELECTROCARDIOGRAM REPORT: CPT | Performed by: INTERNAL MEDICINE

## 2022-01-19 PROCEDURE — 82728 ASSAY OF FERRITIN: CPT

## 2022-01-19 PROCEDURE — 86140 C-REACTIVE PROTEIN: CPT

## 2022-01-19 PROCEDURE — 1200000000 HC SEMI PRIVATE

## 2022-01-19 RX ORDER — POLYETHYLENE GLYCOL 3350 17 G/17G
17 POWDER, FOR SOLUTION ORAL DAILY PRN
Status: DISCONTINUED | OUTPATIENT
Start: 2022-01-19 | End: 2022-01-21 | Stop reason: HOSPADM

## 2022-01-19 RX ORDER — NICOTINE POLACRILEX 4 MG
15 LOZENGE BUCCAL PRN
Status: DISCONTINUED | OUTPATIENT
Start: 2022-01-19 | End: 2022-01-21 | Stop reason: HOSPADM

## 2022-01-19 RX ORDER — VITAMIN B COMPLEX
2000 TABLET ORAL DAILY
Status: DISCONTINUED | OUTPATIENT
Start: 2022-01-19 | End: 2022-01-21 | Stop reason: HOSPADM

## 2022-01-19 RX ORDER — HEPARIN SODIUM 5000 [USP'U]/ML
5000 INJECTION, SOLUTION INTRAVENOUS; SUBCUTANEOUS EVERY 8 HOURS SCHEDULED
Status: DISCONTINUED | OUTPATIENT
Start: 2022-01-19 | End: 2022-01-21 | Stop reason: HOSPADM

## 2022-01-19 RX ORDER — INSULIN GLARGINE 100 [IU]/ML
42 INJECTION, SOLUTION SUBCUTANEOUS NIGHTLY
Status: DISCONTINUED | OUTPATIENT
Start: 2022-01-19 | End: 2022-01-20

## 2022-01-19 RX ORDER — SODIUM CHLORIDE 0.9 % (FLUSH) 0.9 %
5-40 SYRINGE (ML) INJECTION EVERY 12 HOURS SCHEDULED
Status: DISCONTINUED | OUTPATIENT
Start: 2022-01-19 | End: 2022-01-21 | Stop reason: HOSPADM

## 2022-01-19 RX ORDER — MELATONIN
1000 DAILY
Status: DISCONTINUED | OUTPATIENT
Start: 2022-01-19 | End: 2022-01-19

## 2022-01-19 RX ORDER — GUAIFENESIN DEXTROMETHORPHAN HYDROBROMIDE ORAL SOLUTION 10; 100 MG/5ML; MG/5ML
5 SOLUTION ORAL EVERY 4 HOURS PRN
Status: DISCONTINUED | OUTPATIENT
Start: 2022-01-19 | End: 2022-01-21 | Stop reason: HOSPADM

## 2022-01-19 RX ORDER — ONDANSETRON 2 MG/ML
4 INJECTION INTRAMUSCULAR; INTRAVENOUS EVERY 6 HOURS PRN
Status: DISCONTINUED | OUTPATIENT
Start: 2022-01-19 | End: 2022-01-21 | Stop reason: HOSPADM

## 2022-01-19 RX ORDER — CLOPIDOGREL BISULFATE 75 MG/1
75 TABLET ORAL DAILY
Status: DISCONTINUED | OUTPATIENT
Start: 2022-01-19 | End: 2022-01-21 | Stop reason: HOSPADM

## 2022-01-19 RX ORDER — DEXTROSE MONOHYDRATE 25 G/50ML
12.5 INJECTION, SOLUTION INTRAVENOUS PRN
Status: DISCONTINUED | OUTPATIENT
Start: 2022-01-19 | End: 2022-01-21 | Stop reason: HOSPADM

## 2022-01-19 RX ORDER — ALOGLIPTIN 12.5 MG/1
12.5 TABLET, FILM COATED ORAL DAILY
Status: DISCONTINUED | OUTPATIENT
Start: 2022-01-19 | End: 2022-01-21 | Stop reason: HOSPADM

## 2022-01-19 RX ORDER — SODIUM CHLORIDE 9 MG/ML
25 INJECTION, SOLUTION INTRAVENOUS PRN
Status: DISCONTINUED | OUTPATIENT
Start: 2022-01-19 | End: 2022-01-21 | Stop reason: HOSPADM

## 2022-01-19 RX ORDER — ONDANSETRON 4 MG/1
4 TABLET, ORALLY DISINTEGRATING ORAL EVERY 8 HOURS PRN
Status: DISCONTINUED | OUTPATIENT
Start: 2022-01-19 | End: 2022-01-21 | Stop reason: HOSPADM

## 2022-01-19 RX ORDER — ACETAMINOPHEN 325 MG/1
650 TABLET ORAL EVERY 6 HOURS PRN
Status: DISCONTINUED | OUTPATIENT
Start: 2022-01-19 | End: 2022-01-21 | Stop reason: HOSPADM

## 2022-01-19 RX ORDER — ASPIRIN 81 MG/1
81 TABLET, CHEWABLE ORAL DAILY
Status: DISCONTINUED | OUTPATIENT
Start: 2022-01-19 | End: 2022-01-21 | Stop reason: HOSPADM

## 2022-01-19 RX ORDER — CHOLECALCIFEROL (VITAMIN D3) 125 MCG
500 CAPSULE ORAL DAILY
Status: DISCONTINUED | OUTPATIENT
Start: 2022-01-19 | End: 2022-01-21 | Stop reason: HOSPADM

## 2022-01-19 RX ORDER — LANOLIN ALCOHOL/MO/W.PET/CERES
325 CREAM (GRAM) TOPICAL 2 TIMES DAILY WITH MEALS
Status: DISCONTINUED | OUTPATIENT
Start: 2022-01-19 | End: 2022-01-21 | Stop reason: HOSPADM

## 2022-01-19 RX ORDER — ATORVASTATIN CALCIUM 80 MG/1
80 TABLET, FILM COATED ORAL DAILY
Status: DISCONTINUED | OUTPATIENT
Start: 2022-01-19 | End: 2022-01-21 | Stop reason: HOSPADM

## 2022-01-19 RX ORDER — DEXAMETHASONE SODIUM PHOSPHATE 10 MG/ML
6 INJECTION INTRAMUSCULAR; INTRAVENOUS EVERY 24 HOURS
Status: DISCONTINUED | OUTPATIENT
Start: 2022-01-19 | End: 2022-01-21 | Stop reason: HOSPADM

## 2022-01-19 RX ORDER — ALBUTEROL SULFATE 90 UG/1
2 AEROSOL, METERED RESPIRATORY (INHALATION) 4 TIMES DAILY
Status: DISCONTINUED | OUTPATIENT
Start: 2022-01-19 | End: 2022-01-21 | Stop reason: HOSPADM

## 2022-01-19 RX ORDER — SODIUM CHLORIDE 0.9 % (FLUSH) 0.9 %
5-40 SYRINGE (ML) INJECTION PRN
Status: DISCONTINUED | OUTPATIENT
Start: 2022-01-19 | End: 2022-01-21 | Stop reason: HOSPADM

## 2022-01-19 RX ORDER — DEXTROSE MONOHYDRATE 50 MG/ML
100 INJECTION, SOLUTION INTRAVENOUS PRN
Status: DISCONTINUED | OUTPATIENT
Start: 2022-01-19 | End: 2022-01-21 | Stop reason: HOSPADM

## 2022-01-19 RX ORDER — FUROSEMIDE 10 MG/ML
40 INJECTION INTRAMUSCULAR; INTRAVENOUS DAILY
Status: DISCONTINUED | OUTPATIENT
Start: 2022-01-19 | End: 2022-01-21

## 2022-01-19 RX ORDER — LEVOTHYROXINE SODIUM 0.07 MG/1
150 TABLET ORAL DAILY
Status: DISCONTINUED | OUTPATIENT
Start: 2022-01-19 | End: 2022-01-21 | Stop reason: HOSPADM

## 2022-01-19 RX ORDER — ACETAMINOPHEN 650 MG/1
650 SUPPOSITORY RECTAL EVERY 6 HOURS PRN
Status: DISCONTINUED | OUTPATIENT
Start: 2022-01-19 | End: 2022-01-21 | Stop reason: HOSPADM

## 2022-01-19 RX ADMIN — INSULIN LISPRO 4 UNITS: 100 INJECTION, SOLUTION INTRAVENOUS; SUBCUTANEOUS at 12:30

## 2022-01-19 RX ADMIN — Medication 2000 UNITS: at 08:39

## 2022-01-19 RX ADMIN — SODIUM CHLORIDE, PRESERVATIVE FREE 10 ML: 5 INJECTION INTRAVENOUS at 20:42

## 2022-01-19 RX ADMIN — HEPARIN SODIUM 5000 UNITS: 5000 INJECTION INTRAVENOUS; SUBCUTANEOUS at 20:32

## 2022-01-19 RX ADMIN — ASPIRIN 81 MG: 81 TABLET, CHEWABLE ORAL at 08:40

## 2022-01-19 RX ADMIN — Medication 2 PUFF: at 20:41

## 2022-01-19 RX ADMIN — FERROUS SULFATE TAB EC 325 MG (65 MG FE EQUIVALENT) 325 MG: 325 (65 FE) TABLET DELAYED RESPONSE at 08:40

## 2022-01-19 RX ADMIN — INSULIN LISPRO 4 UNITS: 100 INJECTION, SOLUTION INTRAVENOUS; SUBCUTANEOUS at 21:00

## 2022-01-19 RX ADMIN — SERTRALINE 100 MG: 50 TABLET, FILM COATED ORAL at 08:39

## 2022-01-19 RX ADMIN — HEPARIN SODIUM 5000 UNITS: 5000 INJECTION INTRAVENOUS; SUBCUTANEOUS at 16:18

## 2022-01-19 RX ADMIN — ATORVASTATIN CALCIUM 80 MG: 80 TABLET, FILM COATED ORAL at 08:40

## 2022-01-19 RX ADMIN — LEVOTHYROXINE SODIUM 150 MCG: 75 TABLET ORAL at 08:40

## 2022-01-19 RX ADMIN — DEXAMETHASONE SODIUM PHOSPHATE 6 MG: 10 INJECTION INTRAMUSCULAR; INTRAVENOUS at 12:20

## 2022-01-19 RX ADMIN — FERROUS SULFATE TAB EC 325 MG (65 MG FE EQUIVALENT) 325 MG: 325 (65 FE) TABLET DELAYED RESPONSE at 16:19

## 2022-01-19 RX ADMIN — METOPROLOL TARTRATE 12.5 MG: 25 TABLET ORAL at 08:43

## 2022-01-19 RX ADMIN — METOPROLOL TARTRATE 12.5 MG: 25 TABLET ORAL at 20:32

## 2022-01-19 RX ADMIN — Medication 500 MCG: at 08:39

## 2022-01-19 RX ADMIN — INSULIN GLARGINE 42 UNITS: 100 INJECTION, SOLUTION SUBCUTANEOUS at 21:00

## 2022-01-19 RX ADMIN — FUROSEMIDE 40 MG: 10 INJECTION, SOLUTION INTRAMUSCULAR; INTRAVENOUS at 12:20

## 2022-01-19 RX ADMIN — ALOGLIPTIN 12.5 MG: 12.5 TABLET, FILM COATED ORAL at 08:40

## 2022-01-19 RX ADMIN — HEPARIN SODIUM 5000 UNITS: 5000 INJECTION INTRAVENOUS; SUBCUTANEOUS at 08:40

## 2022-01-19 RX ADMIN — CLOPIDOGREL 75 MG: 75 TABLET, FILM COATED ORAL at 08:40

## 2022-01-19 RX ADMIN — INSULIN LISPRO 4 UNITS: 100 INJECTION, SOLUTION INTRAVENOUS; SUBCUTANEOUS at 17:45

## 2022-01-19 RX ADMIN — SODIUM CHLORIDE, PRESERVATIVE FREE 10 ML: 5 INJECTION INTRAVENOUS at 08:41

## 2022-01-19 ASSESSMENT — ENCOUNTER SYMPTOMS
STRIDOR: 0
COUGH: 0
WHEEZING: 0
SHORTNESS OF BREATH: 1
ABDOMINAL PAIN: 0

## 2022-01-19 ASSESSMENT — PAIN SCALES - GENERAL
PAINLEVEL_OUTOF10: 0

## 2022-01-19 NOTE — CARE COORDINATION
Case Management Initial Discharge Plan  Selene Powell,             Spoke with:Laura Cantor to discuss discharge plans. Information verified: address, contacts, phone number, , insurance Yes  Insurance Provider: Medicare and Medicaid    Emergency Contact/Next of Kin name & number: Jia Malik [phone]  Who are involved in patient's support system? Daughters    PCP: Madelaine Foster MD  Date of last visit: 1 month ago      Discharge Planning    Living Arrangements:  [address] 1 [address]  4 stairs to climb to get into front door  Patient able to perform ADL's:Independent    Current Services (outpatient & in home) none  DME equipment: na  DME provider: na    Is patient receiving oral anticoagulation therapy? No          Potential Assistance Needed:  Durable Medical Equipment    Patient agreeable to home care: Yes, [facility]  Freedom of choice provided:  n/a    Prior SNF/Rehab Placement and Facility: none  Agreeable to SNF/Rehab: No  [place] of choice provided: n/a     Evaluation: n/a    Expected Discharge date:  22    Patient expects to be discharged to:   Home    If home: is the family and/or caregiver wiling & able to provide support at home? Yes  Who will be providing this support? Family*    Follow Up Appointment: Best Day/ Time:      Transportation provider: Family  Transportation arrangements needed for discharge: No    Readmission Risk              Risk of Unplanned Readmission:  32             Does patient have a readmission risk score greater than 14?: Yes  If yes, follow-up appointment must be made within 7 days of discharge.      Goals of Care: Breathe Easier      Educated laura Cantor on transitional options, provided freedom of choice and are agreeable with plan      Discharge Plan: home with home home care  Sara faxed and called, spoke with Analia          Electronically signed by Oleg Paniagua on 22 at 8:32 AM EST

## 2022-01-19 NOTE — ED PROVIDER NOTES
[facility] ED  Emergency Department  Emergency Medicine Resident Sign-out     Care of Davy Azar was assumed from Dr. Daljit Spencer and is being seen for Nausea (ARRIVED VIA EMS WITH REPORTS OF N/V/D X 2 DAYS  TESTED + FOR COVID 1 WEEK PTA ), Emesis, and Diarrhea  . The patient's initial evaluation and plan have been discussed with the prior provider who initially evaluated the patient.      EMERGENCY DEPARTMENT COURSE / MEDICAL DECISION MAKING:       MEDICATIONS GIVEN:  Orders Placed This Encounter   Medications    0.9 % sodium chloride bolus       LABS / RADIOLOGY:     Labs Reviewed   CBC WITH AUTO DIFFERENTIAL - Abnormal; Notable for the following components:       Result Value    Hematocrit 36.2 (*)     Immature Granulocytes 5 (*)     Seg Neutrophils 68 (*)     Lymphocytes 21 (*)     Eosinophils % 0 (*)     Absolute Immature Granulocyte 0.32 (*)     All other components within normal limits   BASIC METABOLIC PANEL W/ REFLEX TO MG FOR LOW K - Abnormal; Notable for the following components:    Glucose 226 (*)     BUN 26 (*)     CREATININE 1.34 (*)     Calcium 8.1 (*)     Sodium 132 (*)     Chloride 94 (*)     GFR Non- 38 (*)     GFR  46 (*)     All other components within normal limits   HEPATIC FUNCTION PANEL - Abnormal; Notable for the following components:    Alkaline Phosphatase 164 (*)     ALT 36 (*)     AST 39 (*)     All other components within normal limits   TROPONIN - Abnormal; Notable for the following components:    Troponin, High Sensitivity 31 (*)     All other components within normal limits   BRAIN NATRIURETIC PEPTIDE - Abnormal; Notable for the following components:    Pro- (*)     All other components within normal limits   TROPONIN - Abnormal; Notable for the following components:    Troponin, High Sensitivity 27 (*)     All other components within normal limits   IMMATURE PLATELET FRACTION       XR CHEST PORTABLE    Result Date: 1/18/2022  EXAMINATION: ONE XRAY VIEW OF THE CHEST 1/18/2022 7:05 pm COMPARISON: January 8, 2022 HISTORY: ORDERING SYSTEM PROVIDED HISTORY: covid, weakness TECHNOLOGIST PROVIDED HISTORY: covid, weakness FINDINGS: Marginal inspiration is present. Cardiomegaly is noted. New bilateral pulmonary opacities are present. Trace pleural effusion is present bilaterally. Vascular markings are prominent, exaggerated by the poor inspiratory effort. No pneumothorax is noted. Osseous structures are stable. 1. New bilateral pulmonary opacities, consistent with COVID-19 pulmonary disease given the clinical history 2. Cardiomegaly     XR CHEST PORTABLE    Result Date: 1/8/2022  EXAMINATION: ONE XRAY VIEW OF THE CHEST 1/8/2022 7:43 am COMPARISON: April 12, 2021 HISTORY: ORDERING SYSTEM PROVIDED HISTORY: Chest pain TECHNOLOGIST PROVIDED HISTORY: Chest pain Reason for Exam: port upright. Leonardo pain began yesterday 1/7/2022 FINDINGS: The lungs are without acute focal process. There is no effusion or pneumothorax. The cardiomediastinal silhouette is prominent but stable. The osseous structures are without acute process. No acute process. Stable cardiomegaly. CT CHEST PULMONARY EMBOLISM W CONTRAST    Result Date: 1/8/2022  EXAMINATION: CTA OF THE CHEST 1/8/2022 9:23 am TECHNIQUE: CTA of the chest was performed after the administration of intravenous contrast.  Multiplanar reformatted images are provided for review. MIP images are provided for review. Dose modulation, iterative reconstruction, and/or weight based adjustment of the mA/kV was utilized to reduce the radiation dose to as low as reasonably achievable. COMPARISON: None.  HISTORY: ORDERING SYSTEM PROVIDED HISTORY: Dyspnea, elevated dimer TECHNOLOGIST PROVIDED HISTORY: Dyspnea, elevated dimer Decision Support Exception - unselect if not a suspected or confirmed emergency medical condition->Emergency Medical Condition (MA) Reason for Exam: dyspnea, elevated dimer FINDINGS: Pulmonary Arteries: Pulmonary arteries are adequately opacified for evaluation. No evidence of intraluminal filling defect to suggest pulmonary embolism. Main pulmonary artery is normal in caliber. Mediastinum: No evidence of mediastinal lymphadenopathy. The heart and pericardium demonstrate no acute abnormality. There is no acute abnormality of the thoracic aorta. Lungs/pleura: The lungs are without acute process. No focal consolidation or pulmonary edema. No evidence of pleural effusion or pneumothorax. Upper Abdomen: Limited images of the upper abdomen are unremarkable. Soft Tissues/Bones: No acute bone or soft tissue abnormality. No evidence of pulmonary embolism or acute pulmonary abnormality. RECOMMENDATIONS: Unavailable     VL DUP LOWER EXTREMITY VENOUS BILATERAL    Result Date: 1/10/2022    [facility]  Vascular Lower Extremities DVT Study Procedure   Patient Name Marj Camilo       Date of Study           01/10/2022               [facility]   Date of      1935  Gender                  Female  Birth   Age          82 year(s)  Race                       Room Number  3011   Corporate ID [id number]  #   Patient Tonia [de-identified]  #   MR #         [id number]     Sonographer             Constantin Gonzalez RVT   Accession #  [id number]  Interpreting Physician  [address] [place] [address]   Referring                Referring Physician     Carissa Brian MD  Nurse  Practitioner  Procedure Type of Study:   Veins: Lower Extremities DVT Study, Venous Scan Lower Bilateral.  Indications for Study:Pain, edema, discoloration. Patient Status: In Patient. Technical Quality:Limited visualization. Limitation reason:PT could not tolerate compression, pain, patient positioning. Conclusions   Summary   No evidence of superficial or deep venous thrombosis in both lower  extremities.    Signature   ----------------------------------------------------------------  Electronically signed by Constantin Gonzalez +---------------+----------+-----------------------------------------------+ ! CAD            ! !Stent                                          ! +---------------+----------+-----------------------------------------------+ ! Previous Scan  !02/28/2017! WNL--limited due to patient movement           ! +---------------+----------+-----------------------------------------------+ ! CHF            ! !                                               ! +---------------+----------+-----------------------------------------------+ ! Other          ! !H/O Atrial flutter                             ! !               !          !COVID -19                                      ! +---------------+----------+-----------------------------------------------+   - The patient's risk factor(s) include: diabetes mellitus and obesity.   - The patient has Thyroid Cancer. Velocities are measured in cm/s ; Diameters are measured in cm Right Lower Extremities DVT Study Measurements Right 2D Measurements +------------------------------------+----------+---------------+----------+ ! Location                            ! Visualized! Compressibility! Thrombosis! +------------------------------------+----------+---------------+----------+ ! Common Femoral                      !Yes       ! Yes            ! None      ! +------------------------------------+----------+---------------+----------+ ! Prox Femoral                        !Yes       ! Yes            ! None      ! +------------------------------------+----------+---------------+----------+ ! Mid Femoral                         !Yes       !               !          ! +------------------------------------+----------+---------------+----------+ ! Dist Femoral                        !No        !               !          ! +------------------------------------+----------+---------------+----------+ ! Deep Femoral                        !No        !               !          ! +------------------------------------+----------+---------------+----------+ ! Popliteal                           !Partial   !Yes            ! None      ! +------------------------------------+----------+---------------+----------+ ! Sapheno Femoral Junction            ! Yes       ! Yes            ! None      ! +------------------------------------+----------+---------------+----------+ ! PTV                                 ! Yes       !               !          ! +------------------------------------+----------+---------------+----------+ ! Peroneal                            !No        !               !          ! +------------------------------------+----------+---------------+----------+ ! Gastroc                             ! Partial   !               !          ! +------------------------------------+----------+---------------+----------+ ! GSV Thigh                           ! Yes       ! Yes            ! None      ! +------------------------------------+----------+---------------+----------+ ! GSV Knee                            ! Yes       ! Yes            ! None      ! +------------------------------------+----------+---------------+----------+ ! GSV Ankle                           ! Yes       ! Yes            ! None      ! +------------------------------------+----------+---------------+----------+ ! SSV                                 ! Yes       ! Yes            ! None      ! +------------------------------------+----------+---------------+----------+ Right Doppler Measurements +---------------------------+------+------+--------------------------------+ ! Location                   ! Signal!Reflux! Reflux (msec)                   ! +---------------------------+------+------+--------------------------------+ ! Common Femoral             !Phasic!      !                                ! +---------------------------+------+------+--------------------------------+ ! Prox Femoral               !Phasic!      ! ! +---------------------------+------+------+--------------------------------+ ! Popliteal                  !Phasic!      !                                ! +---------------------------+------+------+--------------------------------+ Left Lower Extremities DVT Study Measurements Left 2D Measurements +------------------------------------+----------+---------------+----------+ ! Location                            ! Visualized! Compressibility! Thrombosis! +------------------------------------+----------+---------------+----------+ ! Common Femoral                      !Yes       !               !          ! +------------------------------------+----------+---------------+----------+ ! Prox Femoral                        !Yes       ! Yes            ! None      ! +------------------------------------+----------+---------------+----------+ ! Mid Femoral                         !Yes       ! Yes            ! None      ! +------------------------------------+----------+---------------+----------+ ! Dist Femoral                        !No        !               !          ! +------------------------------------+----------+---------------+----------+ ! Popliteal                           !Partial   !Yes            ! None      ! +------------------------------------+----------+---------------+----------+ ! Sapheno Femoral Junction            ! Yes       ! Yes            ! None      ! +------------------------------------+----------+---------------+----------+ ! PTV                                 ! Partial   !Yes            ! None      ! +------------------------------------+----------+---------------+----------+ ! Peroneal                            !No        !               !          ! +------------------------------------+----------+---------------+----------+ ! Gastroc                             ! Yes       ! Yes            ! None      ! +------------------------------------+----------+---------------+----------+ ! GSV Thigh !Yes       !Yes            ! None      ! +------------------------------------+----------+---------------+----------+ ! GSV Knee                            ! Yes       ! Yes            ! None      ! +------------------------------------+----------+---------------+----------+ ! GSV Ankle                           ! Yes       ! Yes            ! None      ! +------------------------------------+----------+---------------+----------+ ! SSV                                 ! Yes       ! Yes            ! None      ! +------------------------------------+----------+---------------+----------+ Left Doppler Measurements +-------------------------+----------+------+------------------------------+ ! Location                 ! Signal    !Reflux! Reflux (msec)                 ! +-------------------------+----------+------+------------------------------+ ! Common Femoral           !Continuous!      !                              ! +-------------------------+----------+------+------------------------------+ ! Prox Femoral             !Phasic    !      !                              ! +-------------------------+----------+------+------------------------------+ ! Popliteal                !Continuous!      !                              ! +-------------------------+----------+------+------------------------------+    XR HIP 2-3 VW W PELVIS RIGHT    Result Date: 1/18/2022  EXAMINATION: ONE XRAY VIEW OF THE PELVIS AND TWO XRAY VIEWS RIGHT HIP 1/18/2022 8:00 pm COMPARISON: July 13, 2012 HISTORY: ORDERING SYSTEM PROVIDED HISTORY: fall, pain TECHNOLOGIST PROVIDED HISTORY: fall, pain FINDINGS: Normal articulation is present at the hip joints. Mild degenerative changes are present. No evidence of an acute fracture is present involving the right hip, or pelvis. Vascular calcifications are noted. Radiopaque densities are present within the soft tissues lateral to the right hip, and may represent artifact or dystrophic calcifications.      No acute osseous abnormality       RECENT VITALS:     Temp: 97.6 °F (36.4 °C),  Pulse: 64, Resp: 18, BP: (!) 124/55, SpO2: 97 %    This patient is a 82 y.o. Female with COVID-19, dehydration, nausea vomiting and failure to thrive at home. Patient found to have increased bilateral opacities on chest x-ray. However no evidence of bacterial pneumonia based on white count, vital signs and lack of productive cough. Sure troponin mildly elevated from baseline. Additionally has chronic venous stasis with weeping wounds. Will be admitted for cardiology evaluation and wound care. OUTSTANDING TASKS / RECOMMENDATIONS:    1. Transfer to floor. FINAL IMPRESSION:     1. COVID    2. Venous stasis        DISPOSITION:         DISPOSITION:  []  Discharge   []  Transfer -    [x]  Admission -Intermed   []  Against Medical Advice   []  Eloped   FOLLOW-UP: No follow-up provider specified.    DISCHARGE MEDICATIONS: New Prescriptions    No medications on file           En Hernández DO  Emergency Medicine Resident  [id number]        En Hernández [place]  Resident  01/19/22 2159

## 2022-01-19 NOTE — ED NOTES
Bed: 48PED  Expected date:   Expected time:   Means of arrival:   Comments:  ROLLY Wood RN  01/18/22 2103

## 2022-01-19 NOTE — ED NOTES
Pt stated she fell approx 2 weeks pta and her rt hip \" bothers me at times\"     Isabell Murray, RN  01/18/22 3352

## 2022-01-19 NOTE — CONSULTS
[facility] - Initial Consult Note COVID 19 Patient  Today's Date and Time: 1/19/2022, 6:18 PM    Impression :     · COVID 19 Confirmed Infection  · Was treated last week from 1/8-->1/10   · Covid tests: 1/8/21 positive. · CAD s/p PTCA/PEPITO to LAD in October 2021  · CHF with EF 45%  · Atrial flutter  · Diabetes  · Hyperliidemia  · HTN  · Thyroid cancer s/p thyroidectomy    Recommendations:   · Antibiotic treatment:  · Monitor off antibiotics  · Did receive Keflex for E. Coli UTI and finished course on 1/17. · Covid Rx:    · Remdesivir: Not indicated  · Decadron--yes, 6mg IV q24 hours for 10 doses  · Actemra--Not indicated  · Monoclonal antibodies--out of window      Medical Decision Making/Summary/Discussion:1/19/2022     · Patient admitted with previously diagnosed and treated COVID 19 infection  · Given Decadron for worsening COVID symptoms. Infection Control Recommendations   · Universal Precautions  · Airborne isolation  · Droplet Isolation  · Isolate until 1/29/22    Antimicrobial Stewardship Recommendations     · Simplification of therapy  · Targeted therapy    Coordination of Outpatient Care:   · Estimated Length of IV antimicrobials:TBD  · Patient will need Midline Catheter Insertion: TBD  · Patient will need PICC line Insertion: No  · Patient will need: Home IV , [place],  SNF,  LTAC:TBD  · Patient will need outpatient wound care:No    Chief complaint/reason for consultation:   · Concern for COVID infection      History of Present Illness:   Massiel Cheek is a 82y.o.-year-old female who was initially admitted on 1/18/2022. Patient seen at the request of Dr. Walter Hendrickson. INITIAL HISTORY:    Patient presented to ED with worsening SOB. Last hospital visit    She was recently treated here for COVID, positive on 1/8/22.  She was asymptomatic at that time and did not receive Remdesivir due to BRIA, did not receive Decadron, and did not qualify for Actemra or monoclonal antibodies. She received her J+J vaccine in May 2021. Received booster upon discharge on 1/11. She was discharged on 1/10 with cephalexin due to E.coli UTI. She finished this regimen on 1/17/22. ED Course:    Presented to ED with SOB, vomiting, diarrhea, and failure to thrive at home. Hypoxic in ED 88% O2, put on 2L nasal cannula which improved saturation. Hemodynamically stable vitals  Afebrile    CXR 1/19:      CURRENT EVALUATION : 1/19/2022    Afebrile  Vitals notable for hypotension in 100's/50's. She is on Lasix for chronic heart failure. Patient exhibiting respiratory distress. yes  Respiratory secretions: No    Patient receiving 1.5L  supplemental oxygen. RR 18  02 sat 95%      NEWS Score: 0-4 Low risk group; 5-6: Medium risk group; 7 or above: High risk group  Parameters 3 2 1 0 1 2 3   Age    < 72   ? 65   RR ? 8  9-11 12-20  21-24 ? 25   O2 Sats ? 91 92-93 94-95 ? 96      Suppl O2  Yes  No      SBP ? 90  101-110 111-219   ? 220   HR ? 40  41-50 51-90  111-130 ? 131   Consciousness    Alert   Drowsiness, lethargy, or confusion   Temperature ? 35.0 C (95.0 F)  35.1-36.0 C 95.1-96.9 F 36.1-38.0 C 97.0-100.4 F 38.1-39.0 C 100.5-102.3 F ? 39.1 C ? 102.4 F      NEWS Score:   1-19-22: 5, medium risk    Overall Daily Picture:    worsening    Presence of secondary bacterial Infection:  No   Additional antibiotics: no    Labs, X rays reviewed: 1/19/2022    BUN: 26  Cr: 1.34    WBC: 6.4  Hb: 11.9  Plat:  187    Absolute Neutrophils:4.36  Absolute Lymphocytes:1.34  Neutrophil/Lymphocyte Ratio: 3.255 Low risk    CRP:   · 1-8-22: 17.1  · 1-19-22:   Ferritin: 243  LDH:     Pro Calcitonin:      Cultures:  Urine:  ·   Blood:  ·   Sputum :  ·   Wound:       CXR:        CAT:      Discussed with patient, RN, CC, IM.     I have personally reviewed the past medical history, past surgical history, medications, social history, and family history, and I have updated the database accordingly.   Past Medical History:     Past Medical History:   Diagnosis Date    Arthritis     [facility] flutter ([facility]     CAD (coronary artery disease)     CHF (congestive heart failure) (HCC)     Diabetes ([facility]     Diabetes mellitus ([facility]     Hyperlipidemia     Hypertension     Psychiatric problem     Thyroid cancer ([facility]     S/P thyroidectomy; on synthroid    Thyroid disease     hypothyroid       Past Surgical  History:     Past Surgical History:   Procedure Laterality Date    APPENDECTOMY      BACK SURGERY      CHOLECYSTECTOMY      CORONARY ANGIOPLASTY WITH STENT PLACEMENT      HYSTERECTOMY      THYROIDECTOMY      UPPER GASTROINTESTINAL ENDOSCOPY  4/14/2021    EGD BIOPSY performed by Migue Arauz MD at [address] [place] [address] ENDOSCOPY  4/14/2021    EGD CONTROL HEMORRHAGE performed by Migue Arauz MD at [facility] Endoscopy       Medications:      aspirin  81 mg Oral Daily    atorvastatin  80 mg Oral Daily    clopidogrel  75 mg Oral Daily    vitamin B-12  500 mcg Oral Daily    ferrous sulfate  325 mg Oral BID WC    insulin glargine  42 Units SubCUTAneous Nightly    levothyroxine  150 mcg Oral Daily    metoprolol tartrate  12.5 mg Oral BID    sertraline  100 mg Oral Daily    alogliptin  12.5 mg Oral Daily    sodium chloride flush  5-40 mL IntraVENous 2 times per day    heparin (porcine)  5,000 Units SubCUTAneous 3 times per day    Vitamin D  2,000 Units Oral Daily    dexamethasone  6 mg IntraVENous Q24H    furosemide  40 mg IntraVENous Daily    insulin lispro  0-12 Units SubCUTAneous TID WC    insulin lispro  0-6 Units SubCUTAneous Nightly    albuterol sulfate HFA  2 puff Inhalation 4x daily    tiotropium  2 puff Inhalation Daily       Social History:     Social History     Socioeconomic History    Marital status:      Spouse name: Not on file    Number of children: Not on file    Years of education: Not on file    Highest education level: Not on file   Occupational History    Not on file   Tobacco Use    Smoking status: Former Smoker    Smokeless tobacco: Never Used   Vaping Use    Vaping Use: Never used   Substance and Sexual Activity    Alcohol use: No    Drug use: No    Sexual activity: Not Currently   Other Topics Concern    Not on file   Social History Narrative    Not on file     Social Determinants of Health     Financial Resource Strain: Medium Risk    Difficulty of Paying Living Expenses: Somewhat hard   Food Insecurity: Food Insecurity Present    Worried About Running Out of Food in the Last Year: Sometimes true    Portia of Food in the Last Year: Often true   Transportation Needs:     Lack of Transportation (Medical): Not on file    Lack of Transportation (Non-Medical): Not on file   Physical Activity:     Days of Exercise per Week: Not on file    Minutes of Exercise per Session: Not on file   Stress:     Feeling of Stress : Not on file   Social Connections:     Frequency of Communication with Friends and Family: Not on file    Frequency of Social Gatherings with Friends and Family: Not on file    Attends [employer] Services: Not on file    Active Member of [address] [place] [address] or Organizations: Not on file    Attends Club or Organization Meetings: Not on file    Marital Status: Not on file   Intimate Partner Violence:     Fear of Current or Ex-Partner: Not on file    Emotionally Abused: Not on file    Physically Abused: Not on file    Sexually Abused: Not on file   Housing Stability:     Unable to Pay for Housing in the Last Year: Not on file    Number of [place] in the Last Year: Not on file    Unstable Housing in the Last Year: Not on file       Family History:     Family History   Problem Relation Age of Onset    Cancer Mother     Cancer Father     Cancer Brother     Diabetes Brother         Allergies:   Strawberry extract and Tape [adhesive tape]     Review of Systems:       Constitutional: No fevers or chills.  No systemic complaints  Head: No headaches  Eyes: No double vision or blurry vision. No conjunctival inflammation. ENT: No sore throat or runny nose. . No hearing loss, tinnitus or vertigo. Cardiovascular: No chest pain or palpitations. Shortness of breath. No GIRON  Lung: Shortness of breath, no cough. No sputum production  Abdomen: No nausea, vomiting, diarrhea, or abdominal pain. Esha Rued No cramps. Genitourinary: No increased urinary frequency, or dysuria. No hematuria. No suprapubic or CVA pain  Musculoskeletal: No muscle aches or pains. No joint effusions or deformities. Swelling and L calf pain. Hematologic: No bleeding or bruising. Neurologic: No headache, weakness, numbness, or tingling. Integument: No rash, no ulcers. Psychiatric: No depression. Endocrine: No polyuria, no polydipsia, no polyphagia. Physical Examination :     Patient Vitals for the past 8 hrs:   BP Temp Temp src Pulse Resp SpO2   01/19/22 1619 125/64 98.1 °F (36.7 °C) Oral 66 16 94 %   01/19/22 1139 (!) 100/51 97.5 °F (36.4 °C) Oral 66 18 95 %     General Appearance: Awake, alert, and in no apparent distress  Head:  Normocephalic, no trauma  Eyes: Pupils equal, round, reactive to light; sclera anicteric; conjunctivae pink. No embolic phenomena. ENT: Oropharynx clear, without erythema, exudate, or thrush. No tenderness of sinuses. Mouth/throat: mucosa pink and moist. No lesions. Dentition in good repair. Neck:Supple, without lymphadenopathy. Thyroid normal, No bruits. Pulmonary/Chest: Clear to auscultation, without wheezes, rales, or rhonchi. No dullness to percussion. Cardiovascular: Regular rate and rhythm without murmurs, rubs, or gallops. Abdomen: Soft, non tender. Bowel sounds normal. No organomegaly  All four Extremities: No cyanosis, clubbing, edema, or effusions. +4/4 pitting edema bilaterally. L calf pain present upon squeezing of L calf. Neurologic: No gross sensory or motor deficits. Skin: Warm and dry with good turgor. Stasis dermatitis rashes present bilaterally. Medical Decision Making -Laboratory:   I have independently reviewed/ordered the following labs:    CBC with Differential:   Recent Labs     01/18/22 2112   WBC 6.4   HGB 11.9   HCT 36.2*   PLT See Reflexed IPF Result   LYMPHOPCT 21*   MONOPCT 6     BMP:   Recent Labs     01/18/22 2112   *   K 4.1   CL 94*   CO2 23   BUN 26*   CREATININE 1.34*     Hepatic Function Panel:   Recent Labs     01/18/22 2112   PROT 7.2   LABALBU 3.6   BILIDIR 0.17   IBILI 0.32   BILITOT 0.49   ALKPHOS 164*   ALT 36*   AST 39*     No results for input(s): RPR in the last 72 hours. No results for input(s): HIV in the last 72 hours. No results for input(s): BC in the last 72 hours. Lab Results   Component Value Date    MUCUS NOT REPORTED 01/08/2022    PH 7.20 10/05/2012    RBC 4.26 01/18/2022    RBC 4.41 02/03/2012    TRICHOMONAS NOT REPORTED 01/08/2022    WBC 6.4 01/18/2022    YEAST NOT REPORTED 01/08/2022    TURBIDITY Clear 01/08/2022     Lab Results   Component Value Date    CREATININE 1.34 01/18/2022    GLUCOSE 226 01/18/2022    GLUCOSE 380 05/14/2012       Medical Decision Making-Imaging:       Medical Decision Vtuvuj-Eoxulmew-Djefz:       Medical Decision Making-Other:     Note:  · Labs, medications, radiologic studies were reviewed with personal review of films  · Moderate Large amounts of data were reviewed  · Discussed with nursing Staff, Discharge planner  · Infection Control and Prevention measures reviewed  · All prior entries were reviewed  · Administer medications as ordered  · Prognosis: Guarded  · Discharge planning reviewed  · Follow up as outpatient. Thank you for allowing us to participate in the care of this patient. Please call with questions.     Digna Murillo MD     Secokamran Fuller participated in the evaluation of this patient    ATTESTATION:    I have discussed the case, including pertinent history and exam findings with the residents and students. I have seen and examined the patient and the key elements of the encounter have been performed by me. I was present when the student obtained his information or examined the patient. I have reviewed the laboratory data, other diagnostic studies and discussed them with the residents. I have updated the medical record where necessary. I agree with the assessment, plan and orders as documented by the resident/ student.     Trudell Duverney, MD.

## 2022-01-19 NOTE — ED TRIAGE NOTES
PT ARRIVED VIA EMS WITH REPORTS  N/V/D ONSET 2 DAYS  PTA  GCS 15  TEST + COVID  1 WEEK PTA PER PT  BILATERAL ESXTREMITY EDEMA HX CHF DENIES ANY SOB

## 2022-01-19 NOTE — ED PROVIDER NOTES
[facility]  ED  Emergency Department Encounter  EmergencyMedicineResident     This patient was seen during the COVID-19 crisis. There were limited resources and those resources we did have had to be conserved for the sickest of patients. Pt Name: Cassius Obrien  MRN: [id number]  [place] 1935  Date of evaluation: 1/18/22  PCP: Miley Sterling MD    [address] [place] [address]       Chief Complaint   Patient presents with    Nausea     ARRIVED VIA EMS WITH REPORTS OF N/V/D X 2 DAYS  TESTED + FOR COVID 1 WEEK PTA     Emesis    Diarrhea       HISTORY OF PRESENT ILLNESS  (Location/Symptom, Timing/Onset, Context/Setting, Quality, Duration, Modifying Factors, Severity.)      Cassius Obrien is a 82 y.o. female who present for evaluation of nausea vomiting decreased p.o. intake and weakness after being diagnosed with COVID-19 10 days ago. She was at home with her son and daughter. She states she is unable to keep fluids down and generally feels weak. The patient was recently admitted to the [facility] service for COVID 19 and discharged home 4 days ago. The patient told nursing staff she fell 2 weeks ago and now has right hip pain. PAST MEDICAL / SURGICAL /SOCIAL / FAMILY HISTORY      has a past medical history of Arthritis, Atrial flutter ([address] CAD (coronary artery disease), CHF (congestive heart failure) ([address] Diabetes ([address] Diabetes mellitus ([address] Hyperlipidemia, Hypertension, Psychiatric problem, Thyroid cancer ([address] and Thyroid disease. has a past surgical history that includes Coronary angioplasty with stent; Hysterectomy; Cholecystectomy; Thyroidectomy; Appendectomy; back surgery; Upper gastrointestinal endoscopy (4/14/2021); and Upper gastrointestinal endoscopy (4/14/2021).       Social History     Socioeconomic History    Marital status:      Spouse name: Not on file    Number of children: Not on file    Years of education: Not on file    Highest education level: Not on file   Occupational History    Not on file   Tobacco Use    Smoking status: Former Smoker    Smokeless tobacco: Never Used   Vaping Use    Vaping Use: Never used   Substance and Sexual Activity    Alcohol use: No    Drug use: No    Sexual activity: Not Currently   Other Topics Concern    Not on file   Social History Narrative    Not on file     Social Determinants of Health     Financial Resource Strain: Medium Risk    Difficulty of Paying Living Expenses: Somewhat hard   Food Insecurity: Food Insecurity Present    Worried About Running Out of Food in the Last Year: Sometimes true    Portia of Food in the Last Year: Often true   Transportation Needs:     Lack of Transportation (Medical): Not on file    Lack of Transportation (Non-Medical):  Not on file   Physical Activity:     Days of Exercise per Week: Not on file    Minutes of Exercise per Session: Not on file   Stress:     Feeling of Stress : Not on file   Social Connections:     Frequency of Communication with Friends and Family: Not on file    Frequency of Social Gatherings with Friends and Family: Not on file    Attends [employer] Services: Not on file    Active Member of [address] [place] [address] or Organizations: Not on file    Attends Club or Organization Meetings: Not on file    Marital Status: Not on file   Intimate Partner Violence:     Fear of Current or Ex-Partner: Not on file    Emotionally Abused: Not on file    Physically Abused: Not on file    Sexually Abused: Not on file   Housing Stability:     Unable to Pay for Housing in the Last Year: Not on file    Number of [place] in the Last Year: Not on file    Unstable Housing in the Last Year: Not on file       Family History   Problem Relation Age of Onset    Cancer Mother     Cancer Father     Cancer Brother     Diabetes Brother        Allergies:  Strawberry extract and Tape [adhesive tape]    Home Medications:  Prior to Admission medications    Medication Sig Start Date End Date Test 2 times a day & as needed for symptoms of irregular blood glucose. Dispense sufficient amount for indicated testing frequency plus additional to accommodate PRN testing needs. 3/29/21   Asad Rand MD   Continuous Blood Gluc  (FREESTYLE KERRY 14 DAY READER) CLAUDIO 1 Device by Does not apply route 4 times daily 11/30/20   Asad Rand MD   Continuous Blood Gluc Sensor (FREESTYLE KERRY 2 SENSOR SYSTM) MISC 4 Devices by Does not apply route every 7 days 11/30/20   Asad Rand MD   Lancets MISC 1 each by Does not apply route 2 times daily 7/5/19   Asad Rand MD   nystatin (MYCOSTATIN) 468111 UNIT/GM cream Apply topically 2 times daily. 5/7/18   Asad Rand MD   docusate sodium (COLACE) 100 MG capsule Take 1 capsule by mouth daily as needed for Constipation 8/18/17   Asad Rand MD   aspirin 81 MG chewable tablet Take 1 tablet by mouth daily 9/5/16   Chris Brady MD       REVIEW OF SYSTEMS    (2-9 systems for level 4, 10 or more forlevel 5)      Review of Systems   Constitutional: Positive for activity change, appetite change and fatigue. HENT: Negative. Eyes: Negative. Respiratory: Negative for cough and shortness of breath. Cardiovascular: Positive for leg swelling. Negative for chest pain. Gastrointestinal: Positive for diarrhea, nausea and vomiting. Genitourinary: Negative. Negative for dysuria, flank pain and urgency. Musculoskeletal:        Right hip pain   Skin: Positive for wound. Neurological: Negative for dizziness and weakness. Psychiatric/Behavioral: Negative for behavioral problems and confusion.        PHYSICAL EXAM   (up to 7 for level 4, 8 or more forlevel 5)      ED TRIAGE VITALS BP: (!) 116/52, Temp: 97.6 °F (36.4 °C), Pulse: 67, Resp: 20, SpO2: (!) 88 %    Vitals:    01/18/22 2127 01/18/22 2200 01/18/22 2245 01/18/22 2300   BP:  (!) 127/97 (!) 111/57 137/60   Pulse: 67 66 64 64   Resp:  15 16 16   Temp:       TempSrc:       SpO2:  90% 91% 94% Weight:       Height:             Physical Exam  Vitals and nursing note reviewed. Constitutional:       General: She is not in acute distress. Appearance: She is obese. She is not toxic-appearing. HENT:      Head: Normocephalic and atraumatic. Nose: Nose normal.      Mouth/Throat:      Mouth: Mucous membranes are moist.   Eyes:      Extraocular Movements: Extraocular movements intact. Pupils: Pupils are equal, round, and reactive to light. Cardiovascular:      Rate and Rhythm: Normal rate and regular rhythm. Pulses: Normal pulses. Heart sounds: Normal heart sounds. Pulmonary:      Effort: Pulmonary effort is normal.      Breath sounds: Normal breath sounds. Abdominal:      General: Abdomen is flat. Palpations: Abdomen is soft. Tenderness: There is no abdominal tenderness. There is no right CVA tenderness, left CVA tenderness or guarding. Musculoskeletal:         General: Swelling present. Cervical back: Normal range of motion. Right lower leg: Edema present. Left lower leg: Edema present. Comments: weaping wounds to the bl lower extremities   Skin:     General: Skin is warm and dry. Capillary Refill: Capillary refill takes less than 2 seconds. Neurological:      Mental Status: She is oriented to person, place, and time.       Comments: Tremor of the jaw    Psychiatric:         Mood and Affect: Mood normal.         Behavior: Behavior normal.           DIFFERENTIAL  DIAGNOSIS     PLAN (LABS / IMAGING / EKG):  Orders Placed This Encounter   Procedures    XR CHEST PORTABLE    XR HIP 2-3 VW W PELVIS RIGHT    CBC Auto Differential    Basic Metabolic Panel w/ Reflex to MG    Hepatic Function Panel    Troponin    Brain Natriuretic Peptide    Troponin    Immature Platelet Fraction    Inpatient consult to Hospitalist    EKG 12 Lead       MEDICATIONS ORDERED:  ED Medication Orders (From admission, onward)    Start Ordered     Status Ordering Provider    01/18/22 2115 01/18/22 2110  0.9 % sodium chloride bolus  ONCE         Last MAR action: Stopped - by MALIK DEGROOT on 01/18/22 at [address]          DDX: COVID 19, chf exacerbation, acs    DIAGNOSTIC RESULTS / EMERGENCY DEPARTMENT COURSE / MDM     IMPRESSION & INITIAL PLAN:  This is an 83 y/o F presenting to the ed today for evaluation of fatigue, weakness, n/v and abdomen pain s/p recent covid 19 infection. The patient reports that since being discharged home she has been unable to keep anything down and has become increasingly more fatigued. On exam she is obese with chronic venous stasis to the bl lower extremities. Scattered rhonchi noted to the lungs. CXR confirming new infiltrates. Troponin elevated from baseline. Will need wound care for lower extremities.          LABS:  Results for orders placed or performed during the hospital encounter of 01/18/22   CBC Auto Differential   Result Value Ref Range    WBC 6.4 3.5 - 11.3 k/uL    RBC 4.26 3.95 - 5.11 m/uL    Hemoglobin 11.9 11.9 - 15.1 g/dL    Hematocrit 36.2 (L) 36.3 - 47.1 %    MCV 85.0 82.6 - 102.9 fL    MCH 27.9 25.2 - 33.5 pg    MCHC 32.9 28.4 - 34.8 g/dL    RDW 14.1 11.8 - 14.4 %    Platelets See Reflexed IPF Result 138 - 453 k/uL    MPV NOT REPORTED 8.1 - 13.5 fL    NRBC Automated 0.0 0.0 per 100 WBC    Differential Type NOT REPORTED     Seg Neutrophils PENDING %    Lymphocytes PENDING %    Monocytes PENDING %    Eosinophils % PENDING %    Basophils PENDING %    Immature Granulocytes PENDING 0 %    Segs Absolute PENDING k/uL    Absolute Lymph # PENDING k/uL    Absolute Mono # PENDING k/uL    Absolute Eos # PENDING k/uL    Basophils Absolute PENDING 0.0 - 0.2 k/uL    Absolute Immature Granulocyte PENDING 0.00 - 0.30 k/uL    WBC Morphology NOT REPORTED     RBC Morphology NOT REPORTED     Platelet Estimate NOT REPORTED    Basic Metabolic Panel w/ Reflex to MG   Result Value Ref Range    Glucose 226 (H) 70 - 99 mg/dL BUN 26 (H) 8 - 23 mg/dL    CREATININE 1.34 (H) 0.50 - 0.90 mg/dL    Bun/Cre Ratio NOT REPORTED 9 - 20    Calcium 8.1 (L) 8.6 - 10.4 mg/dL    Sodium 132 (L) 135 - 144 mmol/L    Potassium 4.1 3.7 - 5.3 mmol/L    Chloride 94 (L) 98 - 107 mmol/L    CO2 23 20 - 31 mmol/L    Anion Gap 15 9 - 17 mmol/L    GFR Non-African American 38 (L) >60 mL/min    GFR  46 (L) >60 mL/min    GFR Comment          GFR Staging NOT REPORTED    Hepatic Function Panel   Result Value Ref Range    Albumin 3.6 3.5 - 5.2 g/dL    Alkaline Phosphatase 164 (H) 35 - 104 U/L    ALT 36 (H) 5 - 33 U/L    AST 39 (H) <32 U/L    Total Bilirubin 0.49 0.3 - 1.2 mg/dL    Bilirubin, Direct 0.17 <0.31 mg/dL    Bilirubin, Indirect 0.32 0.00 - 1.00 mg/dL    Total Protein 7.2 6.4 - 8.3 g/dL    Globulin NOT REPORTED 1.5 - 3.8 g/dL    Albumin/Globulin Ratio 1.0 1.0 - 2.5   Troponin   Result Value Ref Range    Troponin, High Sensitivity 31 (H) 0 - 14 ng/L    Troponin T NOT REPORTED <0.03 ng/mL    Troponin Interp NOT REPORTED    Brain Natriuretic Peptide   Result Value Ref Range    Pro- (H) <300 pg/mL    BNP Interpretation NOT REPORTED    Troponin   Result Value Ref Range    Troponin, High Sensitivity 27 (H) 0 - 14 ng/L    Troponin T NOT REPORTED <0.03 ng/mL    Troponin Interp NOT REPORTED    Immature Platelet Fraction   Result Value Ref Range    Platelet, Immature Fraction 3.9 1.1 - 10.3 %    Platelet, Fluorescence 187 138 - 453 k/uL       RADIOLOGY:  XR CHEST PORTABLE   Final Result   1. New bilateral pulmonary opacities, consistent with COVID-19 pulmonary   disease given the clinical history   2. Cardiomegaly         XR HIP 2-3 VW W PELVIS RIGHT    (Results Pending)       ECG:  No acute changes from previous.     All EKG's are interpreted by the Emergency Department Physician who either signsor Co-signs this chart in the absence of a cardiologist.    CONSULTS:  IP CONSULT TO HOSPITALIST    CRITICAL CARE:  See attending physician note    FINAL

## 2022-01-19 NOTE — CONSULTS
[facility] - Initial Consult Note COVID 19 Patient  Today's Date and Time: 1/19/2022, 3:40 PM    Impression :     · COVID 19 Confirmed Infection  · Was treated last week from 1/8-->1/10   · Covid tests: 1/8/21 positive. · CAD s/p PTCA/PEPITO to LAD in October 2021  · CHF with EF 45%  · Atrial flutter  · Diabetes  · Hyperliidemia  · HTN  · Thyroid cancer s/p thyroidectomy    Recommendations:   · Antibiotic treatment:  · Monitor off antibiotics  · Did receive Keflex for E. Coli UTI and finished course on 1/17. · Covid Rx:    · Remdesivir: Not indicated  · Decadron--yes, 6mg IV q24 hours for 10 doses  · Actemra--Not indicated  · Monoclonal antibodies--out of window      Medical Decision Making/Summary/Discussion:1/19/2022     · Patient admitted with previously diagnosed and treated COVID 19 infection  · Given Decadron for worsening COVID symptoms. Infection Control Recommendations   · Universal Precautions  · Airborne isolation  · Droplet Isolation  · Isolate until 1/29/22    Antimicrobial Stewardship Recommendations     · Simplification of therapy  · Targeted therapy    Coordination of Outpatient Care:   · Estimated Length of IV antimicrobials:TBD  · Patient will need Midline Catheter Insertion: TBD  · Patient will need PICC line Insertion: No  · Patient will need: Home IV , [place],  SNF,  LTAC:TBD  · Patient will need outpatient wound care:No    Chief complaint/reason for consultation:   · Concern for COVID infection      History of Present Illness:   Bir Costello is a 82y.o.-year-old female who was initially admitted on 1/18/2022. Patient seen at the request of Dr. Clarisa Walden. INITIAL HISTORY:    Patient presented to ED with worsening SOB. Last hospital visit    She was recently treated here for COVID, positive on 1/8/22.  She was asymptomatic at that time and did not receive Remdesivir due to BRIA, did not receive Decadron, and did not qualify for Actemra or monoclonal antibodies. She received her J+J vaccine in May 2021. Received booster upon discharge on 1/11. She was discharged on 1/10 with cephalexin due to E.coli UTI. She finished this regimen on 1/17/22. ED Course:    Presented to ED with SOB, vomiting, diarrhea, and failure to thrive at home. Hypoxic in ED 88% O2, put on 2L nasal cannula which improved saturation. Hemodynamically stable vitals  Afebrile    CXR 1/19:      CURRENT EVALUATION : 1/19/2022    Afebrile  Vitals notable for hypotension in 100's/50's. She is on Lasix for chronic heart failure. Patient exhibiting respiratory distress. yes  Respiratory secretions: No    Patient receiving 1.5L  supplemental oxygen. RR 18  02 sat 95%      NEWS Score: 0-4 Low risk group; 5-6: Medium risk group; 7 or above: High risk group  Parameters 3 2 1 0 1 2 3   Age    < 72   ? 65   RR ? 8  9-11 12-20  21-24 ? 25   O2 Sats ? 91 92-93 94-95 ? 96      Suppl O2  Yes  No      SBP ? 90  101-110 111-219   ? 220   HR ? 40  41-50 51-90  111-130 ? 131   Consciousness    Alert   Drowsiness, lethargy, or confusion   Temperature ? 35.0 C (95.0 F)  35.1-36.0 C 95.1-96.9 F 36.1-38.0 C 97.0-100.4 F 38.1-39.0 C 100.5-102.3 F ? 39.1 C ? 102.4 F      NEWS Score:   1-19-22: 5, medium risk    Overall Daily Picture:    worsening    Presence of secondary bacterial Infection:  No   Additional antibiotics: no    Labs, X rays reviewed: 1/19/2022    BUN: 26  Cr: 1.34    WBC: 6.4  Hb: 11.9  Plat:  187    Absolute Neutrophils:4.36  Absolute Lymphocytes:1.34  Neutrophil/Lymphocyte Ratio: 3.255 Low risk    CRP:   · 1-8-22: 17.1  · 1-19-22:   Ferritin: 243  LDH:     Pro Calcitonin:      Cultures:  Urine:  ·   Blood:  ·   Sputum :  ·   Wound:       CXR:        CAT:      Discussed with patient, RN, CC, IM.     I have personally reviewed the past medical history, past surgical history, medications, social history, and family history, and I have updated the database  Smokeless tobacco: Never Used   Vaping Use    Vaping Use: Never used   Substance and Sexual Activity    Alcohol use: No    Drug use: No    Sexual activity: Not Currently   Other Topics Concern    Not on file   Social History Narrative    Not on file     Social Determinants of Health     Financial Resource Strain: Medium Risk    Difficulty of Paying Living Expenses: Somewhat hard   Food Insecurity: Food Insecurity Present    Worried About Running Out of Food in the Last Year: Sometimes true    Portia of Food in the Last Year: Often true   Transportation Needs:     Lack of Transportation (Medical): Not on file    Lack of Transportation (Non-Medical): Not on file   Physical Activity:     Days of Exercise per Week: Not on file    Minutes of Exercise per Session: Not on file   Stress:     Feeling of Stress : Not on file   Social Connections:     Frequency of Communication with Friends and Family: Not on file    Frequency of Social Gatherings with Friends and Family: Not on file    Attends Pent[employer]ostal[employer] Services: Not on file    Active Member of [address] [place] [address] or Organizations: Not on file    Attends Club or Organization Meetings: Not on file    Marital Status: Not on file   Intimate Partner Violence:     Fear of Current or Ex-Partner: Not on file    Emotionally Abused: Not on file    Physically Abused: Not on file    Sexually Abused: Not on file   Housing Stability:     Unable to Pay for Housing in the Last Year: Not on file    Number of [place] in the Last Year: Not on file    Unstable Housing in the Last Year: Not on file       Family History:     Family History   Problem Relation Age of Onset    Cancer Mother     Cancer Father     Cancer Brother     Diabetes Brother         Allergies:   Strawberry extract and Tape [adhesive tape]     Review of Systems:       Constitutional: No fevers or chills. No systemic complaints  Head: No headaches  Eyes: No double vision or blurry vision.  No -Laboratory:   I have independently reviewed/ordered the following labs:    CBC with Differential:   Recent Labs     01/18/22 2112   WBC 6.4   HGB 11.9   HCT 36.2*   PLT See Reflexed IPF Result   LYMPHOPCT 21*   MONOPCT 6     BMP:   Recent Labs     01/18/22 2112   *   K 4.1   CL 94*   CO2 23   BUN 26*   CREATININE 1.34*     Hepatic Function Panel:   Recent Labs     01/18/22 2112   PROT 7.2   LABALBU 3.6   BILIDIR 0.17   IBILI 0.32   BILITOT 0.49   ALKPHOS 164*   ALT 36*   AST 39*     No results for input(s): RPR in the last 72 hours. No results for input(s): HIV in the last 72 hours. No results for input(s): BC in the last 72 hours. Lab Results   Component Value Date    MUCUS NOT REPORTED 01/08/2022    PH 7.20 10/05/2012    RBC 4.26 01/18/2022    RBC 4.41 02/03/2012    TRICHOMONAS NOT REPORTED 01/08/2022    WBC 6.4 01/18/2022    YEAST NOT REPORTED 01/08/2022    TURBIDITY Clear 01/08/2022     Lab Results   Component Value Date    CREATININE 1.34 01/18/2022    GLUCOSE 226 01/18/2022    GLUCOSE 380 05/14/2012       Medical Decision Making-Imaging:       Medical Decision [id number]:       Medical Decision Making-Other:     Note:  · Labs, medications, radiologic studies were reviewed with personal review of films  · Moderate Large amounts of data were reviewed  · Discussed with nursing Staff, Discharge planner  · Infection Control and Prevention measures reviewed  · All prior entries were reviewed  · Administer medications as ordered  · Prognosis: Guarded  · Discharge planning reviewed  · Follow up as outpatient. Thank you for allowing us to participate in the care of this patient. Please call with questions. Daniella Madrigal, medical student, participated in the care of this patient.      Waldo Dakin, MD

## 2022-01-19 NOTE — ED PROVIDER NOTES
[address] [facility]     Emergency Department     Faculty Attestation        I performed a history and physical examination of the patient and discussed management with the resident. I reviewed the residents note and agree with the documented findings and plan of care. Any areas of disagreement are noted on the chart. I was personally present for the key portions of any procedures. I have documented in the chart those procedures where I was not present during the key portions. I have reviewed the emergency nurses triage note. I agree with the chief complaint, past medical history, past surgical history, allergies, medications, social and family history as documented unless otherwise noted below. For mid-level providers such as nurse practitioners as well as physicians assistants:    I have personally seen and evaluated the patient. I find the patient's history and physical exam are consistent with NP/PA documentation. I agree with the care provided, treatment rendered, disposition, & follow-up plan. Additional findings are as noted. Vital Signs: Temp 97.6 °F (36.4 °C) (Oral)   PCP:  Pérez Martins MD    Pertinent Comments:     Brought in by EMS for nausea, vomiting and diarrhea for the past 2 days was recently admitted for COVID without the symptoms 2 days ago. Denies any chest pain or shortness of breath she does appear slightly dehydrated Marked edema to the lower extremities noted.       Critical Care  None          German Orellana MD    Attending Emergency Medicine Physician              Deidra Serna MD  01/18/22 [id number]

## 2022-01-19 NOTE — CARE COORDINATION
Patient/family seen: No: covid isolation       Informed patient/family of [facility] Medical Bundle Program with potential outreach by either Care Transitions Team or [facility]Health Team based on hospital admission and location.        [facility] Notification Letter given: Yes mailed         Current discharge plan:

## 2022-01-20 PROBLEM — Z79.4 TYPE 2 DIABETES MELLITUS WITHOUT COMPLICATION, WITH LONG-TERM CURRENT USE OF INSULIN (HCC): Status: ACTIVE | Noted: 2017-07-04

## 2022-01-20 PROBLEM — I50.33 ACUTE ON CHRONIC DIASTOLIC CONGESTIVE HEART FAILURE (HCC): Status: ACTIVE | Noted: 2017-02-20

## 2022-01-20 LAB
ABSOLUTE EOS #: <0.03 K/UL (ref 0–0.44)
ABSOLUTE IMMATURE GRANULOCYTE: 0.19 K/UL (ref 0–0.3)
ABSOLUTE LYMPH #: 1.1 K/UL (ref 1.1–3.7)
ABSOLUTE MONO #: 0.42 K/UL (ref 0.1–1.2)
ALBUMIN SERPL-MCNC: 3 G/DL (ref 3.5–5.2)
ALBUMIN/GLOBULIN RATIO: 0.9 (ref 1–2.5)
ALP BLD-CCNC: 145 U/L (ref 35–104)
ALT SERPL-CCNC: 24 U/L (ref 5–33)
ANION GAP SERPL CALCULATED.3IONS-SCNC: 14 MMOL/L (ref 9–17)
AST SERPL-CCNC: 23 U/L
BASOPHILS # BLD: 0 % (ref 0–2)
BASOPHILS ABSOLUTE: <0.03 K/UL (ref 0–0.2)
BILIRUB SERPL-MCNC: 0.36 MG/DL (ref 0.3–1.2)
BUN BLDV-MCNC: 24 MG/DL (ref 8–23)
BUN/CREAT BLD: ABNORMAL (ref 9–20)
C-REACTIVE PROTEIN: 59.7 MG/L (ref 0–5)
C-REACTIVE PROTEIN: 66.9 MG/L (ref 0–5)
CALCIUM SERPL-MCNC: 8 MG/DL (ref 8.6–10.4)
CHLORIDE BLD-SCNC: 98 MMOL/L (ref 98–107)
CO2: 24 MMOL/L (ref 20–31)
CREAT SERPL-MCNC: 1.18 MG/DL (ref 0.5–0.9)
D-DIMER QUANTITATIVE: 1.39 MG/L FEU
DIFFERENTIAL TYPE: ABNORMAL
EOSINOPHILS RELATIVE PERCENT: 0 % (ref 1–4)
FIBRINOGEN: 369 MG/DL (ref 140–420)
GFR AFRICAN AMERICAN: 53 ML/MIN
GFR NON-AFRICAN AMERICAN: 44 ML/MIN
GFR SERPL CREATININE-BSD FRML MDRD: ABNORMAL ML/MIN/{1.73_M2}
GFR SERPL CREATININE-BSD FRML MDRD: ABNORMAL ML/MIN/{1.73_M2}
GLUCOSE BLD-MCNC: 201 MG/DL (ref 65–105)
GLUCOSE BLD-MCNC: 219 MG/DL (ref 65–105)
GLUCOSE BLD-MCNC: 239 MG/DL (ref 70–99)
GLUCOSE BLD-MCNC: 310 MG/DL (ref 65–105)
GLUCOSE BLD-MCNC: 333 MG/DL (ref 65–105)
GLUCOSE BLD-MCNC: 349 MG/DL (ref 65–105)
HCT VFR BLD CALC: 34.1 % (ref 36.3–47.1)
HEMOGLOBIN: 11.2 G/DL (ref 11.9–15.1)
IMMATURE GRANULOCYTES: 3 %
INR BLD: 1.1
LACTATE DEHYDROGENASE: 214 U/L (ref 135–214)
LACTIC ACID, WHOLE BLOOD: 1.4 MMOL/L (ref 0.7–2.1)
LACTIC ACID: NORMAL MMOL/L
LV EF: 54 %
LVEF MODALITY: NORMAL
LYMPHOCYTES # BLD: 20 % (ref 24–43)
MCH RBC QN AUTO: 27.8 PG (ref 25.2–33.5)
MCHC RBC AUTO-ENTMCNC: 32.8 G/DL (ref 28.4–34.8)
MCV RBC AUTO: 84.6 FL (ref 82.6–102.9)
MONOCYTES # BLD: 8 % (ref 3–12)
NRBC AUTOMATED: 0 PER 100 WBC
PARTIAL THROMBOPLASTIN TIME: 31.9 SEC (ref 20.5–30.5)
PDW BLD-RTO: 13.8 % (ref 11.8–14.4)
PLATELET # BLD: 185 K/UL (ref 138–453)
PLATELET ESTIMATE: ABNORMAL
PMV BLD AUTO: 10.8 FL (ref 8.1–13.5)
POTASSIUM SERPL-SCNC: 4.2 MMOL/L (ref 3.7–5.3)
PROCALCITONIN: 0.08 NG/ML
PROTHROMBIN TIME: 11.7 SEC (ref 9.1–12.3)
RBC # BLD: 4.03 M/UL (ref 3.95–5.11)
RBC # BLD: ABNORMAL 10*6/UL
SEG NEUTROPHILS: 69 % (ref 36–65)
SEGMENTED NEUTROPHILS ABSOLUTE COUNT: 3.88 K/UL (ref 1.5–8.1)
SODIUM BLD-SCNC: 136 MMOL/L (ref 135–144)
TOTAL PROTEIN: 6.4 G/DL (ref 6.4–8.3)
TROPONIN INTERP: ABNORMAL
TROPONIN T: ABNORMAL NG/ML
TROPONIN, HIGH SENSITIVITY: 25 NG/L (ref 0–14)
VITAMIN D 25-HYDROXY: 22.4 NG/ML (ref 30–100)
WBC # BLD: 5.6 K/UL (ref 3.5–11.3)
WBC # BLD: ABNORMAL 10*3/UL

## 2022-01-20 PROCEDURE — 97530 THERAPEUTIC ACTIVITIES: CPT

## 2022-01-20 PROCEDURE — 84484 ASSAY OF TROPONIN QUANT: CPT

## 2022-01-20 PROCEDURE — 99231 SBSQ HOSP IP/OBS SF/LOW 25: CPT | Performed by: INTERNAL MEDICINE

## 2022-01-20 PROCEDURE — 6370000000 HC RX 637 (ALT 250 FOR IP)

## 2022-01-20 PROCEDURE — 2700000000 HC OXYGEN THERAPY PER DAY

## 2022-01-20 PROCEDURE — 83036 HEMOGLOBIN GLYCOSYLATED A1C: CPT

## 2022-01-20 PROCEDURE — 97162 PT EVAL MOD COMPLEX 30 MIN: CPT

## 2022-01-20 PROCEDURE — 2580000003 HC RX 258: Performed by: NURSE PRACTITIONER

## 2022-01-20 PROCEDURE — 99232 SBSQ HOSP IP/OBS MODERATE 35: CPT | Performed by: INTERNAL MEDICINE

## 2022-01-20 PROCEDURE — 85610 PROTHROMBIN TIME: CPT

## 2022-01-20 PROCEDURE — 6370000000 HC RX 637 (ALT 250 FOR IP): Performed by: NURSE PRACTITIONER

## 2022-01-20 PROCEDURE — 80053 COMPREHEN METABOLIC PANEL: CPT

## 2022-01-20 PROCEDURE — 94761 N-INVAS EAR/PLS OXIMETRY MLT: CPT

## 2022-01-20 PROCEDURE — 6360000002 HC RX W HCPCS: Performed by: STUDENT IN AN ORGANIZED HEALTH CARE EDUCATION/TRAINING PROGRAM

## 2022-01-20 PROCEDURE — 82306 VITAMIN D 25 HYDROXY: CPT

## 2022-01-20 PROCEDURE — 1200000000 HC SEMI PRIVATE

## 2022-01-20 PROCEDURE — 97535 SELF CARE MNGMENT TRAINING: CPT

## 2022-01-20 PROCEDURE — 6370000000 HC RX 637 (ALT 250 FOR IP): Performed by: STUDENT IN AN ORGANIZED HEALTH CARE EDUCATION/TRAINING PROGRAM

## 2022-01-20 PROCEDURE — 85384 FIBRINOGEN ACTIVITY: CPT

## 2022-01-20 PROCEDURE — 6370000000 HC RX 637 (ALT 250 FOR IP): Performed by: INTERNAL MEDICINE

## 2022-01-20 PROCEDURE — 97166 OT EVAL MOD COMPLEX 45 MIN: CPT

## 2022-01-20 PROCEDURE — 85025 COMPLETE CBC W/AUTO DIFF WBC: CPT

## 2022-01-20 PROCEDURE — 83605 ASSAY OF LACTIC ACID: CPT

## 2022-01-20 PROCEDURE — 82947 ASSAY GLUCOSE BLOOD QUANT: CPT

## 2022-01-20 PROCEDURE — 86140 C-REACTIVE PROTEIN: CPT

## 2022-01-20 PROCEDURE — 6360000002 HC RX W HCPCS: Performed by: NURSE PRACTITIONER

## 2022-01-20 PROCEDURE — 36415 COLL VENOUS BLD VENIPUNCTURE: CPT

## 2022-01-20 PROCEDURE — 85730 THROMBOPLASTIN TIME PARTIAL: CPT

## 2022-01-20 PROCEDURE — 93306 TTE W/DOPPLER COMPLETE: CPT

## 2022-01-20 PROCEDURE — 85379 FIBRIN DEGRADATION QUANT: CPT

## 2022-01-20 RX ORDER — INSULIN GLARGINE 100 [IU]/ML
30 INJECTION, SOLUTION SUBCUTANEOUS 2 TIMES DAILY
Status: DISCONTINUED | OUTPATIENT
Start: 2022-01-20 | End: 2022-01-21 | Stop reason: HOSPADM

## 2022-01-20 RX ADMIN — INSULIN LISPRO 4 UNITS: 100 INJECTION, SOLUTION INTRAVENOUS; SUBCUTANEOUS at 21:37

## 2022-01-20 RX ADMIN — ACETAMINOPHEN 650 MG: 325 TABLET ORAL at 21:18

## 2022-01-20 RX ADMIN — Medication 2 PUFF: at 08:32

## 2022-01-20 RX ADMIN — Medication 2 PUFF: at 21:25

## 2022-01-20 RX ADMIN — FERROUS SULFATE TAB EC 325 MG (65 MG FE EQUIVALENT) 325 MG: 325 (65 FE) TABLET DELAYED RESPONSE at 08:31

## 2022-01-20 RX ADMIN — HEPARIN SODIUM 5000 UNITS: 5000 INJECTION INTRAVENOUS; SUBCUTANEOUS at 05:55

## 2022-01-20 RX ADMIN — ATORVASTATIN CALCIUM 80 MG: 80 TABLET, FILM COATED ORAL at 08:31

## 2022-01-20 RX ADMIN — ACETAMINOPHEN 650 MG: 325 TABLET ORAL at 04:52

## 2022-01-20 RX ADMIN — HEPARIN SODIUM 5000 UNITS: 5000 INJECTION INTRAVENOUS; SUBCUTANEOUS at 15:00

## 2022-01-20 RX ADMIN — Medication 2 PUFF: at 08:33

## 2022-01-20 RX ADMIN — SERTRALINE 100 MG: 50 TABLET, FILM COATED ORAL at 08:31

## 2022-01-20 RX ADMIN — ASPIRIN 81 MG: 81 TABLET, CHEWABLE ORAL at 08:31

## 2022-01-20 RX ADMIN — ALOGLIPTIN 12.5 MG: 12.5 TABLET, FILM COATED ORAL at 08:32

## 2022-01-20 RX ADMIN — LEVOTHYROXINE SODIUM 150 MCG: 75 TABLET ORAL at 08:31

## 2022-01-20 RX ADMIN — FERROUS SULFATE TAB EC 325 MG (65 MG FE EQUIVALENT) 325 MG: 325 (65 FE) TABLET DELAYED RESPONSE at 17:14

## 2022-01-20 RX ADMIN — DEXAMETHASONE SODIUM PHOSPHATE 6 MG: 10 INJECTION INTRAMUSCULAR; INTRAVENOUS at 12:20

## 2022-01-20 RX ADMIN — INSULIN LISPRO 4 UNITS: 100 INJECTION, SOLUTION INTRAVENOUS; SUBCUTANEOUS at 12:30

## 2022-01-20 RX ADMIN — INSULIN GLARGINE 30 UNITS: 100 INJECTION, SOLUTION SUBCUTANEOUS at 22:40

## 2022-01-20 RX ADMIN — Medication 2000 UNITS: at 08:31

## 2022-01-20 RX ADMIN — INSULIN LISPRO 8 UNITS: 100 INJECTION, SOLUTION INTRAVENOUS; SUBCUTANEOUS at 17:40

## 2022-01-20 RX ADMIN — METOPROLOL TARTRATE 12.5 MG: 25 TABLET ORAL at 21:22

## 2022-01-20 RX ADMIN — Medication 2 PUFF: at 12:26

## 2022-01-20 RX ADMIN — Medication 2 PUFF: at 17:14

## 2022-01-20 RX ADMIN — FUROSEMIDE 40 MG: 10 INJECTION, SOLUTION INTRAMUSCULAR; INTRAVENOUS at 08:32

## 2022-01-20 RX ADMIN — HEPARIN SODIUM 5000 UNITS: 5000 INJECTION INTRAVENOUS; SUBCUTANEOUS at 21:24

## 2022-01-20 RX ADMIN — SODIUM CHLORIDE, PRESERVATIVE FREE 10 ML: 5 INJECTION INTRAVENOUS at 08:33

## 2022-01-20 RX ADMIN — Medication 500 MCG: at 08:31

## 2022-01-20 RX ADMIN — INSULIN LISPRO 4 UNITS: 100 INJECTION, SOLUTION INTRAVENOUS; SUBCUTANEOUS at 08:30

## 2022-01-20 RX ADMIN — SODIUM CHLORIDE, PRESERVATIVE FREE 10 ML: 5 INJECTION INTRAVENOUS at 21:23

## 2022-01-20 RX ADMIN — CLOPIDOGREL 75 MG: 75 TABLET, FILM COATED ORAL at 08:31

## 2022-01-20 ASSESSMENT — PAIN SCALES - GENERAL
PAINLEVEL_OUTOF10: 2
PAINLEVEL_OUTOF10: 0
PAINLEVEL_OUTOF10: 0

## 2022-01-20 NOTE — PROGRESS NOTES
[facility] - Progress Note   COVID 19 Patient  Today's Date and Time: 1/20/2022, 4:26 PM    Impression :     · COVID 19 Confirmed Infection  · Was treated last week from 1/8-->1/10   · Covid tests: 1/8/21 positive. · CAD s/p PTCA/PEPITO to LAD in October 2021  · CHF with EF 45%  · Atrial flutter  · Diabetes  · Hyperliidemia  · HTN  · Thyroid cancer s/p thyroidectomy    Recommendations:   · Antibiotic treatment:  · Monitor off antibiotics  · Did receive Keflex for E. Coli UTI and finished course on 1/17. · Covid Rx:    · Remdesivir: Not indicated  · Decadron--yes, 6mg IV q24 hours for 10 doses  · Actemra--Not indicated  · Monoclonal antibodies--out of window      Medical Decision Making/Summary/Discussion:1/20/2022     · Patient admitted with previously diagnosed and treated COVID 19 infection  · Given Decadron for worsening COVID symptoms. Infection Control Recommendations   · Universal Precautions  · Airborne isolation  · Droplet Isolation  · Isolate until 1/29/22    Antimicrobial Stewardship Recommendations     · Simplification of therapy  · Targeted therapy    Coordination of Outpatient Care:   · Estimated Length of IV antimicrobials:TBD  · Patient will need Midline Catheter Insertion: TBD  · Patient will need PICC line Insertion: No  · Patient will need: Home IV , [place],  SNF,  LTAC:TBD  · Patient will need outpatient wound care:No    Chief complaint/reason for consultation:   · Concern for COVID infection      History of Present Illness:   Kaity Leger is a 82y.o.-year-old female who was initially admitted on 1/18/2022. Patient seen at the request of Dr. Juan Pablo Canales. INITIAL HISTORY:    Patient presented to ED with worsening SOB. Last hospital visit    She was recently treated here for COVID, positive on 1/8/22.  She was asymptomatic at that time and did not receive Remdesivir due to BRIA, did not receive Decadron, and did not qualify for Actemra or monoclonal antibodies. She received her J+J vaccine in May 2021. Received booster upon discharge on 1/11. She was discharged on 1/10 with cephalexin due to E.coli UTI. She finished this regimen on 1/17/22. ED Course:    Presented to ED with SOB, vomiting, diarrhea, and failure to thrive at home. Hypoxic in ED 88% O2, put on 2L nasal cannula which improved saturation. Hemodynamically stable vitals  Afebrile    CXR 1/19:      CURRENT EVALUATION : 1/20/2022    Afebrile                                                Vitals notable for hypotension in 100's/50's. She is on Lasix for chronic heart failure. Patient says she is doing well  Denies SOB, cough, chills    Patient exhibiting respiratory distress. No  Respiratory secretions: No    Patient receiving 1.5L-->1.0 L NC  supplemental oxygen. RR 18-->16  02 sat 95%-->93%    NEWS Score: 0-4 Low risk group; 5-6: Medium risk group; 7 or above: High risk group  Parameters 3 2 1 0 1 2 3   Age    < 65   ? 65   RR ? 8  9-11 12-20  21-24 ? 25   O2 Sats ? 91 92-93 94-95 ? 96      Suppl O2  Yes  No      SBP ? 90  101-110 111-219   ? 220   HR ? 40  41-50 51-90  111-130 ? 131   Consciousness    Alert   Drowsiness, lethargy, or confusion   Temperature ? 35.0 C (95.0 F)  35.1-36.0 C 95.1-96.9 F 36.1-38.0 C 97.0-100.4 F 38.1-39.0 C 100.5-102.3 F ? 39.1 C ? 102.4 F      NEWS Score:   1-19-22: 5, medium risk    Overall Daily Picture:    worsening    Presence of secondary bacterial Infection:  No   Additional antibiotics: no    Labs, X rays reviewed: 1/20/2022    BUN: 26-->24  Cr: 1.34-->1.18    WBC: 6.4-->5.6  Hb: 11.9-->11.2  Plat:  187-->185    Absolute Neutrophils:4.36  Absolute Lymphocytes:1.34  Neutrophil/Lymphocyte Ratio: 3.255 Low risk    CRP:   · 1-8-22: 17.1  · 1-19-22:   Ferritin: 243  LDH:     Pro Calcitonin:      Cultures:  Urine:  ·   Blood:  ·   Sputum :  ·   Wound:       CXR:        CAT:      Discussed with patient, RN, CC, IM.     I have personally reviewed the past medical history, past surgical history, medications, social history, and family history, and I have updated the database accordingly.   Past Medical History:     Past Medical History:   Diagnosis Date    Arthritis     At[facility] flutter ([facility]     CAD (coronary artery disease)     CHF (congestive heart failure) (HCC)     [facility]     Diabetes mellitus ([facility]     Hyperlipidemia     Hypertension     Psychiatric problem     Thyroid cancer ([facility]     S/P thyroidectomy; on synthroid    Thyroid disease     hypothyroid    Type 2 diabetes mellitus without complication, with long-term current use of insulin ([facility] 7/4/2017       Past Surgical  History:     Past Surgical History:   Procedure Laterality Date    APPENDECTOMY      BACK SURGERY      CHOLECYSTECTOMY      CORONARY ANGIOPLASTY WITH STENT PLACEMENT      HYSTERECTOMY      THYROIDECTOMY      UPPER GASTROINTESTINAL ENDOSCOPY  4/14/2021    EGD BIOPSY performed by Saige Del Angel MD at [address] [place] [address] ENDOSCOPY  4/14/2021    EGD CONTROL HEMORRHAGE performed by Saige Del Angel MD at \[facility]\"" Endoscopy       Medications:      insulin glargine  30 Units SubCUTAneous BID    aspirin  81 mg Oral Daily    atorvastatin  80 mg Oral Daily    clopidogrel  75 mg Oral Daily    vitamin B-12  500 mcg Oral Daily    ferrous sulfate  325 mg Oral BID WC    levothyroxine  150 mcg Oral Daily    metoprolol tartrate  12.5 mg Oral BID    sertraline  100 mg Oral Daily    alogliptin  12.5 mg Oral Daily    sodium chloride flush  5-40 mL IntraVENous 2 times per day    heparin (porcine)  5,000 Units SubCUTAneous 3 times per day    Vitamin D  2,000 Units Oral Daily    dexamethasone  6 mg IntraVENous Q24H    furosemide  40 mg IntraVENous Daily    insulin lispro  0-12 Units SubCUTAneous TID WC    insulin lispro  0-6 Units SubCUTAneous Nightly    albuterol sulfate HFA  2 puff Inhalation 4x daily    tiotropium  2 puff Inhalation Daily       Social History:     Social History     Socioeconomic History    Marital status:      Spouse name: Not on file    Number of children: Not on file    Years of education: Not on file    Highest education level: Not on file   Occupational History    Not on file   Tobacco Use    Smoking status: Former Smoker    Smokeless tobacco: Never Used   Vaping Use    Vaping Use: Never used   Substance and Sexual Activity    Alcohol use: No    Drug use: No    Sexual activity: Not Currently   Other Topics Concern    Not on file   Social History Narrative    Not on file     Social Determinants of Health     Financial Resource Strain: Medium Risk    Difficulty of Paying Living Expenses: Somewhat hard   Food Insecurity: Food Insecurity Present    Worried About Running Out of Food in the Last Year: Sometimes true    Portia of Food in the Last Year: Often true   Transportation Needs:     Lack of Transportation (Medical): Not on file    Lack of Transportation (Non-Medical):  Not on file   Physical Activity:     Days of Exercise per Week: Not on file    Minutes of Exercise per Session: Not on file   Stress:     Feeling of Stress : Not on file   Social Connections:     Frequency of Communication with Friends and Family: Not on file    Frequency of Social Gatherings with Friends and Family: Not on file    Attends Cheondoism Services: Not on file    Active Member of [address] [place] [address] [employer] or Organizations: Not on file    Attends Club or Organization Meetings: Not on file    Marital Status: Not on file   Intimate Partner Violence:     Fear of Current or Ex-Partner: Not on file    Emotionally Abused: Not on file    Physically Abused: Not on file    Sexually Abused: Not on file   Housing Stability:     Unable to Pay for Housing in the Last Year: Not on file    Number of [place] in the Last Year: Not on file    Unstable Housing in the Last Year: Not on file       Family History:     Family History   Problem Relation Age of Onset    Cancer Mother     Cancer Father     Cancer Brother     Diabetes Brother         Allergies:   Strawberry extract and Tape [adhesive tape]     Review of Systems:       Constitutional: No fevers or chills. No systemic complaints  Head: No headaches  Eyes: No double vision or blurry vision. No conjunctival inflammation. ENT: No sore throat or runny nose. . No hearing loss, tinnitus or vertigo. Cardiovascular: No chest pain or palpitations. Shortness of breath. No GIRON  Lung: Shortness of breath, no cough. No sputum production  Abdomen: No nausea, vomiting, diarrhea, or abdominal pain. Tereso Miyamoto No cramps. Genitourinary: No increased urinary frequency, or dysuria. No hematuria. No suprapubic or CVA pain  Musculoskeletal: No muscle aches or pains. No joint effusions or deformities. Swelling and L calf pain. Hematologic: No bleeding or bruising. Neurologic: No headache, weakness, numbness, or tingling. Integument: No rash, no ulcers. Psychiatric: No depression. Endocrine: No polyuria, no polydipsia, no polyphagia. Physical Examination :     Patient Vitals for the past 8 hrs:   BP Temp Temp src Pulse Resp SpO2   01/20/22 0829 (!) 119/58 98 °F (36.7 °C) Oral 59 16 93 %     General Appearance: Awake, alert, and in no apparent distress  Head:  Normocephalic, no trauma  Eyes: Pupils equal, round, reactive to light; sclera anicteric; conjunctivae pink. No embolic phenomena. ENT: Oropharynx clear, without erythema, exudate, or thrush. No tenderness of sinuses. Mouth/throat: mucosa pink and moist. No lesions. Dentition in good repair. Neck:Supple, without lymphadenopathy. Thyroid normal, No bruits. Pulmonary/Chest: Clear to auscultation, without wheezes, rales, or rhonchi. No dullness to percussion. Cardiovascular: Regular rate and rhythm without murmurs, rubs, or gallops. Abdomen: Soft, non tender.  Bowel sounds normal. No organomegaly  All four Extremities: No cyanosis, clubbing, edema, or effusions. +4/4 pitting edema bilaterally. L calf pain present upon squeezing of L calf. Neurologic: No gross sensory or motor deficits. Skin: Warm and dry with good turgor. Stasis dermatitis rashes present bilaterally. Medical Decision Making -Laboratory:   I have independently reviewed/ordered the following labs:    CBC with Differential:   Recent Labs     01/18/22 2112 01/20/22  0613   WBC 6.4 5.6   HGB 11.9 11.2*   HCT 36.2* 34.1*   PLT See Reflexed IPF Result 185   LYMPHOPCT 21* 20*   MONOPCT 6 8     BMP:   Recent Labs     01/18/22 2112 01/20/22  0613   * 136   K 4.1 4.2   CL 94* 98   CO2 23 24   BUN 26* 24*   CREATININE 1.34* 1.18*     Hepatic Function Panel:   Recent Labs     01/18/22 2112 01/20/22  0613   PROT 7.2 6.4   LABALBU 3.6 3.0*   BILIDIR 0.17  --    IBILI 0.32  --    BILITOT 0.49 0.36   ALKPHOS 164* 145*   ALT 36* 24   AST 39* 23     No results for input(s): RPR in the last 72 hours. No results for input(s): HIV in the last 72 hours. No results for input(s): BC in the last 72 hours.   Lab Results   Component Value Date    MUCUS NOT REPORTED 01/08/2022    PH 7.20 10/05/2012    RBC 4.03 01/20/2022    RBC 4.41 02/03/2012    TRICHOMONAS NOT REPORTED 01/08/2022    WBC 5.6 01/20/2022    YEAST NOT REPORTED 01/08/2022    TURBIDITY Clear 01/08/2022     Lab Results   Component Value Date    CREATININE 1.18 01/20/2022    GLUCOSE 239 01/20/2022    GLUCOSE 380 05/14/2012       Medical Decision Making-Imaging:       Medical Decision [id number]:       Medical Decision Making-Other:     Note:  · Labs, medications, radiologic studies were reviewed with personal review of films  · Moderate Large amounts of data were reviewed  · Discussed with nursing Staff, Discharge planner  · Infection Control and Prevention measures reviewed  · All prior entries were reviewed  · Administer medications as ordered  · Prognosis: Guarded  · Discharge planning reviewed  · Follow up as outpatient. Thank you for allowing us to participate in the care of this patient. Please call with questions. MD Dinesh Morris participated in the evaluation of this patient. ATTESTATION:    I have discussed the case, including pertinent history and exam findings with the residents and students. I have seen and examined the patient and the key elements of the encounter have been performed by me. I was present when the student obtained his information or examined the patient. I have reviewed the laboratory data, other diagnostic studies and discussed them with the residents. I have updated the medical record where necessary. I agree with the assessment, plan and orders as documented by the resident/ student.     Luis Enrique Simmons MD.

## 2022-01-20 NOTE — PROGRESS NOTES
Occupational Therapy   Occupational Therapy Initial Assessment  Date: 2022   Patient Name: Maylin Herrera  MRN: [id number]     : 1935     Chief Complaint   Patient presents with    Nausea     ARRIVED VIA EMS WITH REPORTS OF N/V/D X 2 DAYS  TESTED + FOR COVID 1 WEEK PTA     Emesis    Diarrhea       Date of Service: 2022    Discharge Recommendations:  Patient would benefit from continued therapy after discharge Patient is currently unsafe to return to prior living arrangements without 24 hour assistance. OT Equipment Recommendations  Equipment Needed: Yes  Equipment recommendations listed below are based on what the patient would need if they were able to return to prior living arrangements at the time of discharge. Mobility Devices: ADL Assistive Devices  ADL Assistive Devices: Long-handled Shoe Horn;Long-handled Sponge;Reacher;Sock-Aid Hard    Assessment   Performance deficits / Impairments: Decreased functional mobility ; Decreased ADL status; Decreased balance;Decreased safe awareness;Decreased cognition;Decreased endurance;Decreased high-level IADLs  Assessment: Pt agreeable to OT eval this date. Pt completed bed mobility requiring Min-Mod A for trunk and BLE progression. Pt utilizing bed rails throughout with HOB elevated to simulate bed mobility at home as pt sleeps in hospital bed. Pt completed 2 trials of functional transfers and stand-step, pivot from EOB <> recliner initially requiring Min A X2 with bilateral UE support and progressing to [address]. Pt currently requires Max A for LB dressing tasks d/t decreased dynamic sitting/reaching and BLE edema. Pt requires Max A for toileting tasks for brief change and pericare.  Pt exhibits impairments in endurance, balance, and cognition/safety awareness and will benefit from continued OT services to increase independence and safety with ADLs/IADLs and functional transfers/mobility  Prognosis: Good  Decision Making: Medium Complexity  Patient Education: Pt ed on OT role, OT POC, safety awareness, transfer training, standing balance training, posture, and importance of continued OT. Pt verbalized good understanding however further education is recommended to ensure carryover  REQUIRES OT FOLLOW UP: Yes  Activity Tolerance  Activity Tolerance: Patient Tolerated treatment well  Safety Devices  Safety Devices in place: Yes  Type of devices: Nurse notified;Call light within reach; Left in bed;Gait belt;Bed alarm in place  Restraints  Initially in place: No           Patient Diagnosis(es): The primary encounter diagnosis was COVID. A diagnosis of Venous stasis was also pertinent to this visit. has a past medical history of Arthritis, Atrial flutter ([address] CAD (coronary artery disease), CHF (congestive heart failure) ([address] Diabetes ([address] Diabetes mellitus ([address] Hyperlipidemia, Hypertension, Psychiatric problem, Thyroid cancer ([facility] Thyroid disease, and Type 2 diabetes mellitus without complication, with long-term current use of insulin ([facility] has a past surgical history that includes Coronary angioplasty with stent; Hysterectomy; Cholecystectomy; Thyroidectomy; Appendectomy; back surgery; Upper gastrointestinal endoscopy (4/14/2021); and Upper gastrointestinal endoscopy (4/14/2021). Restrictions  Restrictions/Precautions  Restrictions/Precautions: Fall Risk,Isolation,Up as Tolerated (COVID+/Droplet+)  Required Braces or Orthoses?: No  Position Activity Restriction  Other position/activity restrictions: up with assist, recent frequent falls, 0.5 L O2    Subjective   General  Patient assessed for rehabilitation services?: Yes  Family / Caregiver Present: No  General Comment  Comments: RN ok'd for OT eval this AM. Pt agreeable to participate in session and pleasant/cooperative throughout.   Patient Currently in Pain: Denies  Vital Signs  Patient Currently in Pain: Denies     Social/Functional History  Social/Functional History  Lives With: Son,Daughter  Type of Home: House  Home Layout: One level  Home Access: Stairs to enter without rails (pt reports children assist pt with stairs)  Entrance Stairs - Number of Steps: 4  Bathroom Shower/Tub: Walk-in shower (pt reports sits in w/c in shower for bathing tasks)  Bathroom Toilet: Standard  Bathroom Accessibility: Wheelchair accessible  Home Equipment: Wheelchair-electric,Hospital bed  ADL Assistance: Needs assistance (pt reports daughter assists with LB dressing/bathing)  Homemaking Responsibilities: No  Ambulation Assistance: Needs assistance (pt reports completing very short functional mobility \"If I have to\" with assistance from children)  Transfer Assistance: Needs assistance (pt reports pivot from w/c <> toilet <> bed)  Active : No  Patient's  Info: family  Mode of Transportation: Car  Occupation: Retired  Type of occupation: \"everything,\" 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 Pt reports son and daughter can provide assistance when home.  Pt reports children's jobs are flexible and they could provide 24/7 assistance if needed       Objective   Vision: Within Functional Limits  Hearing: Exceptions to [facility]  Hearing Exceptions: Hard of hearing/hearing concerns (L hearing deficits)         Balance  Sitting Balance: Contact guard assistance (~5 min, 2 times-seated EOB with CGA; unsupported sitting in recliner ~2 minutes)  Standing Balance: Minimal assistance  Standing Balance  Time: ~2 min  Activity: stand pivot <> bedside recliner, standing for brief change  Functional Mobility  Functional - Mobility Device: Other  Activity: Other  Assist Level: Minimal assistance (X2)  Functional Mobility Comments: Pt completed functional mobility, stand-step and pivot from EOB > recliner with Min A X2 with BUE supported and from recliner > EOB with Min A X1 ADL  Feeding: Increased time to complete;Setup;Modified independent   Grooming: Stand by assistance;Setup; Increased time to complete  UE Bathing: Minimal assistance;Setup; Increased time to complete  LE Bathing: Setup; Increased time to complete; Moderate assistance  UE Dressing: Minimal assistance;Setup; Increased time to complete (OT facilitated UB dressing task with pt requiring Min A to doff soiled gown and don clean gown for assistance with threading BUE)  LE Dressing: Maximum assistance; Increased time to complete;Setup;Verbal cueing (pt requires Max A to don B socks d/t limited reach and BLE edema)  Toileting: Maximum assistance; Increased time to complete;Setup (pt incontinent of urine 2X during session requiring Max A to doff soiled brief and don clean brief. Pt requires Max A for pericare tasks d/t decreased standing balance/poor reaching ability)  Tone RUE  RUE Tone: Normotonic  Tone LUE  LUE Tone: Normotonic  Coordination  Movements Are Fluid And Coordinated: Yes    Bed mobility  Supine to Sit: Minimal assistance (HOB elevated to ~30 degrees and heavy reliance on bed rail)  Sit to Supine: Moderate assistance (Mod A for BLE progression)  Scooting: Minimal assistance  Comment: Pt reports sleeping in hospital bed at home; pt requires increased time and effort to complete bed mobility maneuvers  Transfers  Stand Pivot Transfers: Minimal assistance;2 Person assistance  Sit to stand: Minimal assistance;2 Person assistance  Stand to sit: Minimal assistance;2 Person assistance  Transfer Comments: pt completed initial transfer from EOB with Min A X2 and second transfer from recliner with Min A X1; increasaed time and effort required    Cognition  Overall Cognitive Status: Exceptions  Arousal/Alertness: Appropriate responses to stimuli  Following Commands:  Follows multistep commands with repitition  Attention Span: Attends with cues to redirect  Safety Judgement: Decreased awareness of need for assistance  Problem Solving: Assistance required to identify errors made;Assistance required to correct errors made  Insights: Decreased awareness of deficits  Initiation: Requires cues for some  Sequencing: Requires cues for some       Sensation  Overall Sensation Status: WFL (Pt denies any numbness or tingling)        LUE AROM (degrees)  LUE AROM : WFL  Left Hand AROM (degrees)  Left Hand AROM: WFL  RUE AROM (degrees)  RUE AROM : WFL  Right Hand AROM (degrees)  Right Hand AROM: WFL  LUE Strength  Gross LUE Strength: WFL  L Hand General: 4/5  LUE Strength Comment: grossly 4/5  RUE Strength  Gross RUE Strength: WFL  R Hand General: 4/5  RUE Strength Comment: grossly 4/5                   Plan   Plan  Times per week: 3-4x/wk  Current Treatment Recommendations: Endurance Training,Wheelchair Mobility Training,Self-Care / ADL,Safety Education & Training,Patient/Caregiver Education & Training,Equipment Evaluation, Education, & procurement,Balance Training,Functional Mobility Training,Cognitive Reorientation    AM-PAC Score  AM-PAC Inpatient Daily Activity Raw Score: 16 (01/20/22 1651)  AM-PAC Inpatient ADL T-Scale Score : 35.96 (01/20/22 1651)  ADL Inpatient CMS 0-100% Score: 53.32 (01/20/22 1651)  ADL Inpatient CMS G-Code Modifier : CK (01/20/22 1651)    Goals  Short term goals  Time Frame for Short term goals: Pt will, by discharge:  Short term goal 1: Demo functional transfers and functional mobility with CGA and LRD PRN  Short term goal 2: Demo UB ADLs and grooming tasks with setup and SBA  Short term goal 3: Demo LB ADLs and toileting tasks with Min A and AE PRN  Short term goal 4: Demo 5+ min dynamic standing task with Min A and LRD for improved balance for ADL/IADL performance  Short term goal 5: Follow 100% of 2-step commands to address cognitive deficits for improved participation in ADLs/IADLs       Therapy Time   Individual Concurrent Group Co-treatment   Time In 0942         Time Out 1019         Minutes 37         Timed Code Treatment Minutes: 23 Minutes       Wade Revebon, OTR/L

## 2022-01-20 NOTE — PROGRESS NOTES
[facility]  Internal Medicine Teaching Residency Program  Inpatient Daily Progress Note  ______________________________________________________________________________    Patient: Holden Villatoro  YOB: 1935   SAMMY:[id number]    Acct: [de-identified]     Room: 2011/2011-02  Admit date: 1/18/2022  Today's date: 01/20/22  Number of days in the hospital: 1    SUBJECTIVE   Admitting Diagnosis: Pneumonia due to COVID-19 virus  CC: Worsening shortness of breath  Pt examined at bedside. Chart & results reviewed. Currently saturating on 1 L nasal cannula. Blood pressure is 120/60. No acute event overnight. She is feeling better since admission. Labs reviewed and evaluated. Blood glucose on higher side. Insulin regimen readjusted. CRP is 60-66    ROS:  Constitutional:  negative for chills, fevers, sweats  Respiratory:  negative for cough, dyspnea on exertion, hemoptysis, shortness of breath, wheezing  Cardiovascular:  negative for chest pain, chest pressure/discomfort, lower extremity edema, palpitations  Gastrointestinal:  negative for abdominal pain, constipation, diarrhea, nausea, vomiting  Neurological:  negative for dizziness, headache  BRIEF HISTORY     The patient is a pleasant 82 y.o. female with past medical history of CAD s/p PTCA/PEPITO to LAD on 10/8/2012 , hypothyroidism status post thyroidectomy due to thyroid cancer, hypertension, diabetes on insulin, chronic diastolic heart failure, hyperlipidemia is admitted with worsening shortness of breath cough fatigue. She also admits to nausea vomiting and diarrhea. On evaluation in the ER she was found hypoxic 88% and started on 4 L nasal cannula improved SPO2 greater than 94%. Patient was found afebrile. Labs reviewed: CRP 66.  Chest x-ray showed worsening of pulmonary opacities. Of note, she was recently discharged from [place] [address]  for mild COVID infection and UTI and was sent on p.o. Keflex. 1/20/2022.   Echo today.    OBJECTIVE     Vital Signs:  BP (!) 119/58   Pulse 59   Temp 98 °F (36.7 °C) (Oral)   Resp 16   Ht 5' 2\" (1.575 m)   Wt 200 lb (90.7 kg)   SpO2 93%   BMI 36.58 kg/m²     Temp (24hrs), Av.9 °F (36.6 °C), Min:97.6 °F (36.4 °C), Max:98.1 °F (36.7 °C)    In: -   Out: 1700 [Urine:1700]    Physical Exam:  Constitutional: This is a well developed, well nourished, 35-39.9 - Obesity Grade II 82y.o. year old female who is alert, oriented, cooperative and in no apparent distress. Head:normocephalic and atraumatic. EENT:  PERRLA. No conjunctival injections. Septum was midline, mucosa was without erythema, exudates or cobblestoning. No thrush was noted. Neck: Supple without thyromegaly. No elevated JVP. Trachea was midline. Respiratory: Chest was symmetrical without dullness to percussion. Breath sounds bilaterally were clear to auscultation. There were no wheezes, rhonchi or rales. There is no intercostal retraction or use of accessory muscles. No egophony noted. Cardiovascular: Regular without murmur, clicks, gallops or rubs. Abdomen: Slightly rounded and soft without organomegaly. No rebound, rigidity or guarding was appreciated. Lymphatic: No lymphadenopathy. Musculoskeletal: Normal curvature of the spine. No gross muscle weakness. Extremities:  No lower extremity edema, ulcerations, tenderness, varicosities or erythema. Muscle size, tone and strength are normal.  No involuntary movements are noted. Skin:  Warm and dry. Good color, turgor and pigmentation. No lesions or scars.   No cyanosis or clubbing  Neurological/Psychiatric: The patient's general behavior, level of consciousness, thought content and emotional status is normal.        Medications:  Scheduled Medications:    aspirin  81 mg Oral Daily    atorvastatin  80 mg Oral Daily    clopidogrel  75 mg Oral Daily    vitamin B-12  500 mcg Oral Daily    ferrous sulfate  325 mg Oral BID WC    insulin glargine  42 Units SubCUTAneous Nightly    levothyroxine  150 mcg Oral Daily    metoprolol tartrate  12.5 mg Oral BID    sertraline  100 mg Oral Daily    alogliptin  12.5 mg Oral Daily    sodium chloride flush  5-40 mL IntraVENous 2 times per day    heparin (porcine)  5,000 Units SubCUTAneous 3 times per day    Vitamin D  2,000 Units Oral Daily    dexamethasone  6 mg IntraVENous Q24H    furosemide  40 mg IntraVENous Daily    insulin lispro  0-12 Units SubCUTAneous TID WC    insulin lispro  0-6 Units SubCUTAneous Nightly    albuterol sulfate HFA  2 puff Inhalation 4x daily    tiotropium  2 puff Inhalation Daily     Continuous Infusions:    sodium chloride      dextrose       PRN Medicationssodium chloride flush, 5-40 mL, PRN  sodium chloride, 25 mL, PRN  ondansetron, 4 mg, Q8H PRN   Or  ondansetron, 4 mg, Q6H PRN  polyethylene glycol, 17 g, Daily PRN  acetaminophen, 650 mg, Q6H PRN   Or  acetaminophen, 650 mg, Q6H PRN  glucose, 15 g, PRN  dextrose, 12.5 g, PRN  glucagon (rDNA), 1 mg, PRN  dextrose, 100 mL/hr, PRN  dextromethorphan-guaiFENesin, 5 mL, Q4H PRN        Diagnostic Labs:  CBC:   Recent Labs     01/18/22 2112 01/20/22 0613   WBC 6.4 5.6   RBC 4.26 4.03   HGB 11.9 11.2*   HCT 36.2* 34.1*   MCV 85.0 84.6   RDW 14.1 13.8   PLT See Reflexed IPF Result 185     BMP:   Recent Labs     01/18/22 2112 01/20/22 0613   * 136   K 4.1 4.2   CL 94* 98   CO2 23 24   BUN 26* 24*   CREATININE 1.34* 1.18*     BNP: No results for input(s): BNP in the last 72 hours. PT/INR:   Recent Labs     01/20/22 0613   PROTIME 11.7   INR 1.1     APTT:   Recent Labs     01/20/22 0613   APTT 31.9*     CARDIAC ENZYMES: No results for input(s): CKMB, CKMBINDEX, TROPONINI in the last 72 hours.     Invalid input(s): CKTOTAL;3  FASTING LIPID PANEL:  Lab Results   Component Value Date    CHOL 173 06/29/2020    HDL 52 06/29/2020    TRIG 337 (H) 06/29/2020     LIVER PROFILE:   Recent Labs     01/18/22  2112 01/20/22  0613   AST 39* 23   ALT 36* 24   BILIDIR 0.17  --    BILITOT 0.49 0.36   ALKPHOS 164* 145*      MICROBIOLOGY:   Lab Results   Component Value Date/Time    CULTURE NO GROWTH 5 DAYS 01/09/2022 12:32 PM    CULTURE NO GROWTH 5 DAYS 01/09/2022 12:32 PM       Imaging:    XR CHEST PORTABLE    Result Date: 1/18/2022  1. New bilateral pulmonary opacities, consistent with COVID-19 pulmonary disease given the clinical history 2. Cardiomegaly     XR HIP 2-3 VW W PELVIS RIGHT    Result Date: 1/18/2022  No acute osseous abnormality       ASSESSMENT & PLAN     ASSESSMENT / PLAN:     Principal Problem:    Pneumonia due to COVID-19 virus  Plan:   -Currently saturating on 1 L nasal cannula. CRP is down to 60 from 66. -ID on board, recommend 6 mg Decadron for 10 days.  -Did not fulfill criteria for tocilizumab. And out of window for remdesivir. Active Problems:    Acute on chronic diastolic congestive heart failure (HCC)  Plan:   -On IV Lasix 40 mg once daily. We will add Aldactone 25 mg once daily on discharge.  -Net I's/O: -1700 mL. -Pedal edema is improved. Echo on 1/20/2022:  Summary   Left ventricle is normal in size. Global left ventricular systolic function   is normal. Calculated ejection fraction 54% by Valdez's method. Grade I (mild) left ventricular diastolic dysfunction. Aortic valve is trileaflet. Aortic valve is mildly sclerotic but opens well. Left pleural effusion      Type 2 diabetes mellitus without complication, with long-term current use of insulin (HCC)  Plan:   -Ordered HbA1c. Previous HbA1c was 14, 2 months ago. -Change Lantus to 30 units twice daily. Home dose was 42 units once daily. On medium dose insulin sliding scale. -POC glucose every 6 hours.  -Hypoglycemia protocol. CAD (coronary artery disease)  Plan:   -Status post PEPITO to LAD on 10/08/2012  -On ASA 81, Plavix 75 once daily.  -Lopressor 12.5 mg twice daily.     Essential hypertension:  -Stable on Lopressor 12.5 mg twice daily.    Hyperlipidemia:  -On atorvastatin 80 mg once daily. Obesity, Class III, BMI 40-49.9 (morbid obesity) (HCC)    DVT ppx : Heparin 5000 units three times daily. GI ppx: Not needed    PT/OT: Consulted  Discharge Planning / SW: Ongoing  Patient will be closely observed today for oxygen requirement. Currently down to 4 L nasal cannula. Will diurese with IV Lasix. Also will do home oxygen evaluation and will discharge tomorrow. Александр Ayon MD  Internal Medicine Resident, PGY-1  [address], [place]  1/20/2022, 12:18 PM

## 2022-01-20 NOTE — PROGRESS NOTES
Physical Therapy    Facility/Department: [facility]  Initial Assessment    NAME: Kevin Mena  : 1935  MRN: [id number]    Date of Service: 2022  Chief Complaint   Patient presents with    Nausea     ARRIVED [facility] WITH REPORTS OF N/V/D X 2 DAYS  TESTED + FOR COVID 1 WEEK PTA     Emesis    Diarrhea       Discharge Recommendations:  Patient would benefit from continued therapy after discharge   PT Equipment Recommendations  Equipment Needed: Yes  Mobility Devices: Sandra Estrin: Rolling  Other: Pt already owns w/c which is main means for mobility    Assessment   Body structures, Functions, Activity limitations: Decreased functional mobility ; Decreased balance;Decreased endurance;Decreased safe awareness;Decreased strength; Increased pain;Decreased posture  Assessment: Pt able to demonstrate bed mobility and functional transfers with min Ax1 today. Pt with electric w/c at home which is her main means for mobility. Pt will need 24/7 physical assistance at home and will benefit from further therapy for LE strengthening, balance and gait training (pt appears to have likely chance for short distance gait despite main means of transportation being w/c). Pt would be unsafe to return home without 24/7 physical assistance and family assist for entrance of home pt likely doesn't demonstrate strength to be able to negotiate stairs to allow entrance to home today. Prognosis: Good  Decision Making: Medium Complexity  PT Education: Goals;PT Role;Plan of Care;Transfer Training;Functional Mobility Training  REQUIRES PT FOLLOW UP: Yes  Activity Tolerance  Activity Tolerance: Patient limited by endurance; Patient limited by fatigue       Patient Diagnosis(es): The primary encounter diagnosis was COVID. A diagnosis of Venous stasis was also pertinent to this visit.      has a past medical history of Arthritis, Atrial flutter ([facility] [address] CAD (coronary artery disease), CHF (congestive heart failure) ([facility] [address] Diabetes ([facility] [address] Diabetes mellitus ([facility] [address] Hyperlipidemia, Hypertension, Psychiatric problem, Thyroid cancer ([facility] [address] Thyroid disease, and Type 2 diabetes mellitus without complication, with long-term current use of insulin ([facility] [address] has a past surgical history that includes Coronary angioplasty with stent; Hysterectomy; Cholecystectomy; Thyroidectomy; Appendectomy; back surgery; Upper gastrointestinal endoscopy (4/14/2021); and Upper gastrointestinal endoscopy (4/14/2021). Restrictions  Restrictions/Precautions  Restrictions/Precautions: Fall Risk,Isolation,Up as Tolerated (COVID+/Droplet+)  Required Braces or Orthoses?: No  Position Activity Restriction  Other position/activity restrictions: up with assist, recent frequent falls, 0.5 L O2  Vision/Hearing  Vision: Within Functional Limits  Hearing: Exceptions to [facility]  Hearing Exceptions: Hard of hearing/hearing concerns (L hearing deficits)     Subjective  General  Patient assessed for rehabilitation services?: Yes  Response To Previous Treatment: Not applicable  Family / Caregiver Present: No  Follows Commands: Within Functional Limits  Subjective  Subjective: Pt supine in bed and agreeable to therapy this morning. RN agreeable to therapy.  Pt denies any pain at rest.  Pain Screening  Patient Currently in Pain: Denies  Vital Signs  Patient Currently in Pain: Denies       Orientation  Orientation  Overall Orientation Status: Within Functional Limits  Social/Functional History  Social/Functional History  Lives With: Son,Daughter  Type of Home: House  Home Layout: One level  Home Access: Stairs to enter without rails (pt reports children assist pt with stairs)  Entrance Stairs - Number of Steps: 4  Bathroom Shower/Tub: Walk-in shower (pt reports sits in w/c in shower for bathing tasks)  Bathroom Toilet: Standard  Bathroom Accessibility: Wheelchair accessible  Home Equipment: Wheelchair-electric  ADL Assistance: Needs assistance (pt reports daughter assists with LB dressing/bathing)  Homemaking Responsibilities: No  Ambulation Assistance: Needs assistance (pt reports completing very short functional mobility \"If I have to\" with assistance from children)  Transfer Assistance: Needs assistance (pt reports pivot from w/c <> toilet <> bed)  Active : No  Patient's  Info: family  Mode of Transportation: Car  Occupation: Retired  Type of occupation: \"everything,\" 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 Pt reports son and daughter can provide assistance when home. Pt reports children's jobs are flexible and they could provide 24/7 assistance if needed  Cognition   Cognition  Overall Cognitive Status: Exceptions  Arousal/Alertness: Appropriate responses to stimuli  Following Commands: Follows multistep commands with repitition  Attention Span: Attends with cues to redirect  Safety Judgement: Decreased awareness of need for assistance  Problem Solving: Assistance required to identify errors made;Assistance required to generate solutions  Insights: Decreased awareness of deficits  Initiation: Requires cues for some  Sequencing: Requires cues for some    Objective          AROM RLE (degrees)  RLE AROM: WFL  AROM LLE (degrees)  LLE AROM : WFL  AROM RUE (degrees)  RUE AROM : WFL  AROM LUE (degrees)  LUE AROM : WFL  Strength RLE  Comment: grossly 4/5  Strength LLE  Comment: grossly 4/5  Strength RUE  Comment: Co evaluation with OT-see OT evaluation for full UE assessment  Strength LUE  Comment: Co evaluation with OT-see OT evaluation for full UE assessment     Sensation  Overall Sensation Status: WFL (Pt denies any numbness or tingling)  Bed mobility  Supine to Sit: Minimal assistance (use of bed rail with HOB raised 30 degrees (pt reports having hospital bed at home))  Sit to Supine:  Moderate assistance (for LE management)  Scooting: Minimal assistance  Comment: Increased time and effort overall; good use of UEs to assist and bed rails  Transfers  Sit to Stand: Minimal Assistance (x2 on first attempt; x1 on second and third attempt)  Stand to sit: Minimal Assistance (x2 on first attempt; x1 on second and third attempt)  Bed to Chair: Minimal assistance (x2 on first attempt; x1 on second attempt)  Stand Pivot Transfers: Minimal Assistance (x2 on first attempt; x1 on second attempt; cues for hand placement on second attempt with improved tolerance and less assist required)  Ambulation  Ambulation?: No  Stairs/Curb  Stairs?: No     Balance  Posture: Fair  Sitting - Static: Good;-  Sitting - Dynamic: Fair;+  Standing - Static: Fair  Standing - Dynamic: Fair;-  Comments: standing balance assessed with UE support on bedrail and chair that pt was pivoting to/from        Plan   Plan  Times per week: 5-6x/wk  Times per day: Daily  Current Treatment Recommendations: Strengthening,Transfer Training,Balance Training,Functional Mobility Training,Endurance Training,Patient/Caregiver Education & Training,Home Exercise Program,Safety Education & Training,Gait Training,ROM  Safety Devices  Type of devices: Call light within reach,Bed alarm in place,Gait belt,Left in bed,Nurse notified  Restraints  Initially in place: No    AM-PAC Score  AM-PAC Inpatient Mobility Raw Score : 14 (01/20/22 1500)  AM-PAC Inpatient T-Scale Score : 38.1 (01/20/22 1500)  Mobility Inpatient CMS 0-100% Score: 61.29 (01/20/22 1500)  Mobility Inpatient CMS G-Code Modifier : CL (01/20/22 1500)          Goals  Short term goals  Time Frame for Short term goals: 14 visits  Short term goal 1: Pt to demonstrate modified independent bed mobility  Short term goal 2: Pt to sit <> stand transfer modified independently with UE support  Short term goal 3: Pt to stand pivot to<>from w/c modified independently  Short term goal 4: Pt to demonstrate fair + standing balance to decrease risk of falls  Short term goal 5: Pt to ambulate 15ft min A with RW       Therapy Time   Individual Concurrent Group Co-treatment   Time In 0941         Time Out 1020         Minutes 39         Timed Code Treatment Minutes: [address] [facility] Olga Burt PT

## 2022-01-20 NOTE — PROGRESS NOTES
[facility] - Progress Note   COVID 19 Patient  Today's Date and Time: 1/20/2022, 2:44 PM    Impression :     · COVID 19 Confirmed Infection  · Was treated last week from 1/8-->1/10   · Covid tests: 1/8/21 positive. · CAD s/p PTCA/PEPITO to LAD in October 2021  · CHF with EF 45%  · Atrial flutter  · Diabetes  · Hyperliidemia  · HTN  · Thyroid cancer s/p thyroidectomy    Recommendations:   · Antibiotic treatment:  · Monitor off antibiotics  · Did receive Keflex for E. Coli UTI and finished course on 1/17. · Covid Rx:    · Remdesivir: Not indicated  · Decadron--yes, 6mg IV q24 hours for 10 doses  · Actemra--Not indicated  · Monoclonal antibodies--out of window      Medical Decision Making/Summary/Discussion:1/20/2022     · Patient admitted with previously diagnosed and treated COVID 19 infection  · Given Decadron for worsening COVID symptoms. Infection Control Recommendations   · Universal Precautions  · Airborne isolation  · Droplet Isolation  · Isolate until 1/29/22    Antimicrobial Stewardship Recommendations     · Simplification of therapy  · Targeted therapy    Coordination of Outpatient Care:   · Estimated Length of IV antimicrobials:TBD  · Patient will need Midline Catheter Insertion: TBD  · Patient will need PICC line Insertion: No  · Patient will need: Home IV , [place],  SNF,  LTAC:TBD  · Patient will need outpatient wound care:No    Chief complaint/reason for consultation:   · Concern for COVID infection      History of Present Illness:   Evie Templeton is a 82y.o.-year-old female who was initially admitted on 1/18/2022. Patient seen at the request of Dr. Angelica Kya. INITIAL HISTORY:    Patient presented to ED with worsening SOB. Last hospital visit    She was recently treated here for COVID, positive on 1/8/22.  She was asymptomatic at that time and did not receive Remdesivir due to BRIA, did not receive Decadron, and did not qualify for Actemra or monoclonal antibodies. She received her J+J vaccine in May 2021. Received booster upon discharge on 1/11. She was discharged on 1/10 with cephalexin due to E.coli UTI. She finished this regimen on 1/17/22. ED Course:    Presented to ED with SOB, vomiting, diarrhea, and failure to thrive at home. Hypoxic in ED 88% O2, put on 2L nasal cannula which improved saturation. Hemodynamically stable vitals  Afebrile    CXR 1/19:      CURRENT EVALUATION : 1/20/2022    Afebrile                                                Vitals notable for hypotension in 100's/50's. She is on Lasix for chronic heart failure. Patient says she is doing well  Denies SOB, cough, chills    Patient exhibiting respiratory distress. No  Respiratory secretions: No    Patient receiving 1.5L-->1.0 L NC  supplemental oxygen. RR 18-->16  02 sat 95%-->93%    NEWS Score: 0-4 Low risk group; 5-6: Medium risk group; 7 or above: High risk group  Parameters 3 2 1 0 1 2 3   Age    < 65   ? 65   RR ? 8  9-11 12-20  21-24 ? 25   O2 Sats ? 91 92-93 94-95 ? 96      Suppl O2  Yes  No      SBP ? 90  101-110 111-219   ? 220   HR ? 40  41-50 51-90  111-130 ? 131   Consciousness    Alert   Drowsiness, lethargy, or confusion   Temperature ? 35.0 C (95.0 F)  35.1-36.0 C 95.1-96.9 F 36.1-38.0 C 97.0-100.4 F 38.1-39.0 C 100.5-102.3 F ? 39.1 C ? 102.4 F      NEWS Score:   1-19-22: 5, medium risk    Overall Daily Picture:    worsening    Presence of secondary bacterial Infection:  No   Additional antibiotics: no    Labs, X rays reviewed: 1/20/2022    BUN: 26-->24  Cr: 1.34-->1.18    WBC: 6.4-->5.6  Hb: 11.9-->11.2  Plat:  187-->185    Absolute Neutrophils:4.36  Absolute Lymphocytes:1.34  Neutrophil/Lymphocyte Ratio: 3.255 Low risk    CRP:   · 1-8-22: 17.1  · 1-19-22:   Ferritin: 243  LDH:     Pro Calcitonin:      Cultures:  Urine:  ·   Blood:  ·   Sputum :  ·   Wound:       CXR:        CAT:      Discussed with patient, RN, CC, IM.     I have personally reviewed the past medical history, past surgical history, medications, social history, and family history, and I have updated the database accordingly.   Past Medical History:     Past Medical History:   Diagnosis Date    Arthritis     Atrial flutter ([facility]     CAD (coronary artery disease)     CHF (congestive heart failure) (HCC)     Diabetes [facility]     Diabetes mellitus ([facility]     Hyperlipidemia     Hypertension     Psychiatric problem     Thyroid cancer ([facility]     S/P thyroidectomy; on synthroid    Thyroid disease     hypothyroid    Type 2 diabetes mellitus without complication, with long-term current use of insulin ([facility] 7/4/2017       Past Surgical  History:     Past Surgical History:   Procedure Laterality Date    APPENDECTOMY      BACK SURGERY      CHOLECYSTECTOMY      CORONARY ANGIOPLASTY WITH STENT PLACEMENT      HYSTERECTOMY      THYROIDECTOMY      UPPER GASTROINTESTINAL ENDOSCOPY  4/14/2021    EGD BIOPSY performed by Jia Vasquez MD at [address] [place] [address] ENDOSCOPY  4/14/2021    EGD CONTROL HEMORRHAGE performed by Jia Vasquez MD at [facility] Endoscopy       Medications:      insulin glargine  30 Units SubCUTAneous BID    aspirin  81 mg Oral Daily    atorvastatin  80 mg Oral Daily    clopidogrel  75 mg Oral Daily    vitamin B-12  500 mcg Oral Daily    ferrous sulfate  325 mg Oral BID WC    levothyroxine  150 mcg Oral Daily    metoprolol tartrate  12.5 mg Oral BID    sertraline  100 mg Oral Daily    alogliptin  12.5 mg Oral Daily    sodium chloride flush  5-40 mL IntraVENous 2 times per day    heparin (porcine)  5,000 Units SubCUTAneous 3 times per day    Vitamin D  2,000 Units Oral Daily    dexamethasone  6 mg IntraVENous Q24H    furosemide  40 mg IntraVENous Daily    insulin lispro  0-12 Units SubCUTAneous TID WC    insulin lispro  0-6 Units SubCUTAneous Nightly    albuterol sulfate HFA  2 puff Inhalation 4x daily    tiotropium  2 puff Inhalation Daily       Social History:     Social History     Socioeconomic History    Marital status:      Spouse name: Not on file    Number of children: Not on file    Years of education: Not on file    Highest education level: Not on file   Occupational History    Not on file   Tobacco Use    Smoking status: Former Smoker    Smokeless tobacco: Never Used   Vaping Use    Vaping Use: Never used   Substance and Sexual Activity    Alcohol use: No    Drug use: No    Sexual activity: Not Currently   Other Topics Concern    Not on file   Social History Narrative    Not on file     Social Determinants of Health     Financial Resource Strain: Medium Risk    Difficulty of Paying Living Expenses: Somewhat hard   Food Insecurity: Food Insecurity Present    Worried About Running Out of Food in the Last Year: Sometimes true    Portia of Food in the Last Year: Often true   Transportation Needs:     Lack of Transportation (Medical): Not on file    Lack of Transportation (Non-Medical):  Not on file   Physical Activity:     Days of Exercise per Week: Not on file    Minutes of Exercise per Session: Not on file   Stress:     Feeling of Stress : Not on file   Social Connections:     Frequency of Communication with Friends and Family: Not on file    Frequency of Social Gatherings with Friends and Family: Not on file    Attends Druze Services: Not on file    Active Member of [address] [place] [address] [employer] or Organizations: Not on file    Attends Club or Organization Meetings: Not on file    Marital Status: Not on file   Intimate Partner Violence:     Fear of Current or Ex-Partner: Not on file    Emotionally Abused: Not on file    Physically Abused: Not on file    Sexually Abused: Not on file   Housing Stability:     Unable to Pay for Housing in the Last Year: Not on file    Number of [place] in the Last Year: Not on file    Unstable Housing in the Last Year: Not on file       Family History:     Family History   Problem Relation Age of Onset    Cancer Mother     Cancer Father     Cancer Brother     Diabetes Brother         Allergies:   Strawberry extract and Tape [adhesive tape]     Review of Systems:       Constitutional: No fevers or chills. No systemic complaints  Head: No headaches  Eyes: No double vision or blurry vision. No conjunctival inflammation. ENT: No sore throat or runny nose. . No hearing loss, tinnitus or vertigo. Cardiovascular: No chest pain or palpitations. Shortness of breath. No GIRON  Lung: Shortness of breath, no cough. No sputum production  Abdomen: No nausea, vomiting, diarrhea, or abdominal pain. Tristan Skates No cramps. Genitourinary: No increased urinary frequency, or dysuria. No hematuria. No suprapubic or CVA pain  Musculoskeletal: No muscle aches or pains. No joint effusions or deformities. Swelling and L calf pain. Hematologic: No bleeding or bruising. Neurologic: No headache, weakness, numbness, or tingling. Integument: No rash, no ulcers. Psychiatric: No depression. Endocrine: No polyuria, no polydipsia, no polyphagia. Physical Examination :     Patient Vitals for the past 8 hrs:   BP Temp Temp src Pulse Resp SpO2   01/20/22 0829 (!) 119/58 98 °F (36.7 °C) Oral 59 16 93 %     General Appearance: Awake, alert, and in no apparent distress  Head:  Normocephalic, no trauma  Eyes: Pupils equal, round, reactive to light; sclera anicteric; conjunctivae pink. No embolic phenomena. ENT: Oropharynx clear, without erythema, exudate, or thrush. No tenderness of sinuses. Mouth/throat: mucosa pink and moist. No lesions. Dentition in good repair. Neck:Supple, without lymphadenopathy. Thyroid normal, No bruits. Pulmonary/Chest: Clear to auscultation, without wheezes, rales, or rhonchi. No dullness to percussion. Cardiovascular: Regular rate and rhythm without murmurs, rubs, or gallops. Abdomen: Soft, non tender.  Bowel sounds normal. No organomegaly  All four Extremities: No cyanosis, clubbing, edema, or effusions. +4/4 pitting edema bilaterally. L calf pain present upon squeezing of L calf. Neurologic: No gross sensory or motor deficits. Skin: Warm and dry with good turgor. Stasis dermatitis rashes present bilaterally. Medical Decision Making -Laboratory:   I have independently reviewed/ordered the following labs:    CBC with Differential:   Recent Labs     01/18/22 2112 01/20/22  0613   WBC 6.4 5.6   HGB 11.9 11.2*   HCT 36.2* 34.1*   PLT See Reflexed IPF Result 185   LYMPHOPCT 21* 20*   MONOPCT 6 8     BMP:   Recent Labs     01/18/22 2112 01/20/22  0613   * 136   K 4.1 4.2   CL 94* 98   CO2 23 24   BUN 26* 24*   CREATININE 1.34* 1.18*     Hepatic Function Panel:   Recent Labs     01/18/22 2112 01/20/22  0613   PROT 7.2 6.4   LABALBU 3.6 3.0*   BILIDIR 0.17  --    IBILI 0.32  --    BILITOT 0.49 0.36   ALKPHOS 164* 145*   ALT 36* 24   AST 39* 23     No results for input(s): RPR in the last 72 hours. No results for input(s): HIV in the last 72 hours. No results for input(s): BC in the last 72 hours.   Lab Results   Component Value Date    MUCUS NOT REPORTED 01/08/2022    PH 7.20 10/05/2012    RBC 4.03 01/20/2022    RBC 4.41 02/03/2012    TRICHOMONAS NOT REPORTED 01/08/2022    WBC 5.6 01/20/2022    YEAST NOT REPORTED 01/08/2022    TURBIDITY Clear 01/08/2022     Lab Results   Component Value Date    CREATININE 1.18 01/20/2022    GLUCOSE 239 01/20/2022    GLUCOSE 380 05/14/2012       Medical Decision Making-Imaging:       Medical Decision Dzfgtc-Oketdoai-Sqhje:       Medical Decision Making-Other:     Note:  · Labs, medications, radiologic studies were reviewed with personal review of films  · Moderate Large amounts of data were reviewed  · Discussed with nursing Staff, Discharge planner  · Infection Control and Prevention measures reviewed  · All prior entries were reviewed  · Administer medications as ordered  · Prognosis: Guarded  · Discharge planning reviewed  · Follow up as outpatient. Thank you for allowing us to participate in the care of this patient. Please call with questions. MD Hawk Dougherty participated in the evaluation of this patient. ATTESTATION:    I have discussed the case, including pertinent history and exam findings with the residents and students. I have seen and examined the patient and the key elements of the encounter have been performed by me. I was present when the student obtained his information or examined the patient. I have reviewed the laboratory data, other diagnostic studies and discussed them with the residents. I have updated the medical record where necessary. I agree with the assessment, plan and orders as documented by the resident/ student.     Myrtle Peralta MD.

## 2022-01-20 NOTE — PROGRESS NOTES
Referral received for Wound ostomy nurse for venous stasis. No lower extremity ulcerations are present. RN reports lower leg edema is improved with diuresis. No interventions needed at this time. Please notify [id number] service should needs arise.

## 2022-01-21 VITALS
DIASTOLIC BLOOD PRESSURE: 57 MMHG | OXYGEN SATURATION: 92 % | RESPIRATION RATE: 15 BRPM | HEIGHT: 62 IN | HEART RATE: 58 BPM | SYSTOLIC BLOOD PRESSURE: 111 MMHG | TEMPERATURE: 98 F | BODY MASS INDEX: 36.8 KG/M2 | WEIGHT: 200 LBS

## 2022-01-21 LAB
ESTIMATED AVERAGE GLUCOSE: 315 MG/DL
GLUCOSE BLD-MCNC: 125 MG/DL (ref 65–105)
GLUCOSE BLD-MCNC: 158 MG/DL (ref 65–105)
GLUCOSE BLD-MCNC: 92 MG/DL (ref 65–105)
HBA1C MFR BLD: 12.6 % (ref 4–6)
PROCALCITONIN: 0.05 NG/ML

## 2022-01-21 PROCEDURE — 6370000000 HC RX 637 (ALT 250 FOR IP): Performed by: NURSE PRACTITIONER

## 2022-01-21 PROCEDURE — 36415 COLL VENOUS BLD VENIPUNCTURE: CPT

## 2022-01-21 PROCEDURE — 82947 ASSAY GLUCOSE BLOOD QUANT: CPT

## 2022-01-21 PROCEDURE — 6360000002 HC RX W HCPCS: Performed by: NURSE PRACTITIONER

## 2022-01-21 PROCEDURE — 84145 PROCALCITONIN (PCT): CPT

## 2022-01-21 PROCEDURE — 6370000000 HC RX 637 (ALT 250 FOR IP): Performed by: STUDENT IN AN ORGANIZED HEALTH CARE EDUCATION/TRAINING PROGRAM

## 2022-01-21 PROCEDURE — 99232 SBSQ HOSP IP/OBS MODERATE 35: CPT | Performed by: INTERNAL MEDICINE

## 2022-01-21 PROCEDURE — 6370000000 HC RX 637 (ALT 250 FOR IP)

## 2022-01-21 PROCEDURE — 2580000003 HC RX 258: Performed by: NURSE PRACTITIONER

## 2022-01-21 PROCEDURE — 99231 SBSQ HOSP IP/OBS SF/LOW 25: CPT | Performed by: INTERNAL MEDICINE

## 2022-01-21 PROCEDURE — 6360000002 HC RX W HCPCS: Performed by: STUDENT IN AN ORGANIZED HEALTH CARE EDUCATION/TRAINING PROGRAM

## 2022-01-21 RX ORDER — FUROSEMIDE 20 MG/1
TABLET ORAL
Qty: 60 TABLET | Refills: 3 | Status: SHIPPED | OUTPATIENT
Start: 2022-01-21 | End: 2022-01-31

## 2022-01-21 RX ORDER — DEXAMETHASONE 6 MG/1
6 TABLET ORAL DAILY
Qty: 7 TABLET | Refills: 0 | Status: SHIPPED | OUTPATIENT
Start: 2022-01-21 | End: 2022-01-28

## 2022-01-21 RX ORDER — FUROSEMIDE 40 MG/1
40 TABLET ORAL DAILY
Status: DISCONTINUED | OUTPATIENT
Start: 2022-01-22 | End: 2022-01-21 | Stop reason: HOSPADM

## 2022-01-21 RX ORDER — INSULIN GLARGINE 100 [IU]/ML
25 INJECTION, SOLUTION SUBCUTANEOUS 2 TIMES DAILY
Qty: 5 PEN | Refills: 5 | Status: SHIPPED | OUTPATIENT
Start: 2022-01-21 | End: 2022-02-23 | Stop reason: SDUPTHER

## 2022-01-21 RX ADMIN — FERROUS SULFATE TAB EC 325 MG (65 MG FE EQUIVALENT) 325 MG: 325 (65 FE) TABLET DELAYED RESPONSE at 08:18

## 2022-01-21 RX ADMIN — SODIUM CHLORIDE, PRESERVATIVE FREE 10 ML: 5 INJECTION INTRAVENOUS at 08:20

## 2022-01-21 RX ADMIN — Medication 500 MCG: at 08:17

## 2022-01-21 RX ADMIN — INSULIN GLARGINE 30 UNITS: 100 INJECTION, SOLUTION SUBCUTANEOUS at 08:33

## 2022-01-21 RX ADMIN — ASPIRIN 81 MG: 81 TABLET, CHEWABLE ORAL at 08:18

## 2022-01-21 RX ADMIN — ALOGLIPTIN 12.5 MG: 12.5 TABLET, FILM COATED ORAL at 08:17

## 2022-01-21 RX ADMIN — Medication 2 PUFF: at 11:53

## 2022-01-21 RX ADMIN — Medication 2 PUFF: at 08:19

## 2022-01-21 RX ADMIN — ATORVASTATIN CALCIUM 80 MG: 80 TABLET, FILM COATED ORAL at 08:18

## 2022-01-21 RX ADMIN — HEPARIN SODIUM 5000 UNITS: 5000 INJECTION INTRAVENOUS; SUBCUTANEOUS at 15:49

## 2022-01-21 RX ADMIN — LEVOTHYROXINE SODIUM 150 MCG: 75 TABLET ORAL at 08:18

## 2022-01-21 RX ADMIN — CLOPIDOGREL 75 MG: 75 TABLET, FILM COATED ORAL at 08:19

## 2022-01-21 RX ADMIN — INSULIN LISPRO 2 UNITS: 100 INJECTION, SOLUTION INTRAVENOUS; SUBCUTANEOUS at 17:00

## 2022-01-21 RX ADMIN — Medication 2000 UNITS: at 08:17

## 2022-01-21 RX ADMIN — FUROSEMIDE 40 MG: 10 INJECTION, SOLUTION INTRAMUSCULAR; INTRAVENOUS at 11:47

## 2022-01-21 RX ADMIN — DEXAMETHASONE SODIUM PHOSPHATE 6 MG: 10 INJECTION INTRAMUSCULAR; INTRAVENOUS at 12:56

## 2022-01-21 RX ADMIN — SERTRALINE 100 MG: 50 TABLET, FILM COATED ORAL at 08:17

## 2022-01-21 RX ADMIN — ACETAMINOPHEN 650 MG: 325 TABLET ORAL at 02:40

## 2022-01-21 ASSESSMENT — PAIN SCALES - GENERAL
PAINLEVEL_OUTOF10: 0
PAINLEVEL_OUTOF10: 4
PAINLEVEL_OUTOF10: 3

## 2022-01-21 NOTE — DISCHARGE INSTR - COC
Continuity of Care Form    Patient Name: Massiel Cheek   :  1935  MRN:  [id number]    Admit date:  2022  Discharge date:  2022    Code Status Order: Full Code   Advance Directives:      Admitting Physician:  Belen Bean MD  PCP: Irma Estrada MD    Discharging Nurse: [facility] Unit/Room#:   Discharging Unit Phone Number: ***    Emergency Contact:   Extended Emergency Contact Information  Primary Emergency Contact: Qing Reece  Address: [address] [facility], [address] [facility] [address] [facility] Phone: [phone]  Mobile Phone: [phone]  Relation: Child  Secondary Emergency Contact: Elmer Livingston  Address: [address] [place] [address], [address] [place] [address] [address] [facility] Phone: [phone]  Mobile Phone: [phone]  Relation: Child    Past Surgical History:  Past Surgical History:   Procedure Laterality Date    APPENDECTOMY      BACK SURGERY      CHOLECYSTECTOMY      CORONARY ANGIOPLASTY WITH STENT PLACEMENT      HYSTERECTOMY      THYROIDECTOMY      UPPER GASTROINTESTINAL ENDOSCOPY  2021    EGD BIOPSY performed by Shellie Ayala MD at [address] [place] [address] ENDOSCOPY  2021    EGD CONTROL HEMORRHAGE performed by Shellie [facility]ala MD at [facility] Endoscopy       Immunization History:   Immunization History   Administered Date(s) Administered    COVID-19, J&J, PF, 0.5 mL 2021    Influenza Vaccine, unspecified formulation 2014    Influenza Virus Vaccine 2014    Influenza, High Dose (Fluzone 65 yrs and older) 2016, 10/10/2017    Influenza, Quadv, adjuvanted, 65 yrs +, IM, PF (Fluad) 2021    Pneumococcal Conjugate 13-valent (Vahid Pickle) 2016    Pneumococcal Polysaccharide ([id number]) 02/15/2018       Active Problems:  Patient Active Problem List   Diagnosis Code    Acute kidney injury ([facility] N17.9    Anemia due to vitamin B12 deficiency D51.9    CAD (coronary artery disease) I25.10    Thyroid cancer (multi-drug resistant organism)  09/06/16 09/06/16 Danika Guadarrama, RN        E. Coli - urine 9/2016      Resolved    COVID-19 (Rule Out) 01/08/22 01/08/22 01/08/22 COVID-19, Rapid (Ordered)   01/08/22 Rule-Out Test Resulted    C-diff Rule Out 04/12/21 04/12/21 04/12/21 Gastrointestinal Panel, Molecular (Ordered)   04/15/21 Renetta Rosenberg RN            Nurse Assessment:  Last Vital Signs: BP (!) 109/49   Pulse 56   Temp 97.7 °F (36.5 °C) (Oral)   Resp 15   Ht 5' 2\" (1.575 m)   Wt 200 lb (90.7 kg)   SpO2 92%   BMI 36.58 kg/m²     Last documented pain score (0-10 scale): Pain Level: 0  Last Weight:   Wt Readings from Last 1 Encounters:   01/18/22 200 lb (90.7 kg)     Mental Status:  oriented, alert, coherent, and logical    IV Access:  - None    Nursing Mobility/ADLs:  Walking   Assisted  Transfer  Assisted  Bathing  Assisted  Dressing  Assisted  Toileting  Assisted  Feeding  Independent  Med Admin  Assisted  Med Delivery   whole    Wound Care Documentation and Therapy:        Elimination:  Continence: Bowel: Yes  Bladder: No  Urinary Catheter: None   Colostomy/Ileostomy/Ileal Conduit: No       Date of Last BM: 1/18/22    Intake/Output Summary (Last 24 hours) at 1/21/2022 1615  Last data filed at 1/21/2022 0500  Gross per 24 hour   Intake --   Output 900 ml   Net -900 ml     I/O last 3 completed shifts:  In: -   Out: 1900 [Urine:1900]    Safety Concerns: At Risk for Falls    Impairments/Disabilities:      None    Nutrition Therapy:  Current Nutrition Therapy:   - Oral Diet:  Carb Control 4 carbs/meal (1800kcals/day)    Routes of Feeding: Oral  Liquids: Thin Liquids  Daily Fluid Restriction: no  Last Modified Barium Swallow with Video (Video Swallowing Test): not done    Treatments at the Time of Hospital Discharge:   Respiratory Treatments: ***  Oxygen Therapy:  is not on home oxygen therapy.   Ventilator:    - No ventilator support    Rehab Therapies: Physical Therapy and Occupational Therapy  Weight Bearing Status/Restrictions: No weight bearing restirctions  Other Medical Equipment (for information only, NOT a DME order):  wheelchair  Other Treatments: skilled nursing, HHA services    Patient's personal belongings (please select all that are sent with patient):  Nate    RN SIGNATURE:  rosio OCASIO    CASE MANAGEMENT/SOCIAL WORK SECTION    Inpatient Status Date: 1-    Readmission Risk Assessment Score:  Readmission Risk              Risk of Unplanned Readmission:  39           Discharging to Facility/ Agency   Name: [facility]  Address:  Phone:  Fax:    Dialysis Facility (if applicable)   Name:  Address:  Dialysis Schedule:  Phone:  Fax:    / signature: Electronically signed by Dawn Sequeira RN on 1/21/22 at 5:12 PM EST    PHYSICIAN SECTION    Prognosis: Fair    Condition at Discharge: Stable    Rehab Potential (if transferring to Rehab): Fair    Recommended Labs or Other Treatments After Discharge: PT/OT, skilled nursing, HHA services  Check daily weight. Lasix increased to 40mg daily for ten days. Go back to 20mg (half tablet) daily after that. Your insulin dose was increased because of increased blood sugar from the steroids that you are getting for the treatment of COVID. Once you are off dexamethasone, go back to your home dose of Lantus (42U nightly) and monitor blood sugar closely in coordination with your PCP. Monitor vitals    Physician Certification: I certify the above information and transfer of Lorena Bruner  is necessary for the continuing treatment of the diagnosis listed and that she requires Home Care for greater 30 days.      Update Admission H&P: No change in H&P    PHYSICIAN SIGNATURE:  Electronically signed by Sandy Coulter MD on 1/21/22 at 4:16 PM EST

## 2022-01-21 NOTE — CARE COORDINATION
Transitional planning-attempted to call patient in her room-no answer. Plan was home with [facility].    [id number] called [facility] and talked with Joel Bhandari her of patient's discharge.  They will pull needed information from [facility]

## 2022-01-21 NOTE — PROGRESS NOTES
CLINICAL PHARMACY NOTE: MEDS TO BEDS    Total # of Prescriptions Filled: 2   The following medications were delivered to the patient:  · basaglar kwikpen insulin  · Dexamethasone 6mg tablets    Additional Documentation: meds delivered to the rn on 01.21.22 at 17:23 co pay $10.77, paid with a card on the clover. I let the nurse know that they also wrote for a lasix rx but it was too soon to fill but the directions changed. The patient is to follow the directions on the green sheet not what is on her bottle. The RN confirmed that she would explain this to the pt.

## 2022-01-21 NOTE — PROGRESS NOTES
[facility] - Progress Note   COVID 19 Patient  Today's Date and Time: 1/21/2022, 12:26 PM    Impression :     · COVID 19 Confirmed Infection  · Was treated last week from 1/8-->1/10   · Covid tests: 1/8/21 positive. · CAD s/p PTCA/PEPITO to LAD in October 2021  · CHF with EF 45%  · Atrial flutter  · Diabetes  · Hyperliidemia  · HTN  · Thyroid cancer s/p thyroidectomy    Recommendations:   · Antibiotic treatment:  · Monitor off antibiotics  · Did receive Keflex for E. Coli UTI and finished course on 1/17. · Covid Rx:    · Remdesivir: Not indicated  · Decadron--yes, 6mg IV q24 hours for 10 doses  · Actemra--Not indicated  · Monoclonal antibodies--out of window      Medical Decision Making/Summary/Discussion:1/21/2022     · Patient admitted with previously diagnosed and treated COVID 19 infection  · Given Decadron for worsening COVID symptoms. Infection Control Recommendations   · Universal Precautions  · Airborne isolation  · Droplet Isolation  · Isolate until 1/29/22    Antimicrobial Stewardship Recommendations     · Simplification of therapy  · Targeted therapy    Coordination of Outpatient Care:   · Estimated Length of IV antimicrobials:TBD  · Patient will need Midline Catheter Insertion: TBD  · Patient will need PICC line Insertion: No  · Patient will need: Home IV , [place],  SNF,  LTAC:TBD  · Patient will need outpatient wound care:No    Chief complaint/reason for consultation:   · Concern for COVID infection      History of Present Illness:   Zakiya Galarza is a 82y.o.-year-old female who was initially admitted on 1/18/2022. Patient seen at the request of Dr. Soo Casillas. INITIAL HISTORY:    Patient presented to ED with worsening SOB. Last hospital visit    She was recently treated here for COVID, positive on 1/8/22.  She was asymptomatic at that time and did not receive Remdesivir due to BRIA, did not receive Decadron, and did not qualify for Actemra or monoclonal antibodies. She received her J+J vaccine in May 2021. Received booster upon discharge on 1/11. She was discharged on 1/10 with cephalexin due to E.coli UTI. She finished this regimen on 1/17/22. ED Course:    Presented to ED with SOB, vomiting, diarrhea, and failure to thrive at home. Hypoxic in ED 88% O2, put on 2L nasal cannula which improved saturation. Hemodynamically stable vitals  Afebrile    CXR 1/19:      CURRENT EVALUATION : 1/21/2022    Afebrile                                                Borderline hypotension    The patient is doing well on room air today. Lower extremity edema is improving. No acute issues noted  She is stable for discharge ID-wise    Patient exhibiting respiratory distress. No  Respiratory secretions: No    Patient receiving 1.5L-->1.0 L NC--> RA. RR 18-->16-->15  02 sat 95%-->93-->92%    NEWS Score: 0-4 Low risk group; 5-6: Medium risk group; 7 or above: High risk group  Parameters 3 2 1 0 1 2 3   Age    < 65   ? 65   RR ? 8  9-11 12-20  21-24 ? 25   O2 Sats ?  91 92-93 94-95 ? 96      Suppl O2  Yes  No      SBP ? 90  101-110 111-219   ? 220   HR ? 40  41-50 51-90  111-130 ? 131   Consciousness    Alert   Drowsiness, lethargy, or confusion   Temperature ? 35.0 C (95.0 F)  35.1-36.0 C 95.1-96.9 F 36.1-38.0 C 97.0-100.4 F 38.1-39.0 C 100.5-102.3 F ? 39.1 C ? 102.4 F      NEWS Score:   1-19-22: 5, medium risk    Overall Daily Picture:    Improving    Presence of secondary bacterial Infection:  No   Additional antibiotics: no    Labs, X rays reviewed: 1/21/2022    BUN: 26-->24  Cr: 1.34-->1.18    WBC: 6.4-->5.6  Hb: 11.9-->11.2  Plat:  187-->185    Absolute Neutrophils:4.36  Absolute Lymphocytes:1.34  Neutrophil/Lymphocyte Ratio: 3.255 Low risk    CRP:   · 1-8-22: 17.1  · 1-19-22:   Ferritin: 243  LDH:     Pro Calcitonin:      Cultures:  Urine:  ·   Blood:  ·   Sputum :  ·   Wound:       CXR:        CAT:      Discussed with patient, RN, CC, IM. I have personally reviewed the past medical history, past surgical history, medications, social history, and family history, and I have updated the database accordingly.   Past Medical History:     Past Medical History:   Diagnosis Date    Arthritis     At[facility] flutter ([facility]     CAD (coronary artery disease)     CHF (congestive heart failure) (HCC)     Diabetes ([facility]     [facility]     Hyperlipidemia     Hypertension     Psychiatric problem     Thyroid cancer ([facility]     S/P thyroidectomy; on synthroid    Thyroid disease     hypothyroid    Type 2 diabetes mellitus without complication, with long-term current use of insulin ([facility] 7/4/2017       Past Surgical  History:     Past Surgical History:   Procedure Laterality Date    APPENDECTOMY      BACK SURGERY      CHOLECYSTECTOMY      CORONARY ANGIOPLASTY WITH STENT PLACEMENT      HYSTERECTOMY      THYROIDECTOMY      UPPER GASTROINTESTINAL ENDOSCOPY  4/14/2021    EGD BIOPSY performed by Kirstie Meléndez MD at [address] [place] [address] ENDOSCOPY  4/14/2021    EGD CONTROL HEMORRHAGE performed by Kirstie Meléndez MD at [facility]       Medications:      insulin glargine  30 Units SubCUTAneous BID    aspirin  81 mg Oral Daily    atorvastatin  80 mg Oral Daily    clopidogrel  75 mg Oral Daily    vitamin B-12  500 mcg Oral Daily    ferrous sulfate  325 mg Oral BID WC    levothyroxine  150 mcg Oral Daily    metoprolol tartrate  12.5 mg Oral BID    sertraline  100 mg Oral Daily    alogliptin  12.5 mg Oral Daily    sodium chloride flush  5-40 mL IntraVENous 2 times per day    heparin (porcine)  5,000 Units SubCUTAneous 3 times per day    Vitamin D  2,000 Units Oral Daily    dexamethasone  6 mg IntraVENous Q24H    furosemide  40 mg IntraVENous Daily    insulin lispro  0-12 Units SubCUTAneous TID WC    insulin lispro  0-6 Units SubCUTAneous Nightly    albuterol sulfate HFA  2 puff Inhalation 4x daily    tiotropium  2 puff Inhalation Daily       Social History:     Social History     Socioeconomic History    Marital status:      Spouse name: Not on file    Number of children: Not on file    Years of education: Not on file    Highest education level: Not on file   Occupational History    Not on file   Tobacco Use    Smoking status: Former Smoker    Smokeless tobacco: Never Used   Vaping Use    Vaping Use: Never used   Substance and Sexual Activity    Alcohol use: No    Drug use: No    Sexual activity: Not Currently   Other Topics Concern    Not on file   Social History Narrative    Not on file     Social Determinants of Health     Financial Resource Strain: Medium Risk    Difficulty of Paying Living Expenses: Somewhat hard   Food Insecurity: Food Insecurity Present    Worried About Running Out of Food in the Last Year: Sometimes true    Portia of Food in the Last Year: Often true   Transportation Needs:     Lack of Transportation (Medical): Not on file    Lack of Transportation (Non-Medical):  Not on file   Physical Activity:     Days of Exercise per Week: Not on file    Minutes of Exercise per Session: Not on file   Stress:     Feeling of Stress : Not on file   Social Connections:     Frequency of Communication with Friends and Family: Not on file    Frequency of Social Gatherings with Friends and Family: Not on file    Attends Sikhism Services: Not on file    Active Member of [address] [place] [address] [employer] or Organizations: Not on file    Attends Club or Organization Meetings: Not on file    Marital Status: Not on file   Intimate Partner Violence:     Fear of Current or Ex-Partner: Not on file    Emotionally Abused: Not on file    Physically Abused: Not on file    Sexually Abused: Not on file   Housing Stability:     Unable to Pay for Housing in the Last Year: Not on file    Number of [place] in the Last Year: Not on file    Unstable Housing in the Last Year: Not on file       Family History:     Family History   Problem Relation Age of Onset    Cancer Mother     Cancer Father     Cancer Brother     Diabetes Brother         Allergies:   Strawberry extract and Tape [adhesive tape]     Review of Systems:       Constitutional: No fevers or chills. No systemic complaints  Head: No headaches  Eyes: No double vision or blurry vision. No conjunctival inflammation. ENT: No sore throat or runny nose. . No hearing loss, tinnitus or vertigo. Cardiovascular: No chest pain or palpitations. Shortness of breath. No GIRON  Lung: Shortness of breath, no cough. No sputum production  Abdomen: No nausea, vomiting, diarrhea, or abdominal pain. Vonne Dach No cramps. Genitourinary: No increased urinary frequency, or dysuria. No hematuria. No suprapubic or CVA pain  Musculoskeletal: No muscle aches or pains. No joint effusions or deformities. Swelling and L calf pain. Hematologic: No bleeding or bruising. Neurologic: No headache, weakness, numbness, or tingling. Integument: No rash, no ulcers. Psychiatric: No depression. Endocrine: No polyuria, no polydipsia, no polyphagia. Physical Examination :     Patient Vitals for the past 8 hrs:   BP Temp Temp src Pulse Resp SpO2   01/21/22 1155 (!) 109/49 97.7 °F (36.5 °C) Oral 56 15 92 %   01/21/22 0743 (!) 107/49 98.6 °F (37 °C) Oral 56 16 94 %     General Appearance: Awake, alert, and in no apparent distress  Head:  Normocephalic, no trauma  Eyes: Pupils equal, round, reactive to light; sclera anicteric; conjunctivae pink. No embolic phenomena. ENT: Oropharynx clear, without erythema, exudate, or thrush. No tenderness of sinuses. Mouth/throat: mucosa pink and moist. No lesions. Dentition in good repair. Neck:Supple, without lymphadenopathy. Thyroid normal, No bruits. Pulmonary/Chest: Clear to auscultation, without wheezes, rales, or rhonchi. No dullness to percussion. Cardiovascular: Regular rate and rhythm without murmurs, rubs, or gallops.    Abdomen: Soft, non tender. Bowel sounds normal. No organomegaly  All four Extremities: No cyanosis, clubbing, edema, or effusions. +4/4 pitting edema bilaterally. L calf pain present upon squeezing of L calf. Neurologic: No gross sensory or motor deficits. Skin: Warm and dry with good turgor. Stasis dermatitis rashes present bilaterally. Medical Decision Making -Laboratory:   I have independently reviewed/ordered the following labs:    CBC with Differential:   Recent Labs     01/18/22 2112 01/20/22  0613   WBC 6.4 5.6   HGB 11.9 11.2*   HCT 36.2* 34.1*   PLT See Reflexed IPF Result 185   LYMPHOPCT 21* 20*   MONOPCT 6 8     BMP:   Recent Labs     01/18/22 2112 01/20/22  0613   * 136   K 4.1 4.2   CL 94* 98   CO2 23 24   BUN 26* 24*   CREATININE 1.34* 1.18*     Hepatic Function Panel:   Recent Labs     01/18/22 2112 01/20/22  0613   PROT 7.2 6.4   LABALBU 3.6 3.0*   BILIDIR 0.17  --    IBILI 0.32  --    BILITOT 0.49 0.36   ALKPHOS 164* 145*   ALT 36* 24   AST 39* 23     No results for input(s): RPR in the last 72 hours. No results for input(s): HIV in the last 72 hours. No results for input(s): BC in the last 72 hours.   Lab Results   Component Value Date    MUCUS NOT REPORTED 01/08/2022    PH 7.20 10/05/2012    RBC 4.03 01/20/2022    RBC 4.41 02/03/2012    TRICHOMONAS NOT REPORTED 01/08/2022    WBC 5.6 01/20/2022    YEAST NOT REPORTED 01/08/2022    TURBIDITY Clear 01/08/2022     Lab Results   Component Value Date    CREATININE 1.18 01/20/2022    GLUCOSE 239 01/20/2022    GLUCOSE 380 05/14/2012       Medical Decision Making-Imaging:       Medical Decision Kisneb-Uahetfdk-Qdbqa:       Medical Decision Making-Other:     Note:  · Labs, medications, radiologic studies were reviewed with personal review of films  · Large amounts of data were reviewed  · Discussed with nursing Staff, Discharge planner  · Infection Control and Prevention measures reviewed  · All prior entries were reviewed  · Administer medications as ordered  · Prognosis: Guarded  · Discharge planning reviewed      Thank you for allowing us to participate in the care of this patient. Please call with questions. STEFAN Vega    ATTESTATION:    I have discussed the case, including pertinent history and exam findings with the APRN. I have evaluated the  History, physical findings and pictures of the patient and the key elements of the encounter have been performed by me. I have reviewed the laboratory data, other diagnostic studies and discussed them with the APRN. I have updated the medical record where necessary. I agree with the assessment, plan and orders as documented by the APRN.     Xavi Singer MD.

## 2022-01-21 NOTE — PROGRESS NOTES
Home Oxygen Evaluation    Home Oxygen Evaluation completed. Upon my arrival patient is on room air    Resting SpO2 on room air = 92%  Patient is unable to walk so I had her move her arms.   SpO2 on room air with exercise = 91%           DELMY ALONZO RCP  3:22 PM

## 2022-01-21 NOTE — PROGRESS NOTES
[facility]  Internal Medicine Teaching Residency Program  Inpatient Daily Progress Note  ______________________________________________________________________________    Patient: Bri Costello  YOB: 1935   DKT:[id number]    Acct: [de-identified]     Room: 2011/2011-02  Admit date: 1/18/2022  Today's date: 01/21/22  Number of days in the hospital: 2    SUBJECTIVE   Admitting Diagnosis: COVID  CC: Worsening shortness of breath  Patient seen and examined at bedside. Chart and results reviewed. Currently saturating on room air. SPO2 96%. Blood pressure is stable 110/60. Labs reviewed and evaluated. Patient is stable for discharge. Will do home oxygen evaluation. ROS:  Constitutional:  negative for chills, fevers, sweats  Respiratory:  negative for cough, dyspnea on exertion, hemoptysis, shortness of breath, wheezing  Cardiovascular:  negative for chest pain, chest pressure/discomfort, lower extremity edema, palpitations  Gastrointestinal:  negative for abdominal pain, constipation, diarrhea, nausea, vomiting  Neurological:  negative for dizziness, headache  BRIEF HISTORY     The patient is a pleasant 82 y.o. female with past medical history of CAD s/p PTCA/PEPITO to LAD on 10/8/2012 , hypothyroidism status post thyroidectomy due to thyroid cancer, hypertension, diabetes on insulin, chronic diastolic heart failure, hyperlipidemia is admitted with worsening shortness of breath cough fatigue. She also admits to nausea vomiting and diarrhea. On evaluation in the ER she was found hypoxic 88% and started on 4 L nasal cannula improved SPO2 greater than 94%. Patient was found afebrile. Labs reviewed: CRP 66.  Chest x-ray showed worsening of pulmonary opacities. Of note, she was recently discharged from [place] [address]  for mild COVID infection and UTI and was sent on p.o. Keflex. 1/20/2022. Echo today. 1/21/2022.   EKG showed ejection fraction of 55% with grade 1 diastolic dysfunction. OBJECTIVE     Vital Signs:  BP (!) 109/49   Pulse 56   Temp 97.7 °F (36.5 °C) (Oral)   Resp 15   Ht 5' 2\" (1.575 m)   Wt 200 lb (90.7 kg)   SpO2 92%   BMI 36.58 kg/m²     Temp (24hrs), Av.8 °F (36.6 °C), Min:97.5 °F (36.4 °C), Max:98.6 °F (37 °C)    In: -   Out: 900 [Urine:900]    Physical Exam:  Constitutional: This is a well developed, well nourished, 35-39.9 - Obesity Grade II 82y.o. year old female who is alert, oriented, cooperative and in no apparent distress. Head:normocephalic and atraumatic. EENT:  PERRLA. No conjunctival injections. Septum was midline, mucosa was without erythema, exudates or cobblestoning. No thrush was noted. Neck: Supple without thyromegaly. No elevated JVP. Trachea was midline. Respiratory: Chest was symmetrical without dullness to percussion. Breath sounds bilaterally were clear to auscultation. There were no wheezes, rhonchi or rales. There is no intercostal retraction or use of accessory muscles. No egophony noted. Cardiovascular: Regular without murmur, clicks, gallops or rubs. Abdomen: Slightly rounded and soft without organomegaly. No rebound, rigidity or guarding was appreciated. Lymphatic: No lymphadenopathy. Musculoskeletal: Normal curvature of the spine. No gross muscle weakness. Extremities:  No lower extremity edema, ulcerations, tenderness, varicosities or erythema. Muscle size, tone and strength are normal.  No involuntary movements are noted. Skin:  Warm and dry. Good color, turgor and pigmentation. No lesions or scars.   No cyanosis or clubbing  Neurological/Psychiatric: The patient's general behavior, level of consciousness, thought content and emotional status is normal.        Medications:  Scheduled Medications:    [START ON 2022] furosemide  40 mg Oral Daily    insulin glargine  30 Units SubCUTAneous BID    aspirin  81 mg Oral Daily    atorvastatin  80 mg Oral Daily    clopidogrel  75 mg Oral Daily    vitamin B-12  500 mcg Oral Daily    ferrous sulfate  325 mg Oral BID WC    levothyroxine  150 mcg Oral Daily    metoprolol tartrate  12.5 mg Oral BID    sertraline  100 mg Oral Daily    alogliptin  12.5 mg Oral Daily    sodium chloride flush  5-40 mL IntraVENous 2 times per day    heparin (porcine)  5,000 Units SubCUTAneous 3 times per day    Vitamin D  2,000 Units Oral Daily    dexamethasone  6 mg IntraVENous Q24H    insulin lispro  0-12 Units SubCUTAneous TID WC    insulin lispro  0-6 Units SubCUTAneous Nightly    albuterol sulfate HFA  2 puff Inhalation 4x daily    tiotropium  2 puff Inhalation Daily     Continuous Infusions:    sodium chloride      dextrose       PRN Medicationssodium chloride flush, 5-40 mL, PRN  sodium chloride, 25 mL, PRN  ondansetron, 4 mg, Q8H PRN   Or  ondansetron, 4 mg, Q6H PRN  polyethylene glycol, 17 g, Daily PRN  acetaminophen, 650 mg, Q6H PRN   Or  acetaminophen, 650 mg, Q6H PRN  glucose, 15 g, PRN  dextrose, 12.5 g, PRN  glucagon (rDNA), 1 mg, PRN  dextrose, 100 mL/hr, PRN  dextromethorphan-guaiFENesin, 5 mL, Q4H PRN        Diagnostic Labs:  CBC:   Recent Labs     01/18/22 2112 01/20/22 0613   WBC 6.4 5.6   RBC 4.26 4.03   HGB 11.9 11.2*   HCT 36.2* 34.1*   MCV 85.0 84.6   RDW 14.1 13.8   PLT See Reflexed IPF Result 185     BMP:   Recent Labs     01/18/22 2112 01/20/22 0613   * 136   K 4.1 4.2   CL 94* 98   CO2 23 24   BUN 26* 24*   CREATININE 1.34* 1.18*     BNP: No results for input(s): BNP in the last 72 hours. PT/INR:   Recent Labs     01/20/22 0613   PROTIME 11.7   INR 1.1     APTT:   Recent Labs     01/20/22 0613   APTT 31.9*     CARDIAC ENZYMES: No results for input(s): CKMB, CKMBINDEX, TROPONINI in the last 72 hours.     Invalid input(s): CKTOTAL;3  FASTING LIPID PANEL:  Lab Results   Component Value Date    CHOL 173 06/29/2020    HDL 52 06/29/2020    TRIG 337 (H) 06/29/2020     LIVER PROFILE:   Recent Labs     01/18/22  2112 01/20/22  0613   AST 39* 23   ALT 36* 24   BILIDIR 0.17  --    BILITOT 0.49 0.36   ALKPHOS 164* 145*      MICROBIOLOGY:   Lab Results   Component Value Date/Time    CULTURE NO GROWTH 5 DAYS 01/09/2022 12:32 PM    CULTURE NO GROWTH 5 DAYS 01/09/2022 12:32 PM       Imaging:    XR CHEST PORTABLE    Result Date: 1/18/2022  1. New bilateral pulmonary opacities, consistent with COVID-19 pulmonary disease given the clinical history 2. Cardiomegaly     XR HIP 2-3 VW W PELVIS RIGHT    Result Date: 1/18/2022  No acute osseous abnormality       ASSESSMENT & PLAN     ASSESSMENT / PLAN:     Principal Problem:    Pneumonia due to COVID-19 virus  Plan:   -Currently saturating on room air. CRP is down to 60 from 66. -ID on board, recommend 6 mg Decadron for 10 days.  -Did not fulfill criteria for tocilizumab. And out of window for remdesivir.  -Stable on discharge standpoint. We will do home oxygen evaluation. Will change IV Decadron to p.o. Decadron. Active Problems:    Acute on chronic diastolic congestive heart failure (HCC)  Plan:   -On IV Lasix 40 mg once daily.   -Net I's/O: -1700 mL. -Pedal edema is improved. Echo on 1/20/2022:  Summary   Left ventricle is normal in size. Global left ventricular systolic function   is normal. Calculated ejection fraction 54% by Valdez's method. Grade I (mild) left ventricular diastolic dysfunction. Aortic valve is trileaflet. Aortic valve is mildly sclerotic but opens well. Left pleural effusion      Type 2 diabetes mellitus without complication, with long-term current use of insulin ([facility])  Plan:   -Ordered HbA1c. Previous HbA1c was 14, 2 months ago. -Change Lantus to 30 units twice daily. Home dose was 42 units once daily. On medium dose insulin sliding scale. -POC glucose every 6 hours.  -Hypoglycemia protocol.       CAD (coronary artery disease)  Plan:   -Status post PEPITO to LAD on 10/08/2012  -On ASA 81, Plavix 75 once daily.  -Lopressor 12.5 mg twice daily.    Essential hypertension:  -Stable on Lopressor 12.5 mg twice daily. Hyperlipidemia:  -On atorvastatin 80 mg once daily. Obesity, Class III, BMI 40-49.9 (morbid obesity) (HCC)    DVT ppx : Heparin 5000 units three times daily. GI ppx: Not needed    PT/OT: Consulted  Discharge Planning / SW: Ongoing  Patient is stable on discharge standpoint. Will do home oxygen evaluation. Olivia Alejandre MD  Internal Medicine Resident, PGY-1  [address], [place]  1/21/2022, 3:28 PM

## 2022-01-30 NOTE — DISCHARGE SUMMARY
[facility]     Department of Internal Medicine - Staff Internal Medicine Teaching Service    INPATIENT DISCHARGE SUMMARY      Patient Identification:  Nia Dela Cruz is a 82 y.o. female. :  1935  MRN: [id number]     Acct: [de-identified]   PCP: Mackenzie Conti MD  Admit Date:  2022  Discharge date and time: 2022  6:42 PM   Attending Provider: No att. providers found                                     [address] Problem Lists:  Principal Problem:    COVID  Active Problems:    Acute on chronic diastolic congestive heart failure (HCC)    Type 2 diabetes mellitus without complication, with long-term current use of insulin (HCC)    CAD (coronary artery disease)    Obesity, Class III, BMI 40-49.9 (morbid obesity) ([address]    Venous stasis  Resolved Problems:    * No resolved hospital problems. *      HOSPITAL STAY     Brief Inpatient course: Nia Dela Cruz is a 82 y.o. female who was admitted for the management of COVID, presented to the emergency department with worsening shortness of breath and was found hypoxic 88% on room air, started on 6 L nasal cannula. Labs reviewed elevated CRP 66, x-ray concerning for worsening pulmonary infiltrates. ID consult was placed for the Covid management, recommend 6 mg of Decadron for 10 days. She was not fulfilling criteria for Tocilizumab and was out of window for remdesivir. In addition, she was also treated with IV Lasix for acute on chronic diastolic heart failure and was subsequently changed to p.o. Lasix. Regarding her diabetes mellitus, HbA1c was very high at 12.6. Blood glucose was maintained with basal bolus insulin sliding scale. Patient improved significantly. Saturating well on room air. Discharge clearance was taken from infectious disease.     Procedures/ Significant Interventions:    None    Consults:     Consults:     Final Specialist Recommendations/Findings:   IP CONSULT TO HOSPITALIST  IP CONSULT TO INFECTIOUS DISEASES  IP CONSULT TO CASE MANAGEMENT  IP CONSULT TO HOME CARE NEEDS      Any Hospital Acquired Infections: none    Discharge Functional Status:  stable    DISCHARGE PLAN     Disposition: home    Patient Instructions:   Discharge Medication List as of 1/21/2022  6:10 PM      START taking these medications    Details   dexamethasone (DECADRON) 6 MG tablet Take 1 tablet by mouth daily for 7 days, Disp-7 tablet, R-0Normal         CONTINUE these medications which have CHANGED    Details   furosemide (LASIX) 20 MG tablet Take 2 tablets by mouth See Admin Instructions AND 1 tablet See Admin Instructions. Do all this for 10 days. 40mg daily for 10 days. Followed by 20mg daily thereafter. ., Disp-60 tablet, R-3Normal      insulin glargine (BASAGLAR KWIKPEN) 100 UNIT/ML injection pen Inject 25 Units into the skin 2 times daily, Disp-5 pen, R-5Normal         CONTINUE these medications which have NOT CHANGED    Details   pantoprazole (PROTONIX) 40 MG tablet Take 1 tablet by mouth daily, Disp-60 tablet, R-0Normal      levothyroxine (SYNTHROID) 150 MCG tablet Take 1 tablet by mouth Daily, Disp-30 tablet, R-3Normal      metoprolol tartrate (LOPRESSOR) 25 MG tablet Take 0.5 tablets by mouth 2 times daily, Disp-180 tablet, R-3Normal      FEROSUL 325 (65 Fe) MG tablet TAKE 1 TABLET BY MOUTH 2 TIMES DAILY, Disp-60 tablet, R-3Normal      !! Continuous Blood Gluc Sensor (FREESTYLE KERRY 2 SENSOR) MISC 1 Device by Does not apply route every 14 days, Disp-2 each, R-2Normal      !!  Continuous Blood Gluc  (FREESTYLE KERRY 2 READER) CLAUDIO 1 Device by Does not apply route daily, Disp-1 each, R-0Normal      Cyanocobalamin (VITAMIN B-12) 1000 MCG extended release tablet TAKE 1 TAB BY MOUTH ONCE A DAY , Disp-30 tablet, R-11Normal      atorvastatin (LIPITOR) 80 MG tablet TAKE 1 TABLET BY MOUTH DAILY , Disp-30 tablet, R-4Normal      vitamin D3 (CHOLECALCIFEROL) 25 MCG (1000 UT) TABS tablet TAKE 1 TAB BY MOUTH ONCE A DAY , Disp-30 tablet, R-4Normal      clopidogrel (PLAVIX) 75 MG tablet TAKE 1 TAB BY MOUTH ONCE A DAY, Disp-90 tablet, R-3Normal      loratadine (CLARITIN) 10 MG tablet TAKE 1 TABLET BY MOUTH TWICE A DAY AS NEEDED, Disp-90 tablet, R-1Normal      sertraline (ZOLOFT) 100 MG tablet Take 1 tablet by Mouth Once a Day, Disp-90 tablet, R-3Normal      SITagliptin (JANUVIA) 100 MG tablet TAKE 1 TAB BY MOUTH ONCE A DAY, Disp-90 tablet, R-3Normal      blood glucose monitor strips Test 2 times a day & as needed for symptoms of irregular blood glucose. Dispense sufficient amount for indicated testing frequency plus additional to accommodate PRN testing needs. , Disp-100 strip, R-1, Normal      !! Continuous Blood Gluc  (FREESTYLE KERRY 14 DAY READER) CLAUDIO 1 Device by Does not apply route 4 times daily, Disp-1 Device,R-0Normal      !! Continuous Blood Gluc Sensor (FREESTYLE KERRY 2 SENSOR SYSTM) MISC 4 Devices by Does not apply route every 7 days, Disp-4 each,R-0Normal      Lancets MISC 2 TIMES DAILY Starting Fri 7/5/2019, Disp-100 each, R-11, Normal      nystatin (MYCOSTATIN) 924360 UNIT/GM cream Apply topically 2 times daily. , Disp-1 Tube, R-1, Normal      docusate sodium (COLACE) 100 MG capsule Take 1 capsule by mouth daily as needed for Constipation, Disp-30 capsule, R-0Normal      aspirin 81 MG chewable tablet Take 1 tablet by mouth daily, Disp-30 tablet, R-3       !! - Potential duplicate medications found. Please discuss with provider. STOP taking these medications       cephALEXin (KEFLEX) 250 MG capsule Comments:   Reason for Stopping:               Activity: activity as tolerated    Diet: cardiac diet    Follow-up:    No follow-up provider specified. Patient Instructions:   Check daily weight. Lasix increased to 40mg daily for ten days. Go back to 20mg (half tablet) daily after that.   Your insulin dose was increased because of increased blood sugar from the steroids that you are getting for the treatment of COVID. Once you are off dexamethasone, go back to your home dose of Lantus (42U nightly) and monitor blood sugar closely in coordination with your PCP. Follow up labs: None  Follow up imaging: None    Note that over 30 minutes was spent in preparing discharge papers, discussing discharge with patient, medication review, etc.      Cherylene Bedford, MD,   Internal Medicine Resident, PGY-1  [address], [place]  1/29/2022, 10:35 PM

## 2022-02-07 DIAGNOSIS — M79.89 LEFT LEG SWELLING: ICD-10-CM

## 2022-02-07 RX ORDER — FUROSEMIDE 40 MG/1
40 TABLET ORAL DAILY
Qty: 30 TABLET | Refills: 4 | Status: SHIPPED | OUTPATIENT
Start: 2022-02-07 | End: 2022-04-12 | Stop reason: SDUPTHER

## 2022-02-08 DIAGNOSIS — I25.10 CORONARY ARTERY DISEASE DUE TO LIPID RICH PLAQUE: ICD-10-CM

## 2022-02-08 DIAGNOSIS — I25.83 CORONARY ARTERY DISEASE DUE TO LIPID RICH PLAQUE: ICD-10-CM

## 2022-02-08 DIAGNOSIS — E55.9 VITAMIN D DEFICIENCY: ICD-10-CM

## 2022-02-08 RX ORDER — ATORVASTATIN CALCIUM 80 MG/1
80 TABLET, FILM COATED ORAL DAILY
Qty: 30 TABLET | Refills: 0 | Status: SHIPPED | OUTPATIENT
Start: 2022-02-08 | End: 2022-03-16 | Stop reason: SDUPTHER

## 2022-02-08 RX ORDER — MELATONIN
Qty: 30 TABLET | Refills: 0 | Status: SHIPPED | OUTPATIENT
Start: 2022-02-08 | End: 2022-03-16 | Stop reason: SDUPTHER

## 2022-02-08 RX ORDER — LORATADINE 10 MG/1
TABLET ORAL
Qty: 60 TABLET | Refills: 0 | Status: SHIPPED | OUTPATIENT
Start: 2022-02-08 | End: 2022-05-18

## 2022-02-14 ENCOUNTER — TELEPHONE (OUTPATIENT)
Dept: INTERNAL MEDICINE CLINIC | Age: 87
End: 2022-02-14

## 2022-02-14 NOTE — TELEPHONE ENCOUNTER
----- Message from Fabricio Marlow sent at 2/12/2022 12:08 PM EST -----  Subject: Message to Provider    QUESTIONS  Information for Provider? Berna from [address] calling to notify Dr Zackery Garcia that patient had a fall with no injury. They evaluated her and she   is fine, its just protocol to call  and notify.   ---------------------------------------------------------------------------  --------------  [address]  What is the best way for the office to contact you? Do not leave any   message, patient will call back for answer  Preferred Call Back Phone Number? [phone]  ---------------------------------------------------------------------------  --------------  SCRIPT ANSWERS  Relationship to Patient? Third Party  Representative Name?  Berna from [facility]

## 2022-02-14 NOTE — TELEPHONE ENCOUNTER
I have not seen this patient since June of last year.   Deep Rodriguez has seen patient for last 3 visit, please forward message to her,  She needs to make appointment

## 2022-02-14 NOTE — TELEPHONE ENCOUNTER
Mayra Bolton, RN with [facility], called regarding Parvin's blood sugars. He was at her house for a home visit about 1 hour ago and he had her check her blood sugar - it resulted at 494. Mayra Bolton asked the daughter if [facility] had been taking her insulin but she was unsure. According to Mayra Bolton he asked [facility] and she said she was taking it but then said she was not taking it. Mayra Bolton advised both [facility] and her daughter to make sure she is taking a fasting and HS blood sugar and to keep a log of the blood sugars and insulin usage.  Sara is going to follow up with patient next week about the blood sugars     Please advise

## 2022-02-15 NOTE — TELEPHONE ENCOUNTER
Please have patient schedule an office visit. She needs to be compliant with medication regimen. If she is symptomatic (fatigue, not feeling well, confusion, cold/clammy etc) instruct to go to ED.

## 2022-02-23 ENCOUNTER — OFFICE VISIT (OUTPATIENT)
Dept: INTERNAL MEDICINE CLINIC | Age: 87
End: 2022-02-23
Payer: MEDICARE

## 2022-02-23 VITALS
WEIGHT: 203.2 LBS | HEIGHT: 62 IN | DIASTOLIC BLOOD PRESSURE: 62 MMHG | BODY MASS INDEX: 37.39 KG/M2 | HEART RATE: 65 BPM | SYSTOLIC BLOOD PRESSURE: 102 MMHG | OXYGEN SATURATION: 98 %

## 2022-02-23 DIAGNOSIS — E11.8 TYPE 2 DIABETES MELLITUS WITH COMPLICATION, WITHOUT LONG-TERM CURRENT USE OF INSULIN (HCC): Primary | ICD-10-CM

## 2022-02-23 PROCEDURE — 1090F PRES/ABSN URINE INCON ASSESS: CPT | Performed by: INTERNAL MEDICINE

## 2022-02-23 PROCEDURE — 1036F TOBACCO NON-USER: CPT | Performed by: INTERNAL MEDICINE

## 2022-02-23 PROCEDURE — G8484 FLU IMMUNIZE NO ADMIN: HCPCS | Performed by: INTERNAL MEDICINE

## 2022-02-23 PROCEDURE — G8417 CALC BMI ABV UP PARAM F/U: HCPCS | Performed by: INTERNAL MEDICINE

## 2022-02-23 PROCEDURE — G8400 PT W/DXA NO RESULTS DOC: HCPCS | Performed by: INTERNAL MEDICINE

## 2022-02-23 PROCEDURE — G8427 DOCREV CUR MEDS BY ELIG CLIN: HCPCS | Performed by: INTERNAL MEDICINE

## 2022-02-23 PROCEDURE — 1123F ACP DISCUSS/DSCN MKR DOCD: CPT | Performed by: INTERNAL MEDICINE

## 2022-02-23 PROCEDURE — 99213 OFFICE O/P EST LOW 20 MIN: CPT | Performed by: INTERNAL MEDICINE

## 2022-02-23 PROCEDURE — 4040F PNEUMOC VAC/ADMIN/RCVD: CPT | Performed by: INTERNAL MEDICINE

## 2022-02-23 RX ORDER — DOCUSATE SODIUM 100 MG/1
100 CAPSULE, LIQUID FILLED ORAL DAILY PRN
Qty: 30 CAPSULE | Refills: 0 | Status: SHIPPED | OUTPATIENT
Start: 2022-02-23 | End: 2022-04-12 | Stop reason: SDUPTHER

## 2022-02-23 RX ORDER — NYSTATIN 100000 [USP'U]/G
POWDER TOPICAL
Qty: 1 EACH | Refills: 3 | Status: SHIPPED | OUTPATIENT
Start: 2022-02-23

## 2022-02-23 RX ORDER — INSULIN GLARGINE 100 [IU]/ML
30 INJECTION, SOLUTION SUBCUTANEOUS 2 TIMES DAILY
Qty: 5 PEN | Refills: 5 | Status: SHIPPED | OUTPATIENT
Start: 2022-02-23

## 2022-02-23 RX ORDER — FERROUS SULFATE 325(65) MG
TABLET ORAL
Qty: 60 TABLET | Refills: 3 | Status: SHIPPED | OUTPATIENT
Start: 2022-02-23 | End: 2022-04-12 | Stop reason: SDUPTHER

## 2022-02-23 NOTE — PROGRESS NOTES
Visit Information    Have you changed or started any medications since your last visit including any over-the-counter medicines, vitamins, or herbal medicines? no   Are you having any side effects from any of your medications? -  no  Have you stopped taking any of your medications? Is so, why? -  no    Have you seen any other physician or provider since your last visit? No  Have you had any other diagnostic tests since your last visit? No  Have you been seen in the emergency room and/or had an admission to a hospital since we last saw you? No  Have you had your routine dental cleaning in the past 6 months? no    Have you activated your [employer] account? If not, what are your barriers?  Yes     Patient Care Team:  Madelaine Foster MD as PCP - General (Internal Medicine)  Madelaine Foster MD as PCP - [facility]  Lupe Lafleur MD as Consulting Physician (Gastroenterology)  Niurka Erickson MD as Consulting Physician (Pulmonology)    Medical History Review  Past Medical, Family, and Social History reviewed and does contribute to the patient presenting condition    Health Maintenance   Topic Date Due    DTaP/Tdap/Td vaccine (1 - Tdap) Never done    Shingles Vaccine (1 of 2) Never done    Diabetic retinal exam  09/02/2016    Diabetic foot exam  01/07/2021    Lipid screen  06/29/2021    COVID-19 Vaccine (2 - Booster for Patricia series) 07/08/2021    Depression Monitoring  06/10/2022    Annual Wellness Visit (AWV)  06/11/2022    A1C test (Diabetic or Prediabetic)  07/20/2022    TSH testing  01/08/2023    Potassium monitoring  01/20/2023    Creatinine monitoring  01/20/2023    Flu vaccine  Completed    Pneumococcal 65+ years Vaccine  Completed    DEXA (modify frequency per FRAX score)  Addressed    Hepatitis A vaccine  Aged Out    Hib vaccine  Aged Out    Meningococcal (ACWY) vaccine  Aged Out

## 2022-02-23 NOTE — PROGRESS NOTES
[address]  [place] [address]  Dept: [phone]  Dept Fax: [phone]     Name: Chelle Del Valle  : 1935           Chief Complaint   Patient presents with    Diabetes     A1C 12.6% 22 Patient sugars have been running from 138-478    Follow-Up from [address] [facility] positive        History of Present Illness:    HPI  Patient here for follow-up for diabetes,  Dr. Marylin Allison patient,  Patient been very noncompliant in the past,  A1c was running 13-16,  Diabetic education and medical management done in the past by Dr. Jennifer Bob,  Sugars start running better, A1c 12.6 in January,  Here to get the refills,  Also had Covid positive in the hospital,  Doing well on that,  Not on any medication at this time  Past Medical History:    Past Medical History:   Diagnosis Date    Arthritis     Atrial flutter ([address]     CAD (coronary artery disease)     CHF (congestive heart failure) ([address]     Diabetes ([address]     Diabetes mellitus ([address]     Hyperlipidemia     Hypertension     Psychiatric problem     Thyroid cancer ([address]     S/P thyroidectomy; on synthroid    Thyroid disease     hypothyroid    Type 2 diabetes mellitus without complication, with long-term current use of insulin ([address] 2017      Reviewed all health maintenance requirements and ordered appropriate tests  Health Maintenance Due   Topic Date Due    DTaP/Tdap/Td vaccine (1 - Tdap) Never done    Shingles Vaccine (1 of 2) Never done    Diabetic retinal exam  2016    Diabetic foot exam  2021    Lipid screen  2021    COVID-19 Vaccine (2 - Booster for Patricia series) 2021       Past Surgical History:    Past Surgical History:   Procedure Laterality Date    APPENDECTOMY      BACK SURGERY      CHOLECYSTECTOMY      CORONARY ANGIOPLASTY WITH STENT PLACEMENT      HYSTERECTOMY      THYROIDECTOMY      UPPER GASTROINTESTINAL ENDOSCOPY  2021    EGD BIOPSY performed by Shayna Mary MD at [address] [place] [address] ENDOSCOPY  4/14/2021    EGD CONTROL HEMORRHAGE performed by Shayna Mary MD at [facility]        Medications:      Current Outpatient Medications:     ferrous sulfate (FEROSUL) 325 (65 Fe) MG tablet, TAKE 1 TABLET BY MOUTH 2 TIMES DAILY, Disp: 60 tablet, Rfl: 3    insulin glargine (BASAGLAR KWIKPEN) 100 UNIT/ML injection pen, Inject 30 Units into the skin 2 times daily, Disp: 5 pen, Rfl: 5    docusate sodium (COLACE) 100 MG capsule, Take 1 capsule by mouth daily as needed for Constipation, Disp: 30 capsule, Rfl: 0    nystatin (MYCOSTATIN) 519669 UNIT/GM powder, Apply 3 times daily. , Disp: 1 each, Rfl: 3    atorvastatin (LIPITOR) 80 MG tablet, Take 1 tablet by mouth daily, Disp: 30 tablet, Rfl: 0    vitamin D3 (CHOLECALCIFEROL) 25 MCG (1000 UT) TABS tablet, Take one daily, Disp: 30 tablet, Rfl: 0    furosemide (LASIX) 40 MG tablet, TAKE 1 TABLET BY MOUTH DAILY, Disp: 30 tablet, Rfl: 4    pantoprazole (PROTONIX) 40 MG tablet, Take 1 tablet by mouth daily, Disp: 60 tablet, Rfl: 0    levothyroxine (SYNTHROID) 150 MCG tablet, Take 1 tablet by mouth Daily, Disp: 30 tablet, Rfl: 3    metoprolol tartrate (LOPRESSOR) 25 MG tablet, Take 0.5 tablets by mouth 2 times daily, Disp: 180 tablet, Rfl: 3    clopidogrel (PLAVIX) 75 MG tablet, TAKE 1 TAB BY MOUTH ONCE A DAY, Disp: 90 tablet, Rfl: 3    sertraline (ZOLOFT) 100 MG tablet, Take 1 tablet by Mouth Once a Day, Disp: 90 tablet, Rfl: 3    SITagliptin (JANUVIA) 100 MG tablet, TAKE 1 TAB BY MOUTH ONCE A DAY, Disp: 90 tablet, Rfl: 3    blood glucose monitor strips, Test 2 times a day & as needed for symptoms of irregular blood glucose. Dispense sufficient amount for indicated testing frequency plus additional to accommodate PRN testing needs. , Disp: 100 strip, Rfl: 1    Lancets MISC, 1 each by Does not apply route 2 times daily, Disp: 100 each, Rfl: 11    aspirin 81 MG chewable tablet, Take 1 tablet by mouth daily, Disp: 30 tablet, Rfl: 3    loratadine (CLARITIN) 10 MG tablet, TAKE 1 TABLET BY MOUTH TWICE A DAY AS NEEDED (Patient not taking: Reported on 2/23/2022), Disp: 60 tablet, Rfl: 0    furosemide (LASIX) 20 MG tablet, Take 2 tablets by mouth See Admin Instructions AND 1 tablet See Admin Instructions. Do all this for 10 days. 40mg daily for 10 days. Followed by 20mg daily thereafter. ., Disp: 60 tablet, Rfl: 3    Continuous Blood Gluc Sensor (FREESTYLE KERRY 2 SENSOR) MISC, 1 Device by Does not apply route every 14 days (Patient not taking: Reported on 2/23/2022), Disp: 2 each, Rfl: 2    Continuous Blood Gluc  (FREESTYLE KERRY 2 READER) CLAUDIO, 1 Device by Does not apply route daily (Patient not taking: Reported on 2/23/2022), Disp: 1 each, Rfl: 0    Cyanocobalamin (VITAMIN B-12) 1000 MCG extended release tablet, TAKE 1 TAB BY MOUTH ONCE A DAY  (Patient not taking: Reported on 2/23/2022), Disp: 30 tablet, Rfl: 11    Continuous Blood Gluc  (FREESTYLE KERRY 14 DAY READER) CLAUDIO, 1 Device by Does not apply route 4 times daily (Patient not taking: Reported on 2/23/2022), Disp: 1 Device, Rfl: 0    Continuous Blood Gluc Sensor (FREESTYLE KERRY 2 SENSOR SYSTM) MISC, 4 Devices by Does not apply route every 7 days (Patient not taking: Reported on 2/23/2022), Disp: 4 each, Rfl: 0    nystatin (MYCOSTATIN) 100985 UNIT/GM cream, Apply topically 2 times daily. (Patient not taking: Reported on 2/23/2022), Disp: 1 Tube, Rfl: 1    Allergies:      Strawberry extract and Tape [adhesive tape]    Social History:    Tobacco:    reports that she has quit smoking. She has never used smokeless tobacco.  Alcohol:      reports no history of alcohol use. Drug Use:  reports no history of drug use.     Family History:    Family History   Problem Relation Age of Onset    Cancer Mother     Cancer Father     Cancer Brother     Diabetes Brother        Review of Systems:    Positive and Negative as described in HPI    Constitutional:  negative for  fevers, chills, sweats, fatigue, and weight loss  HEENT:  negative for vision or hearing changes,   Respiratory:  negative for shortness of breath, cough, or congestion  Cardiovascular:  negative for  chest pain, palpitations  Gastrointestinal:  negative for nausea, vomiting, diarrhea, constipation, abdominal pain  Genitourinary:  negative for frequency, dysuria  Integument/Breast:  negative for rash, skin lesions  Musculoskeletal:  negative for muscle aches or joint pain  Neurological:  negative for headaches, dizziness, lightheadedness, numbness, pain and tingling extrimities  Behavior/Psych:  negative for depression and anxiety      Physical Exam:    Vitals:  /62   Pulse 65   Ht 5' 2\" (1.575 m)   Wt 203 lb 3.2 oz (92.2 kg)   SpO2 98%   BMI 37.17 kg/m²     General appearance - alert, well appearing, and in no acute distress  Mental status - oriented to person, place, and time with normal affect  Head - normocephalic and atraumatic  Eyes - pupils equal and reactive, extraocular eye movements intact, conjunctiva clear  Ears - hearing appears to be intact  Nose - no drainage noted  Mouth - mucous membranes moist  Neck - supple, no carotid bruits, thyroid not palpable  Chest - clear to auscultation, normal effort  Heart - normal rate, regular rhythm, no murmurs  Abdomen - soft, nontender, nondistended, bowel sounds present all four quadrants, no masses, hepatomegaly or splenomegaly  Neurological - normal speech, no focal findings or movement disorder noted, cranial nerves II through XII grossly intact  Extremities - peripheral pulses palpable, no pedal edema or calf pain with palpation  Skin - no gross lesions, rashes, or induration noted      Data:    Lab Results   Component Value Date     01/20/2022    K 4.2 01/20/2022    CL 98 01/20/2022    CO2 24 01/20/2022    BUN 24 01/20/2022    CREATININE 1.18 01/20/2022    GLUCOSE 239 01/20/2022    GLUCOSE 380 05/14/2012    PROT 6.4 01/20/2022    LABALBU 3.0 01/20/2022    LABALBU 4.0 02/03/2012    BILITOT 0.36 01/20/2022    ALKPHOS 145 01/20/2022    AST 23 01/20/2022    ALT 24 01/20/2022     Lab Results   Component Value Date    WBC 5.6 01/20/2022    RBC 4.03 01/20/2022    RBC 4.41 02/03/2012    HGB 11.2 01/20/2022    HCT 34.1 01/20/2022    MCV 84.6 01/20/2022    MCH 27.8 01/20/2022    MCHC 32.8 01/20/2022    RDW 13.8 01/20/2022     01/20/2022     02/03/2012    MPV 10.8 01/20/2022     Lab Results   Component Value Date    TSH 15.03 01/08/2022     Lab Results   Component Value Date    CHOL 173 06/29/2020    HDL 52 06/29/2020    LABA1C 12.6 01/20/2022          Assessment & Plan:     Diagnosis Orders   1. Type 2 diabetes mellitus with complication, without long-term current use of insulin ([facility])  insulin glargine (BASAGLAR KWIKPEN) 100 UNIT/ML injection pen       1. Type 2 diabetes mellitus with complication, without long-term current use of insulin (HCC)  -     insulin glargine (BASAGLAR KWIKPEN) 100 UNIT/ML injection pen; Inject 30 Units into the skin 2 times daily, Disp-5 pen, R-5Normal     DM:  Discussed in detail about the Diabetes, lab results, importance of diet/carb control, exercise, and med compliance. Medication side effects discussed in detail and has none today. Micro and Macrovascular complications discussed with patient today. Patient verbalized understanding.    Sugar log checked,  Morning sugars has been running around 200, afternoon sugars are running 2 ,  Lantus doses adjusted, instead of 25 twice daily increased to 30 twice daily,  Watch for low sugars,  Follow with Dr. Mo Crouch in 2 months    Hemoglobin A1C   Date Value Ref Range Status   01/20/2022 12.6 (H) 4.0 - 6.0 % Final   11/09/2021 14.0 % Final     Comment:     >14   03/29/2021 14.0 % Final       Lab Results   Component Value Date    LDLCHOLESTEROL 54 06/29/2020    LDLDIRECT 94 05/08/2019       Lab Results   Component Value Date LABMICR 23 05/11/2017         Foot Exam completed within last 12 months? Yes   Eye Exam completed within last 12 months? Yes             Completed Refills   Requested Prescriptions     Signed Prescriptions Disp Refills    ferrous sulfate (FEROSUL) 325 (65 Fe) MG tablet 60 tablet 3     Sig: TAKE 1 TABLET BY MOUTH 2 TIMES DAILY    insulin glargine (BASAGLAR KWIKPEN) 100 UNIT/ML injection pen 5 pen 5     Sig: Inject 30 Units into the skin 2 times daily    docusate sodium (COLACE) 100 MG capsule 30 capsule 0     Sig: Take 1 capsule by mouth daily as needed for Constipation    nystatin (MYCOSTATIN) 686442 UNIT/GM powder 1 each 3     Sig: Apply 3 times daily. No follow-ups on file. Orders Placed This Encounter   Medications    ferrous sulfate (FEROSUL) 325 (65 Fe) MG tablet     Sig: TAKE 1 TABLET BY MOUTH 2 TIMES DAILY     Dispense:  60 tablet     Refill:  3    insulin glargine (BASAGLAR KWIKPEN) 100 UNIT/ML injection pen     Sig: Inject 30 Units into the skin 2 times daily     Dispense:  5 pen     Refill:  5    docusate sodium (COLACE) 100 MG capsule     Sig: Take 1 capsule by mouth daily as needed for Constipation     Dispense:  30 capsule     Refill:  0    nystatin (MYCOSTATIN) 881581 UNIT/GM powder     Sig: Apply 3 times daily. Dispense:  1 each     Refill:  3     No orders of the defined types were placed in this encounter.       Electronically signed by Oscar Correa MD on 2/23/2022 at 12:42 PM

## 2022-03-01 RX ORDER — PEN NEEDLE, DIABETIC 29 G X1/2"
NEEDLE, DISPOSABLE MISCELLANEOUS
Qty: 100 EACH | Refills: 11 | Status: SHIPPED | OUTPATIENT
Start: 2022-03-01

## 2022-03-08 ENCOUNTER — TELEPHONE (OUTPATIENT)
Dept: INTERNAL MEDICINE CLINIC | Age: 87
End: 2022-03-08

## 2022-03-10 DIAGNOSIS — I25.10 CORONARY ARTERY DISEASE DUE TO LIPID RICH PLAQUE: ICD-10-CM

## 2022-03-10 DIAGNOSIS — E55.9 VITAMIN D DEFICIENCY: ICD-10-CM

## 2022-03-10 DIAGNOSIS — I25.83 CORONARY ARTERY DISEASE DUE TO LIPID RICH PLAQUE: ICD-10-CM

## 2022-03-10 RX ORDER — ATORVASTATIN CALCIUM 80 MG/1
80 TABLET, FILM COATED ORAL DAILY
Qty: 30 TABLET | Refills: 9 | OUTPATIENT
Start: 2022-03-10

## 2022-03-10 RX ORDER — MELATONIN
Qty: 30 TABLET | Refills: 9 | OUTPATIENT
Start: 2022-03-10

## 2022-03-15 DIAGNOSIS — I10 HYPERTENSION: ICD-10-CM

## 2022-03-16 DIAGNOSIS — E55.9 VITAMIN D DEFICIENCY: ICD-10-CM

## 2022-03-16 DIAGNOSIS — I25.83 CORONARY ARTERY DISEASE DUE TO LIPID RICH PLAQUE: ICD-10-CM

## 2022-03-16 DIAGNOSIS — I25.10 CORONARY ARTERY DISEASE DUE TO LIPID RICH PLAQUE: ICD-10-CM

## 2022-03-16 RX ORDER — ATORVASTATIN CALCIUM 80 MG/1
80 TABLET, FILM COATED ORAL DAILY
Qty: 30 TABLET | Refills: 0 | Status: SHIPPED | OUTPATIENT
Start: 2022-03-16 | End: 2022-04-12 | Stop reason: SDUPTHER

## 2022-03-16 RX ORDER — MELATONIN
Qty: 30 TABLET | Refills: 0 | Status: SHIPPED | OUTPATIENT
Start: 2022-03-16 | End: 2022-04-12 | Stop reason: SDUPTHER

## 2022-03-28 ENCOUNTER — CARE COORDINATION (OUTPATIENT)
Dept: CARE COORDINATION | Age: 87
End: 2022-03-28

## 2022-03-28 SDOH — ECONOMIC STABILITY: HOUSING INSECURITY: IN THE LAST 12 MONTHS, HOW MANY PLACES HAVE YOU LIVED?: 1

## 2022-03-28 SDOH — ECONOMIC STABILITY: HOUSING INSECURITY
IN THE LAST 12 MONTHS, WAS THERE A TIME WHEN YOU DID NOT HAVE A STEADY PLACE TO SLEEP OR SLEPT IN A SHELTER (INCLUDING NOW)?: NO

## 2022-03-28 SDOH — HEALTH STABILITY: PHYSICAL HEALTH: ON AVERAGE, HOW MANY MINUTES DO YOU ENGAGE IN EXERCISE AT THIS LEVEL?: 0 MIN

## 2022-03-28 SDOH — ECONOMIC STABILITY: INCOME INSECURITY: IN THE LAST 12 MONTHS, WAS THERE A TIME WHEN YOU WERE NOT ABLE TO PAY THE MORTGAGE OR RENT ON TIME?: NO

## 2022-03-28 SDOH — HEALTH STABILITY: PHYSICAL HEALTH: ON AVERAGE, HOW MANY DAYS PER WEEK DO YOU ENGAGE IN MODERATE TO STRENUOUS EXERCISE (LIKE A BRISK WALK)?: 0 DAYS

## 2022-03-28 NOTE — CARE COORDINATION
Ambulatory Care Coordination Note  3/28/2022  CM Risk Score: 10  Charlson 10 Year Mortality Risk Score: 100%     ACC: Robin Gabriel RN  ACP on file    Summary  Date Care Coordination Episode Started:  3.28.22  Week: 1    Reason for Call Today:     CC Enrollment    Reason patient is in Care Coordination:     CHF, DM, 74% admission ED risk score     Topics Discussed Today:     1. DM, has a CGM now, this morning blood sugar was 89. Patient         agreed to a referral to Emilio Rya RD. A1C 12.6  2. CHF diet and daily weights  3. Patient verified that she has home health every Tuesday, no PT at this time. Per patient she can not walk. 4. Transportation needs, patient denied the need for assistance, she has family who provides her rides. 5. Patient reported hitting her leg on a table and it bled some due to being on a blood thinner. She will have her nurse look at it and call with any concerns. Interventions completed today:    Referral to Emilio Ray RD  DM and CHF education started. Assessments completed today:     1. Fall risk  2. Initial assessment in progress  3. Medication reconciliation  4. SDOH in progress         Care Coordinator plan of care:     Send referral to Emilio Ray RD  Follow up with patient in 1 week to continue education and check on needs. Ambulatory Care Coordination Assessment    Care Coordination Protocol  Program Enrollment: Complex Care  Referral from Primary Care Provider: No  Week 1 - Initial Assessment     Do you have all of your prescriptions and are they filled?: Yes  Are you able to afford your medications?: Yes  How often do you have trouble taking your medications the way you have been told to take them?: I always take them as prescribed.      Do you have Home O2 Therapy?: No      Ability to seek help/take action for Emergent Urgent situations i.e. fire, crime, inclement weather or health crisis.: Needs Assistance  Ability to ambulate to restroom: Independent  Ability handle personal hygeine needs (bathing/dressing/grooming): Independent  Ability to manage Medications: Independent  Ability to prepare Food Preparation: Needs Assistance  Ability to maintain home (clean home, laundry): Needs Assistance  Ability to drive and/or has transportation: Dependent  Ability to do shopping: Dependent  Ability to manage finances: Independent     Current Housing: Private Residence              Are you experiencing loss of meaning?: No  Are you experiencing loss of hope and peace?: No     Thinking about your patient's physical health needs, are there any symptoms or problems (risk indicators) you are unsure about that require further investigation?: No identified areas of uncertainly or problems already being investigated   Are the patients physical health problems impacting on their mental well-being?: No identified areas of concern   Are there any problems with your patients lifestyle behaviors (alcohol, drugs, diet, exercise) that are impacting on physical or mental well-being?: No identified areas of concern   Do you have any other concerns about your patients mental well-being?  How would you rate their severity and impact on the patient?: No identified areas of concern   How would you rate their home environment in terms of safety and stability (including domestic violence, insecure housing, neighbor harassment)?: Consistently safe, supportive, stable, no identified problems   How do daily activities impact on the patient's well-being? (include current or anticipated unemployment, work, caregiving, access to transportation or other): Some general dissatisfaction but no concern   How would you rate their social network (family, work, friends)?: Adequate participation with social networks   How would you rate their financial resources (including ability to afford all required medical care)?: Financially secure, some resource challenges   How wells does the patient now understand their health and well-being (symptoms, signs or risk factors) and what they need to do to manage their health?: Reasonable to good understanding and already engages in managing health or is willing to undertake better management   How well do you think your patient can engage in healthcare discussions? (Barriers include language, deafness, aphasia, alcohol or drug problems, learning difficulties, concentration): Clear and open communication, no identified barriers   Do other services need to be involved to help this patient?: Other care/services in place but not sufficient   Are current services involved with this patient well-coordinated? (Include coordination with other services you are now recommendation): Required care/services in place with some coordination barriers   Suggested Interventions and Community Resources  Diabetes Education: In Process (Comment: has CGM)   Fall Risk Prevention: In Process Registered Dietician: Completed   Transportation Services: Declined   Zone Management Tools: In Process                  Prior to Admission medications    Medication Sig Start Date End Date Taking? Authorizing Provider   atorvastatin (LIPITOR) 80 MG tablet Take 1 tablet by mouth daily 3/16/22   Jaspreet Nogueira MD   vitamin D3 (CHOLECALCIFEROL) 25 MCG (1000 UT) TABS tablet Take one daily 3/16/22   Jaspreet Nogueira MD   ULTICARE MINI PEN NEEDLES 31G X 6 MM MISC 1 EACH BY DOES NOT APPLY ROUTE DAILY 3/1/22   Tato Dsouza MD   ferrous sulfate (FEROSUL) 325 (65 Fe) MG tablet TAKE 1 TABLET BY MOUTH 2 TIMES DAILY 2/23/22   Tato Dsouza MD   insulin glargine (BASAGLAR KWIKPEN) 100 UNIT/ML injection pen Inject 30 Units into the skin 2 times daily 2/23/22   Tato Dsouza MD   docusate sodium (COLACE) 100 MG capsule Take 1 capsule by mouth daily as needed for Constipation 2/23/22   Tato Dsouza MD   nystatin (MYCOSTATIN) 436032 UNIT/GM powder Apply 3 times daily.  2/23/22   Tato Dsouza MD   loratadine (CLARITIN) 10 MG tablet TAKE 1 TABLET BY MOUTH TWICE A DAY AS NEEDED  Patient not taking: Reported on 2/23/2022 2/8/22   Honorio Officer, MD   furosemide (LASIX) 40 MG tablet TAKE 1 TABLET BY MOUTH DAILY 2/7/22   Honorio OfficeMD lynn   furosemide (LASIX) 20 MG tablet Take 2 tablets by mouth See Admin Instructions AND 1 tablet See Admin Instructions. Do all this for 10 days. 40mg daily for 10 days. Followed by 20mg daily thereafter. . 1/21/22 1/31/22  Yunior Ba MD   pantoprazole (PROTONIX) 40 MG tablet Take 1 tablet by mouth daily 1/11/22   Yunior Live MD   levothyroxine (SYNTHROID) 150 MCG tablet Take 1 tablet by mouth Daily 1/12/22   Yunior Live MD   metoprolol tartrate (LOPRESSOR) 25 MG tablet Take 0.5 tablets by mouth 2 times daily 1/11/22   Yunior Ba MD   Continuous Blood Gluc Sensor (FREESTYLE KERRY 2 SENSOR) MISC 1 Device by Does not apply route every 14 days  Patient not taking: Reported on 2/23/2022 11/23/21   STEFAN Jefferson CNP   Continuous Blood Gluc  (FREESTYLE KERRY 2 READER) CLAUDIO 1 Device by Does not apply route daily  Patient not taking: Reported on 2/23/2022 11/23/21   STEFAN Jefferson CNP   Cyanocobalamin (VITAMIN B-12) 1000 MCG extended release tablet TAKE 1 TAB BY MOUTH ONCE A DAY   Patient not taking: Reported on 2/23/2022 10/28/21   Honorio Billings MD   clopidogrel (PLAVIX) 75 MG tablet TAKE 1 TAB BY MOUTH ONCE A DAY 7/29/21   Honorio Billings MD   sertraline (ZOLOFT) 100 MG tablet Take 1 tablet by Mouth Once a Day 6/17/21   Honorio Billings MD   SITagliptin (JANUVIA) 100 MG tablet TAKE 1 TAB BY MOUTH ONCE A DAY 6/3/21   Honorio Billings MD   blood glucose monitor strips Test 2 times a day & as needed for symptoms of irregular blood glucose. Dispense sufficient amount for indicated testing frequency plus additional to accommodate PRN testing needs.  3/29/21   Honorio Officer, MD   Continuous Blood Gluc  (FREESTYLE KERRY 14 DAY READER) CLAUDIO 1 Device by Does not apply route 4 times daily  Patient not taking: Reported on 2/23/2022 11/30/20   Denice Serna MD   Continuous Blood Gluc Sensor (FREESTYLE KERRY 2 SENSOR SYSTM) MISC 4 Devices by Does not apply route every 7 days  Patient not taking: Reported on 2/23/2022 11/30/20   Denice Serna MD   Lancets MISC 1 each by Does not apply route 2 times daily 7/5/19   Denice Serna MD   nystatin (MYCOSTATIN) 125766 UNIT/GM cream Apply topically 2 times daily.   Patient not taking: Reported on 2/23/2022 5/7/18   Denice Serna MD   aspirin 81 MG chewable tablet Take 1 tablet by mouth daily 9/5/16   Juvencio Mahmood MD       Future Appointments   Date Time Provider Estrellita Pederseni   4/1/2022 12:15 PM [facility]   5/23/2022 10:00 AM Denice Serna MD [address] [place] [address]

## 2022-03-29 ENCOUNTER — CARE COORDINATION (OUTPATIENT)
Dept: CARE COORDINATION | Age: 87
End: 2022-03-29

## 2022-03-29 DIAGNOSIS — E11.8 TYPE 2 DIABETES MELLITUS WITH COMPLICATION, WITHOUT LONG-TERM CURRENT USE OF INSULIN (HCC): ICD-10-CM

## 2022-03-29 PROCEDURE — 3046F HEMOGLOBIN A1C LEVEL >9.0%: CPT | Performed by: INTERNAL MEDICINE

## 2022-03-29 NOTE — CARE COORDINATION
Referral from [facility], Uli Martin RN-  Referral: DM and CHF   Patient has an  a1c of 12.6 which is an improvement. She has a CGM now and likes it, blood sugar seems to be improving. Will reach out to patient for consult.   MANOJ Figueroa

## 2022-03-29 NOTE — CARE COORDINATION
Registered Dietitian Initial Assessment for Care Coordination      Name-Parvin Patel  March 29, 2022    Initial Referral Reason: Diabetes, CHF    Patient Care Team:  Poncho Wright MD as PCP - General (Internal Medicine)  Poncho Wright MD as PCP - [facility]  Chica Reddy MD as Consulting Physician (Gastroenterology)  Alvaro Hoffman MD as Consulting Physician (Pulmonology)  Gus Dumont RN as [address] [place] [address]  Rickie Briggs RD, LD as Dietitian    Patient Active Problem List   Diagnosis    Acute kidney injury [facility])    Anemia due to vitamin B12 deficiency    CAD (coronary artery disease)    Thyroid cancer ([address]    Hypothyroidism uncontrolled    Essential hypertension    Type 2 diabetes mellitus with complication, without long-term current use of insulin ([address]    Transaminitis    Acute cystitis without hematuria    Urinary tract infection without hematuria    Acute on chronic diastolic congestive heart failure ([address]    Otalgia of both ears    Bilateral swelling of feet and ankles    Dyslipidemia    Type 2 diabetes mellitus without complication, with long-term current use of insulin (HCC)    Bilateral non-suppurative otitis media    Hypochromic-microcytic anemia    Pneumonia of right lung due to infectious organism    Tracheobronchitis    Frequency of micturition    Pneumonia    Hypoalbuminemia    Lactic acid acidosis    Hyponatremia    Hyperglycemia    Hypocalcemia    Obesity, Class III, BMI 40-49.9 (morbid obesity) ([address]    Sepsis due to pneumonia ([address]    Cellulitis    Hypophosphatemia    Obesity    Atrial flutter ([address]    Hypoglycemia    Encounter for palliative care    BRIA (acute kidney injury) ([address]    Upper GI bleed    Superficial gastritis without hemorrhage    Morbid obesity ([address]    Mild anemia    Acute gastric ulcer with hemorrhage    Melena    Gastric polyps    Family history of GI bleeding    Absolute anemia    Hx of long term use of blood thinners    Hypokalemia    COVID    Venous stasis       Current Outpatient Medications   Medication Sig Dispense Refill    atorvastatin (LIPITOR) 80 MG tablet Take 1 tablet by mouth daily 30 tablet 0    vitamin D3 (CHOLECALCIFEROL) 25 MCG (1000 UT) TABS tablet Take one daily 30 tablet 0    ULTICARE MINI PEN NEEDLES 31G X 6 MM MISC 1 EACH BY DOES NOT APPLY ROUTE DAILY 100 each 11    ferrous sulfate (FEROSUL) 325 (65 Fe) MG tablet TAKE 1 TABLET BY MOUTH 2 TIMES DAILY 60 tablet 3    insulin glargine (BASAGLAR KWIKPEN) 100 UNIT/ML injection pen Inject 30 Units into the skin 2 times daily 5 pen 5    docusate sodium (COLACE) 100 MG capsule Take 1 capsule by mouth daily as needed for Constipation 30 capsule 0    nystatin (MYCOSTATIN) 728185 UNIT/GM powder Apply 3 times daily. 1 each 3    loratadine (CLARITIN) 10 MG tablet TAKE 1 TABLET BY MOUTH TWICE A DAY AS NEEDED (Patient not taking: Reported on 2/23/2022) 60 tablet 0    furosemide (LASIX) 40 MG tablet TAKE 1 TABLET BY MOUTH DAILY 30 tablet 4    furosemide (LASIX) 20 MG tablet Take 2 tablets by mouth See Admin Instructions AND 1 tablet See Admin Instructions. Do all this for 10 days. 40mg daily for 10 days. Followed by 20mg daily thereafter. . 60 tablet 3    pantoprazole (PROTONIX) 40 MG tablet Take 1 tablet by mouth daily 60 tablet 0    levothyroxine (SYNTHROID) 150 MCG tablet Take 1 tablet by mouth Daily 30 tablet 3    metoprolol tartrate (LOPRESSOR) 25 MG tablet Take 0.5 tablets by mouth 2 times daily 180 tablet 3    Continuous Blood Gluc Sensor (FREESTYLE KERRY 2 SENSOR) [facility] 1 Device by Does not apply route every 14 days (Patient not taking: Reported on 2/23/2022) 2 each 2    Continuous Blood Gluc  (FREESTYLE KERRY 2 READER) CLAUDIO 1 Device by Does not apply route daily (Patient not taking: Reported on 2/23/2022) 1 each 0    Cyanocobalamin (VITAMIN B-12) 1000 MCG extended release tablet TAKE 1 TAB BY MOUTH ONCE A DAY (Patient not taking: Reported on 2/23/2022) 30 tablet 11    clopidogrel (PLAVIX) 75 MG tablet TAKE 1 TAB BY MOUTH ONCE A DAY 90 tablet 3    sertraline (ZOLOFT) 100 MG tablet Take 1 tablet by Mouth Once a Day 90 tablet 3    SITagliptin (JANUVIA) 100 MG tablet TAKE 1 TAB BY MOUTH ONCE A DAY 90 tablet 3    blood glucose monitor strips Test 2 times a day & as needed for symptoms of irregular blood glucose. Dispense sufficient amount for indicated testing frequency plus additional to accommodate PRN testing needs. 100 strip 1    Continuous Blood Gluc  (FREESTYLE KERRY 14 DAY READER) CLAUDIO 1 Device by Does not apply route 4 times daily (Patient not taking: Reported on 2/23/2022) 1 Device 0    Continuous Blood Gluc Sensor (FREESTYLE KERRY 2 SENSOR SYSTM) MISC 4 Devices by Does not apply route every 7 days (Patient not taking: Reported on 2/23/2022) 4 each 0    Lancets MISC 1 each by Does not apply route 2 times daily 100 each 11    nystatin (MYCOSTATIN) 799261 UNIT/GM cream Apply topically 2 times daily. (Patient not taking: Reported on 2/23/2022) 1 Tube 1    aspirin 81 MG chewable tablet Take 1 tablet by mouth daily 30 tablet 3     No current facility-administered medications for this visit.          Visit for:  Obesity/Weigh   Diabetes: X  Hypertension:  Hyperlipidemia:  Other: CHF     Anthropometric Measurements:  HT: 5'2  Weight: 203  IBW: 110 + or - 10%  BMI: 37    Biochemical Data, Medical Tests and Procedures:    Lab Results   Component Value Date    LABA1C 12.6 (H) 01/20/2022     Lab Results   Component Value Date     01/20/2022       Lab Results   Component Value Date    CHOL 173 06/29/2020    CHOL 218 (H) 05/08/2019    CHOL 109 09/03/2016     Lab Results   Component Value Date    TRIG 337 (H) 06/29/2020    TRIG 750 (H) 05/08/2019    TRIG 163 (H) 09/03/2016     Lab Results   Component Value Date    HDL 52 06/29/2020    HDL 47 05/08/2019    HDL 53 09/03/2016     Lab Results   Component Value Date    LDLCHOLESTEROL 54 06/29/2020    LDLCHOLESTEROL      05/08/2019    LDLCHOLESTEROL 23 09/03/2016     Lab Results   Component Value Date    VLDL NOT REPORTED (H) 06/29/2020    VLDL NOT REPORTED 09/03/2016    VLDL NOT REPORTED 06/13/2016     Lab Results   Component Value Date    CHOLHDLRATIO 3.3 06/29/2020    CHOLHDLRATIO 4.6 05/08/2019    CHOLHDLRATIO 2.1 09/03/2016       Lab Results   Component Value Date    WBC 5.6 01/20/2022    HGB 11.2 (L) 01/20/2022    HCT 34.1 (L) 01/20/2022    MCV 84.6 01/20/2022     01/20/2022       Lab Results   Component Value Date    CREATININE 1.18 (H) 01/20/2022    BUN 24 (H) 01/20/2022     01/20/2022    K 4.2 01/20/2022    CL 98 01/20/2022    CO2 24 01/20/2022         NUTRITION DIAGNOSIS    #1 Problem  Excessive carbohydrate intake       Etiology  related to food and nutrition knowledge deficit       Signs/Symptoms  as evidenced by HbA1c- 12.6    NUTRITION INTERVENTION  Nutrition Prescription: used adjusted weight is 140#    diabetic diet providing 1600 kcals/day     Estimated daily CHO Needs: 60 gms/meal, 15gms/snack  Protein needs: 65gms/d  Fluid needs: 2000cc/day    Patient Goals:  1. Patient will work on meal patten, she is currently eating 2-3 meals/d, sometimes skips breakfast. Discussed and encouraged set meal times daily.  Quick/easy breakfast suggestions discussed, will send patient a list of suggestions.  Goal is 3 meals daily spaced every 4-5 hours. 2. Reviewed what foods contain carbohydrate to limit and how to measure out portions. Encouraged patient to limit carb intake to 45-60gms/meal, 15gms/snack.  Patient admits snacks often in the evening, discussed low carb snacking options and will send patient lists of low carb snacks.   3. Discussed plate method, encouraged patient to increase intake of non-starchy vegetables.  Goal is 1/2 of  plate to be non-starchy vegetables, 1/4 lean protein, 1/4 carbs. Foods from each category reviewed along with what a serving size is  4. Discussed avoiding/limiting sweets and sweetened drinks. Patient does not drink sugary drinks. Encouraged also to limit/avoid sugary foods, discussed sugar free alternatives to sweets- sugar free jello, pudding, ect. Encouraged patient to keep sweets out of the house as much as possible and limit to once a week or less.   5. Discussed avoiding canned, prepackaged foods, processed meats and cheese. Discussed processed meats in detail to avoid- sausage, vo, bologna, ham, ect. Patient states she does try to avoid high sodium foods and reads food labels to identify foods to avoid. Goal is <2gm of sodium/day. Nutrition Intervention Need:  Initial/Brief:  Comprehensive Nutrition Education: X  Coordination of other care during nutrition care:    Monitoring and Evaluation:  Ability to plan meals/snacks:  Ability to select healthy foods/meals: X  Food and Nutrition Knowledge: X  Self Monitoring:  Physical Activity:  Energy intake:  Fluid/beverage intake:  Fat/chol intake:  Carb intake: X  Other: sodium intake: X    Plan:  1. Will continue to educate patient on reading food labels, measuring out portions, carb counting, low carb snacking, plate method, importance of meal pattern, quick/easy diabetic  meal ideas, and limiting/cutting out sweets and low sodium guidelines. Will mail patient nutrition information on all the above discussed. 2. Will follow up in 2-3 weeks to review nutrition information and answer questions.       MANOJ Steve

## 2022-03-31 ENCOUNTER — CARE COORDINATION (OUTPATIENT)
Dept: CARE COORDINATION | Age: 87
End: 2022-03-31

## 2022-03-31 NOTE — CARE COORDINATION
Diabetes and CHF zone tools , walk-in clinic and right [facility]-right place-right time information sheets was mailed out to pt.

## 2022-04-01 ENCOUNTER — OFFICE VISIT (OUTPATIENT)
Dept: GASTROENTEROLOGY | Age: 87
End: 2022-04-01
Payer: MEDICARE

## 2022-04-01 VITALS
WEIGHT: 203 LBS | HEART RATE: 65 BPM | DIASTOLIC BLOOD PRESSURE: 62 MMHG | BODY MASS INDEX: 37.13 KG/M2 | SYSTOLIC BLOOD PRESSURE: 102 MMHG | TEMPERATURE: 96.6 F

## 2022-04-01 DIAGNOSIS — Z92.29 HX OF LONG TERM USE OF BLOOD THINNERS: Primary | ICD-10-CM

## 2022-04-01 DIAGNOSIS — K92.1 MELENA: ICD-10-CM

## 2022-04-01 DIAGNOSIS — K31.7 GASTRIC POLYPS: ICD-10-CM

## 2022-04-01 DIAGNOSIS — K29.30 SUPERFICIAL GASTRITIS WITHOUT HEMORRHAGE, UNSPECIFIED CHRONICITY: ICD-10-CM

## 2022-04-01 DIAGNOSIS — D50.8 OTHER IRON DEFICIENCY ANEMIA: ICD-10-CM

## 2022-04-01 PROCEDURE — 99213 OFFICE O/P EST LOW 20 MIN: CPT | Performed by: INTERNAL MEDICINE

## 2022-04-01 PROCEDURE — 1123F ACP DISCUSS/DSCN MKR DOCD: CPT | Performed by: INTERNAL MEDICINE

## 2022-04-01 PROCEDURE — G8417 CALC BMI ABV UP PARAM F/U: HCPCS | Performed by: INTERNAL MEDICINE

## 2022-04-01 PROCEDURE — G8400 PT W/DXA NO RESULTS DOC: HCPCS | Performed by: INTERNAL MEDICINE

## 2022-04-01 PROCEDURE — 4040F PNEUMOC VAC/ADMIN/RCVD: CPT | Performed by: INTERNAL MEDICINE

## 2022-04-01 PROCEDURE — 1090F PRES/ABSN URINE INCON ASSESS: CPT | Performed by: INTERNAL MEDICINE

## 2022-04-01 PROCEDURE — G8427 DOCREV CUR MEDS BY ELIG CLIN: HCPCS | Performed by: INTERNAL MEDICINE

## 2022-04-01 PROCEDURE — 1036F TOBACCO NON-USER: CPT | Performed by: INTERNAL MEDICINE

## 2022-04-01 ASSESSMENT — ENCOUNTER SYMPTOMS
ABDOMINAL DISTENTION: 0
RECTAL PAIN: 0
ANAL BLEEDING: 0
SHORTNESS OF BREATH: 0
TROUBLE SWALLOWING: 0
BLOOD IN STOOL: 0
VOICE CHANGE: 0
ABDOMINAL PAIN: 0
WHEEZING: 0
CONSTIPATION: 0
NAUSEA: 0
VOMITING: 0
COUGH: 0
DIARRHEA: 1
CHOKING: 0
SORE THROAT: 0

## 2022-04-01 NOTE — PROGRESS NOTES
GI CLINIC FOLLOW UP    NTERVAL HISTORY:   No referring provider defined for this encounter. Chief Complaint   Patient presents with    Follow-up     Pt is here today for f/u on labs. 1. Hx of long term use of blood thinners    2. Other iron deficiency anemia    3. Gastric polyps    4. Melena    5. Superficial gastritis without hemorrhage, unspecified chronicity      This patient evaluated my office as a follow-up accompanied by her daughter  She is elderly wheelchair-bound has history for obesity  Has been worked up at [facility] for GI bleeding was found to have extensive polyp apparently bleeding from the stomach? However she has been on iron pills relatively stable overall  Her hemoglobin is lingering between 11 and 12  Denies any overt bleeding  Has some dark-colored stools probably from iron  Some intermittent diarrhea  Denies any significant abdominal pain bloating gas cramping  Denies any significant nausea vomiting hematemesis  She will be turning 87 this year  Not interested in any invasive GI work-up at this time    HISTORY OF PRESENT ILLNESS: Oletha Curling is a 82 y.o. female with a past history remarkable for , referred for evaluation of   Chief Complaint   Patient presents with    Follow-up     Pt is here today for f/u on labs. .    Past Medical,Family, and Social History reviewed and does contribute to the patient presenting condition. Patient's PMH/PSH,SH,PSYCH Hx, MEDs, ALLERGIES, and ROS were all reviewed and updated in the appropriate sections.     PAST MEDICAL HISTORY:  Past Medical History:   Diagnosis Date    Arthritis     Atrial flutter ([address]     CAD (coronary artery disease)     CHF (congestive heart failure) (HCC)     Diabetes ([address]     Diabetes mellitus ([address]     Hyperlipidemia     Hypertension     Psychiatric problem     [facility]     S/P thyroidectomy; on synthroid    Thyroid disease     hypothyroid    Type 2 diabetes mellitus without complication, with long-term current use of insulin ([address] 7/4/2017       Past Surgical History:   Procedure Laterality Date    APPENDECTOMY      BACK SURGERY      CHOLECYSTECTOMY      CORONARY ANGIOPLASTY WITH STENT PLACEMENT      HYSTERECTOMY      THYROIDECTOMY      UPPER GASTROINTESTINAL ENDOSCOPY  4/14/2021    EGD BIOPSY performed by Justin Reynolds MD at [facility] ENDOSCOPY  4/14/2021    EGD CONTROL HEMORRHAGE performed by Justin Reynolds MD at [address] [facility] [address]:    Current Outpatient Medications:     SITagliptin (JANUVIA) 100 MG tablet, TAKE 1 TAB BY MOUTH ONCE A DAY, Disp: 90 tablet, Rfl: 3    atorvastatin (LIPITOR) 80 MG tablet, Take 1 tablet by mouth daily, Disp: 30 tablet, Rfl: 0    vitamin D3 (CHOLECALCIFEROL) 25 MCG (1000 UT) TABS tablet, Take one daily, Disp: 30 tablet, Rfl: 0    ULTICARE MINI PEN NEEDLES 31G X 6 MM MISC, 1 EACH BY DOES NOT APPLY ROUTE DAILY, Disp: 100 each, Rfl: 11    ferrous sulfate (FEROSUL) 325 (65 Fe) MG tablet, TAKE 1 TABLET BY MOUTH 2 TIMES DAILY, Disp: 60 tablet, Rfl: 3    insulin glargine (BASAGLAR KWIKPEN) 100 UNIT/ML injection pen, Inject 30 Units into the skin 2 times daily, Disp: 5 pen, Rfl: 5    docusate sodium (COLACE) 100 MG capsule, Take 1 capsule by mouth daily as needed for Constipation, Disp: 30 capsule, Rfl: 0    nystatin (MYCOSTATIN) 374114 UNIT/GM powder, Apply 3 times daily. , Disp: 1 each, Rfl: 3    loratadine (CLARITIN) 10 MG tablet, TAKE 1 TABLET BY MOUTH TWICE A DAY AS NEEDED, Disp: 60 tablet, Rfl: 0    furosemide (LASIX) 40 MG tablet, TAKE 1 TABLET BY MOUTH DAILY, Disp: 30 tablet, Rfl: 4    pantoprazole (PROTONIX) 40 MG tablet, Take 1 tablet by mouth daily, Disp: 60 tablet, Rfl: 0    levothyroxine (SYNTHROID) 150 MCG tablet, Take 1 tablet by mouth Daily, Disp: 30 tablet, Rfl: 3    metoprolol tartrate (LOPRESSOR) 25 MG tablet, Take 0.5 tablets by mouth 2 times daily, Disp: 180 tablet, Rfl: 3    Continuous Blood Gluc Sensor (FREESTYLE KERRY 2 SENSOR) MISC, 1 Device by Does not apply route every 14 days, Disp: 2 each, Rfl: 2    Continuous Blood Gluc  (FREESTYLE KERRY 2 READER) CLAUDIO, 1 Device by Does not apply route daily, Disp: 1 each, Rfl: 0    Cyanocobalamin (VITAMIN B-12) 1000 MCG extended release tablet, TAKE 1 TAB BY MOUTH ONCE A DAY , Disp: 30 tablet, Rfl: 11    clopidogrel (PLAVIX) 75 MG tablet, TAKE 1 TAB BY MOUTH ONCE A DAY, Disp: 90 tablet, Rfl: 3    sertraline (ZOLOFT) 100 MG tablet, Take 1 tablet by Mouth Once a Day, Disp: 90 tablet, Rfl: 3    blood glucose monitor strips, Test 2 times a day & as needed for symptoms of irregular blood glucose. Dispense sufficient amount for indicated testing frequency plus additional to accommodate PRN testing needs. , Disp: 100 strip, Rfl: 1    Continuous Blood Gluc  (FREESTYLE KERRY 14 DAY READER) CLAUDIO, 1 Device by Does not apply route 4 times daily, Disp: 1 Device, Rfl: 0    Continuous Blood Gluc Sensor (FREESTYLE KERRY 2 SENSOR SYSTM) MISC, 4 Devices by Does not apply route every 7 days, Disp: 4 each, Rfl: 0    Lancets MISC, 1 each by Does not apply route 2 times daily, Disp: 100 each, Rfl: 11    nystatin (MYCOSTATIN) 523367 UNIT/GM cream, Apply topically 2 times daily. , Disp: 1 Tube, Rfl: 1    aspirin 81 MG chewable tablet, Take 1 tablet by mouth daily, Disp: 30 tablet, Rfl: 3    furosemide (LASIX) 20 MG tablet, Take 2 tablets by mouth See Admin Instructions AND 1 tablet See Admin Instructions. Do all this for 10 days. 40mg daily for 10 days. Followed by 20mg daily thereafter. ., Disp: 60 tablet, Rfl: 3    ALLERGIES:   Allergies   Allergen Reactions    Strawberry Extract     Tape Riddhi Sicilian Tape] Rash       FAMILY HISTORY:       Problem Relation Age of Onset    Cancer Mother     Cancer Father     Cancer Brother     Diabetes Brother          SOCIAL HISTORY:   Social History     Socioeconomic History    Marital status:      Spouse name: Not on file    Number of children: Not on file    Years of education: Not on file    Highest education level: Not on file   Occupational History    Not on file   Tobacco Use    Smoking status: Former Smoker    Smokeless tobacco: Never Used   Vaping Use    Vaping Use: Never used   Substance and Sexual Activity    Alcohol use: No    Drug use: No    Sexual activity: Not Currently   Other Topics Concern    Not on file   Social History Narrative    Not on file     Social Determinants of Health     Financial Resource Strain: Medium Risk    Difficulty of Paying Living Expenses: Somewhat hard   Food Insecurity: Food Insecurity Present    Worried About Running Out of Food in the Last Year: Sometimes true    Portia of Food in the Last Year: Often true   Transportation Needs: No Transportation Needs    Lack of Transportation (Medical): No    Lack of Transportation (Non-Medical): No   Physical Activity: Inactive    Days of Exercise per Week: 0 days    Minutes of Exercise per Session: 0 min   Stress:     Feeling of Stress : Not on file   Social Connections:     Frequency of Communication with Friends and Family: Not on file    Frequency of Social Gatherings with Friends and Family: Not on file    Attends Baptism Services: Not on file    Active Member of Clubs or Organizations: Not on file    Attends Club or Organization Meetings: Not on file    Marital Status: Not on file   Intimate Partner Violence:     Fear of Current or Ex-Partner: Not on file    Emotionally Abused: Not on file    Physically Abused: Not on file    Sexually Abused: Not on file   Housing Stability: [address] Unable to Pay for Housing in the Last Year: No    Number of [place] in the Last Year: 1    Unstable Housing in the Last Year: No         REVIEW OF SYSTEMS:         Review of Systems   Constitutional: Negative for appetite change, fatigue and unexpected weight change.    HENT: Metamucil were suggested    The patient was instructed to start taking some OTC Probiotics products   These are available over the counter at the Pharmacy stores and Grocery stores  He was explained about the beneficial effects they have in the GI track  They will help to establish the good bacterial chiquis and will help with the digestion and bowel movements  The patient has verbalized understanding and agreement to this plan        Discussed with the daughter and their questions were answered      Thank you for allowing me to participate in the care of Ms. Wells Dubin. For any further questions please do not hesitate to contact me. I have reviewed and agree with the ROS entered by the MA/Nurse.          Boyd Nicholas MD, [facility]  Board Certified in Gastroenterology and [address] [place] [address] Gastroenterology  Office #: [phone]

## 2022-04-06 ENCOUNTER — CARE COORDINATION (OUTPATIENT)
Dept: CARE COORDINATION | Age: 87
End: 2022-04-06

## 2022-04-06 NOTE — CARE COORDINATION
Attempting to reach patient for a follow up care coordination call. Left detailed message for the patient to return my call with any questions or concerns.   Roro Lorenzo RN, ambulatory care manager

## 2022-04-12 ENCOUNTER — CARE COORDINATION (OUTPATIENT)
Dept: CARE COORDINATION | Age: 87
End: 2022-04-12

## 2022-04-12 DIAGNOSIS — E55.9 VITAMIN D DEFICIENCY: ICD-10-CM

## 2022-04-12 DIAGNOSIS — F32.A DEPRESSION, UNSPECIFIED DEPRESSION TYPE: ICD-10-CM

## 2022-04-12 DIAGNOSIS — I25.83 CORONARY ARTERY DISEASE DUE TO LIPID RICH PLAQUE: ICD-10-CM

## 2022-04-12 DIAGNOSIS — I25.10 CORONARY ARTERY DISEASE DUE TO LIPID RICH PLAQUE: ICD-10-CM

## 2022-04-12 DIAGNOSIS — M79.89 LEFT LEG SWELLING: ICD-10-CM

## 2022-04-12 RX ORDER — SERTRALINE HYDROCHLORIDE 100 MG/1
TABLET, FILM COATED ORAL
Qty: 90 TABLET | Refills: 3 | Status: SHIPPED | OUTPATIENT
Start: 2022-04-12

## 2022-04-12 RX ORDER — DOCUSATE SODIUM 100 MG/1
100 CAPSULE, LIQUID FILLED ORAL DAILY PRN
Qty: 30 CAPSULE | Refills: 0 | Status: SHIPPED | OUTPATIENT
Start: 2022-04-12

## 2022-04-12 RX ORDER — ATORVASTATIN CALCIUM 80 MG/1
80 TABLET, FILM COATED ORAL DAILY
Qty: 30 TABLET | Refills: 0 | Status: SHIPPED | OUTPATIENT
Start: 2022-04-12 | End: 2022-05-18

## 2022-04-12 RX ORDER — FERROUS SULFATE 325(65) MG
TABLET ORAL
Qty: 60 TABLET | Refills: 3 | Status: SHIPPED | OUTPATIENT
Start: 2022-04-12 | End: 2022-07-15

## 2022-04-12 RX ORDER — FUROSEMIDE 40 MG/1
40 TABLET ORAL DAILY
Qty: 30 TABLET | Refills: 4 | Status: SHIPPED | OUTPATIENT
Start: 2022-04-12 | End: 2022-09-28

## 2022-04-12 RX ORDER — MELATONIN
Qty: 30 TABLET | Refills: 0 | Status: SHIPPED | OUTPATIENT
Start: 2022-04-12 | End: 2022-05-10

## 2022-04-12 NOTE — CARE COORDINATION
Ambulatory Care Coordination Note  4/12/2022  CM Risk Score: 10  Charlson 10 Year Mortality Risk Score: 100%     ACC: Karly Taylor, RN    Summary Spoke with daughter who reported things are going well right now. Date Care Coordination Episode Started:  3.28.22  Week: 3      Reason patient is in Care Coordination:     CHF, DM, 74% admission ED risk score     Topics Discussed Today:     1) Medications, daughter requested several refills, will pend to PCP  2) Equipment, daughter denied any needs  3) Dietician, patient continues to work with Jose Vick RD       Assessments completed today:     1. Fall risk  2. Initial assessment  3. Medication reconciliation  4. SDOH  5. DM, CHF in progress     Care Coordinator plan of care:     Route refill requests to PCP. Follow up in 1 week. To continue education. Care Coordination Interventions    Program Enrollment: Complex Care  Referral from Primary Care Provider: No  Suggested Interventions and Community Resources  Diabetes Education: In Process (Comment: has CGM)  Fall Risk Prevention: In Process  Home Health Services: Completed  Registered Dietician: Completed  Transportation Support: Declined  Zone Management Tools: In Process         Goals Addressed                 This Visit's Progress     Medication Management   Improving     I will take my medication as directed. I will notify my provider of any problems with medications, like adverse effects or side effects. I will notify my provider/Care Coordinator if I am unable to afford my medications. I will notify my provider for advice before I stop taking any of my medication. Barriers: overwhelmed by complexity of regimen  Plan for overcoming my barriers: work with ACM and dietician   Confidence: 9/10  Anticipated Goal Completion Date: 6.28.22            Prior to Admission medications    Medication Sig Start Date End Date Taking?  Authorizing Provider   SITagliptin (JANUVIA) 100 MG tablet TAKE 1 TAB BY MOUTH ONCE A DAY 3/29/22   Linden Chong MD   atorvastatin (LIPITOR) 80 MG tablet Take 1 tablet by mouth daily 3/16/22   Linden Chong MD   vitamin D3 (CHOLECALCIFEROL) 25 MCG (1000 UT) TABS tablet Take one daily 3/16/22   Linden Chong MD   ULTICARE MINI PEN NEEDLES 31G X 6 MM MISC 1 EACH BY DOES NOT APPLY ROUTE DAILY 3/1/22   Rao Coello MD   ferrous sulfate (FEROSUL) 325 (65 Fe) MG tablet TAKE 1 TABLET BY MOUTH 2 TIMES DAILY 2/23/22   Rao Coello MD   insulin glargine (BASAGLAR KWIKPEN) 100 UNIT/ML injection pen Inject 30 Units into the skin 2 times daily 2/23/22   Rao Coello MD   docusate sodium (COLACE) 100 MG capsule Take 1 capsule by mouth daily as needed for Constipation 2/23/22   Rao Coello MD   nystatin (MYCOSTATIN) 609031 UNIT/GM powder Apply 3 times daily. 2/23/22   Rao Coello MD   loratadine (CLARITIN) 10 MG tablet TAKE 1 TABLET BY MOUTH TWICE A DAY AS NEEDED 2/8/22   Linden Chong MD   furosemide (LASIX) 40 MG tablet TAKE 1 TABLET BY MOUTH DAILY 2/7/22   Linden Chong MD   furosemide (LASIX) 20 MG tablet Take 2 tablets by mouth See Admin Instructions AND 1 tablet See Admin Instructions. Do all this for 10 days. 40mg daily for 10 days. Followed by 20mg daily thereafter. . 1/21/22 1/31/22  Yunior Ba MD   pantoprazole (PROTONIX) 40 MG tablet Take 1 tablet by mouth daily 1/11/22   Yunior Lopez MD   levothyroxine (SYNTHROID) 150 MCG tablet Take 1 tablet by mouth Daily 1/12/22   Yunior Lopez MD   metoprolol tartrate (LOPRESSOR) 25 MG tablet Take 0.5 tablets by mouth 2 times daily 1/11/22   Yunior Ba MD   Continuous Blood Gluc Sensor (FREESTYLE KERRY 2 SENSOR) MISC 1 Device by Does not apply route every 14 days 11/23/21   STEFAN Roland CNP   Continuous Blood Gluc  (NuHabitatYLE KERRY 2 READER) CLAUDIO 1 Device by Does not apply route daily 11/23/21   STEFAN Roland CNP   Cyanocobalamin (VITAMIN B-12) 1000 MCG extended release tablet TAKE 1 TAB BY MOUTH ONCE A DAY  10/28/21   Linden Chong MD   clopidogrel (PLAVIX) 75 MG tablet TAKE 1 TAB BY MOUTH ONCE A DAY 7/29/21   Linden Chong MD   sertraline (ZOLOFT) 100 MG tablet Take 1 tablet by Mouth Once a Day 6/17/21   Linden Chong MD   blood glucose monitor strips Test 2 times a day & as needed for symptoms of irregular blood glucose. Dispense sufficient amount for indicated testing frequency plus additional to accommodate PRN testing needs. 3/29/21   Linden Chong MD   Continuous Blood Gluc  (FREESTYLE KERRY 14 DAY READER) CLAUDIO 1 Device by Does not apply route 4 times daily 11/30/20   Linden Chong MD   Continuous Blood Gluc Sensor (FREESTYLE KERRY 2 SENSOR SYSTM) MISC 4 Devices by Does not apply route every 7 days 11/30/20   Linden Chong MD   Lancets MISC 1 each by Does not apply route 2 times daily 7/5/19   Linden Chong MD   nystatin (MYCOSTATIN) 866205 UNIT/GM cream Apply topically 2 times daily. 5/7/18   Linden Chong MD   aspirin 81 MG chewable tablet Take 1 tablet by mouth daily 9/5/16   Lalo Grover MD       Future Appointments   Date Time Provider Estrellita Segura   5/23/2022 10:00 AM Linden Chong MD [address]   10/7/2022 11:00 AM Iftikhar Medina MD [facility]     ,   Diabetes Assessment    Meal Planning: Avoidance of concentrated sweets   How often do you test your blood sugar?: Other   Do you have barriers with adherence to non-pharmacologic self-management interventions?  (Nutrition/Exercise/Self-Monitoring): No       No patient-reported symptoms      ,   Congestive Heart Failure Assessment    Are you currently restricting fluids?: Other  Do you understand a low sodium diet?: Yes  Do you understand how to read food labels?: Yes  How many restaurant meals do you eat per week?: 0  Do you salt your food before tasting it?: No     No patient-reported symptoms      Symptoms:     Salt intake watch compared to last visit: stable      and   General Assessment    Do you have any symptoms that are causing concern?: No

## 2022-04-14 ENCOUNTER — CARE COORDINATION (OUTPATIENT)
Dept: CARE COORDINATION | Age: 87
End: 2022-04-14

## 2022-04-14 NOTE — CARE COORDINATION
Nutrition Care Coordinator Follow-Up visit:    Food Recall: eating 2-3 meals/d    Activity Level:  Sedentary: X  Lightly Active: Moderately Active:  Very Active:    Adult BMI:  Underweight (below 18.5)  Normal Weight (18.5-24.9)  Overweight (25-29. 9)  Obese (30-39. 9) X  Morbidly Obese (>40)    Weight Change: no change    Plan:  Plan was established with patient:  Increase dietary fiber by consuming whole grains, fruits and vegetables: X  Limit dietary cholesterol to >200mg/day: Increase water intake:  Avoid added sugar: X  Avoid sweetened beverages: X  Choose lean meats: X  Limit sodium intake: X    Monitoring: Will monitor weight:  Will monitor adherence to meal plan: X  Will monitor adherence to exercise plan: Will monitor HGA1c: X    Handouts Provided :  Low Carb snacking: X  Carb counting /individual meal plan: X  Portion Control: X  Food Labels: X  Physical Activity:  Low Fat/Cholesterol:  Hypo/Hyperglycemia:  Calorie Controlled Meal Plan:    Goals: Increase water consumption to 8oz. 6-8 times daily:  Manage blood sugars by consuming 3 meals spaced every 4-5 hours with 2-3 snacks daily: discussed  Increase fiber and decrease fat intake by consuming 1-2 fruit servings and 2-3 vegetable servings per day.   Increase physical activity by:  Consume less than 2,000mg of sodium/day: discussed  Avoid consumption of sweetened beverages and added sugar by reading food labels: discussed  Monitor blood sugars by using meter to check blood glucose before morning meal and 2 hours after a meal daily:BS all <175 per patient  Decrease risk of coronary heart disease by consuming fish that contains omega-3 fatty acids at least twice a week, avoiding partially hydrogenated oil/trans fats and limiting saturated fat intake by reading food labels: discussed    Patient goals set:  1. Reviewed meal patten, she is currently eating 2-3 meals/d, sometimes skips breakfast. Reviewed and encouraged set meal times daily.  Quick/easy breakfast suggestions discussed, will send patient a list of suggestions.  Goal is 3 meals daily spaced every 4-5 hours. 2. Reviewed what foods contain carbohydrate to limit and how to measure out portions. Encouraged patient to limit carb intake to 45-60gms/meal, 15gms/snack.  Patient admits snacks often in the evening, discussed low carb snacking options and will send patient lists of low carb snacks. 3. Reviewed plate method, encouraged patient to increase intake of non-starchy vegetables.  Goal is 1/2 of  plate to be non-starchy vegetables, 1/4 lean protein, 1/4 carbs. Foods from each category reviewed along with what a serving size is  4. Reviewed avoiding/limiting sweets and sweetened drinks. Patient does not drink sugary drinks. Encouraged also to limit/avoid sugary foods, reviewed sugar free alternatives to sweets- sugar free jello, pudding, ect. Encouraged patient to keep sweets out of the house as much as possible and limit to once a week or less.   5. Reviewed avoiding canned, prepackaged foods, processed meats and cheese. Reviewed processed meats in detail to avoid- sausage, vo, bologna, ham, ect. Patient states she does try to avoid high sodium foods and reads food labels to identify foods to avoid. Goal is <2gm of sodium/day. Patient states doing well, got a sanjay and able to monitor sugars better, all <175 recently. Patient did get nutrition information and started to read through it, no questions at this time. Will follow up in 3-4 weeks to review and answer questions.    Dashawn Maldonado

## 2022-04-21 ENCOUNTER — CARE COORDINATION (OUTPATIENT)
Dept: CARE COORDINATION | Age: 87
End: 2022-04-21

## 2022-04-21 NOTE — CARE COORDINATION
Attempting to reach patient for a follow up care coordination call. Left detailed message for the patient to return my call with any questions or concerns.   Adela Vazquez RN, ambulatory care manager

## 2022-04-22 ENCOUNTER — CARE COORDINATION (OUTPATIENT)
Dept: CARE COORDINATION | Age: 87
End: 2022-04-22

## 2022-04-26 ENCOUNTER — CARE COORDINATION (OUTPATIENT)
Dept: CARE COORDINATION | Age: 87
End: 2022-04-26

## 2022-04-26 ASSESSMENT — LIFESTYLE VARIABLES: HOW OFTEN DO YOU HAVE A DRINK CONTAINING ALCOHOL: NEVER

## 2022-04-26 NOTE — CARE COORDINATION
Ambulatory Care Coordination Note  4/26/2022  CM Risk Score: 10  Charlson 10 Year Mortality Risk Score: 100%     ACC: Elaine Alexander RN    Summary  Date Care Coordination Episode Started:  3.28.22        Reason patient is in Care Coordination:     CHF, DM, 74% admission ED risk score  Topics Discussed Today:     1) Blood sugar, per daughter patient has a CGM, she recently had a hypoglycemic event, 61. They were able to treat it quickly, without issues. ACM reviewed S&S and treatment of hypo and hyperglycemia. 2) Equipment, power chair is nit working. ACM called [facility] medical and requested that they contact patient's daughter. Daughter notified to expect a call to schedule a service call. Daughter denied any needs from PCP or ACM today. Assessments completed today:     1. Fall risk  2. Medication reconciliation  3. SDOH  4. DM, CHF (Health assessments)      Care Coordinator plan of care: Follow up call in 1 week for needs, check on wheel chair. Review blood sugar. Care Coordination Interventions    Program Enrollment: Complex Care  Referral from Primary Care Provider: No  Suggested Interventions and Community Resources  Diabetes Education: In Process (Comment: has CGM)  Fall Risk Prevention: In Process  Home Health Services: Completed  Registered Dietician: Completed  Transportation Support: Declined  Zone Management Tools: In Process         Goals Addressed                 This Visit's Progress     Medication Management   Improving     I will take my medication as directed. I will notify my provider of any problems with medications, like adverse effects or side effects. I will notify my provider/Care Coordinator if I am unable to afford my medications. I will notify my provider for advice before I stop taking any of my medication.     Barriers: overwhelmed by complexity of regimen  Plan for overcoming my barriers: work with ACM and dietician   Confidence: 9/10  Anticipated Goal Completion Date: 6. 28.22            Prior to Admission medications    Medication Sig Start Date End Date Taking? Authorizing Provider   atorvastatin (LIPITOR) 80 MG tablet Take 1 tablet by mouth daily 4/12/22   Nicol Martins MD   vitamin D3 (CHOLECALCIFEROL) 25 MCG (1000 UT) TABS tablet Take one daily 4/12/22   Nicol Martins MD   ferrous sulfate (FEROSUL) 325 (65 Fe) MG tablet TAKE 1 TABLET BY MOUTH 2 TIMES DAILY 4/12/22   Nicol Martins MD   docusate sodium (COLACE) 100 MG capsule Take 1 capsule by mouth daily as needed for Constipation 4/12/22   Nicol Martins MD   furosemide (LASIX) 40 MG tablet Take 1 tablet by mouth daily 4/12/22   Nicol Martins MD   sertraline (ZOLOFT) 100 MG tablet Take 1 tablet by Mouth Once a Day 4/12/22   Nicol Martins MD   SITagliptin (JANUVIA) 100 MG tablet TAKE 1 TAB BY MOUTH ONCE A DAY 3/29/22   Nicol Martins MD   ULTICARE MINI PEN NEEDLES 31G X 6 MM MISC 1 EACH BY DOES NOT APPLY ROUTE DAILY 3/1/22   Beto Conrad MD   insulin glargine (BASAGLAR KWIKPEN) 100 UNIT/ML injection pen Inject 30 Units into the skin 2 times daily 2/23/22   Beto Conrad MD   nystatin (MYCOSTATIN) 979327 UNIT/GM powder Apply 3 times daily. 2/23/22   Beto Conrad MD   loratadine (CLARITIN) 10 MG tablet TAKE 1 TABLET BY MOUTH TWICE A DAY AS NEEDED 2/8/22   Nicol Martins MD   furosemide (LASIX) 20 MG tablet Take 2 tablets by mouth See Admin Instructions AND 1 tablet See Admin Instructions. Do all this for 10 days. 40mg daily for 10 days. Followed by 20mg daily thereafter. . 1/21/22 1/31/22  Yunior Ba MD   pantoprazole (PROTONIX) 40 MG tablet Take 1 tablet by mouth daily 1/11/22   Yunior Hurley MD   levothyroxine (SYNTHROID) 150 MCG tablet Take 1 tablet by mouth Daily 1/12/22   Yunior Hurley MD   metoprolol tartrate (LOPRESSOR) 25 MG tablet Take 0.5 tablets by mouth 2 times daily 1/11/22   Yunior Hurlye MD   Continuous Blood Gluc Sensor (FREESTYLE KERRY 2 SENSOR) MISC 1 Device by Does not apply route every 14 days 11/23/21   STEFAN Noriega CNP   Continuous Blood Gluc  (FREESTYLE KERRY 2 READER) CLAUDIO 1 Device by Does not apply route daily 11/23/21   STEFAN Noriega CNP   Cyanocobalamin (VITAMIN B-12) 1000 MCG extended release tablet TAKE 1 TAB BY MOUTH ONCE A DAY  10/28/21   Ross Cazares MD   clopidogrel (PLAVIX) 75 MG tablet TAKE 1 TAB BY MOUTH ONCE A DAY 7/29/21   Ross Cazares MD   blood glucose monitor strips Test 2 times a day & as needed for symptoms of irregular blood glucose. Dispense sufficient amount for indicated testing frequency plus additional to accommodate PRN testing needs. 3/29/21   Ross Cazares MD   Continuous Blood Gluc  (FREESTYLE KERRY 14 DAY READER) CLAUDIO 1 Device by Does not apply route 4 times daily 11/30/20   Ross Cazares MD   Continuous Blood Gluc Sensor (FREESTYLE KERRY 2 SENSOR SYSTM) MISC 4 Devices by Does not apply route every 7 days 11/30/20   Ross Cazares MD   Lancets MISC 1 each by Does not apply route 2 times daily 7/5/19   Ross Cazares MD   nystatin (MYCOSTATIN) 606825 UNIT/GM cream Apply topically 2 times daily. 5/7/18   Ross Cazares MD   aspirin 81 MG chewable tablet Take 1 tablet by mouth daily 9/5/16   Abe Souza MD       Future Appointments   Date Time Provider Estrellita Segura   5/23/2022 10:00 AM Ross Cazares MD  [facility]   10/7/2022 11:00 AM Damon Hill [facility]     ,   Diabetes Assessment    Meal Planning: Avoidance of concentrated sweets   How often do you test your blood sugar?: Other   Do you have barriers with adherence to non-pharmacologic self-management interventions?  (Nutrition/Exercise/Self-Monitoring): No       Increase or Decrease trend in Blood Sugars, Blood Sugars less than 70      ,   Congestive Heart Failure Assessment    Are you currently restricting fluids?: Other  Do you understand a low sodium diet?: Yes  Do you understand how to read food labels?: Yes  How many restaurant meals do you eat per week?: 0  Do you salt your food before tasting it?: No     No patient-reported symptoms      Symptoms:     Salt intake watch compared to last visit: stable      and   General Assessment    Do you have any symptoms that are causing concern?: No

## 2022-05-02 NOTE — TELEPHONE ENCOUNTER
Spoke to Chantale with [facility] and informed her of Lantus change to 20 units, she voiced understanding and will inform patient of calling to report glucose readings.

## 2022-05-02 NOTE — TELEPHONE ENCOUNTER
Morning Glucose readings: 4/28-45, 4/29- 66, 4/30-69, 5/1-75    They have counseled her on eating a snack before bed patient claims she does but has those readings in the morning. Readings thrroughout the day are normal not too low or too high. Should Lantus be decreased at bed time? Please advise contact Lorraine at [phone] with how to proceed.

## 2022-05-03 ENCOUNTER — CARE COORDINATION (OUTPATIENT)
Dept: CARE COORDINATION | Age: 87
End: 2022-05-03

## 2022-05-03 NOTE — CARE COORDINATION
Daughter Marifer Benavidez called to report Parvin's blood sugar was 199 this morning. They were instructed to reduce nightly dose of  Lantus to 20 mg yesterday due to multiple low blood sugars in the morning:   Morning Glucose readings: 4/28-45, 4/29- 66, 4/30-69, 5/1-75. Daughter encouraged to monitor for the next 2 days, ACM will call daughter on 5.5.22 for an update. Daughter also advised to call if it is over 200 tomorrow AM. ACM will route to PCP for recommendations.

## 2022-05-06 ENCOUNTER — CARE COORDINATION (OUTPATIENT)
Dept: CARE COORDINATION | Age: 87
End: 2022-05-06

## 2022-05-06 NOTE — CARE COORDINATION
ACM contacted daughter to discuss blood sugars. Daughter reported 2 low glucose this week. Average has been  . Education on how to treat hypoglycemia provided, including discussion on glucose tablets. Discussed Glucerna for when patient skips meals. [facility] will mail glucerna coupons and contact [place] to check on coverage. Daughter stated that patient does not usually like the drinks but she will try it over crushed ice or different ways to get her to try them. [facility] will send to [address] for FYI and ask her to mail coupons.

## 2022-05-09 ENCOUNTER — CARE COORDINATION (OUTPATIENT)
Dept: CARE COORDINATION | Age: 87
End: 2022-05-09

## 2022-05-09 NOTE — CARE COORDINATION
Nutrition Care Coordinator Follow-Up visit:    Food Recall: eating 2-3 meals/d    Activity Level:  Sedentary: X  Lightly Active: Moderately Active:  Very Active:    Adult BMI:  Underweight (below 18.5)  Normal Weight (18.5-24.9)  Overweight (25-29. 9)  Obese (30-39. 9) X  Morbidly Obese (>40)    Weight Change: no change    Plan:  Plan was established with patient:  Increase dietary fiber by consuming whole grains, fruits and vegetables: X  Limit dietary cholesterol to >200mg/day: Increase water intake:  Avoid added sugar: X  Avoid sweetened beverages: X  Choose lean meats:      Monitoring: Will monitor weight:  Will monitor adherence to meal plan: Will monitor adherence to exercise plan: Will monitor HGA1c: X    Handouts Provided :  Low Carb snacking: X  Carb counting /individual meal plan:  Portion Control: X  Food Labels:  Physical Activity:  Low Fat/Cholesterol:  Hypo/Hyperglycemia:  Calorie Controlled Meal Plan:    Goals: Increase water consumption to 8oz. 6-8 times daily:  Manage blood sugars by consuming 3 meals spaced every 4-5 hours with 2-3 snacks daily:reviewed  Increase fiber and decrease fat intake by consuming 1-2 fruit servings and 2-3 vegetable servings per day.   Increase physical activity by:  Consume less than 2,000mg of sodium/day  Avoid consumption of sweetened beverages and added sugar by reading food labels:reviewed  Monitor blood sugars by using meter to check blood glucose before morning meal and 2 hours after a meal daily:BS  recently  Decrease risk of coronary heart disease by consuming fish that contains omega-3 fatty acids at least twice a week, avoiding partially hydrogenated oil/trans fats and limiting saturated fat intake by reading food labels:    Patient goals set:  1. Reviewed meal patten, she is currently eating 2-3 meals/d, sometimes skips breakfast. Reviewed and encouraged set meal times daily.  Quick/easy breakfast suggestions discussed, will send patient a list of suggestions.  Goal is 3 meals daily spaced every 4-5 hours. 2. Reviewed what foods contain carbohydrate to limit and how to measure out portions. Encouraged patient to limit carb intake to 45-60gms/meal, 15gms/snack.  Low carb snacking reviewed. 3. Reviewed plate method, encouraged patient to increase intake of non-starchy vegetables.  Goal is 1/2 of  plate to be non-starchy vegetables, 1/4 lean protein, 1/4 carbs. Foods from each category reviewed along with what a serving size is  4. Reviewed avoiding/limiting sweets and sweetened drinks. Patient does not drink sugary drinks. Encouraged also to limit/avoid sugary foods, reviewed sugar free alternatives to sweets- sugar free jello, pudding, ect. Encouraged patient to keep sweets out of the house as much as possible and limit to once a week or less.   5. Reviewed avoiding canned, prepackaged foods, processed meats and cheese. Reviewed processed meats in detail to avoid- sausage, vo, bologna, ham, ect. Patient continues to avoid high sodium foods and reads food labels to identify foods to avoid. Goal is <2gm of sodium/day.   Patient states doing well sugars have been good . Patient has had a few lows- reviewed hypoglycemia prevention and treatment. Will send Geetha carr, daughter discussed using at breakfast with ACM. Will follow up in 3-4 weeks to review and answer questions.          Yogesh Martins

## 2022-05-10 DIAGNOSIS — E03.9 HYPOTHYROIDISM, UNSPECIFIED TYPE: ICD-10-CM

## 2022-05-10 DIAGNOSIS — E55.9 VITAMIN D DEFICIENCY: ICD-10-CM

## 2022-05-10 RX ORDER — LEVOTHYROXINE SODIUM 0.12 MG/1
TABLET ORAL
Qty: 90 TABLET | Refills: 3 | Status: SHIPPED | OUTPATIENT
Start: 2022-05-10 | End: 2022-05-23 | Stop reason: ALTCHOICE

## 2022-05-10 RX ORDER — MELATONIN
Qty: 30 TABLET | Refills: 0 | Status: SHIPPED | OUTPATIENT
Start: 2022-05-10

## 2022-05-18 DIAGNOSIS — I25.83 CORONARY ARTERY DISEASE DUE TO LIPID RICH PLAQUE: ICD-10-CM

## 2022-05-18 DIAGNOSIS — I25.10 CORONARY ARTERY DISEASE DUE TO LIPID RICH PLAQUE: ICD-10-CM

## 2022-05-18 RX ORDER — LORATADINE 10 MG/1
TABLET ORAL
Qty: 60 TABLET | Refills: 0 | Status: SHIPPED | OUTPATIENT
Start: 2022-05-18

## 2022-05-18 RX ORDER — ATORVASTATIN CALCIUM 80 MG/1
80 TABLET, FILM COATED ORAL DAILY
Qty: 30 TABLET | Refills: 0 | Status: SHIPPED | OUTPATIENT
Start: 2022-05-18 | End: 2022-06-16

## 2022-05-19 ENCOUNTER — CARE COORDINATION (OUTPATIENT)
Dept: CARE COORDINATION | Age: 87
End: 2022-05-19

## 2022-05-23 ENCOUNTER — CARE COORDINATION (OUTPATIENT)
Dept: CARE COORDINATION | Age: 87
End: 2022-05-23

## 2022-05-23 ENCOUNTER — OFFICE VISIT (OUTPATIENT)
Dept: INTERNAL MEDICINE CLINIC | Age: 87
End: 2022-05-23
Payer: MEDICARE

## 2022-05-23 VITALS
WEIGHT: 210.4 LBS | HEART RATE: 59 BPM | OXYGEN SATURATION: 97 % | DIASTOLIC BLOOD PRESSURE: 82 MMHG | HEIGHT: 62 IN | SYSTOLIC BLOOD PRESSURE: 136 MMHG | BODY MASS INDEX: 38.72 KG/M2

## 2022-05-23 DIAGNOSIS — Z13.220 ENCOUNTER FOR LIPID SCREENING FOR CARDIOVASCULAR DISEASE: ICD-10-CM

## 2022-05-23 DIAGNOSIS — Z13.6 ENCOUNTER FOR LIPID SCREENING FOR CARDIOVASCULAR DISEASE: ICD-10-CM

## 2022-05-23 DIAGNOSIS — N17.9 ACUTE KIDNEY INJURY (HCC): ICD-10-CM

## 2022-05-23 DIAGNOSIS — Z79.4 TYPE 2 DIABETES MELLITUS WITHOUT COMPLICATION, WITH LONG-TERM CURRENT USE OF INSULIN (HCC): ICD-10-CM

## 2022-05-23 DIAGNOSIS — Z13.220 SCREENING FOR HYPERLIPIDEMIA: ICD-10-CM

## 2022-05-23 DIAGNOSIS — E03.9 HYPOTHYROIDISM, UNSPECIFIED TYPE: ICD-10-CM

## 2022-05-23 DIAGNOSIS — I48.92 ATRIAL FLUTTER, UNSPECIFIED TYPE (HCC): ICD-10-CM

## 2022-05-23 DIAGNOSIS — I50.33 ACUTE ON CHRONIC DIASTOLIC CONGESTIVE HEART FAILURE (HCC): Primary | ICD-10-CM

## 2022-05-23 DIAGNOSIS — E11.9 TYPE 2 DIABETES MELLITUS WITHOUT COMPLICATION, WITH LONG-TERM CURRENT USE OF INSULIN (HCC): ICD-10-CM

## 2022-05-23 DIAGNOSIS — A41.9 SEPSIS DUE TO PNEUMONIA (HCC): ICD-10-CM

## 2022-05-23 DIAGNOSIS — J18.9 SEPSIS DUE TO PNEUMONIA (HCC): ICD-10-CM

## 2022-05-23 DIAGNOSIS — Z91.81 AT HIGH RISK FOR FALLS: ICD-10-CM

## 2022-05-23 DIAGNOSIS — C73 THYROID CANCER (HCC): ICD-10-CM

## 2022-05-23 LAB — HBA1C MFR BLD: 7.4 %

## 2022-05-23 PROCEDURE — 1090F PRES/ABSN URINE INCON ASSESS: CPT | Performed by: INTERNAL MEDICINE

## 2022-05-23 PROCEDURE — 99214 OFFICE O/P EST MOD 30 MIN: CPT | Performed by: INTERNAL MEDICINE

## 2022-05-23 PROCEDURE — G8427 DOCREV CUR MEDS BY ELIG CLIN: HCPCS | Performed by: INTERNAL MEDICINE

## 2022-05-23 PROCEDURE — 1036F TOBACCO NON-USER: CPT | Performed by: INTERNAL MEDICINE

## 2022-05-23 PROCEDURE — 3051F HG A1C>EQUAL 7.0%<8.0%: CPT | Performed by: INTERNAL MEDICINE

## 2022-05-23 PROCEDURE — 83036 HEMOGLOBIN GLYCOSYLATED A1C: CPT | Performed by: INTERNAL MEDICINE

## 2022-05-23 PROCEDURE — G8417 CALC BMI ABV UP PARAM F/U: HCPCS | Performed by: INTERNAL MEDICINE

## 2022-05-23 PROCEDURE — G8400 PT W/DXA NO RESULTS DOC: HCPCS | Performed by: INTERNAL MEDICINE

## 2022-05-23 PROCEDURE — 1123F ACP DISCUSS/DSCN MKR DOCD: CPT | Performed by: INTERNAL MEDICINE

## 2022-05-23 RX ORDER — LEVOTHYROXINE SODIUM 175 UG/1
175 TABLET ORAL DAILY
Qty: 90 TABLET | Refills: 0 | Status: SHIPPED | OUTPATIENT
Start: 2022-05-23 | End: 2022-09-07

## 2022-05-23 RX ORDER — ISOPROPYL ALCOHOL 70 ML/100ML
SWAB TOPICAL
COMMUNITY
Start: 2022-03-29

## 2022-05-23 NOTE — PROGRESS NOTES
Subjective:      Patient ID: Teresa Goodwin is a 82 y.o. female. HPI  patient is here for evaluation of multiple medical problems.  She has hypertension, diabetes, hypothyroidism, depression, CHF. Patient is along with her daughter. Patient has freestyle sanjay   Blood sugars are doing better  Patient daughter mentioned to me, she is giving all the medication to patient under supervision  Follows with GI physician with iron deficiency anemia history of melena  Patient, TSH was high last visit, history of thyroid cancer, Synthroid was increased, will check TSH  Review of Systems   Constitutional: Negative for activity change, appetite change, chills, diaphoresis, fatigue and fever. HENT: Negative for congestion, dental problem, drooling and ear discharge. Eyes: Negative for pain, discharge, redness and itching. Respiratory: Negative for apnea, cough, choking, chest tightness and shortness of breath. Cardiovascular: Positive for leg swelling. Negative for chest pain. Gastrointestinal: Negative for abdominal distention, abdominal pain, blood in stool, constipation and diarrhea. Endocrine: Negative for cold intolerance and heat intolerance. Genitourinary: Negative for difficulty urinating, dysuria, enuresis, flank pain and frequency. Musculoskeletal: Positive for gait problem (wheel Chair Bound ). Negative for arthralgias, back pain and joint swelling. Skin: Negative for color change, pallor and rash. Neurological: Negative for dizziness, facial asymmetry, light-headedness, numbness and headaches. Psychiatric/Behavioral: Negative for agitation, behavioral problems, confusion, decreased concentration and dysphoric mood. Objective:   Physical Exam  Constitutional:       Appearance: She is well-developed. She is obese. She is not diaphoretic. Comments: Wheel chair Bound    HENT:      Head: Normocephalic and atraumatic. Mouth/Throat:      Pharynx: No oropharyngeal exudate. Eyes:      General: No scleral icterus. Right eye: No discharge. Left eye: No discharge. Conjunctiva/sclera: Conjunctivae normal.      Pupils: Pupils are equal, round, and reactive to light. Neck:      Thyroid: No thyromegaly. Vascular: No JVD. Trachea: No tracheal deviation. Cardiovascular:      Rate and Rhythm: Normal rate. Heart sounds: Normal heart sounds. No murmur heard. No gallop. Pulmonary:      Effort: Pulmonary effort is normal. No respiratory distress. Breath sounds: Normal breath sounds. No stridor. No wheezing or rales. Chest:      Chest wall: No tenderness. Abdominal:      General: Bowel sounds are normal. There is no distension. Palpations: Abdomen is soft. Tenderness: There is no abdominal tenderness. There is no guarding or rebound. Musculoskeletal:         General: Normal range of motion. Cervical back: Normal range of motion and neck supple. Right lower leg: Edema present. Left lower leg: Edema present. Neurological:      Mental Status: She is alert and oriented to person, place, and time. Assessment / Plan:   1. Acute on chronic diastolic congestive heart failure (HCC)  Stable     2. Atrial flutter, unspecified type ([address]  Compensated  , Controlled     3. Thyroid cancer ([address]  Stable     4. Sepsis due to pneumonia ([address]  Resolved     5. Type 2 diabetes mellitus without complication, with long-term current use of insulin ([facility])  Improving   Uses Freestyle sanjay   - POCT glycosylated hemoglobin (Hb A1C)  - Basic Metabolic Panel; Future    6. Acute kidney injury ([address]  Resolved     7. Screening for hyperlipidemia  - Lipid Panel; Future    8. Hypothyroidism, unspecified type  - levothyroxine (SYNTHROID) 175 MCG tablet; Take 1 tablet by mouth Daily  Dispense: 90 tablet; Refill: 0  - TSH; Future    9. At high risk for falls  Uses wheel chair       · Return in about 6 weeks (around 7/4/2022).     · Reviewed prior labs and health maintenance. · Discussed use, benefit, and side effects of prescribed medications. Barriers to medication compliance addressed. All patient questions answered. Pt voiced understanding. Cherelle Riddle MD  [facility]  5/30/2022, 9:48 PM    Please note that this chart was generated using voice recognition Dragon dictation software. Although every effort was made to ensure the accuracy of this automated transcription, some errors in transcription may have occurred. Visit Information    Have you changed or started any medications since your last visit including any over-the-counter medicines, vitamins, or herbal medicines? no   Are you having any side effects from any of your medications? -  no  Have you stopped taking any of your medications? Is so, why? -  no    Have you seen any other physician or provider since your last visit? No  Have you had any other diagnostic tests since your last visit? No  Have you been seen in the emergency room and/or had an admission to a hospital since we last saw you? No  Have you had your routine dental cleaning in the past 6 months? no    Have you activated your [employer] account? If not, what are your barriers?  Yes     Patient Care Team:  Cherelle Riddle MD as PCP - General (Internal Medicine)  Cherelle Riddle MD as PCP - [facility]  Darcy Storey MD as Consulting Physician (Gastroenterology)  Goyo Molina MD as Consulting Physician (Pulmonology)  Laurie Madrigal RN as [address] [place] [address] Rm, RD, LD as Dietitian    Medical History Review  Past Medical, Family, and Social History reviewed and does contribute to the patient presenting condition    Health Maintenance   Topic Date Due    DTaP/Tdap/Td vaccine (1 - Tdap) Never done    Shingles vaccine (1 of 2) Never done    Diabetic retinal exam  09/02/2016    Diabetic foot exam  01/07/2021    Lipids  06/29/2021    COVID-19 Vaccine (2 - Booster for [employer] series) 07/08/2021    Depression Monitoring  06/10/2022    Annual Wellness Visit (AWV)  06/11/2022    A1C test (Diabetic or Prediabetic)  07/20/2022    Flu vaccine  Completed    Pneumococcal 65+ years Vaccine  Completed    DEXA (modify frequency per FRAX score)  Addressed    Hepatitis A vaccine  Aged Out    Hib vaccine  Aged Out    Meningococcal (ACWY) vaccine  Aged Out       On the basis of positive falls risk screening, assessment and plan is as follows: home safety tips provided.

## 2022-05-24 ENCOUNTER — CARE COORDINATION (OUTPATIENT)
Dept: CARE COORDINATION | Age: 87
End: 2022-05-24

## 2022-05-24 NOTE — CARE COORDINATION
Heart Failure Education outreach Date/Time: 2022 2:56 PM    Ambulatory Care Manager (ACM) contacted the patient by telephone to perform Ambulatory Care Coordination. Verified name and  with patient as identifiers. Provided introduction to self, and explanation of the Ambulatory Care Manager's role. ACM reviewed that a Health Healthy tips for the Summer packet has been mailed to the them. ACM reviewed CHF zones, daily weights, fluid restriction, the importance of low sodium diet and healthy tips packet with the patient. Instructed patient to call their PCP if they have a weight gain of 3 lbs in 2 days or 5 lbs in a week. Patient reminded that there is a physician on call 24 hours a day / 7 days a week should the patient have questions or concerns. The patient verbalized understanding. Patient reports that she is doing well at this time, no concerns.

## 2022-05-26 ENCOUNTER — CARE COORDINATION (OUTPATIENT)
Dept: CARE COORDINATION | Age: 87
End: 2022-05-26

## 2022-05-30 ASSESSMENT — ENCOUNTER SYMPTOMS
EYE REDNESS: 0
ABDOMINAL PAIN: 0
CHOKING: 0
BLOOD IN STOOL: 0
COUGH: 0
COLOR CHANGE: 0
EYE PAIN: 0
EYE ITCHING: 0
BACK PAIN: 0
ABDOMINAL DISTENTION: 0
CONSTIPATION: 0
SHORTNESS OF BREATH: 0
APNEA: 0
CHEST TIGHTNESS: 0
EYE DISCHARGE: 0
DIARRHEA: 0

## 2022-06-06 ENCOUNTER — CARE COORDINATION (OUTPATIENT)
Dept: CARE COORDINATION | Age: 87
End: 2022-06-06

## 2022-06-06 NOTE — CARE COORDINATION
Ambulatory Care Coordination Note  6/6/2022  CM Risk Score: 6  Charlson 10 Year Mortality Risk Score: 100%     ACC: Alonzo Bartholomew RN    Summary  Date Care Coordination Episode Started: 3.28.22      Reason patient is in Care Coordination:        CHF, DM, 74% admission ED risk score    Topics Discussed Today:     1) Blood sugar, per patient it has been under 100 except once when it was 200. She said it was just one time, unsure why since she had not ate anything different. ACM encouraged her to call if it happens again. 2) Medication, no questions or needs today. 3) Equipment, no needs per patient. Assessments completed today:     1. Fall risk  2. Initial assessment  3. Medication reconciliation  4. SDOH  5. DM  (Health assessments)      Care Coordinator plan of care: Follow up call 1-2 weeks if no needs will graduate. Care Coordination Interventions    Program Enrollment: Complex Care  Referral from Primary Care Provider: No  Suggested Interventions and Community Resources  Diabetes Education: In Process (Comment: has CGM)  Fall Risk Prevention: In Process  Home Health Services: Completed  Registered Dietician: Completed  Transportation Support: Declined  Zone Management Tools: In Process         Goals Addressed                 This Visit's Progress     Medication Management   On track     I will take my medication as directed. I will notify my provider of any problems with medications, like adverse effects or side effects. I will notify my provider/Care Coordinator if I am unable to afford my medications. I will notify my provider for advice before I stop taking any of my medication. Barriers: overwhelmed by complexity of regimen  Plan for overcoming my barriers: work with ACM and dietician   Confidence: 9/10  Anticipated Goal Completion Date: 6.28.22            Prior to Admission medications    Medication Sig Start Date End Date Taking?  Authorizing Provider   Alcohol Swabs (EASY TOUCH ALCOHOL PREP MEDIUM) 70 % PADS  3/29/22   Historical Provider, MD   levothyroxine (SYNTHROID) 175 MCG tablet Take 1 tablet by mouth Daily 5/23/22   Lex Cowden, MD   atorvastatin (LIPITOR) 80 MG tablet TAKE 1 TABLET BY MOUTH DAILY 5/18/22   Lex Cowden, MD   loratadine (CLARITIN) 10 MG tablet TAKE 1 TABLET BY MOUTH TWICE A DAY AS NEEDED 5/18/22   Lex Cowden, MD   vitamin D3 (CHOLECALCIFEROL) 25 MCG (1000 UT) TABS tablet TAKE ONE TABLET BY MOUTH ONCE DAILY 5/10/22   Lex Cowden, MD   ferrous sulfate (FEROSUL) 325 (65 Fe) MG tablet TAKE 1 TABLET BY MOUTH 2 TIMES DAILY 4/12/22   Lex Cowden, MD   docusate sodium (COLACE) 100 MG capsule Take 1 capsule by mouth daily as needed for Constipation 4/12/22   Lex Cowden, MD   furosemide (LASIX) 40 MG tablet Take 1 tablet by mouth daily 4/12/22   Lex Cowden, MD   sertraline (ZOLOFT) 100 MG tablet Take 1 tablet by Mouth Once a Day 4/12/22   Lex Cowden, MD   SITagliptin (JANUVIA) 100 MG tablet TAKE 1 TAB BY MOUTH ONCE A DAY 3/29/22   Lex Cowden, MD   ULTICARE MINI PEN NEEDLES 31G X 6 MM MISC 1 EACH BY DOES NOT APPLY ROUTE DAILY 3/1/22   Nicki Bhagat MD   insulin glargine (BASAGLAR KWIKPEN) 100 UNIT/ML injection pen Inject 30 Units into the skin 2 times daily  Patient taking differently: Inject 30 Units into the skin 2 times daily 30 units AM, 20 units PM 2/23/22   Nicki Bhagat MD   nystatin (MYCOSTATIN) 851362 UNIT/GM powder Apply 3 times daily. 2/23/22   Nicki Bhagat MD   furosemide (LASIX) 20 MG tablet Take 2 tablets by mouth See Admin Instructions AND 1 tablet See Admin Instructions. Do all this for 10 days. 40mg daily for 10 days. Followed by 20mg daily thereafter. . 1/21/22 1/31/22  Yunior Ba MD   pantoprazole (PROTONIX) 40 MG tablet Take 1 tablet by mouth daily 1/11/22   Yunior Brown MD   metoprolol tartrate (LOPRESSOR) 25 MG tablet Take 0.5 tablets by mouth 2 times daily 1/11/22   Yunior Erickson MD [place]   Continuous Blood Gluc Sensor (FREESTYLE KERRY 2 SENSOR) MISC 1 Device by Does not apply route every 14 days 11/23/21   STEFAN Sadler CNP   Continuous Blood Gluc  (FREESTYLE KERRY 2 READER) CLAUDIO 1 Device by Does not apply route daily 11/23/21   STEFAN Sadler CNP   Cyanocobalamin (VITAMIN B-12) 1000 MCG extended release tablet TAKE 1 TAB BY MOUTH ONCE A DAY  10/28/21   Lety Cruz MD   clopidogrel (PLAVIX) 75 MG tablet TAKE 1 TAB BY MOUTH ONCE A DAY 7/29/21   Lety Cruz MD   blood glucose monitor strips Test 2 times a day & as needed for symptoms of irregular blood glucose. Dispense sufficient amount for indicated testing frequency plus additional to accommodate PRN testing needs. 3/29/21   Lety Cruz MD   Continuous Blood Gluc  (FREESTYLE KERRY 14 DAY READER) CLAUDIO 1 Device by Does not apply route 4 times daily 11/30/20   Lety Cruz MD   Continuous Blood Gluc Sensor (FREESTYLE KERRY 2 SENSOR SYSTM) MISC 4 Devices by Does not apply route every 7 days 11/30/20   Lety Cruz MD   Lancets MISC 1 each by Does not apply route 2 times daily 7/5/19   Lety Cruz MD   nystatin (MYCOSTATIN) 738409 UNIT/GM cream Apply topically 2 times daily. 5/7/18   Lety Cruz MD   aspirin 81 MG chewable tablet Take 1 tablet by mouth daily 9/5/16   Jose Bynum MD       Future Appointments   Date Time Provider Estrellita Segura   7/15/2022  9:45 AM Lety Cruz MD [address]   10/7/2022 11:00 AM [facility]     ,   Diabetes Assessment    Meal Planning: Avoidance of concentrated sweets   How often do you test your blood sugar?: Other   Do you have barriers with adherence to non-pharmacologic self-management interventions?  (Nutrition/Exercise/Self-Monitoring): No       No patient-reported symptoms      ,   Congestive Heart Failure Assessment    Are you currently restricting fluids?: Other  Do you understand a low sodium diet?: Yes  Do you understand how to read food labels?: Yes  How many restaurant meals do you eat per week?: 0  Do you salt your food before tasting it?: No     No patient-reported symptoms      Symptoms:     Symptom course: stable  Salt intake watch compared to last visit: stable      and   General Assessment    Do you have any symptoms that are causing concern?: No

## 2022-06-09 ENCOUNTER — CARE COORDINATION (OUTPATIENT)
Dept: CARE COORDINATION | Age: 87
End: 2022-06-09

## 2022-06-10 NOTE — CARE COORDINATION
Unable to reach patient. M requesting return call to [phone]  Will attempt to reach again within 1-2 weeks.   MANOJ Figueroa

## 2022-06-15 DIAGNOSIS — E55.9 VITAMIN D DEFICIENCY: ICD-10-CM

## 2022-06-15 RX ORDER — MELATONIN
Qty: 30 TABLET | Refills: 1 | OUTPATIENT
Start: 2022-06-15

## 2022-06-15 RX ORDER — LORATADINE 10 MG/1
TABLET ORAL
Qty: 60 TABLET | Refills: 1 | OUTPATIENT
Start: 2022-06-15

## 2022-06-16 DIAGNOSIS — I25.10 CORONARY ARTERY DISEASE DUE TO LIPID RICH PLAQUE: ICD-10-CM

## 2022-06-16 DIAGNOSIS — I25.83 CORONARY ARTERY DISEASE DUE TO LIPID RICH PLAQUE: ICD-10-CM

## 2022-06-16 RX ORDER — ATORVASTATIN CALCIUM 80 MG/1
80 TABLET, FILM COATED ORAL DAILY
Qty: 30 TABLET | Refills: 0 | Status: SHIPPED | OUTPATIENT
Start: 2022-06-16 | End: 2022-07-19

## 2022-06-21 ENCOUNTER — CARE COORDINATION (OUTPATIENT)
Dept: CARE COORDINATION | Age: 87
End: 2022-06-21

## 2022-06-21 NOTE — CARE COORDINATION
Ambulatory Care Coordination Note  6/21/2022  CM Risk Score: 10  Charlson 10 Year Mortality Risk Score: 100%     ACC: Jet Meza RN    Summary Note: Spoke with daughter who stated Rosette Kocher is doing good. Her blood sugar has been \"normal\". No concerns at this time. Patient's A1C has improved from 12.6 to 7.4 over the past 6 months. ACM will graduate at this time but explained to patients daughter to call with any concerns in the future. Care Coordination Interventions    Program Enrollment: Complex Care  Referral from Primary Care Provider: No  Suggested Interventions and Community Resources  Diabetes Education: In Process (Comment: has CGM)  Fall Risk Prevention: In Process  Home Health Services: Completed  Registered Dietician: Completed  Transportation Support: Declined  Zone Management Tools: In Process         Goals Addressed                 This Visit's Progress     COMPLETED: Medication Management        I will take my medication as directed. I will notify my provider of any problems with medications, like adverse effects or side effects. I will notify my provider/Care Coordinator if I am unable to afford my medications. I will notify my provider for advice before I stop taking any of my medication. Barriers: overwhelmed by complexity of regimen  Plan for overcoming my barriers: work with ACM and dietician   Confidence: 9/10  Anticipated Goal Completion Date: 6.28.22            Prior to Admission medications    Medication Sig Start Date End Date Taking?  Authorizing Provider   atorvastatin (LIPITOR) 80 MG tablet TAKE 1 TABLET BY MOUTH DAILY 6/16/22   Jose Boudreaux MD   Alcohol Swabs (EASY TOUCH ALCOHOL PREP MEDIUM) 70 % PADS  3/29/22   Historical Provider, MD   levothyroxine (SYNTHROID) 175 MCG tablet Take 1 tablet by mouth Daily 5/23/22   Sorin Naidu MD   loratadine (CLARITIN) 10 MG tablet TAKE 1 TABLET BY MOUTH TWICE A DAY AS NEEDED 5/18/22   Sorin aNidu MD   vitamin D3 (CHOLECALCIFEROL) 25 MCG (1000 UT) TABS tablet TAKE ONE TABLET BY MOUTH ONCE DAILY 5/10/22   Jassi Ballard MD   ferrous sulfate (FEROSUL) 325 (65 Fe) MG tablet TAKE 1 TABLET BY MOUTH 2 TIMES DAILY 4/12/22   Jassi Ballard MD   docusate sodium (COLACE) 100 MG capsule Take 1 capsule by mouth daily as needed for Constipation 4/12/22   Jassi Ballard MD   furosemide (LASIX) 40 MG tablet Take 1 tablet by mouth daily 4/12/22   Jassi Ballard MD   sertraline (ZOLOFT) 100 MG tablet Take 1 tablet by Mouth Once a Day 4/12/22   Jassi Balladr MD   SITagliptin (JANUVIA) 100 MG tablet TAKE 1 TAB BY MOUTH ONCE A DAY 3/29/22   Jassi Ballard MD   ULTICARE MINI PEN NEEDLES 31G X 6 MM MISC 1 EACH BY DOES NOT APPLY ROUTE DAILY 3/1/22   Tad Serna MD   insulin glargine (BASAGLAR KWIKPEN) 100 UNIT/ML injection pen Inject 30 Units into the skin 2 times daily  Patient taking differently: Inject 30 Units into the skin 2 times daily 30 units AM, 20 units PM 2/23/22   Tad Serna MD   nystatin (MYCOSTATIN) [id number] UNIT/GM powder Apply 3 times daily. 2/23/22   Tad Serna MD   furosemide (LASIX) 20 MG tablet Take 2 tablets by mouth See Admin Instructions AND 1 tablet See Admin Instructions. Do all this for 10 days. 40mg daily for 10 days. Followed by 20mg daily thereafter. . 1/21/22 1/31/22  Yunior Ba MD   pantoprazole (PROTONIX) 40 MG tablet Take 1 tablet by mouth daily 1/11/22   Yunior Nguyễn MD   metoprolol tartrate (LOPRESSOR) 25 MG tablet Take 0.5 tablets by mouth 2 times daily 1/11/22   Yunior Ba MD   Continuous Blood Gluc Sensor (FREESTYLE KERRY 2 SENSOR) MISC 1 Device by Does not apply route every 14 days 11/23/21   STEFAN Cavazos CNP   Continuous Blood Gluc  (FREESTYLE KERRY 2 READER) CLAUDIO 1 Device by Does not apply route daily 11/23/21   Felecia Wilson APRN - CNP   Cyanocobalamin (VITAMIN B-12) 1000 MCG extended release tablet TAKE 1 TAB BY MOUTH ONCE A DAY  10/28/21   Sarai Monaco MD   clopidogrel (PLAVIX) 75 MG tablet TAKE 1 TAB BY MOUTH ONCE A DAY 7/29/21   Sarai Monaco MD   blood glucose monitor strips Test 2 times a day & as needed for symptoms of irregular blood glucose. Dispense sufficient amount for indicated testing frequency plus additional to accommodate PRN testing needs. 3/29/21   Sarai Monaco MD   Continuous Blood Gluc  (FREESTYLE KERRY 14 DAY READER) CLAUDIO 1 Device by Does not apply route 4 times daily 11/30/20   Sarai Monaco MD   Continuous Blood Gluc Sensor (FREESTYLE KERRY 2 SENSOR SYSTM) MISC 4 Devices by Does not apply route every 7 days 11/30/20   Sarai Monaco MD   Lancets MISC 1 each by Does not apply route 2 times daily 7/5/19   Sarai Monaco MD   nystatin (MYCOSTATIN) [id number] UNIT/GM cream Apply topically 2 times daily. 5/7/18   Sarai Monaco MD   aspirin 81 MG chewable tablet Take 1 tablet by mouth daily 9/5/16   Tre Rodriguez MD       Future Appointments   Date Time Provider Estrellita Segura   7/15/2022  9:45 AM Sarai Monaco MD [address]   10/7/2022 11:00 AM Lelo Flores MD [facility]     ,   Diabetes Assessment    Meal Planning: Avoidance of concentrated sweets   How often do you test your blood sugar?: Other   Do you have barriers with adherence to non-pharmacologic self-management interventions?  (Nutrition/Exercise/Self-Monitoring): No       No patient-reported symptoms      ,   Congestive Heart Failure Assessment    Are you currently restricting fluids?: Other  Do you understand a low sodium diet?: Yes  Do you understand how to read food labels?: Yes  How many restaurant meals do you eat per week?: 0  Do you salt your food before tasting it?: No     No patient-reported symptoms      Symptoms:         and   General Assessment    Do you have any symptoms that are causing concern?: No

## 2022-06-22 ENCOUNTER — CARE COORDINATION (OUTPATIENT)
Dept: CARE COORDINATION | Age: 87
End: 2022-06-22

## 2022-06-22 NOTE — CARE COORDINATION
Patient goals reviewed:  1. Reviewed meal patten, she is currently eating 2-3 meals/d, sometimes skips breakfast. Reviewed and encouraged set meal times daily.  Quick/easy breakfast suggestions discussed, will send patient a list of suggestions.  Goal is 3 meals daily spaced every 4-5 hours. 2. Reviewed what foods contain carbohydrate to limit and how to measure out portions. Encouraged patient to limit carb intake to 45-60gms/meal, 15gms/snack.  Low carb snacking reviewed. 3. Reviewed plate method, encouraged patient to increase intake of non-starchy vegetables.  Goal is 1/2 of  plate to be non-starchy vegetables, 1/4 lean protein, 1/4 carbs. Foods from each category reviewed along with what a serving size is  4. Reviewed avoiding/limiting sweets and sweetened drinks. Patient does not drink sugary drinks. Encouraged also to limit/avoid sugary foods, reviewed sugar free alternatives to sweets- sugar free jello, pudding, ect. Encouraged patient to keep sweets out of the house as much as possible and limit to once a week or less.   5. Reviewed avoiding canned, prepackaged foods, processed meats and cheese. Reviewed processed meats in detail to avoid- sausage, vo, bologna, ham, ect. Patient continues to avoid high sodium foods and reads food labels to identify foods to avoid. Goal is <2gm of sodium/day.   6/22/22-patient states doing ok, sugars up and down a little but last A1c was 7.4 so much improved. Reviewed limiting sodium intake, patient states she does not add salt to foods and does read labels to limit sodium intake. Patient had no further nutrition questions, provided with contact information to call with questions. Will remove from panel.   MANOJ Figueroa

## 2022-06-23 ENCOUNTER — HOSPITAL ENCOUNTER (OUTPATIENT)
Age: 87
Setting detail: SPECIMEN
Discharge: HOME | End: 2022-06-23

## 2022-06-24 LAB
ANION GAP SERPL CALCULATED.3IONS-SCNC: 13 MMOL/L (ref 9–17)
BUN BLDV-MCNC: 19 MG/DL (ref 8–23)
CALCIUM SERPL-MCNC: 8.4 MG/DL (ref 8.6–10.4)
CHLORIDE BLD-SCNC: 103 MMOL/L (ref 98–107)
CHOLESTEROL/HDL RATIO: 3.2
CHOLESTEROL: 143 MG/DL
CO2: 24 MMOL/L (ref 20–31)
CREAT SERPL-MCNC: 0.91 MG/DL (ref 0.5–0.9)
GFR AFRICAN AMERICAN: >60 ML/MIN
GFR NON-AFRICAN AMERICAN: 59 ML/MIN
GFR SERPL CREATININE-BSD FRML MDRD: ABNORMAL ML/MIN/{1.73_M2}
GLUCOSE BLD-MCNC: 180 MG/DL (ref 70–99)
HDLC SERPL-MCNC: 45 MG/DL
LDL CHOLESTEROL: 52 MG/DL (ref 0–130)
POTASSIUM SERPL-SCNC: 4 MMOL/L (ref 3.7–5.3)
SODIUM BLD-SCNC: 140 MMOL/L (ref 135–144)
TRIGL SERPL-MCNC: 230 MG/DL
TSH SERPL DL<=0.05 MIU/L-ACNC: 0.6 UIU/ML (ref 0.3–5)

## 2022-06-28 ENCOUNTER — TELEPHONE (OUTPATIENT)
Dept: INTERNAL MEDICINE CLINIC | Age: 87
End: 2022-06-28

## 2022-06-28 NOTE — TELEPHONE ENCOUNTER
Patient need to be more careful, about her diet  Increase insulin to 36 units from 30 unit, twice a day  Schedule appointment in the office

## 2022-06-28 NOTE — TELEPHONE ENCOUNTER
Daughter Klever Leelynn, informed of Dr Jose Sheppard response of increasing insulin dose. Patient has a scheduled appointment in July, daughter states that she will call back in there is anymore issues in the in between time.

## 2022-06-28 NOTE — TELEPHONE ENCOUNTER
Suki Shea from [facility] called to report that the patient's blood sugars have been elevated the past few weeks. 6/  6/27- no numbers reading just high  6/  6/    No symptoms feeling fine no change to diet. Please advise.

## 2022-07-05 DIAGNOSIS — K25.0 ACUTE GASTRIC ULCER WITH HEMORRHAGE: ICD-10-CM

## 2022-07-05 DIAGNOSIS — E11.8 TYPE 2 DIABETES MELLITUS WITH COMPLICATION, WITHOUT LONG-TERM CURRENT USE OF INSULIN (HCC): ICD-10-CM

## 2022-07-05 RX ORDER — PANTOPRAZOLE SODIUM 40 MG/1
TABLET, DELAYED RELEASE ORAL
Qty: 180 TABLET | Refills: 3 | Status: SHIPPED | OUTPATIENT
Start: 2022-07-05

## 2022-07-15 RX ORDER — FERROUS SULFATE 325(65) MG
TABLET ORAL
Qty: 60 TABLET | Refills: 3 | Status: SHIPPED | OUTPATIENT
Start: 2022-07-15 | End: 2022-09-28

## 2022-07-19 DIAGNOSIS — I25.10 CORONARY ARTERY DISEASE DUE TO LIPID RICH PLAQUE: ICD-10-CM

## 2022-07-19 DIAGNOSIS — I25.83 CORONARY ARTERY DISEASE DUE TO LIPID RICH PLAQUE: ICD-10-CM

## 2022-07-19 RX ORDER — ATORVASTATIN CALCIUM 80 MG/1
80 TABLET, FILM COATED ORAL DAILY
Qty: 30 TABLET | Refills: 0 | Status: SHIPPED | OUTPATIENT
Start: 2022-07-19 | End: 2022-09-19

## 2022-08-11 RX ORDER — CLOPIDOGREL BISULFATE 75 MG/1
TABLET ORAL
Qty: 90 TABLET | Refills: 3 | Status: SHIPPED | OUTPATIENT
Start: 2022-08-11

## 2022-08-30 ENCOUNTER — OFFICE VISIT (OUTPATIENT)
Dept: INTERNAL MEDICINE CLINIC | Age: 87
End: 2022-08-30
Payer: MEDICARE

## 2022-08-30 VITALS — HEART RATE: 63 BPM | DIASTOLIC BLOOD PRESSURE: 82 MMHG | SYSTOLIC BLOOD PRESSURE: 138 MMHG | OXYGEN SATURATION: 97 %

## 2022-08-30 DIAGNOSIS — Z00.00 MEDICARE ANNUAL WELLNESS VISIT, SUBSEQUENT: Primary | ICD-10-CM

## 2022-08-30 DIAGNOSIS — Z23 NEED FOR PROPHYLACTIC VACCINATION AND INOCULATION AGAINST VARICELLA: ICD-10-CM

## 2022-08-30 PROCEDURE — G0439 PPPS, SUBSEQ VISIT: HCPCS | Performed by: INTERNAL MEDICINE

## 2022-08-30 PROCEDURE — 1123F ACP DISCUSS/DSCN MKR DOCD: CPT | Performed by: INTERNAL MEDICINE

## 2022-08-30 ASSESSMENT — COLUMBIA-SUICIDE SEVERITY RATING SCALE - C-SSRS
4. HAVE YOU HAD THESE THOUGHTS AND HAD SOME INTENTION OF ACTING ON THEM?: NO
3. HAVE YOU BEEN THINKING ABOUT HOW YOU MIGHT KILL YOURSELF?: YES
5. HAVE YOU STARTED TO WORK OUT OR WORKED OUT THE DETAILS OF HOW TO KILL YOURSELF? DO YOU INTEND TO CARRY OUT THIS PLAN?: NO
1. WITHIN THE PAST MONTH, HAVE YOU WISHED YOU WERE DEAD OR WISHED YOU COULD GO TO SLEEP AND NOT WAKE UP?: NO
2. HAVE YOU ACTUALLY HAD ANY THOUGHTS OF KILLING YOURSELF?: YES
6. HAVE YOU EVER DONE ANYTHING, STARTED TO DO ANYTHING, OR PREPARED TO DO ANYTHING TO END YOUR LIFE?: NO

## 2022-08-30 ASSESSMENT — PATIENT HEALTH QUESTIONNAIRE - PHQ9
SUM OF ALL RESPONSES TO PHQ QUESTIONS 1-9: 8
SUM OF ALL RESPONSES TO PHQ9 QUESTIONS 1 & 2: 1
3. TROUBLE FALLING OR STAYING ASLEEP: 2
5. POOR APPETITE OR OVEREATING: 2
6. FEELING BAD ABOUT YOURSELF - OR THAT YOU ARE A FAILURE OR HAVE LET YOURSELF OR YOUR FAMILY DOWN: 1
1. LITTLE INTEREST OR PLEASURE IN DOING THINGS: 0
9. THOUGHTS THAT YOU WOULD BE BETTER OFF DEAD, OR OF HURTING YOURSELF: 1
SUM OF ALL RESPONSES TO PHQ QUESTIONS 1-9: 8
2. FEELING DOWN, DEPRESSED OR HOPELESS: 1
7. TROUBLE CONCENTRATING ON THINGS, SUCH AS READING THE NEWSPAPER OR WATCHING TELEVISION: 0
8. MOVING OR SPEAKING SO SLOWLY THAT OTHER PEOPLE COULD HAVE NOTICED. OR THE OPPOSITE, BEING SO FIGETY OR RESTLESS THAT YOU HAVE BEEN MOVING AROUND A LOT MORE THAN USUAL: 0
SUM OF ALL RESPONSES TO PHQ QUESTIONS 1-9: 7
4. FEELING TIRED OR HAVING LITTLE ENERGY: 1
SUM OF ALL RESPONSES TO PHQ QUESTIONS 1-9: 8

## 2022-08-30 NOTE — PATIENT INSTRUCTIONS
Personalized Preventive Plan for Nayeli Dust - 8/30/2022  Medicare offers a range of preventive health benefits. Some of the tests and screenings are paid in full while other may be subject to a deductible, co-insurance, and/or copay. Some of these benefits include a comprehensive review of your medical history including lifestyle, illnesses that may run in your family, and various assessments and screenings as appropriate. After reviewing your medical record and screening and assessments performed today your provider may have ordered immunizations, labs, imaging, and/or referrals for you. A list of these orders (if applicable) as well as your Preventive Care list are included within your After Visit Summary for your review. Other Preventive Recommendations:    A preventive eye exam performed by an eye specialist is recommended every 1-2 years to screen for glaucoma; cataracts, macular degeneration, and other eye disorders. A preventive dental visit is recommended every 6 months. Try to get at least 150 minutes of exercise per week or 10,000 steps per day on a pedometer . Order or download the FREE \"Exercise & Physical Activity: Your Everyday Guide\" from [employer]. Call 9-287.511.8547 or search [employer] online. You need 3756-7405 mg of calcium and 1549-6252 IU of vitamin D per day. It is possible to meet your calcium requirement with diet alone, but a vitamin D supplement is usually necessary to meet this goal.  When exposed to the sun, use a sunscreen that protects against both UVA and UVB radiation with an SPF of 30 or greater. Reapply every 2 to 3 hours or after sweating, drying off with a towel, or swimming. Always wear a seat belt when traveling in a car. Always wear a helmet when riding a bicycle or motorcycle.

## 2022-08-30 NOTE — PROGRESS NOTES
Visit Information    Have you changed or started any medications since your last visit including any over-the-counter medicines, vitamins, or herbal medicines? no   Are you having any side effects from any of your medications? -  no  Have you stopped taking any of your medications? Is so, why? -  no    Have you seen any other physician or provider since your last visit? No  Have you had any other diagnostic tests since your last visit? No  Have you been seen in the emergency room and/or had an admission to a hospital since we last saw you? No  Have you had your routine dental cleaning in the past 6 months? no    Have you activated your [employer] account? If not, what are your barriers?  Yes     Patient Care Team:  Mary Dickey MD as PCP - General (Internal Medicine)  Mary Dickey MD as PCP - [facility] Provider  Jyotsna Leon MD as Consulting Physician (Gastroenterology)  Mir Villasenor MD as Consulting Physician (Pulmonology)    Medical History Review  Past Medical, Family, and Social History reviewed and does contribute to the patient presenting condition    Health Maintenance   Topic Date Due    DTaP/Tdap/Td vaccine (1 - Tdap) Never done    Shingles vaccine (1 of 2) Never done    Diabetic retinal exam  09/02/2016    Diabetic foot exam  01/07/2021    COVID-19 Vaccine (2 - Booster for Patricia series) 07/08/2021    Depression Monitoring  06/10/2022    Annual Wellness Visit (AWV)  06/11/2022    Flu vaccine (1) 09/01/2022    A1C test (Diabetic or Prediabetic)  11/23/2022    Lipids  06/24/2023    Pneumococcal 65+ years Vaccine  Completed    DEXA (modify frequency per FRAX score)  Addressed    Hepatitis A vaccine  Aged Out    Hib vaccine  Aged Out    Meningococcal (ACWY) vaccine  Aged Out

## 2022-08-30 NOTE — PROGRESS NOTES
Medicare Annual Wellness Visit    Maverick Kan is here for Medicare AWV    Assessment & Plan    Recommendations for Preventive Services Due: see orders and patient instructions/AVS.  Recommended screening schedule for the next 5-10 years is provided to the patient in written form: see Patient Instructions/AVS.     No follow-ups on file. Subjective   The following acute and/or chronic problems were also addressed today:  Pedal edema    Patient's complete Health Risk Assessment and screening values have been reviewed and are found in Flowsheets. The following problems were reviewed today and where indicated follow up appointments were made and/or referrals ordered. Positive Risk Factor Screenings with Interventions:    Fall Risk:  Do you feel unsteady or are you worried about falling? : (!) yes  2 or more falls in past year?: (!) yes  Fall with injury in past year?: no   Fall Risk Interventions:    Home safety tips provided    Cognitive:   Words recalled: 2 Words Recalled  Clock Drawing Test (CDT): (!) Abnormal  Total Score Interpretation: Abnormal Mini-Cog  Cognitive Impairment Interventions:  Patient advised to follow-up in this office for further evaluation and treatment within 3 month(s)    Depression:  PHQ-2 Score: 1  PHQ-9 Total Score: 8    Severity:1-4 = minimal depression, 5-9 = mild depression, 10-14 = moderate depression, 15-19 = moderately severe depression, 20-27 = severe depression  Depression Interventions:  Patient declines any further evaluation/treatment for this issue          General Health and ACP:  General  In general, how would you say your health is?: Good  In the past 7 days, have you experienced any of the following: New or Increased Pain, New or Increased Fatigue, Loneliness, Social Isolation, Stress or Anger?: (!) Yes  Select all that apply: (!) Stress  Do you get the social and emotional support that you need?: (!) No  Do you have a Living Will?: Yes    Advance Directives Power of [facility] ACP-Advance Directive ACP-Power of Neil Rangel on 04/07/21 Filed on 04/07/21 Filed Rizwan Tino Risk Interventions:  Poor self-assessment of health status: patient declines any further evaluation/treatment for this issue    Health Habits/Nutrition:  Physical Activity: Inactive    Days of Exercise per Week: 0 days    Minutes of Exercise per Session: 0 min     Have you lost any weight without trying in the past 3 months?: No     Have you seen the dentist within the past year?: (!) No  Health Habits/Nutrition Interventions:  Inadequate physical activity:  patient is not ready to increase his/her physical activity level at this time    Hearing/Vision:  Do you or your family notice any trouble with your hearing that hasn't been managed with hearing aids?: (!) Yes  Do you have difficulty driving, watching TV, or doing any of your daily activities because of your eyesight?: (!) Yes  Have you had an eye exam within the past year?: (!) No  No results found.   Hearing/Vision Interventions:  Hearing concerns:  dtr and she states they cannot afford    Safety:  Do you have working smoke detectors?: Yes  Do you have any tripping hazards - clutter in doorways, halls, or stairs?: (!) Yes  Do you have either shower bars, grab bars, non-slip mats or non-slip surfaces in your shower or bathtub?: Yes  Do all of your stairways have a railing or banister?: (!) No (outside, no stairs in the house)  Do you always fasten your seatbelt when you are in a car?: (!) No  Safety Interventions:  Home safety tips provided    ADLs:  In the past 7 days, did you need help from others to perform any of the following everyday activities: Eating, dressing, grooming, bathing, toileting, or walking/balance?: (!) Yes  Select all that apply: (!) Walking/Balance  In the past 7 days, did you need help from others to take care of any of the following: Laundry, housekeeping, banking/finances, shopping, telephone use, food preparation, transportation, or taking medications?: (!) Yes  Select all that apply: [employer], Housekeeping, Banking/Finances, Shopping, Telephone Use, Food Preparation, Transportation, Taking Medications  ADL Interventions:  Patient declines any further evaluation/treatment for this issue          Objective   Vitals:    08/30/22 1151   BP: 138/82   Site: Right Upper Arm   Position: Sitting   Pulse: 63   SpO2: 97%      There is no height or weight on file to calculate BMI. General Appearance: alert and oriented to person, place and time, well developed and well- nourished, in no acute distress  Skin: warm and dry, no rash or erythema  Head: normocephalic and atraumatic  Eyes: pupils equal, round, and reactive to light, extraocular eye movements intact, conjunctivae normal  ENT: tympanic membrane, external ear and ear canal normal bilaterally, nose without deformity, nasal mucosa and turbinates normal without polyps  Neck: supple and non-tender without mass, no thyromegaly or thyroid nodules, no cervical lymphadenopathy  Pulmonary/Chest: clear to auscultation bilaterally- no wheezes, rales or rhonchi, normal air movement, no respiratory distress  Cardiovascular: normal rate, regular rhythm, normal S1 and S2, no murmurs, rubs, clicks, or gallops, distal pulses intact, no carotid bruits  Abdomen: soft, non-tender, non-distended, normal bowel sounds, no masses or organomegaly  Musculoskeletal: normal range of motion, no joint swelling, deformity or tenderness  Neurologic: reflexes normal and symmetric, no cranial nerve deficit, gait, coordination and speech normal     2+ pitting edema    Allergies   Allergen Reactions    Strawberry Extract     Tape [Adhesive Tape] Rash     Prior to Visit Medications    Medication Sig Taking?  Authorizing Provider   clopidogrel (PLAVIX) 75 MG tablet TAKE 1 TAB BY MOUTH ONCE A DAY Yes Jeremi Birch MD   atorvastatin (LIPITOR) 80 MG tablet TAKE 1 TABLET BY MOUTH DAILY Yes Shai Henry MD   ferrous sulfate (FEROSUL) 325 (65 Fe) MG tablet TAKE 1 TABLET BY MOUTH 2 TIMES DAILY Yes Shai Henry MD   pantoprazole (PROTONIX) 40 MG tablet TAKE 1 TABLET BY MOUTH 2 TIMES DAILY Yes Rubén Meyer MD   SITagliptin (JANUVIA) 100 MG tablet TAKE 1 TAB BY MOUTH ONCE A DAY Yes Rubén Meyer MD   Alcohol Swabs (EASY TOUCH ALCOHOL PREP MEDIUM) 70 % PADS  Yes Historical Provider, MD   levothyroxine (SYNTHROID) 175 MCG tablet Take 1 tablet by mouth Daily Yes Rubén Meyer MD   loratadine (CLARITIN) 10 MG tablet TAKE 1 TABLET BY MOUTH TWICE A DAY AS NEEDED Yes Rubén Meyer MD   vitamin D3 (CHOLECALCIFEROL) 25 MCG (1000 UT) TABS tablet TAKE ONE TABLET BY MOUTH ONCE DAILY Yes Rubén Meyer MD   docusate sodium (COLACE) 100 MG capsule Take 1 capsule by mouth daily as needed for Constipation Yes Rubén Meyer MD   furosemide (LASIX) 40 MG tablet Take 1 tablet by mouth daily Yes Rubén Meyer MD   sertraline (ZOLOFT) 100 MG tablet Take 1 tablet by Mouth Once a Day Yes Rubén Meyer MD   [employer] MINI PEN NEEDLES 31G X 6 MM 53 Place [place] DOES NOT APPLY ROUTE DAILY Yes Shai Henry MD   insulin glargine (BASAGLAR KWIKPEN) 100 UNIT/ML injection pen Inject 30 Units into the skin 2 times daily  Patient taking differently: Inject 30 Units into the skin 2 times daily 30 units AM, 20 units PM Yes Shai Henry MD   nystatin (MYCOSTATIN) 613761 UNIT/GM powder Apply 3 times daily.  Yes Shai Henry MD   metoprolol tartrate (LOPRESSOR) 25 MG tablet Take 0.5 tablets by mouth 2 times daily Yes Yunior Ba MD   Continuous Blood Gluc Sensor (FREESTYLE KERRY 2 SENSOR) MISC 1 Device by Does not apply route every 14 days Yes STEFAN Gilliam CNP   Continuous Blood Gluc  (FREESTYLE KERRY 2 READER) CLAUDIO 1 Device by Does not apply route daily Yes STEFAN Gilliam CNP   Cyanocobalamin (VITAMIN B-12) 1000 MCG extended release tablet TAKE 1 TAB BY MOUTH ONCE A DAY Yes Vernell Bermudez MD   blood glucose monitor strips Test 2 times a day & as needed for symptoms of irregular blood glucose. Dispense sufficient amount for indicated testing frequency plus additional to accommodate PRN testing needs. Yes Vernell Bermudez MD   Continuous Blood Gluc  (FREESTYLE KERRY 14 DAY READER) CLAUDIO 1 Device by Does not apply route 4 times daily Yes Vernell Bermudez MD   Continuous Blood Gluc Sensor (FREESTYLE KERRY 2 SENSOR SYSTM) MISC 4 Devices by Does not apply route every 7 days Yes Vernell Bermudez MD   Lancets MISC 1 each by Does not apply route 2 times daily Yes Vernell Bermudez MD   nystatin (MYCOSTATIN) 346367 UNIT/GM cream Apply topically 2 times daily. Yes Vernell Bermudez MD   aspirin 81 MG chewable tablet Take 1 tablet by mouth daily Yes Dg Putnam MD   furosemide (LASIX) 20 MG tablet Take 2 tablets by mouth See Admin Instructions AND 1 tablet See Admin Instructions. Do all this for 10 days. 40mg daily for 10 days. Followed by 20mg daily thereafter. .  Elkin Rubio MD       [facility] (Including outside providers/suppliers regularly involved in providing care):   Patient Care Team:  Vernell Bermudez MD as PCP - General (Internal Medicine)  Vernell Bermudez MD as PCP - [facility]  Ambar Huerta MD as Consulting Physician (Gastroenterology)  Chantal Gonzalez MD as Consulting Physician (Pulmonology)     Reviewed and updated this visit:  Allergies  Meds              Advance Care Planning   Advanced Care Planning: Discussed the patients choices for care and treatment in case of a health event that adversely affects decision-making abilities. Also discussed the patients long-term treatment options. Reviewed with the patient the appropriate state-specific advance directive documents. Reviewed the process of designating a competent adult as an Agent (or -in-fact) that could take make health care decisions for the patient if incompetent.  Patient was

## 2022-09-06 DIAGNOSIS — E03.9 HYPOTHYROIDISM, UNSPECIFIED TYPE: ICD-10-CM

## 2022-09-07 RX ORDER — LEVOTHYROXINE SODIUM 175 UG/1
175 TABLET ORAL DAILY
Qty: 90 TABLET | Refills: 0 | Status: SHIPPED | OUTPATIENT
Start: 2022-09-07

## 2022-09-15 DIAGNOSIS — I25.10 CORONARY ARTERY DISEASE DUE TO LIPID RICH PLAQUE: ICD-10-CM

## 2022-09-15 DIAGNOSIS — I25.83 CORONARY ARTERY DISEASE DUE TO LIPID RICH PLAQUE: ICD-10-CM

## 2022-09-19 RX ORDER — ATORVASTATIN CALCIUM 80 MG/1
80 TABLET, FILM COATED ORAL DAILY
Qty: 30 TABLET | Refills: 8 | Status: SHIPPED | OUTPATIENT
Start: 2022-09-19

## 2022-09-28 DIAGNOSIS — M79.89 LEFT LEG SWELLING: ICD-10-CM

## 2022-09-28 RX ORDER — FERROUS SULFATE 325(65) MG
TABLET ORAL
Qty: 60 TABLET | Refills: 3 | Status: SHIPPED | OUTPATIENT
Start: 2022-09-28

## 2022-09-28 RX ORDER — FUROSEMIDE 40 MG/1
40 TABLET ORAL DAILY
Qty: 30 TABLET | Refills: 3 | Status: SHIPPED | OUTPATIENT
Start: 2022-09-28

## 2022-12-15 ENCOUNTER — OFFICE VISIT (OUTPATIENT)
Dept: INTERNAL MEDICINE CLINIC | Age: 87
End: 2022-12-15

## 2022-12-15 VITALS — SYSTOLIC BLOOD PRESSURE: 128 MMHG | DIASTOLIC BLOOD PRESSURE: 84 MMHG

## 2022-12-15 DIAGNOSIS — I10 HYPERTENSION, UNSPECIFIED TYPE: ICD-10-CM

## 2022-12-15 DIAGNOSIS — I25.83 CORONARY ARTERY DISEASE DUE TO LIPID RICH PLAQUE: ICD-10-CM

## 2022-12-15 DIAGNOSIS — C73 THYROID CANCER (HCC): ICD-10-CM

## 2022-12-15 DIAGNOSIS — E11.8 TYPE 2 DIABETES MELLITUS WITH COMPLICATION, WITHOUT LONG-TERM CURRENT USE OF INSULIN (HCC): Primary | ICD-10-CM

## 2022-12-15 DIAGNOSIS — I25.10 CORONARY ARTERY DISEASE DUE TO LIPID RICH PLAQUE: ICD-10-CM

## 2022-12-15 LAB — HBA1C MFR BLD: 14 %

## 2022-12-15 RX ORDER — INSULIN GLARGINE 100 [IU]/ML
45 INJECTION, SOLUTION SUBCUTANEOUS 2 TIMES DAILY
Qty: 10 ADJUSTABLE DOSE PRE-FILLED PEN SYRINGE | Refills: 3 | Status: SHIPPED | OUTPATIENT
Start: 2022-12-15

## 2022-12-15 SDOH — ECONOMIC STABILITY: FOOD INSECURITY: WITHIN THE PAST 12 MONTHS, THE FOOD YOU BOUGHT JUST DIDN'T LAST AND YOU DIDN'T HAVE MONEY TO GET MORE.: NEVER TRUE

## 2022-12-15 SDOH — ECONOMIC STABILITY: FOOD INSECURITY: WITHIN THE PAST 12 MONTHS, YOU WORRIED THAT YOUR FOOD WOULD RUN OUT BEFORE YOU GOT MONEY TO BUY MORE.: NEVER TRUE

## 2022-12-15 ASSESSMENT — SOCIAL DETERMINANTS OF HEALTH (SDOH): HOW HARD IS IT FOR YOU TO PAY FOR THE VERY BASICS LIKE FOOD, HOUSING, MEDICAL CARE, AND HEATING?: NOT HARD AT ALL

## 2022-12-15 NOTE — PROGRESS NOTES
Subjective:      Patient ID: Faizan Perez is a 82 y.o. female. HPIpatient is here for evaluation of multiple medical problems. She has hypertension, diabetes, hypothyroidism, depression, CAD s/p stent, CHF, has H/o Thyroid cancer on Synthyroid . Patient is along with her daughter. No blood in stools   Has only one stent in heart Years ago on DAPT , no H/o CVA , will Dc Plavix, discussed with Patient   Not complaint with her Diet as per her daughter   Takes nightly Lantus for sure , May or may not take morning lantus   Review of Systems   Constitutional:  Negative for activity change, appetite change, chills, diaphoresis, fatigue and fever. HENT:  Negative for congestion, dental problem, drooling and ear discharge. Eyes:  Negative for pain, discharge, redness and itching. Respiratory:  Negative for apnea, cough, choking, chest tightness and shortness of breath. Cardiovascular:  Positive for leg swelling. Negative for chest pain. Gastrointestinal:  Negative for abdominal distention, abdominal pain, blood in stool, constipation and diarrhea. Endocrine: Negative for cold intolerance and heat intolerance. Genitourinary:  Negative for difficulty urinating, dysuria, enuresis, flank pain and frequency. Musculoskeletal:  Positive for gait problem (wheel Chair Bound ). Negative for arthralgias, back pain and joint swelling. Skin:  Negative for color change, pallor and rash. Neurological:  Negative for dizziness, facial asymmetry, light-headedness, numbness and headaches. Psychiatric/Behavioral:  Negative for agitation, behavioral problems, confusion, decreased concentration and dysphoric mood. Objective:   Physical Exam  Constitutional:       Appearance: She is well-developed. She is obese. She is not diaphoretic. Comments: Wheel chair Bound    HENT:      Head: Normocephalic and atraumatic. Mouth/Throat:      Pharynx: No oropharyngeal exudate.    Eyes:      General: No scleral icterus. Right eye: No discharge. Left eye: No discharge. Conjunctiva/sclera: Conjunctivae normal.      Pupils: Pupils are equal, round, and reactive to light. Neck:      Thyroid: No thyromegaly. Vascular: No JVD. Trachea: No tracheal deviation. Cardiovascular:      Rate and Rhythm: Normal rate. Heart sounds: Normal heart sounds. No murmur heard. No gallop. Pulmonary:      Effort: Pulmonary effort is normal. No respiratory distress. Breath sounds: Normal breath sounds. No stridor. No wheezing or rales. Chest:      Chest wall: No tenderness. Abdominal:      General: Bowel sounds are normal. There is no distension. Palpations: Abdomen is soft. Tenderness: There is no abdominal tenderness. There is no guarding or rebound. Musculoskeletal:         General: Normal range of motion. Cervical back: Normal range of motion and neck supple. Right lower leg: Edema present. Left lower leg: Edema present. Neurological:      Mental Status: She is alert and oriented to person, place, and time. Assessment / Plan:  1. Type 2 diabetes mellitus with complication, without long-term current use of insulin (HCC)  UNcontrolled   Increasing Lantus to 45   - POCT glycosylated hemoglobin (Hb A1C)  - insulin glargine (BASAGLAR KWIKPEN) 100 UNIT/ML injection pen; Inject 45 Units into the skin 2 times daily  Dispense: 10 Adjustable Dose Pre-filled Pen Syringe; Refill: 3    2. Coronary artery disease due to lipid rich plaque  S/p stent   Stable     3. Thyroid cancer ([address]  On synthyroid         4. Hypertension, unspecified type  Controlled       Return in about 4 weeks (around 1/12/2023). Reviewed prior labs and health maintenance. Discussed use, benefit, and side effects of prescribed medications. Barriers to medication compliance addressed. All patient questions answered. Pt voiced understanding.          Sherrie Mai MD  [facility]  12/18/2022, 7:56 PM    Please note that this chart was generated using voice recognition Dragon dictation software. Although every effort was made to ensure the accuracy of this automated transcription, some errors in transcription may have occurred. Visit Information    Have you changed or started any medications since your last visit including any over-the-counter medicines, vitamins, or herbal medicines? no   Are you having any side effects from any of your medications? -  no  Have you stopped taking any of your medications? Is so, why? -  no    Have you seen any other physician or provider since your last visit? No  Have you had any other diagnostic tests since your last visit? No  Have you been seen in the emergency room and/or had an admission to a hospital since we last saw you? No  Have you had your routine dental cleaning in the past 6 months? no    Have you activated your [employer] [facility] account? If not, what are your barriers?  Yes     Patient Care Team:  Chrissy Meza MD as PCP - General (Internal Medicine)  Chrissy Meza MD as PCP - [facility] Empaneled Provider  Hair Bunn MD as Consulting Physician (Pulmonology)  Mary Beth Mauricio MD as Consulting Physician (Gastroenterology)    Medical History Review  Past Medical, Family, and Social History reviewed and does not contribute to the patient presenting condition    Health Maintenance   Topic Date Due    DTaP/Tdap/Td vaccine (1 - Tdap) Never done    Shingles vaccine (1 of 2) Never done    Diabetic retinal exam  09/02/2016    Diabetic foot exam  01/07/2021    COVID-19 Vaccine (2 - Booster for Patricia series) 07/08/2021    Flu vaccine (1) 08/01/2022    A1C test (Diabetic or Prediabetic)  11/23/2022    Lipids  06/24/2023    Depression Monitoring  08/30/2023    Annual Wellness Visit (AWV)  08/31/2023    Pneumococcal 65+ years Vaccine  Completed    DEXA (modify frequency per FRAX score)  Addressed    Hepatitis A vaccine  Aged Out    Hib vaccine  Aged Out Meningococcal (ACWY) vaccine  Aged Out

## 2022-12-18 ASSESSMENT — ENCOUNTER SYMPTOMS
CHOKING: 0
BLOOD IN STOOL: 0
CHEST TIGHTNESS: 0
ABDOMINAL DISTENTION: 0
EYE DISCHARGE: 0
SHORTNESS OF BREATH: 0
ABDOMINAL PAIN: 0
COLOR CHANGE: 0
CONSTIPATION: 0
APNEA: 0
EYE ITCHING: 0
DIARRHEA: 0
COUGH: 0
BACK PAIN: 0
EYE REDNESS: 0
EYE PAIN: 0

## 2022-12-22 DIAGNOSIS — E03.9 HYPOTHYROIDISM, UNSPECIFIED TYPE: ICD-10-CM

## 2022-12-22 RX ORDER — LEVOTHYROXINE SODIUM 175 UG/1
175 TABLET ORAL DAILY
Qty: 90 TABLET | Refills: 0 | Status: SHIPPED | OUTPATIENT
Start: 2022-12-22

## 2023-01-09 NOTE — TELEPHONE ENCOUNTER
Please advise if appropriate Stable  Clinically euthyroid. Thyroid levels ordered. Medication to be adjusted accordingly.

## 2023-02-23 DIAGNOSIS — M79.89 LEFT LEG SWELLING: ICD-10-CM

## 2023-02-23 RX ORDER — FERROUS SULFATE 325(65) MG
TABLET ORAL
Qty: 60 TABLET | Refills: 3 | Status: SHIPPED | OUTPATIENT
Start: 2023-02-23

## 2023-02-23 RX ORDER — FUROSEMIDE 40 MG/1
40 TABLET ORAL DAILY
Qty: 30 TABLET | Refills: 3 | Status: SHIPPED | OUTPATIENT
Start: 2023-02-23

## 2023-03-07 ENCOUNTER — OFFICE VISIT (OUTPATIENT)
Dept: INTERNAL MEDICINE CLINIC | Age: 88
End: 2023-03-07
Payer: MEDICARE

## 2023-03-07 VITALS
OXYGEN SATURATION: 95 % | BODY MASS INDEX: 38 KG/M2 | SYSTOLIC BLOOD PRESSURE: 130 MMHG | DIASTOLIC BLOOD PRESSURE: 84 MMHG | HEIGHT: 62 IN | WEIGHT: 206.5 LBS | HEART RATE: 71 BPM

## 2023-03-07 DIAGNOSIS — E11.8 TYPE 2 DIABETES MELLITUS WITH COMPLICATION, WITHOUT LONG-TERM CURRENT USE OF INSULIN (HCC): ICD-10-CM

## 2023-03-07 DIAGNOSIS — I50.33 ACUTE ON CHRONIC DIASTOLIC CONGESTIVE HEART FAILURE (HCC): ICD-10-CM

## 2023-03-07 DIAGNOSIS — F32.A DEPRESSION, UNSPECIFIED DEPRESSION TYPE: ICD-10-CM

## 2023-03-07 DIAGNOSIS — E66.01 SEVERE OBESITY (BMI 35.0-39.9) WITH COMORBIDITY (HCC): ICD-10-CM

## 2023-03-07 DIAGNOSIS — G89.29 CHRONIC BILATERAL LOW BACK PAIN WITHOUT SCIATICA: ICD-10-CM

## 2023-03-07 DIAGNOSIS — S91.209A AVULSION OF TOENAIL, INITIAL ENCOUNTER: Primary | ICD-10-CM

## 2023-03-07 DIAGNOSIS — C73 THYROID CANCER (HCC): ICD-10-CM

## 2023-03-07 DIAGNOSIS — E03.9 HYPOTHYROIDISM, UNSPECIFIED TYPE: ICD-10-CM

## 2023-03-07 DIAGNOSIS — I48.92 ATRIAL FLUTTER, UNSPECIFIED TYPE (HCC): ICD-10-CM

## 2023-03-07 DIAGNOSIS — F33.1 MAJOR DEPRESSIVE DISORDER, RECURRENT, MODERATE (HCC): ICD-10-CM

## 2023-03-07 DIAGNOSIS — F33.0 MAJOR DEPRESSIVE DISORDER, RECURRENT, MILD (HCC): ICD-10-CM

## 2023-03-07 DIAGNOSIS — M54.50 CHRONIC BILATERAL LOW BACK PAIN WITHOUT SCIATICA: ICD-10-CM

## 2023-03-07 DIAGNOSIS — K25.0 ACUTE GASTRIC ULCER WITH HEMORRHAGE: ICD-10-CM

## 2023-03-07 DIAGNOSIS — F33.42 RECURRENT MAJOR DEPRESSIVE DISORDER, IN FULL REMISSION (HCC): ICD-10-CM

## 2023-03-07 DIAGNOSIS — I10 PRIMARY HYPERTENSION: ICD-10-CM

## 2023-03-07 PROBLEM — F33.9 MAJOR DEPRESSIVE DISORDER, RECURRENT, UNSPECIFIED (HCC): Status: ACTIVE | Noted: 2023-03-07

## 2023-03-07 PROCEDURE — G8400 PT W/DXA NO RESULTS DOC: HCPCS | Performed by: INTERNAL MEDICINE

## 2023-03-07 PROCEDURE — G8427 DOCREV CUR MEDS BY ELIG CLIN: HCPCS | Performed by: INTERNAL MEDICINE

## 2023-03-07 PROCEDURE — 1123F ACP DISCUSS/DSCN MKR DOCD: CPT | Performed by: INTERNAL MEDICINE

## 2023-03-07 PROCEDURE — 1090F PRES/ABSN URINE INCON ASSESS: CPT | Performed by: INTERNAL MEDICINE

## 2023-03-07 PROCEDURE — 99213 OFFICE O/P EST LOW 20 MIN: CPT | Performed by: INTERNAL MEDICINE

## 2023-03-07 PROCEDURE — G8484 FLU IMMUNIZE NO ADMIN: HCPCS | Performed by: INTERNAL MEDICINE

## 2023-03-07 PROCEDURE — 3075F SYST BP GE 130 - 139MM HG: CPT | Performed by: INTERNAL MEDICINE

## 2023-03-07 PROCEDURE — G8417 CALC BMI ABV UP PARAM F/U: HCPCS | Performed by: INTERNAL MEDICINE

## 2023-03-07 PROCEDURE — 3079F DIAST BP 80-89 MM HG: CPT | Performed by: INTERNAL MEDICINE

## 2023-03-07 PROCEDURE — 1036F TOBACCO NON-USER: CPT | Performed by: INTERNAL MEDICINE

## 2023-03-07 RX ORDER — PANTOPRAZOLE SODIUM 40 MG/1
TABLET, DELAYED RELEASE ORAL
Qty: 180 TABLET | Refills: 3 | Status: SHIPPED | OUTPATIENT
Start: 2023-03-07

## 2023-03-07 RX ORDER — SERTRALINE HYDROCHLORIDE 100 MG/1
TABLET, FILM COATED ORAL
Qty: 90 TABLET | Refills: 3 | Status: SHIPPED | OUTPATIENT
Start: 2023-03-07

## 2023-03-07 RX ORDER — LEVOTHYROXINE SODIUM 175 UG/1
175 TABLET ORAL DAILY
Qty: 90 TABLET | Refills: 3 | Status: SHIPPED | OUTPATIENT
Start: 2023-03-07

## 2023-03-07 RX ORDER — GABAPENTIN 300 MG/1
300 CAPSULE ORAL 2 TIMES DAILY
Qty: 60 CAPSULE | Refills: 5 | Status: SHIPPED | OUTPATIENT
Start: 2023-03-07 | End: 2023-09-03

## 2023-03-07 ASSESSMENT — PATIENT HEALTH QUESTIONNAIRE - PHQ9
SUM OF ALL RESPONSES TO PHQ9 QUESTIONS 1 & 2: 0
SUM OF ALL RESPONSES TO PHQ QUESTIONS 1-9: 0
8. MOVING OR SPEAKING SO SLOWLY THAT OTHER PEOPLE COULD HAVE NOTICED. OR THE OPPOSITE, BEING SO FIGETY OR RESTLESS THAT YOU HAVE BEEN MOVING AROUND A LOT MORE THAN USUAL: 0
SUM OF ALL RESPONSES TO PHQ QUESTIONS 1-9: 0
2. FEELING DOWN, DEPRESSED OR HOPELESS: 0
7. TROUBLE CONCENTRATING ON THINGS, SUCH AS READING THE NEWSPAPER OR WATCHING TELEVISION: 0
10. IF YOU CHECKED OFF ANY PROBLEMS, HOW DIFFICULT HAVE THESE PROBLEMS MADE IT FOR YOU TO DO YOUR WORK, TAKE CARE OF THINGS AT HOME, OR GET ALONG WITH OTHER PEOPLE: 0
4. FEELING TIRED OR HAVING LITTLE ENERGY: 0
9. THOUGHTS THAT YOU WOULD BE BETTER OFF DEAD, OR OF HURTING YOURSELF: 0
5. POOR APPETITE OR OVEREATING: 0
1. LITTLE INTEREST OR PLEASURE IN DOING THINGS: 0
3. TROUBLE FALLING OR STAYING ASLEEP: 0
SUM OF ALL RESPONSES TO PHQ QUESTIONS 1-9: 0
6. FEELING BAD ABOUT YOURSELF - OR THAT YOU ARE A FAILURE OR HAVE LET YOURSELF OR YOUR FAMILY DOWN: 0
SUM OF ALL RESPONSES TO PHQ QUESTIONS 1-9: 0

## 2023-03-07 ASSESSMENT — ENCOUNTER SYMPTOMS
RESPIRATORY NEGATIVE: 1
EYES NEGATIVE: 1
GASTROINTESTINAL NEGATIVE: 1

## 2023-03-07 NOTE — PROGRESS NOTES
Visit Information    Have you changed or started any medications since your last visit including any over-the-counter medicines, vitamins, or herbal medicines? no   Are you having any side effects from any of your medications? -  no  Have you stopped taking any of your medications? Is so, why? -  no    Have you seen any other physician or provider since your last visit? No  Have you had any other diagnostic tests since your last visit? No  Have you been seen in the emergency room and/or had an admission to a hospital since we last saw you? No  Have you had your routine dental cleaning in the past 6 months? no    Have you activated your [employer] account? If not, what are your barriers?  Yes     Patient Care Team:  Sharon Landers MD as PCP - General (Internal Medicine)  Sharon Landers MD as PCP - Empaneled Provider  Maricruz Stevens MD as Consulting Physician (Pulmonology)  Shwetha Moss MD as Consulting Physician (Gastroenterology)    Medical History Review  Past Medical, Family, and Social History reviewed and does contribute to the patient presenting condition    Health Maintenance   Topic Date Due    DTaP/Tdap/Td vaccine (1 - Tdap) Never done    Shingles vaccine (1 of 2) Never done    Diabetic retinal exam  09/02/2016    Diabetic foot exam  01/07/2021    COVID-19 Vaccine (2 - Booster for Patricia series) 07/08/2021    Flu vaccine (1) 08/01/2022    A1C test (Diabetic or Prediabetic)  06/15/2023    Lipids  06/24/2023    Depression Monitoring  08/30/2023    Annual Wellness Visit (AWV)  08/31/2023    Pneumococcal 65+ years Vaccine  Completed    DEXA (modify frequency per FRAX score)  Addressed    Hepatitis A vaccine  Aged Out    Hib vaccine  Aged Out    Meningococcal (ACWY) vaccine  Aged Out

## 2023-03-07 NOTE — PROGRESS NOTES
[address] [id number]  Dept: [phone]  Dept Fax: [phone]    Jassi Tran is a 82 y.o. female who presents today for her medicalconditions/complaints as noted below. Jassi Tran is c/o of Toe Injury (Patient ripped her entire toe nail off her big toe on the Right foot 4days ago/)      HPI:     Toe Pain   The incident occurred 3 to 5 days ago (kicked furniture with right toe. has thickened nail that nearly  vback to the nail bed). The incident occurred at home. The injury mechanism was a direct blow. The pain is present in the right toes (hallux). The pain is mild. Nothing aggravates the symptoms. She has tried elevation (keeping clean) for the symptoms. The treatment provided moderate relief. She is also treated for DM, HTN, CH, atrial fib, peptic ulcer disease, hypothyroidism/thyroid cancer and depression. Symptoms are stable on current medications.     Past Medical History:   Diagnosis Date    Arthritis     Atrial flutter (HCC)     CAD (coronary artery disease)     CHF (congestive heart failure) (HCC)     Diabetes ([facility]     Diabetes mellitus ([facility]     Hyperlipidemia     Hypertension     Psychiatric problem     Thyroid cancer ([facility]     S/P thyroidectomy; on synthroid    Thyroid disease     hypothyroid    Type 2 diabetes mellitus without complication, with long-term current use of insulin ([facility]) 7/4/2017        Current Outpatient Medications   Medication Sig Dispense Refill    levothyroxine (SYNTHROID) 175 MCG tablet Take 1 tablet by mouth Daily 90 tablet 3    metoprolol tartrate (LOPRESSOR) 25 MG tablet Take 0.5 tablets by mouth 2 times daily 180 tablet 3    sertraline (ZOLOFT) 100 MG tablet Take 1 tablet by Mouth Once a Day 90 tablet 3    pantoprazole (PROTONIX) 40 MG tablet TAKE 1 TABLET BY MOUTH 2 TIMES DAILY 180 tablet 3    furosemide (LASIX) 40 MG tablet TAKE 1 TABLET BY MOUTH DAILY 30 tablet 3    ferrous sulfate (FEROSUL) 325 (65 Fe) MG tablet TAKE 1 TABLET BY MOUTH 2 TIMES DAILY 60 tablet 3    insulin glargine (BASAGLAR KWIKPEN) 100 UNIT/ML injection pen Inject 45 Units into the skin 2 times daily 10 Adjustable Dose Pre-filled Pen Syringe 3    atorvastatin (LIPITOR) 80 MG tablet TAKE 1 TABLET BY MOUTH DAILY 30 tablet 8    SITagliptin (JANUVIA) 100 MG tablet TAKE 1 TAB BY MOUTH ONCE A DAY 90 tablet 3    Alcohol Swabs (EASY TOUCH ALCOHOL PREP MEDIUM) 70 % PADS       loratadine (CLARITIN) 10 MG tablet TAKE 1 TABLET BY MOUTH TWICE A DAY AS NEEDED 60 tablet 0    vitamin D3 (CHOLECALCIFEROL) 25 MCG (1000 UT) TABS tablet TAKE ONE TABLET BY MOUTH ONCE DAILY 30 tablet 0    docusate sodium (COLACE) 100 MG capsule Take 1 capsule by mouth daily as needed for Constipation 30 capsule 0    ULTIC[employer] MINI PEN NEEDLES 31G X 6 MM MISC 1 EACH BY DOES NOT APPLY ROUTE DAILY 100 each 11    nystatin (MYCOSTATIN) 390094 UNIT/GM powder Apply 3 times daily. 1 each 3    Continuous Blood Gluc Sensor (FREESTYLE KERRY 2 SENSOR) MISC 1 Device by Does not apply route every 14 days 2 each 2    Continuous Blood Gluc  (FREESTYLE KERRY 2 READER) CLAUDIO 1 Device by Does not apply route daily 1 each 0    Cyanocobalamin (VITAMIN B-12) 1000 MCG extended release tablet TAKE 1 TAB BY MOUTH ONCE A DAY  30 tablet 11    blood glucose monitor strips Test 2 times a day & as needed for symptoms of irregular blood glucose. Dispense sufficient amount for indicated testing frequency plus additional to accommodate PRN testing needs. 100 strip 1    Continuous Blood Gluc  (FREESTYLE KERRY 14 DAY READER) CLAUDIO 1 Device by Does not apply route 4 times daily 1 Device 0    Continuous Blood Gluc Sensor (FREESTYLE KERRY 2 SENSOR SYSTM) MISC 4 Devices by Does not apply route every 7 days 4 each 0    Lancets MISC 1 each by Does not apply route 2 times daily 100 each 11    nystatin (MYCOSTATIN) 671823 UNIT/GM cream Apply topically 2 times daily.  1 Tube 1    aspirin 81 MG chewable tablet Take 1 tablet by mouth daily 30 tablet 3    furosemide (LASIX) 20 MG tablet Take 2 tablets by mouth See Admin Instructions AND 1 tablet See Admin Instructions. Do all this for 10 days. 40mg daily for 10 days. Followed by 20mg daily thereafter. . 60 tablet 3     No current facility-administered medications for this visit. Allergies   Allergen Reactions    Strawberry Extract     Tape Matheus Boss Tape] Rash       Health Maintenance   Topic Date Due    DTaP/Tdap/Td vaccine (1 - Tdap) Never done    Shingles vaccine (1 of 2) Never done    Diabetic retinal exam  09/02/2016    Diabetic foot exam  01/07/2021    COVID-19 Vaccine (2 - Booster for Patricia series) 07/08/2021    Flu vaccine (1) 08/01/2022    A1C test (Diabetic or Prediabetic)  06/15/2023    Lipids  06/24/2023    Depression Monitoring  08/30/2023    Annual Wellness Visit (AWV)  08/31/2023    Pneumococcal 65+ years Vaccine  Completed    DEXA (modify frequency per FRAX score)  Addressed    Hepatitis A vaccine  Aged Out    Hib vaccine  Aged Out    Meningococcal (ACWY) vaccine  Aged Out       Subjective:      Review of Systems   Constitutional: Negative. HENT: Negative. Eyes: Negative. Respiratory: Negative. Cardiovascular: Negative. Gastrointestinal: Negative. Genitourinary: Negative. Musculoskeletal: Negative. Skin: Negative. Neurological: Negative. Psychiatric/Behavioral: Negative. All other systems reviewed and are negative. Objective:     Physical Exam  Vitals reviewed. Constitutional:       Appearance: Normal appearance. She is well-developed. HENT:      Head: Normocephalic and atraumatic. Eyes:      Conjunctiva/sclera: Conjunctivae normal.      Pupils: Pupils are equal, round, and reactive to light. Neck:      Thyroid: No thyromegaly. Vascular: No JVD. Cardiovascular:      Rate and Rhythm: Normal rate and regular rhythm. Heart sounds: S1 normal and S2 normal. No murmur heard.   Pulmonary: Effort: No respiratory distress. Breath sounds: Normal breath sounds. Abdominal:      General: Bowel sounds are normal.      Palpations: Abdomen is soft. There is no mass. Tenderness: There is no abdominal tenderness. Musculoskeletal:         General: No tenderness. Normal range of motion. Cervical back: Neck supple. Lymphadenopathy:      Cervical: No cervical adenopathy. Skin:     General: Skin is warm and dry. Neurological:      Mental Status: She is alert and oriented to person, place, and time. Cranial Nerves: No cranial nerve deficit. Deep Tendon Reflexes: Reflexes are normal and symmetric. Psychiatric:         Behavior: Behavior normal.     /84   Pulse 71   Ht 5' 2\" (1.575 m)   Wt 206 lb 8 oz (93.7 kg)   SpO2 95%   BMI 37.77 kg/m²       Assessment:       Diagnosis Orders   1. Avulsion of toenail, initial encounter  [facility]      2. Hypothyroidism, unspecified type  levothyroxine (SYNTHROID) 175 MCG tablet   The current medical regimen is effective;  continue present plan and medications. 3. Hypertension  metoprolol tartrate (LOPRESSOR) 25 MG tablet   The current medical regimen is effective;  continue present plan and medications. 4. Depression, unspecified depression type  sertraline (ZOLOFT) 100 MG tablet   The current medical regimen is effective;  continue present plan and medications. .cont5. Acute gastric ulcer with hemorrhage  pantoprazole (PROTONIX) 40 MG tablet   The current medical regimen is effective;  continue present plan and medications. 6. Type 2 diabetes mellitus with complication, without long-term current use of insulin (HCC)     The current medical regimen is effective;  continue present plan and medications. 7. Acute on chronic diastolic congestive heart failure ([address]     The current medical regimen is effective;  continue present plan and medications.      8. Atrial flutter, unspecified type ([address]     The current medical regimen is effective;  continue present plan and medications. 9. Thyroid cancer ([facility] [address]     The current medical regimen is effective;  continue present plan and medications. 10. Severe obesity (BMI 35.0-39. 9) with comorbidity ([facility] [address]     The current medical regimen is effective;  continue present plan and medications. 11. Major depressive disorder, recurrent, mild     The current medical regimen is effective;  continue present plan and medications. 12. Major depressive disorder, recurrent, moderate     The current medical regimen is effective;  continue present plan and medications. 13. Recurrent major depressive disorder, in full remission ([facility] [address]     The current medical regimen is effective;  continue present plan and medications. Plan:      No follow-ups on file. Orders Placed This Encounter   Medications    levothyroxine (SYNTHROID) 175 MCG tablet     Sig: Take 1 tablet by mouth Daily     Dispense:  90 tablet     Refill:  3    metoprolol tartrate (LOPRESSOR) 25 MG tablet     Sig: Take 0.5 tablets by mouth 2 times daily     Dispense:  180 tablet     Refill:  3    sertraline (ZOLOFT) 100 MG tablet     Sig: Take 1 tablet by Mouth Once a Day     Dispense:  90 tablet     Refill:  3    pantoprazole (PROTONIX) 40 MG tablet     Sig: TAKE 1 TABLET BY MOUTH 2 TIMES DAILY     Dispense:  180 tablet     Refill:  3     Orders Placed This Encounter   Procedures    [facility]     Referral Priority:   Routine     Referral Type:   Eval and Treat     Referral Reason:   Specialty Services Required     Requested Specialty:   Podiatry     Number of Visits Requested:   1            Patient given educational materials - see patient instructions. Discussed use, benefit, and side effects of prescribed medications. All patientquestions answered. Pt voiced understanding.     Electronically signed by Dick Castrejon MD on 3/7/2023at 1:19 PM

## 2023-03-23 ENCOUNTER — OFFICE VISIT (OUTPATIENT)
Dept: PODIATRY | Age: 88
End: 2023-03-23
Payer: MEDICARE

## 2023-03-23 VITALS — WEIGHT: 206 LBS | HEIGHT: 62 IN | BODY MASS INDEX: 37.91 KG/M2

## 2023-03-23 DIAGNOSIS — E11.42 DIABETIC POLYNEUROPATHY ASSOCIATED WITH TYPE 2 DIABETES MELLITUS (HCC): ICD-10-CM

## 2023-03-23 DIAGNOSIS — M79.674 PAIN OF TOES OF BOTH FEET: ICD-10-CM

## 2023-03-23 DIAGNOSIS — E11.51 TYPE 2 DIABETES MELLITUS WITH PERIPHERAL VASCULAR DISEASE (HCC): ICD-10-CM

## 2023-03-23 DIAGNOSIS — M79.675 PAIN OF TOES OF BOTH FEET: ICD-10-CM

## 2023-03-23 DIAGNOSIS — B35.1 ONYCHOMYCOSIS OF TOENAIL: Primary | ICD-10-CM

## 2023-03-23 PROCEDURE — G8417 CALC BMI ABV UP PARAM F/U: HCPCS | Performed by: PODIATRIST

## 2023-03-23 PROCEDURE — G8427 DOCREV CUR MEDS BY ELIG CLIN: HCPCS | Performed by: PODIATRIST

## 2023-03-23 PROCEDURE — G8484 FLU IMMUNIZE NO ADMIN: HCPCS | Performed by: PODIATRIST

## 2023-03-23 PROCEDURE — 1090F PRES/ABSN URINE INCON ASSESS: CPT | Performed by: PODIATRIST

## 2023-03-23 PROCEDURE — 1123F ACP DISCUSS/DSCN MKR DOCD: CPT | Performed by: PODIATRIST

## 2023-03-23 PROCEDURE — 1036F TOBACCO NON-USER: CPT | Performed by: PODIATRIST

## 2023-03-23 PROCEDURE — 99203 OFFICE O/P NEW LOW 30 MIN: CPT | Performed by: PODIATRIST

## 2023-03-23 PROCEDURE — G8400 PT W/DXA NO RESULTS DOC: HCPCS | Performed by: PODIATRIST

## 2023-03-23 PROCEDURE — 11721 DEBRIDE NAIL 6 OR MORE: CPT | Performed by: PODIATRIST

## 2023-03-23 ASSESSMENT — ENCOUNTER SYMPTOMS
BACK PAIN: 0
NAUSEA: 0
COLOR CHANGE: 0
SHORTNESS OF BREATH: 0
DIARRHEA: 0

## 2023-03-23 NOTE — PROGRESS NOTES
SYSTM) MISC 4 Devices by Does not apply route every 7 days 11/30/20  Yes Elkin Menezes MD   Lancets MISC 1 each by Does not apply route 2 times daily 7/5/19  Yes Elkin Menezes MD   nystatin (MYCOSTATIN) 338975 UNIT/GM cream Apply topically 2 times daily. 5/7/18  Yes Elkin Menezes MD   aspirin 81 MG chewable tablet Take 1 tablet by mouth daily 9/5/16  Yes Robinson Vasquez MD   furosemide (LASIX) 20 MG tablet Take 2 tablets by mouth See Admin Instructions AND 1 tablet See Admin Instructions. Do all this for 10 days. 40mg daily for 10 days. Followed by 20mg daily thereafter. . 1/21/22 1/31/22  Yunior Porter MD       Past Surgical History:   Procedure Laterality Date    APPENDECTOMY      BACK SURGERY      CHOLECYSTECTOMY      CORONARY ANGIOPLASTY WITH STENT PLACEMENT      HYSTERECTOMY (CERVIX STATUS UNKNOWN)      THYROIDECTOMY      UPPER GASTROINTESTINAL ENDOSCOPY  4/14/2021    EGD BIOPSY performed by Maria Del Rosario Moyer MD at [address] [place] [address]  4/14/2021    EGD CONTROL HEMORRHAGE performed by Maria Del Rosario Moyer MD at \[facility]\"" Endoscopy       Family History   Problem Relation Age of Onset    Cancer Mother     Cancer Father     Cancer Brother     Diabetes Brother        Social History     Tobacco Use    Smoking status: Former    Smokeless tobacco: Never   Substance Use Topics    Alcohol use: No       Review of Systems   Constitutional:  Negative for activity change, appetite change, chills, diaphoresis, fatigue and fever. Respiratory:  Negative for shortness of breath. Cardiovascular:  Negative for leg swelling. Gastrointestinal:  Negative for diarrhea and nausea. Endocrine: Negative for cold intolerance, heat intolerance and polyuria. Musculoskeletal:  Positive for arthralgias and gait problem. Negative for back pain, joint swelling and myalgias. Skin:  Negative for color change, pallor, rash and wound.    Allergic/Immunologic: Negative for environmental allergies

## 2023-04-17 RX ORDER — PEN NEEDLE, DIABETIC,DISP UNIT 32GX 5/32"
NEEDLE, DISPOSABLE MISCELLANEOUS
Qty: 100 EACH | Refills: 11 | Status: SHIPPED | OUTPATIENT
Start: 2023-04-17

## 2023-05-11 DIAGNOSIS — K25.0 ACUTE GASTRIC ULCER WITH HEMORRHAGE: ICD-10-CM

## 2023-05-11 RX ORDER — PANTOPRAZOLE SODIUM 40 MG/1
TABLET, DELAYED RELEASE ORAL
Qty: 180 TABLET | Refills: 3 | Status: SHIPPED | OUTPATIENT
Start: 2023-05-11

## 2023-05-24 DIAGNOSIS — F32.A DEPRESSION, UNSPECIFIED DEPRESSION TYPE: ICD-10-CM

## 2023-05-24 RX ORDER — SERTRALINE HYDROCHLORIDE 100 MG/1
TABLET, FILM COATED ORAL
Qty: 90 TABLET | Refills: 3 | Status: SHIPPED | OUTPATIENT
Start: 2023-05-24

## 2023-07-06 DIAGNOSIS — M79.89 LEFT LEG SWELLING: ICD-10-CM

## 2023-07-06 RX ORDER — FUROSEMIDE 40 MG/1
40 TABLET ORAL DAILY
Qty: 30 TABLET | Refills: 3 | Status: SHIPPED | OUTPATIENT
Start: 2023-07-06

## 2023-07-11 RX ORDER — FERROUS SULFATE 325(65) MG
TABLET ORAL
Qty: 60 TABLET | Refills: 3 | Status: SHIPPED | OUTPATIENT
Start: 2023-07-11

## 2023-07-24 DIAGNOSIS — I25.83 CORONARY ARTERY DISEASE DUE TO LIPID RICH PLAQUE: ICD-10-CM

## 2023-07-24 DIAGNOSIS — I25.10 CORONARY ARTERY DISEASE DUE TO LIPID RICH PLAQUE: ICD-10-CM

## 2023-07-25 RX ORDER — ATORVASTATIN CALCIUM 80 MG/1
80 TABLET, FILM COATED ORAL DAILY
Qty: 30 TABLET | Refills: 8 | Status: SHIPPED | OUTPATIENT
Start: 2023-07-25

## 2023-08-07 DIAGNOSIS — E11.8 TYPE 2 DIABETES MELLITUS WITH COMPLICATION, WITHOUT LONG-TERM CURRENT USE OF INSULIN (HCC): ICD-10-CM

## 2023-08-21 DIAGNOSIS — K25.0 ACUTE GASTRIC ULCER WITH HEMORRHAGE: ICD-10-CM

## 2023-08-21 RX ORDER — PANTOPRAZOLE SODIUM 40 MG/1
TABLET, DELAYED RELEASE ORAL
Qty: 180 TABLET | Refills: 3 | OUTPATIENT
Start: 2023-08-21

## 2023-10-02 ENCOUNTER — APPOINTMENT (OUTPATIENT)
Dept: GENERAL RADIOLOGY | Age: 88
End: 2023-10-02
Payer: MEDICARE

## 2023-10-02 ENCOUNTER — HOSPITAL ENCOUNTER (EMERGENCY)
Age: 88
Discharge: HOME | End: 2023-10-02
Attending: EMERGENCY MEDICINE
Payer: MEDICARE

## 2023-10-02 VITALS
BODY MASS INDEX: 37.68 KG/M2 | TEMPERATURE: 97.5 F | HEART RATE: 60 BPM | DIASTOLIC BLOOD PRESSURE: 65 MMHG | OXYGEN SATURATION: 97 % | SYSTOLIC BLOOD PRESSURE: 136 MMHG | WEIGHT: 206 LBS | RESPIRATION RATE: 25 BRPM

## 2023-10-02 DIAGNOSIS — S46.912A SHOULDER STRAIN, LEFT, INITIAL ENCOUNTER: Primary | ICD-10-CM

## 2023-10-02 PROCEDURE — 73060 X-RAY EXAM OF HUMERUS: CPT

## 2023-10-02 PROCEDURE — 99284 EMERGENCY DEPT VISIT MOD MDM: CPT

## 2023-10-02 PROCEDURE — 73030 X-RAY EXAM OF SHOULDER: CPT

## 2023-10-02 PROCEDURE — 71046 X-RAY EXAM CHEST 2 VIEWS: CPT

## 2023-10-02 PROCEDURE — 93005 ELECTROCARDIOGRAM TRACING: CPT

## 2023-10-02 RX ORDER — LIDOCAINE 50 MG/G
1 PATCH TOPICAL DAILY
Qty: 10 PATCH | Refills: 0 | Status: SHIPPED | OUTPATIENT
Start: 2023-10-02 | End: 2023-10-12

## 2023-10-02 RX ORDER — ACETAMINOPHEN 500 MG
500 TABLET ORAL 4 TIMES DAILY PRN
Qty: 30 TABLET | Refills: 0 | Status: SHIPPED | OUTPATIENT
Start: 2023-10-02

## 2023-10-02 RX ORDER — IBUPROFEN 400 MG/1
400 TABLET ORAL 4 TIMES DAILY PRN
Qty: 30 TABLET | Refills: 0 | Status: SHIPPED | OUTPATIENT
Start: 2023-10-02

## 2023-10-02 ASSESSMENT — PAIN - FUNCTIONAL ASSESSMENT: PAIN_FUNCTIONAL_ASSESSMENT: 0-10

## 2023-10-02 ASSESSMENT — PAIN DESCRIPTION - PAIN TYPE: TYPE: ACUTE PAIN

## 2023-10-02 ASSESSMENT — PAIN DESCRIPTION - ORIENTATION: ORIENTATION: LEFT

## 2023-10-02 ASSESSMENT — PAIN DESCRIPTION - LOCATION: LOCATION: BACK;CHEST;SHOULDER

## 2023-10-02 ASSESSMENT — PAIN DESCRIPTION - FREQUENCY: FREQUENCY: CONTINUOUS

## 2023-10-02 ASSESSMENT — PAIN SCALES - GENERAL: PAINLEVEL_OUTOF10: 10

## 2023-10-02 NOTE — ED PROVIDER NOTES
[address] ED  Emergency Department Encounter  Emergency Medicine Resident     Pt Jose Briones  MRN: [id number]  [address] [facility] [place] 1935  Date of evaluation: 10/2/23  PCP:  Margot Dowling MD  Note Started: 4:54 PM EDT      CHIEF COMPLAINT       Chief Complaint   Patient presents with    Fall     On Thursday, didn't want to come in    Chest Pain     CP related to the fall       HISTORY OF PRESENT ILLNESS  (Location/Symptom, Timing/Onset, Context/Setting, Quality, Duration, Modifying Factors, Severity.)      Erica Wynne is a 82 y.o. female who presents with chemical fall at home yesterday. 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 Patient did not hit her head, have any loss of consciousness, or report any lightheadedness associated with the fall. Patient does take aspirin Plavix for cardiac stents but no blood thinners. Per patient's daughters, patient is acting appropriately and has had no alteration in mental status. Patient denies any significant chest pain or shortness of breath. Otherwise denies abdominal pain, nausea, vomiting. No significant headache or vision changes. Denies any new numbness, tingling, weakness. PAST MEDICAL / SURGICAL / SOCIAL / FAMILY HISTORY      has a past medical history of Arthritis, Atrial flutter ([address]), CAD (coronary artery disease), CHF (congestive heart failure) ([address]), Diabetes ([address]), Diabetes mellitus ([address]), Hyperlipidemia, Hypertension, Psychiatric problem, Thyroid cancer ([address]), Thyroid disease, and Type 2 diabetes mellitus without complication, with long-term current use of insulin ([address] has a past surgical history that includes Coronary angioplasty with stent; Hysterectomy; Cholecystectomy; Thyroidectomy; Appendectomy; back surgery;  Upper gastrointestinal endoscopy (4/14/2021); and Upper gastrointestinal

## 2023-10-02 NOTE — DISCHARGE INSTRUCTIONS
You were seen and evaluated the emergency department for left shoulder pain after a fall. Your imaging was negative for acute fracture. This is most likely associated with mild bruising or strain of your musculature. You can use Tylenol, Motrin, Lidoderm patch as needed for pain. You may also apply ice intermittently to assist with pain. You should continue moving your left shoulder through its range of motion as able to prevent your shoulder from freezing up. Please follow-up with your primary care provider within the next week to ensure your symptoms are resolving and for ongoing management. You do begin to experience worsening pain, difficulty breathing, decreased range of motion, numbness, tingling, weakness, you should return the emergency department for further evaluation.

## 2023-10-02 NOTE — ED NOTES
Patient to room 01  Patient is an 82year old female  Patient complains of having fallen on Thursday, did not want to come in at that time but pain continues, asked her daughters to bring her today to ER  C/O chest pain (from falling), shoulder and back pain  Patient was attempting to transfer to chair and fell, denies +LOC  NAD  Pt alert and oriented x4, talking in complete sentences, respirations even and unlabored. Pt acting age appropriate.    White board updated, will continue to asses, call light at side  [place] given       Scott Ruiz RN  10/02/23 [id number]

## 2023-10-02 NOTE — ED NOTES
Patient given discharge update by doctor  Discharge instructions given. Verbalized understanding. All questions answered.   Daughters at bedside  Placed in wheelchair and taken out by GEETA Morales RN  10/02/23 [id number]

## 2023-10-03 ENCOUNTER — CARE COORDINATION (OUTPATIENT)
Dept: CARE COORDINATION | Age: 88
End: 2023-10-03

## 2023-10-03 NOTE — CARE COORDINATION
ED follow up   Left VM requesting a return call to discuss any needs or concerns. RAMONA BarrowN, RN ambulatory care manager [phone].

## 2023-10-04 LAB
EKG ATRIAL RATE: 59 BPM
EKG P AXIS: 2 DEGREES
EKG P-R INTERVAL: 214 MS
EKG Q-T INTERVAL: 508 MS
EKG QRS DURATION: 138 MS
EKG QTC CALCULATION (BAZETT): 502 MS
EKG R AXIS: -51 DEGREES
EKG T AXIS: 20 DEGREES
EKG VENTRICULAR RATE: 59 BPM

## 2023-10-04 PROCEDURE — 93010 ELECTROCARDIOGRAM REPORT: CPT | Performed by: INTERNAL MEDICINE

## 2023-10-11 ENCOUNTER — OFFICE VISIT (OUTPATIENT)
Dept: INTERNAL MEDICINE CLINIC | Age: 88
End: 2023-10-11
Payer: MEDICARE

## 2023-10-11 ENCOUNTER — HOSPITAL ENCOUNTER (OUTPATIENT)
Age: 88
Setting detail: SPECIMEN
Discharge: HOME | End: 2023-10-11

## 2023-10-11 ENCOUNTER — HOSPITAL ENCOUNTER (OUTPATIENT)
Dept: GENERAL RADIOLOGY | Facility: CLINIC | Age: 88
Discharge: HOME | End: 2023-10-13
Payer: MEDICARE

## 2023-10-11 ENCOUNTER — HOSPITAL ENCOUNTER (OUTPATIENT)
Facility: CLINIC | Age: 88
Discharge: HOME | End: 2023-10-13
Payer: MEDICARE

## 2023-10-11 VITALS
SYSTOLIC BLOOD PRESSURE: 126 MMHG | DIASTOLIC BLOOD PRESSURE: 82 MMHG | BODY MASS INDEX: 37.68 KG/M2 | HEIGHT: 62 IN | OXYGEN SATURATION: 96 % | HEART RATE: 72 BPM

## 2023-10-11 DIAGNOSIS — E11.8 TYPE 2 DIABETES MELLITUS WITH COMPLICATION, WITHOUT LONG-TERM CURRENT USE OF INSULIN (HCC): ICD-10-CM

## 2023-10-11 DIAGNOSIS — M54.6 THORACIC BACK PAIN, UNSPECIFIED BACK PAIN LATERALITY, UNSPECIFIED CHRONICITY: Primary | ICD-10-CM

## 2023-10-11 DIAGNOSIS — N39.0 URINARY TRACT INFECTION WITHOUT HEMATURIA, SITE UNSPECIFIED: ICD-10-CM

## 2023-10-11 DIAGNOSIS — Z91.81 AT HIGH RISK FOR FALLS: ICD-10-CM

## 2023-10-11 DIAGNOSIS — M54.6 THORACIC BACK PAIN, UNSPECIFIED BACK PAIN LATERALITY, UNSPECIFIED CHRONICITY: ICD-10-CM

## 2023-10-11 LAB
ALBUMIN SERPL-MCNC: 3.3 G/DL (ref 3.5–5.2)
ALBUMIN/GLOB SERPL: 1 {RATIO} (ref 1–2.5)
ALP SERPL-CCNC: 162 U/L (ref 35–104)
ALT SERPL-CCNC: 10 U/L (ref 5–33)
ANION GAP SERPL CALCULATED.3IONS-SCNC: 10 MMOL/L (ref 9–17)
AST SERPL-CCNC: 14 U/L
BILIRUB SERPL-MCNC: 0.3 MG/DL (ref 0.3–1.2)
BUN SERPL-MCNC: 19 MG/DL (ref 8–23)
CALCIUM SERPL-MCNC: 8.4 MG/DL (ref 8.6–10.4)
CHLORIDE SERPL-SCNC: 101 MMOL/L (ref 98–107)
CHOLEST SERPL-MCNC: 157 MG/DL
CHOLESTEROL/HDL RATIO: 2.7
CO2 SERPL-SCNC: 29 MMOL/L (ref 20–31)
CREAT SERPL-MCNC: 1 MG/DL (ref 0.5–0.9)
GFR SERPL CREATININE-BSD FRML MDRD: 55 ML/MIN/1.73M2
GLUCOSE SERPL-MCNC: 210 MG/DL (ref 70–99)
HBA1C MFR BLD: 10.6 %
HDLC SERPL-MCNC: 59 MG/DL
LDLC SERPL CALC-MCNC: 54 MG/DL (ref 0–130)
POTASSIUM SERPL-SCNC: 3.9 MMOL/L (ref 3.7–5.3)
PROT SERPL-MCNC: 6.6 G/DL (ref 6.4–8.3)
SODIUM SERPL-SCNC: 140 MMOL/L (ref 135–144)
TRIGL SERPL-MCNC: 222 MG/DL
TSH SERPL DL<=0.05 MIU/L-ACNC: 4.13 UIU/ML (ref 0.3–5)

## 2023-10-11 PROCEDURE — 1090F PRES/ABSN URINE INCON ASSESS: CPT | Performed by: INTERNAL MEDICINE

## 2023-10-11 PROCEDURE — 99214 OFFICE O/P EST MOD 30 MIN: CPT | Performed by: INTERNAL MEDICINE

## 2023-10-11 PROCEDURE — G8417 CALC BMI ABV UP PARAM F/U: HCPCS | Performed by: INTERNAL MEDICINE

## 2023-10-11 PROCEDURE — 83036 HEMOGLOBIN GLYCOSYLATED A1C: CPT | Performed by: INTERNAL MEDICINE

## 2023-10-11 PROCEDURE — G8427 DOCREV CUR MEDS BY ELIG CLIN: HCPCS | Performed by: INTERNAL MEDICINE

## 2023-10-11 PROCEDURE — 1123F ACP DISCUSS/DSCN MKR DOCD: CPT | Performed by: INTERNAL MEDICINE

## 2023-10-11 PROCEDURE — 72070 X-RAY EXAM THORAC SPINE 2VWS: CPT

## 2023-10-11 PROCEDURE — G8484 FLU IMMUNIZE NO ADMIN: HCPCS | Performed by: INTERNAL MEDICINE

## 2023-10-11 PROCEDURE — 3046F HEMOGLOBIN A1C LEVEL >9.0%: CPT | Performed by: INTERNAL MEDICINE

## 2023-10-11 PROCEDURE — 1036F TOBACCO NON-USER: CPT | Performed by: INTERNAL MEDICINE

## 2023-10-11 RX ORDER — NITROFURANTOIN 25; 75 MG/1; MG/1
100 CAPSULE ORAL 2 TIMES DAILY
Qty: 20 CAPSULE | Refills: 0 | Status: SHIPPED | OUTPATIENT
Start: 2023-10-11 | End: 2023-10-21

## 2023-10-11 SDOH — ECONOMIC STABILITY: FOOD INSECURITY: WITHIN THE PAST 12 MONTHS, YOU WORRIED THAT YOUR FOOD WOULD RUN OUT BEFORE YOU GOT MONEY TO BUY MORE.: NEVER TRUE

## 2023-10-11 SDOH — ECONOMIC STABILITY: FOOD INSECURITY: WITHIN THE PAST 12 MONTHS, THE FOOD YOU BOUGHT JUST DIDN'T LAST AND YOU DIDN'T HAVE MONEY TO GET MORE.: NEVER TRUE

## 2023-10-11 SDOH — ECONOMIC STABILITY: INCOME INSECURITY: HOW HARD IS IT FOR YOU TO PAY FOR THE VERY BASICS LIKE FOOD, HOUSING, MEDICAL CARE, AND HEATING?: NOT HARD AT ALL

## 2023-10-11 NOTE — PROGRESS NOTES
Subjective:      Patient ID: Vicente Gutierrez is a 82 y.o. female. HPI  patient is here for evaluation of multiple medical problems. She has hypertension, diabetes, hypothyroidism, depression, CAD s/p stent, CHF, has H/o Thyroid cancer on Synthyroid . Patient is along with her daughter. No blood in stools   Patient had a fall while transporting to bed from her baseline, she went to emergency room, underwent x-rays which was negative  She is complaining of pain in her back  Patient and her daughter is concerned that, she may have UTI  She is having difficulty getting sample for urine  Patient as per daughter often forget to take her medications, does not check her blood sugar  Review of Systems   Constitutional:  Negative for activity change, appetite change, chills, diaphoresis, fatigue and fever. HENT:  Negative for congestion, dental problem, drooling and ear discharge. Eyes:  Negative for pain, discharge, redness and itching. Respiratory:  Negative for apnea, cough, choking, chest tightness and shortness of breath. Cardiovascular:  Positive for leg swelling. Negative for chest pain. Gastrointestinal:  Negative for abdominal distention, abdominal pain, blood in stool, constipation and diarrhea. Endocrine: Negative for cold intolerance and heat intolerance. Genitourinary:  Positive for dysuria and frequency. Negative for difficulty urinating, enuresis and flank pain. Musculoskeletal:  Positive for arthralgias, back pain (upper back) and gait problem (wheel Chair Bound ). Negative for joint swelling. Skin:  Negative for color change, pallor and rash. Neurological:  Negative for dizziness, facial asymmetry, light-headedness, numbness and headaches. Psychiatric/Behavioral:  Negative for agitation, behavioral problems, confusion, decreased concentration and dysphoric mood. Objective:   Physical Exam  Constitutional:       Appearance: She is well-developed. She is obese.  She is not

## 2023-10-12 ASSESSMENT — ENCOUNTER SYMPTOMS
DIARRHEA: 0
BACK PAIN: 1
SHORTNESS OF BREATH: 0
CONSTIPATION: 0
COUGH: 0
BLOOD IN STOOL: 0
CHEST TIGHTNESS: 0
ABDOMINAL DISTENTION: 0
EYE REDNESS: 0
EYE ITCHING: 0
CHOKING: 0
EYE PAIN: 0
COLOR CHANGE: 0
EYE DISCHARGE: 0
APNEA: 0
ABDOMINAL PAIN: 0

## 2023-10-14 DIAGNOSIS — E11.8 TYPE 2 DIABETES MELLITUS WITH COMPLICATION, WITHOUT LONG-TERM CURRENT USE OF INSULIN (HCC): ICD-10-CM

## 2023-10-16 RX ORDER — INSULIN GLARGINE 100 [IU]/ML
45 INJECTION, SOLUTION SUBCUTANEOUS 2 TIMES DAILY
Qty: 5 ADJUSTABLE DOSE PRE-FILLED PEN SYRINGE | Refills: 3 | Status: SHIPPED | OUTPATIENT
Start: 2023-10-16

## 2023-11-16 DIAGNOSIS — E11.8 TYPE 2 DIABETES MELLITUS WITH COMPLICATION, WITHOUT LONG-TERM CURRENT USE OF INSULIN (HCC): ICD-10-CM

## 2023-11-20 DIAGNOSIS — M79.89 LEFT LEG SWELLING: ICD-10-CM

## 2023-11-20 RX ORDER — FUROSEMIDE 40 MG/1
40 TABLET ORAL DAILY
Qty: 30 TABLET | Refills: 3 | Status: SHIPPED | OUTPATIENT
Start: 2023-11-20

## 2023-11-28 RX ORDER — FERROUS SULFATE 325(65) MG
TABLET ORAL
Qty: 60 TABLET | Refills: 3 | Status: SHIPPED | OUTPATIENT
Start: 2023-11-28

## 2024-01-22 RX ORDER — PEN NEEDLE, DIABETIC,DISP UNIT 32GX 5/32"
NEEDLE, DISPOSABLE MISCELLANEOUS
Qty: 100 EACH | Refills: 11 | Status: SHIPPED | OUTPATIENT
Start: 2024-01-22

## 2024-01-22 RX ORDER — FLURBIPROFEN SODIUM 0.3 MG/ML
1 SOLUTION/ DROPS OPHTHALMIC DAILY
Qty: 100 EACH | Refills: 11 | OUTPATIENT
Start: 2024-01-22

## 2024-02-19 DIAGNOSIS — E11.8 TYPE 2 DIABETES MELLITUS WITH COMPLICATION, WITHOUT LONG-TERM CURRENT USE OF INSULIN (HCC): ICD-10-CM

## 2024-02-23 DIAGNOSIS — E11.8 TYPE 2 DIABETES MELLITUS WITH COMPLICATION, WITHOUT LONG-TERM CURRENT USE OF INSULIN (HCC): ICD-10-CM

## 2024-04-09 RX ORDER — FERROUS SULFATE 325(65) MG
TABLET ORAL
Qty: 60 TABLET | Refills: 3 | Status: SHIPPED | OUTPATIENT
Start: 2024-04-09

## 2024-04-20 DIAGNOSIS — M79.89 LEFT LEG SWELLING: ICD-10-CM

## 2024-04-22 RX ORDER — FUROSEMIDE 40 MG/1
40 TABLET ORAL DAILY
Qty: 30 TABLET | Refills: 3 | Status: SHIPPED | OUTPATIENT
Start: 2024-04-22

## 2024-04-26 DIAGNOSIS — I25.10 CORONARY ARTERY DISEASE DUE TO LIPID RICH PLAQUE: ICD-10-CM

## 2024-04-26 DIAGNOSIS — E03.9 HYPOTHYROIDISM, UNSPECIFIED TYPE: ICD-10-CM

## 2024-04-26 DIAGNOSIS — I25.83 CORONARY ARTERY DISEASE DUE TO LIPID RICH PLAQUE: ICD-10-CM

## 2024-04-26 RX ORDER — ATORVASTATIN CALCIUM 80 MG/1
80 TABLET, FILM COATED ORAL DAILY
Qty: 30 TABLET | Refills: 8 | Status: SHIPPED | OUTPATIENT
Start: 2024-04-26

## 2024-04-26 RX ORDER — LEVOTHYROXINE SODIUM 175 UG/1
175 TABLET ORAL DAILY
Qty: 90 TABLET | Refills: 3 | Status: SHIPPED | OUTPATIENT
Start: 2024-04-26

## 2024-05-09 DIAGNOSIS — I10 PRIMARY HYPERTENSION: ICD-10-CM

## 2024-06-05 DIAGNOSIS — E11.8 TYPE 2 DIABETES MELLITUS WITH COMPLICATION, WITHOUT LONG-TERM CURRENT USE OF INSULIN (HCC): ICD-10-CM

## 2024-06-11 DIAGNOSIS — K25.0 ACUTE GASTRIC ULCER WITH HEMORRHAGE: ICD-10-CM

## 2024-06-11 RX ORDER — PANTOPRAZOLE SODIUM 40 MG/1
TABLET, DELAYED RELEASE ORAL
Qty: 180 TABLET | Refills: 11 | Status: SHIPPED | OUTPATIENT
Start: 2024-06-11

## 2024-07-10 DIAGNOSIS — E11.8 TYPE 2 DIABETES MELLITUS WITH COMPLICATION, WITHOUT LONG-TERM CURRENT USE OF INSULIN (HCC): ICD-10-CM

## 2024-07-10 RX ORDER — INSULIN GLARGINE 100 [IU]/ML
45 INJECTION, SOLUTION SUBCUTANEOUS 2 TIMES DAILY
Qty: 5 ADJUSTABLE DOSE PRE-FILLED PEN SYRINGE | Refills: 3 | Status: SHIPPED | OUTPATIENT
Start: 2024-07-10

## 2024-07-24 ENCOUNTER — APPOINTMENT (OUTPATIENT)
Dept: CT IMAGING | Age: 89
End: 2024-07-24
Payer: MEDICARE

## 2024-07-24 ENCOUNTER — HOSPITAL ENCOUNTER (INPATIENT)
Age: 89
LOS: 1 days | Discharge: HOME | End: 2024-07-26
Attending: EMERGENCY MEDICINE | Admitting: SURGERY
Payer: MEDICARE

## 2024-07-24 DIAGNOSIS — R60.9 EDEMA, UNSPECIFIED TYPE: ICD-10-CM

## 2024-07-24 DIAGNOSIS — S22.41XA CLOSED FRACTURE OF MULTIPLE RIBS OF RIGHT SIDE, INITIAL ENCOUNTER: Primary | ICD-10-CM

## 2024-07-24 LAB
ANION GAP SERPL CALCULATED.3IONS-SCNC: 10 MMOL/L (ref 9–16)
BASOPHILS # BLD: 0.08 K/UL (ref 0–0.2)
BASOPHILS NFR BLD: 1 % (ref 0–2)
BNP SERPL-MCNC: 1522 PG/ML (ref 0–300)
BUN SERPL-MCNC: 22 MG/DL (ref 8–23)
CALCIUM SERPL-MCNC: 8.2 MG/DL (ref 8.6–10.4)
CHLORIDE SERPL-SCNC: 103 MMOL/L (ref 98–107)
CO2 SERPL-SCNC: 23 MMOL/L (ref 20–31)
CREAT SERPL-MCNC: 1.2 MG/DL (ref 0.5–0.9)
EOSINOPHIL # BLD: 0.17 K/UL (ref 0–0.44)
EOSINOPHILS RELATIVE PERCENT: 2 % (ref 1–4)
ERYTHROCYTE [DISTWIDTH] IN BLOOD BY AUTOMATED COUNT: 13.8 % (ref 11.8–14.4)
GFR, ESTIMATED: 43 ML/MIN/1.73M2
GLUCOSE SERPL-MCNC: 340 MG/DL (ref 74–99)
HCT VFR BLD AUTO: 37.8 % (ref 36.3–47.1)
HGB BLD-MCNC: 12.5 G/DL (ref 11.9–15.1)
IMM GRANULOCYTES # BLD AUTO: 0.17 K/UL (ref 0–0.3)
IMM GRANULOCYTES NFR BLD: 2 %
LYMPHOCYTES NFR BLD: 1.48 K/UL (ref 1.1–3.7)
LYMPHOCYTES RELATIVE PERCENT: 15 % (ref 24–43)
MCH RBC QN AUTO: 29 PG (ref 25.2–33.5)
MCHC RBC AUTO-ENTMCNC: 33.1 G/DL (ref 28.4–34.8)
MCV RBC AUTO: 87.7 FL (ref 82.6–102.9)
MONOCYTES NFR BLD: 0.4 K/UL (ref 0.1–1.2)
MONOCYTES NFR BLD: 4 % (ref 3–12)
NEUTROPHILS NFR BLD: 76 % (ref 36–65)
NEUTS SEG NFR BLD: 7.32 K/UL (ref 1.5–8.1)
NRBC BLD-RTO: 0 PER 100 WBC
PLATELET # BLD AUTO: 150 K/UL (ref 138–453)
PMV BLD AUTO: 11.5 FL (ref 8.1–13.5)
POTASSIUM SERPL-SCNC: 3.9 MMOL/L (ref 3.7–5.3)
RBC # BLD AUTO: 4.31 M/UL (ref 3.95–5.11)
SODIUM SERPL-SCNC: 136 MMOL/L (ref 136–145)
TROPONIN I SERPL HS-MCNC: 19 NG/L (ref 0–14)
TROPONIN I SERPL HS-MCNC: 20 NG/L (ref 0–14)
WBC OTHER # BLD: 9.6 K/UL (ref 3.5–11.3)

## 2024-07-24 PROCEDURE — 82040 ASSAY OF SERUM ALBUMIN: CPT

## 2024-07-24 PROCEDURE — 83880 ASSAY OF NATRIURETIC PEPTIDE: CPT

## 2024-07-24 PROCEDURE — 80048 BASIC METABOLIC PNL TOTAL CA: CPT

## 2024-07-24 PROCEDURE — 83036 HEMOGLOBIN GLYCOSYLATED A1C: CPT

## 2024-07-24 PROCEDURE — 6370000000 HC RX 637 (ALT 250 FOR IP)

## 2024-07-24 PROCEDURE — 84484 ASSAY OF TROPONIN QUANT: CPT

## 2024-07-24 PROCEDURE — 85025 COMPLETE CBC W/AUTO DIFF WBC: CPT

## 2024-07-24 PROCEDURE — 82306 VITAMIN D 25 HYDROXY: CPT

## 2024-07-24 PROCEDURE — 71260 CT THORAX DX C+: CPT

## 2024-07-24 PROCEDURE — 84439 ASSAY OF FREE THYROXINE: CPT

## 2024-07-24 PROCEDURE — 93005 ELECTROCARDIOGRAM TRACING: CPT

## 2024-07-24 PROCEDURE — 99285 EMERGENCY DEPT VISIT HI MDM: CPT

## 2024-07-24 PROCEDURE — 6360000004 HC RX CONTRAST MEDICATION: Performed by: EMERGENCY MEDICINE

## 2024-07-24 PROCEDURE — 84443 ASSAY THYROID STIM HORMONE: CPT

## 2024-07-24 PROCEDURE — 71250 CT THORAX DX C-: CPT

## 2024-07-24 RX ORDER — OXYCODONE HYDROCHLORIDE AND ACETAMINOPHEN 5; 325 MG/1; MG/1
1 TABLET ORAL ONCE
Status: COMPLETED | OUTPATIENT
Start: 2024-07-24 | End: 2024-07-24

## 2024-07-24 RX ADMIN — IOPAMIDOL 75 ML: 755 INJECTION, SOLUTION INTRAVENOUS at 22:29

## 2024-07-24 RX ADMIN — OXYCODONE HYDROCHLORIDE AND ACETAMINOPHEN 1 TABLET: 5; 325 TABLET ORAL at 20:59

## 2024-07-24 ASSESSMENT — PAIN - FUNCTIONAL ASSESSMENT: PAIN_FUNCTIONAL_ASSESSMENT: 0-10

## 2024-07-24 ASSESSMENT — PAIN DESCRIPTION - DESCRIPTORS
DESCRIPTORS: SORE;DISCOMFORT
DESCRIPTORS: ACHING

## 2024-07-24 ASSESSMENT — PAIN SCALES - GENERAL
PAINLEVEL_OUTOF10: 10
PAINLEVEL_OUTOF10: 10

## 2024-07-24 ASSESSMENT — PAIN DESCRIPTION - ORIENTATION
ORIENTATION: RIGHT;LEFT
ORIENTATION: RIGHT

## 2024-07-24 ASSESSMENT — PAIN DESCRIPTION - LOCATION
LOCATION: CHEST
LOCATION: TOE (COMMENT WHICH ONE)

## 2024-07-25 ENCOUNTER — APPOINTMENT (OUTPATIENT)
Dept: GENERAL RADIOLOGY | Age: 89
End: 2024-07-25
Payer: MEDICARE

## 2024-07-25 ENCOUNTER — APPOINTMENT (OUTPATIENT)
Dept: CT IMAGING | Age: 89
End: 2024-07-25
Payer: MEDICARE

## 2024-07-25 ENCOUNTER — APPOINTMENT (OUTPATIENT)
Dept: VASCULAR LAB | Age: 89
End: 2024-07-25
Payer: MEDICARE

## 2024-07-25 PROBLEM — M79.89 LEG SWELLING: Status: ACTIVE | Noted: 2024-07-25

## 2024-07-25 PROBLEM — I89.0 LYMPHEDEMA: Status: ACTIVE | Noted: 2024-07-25

## 2024-07-25 PROBLEM — Z71.89 ACP (ADVANCE CARE PLANNING): Status: ACTIVE | Noted: 2024-07-25

## 2024-07-25 PROBLEM — S22.41XA MULTIPLE CLOSED FRACTURES OF RIBS OF RIGHT SIDE: Status: ACTIVE | Noted: 2024-07-25

## 2024-07-25 PROBLEM — M79.3 LIPODERMATOSCLEROSIS: Status: ACTIVE | Noted: 2024-07-25

## 2024-07-25 PROBLEM — E55.9 VITAMIN D DEFICIENCY: Status: ACTIVE | Noted: 2024-07-25

## 2024-07-25 PROBLEM — R07.89 CHEST PAIN, MUSCULOSKELETAL: Status: ACTIVE | Noted: 2024-07-25

## 2024-07-25 LAB
25(OH)D3 SERPL-MCNC: <6 NG/ML (ref 30–100)
ALBUMIN SERPL-MCNC: 3.4 G/DL (ref 3.5–5.2)
AMPHET UR QL SCN: NEGATIVE
ANION GAP SERPL CALCULATED.3IONS-SCNC: 8 MMOL/L (ref 9–16)
BARBITURATES UR QL SCN: NEGATIVE
BENZODIAZ UR QL: NEGATIVE
BNP SERPL-MCNC: 955 PG/ML (ref 0–300)
BUN SERPL-MCNC: 26 MG/DL (ref 8–23)
CA-I BLD-SCNC: 1.12 MMOL/L (ref 1.13–1.33)
CALCIUM SERPL-MCNC: 8.1 MG/DL (ref 8.6–10.4)
CANNABINOIDS UR QL SCN: NEGATIVE
CHLORIDE SERPL-SCNC: 104 MMOL/L (ref 98–107)
CO2 SERPL-SCNC: 26 MMOL/L (ref 20–31)
COCAINE UR QL SCN: NEGATIVE
CREAT SERPL-MCNC: 1.4 MG/DL (ref 0.5–0.9)
ECHO BSA: 2.05 M2
EKG ATRIAL RATE: 64 BPM
EKG ATRIAL RATE: 75 BPM
EKG P AXIS: 36 DEGREES
EKG P AXIS: 49 DEGREES
EKG P-R INTERVAL: 202 MS
EKG P-R INTERVAL: 208 MS
EKG Q-T INTERVAL: 468 MS
EKG Q-T INTERVAL: 504 MS
EKG QRS DURATION: 134 MS
EKG QRS DURATION: 156 MS
EKG QTC CALCULATION (BAZETT): 519 MS
EKG QTC CALCULATION (BAZETT): 522 MS
EKG R AXIS: -48 DEGREES
EKG R AXIS: -82 DEGREES
EKG T AXIS: 39 DEGREES
EKG T AXIS: 41 DEGREES
EKG VENTRICULAR RATE: 64 BPM
EKG VENTRICULAR RATE: 75 BPM
ERYTHROCYTE [DISTWIDTH] IN BLOOD BY AUTOMATED COUNT: 14 % (ref 11.8–14.4)
EST. AVERAGE GLUCOSE BLD GHB EST-MCNC: 249 MG/DL
ETHANOL PERCENT: <0.01 %
ETHANOLAMINE SERPL-MCNC: <10 MG/DL (ref 0–0.08)
FENTANYL UR QL: NEGATIVE
FOLATE SERPL-MCNC: 6.4 NG/ML (ref 4.8–24.2)
GFR, ESTIMATED: 37 ML/MIN/1.73M2
GLUCOSE BLD-MCNC: 181 MG/DL (ref 65–105)
GLUCOSE BLD-MCNC: 230 MG/DL (ref 65–105)
GLUCOSE BLD-MCNC: 248 MG/DL (ref 65–105)
GLUCOSE BLD-MCNC: 255 MG/DL (ref 65–105)
GLUCOSE SERPL-MCNC: 242 MG/DL (ref 74–99)
HBA1C MFR BLD: 10.3 % (ref 4–6)
HCT VFR BLD AUTO: 33 % (ref 36.3–47.1)
HGB BLD-MCNC: 10.8 G/DL (ref 11.9–15.1)
MAGNESIUM SERPL-MCNC: 1.6 MG/DL (ref 1.6–2.4)
MCH RBC QN AUTO: 28.6 PG (ref 25.2–33.5)
MCHC RBC AUTO-ENTMCNC: 32.7 G/DL (ref 28.4–34.8)
MCV RBC AUTO: 87.3 FL (ref 82.6–102.9)
METHADONE UR QL: NEGATIVE
NRBC BLD-RTO: 0 PER 100 WBC
OPIATES UR QL SCN: NEGATIVE
OXYCODONE UR QL SCN: POSITIVE
PCP UR QL SCN: NEGATIVE
PLATELET # BLD AUTO: ABNORMAL K/UL (ref 138–453)
PLATELET, FLUORESCENCE: 141 K/UL (ref 138–453)
PLATELETS.RETICULATED NFR BLD AUTO: 5 % (ref 1.1–10.3)
POTASSIUM SERPL-SCNC: 3.7 MMOL/L (ref 3.7–5.3)
RBC # BLD AUTO: 3.78 M/UL (ref 3.95–5.11)
SODIUM SERPL-SCNC: 138 MMOL/L (ref 136–145)
T4 FREE SERPL-MCNC: 1.9 NG/DL (ref 0.92–1.68)
TEST INFORMATION: ABNORMAL
TROPONIN I SERPL HS-MCNC: 25 NG/L (ref 0–14)
TSH SERPL DL<=0.05 MIU/L-ACNC: 0.04 UIU/ML (ref 0.27–4.2)
VAS LEFT ARM BP: 101 MMHG
VAS LEFT TBI: 0.69
VAS LEFT TOE PRESSURE: 70 MMHG
VAS RIGHT ARM BP: 93 MMHG
VAS RIGHT TBI: 0.65
VAS RIGHT TOE PRESSURE: 66 MMHG
VIT B12 SERPL-MCNC: 294 PG/ML (ref 232–1245)
WBC OTHER # BLD: 8.6 K/UL (ref 3.5–11.3)

## 2024-07-25 PROCEDURE — 6360000002 HC RX W HCPCS: Performed by: STUDENT IN AN ORGANIZED HEALTH CARE EDUCATION/TRAINING PROGRAM

## 2024-07-25 PROCEDURE — 83880 ASSAY OF NATRIURETIC PEPTIDE: CPT

## 2024-07-25 PROCEDURE — 6370000000 HC RX 637 (ALT 250 FOR IP): Performed by: STUDENT IN AN ORGANIZED HEALTH CARE EDUCATION/TRAINING PROGRAM

## 2024-07-25 PROCEDURE — 84484 ASSAY OF TROPONIN QUANT: CPT

## 2024-07-25 PROCEDURE — 83735 ASSAY OF MAGNESIUM: CPT

## 2024-07-25 PROCEDURE — 85055 RETICULATED PLATELET ASSAY: CPT

## 2024-07-25 PROCEDURE — 71045 X-RAY EXAM CHEST 1 VIEW: CPT

## 2024-07-25 PROCEDURE — 82607 VITAMIN B-12: CPT

## 2024-07-25 PROCEDURE — 97166 OT EVAL MOD COMPLEX 45 MIN: CPT

## 2024-07-25 PROCEDURE — 85027 COMPLETE CBC AUTOMATED: CPT

## 2024-07-25 PROCEDURE — 93922 UPR/L XTREMITY ART 2 LEVELS: CPT | Performed by: SURGERY

## 2024-07-25 PROCEDURE — 2580000003 HC RX 258: Performed by: STUDENT IN AN ORGANIZED HEALTH CARE EDUCATION/TRAINING PROGRAM

## 2024-07-25 PROCEDURE — 80307 DRUG TEST PRSMV CHEM ANLYZR: CPT

## 2024-07-25 PROCEDURE — 96127 BRIEF EMOTIONAL/BEHAV ASSMT: CPT | Performed by: SOCIAL WORKER

## 2024-07-25 PROCEDURE — 93005 ELECTROCARDIOGRAM TRACING: CPT

## 2024-07-25 PROCEDURE — 99222 1ST HOSP IP/OBS MODERATE 55: CPT | Performed by: INTERNAL MEDICINE

## 2024-07-25 PROCEDURE — 72131 CT LUMBAR SPINE W/O DYE: CPT

## 2024-07-25 PROCEDURE — 80048 BASIC METABOLIC PNL TOTAL CA: CPT

## 2024-07-25 PROCEDURE — 99254 IP/OBS CNSLTJ NEW/EST MOD 60: CPT | Performed by: SURGERY

## 2024-07-25 PROCEDURE — 76376 3D RENDER W/INTRP POSTPROCES: CPT

## 2024-07-25 PROCEDURE — 99222 1ST HOSP IP/OBS MODERATE 55: CPT | Performed by: STUDENT IN AN ORGANIZED HEALTH CARE EDUCATION/TRAINING PROGRAM

## 2024-07-25 PROCEDURE — 93922 UPR/L XTREMITY ART 2 LEVELS: CPT

## 2024-07-25 PROCEDURE — 82330 ASSAY OF CALCIUM: CPT

## 2024-07-25 PROCEDURE — 97162 PT EVAL MOD COMPLEX 30 MIN: CPT

## 2024-07-25 PROCEDURE — 36415 COLL VENOUS BLD VENIPUNCTURE: CPT

## 2024-07-25 PROCEDURE — 82947 ASSAY GLUCOSE BLOOD QUANT: CPT

## 2024-07-25 PROCEDURE — 97530 THERAPEUTIC ACTIVITIES: CPT

## 2024-07-25 PROCEDURE — 82746 ASSAY OF FOLIC ACID SERUM: CPT

## 2024-07-25 PROCEDURE — G0480 DRUG TEST DEF 1-7 CLASSES: HCPCS

## 2024-07-25 PROCEDURE — 1200000000 HC SEMI PRIVATE

## 2024-07-25 PROCEDURE — 97535 SELF CARE MNGMENT TRAINING: CPT

## 2024-07-25 RX ORDER — INSULIN LISPRO 100 [IU]/ML
0-4 INJECTION, SOLUTION INTRAVENOUS; SUBCUTANEOUS NIGHTLY
Status: DISCONTINUED | OUTPATIENT
Start: 2024-07-25 | End: 2024-07-26 | Stop reason: HOSPADM

## 2024-07-25 RX ORDER — ONDANSETRON 2 MG/ML
4 INJECTION INTRAMUSCULAR; INTRAVENOUS EVERY 6 HOURS PRN
Status: DISCONTINUED | OUTPATIENT
Start: 2024-07-25 | End: 2024-07-26 | Stop reason: HOSPADM

## 2024-07-25 RX ORDER — HEPARIN SODIUM 5000 [USP'U]/ML
5000 INJECTION, SOLUTION INTRAVENOUS; SUBCUTANEOUS EVERY 8 HOURS SCHEDULED
Status: DISCONTINUED | OUTPATIENT
Start: 2024-07-25 | End: 2024-07-26 | Stop reason: HOSPADM

## 2024-07-25 RX ORDER — MAGNESIUM SULFATE IN WATER 40 MG/ML
2000 INJECTION, SOLUTION INTRAVENOUS PRN
Status: DISCONTINUED | OUTPATIENT
Start: 2024-07-25 | End: 2024-07-25

## 2024-07-25 RX ORDER — ERGOCALCIFEROL 1.25 MG/1
50000 CAPSULE ORAL WEEKLY
Status: DISCONTINUED | OUTPATIENT
Start: 2024-07-25 | End: 2024-07-26 | Stop reason: HOSPADM

## 2024-07-25 RX ORDER — ALBUTEROL SULFATE 2.5 MG/3ML
2.5 SOLUTION RESPIRATORY (INHALATION) EVERY 4 HOURS PRN
Status: DISCONTINUED | OUTPATIENT
Start: 2024-07-25 | End: 2024-07-26 | Stop reason: HOSPADM

## 2024-07-25 RX ORDER — SODIUM CHLORIDE 0.9 % (FLUSH) 0.9 %
5-40 SYRINGE (ML) INJECTION PRN
Status: DISCONTINUED | OUTPATIENT
Start: 2024-07-25 | End: 2024-07-26 | Stop reason: HOSPADM

## 2024-07-25 RX ORDER — PANTOPRAZOLE SODIUM 40 MG/1
40 TABLET, DELAYED RELEASE ORAL 2 TIMES DAILY
Status: DISCONTINUED | OUTPATIENT
Start: 2024-07-25 | End: 2024-07-26 | Stop reason: HOSPADM

## 2024-07-25 RX ORDER — LEVOTHYROXINE SODIUM 0.07 MG/1
150 TABLET ORAL DAILY
Status: DISCONTINUED | OUTPATIENT
Start: 2024-07-26 | End: 2024-07-26 | Stop reason: HOSPADM

## 2024-07-25 RX ORDER — FUROSEMIDE 40 MG/1
40 TABLET ORAL DAILY
Status: DISCONTINUED | OUTPATIENT
Start: 2024-07-25 | End: 2024-07-26 | Stop reason: HOSPADM

## 2024-07-25 RX ORDER — ONDANSETRON 4 MG/1
4 TABLET, ORALLY DISINTEGRATING ORAL EVERY 8 HOURS PRN
Status: DISCONTINUED | OUTPATIENT
Start: 2024-07-25 | End: 2024-07-26 | Stop reason: HOSPADM

## 2024-07-25 RX ORDER — GLUCAGON 1 MG/ML
1 KIT INJECTION PRN
Status: DISCONTINUED | OUTPATIENT
Start: 2024-07-25 | End: 2024-07-26 | Stop reason: HOSPADM

## 2024-07-25 RX ORDER — ERGOCALCIFEROL 1.25 MG/1
50000 CAPSULE ORAL WEEKLY
Status: DISCONTINUED | OUTPATIENT
Start: 2024-07-25 | End: 2024-07-25

## 2024-07-25 RX ORDER — POLYETHYLENE GLYCOL 3350 17 G/17G
17 POWDER, FOR SOLUTION ORAL DAILY
Status: DISCONTINUED | OUTPATIENT
Start: 2024-07-25 | End: 2024-07-26 | Stop reason: HOSPADM

## 2024-07-25 RX ORDER — SODIUM CHLORIDE 9 MG/ML
INJECTION, SOLUTION INTRAVENOUS PRN
Status: DISCONTINUED | OUTPATIENT
Start: 2024-07-25 | End: 2024-07-26 | Stop reason: HOSPADM

## 2024-07-25 RX ORDER — ATORVASTATIN CALCIUM 80 MG/1
80 TABLET, FILM COATED ORAL DAILY
Status: DISCONTINUED | OUTPATIENT
Start: 2024-07-25 | End: 2024-07-26 | Stop reason: HOSPADM

## 2024-07-25 RX ORDER — INSULIN LISPRO 100 [IU]/ML
0-16 INJECTION, SOLUTION INTRAVENOUS; SUBCUTANEOUS
Status: DISCONTINUED | OUTPATIENT
Start: 2024-07-25 | End: 2024-07-26 | Stop reason: HOSPADM

## 2024-07-25 RX ORDER — POTASSIUM CHLORIDE 7.45 MG/ML
10 INJECTION INTRAVENOUS PRN
Status: DISCONTINUED | OUTPATIENT
Start: 2024-07-25 | End: 2024-07-25

## 2024-07-25 RX ORDER — DEXTROSE MONOHYDRATE 100 MG/ML
INJECTION, SOLUTION INTRAVENOUS CONTINUOUS PRN
Status: DISCONTINUED | OUTPATIENT
Start: 2024-07-25 | End: 2024-07-26 | Stop reason: HOSPADM

## 2024-07-25 RX ORDER — POTASSIUM CHLORIDE 29.8 MG/ML
20 INJECTION INTRAVENOUS PRN
Status: DISCONTINUED | OUTPATIENT
Start: 2024-07-25 | End: 2024-07-25

## 2024-07-25 RX ORDER — OXYCODONE HYDROCHLORIDE 5 MG/1
2.5 TABLET ORAL EVERY 4 HOURS PRN
Status: DISCONTINUED | OUTPATIENT
Start: 2024-07-25 | End: 2024-07-26 | Stop reason: HOSPADM

## 2024-07-25 RX ORDER — SODIUM CHLORIDE 0.9 % (FLUSH) 0.9 %
5-40 SYRINGE (ML) INJECTION EVERY 12 HOURS SCHEDULED
Status: DISCONTINUED | OUTPATIENT
Start: 2024-07-25 | End: 2024-07-26 | Stop reason: HOSPADM

## 2024-07-25 RX ORDER — ASPIRIN 81 MG/1
81 TABLET, CHEWABLE ORAL DAILY
Status: DISCONTINUED | OUTPATIENT
Start: 2024-07-25 | End: 2024-07-26 | Stop reason: HOSPADM

## 2024-07-25 RX ORDER — ACETAMINOPHEN 500 MG
1000 TABLET ORAL EVERY 8 HOURS SCHEDULED
Status: DISCONTINUED | OUTPATIENT
Start: 2024-07-25 | End: 2024-07-26 | Stop reason: HOSPADM

## 2024-07-25 RX ORDER — MAGNESIUM SULFATE IN WATER 40 MG/ML
2000 INJECTION, SOLUTION INTRAVENOUS
Status: COMPLETED | OUTPATIENT
Start: 2024-07-25 | End: 2024-07-26

## 2024-07-25 RX ADMIN — HEPARIN SODIUM 5000 UNITS: 5000 INJECTION INTRAVENOUS; SUBCUTANEOUS at 14:55

## 2024-07-25 RX ADMIN — METOPROLOL TARTRATE 12.5 MG: 25 TABLET ORAL at 07:59

## 2024-07-25 RX ADMIN — METOPROLOL TARTRATE 12.5 MG: 25 TABLET ORAL at 21:34

## 2024-07-25 RX ADMIN — FUROSEMIDE 40 MG: 40 TABLET ORAL at 07:59

## 2024-07-25 RX ADMIN — PANTOPRAZOLE SODIUM 40 MG: 40 TABLET, DELAYED RELEASE ORAL at 21:14

## 2024-07-25 RX ADMIN — LEVOTHYROXINE SODIUM 175 MCG: 50 TABLET ORAL at 06:32

## 2024-07-25 RX ADMIN — OXYCODONE 2.5 MG: 5 TABLET ORAL at 06:29

## 2024-07-25 RX ADMIN — METOPROLOL TARTRATE 12.5 MG: 25 TABLET ORAL at 02:00

## 2024-07-25 RX ADMIN — INSULIN LISPRO 4 UNITS: 100 INJECTION, SOLUTION INTRAVENOUS; SUBCUTANEOUS at 07:59

## 2024-07-25 RX ADMIN — INSULIN LISPRO 4 UNITS: 100 INJECTION, SOLUTION INTRAVENOUS; SUBCUTANEOUS at 16:51

## 2024-07-25 RX ADMIN — HEPARIN SODIUM 5000 UNITS: 5000 INJECTION INTRAVENOUS; SUBCUTANEOUS at 21:15

## 2024-07-25 RX ADMIN — PANTOPRAZOLE SODIUM 40 MG: 40 TABLET, DELAYED RELEASE ORAL at 07:59

## 2024-07-25 RX ADMIN — SODIUM CHLORIDE, PRESERVATIVE FREE 10 ML: 5 INJECTION INTRAVENOUS at 08:00

## 2024-07-25 RX ADMIN — SODIUM CHLORIDE, PRESERVATIVE FREE 10 ML: 5 INJECTION INTRAVENOUS at 21:15

## 2024-07-25 RX ADMIN — INSULIN LISPRO 4 UNITS: 100 INJECTION, SOLUTION INTRAVENOUS; SUBCUTANEOUS at 21:13

## 2024-07-25 RX ADMIN — OXYCODONE 2.5 MG: 5 TABLET ORAL at 01:58

## 2024-07-25 RX ADMIN — HEPARIN SODIUM 5000 UNITS: 5000 INJECTION INTRAVENOUS; SUBCUTANEOUS at 06:32

## 2024-07-25 RX ADMIN — OXYCODONE 2.5 MG: 5 TABLET ORAL at 10:05

## 2024-07-25 RX ADMIN — ASPIRIN 81 MG 81 MG: 81 TABLET ORAL at 07:59

## 2024-07-25 RX ADMIN — SERTRALINE HYDROCHLORIDE 100 MG: 50 TABLET ORAL at 07:59

## 2024-07-25 RX ADMIN — ACETAMINOPHEN 1000 MG: 500 TABLET ORAL at 06:30

## 2024-07-25 RX ADMIN — ERGOCALCIFEROL 50000 UNITS: 1.25 CAPSULE ORAL at 16:51

## 2024-07-25 RX ADMIN — ACETAMINOPHEN 1000 MG: 500 TABLET ORAL at 14:54

## 2024-07-25 RX ADMIN — POTASSIUM BICARBONATE 40 MEQ: 782 TABLET, EFFERVESCENT ORAL at 21:09

## 2024-07-25 RX ADMIN — PANTOPRAZOLE SODIUM 40 MG: 40 TABLET, DELAYED RELEASE ORAL at 02:03

## 2024-07-25 RX ADMIN — MAGNESIUM SULFATE HEPTAHYDRATE 2000 MG: 40 INJECTION, SOLUTION INTRAVENOUS at 21:08

## 2024-07-25 RX ADMIN — ACETAMINOPHEN 1000 MG: 500 TABLET ORAL at 21:12

## 2024-07-25 RX ADMIN — ATORVASTATIN CALCIUM 80 MG: 80 TABLET, FILM COATED ORAL at 07:59

## 2024-07-25 ASSESSMENT — ENCOUNTER SYMPTOMS
GASTROINTESTINAL NEGATIVE: 1
RESPIRATORY NEGATIVE: 1

## 2024-07-25 ASSESSMENT — PAIN - FUNCTIONAL ASSESSMENT
PAIN_FUNCTIONAL_ASSESSMENT: PREVENTS OR INTERFERES SOME ACTIVE ACTIVITIES AND ADLS
PAIN_FUNCTIONAL_ASSESSMENT: PREVENTS OR INTERFERES SOME ACTIVE ACTIVITIES AND ADLS

## 2024-07-25 ASSESSMENT — PAIN SCALES - GENERAL
PAINLEVEL_OUTOF10: 2
PAINLEVEL_OUTOF10: 8
PAINLEVEL_OUTOF10: 8
PAINLEVEL_OUTOF10: 4
PAINLEVEL_OUTOF10: 0
PAINLEVEL_OUTOF10: 8
PAINLEVEL_OUTOF10: 0

## 2024-07-25 ASSESSMENT — PAIN DESCRIPTION - ORIENTATION
ORIENTATION: RIGHT
ORIENTATION: RIGHT

## 2024-07-25 ASSESSMENT — PAIN DESCRIPTION - LOCATION
LOCATION: CHEST
LOCATION: CHEST

## 2024-07-25 ASSESSMENT — PAIN DESCRIPTION - DESCRIPTORS
DESCRIPTORS: ACHING;SORE
DESCRIPTORS: SORE
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
## 2024-07-25 NOTE — PROGRESS NOTES
PROGRESS NOTE          PATIENT NAME: Parvin Patel  MEDICAL RECORD NO. [id number]  DATE: 7/25/2024  SURGEON: Dr. Romero  PRIMARY CARE PHYSICIAN: Braydon Hernandez MD    HD: # 0    ASSESSMENT    Patient Active Problem List   Diagnosis    Acute kidney injury (HCC)    Anemia due to vitamin B12 deficiency    CAD (coronary artery disease)    Thyroid cancer (HCC)    Hypothyroidism uncontrolled    Essential hypertension    Type 2 diabetes mellitus with complication, without long-term current use of insulin (HCC)    Transaminitis    Acute cystitis without hematuria    Urinary tract infection without hematuria    Acute on chronic diastolic congestive heart failure (HCC)    Otalgia of both ears    Bilateral swelling of feet and ankles    Dyslipidemia    Type 2 diabetes mellitus without complication, with long-term current use of insulin (HCC)    Bilateral non-suppurative otitis media    Hypochromic-microcytic anemia    Pneumonia of right lung due to infectious organism    Tracheobronchitis    Frequency of micturition    Pneumonia    Hypoalbuminemia    Lactic acid acidosis    Hyponatremia    Hyperglycemia    Hypocalcemia    Obesity, Class III, BMI 40-49.9 (morbid obesity) (HCC)    Sepsis due to pneumonia (HCC)    Cellulitis    Hypophosphatemia    Obesity    Atrial flutter (HCC)    Hypoglycemia    Encounter for palliative care    BRIA (acute kidney injury) (HCC)    Upper GI bleed    Superficial gastritis without hemorrhage    Morbid obesity (HCC)    Mild anemia    Acute gastric ulcer with hemorrhage    Melena    Gastric polyps    Family history of GI bleeding    Absolute anemia    Hx of long term use of blood thinners    Hypokalemia    COVID    Venous stasis    Major depressive disorder, recurrent, mild    Major depressive disorder, recurrent, moderate    Major depressive disorder, recurrent, unspecified    Chest pain, musculoskeletal       MEDICAL DECISION MAKING AND PLAN    CC: chest wall pain after Pt was picked up out of

## 2024-07-25 NOTE — ACP (ADVANCE CARE PLANNING)
Advance Care Planning      Palliative Medicine Provider (MD/NP)  Advance Care Planning (ACP) Conversation      Date of Conversation: 07/25/24  The patient and/or authorized decision maker consented to a voluntary Advance Care Planning conversation.   Individuals present for the conversation:   Patient with decision making capacity    Legal Healthcare Agent(s):    Primary Decision Maker: Qing Morgan - Child - [phone]    Secondary Decision Maker: Amanda Khalil - Child - [phone]    ACP documents available in EMR prior to discussion:  -Power of  for Healthcare  -Living Will    Primary Palliative Diagnosis(es):  Multiple rib fractures, right    Conversation Summary:  Patient does not want CPR.  HCPOA and LW paperwork reviewed and confirmed.  On the fence about short term intubation.  Ongoing discussions    Resuscitation Status:    Code Status: DNR-CCA, with intubation presently    Outcomes / Completed Documentation:  An explanation of advance directives and their importance was provided and the following forms completed:    -Portable DNR    If new document completed, original was provided to patient and/or family member.    Copy was placed for scanning into the [facility] EMR.      I spent 15 minutes providing separately identifiable ACP services with the patient and/or surrogate decision maker in a voluntary, in-person conversation discussing the patient's wishes and goals as detailed in the above note.       IGNACIO MCKEE, DO

## 2024-07-25 NOTE — PROGRESS NOTES
SLP ALL NOTES  [facility]  Speech Language Pathology    SPEECH/COGNITIVE ASSESSMENT    NO LOC,CHI OR CVA/TIA - ST TO DEFER AT THIS TIME      Date: 7/25/2024  Patient Name: Parvin Patel  YOB: 1935   AGE: 89 y.o.  MRN: [id number]        PT NOT SEEN FOR SPEECH OR COGNITIVE ASSESSMENT AT THIS TIME AS NO LOC, CHI OR CVA/TIA IS DOCUMENTED.  ST TO DEFER AT THIS TIME.  PLEASE RE-COSULT AS NEEDED.      Ching Weiss, SLP  7/25/2024  7:00 AM

## 2024-07-25 NOTE — CARE COORDINATION
Case Management Assessment  Initial Evaluation    Date/Time of Evaluation: 7/25/2024 518 PM  Assessment Completed by: Marnie Bansal    If patient is discharged prior to next notation, then this note serves as note for discharge by case management.    Patient Name: Parvin Patel                   YOB: 1935  Diagnosis: Chest pain, musculoskeletal [R07.89]  Closed fracture of multiple ribs of right side, initial encounter [S22.41XA]                   Date / Time: 7/24/2024  8:21 PM    Patient Admission Status: Inpatient   Readmission Risk (Low < 19, Mod (19-27), High > 27): Readmission Risk Score: 14.3    Current PCP: Braydon Hernandez MD  PCP verified by CM? Yes (Dr Braydon Hernandez)    Chart Reviewed: Yes      History Provided by: Patient  Patient Orientation: Alert and Oriented, Person, Place, Situation, Self    Patient Cognition: Alert    Hospitalization in the last 30 days (Readmission):  No    If yes, Readmission Assessment in  Navigator will be completed.    Advance Directives:      Code Status: DNR-CCA   Patient's Primary Decision Maker is: Legal Next of Kin    Primary Decision Maker: CathyQing - Child - [phone]    Secondary Decision Maker: RemiAmanda  Child - [phone]    Discharge Planning:    Patient lives with: Children Type of Home: House  Primary Care Giver: Self  Patient Support Systems include: Children   Current Financial resources: Medicaid  Current community resources: None  Current services prior to admission: Durable Medical Equipment            Current DME: Walker (has a rollator and power chair)            Type of Home Care services:  (P) Skilled Therapy    ADLS  Prior functional level: Assistance with the following:, Bathing, Dressing, Toileting, Cooking, Housework, Shopping, Mobility  Current functional level: Assistance with the following:, Bathing, Dressing, Toileting, Cooking, Housework, Shopping, Mobility    PT AM-PAC: 17 /24  OT AM-PAC: 19 /24    Family can provide

## 2024-07-25 NOTE — PROGRESS NOTES
Pt had a 6-run of vtach, denies chest pain/sob. /51. Dr. Diaz notified, ordered EKG. Plan of care ongoing.

## 2024-07-25 NOTE — CARE COORDINATION
Met with pt to complete an SBIRT.  Pt is alert and oriented and stated that she had a fall.  She denies alcohol and drug use and denies depression.  SBIRT is negative.          Alcohol Screening and Brief Intervention        No results for input(s): \"ALC\" in the last 72 hours.    Alcohol Pre-screening     (WOMEN ONLY) How many times in the past year have you had 4 or more drinks in a day?: None       Drug Pre-Screening        Drug Screening DAST       Mood Pre-Screening (PHQ-2)  During the past 2 weeks, have you been bothered by, feeling down, depressed or hopeless?  No        I have interviewed Parvin Patel, [id number] regarding  Her alcohol consumption/drug use and risk for excessive use. Screenings were negative.  Patient  N/A intervention at this time.   Deferred []    Completed on: 7/25/2024   AMY MICHELLE

## 2024-07-25 NOTE — H&P
2 diabetes mellitus without complication, with long-term current use of insulin (HCC).  has a past surgical history that includes Coronary angioplasty with stent; Hysterectomy; Cholecystectomy; Thyroidectomy; Appendectomy; back surgery; Upper gastrointestinal endoscopy (4/14/2021); and Upper gastrointestinal endoscopy (4/14/2021).    FAMILY HISTORY   []   Information not available due to exam limitations documented above    family history includes Cancer in her brother, father, and mother; Diabetes in her brother.    SOCIAL HISTORY  []   Information not available due to exam limitations documented above   reports that she has quit smoking. She has never used smokeless tobacco.   reports no history of alcohol use.   reports no history of drug use.    Review of Systems:    Review of Systems   Constitutional: Negative.    HENT: Negative.     Respiratory: Negative.     Cardiovascular:  Positive for chest pain and leg swelling.   Gastrointestinal: Negative.    Genitourinary: Negative.    Musculoskeletal:         Bilateral lower extremity swelling and feeling of heaviness   Skin:         B/L lower extremity redness   Neurological: Negative.    Hematological: Negative.    Psychiatric/Behavioral: Negative.         PHYSICAL EXAMINATION:     VITAL SIGNS:   Vitals:    07/24/24 2330   BP: (!) 133/52   Pulse: 72   Resp: 19   Temp:    SpO2: 98%       Physical Exam  Constitutional:       Appearance: She is obese.   HENT:      Head: Normocephalic and atraumatic.      Right Ear: External ear normal.      Left Ear: External ear normal.      Nose: Nose normal.      Mouth/Throat:      Pharynx: Oropharynx is clear.   Eyes:      Conjunctiva/sclera: Conjunctivae normal.   Cardiovascular:      Rate and Rhythm: Normal rate and regular rhythm.      Pulses: Normal pulses.   Pulmonary:      Effort: Pulmonary effort is normal.   Abdominal:      General: Abdomen is flat.      Palpations: Abdomen is soft.   Musculoskeletal:         General:  Swelling present. Normal range of motion.      Cervical back: Normal range of motion. No rigidity or tenderness.      Comments: Bilateral pitting edema lower extremities with skin thickening and lichenification   Skin:     General: Skin is warm.      Capillary Refill: Capillary refill takes less than 2 seconds.      Comments: Skin lichenification of bilateral lower extremities and erythema    Neurological:      General: No focal deficit present.      Mental Status: She is alert and oriented to person, place, and time.   Psychiatric:         Mood and Affect: Mood normal.         Thought Content: Thought content normal.         Judgment: Judgment normal.        FOCUSED ABDOMINAL SONOGRAM FOR TRAUMA (FAST): A good  quality examination was performed and representative images were obtained.    No free fluid in the abdomen        RADIOLOGY  CT CHEST PULMONARY EMBOLISM W CONTRAST   Final Result   1. No significant change from prior study 90 minutes ago.         CT CHEST WO CONTRAST   Final Result   1. Acute right 5-8 rib fractures.   2. 3.6 cm complex right renal cyst.  Recommend nonemergent MRI of the abdomen   with without contrast using renal mass protocol.         Vascular ankle brachial index (ROEL)    (Results Pending)   CT LUMBAR SPINE WO CONTRAST    (Results Pending)   CT THORACIC SPINE BONY RECONSTRUCTION    (Results Pending)         LABS  Labs Reviewed   CBC WITH AUTO DIFFERENTIAL - Abnormal; Notable for the following components:       Result Value    Neutrophils % 76 (*)     Lymphocytes % 15 (*)     Immature Granulocytes % 2 (*)     All other components within normal limits   BASIC METABOLIC PANEL - Abnormal; Notable for the following components:    Glucose 340 (*)     Creatinine 1.2 (*)     Est, Glom Filt Rate 43 (*)     Calcium 8.2 (*)     All other components within normal limits   BRAIN NATRIURETIC PEPTIDE - Abnormal; Notable for the following components:    Pro-BNP 1,522 (*)     All other components within

## 2024-07-25 NOTE — PLAN OF CARE
Problem: Safety - Adult  Goal: Free from fall injury  7/25/2024 1733 by Dilcia Arana, RN  Outcome: Progressing     Problem: Discharge Planning  Goal: Discharge to home or other facility with appropriate resources  Outcome: Progressing     Problem: Pain  Goal: Verbalizes/displays adequate comfort level or baseline comfort level  Outcome: Progressing
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
## 2024-07-25 NOTE — PLAN OF CARE
Problem: Safety - Adult  Goal: Free from fall injury  Outcome: Progressing     Problem: ABCDS Injury Assessment  Goal: Absence of physical injury  Outcome: Progressing      no
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
## 2024-07-25 NOTE — CONSULTS
25 MCG (1000 UT) TABS tablet TAKE ONE TABLET BY MOUTH ONCE DAILY 30 tablet 0    docusate sodium (COLACE) 100 MG capsule Take 1 capsule by mouth daily as needed for Constipation 30 capsule 0    nystatin (MYCOSTATIN) 674764 UNIT/GM powder Apply 3 times daily. 1 each 3    furosemide (LASIX) 20 MG tablet Take 2 tablets by mouth See Admin Instructions AND 1 tablet See Admin Instructions. Do all this for 10 days. 40mg daily for 10 days. Followed by 20mg daily thereafter.. 60 tablet 3    Continuous Blood Gluc Sensor (FREESTYLE KERRY 2 SENSOR) MISC 1 Device by Does not apply route every 14 days 2 each 2    Continuous Blood Gluc  (FREESTYLE KERRY 2 READER) CLAUDIO 1 Device by Does not apply route daily 1 each 0    Cyanocobalamin (VITAMIN B-12) 1000 MCG extended release tablet TAKE 1 TAB BY MOUTH ONCE A DAY  30 tablet 11    blood glucose monitor strips Test 2 times a day & as needed for symptoms of irregular blood glucose. Dispense sufficient amount for indicated testing frequency plus additional to accommodate PRN testing needs. 100 strip 1    Continuous Blood Gluc  (FREESTYLE KERRY 14 DAY READER) CLAUDIO 1 Device by Does not apply route 4 times daily 1 Device 0    Continuous Blood Gluc Sensor (FREESTYLE KERRY 2 SENSOR SYSTM) MISC 4 Devices by Does not apply route every 7 days 4 each 0    Lancets MISC 1 each by Does not apply route 2 times daily 100 each 11    nystatin (MYCOSTATIN) 498123 UNIT/GM cream Apply topically 2 times daily. 1 Tube 1    aspirin 81 MG chewable tablet Take 1 tablet by mouth daily 30 tablet 3       Social History:     Tobacco:    reports that she has quit smoking. She has never used smokeless tobacco.  Alcohol:      reports no history of alcohol use.  Drug Use:  reports no history of drug use.    Review of Systems:     10 point ROS reviewed and negative unless otherwise mentioned above    Physical Exam:     Vitals:  /75   Pulse 73   Temp 97.9 °F (36.6 °C) (Oral)   Resp 16   Wt 93.4  kg (206 lb)   SpO2 98%   BMI 37.68 kg/m²     Physical Exam  Constitutional:       Appearance: Normal appearance. She is ill-appearing.   HENT:      Head: Normocephalic and atraumatic.   Cardiovascular:      Rate and Rhythm: Normal rate and regular rhythm.      Pulses: Normal pulses.           Radial pulses are 2+ on the right side and 2+ on the left side.        Dorsalis pedis pulses are 2+ on the right side and 2+ on the left side.        Posterior tibial pulses are 2+ on the right side and 2+ on the left side.   Pulmonary:      Effort: Pulmonary effort is normal. No respiratory distress.   Abdominal:      General: Abdomen is flat. There is no distension.      Palpations: Abdomen is soft.      Tenderness: There is no abdominal tenderness.   Musculoskeletal:      Cervical back: Normal.      Thoracic back: Tenderness present.      Lumbar back: Tenderness present.      Right lower le+ Pitting Edema present.      Left lower le+ Pitting Edema present.      Comments: Lower extremity skin changes associated with chronic venous stasis, no ulcerations   Skin:     General: Skin is warm and dry.   Neurological:      Mental Status: She is alert and oriented to person, place, and time.      GCS: GCS eye subscore is 4. GCS verbal subscore is 5. GCS motor subscore is 6.      Sensory: Sensation is intact.      Motor: Motor function is intact.         Imaging/Labs:     N/A    Assessment and Plan:     89 yo female, with history of CHF and CAD with stents, presents with generalized fatigue, lower extremity edema, and rib pain. Pt states she has lower extremity swelling at baseline. Physical exam findings consistent with chronic venous stasis disease, extremities are neurovascularly intact.    No acute surgical intervention is indicated at this time.  Recommend elevating lower extremities and wearing compression stockings.  Further recommendations to follow after evaluation by vascular surgery attending.    Electronically  01:34

## 2024-07-25 NOTE — PROGRESS NOTES
Division of Vascular Surgery             Progress Note      Name: Parvin Patel  MRN: [id number]     7/25/2024  7:23 AM    Overnight Events:      No acute overnight events.       Subjective:     Complains of pain in the right side of the chest. Tolerating diet and passing flatus  Swelling of the bilateral lower extremity present.     Physical Exam:     Vitals:  BP (!) 119/59   Pulse 61   Temp 97.9 °F (36.6 °C) (Oral)   Resp (P) 20   Ht 1.575 m (5' 2\")   Wt 95.7 kg (210 lb 15 oz)   SpO2 99%   BMI 38.58 kg/m²       General appearance - alert, well appearing and in no acute distress  Mental status - oriented to person, place and time with normal affect  Head - normocephalic and atraumatic  Neck - supple, no carotid bruits, thyroid not palpable, no JVD  Chest - clear to auscultation, normal effort, tenderness in the right chest  Heart - normal rate, regular rhythm, no murmurs  Abdomen - soft, non-tender, non-distended, bowel sounds present all four quadrants, no masses  Neurological - normal speech, no focal findings or movement disorder noted, cranial nerves II through XII grossly intact  Extremities - peripheral pulses palpable .pitting edema of the skin over the calf ad foot seen. Mild cellulitic changes present over the shin.   Skin - no gross lesions, rashes, or induration noted  Foot Exam - crust of epithelial tissue present over the dorsum of the foot.   Vascular Exam -palpable DP bilaterally  Biphasic PT bilateral      Imaging:   CT LUMBAR SPINE WO CONTRAST    Result Date: 7/25/2024  EXAMINATION: CT OF THE LUMBAR SPINE WITHOUT CONTRAST  7/25/2024 TECHNIQUE: CT of the lumbar spine was performed without the administration of intravenous contrast. Multiplanar reformatted images are provided for review. Adjustment of mA and/or kV according to patient size was utilized.  Automated exposure control, iterative reconstruction, and/or weight based adjustment of the mA/kV was utilized to reduce the radiation

## 2024-07-25 NOTE — CONSULTS
Continuous Blood Gluc  (FREESTYLE KERRY 2 READER) CLAUDIO 1 Device by Does not apply route daily 11/23/21   Imani Vizcarra APRN - CNP   blood glucose monitor strips Test 2 times a day & as needed for symptoms of irregular blood glucose. Dispense sufficient amount for indicated testing frequency plus additional to accommodate PRN testing needs. 3/29/21   Braydon Hernandez MD   Continuous Blood Gluc  (FREESTYLE KERRY 14 DAY READER) CLAUDIO 1 Device by Does not apply route 4 times daily 11/30/20   Braydon Hernandez MD   Continuous Blood Gluc Sensor (FREESTYLE KERRY 2 SENSOR SYSTM) MISC 4 Devices by Does not apply route every 7 days 11/30/20   Braydon Hernandez MD   Lancets MISC 1 each by Does not apply route 2 times daily 7/5/19   Braydon Hernandez MD   nystatin (MYCOSTATIN) 888072 UNIT/GM cream Apply topically 2 times daily. 5/7/18   Braydon Hernandez MD   aspirin 81 MG chewable tablet Take 1 tablet by mouth daily 9/5/16   Terra Greenwood MD        Allergies:     Strawberry extract and Tape [adhesive tape]    Social History:     Tobacco:    reports that she has quit smoking. She has never used smokeless tobacco.  Alcohol:      reports no history of alcohol use.  Drug Use:  reports no history of drug use.    Family History:     Family History   Problem Relation Age of Onset    Cancer Mother     Cancer Father     Cancer Brother     Diabetes Brother      Review of Systems:     Positive and Negative as described in HPI.    CONSTITUTIONAL:  negative for fevers, chills, sweats, fatigue, weight loss  HEENT:  negative for vision, hearing changes, runny nose, throat pain  RESPIRATORY:  negative for shortness of breath, cough, congestion, wheezing.  CARDIOVASCULAR:  negative for chest pain, palpitations.  GASTROINTESTINAL:  negative for nausea, vomiting, diarrhea, constipation, change in bowel habits, abdominal pain   GENITOURINARY:  negative for difficulty of urination, burning with urination, frequency   INTEGUMENT:   foraminal narrowing of the thoracic spine.     CT CHEST PULMONARY EMBOLISM W CONTRAST    Result Date: 7/24/2024  1. No significant change from prior study 90 minutes ago.     CT CHEST WO CONTRAST    Result Date: 7/24/2024  1. Acute right 5-8 rib fractures. 2. 3.6 cm complex right renal cyst.  Recommend nonemergent MRI of the abdomen with without contrast using renal mass protocol.     Assessment :      Hospital Problems             Last Modified POA    * (Principal) Chest pain, musculoskeletal 7/25/2024 Yes    CAD (coronary artery disease) 7/25/2024 Yes    Hypothyroidism uncontrolled 7/25/2024 Yes    Overview Signed 5/12/2014 12:03 PM by Lizbeth Frank LPN     hypothyroid         Essential hypertension (Chronic) 7/25/2024 Yes    Type 2 diabetes mellitus with complication, without long-term current use of insulin (HCC) 7/25/2024 Yes    Dyslipidemia 7/25/2024 Yes    Obesity 7/25/2024 Yes    Atrial flutter (HCC) 7/25/2024 Yes    Morbid obesity (HCC) 7/25/2024 Yes    Multiple closed fractures of ribs of right side 7/25/2024 Yes    Vitamin D deficiency 7/25/2024 Yes     Plan:     Rib fractures with musculoskeletal chest pain.  Management per primary.    Hypertension.  Well-controlled.  Continue Lopressor 12.5 mg twice daily.     Hyperlipidemia.  Continue Lipitor 80 mg daily    Type 2 diabetes mellitus.  Continue high-dose corrective algorithm, POCT glucose hypoglycemia protocol.  Patient takes 45 units of Basaglar twice daily at home as well as Januvia 100 mg daily.  Currently blood sugars are stable.    CAD.  Continue aspirin 81 mg daily, Lipitor 80 mg daily, Lopressor 12.5 mg twice daily.    Hypothyroidism with history of thyroid cancer.  TSH low and T4 high.  Reduce Levothyroxine to 150 mcg daily and recommend outpatient follow-up with PCP for fine-tuning following acute issues.    Atrial flutter.  Continue Lopressor 12.5 mg twice daily.    Vitamin D deficiency.  Start 34635 units weekly x 8 weeks followed by 1000

## 2024-07-25 NOTE — CONSULTS
[facility] Inpatient Brief Diagnostic Assessment Note  JOSELYN Hill   7/25/2024  1:04 PM  Parvin COLLINS Jorge  1935  [id number]      Time Spent with Patient: 15 minutes or less (Brief Diagnostic Assessment) 25671    Pt was provided informed consent for the [facility]. Discussed with patient model of service to include the limits of confidentiality (i.e. abuse reporting, suicide intervention, etc.) and short-term intervention focused approach.  Pt indicated understanding.    [facility] Inpatient or Virtual Question: This was not a virtual session    Is consult a Victim of Crime?: No    Presenting Patient Report:  Patient was screened at the request of the Trauma Team.   Patient presents as a 89 y.o. female subsequent to hospital admission.     Patient was able to complete the following screens: ITSS    Pt is coping well and denies any current or hx of anxiety or depression.        MSE:     Appearance:   Hospital Gown and Good Eye Contact  Speech    normal rate, normal volume, and well articulated  Affect Observed   Appropriate to Context  Thought Content    intact  Thought Process    linear, goal directed, and coherent  Associations    logical connections  Insight    Good  Judgment    Intact  Orientation    oriented to person, place, time, and general circumstances      Patient reports and/or exhibits the following symptoms:    Mood: Appropriate to Context    Cognitive symptoms: Appropriate to Context    Behaviors: Appropriate to Context    Somatic: None Reported        Assessment:  Pt presented in hospital bed.  She was calm and coping.  No hx or current anxiety or depression.  ITSS negative.  No needs identified.        Screening Scale Results (If Any):    ITSS:  Date Screen Completed: 7/25/24  Patient's score= 0 for PTSD risk. ( ? 2 is positive for PTSD risk. )  Patient's score= 0 for depression risk.  ( ? 2 is positive for Depression risk )    Diagnoses: The following Diagnoses are based  on currently available information and may change as additional information becomes available.    Observation for suspected mental condition, Z03.89        Patient Interventions:  Psychoeducation , Emotional Support, and Brief Diagnostic Assessment       Recommendations:  No outpatient mental health services recommended    Plan:  No plan for bedside follow-up during hospital admission

## 2024-07-25 NOTE — RT PROTOCOL NOTE
RT Nebulizer Bronchodilator Protocol Note    There is a bronchodilator order in the chart from a provider indicating to follow the RT Bronchodilator Protocol and there is an “Initiate RT Bronchodilator Protocol” order as well (see protocol at bottom of note).    CXR Findings:  No results found.    The findings from the last RT Protocol Assessment were as follows:  Smoking: Smoker 15 pack years or more  Respiratory Pattern: Regular pattern and RR 12-20 bpm  Breath Sounds: Clear breath sounds  Cough: Strong, spontaneous, non-productive  Indication for Bronchodilator Therapy:    Bronchodilator Assessment Score: 1    Aerosolized bronchodilator medication orders have been revised according to the RT Nebulizer Bronchodilator Protocol below.    Respiratory Therapist to perform RT Therapy Protocol Assessment initially then follow the protocol.  Repeat RT Therapy Protocol Assessment PRN for score 0-3 or on second treatment, BID, and PRN for scores above 3.    No Indications - adjust the frequency to every 6 hours PRN wheezing or bronchospasm, if no treatments needed after 48 hours then discontinue using Per Protocol order mode.     If indication present, adjust the RT bronchodilator orders based on the Bronchodilator Assessment Score as indicated below.  If a patient is on this medication at home then do not decrease Frequency below that used at home.    0-3 - enter or revise RT bronchodilator order(s) to equivalent RT Bronchodilator order with Frequency of every 4 hours PRN for wheezing or increased work of breathing using Per Protocol order mode.       4-6 - enter or revise RT Bronchodilator order(s) to two equivalent RT bronchodilator orders with one order with BID Frequency and one order with Frequency of every 4 hours PRN wheezing or increased work of breathing using Per Protocol order mode.         7-10 - enter or revise RT Bronchodilator order(s) to two equivalent RT bronchodilator orders with one order with TID

## 2024-07-25 NOTE — PROGRESS NOTES
Physical Therapy  Facility/Department: [address] ORTHO/MED SURG  Physical Therapy Initial Assessment    Name: Parvin Patel  : 1935  MRN: [id number]  Date of Service: 2024    Discharge Recommendations:  Patient would benefit from continued therapy after discharge          Patient Diagnosis(es): The primary encounter diagnosis was Closed fracture of multiple ribs of right side, initial encounter. A diagnosis of Edema, unspecified type was also pertinent to this visit.  Past Medical History:  has a past medical history of Arthritis, Atrial flutter (HCC), CAD (coronary artery disease), CHF (congestive heart failure) (HCC), Diabetes (HCC), Diabetes mellitus (HCC), Hyperlipidemia, Hypertension, Psychiatric problem, Thyroid cancer (HCC), Thyroid disease, and Type 2 diabetes mellitus without complication, with long-term current use of insulin (HCC).  Past Surgical History:  has a past surgical history that includes Coronary angioplasty with stent; Hysterectomy; Cholecystectomy; Thyroidectomy; Appendectomy; back surgery; Upper gastrointestinal endoscopy (2021); and Upper gastrointestinal endoscopy (2021).    Assessment   Body Structures, Functions, Activity Limitations Requiring Skilled Therapeutic Intervention: Decreased functional mobility ;Decreased strength;Decreased endurance;Decreased balance  Assessment: The pt took 4-5 steps with a RW x min assist. She tired quickly with a minimal increase in activity. She only does limited ambulation per her daughter. She could benefit from a continuation of PT for gait , strengthening and functional mobility prior to her DC  Therapy Prognosis: Good  Decision Making: Medium Complexity  Requires PT Follow-Up: Yes  Activity Tolerance  Activity Tolerance: Patient limited by fatigue;Patient limited by endurance     Plan   Physical Therapy Plan  General Plan:  (5-6x wk)  Current Treatment Recommendations: Strengthening, Functional mobility training, Balance training,
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
## 2024-07-25 NOTE — PROGRESS NOTES
15 Variable Trauma-Specific Frailty Index   Comorbidities   Cancer History Yes (1) No (0)   Coronary Heart Disease MI (1) CABG (0.75) Mild (0.25)  No (0)   Dementia Severe (1) Moderate (0.5) Mild (0.25)  No (0)   Daily Activities   Help With Grooming Yes (1) No (0)   Help with Managing Money Yes (1) No (0)   Help doing Housework Yes (1) No (0)   Help with Toileting Yes (1) No (0)   Help Walking Wheelchair (1) Walker (0.75) Cane (0.5) No (0)   Health Attitude   Feel Less Useful Most Time (1) Sometimes (0.5) Never (0)   Feel Sad Most Time (1) Sometimes (0.5) Never (0)   Feel Effort to Do Everything Most Time (1) Sometimes (0.5) Never (0)   Feels Lonely Most Time (1) Sometimes (0.5) Never (0)   Falls Most Time (1) Sometimes (0.5) Never (0)   Function   Sexually Active Yes (0)  No(1)   Nutrition   Albumin < 3g/dL (1)  > 3g/dL   Scoring   Score   FI (Score/15)   > 0.25 = Frail   *Based on 2-weeks prior to hospital admission   Trauma Specific Fraility Index > or = to 4 (4/15 = 0.26)  Trauma Specific Fraility Index Score:    Frail

## 2024-07-25 NOTE — PROGRESS NOTES
[facility] Inpatient Progress Note  JOSELYN Hill   7/25/2024  10:15 AM  Parvin Patel  1935  [id number]      Time spent with Patient: 0 minutes    This writer was unable to complete screen or therapy services with patient at bedside at this time due to the following:  Patient requested clinician return later

## 2024-07-25 NOTE — PROGRESS NOTES
PHARMACY NOTE:    The electrolyte replacement protocol for potassium/magnesium has been discontinued per P&T guidelines because the patient has reduced renal function (CrCl < 30 mL/min).      The patient's most recent potassium & magnesium levels are:  Recent Labs     07/24/24 2052   K 3.9     Estimated Creatinine Clearance: 35 mL/min (A) (based on SCr of 1.2 mg/dL (H)).    For patients with decreased renal function (below 30ml/min) needing potassium/magnesium supplementation, please order individual bolus doses with appropriate monitoring.      Please contact the inpatient pharmacy with any concerns.  Thank you.;e

## 2024-07-26 ENCOUNTER — APPOINTMENT (OUTPATIENT)
Dept: GENERAL RADIOLOGY | Age: 89
End: 2024-07-26
Payer: MEDICARE

## 2024-07-26 VITALS
DIASTOLIC BLOOD PRESSURE: 48 MMHG | WEIGHT: 210.94 LBS | TEMPERATURE: 98.5 F | RESPIRATION RATE: 15 BRPM | HEART RATE: 65 BPM | OXYGEN SATURATION: 92 % | HEIGHT: 62 IN | BODY MASS INDEX: 38.82 KG/M2 | SYSTOLIC BLOOD PRESSURE: 121 MMHG

## 2024-07-26 LAB
GLUCOSE BLD-MCNC: 201 MG/DL (ref 65–105)
GLUCOSE BLD-MCNC: 225 MG/DL (ref 65–105)
TROPONIN I SERPL HS-MCNC: 22 NG/L (ref 0–14)
TROPONIN I SERPL HS-MCNC: 22 NG/L (ref 0–14)
TROPONIN I SERPL HS-MCNC: 26 NG/L (ref 0–14)

## 2024-07-26 PROCEDURE — 36415 COLL VENOUS BLD VENIPUNCTURE: CPT

## 2024-07-26 PROCEDURE — 71045 X-RAY EXAM CHEST 1 VIEW: CPT

## 2024-07-26 PROCEDURE — 82947 ASSAY GLUCOSE BLOOD QUANT: CPT

## 2024-07-26 PROCEDURE — 99231 SBSQ HOSP IP/OBS SF/LOW 25: CPT | Performed by: INTERNAL MEDICINE

## 2024-07-26 PROCEDURE — 2580000003 HC RX 258: Performed by: STUDENT IN AN ORGANIZED HEALTH CARE EDUCATION/TRAINING PROGRAM

## 2024-07-26 PROCEDURE — 94761 N-INVAS EAR/PLS OXIMETRY MLT: CPT

## 2024-07-26 PROCEDURE — 6370000000 HC RX 637 (ALT 250 FOR IP): Performed by: STUDENT IN AN ORGANIZED HEALTH CARE EDUCATION/TRAINING PROGRAM

## 2024-07-26 PROCEDURE — 6360000002 HC RX W HCPCS: Performed by: STUDENT IN AN ORGANIZED HEALTH CARE EDUCATION/TRAINING PROGRAM

## 2024-07-26 PROCEDURE — 97110 THERAPEUTIC EXERCISES: CPT

## 2024-07-26 PROCEDURE — 84484 ASSAY OF TROPONIN QUANT: CPT

## 2024-07-26 PROCEDURE — 97530 THERAPEUTIC ACTIVITIES: CPT

## 2024-07-26 RX ORDER — OXYCODONE HYDROCHLORIDE 5 MG/1
2.5 TABLET ORAL EVERY 6 HOURS PRN
Qty: 10 TABLET | Refills: 0 | Status: SHIPPED | OUTPATIENT
Start: 2024-07-26 | End: 2024-08-02

## 2024-07-26 RX ORDER — POLYETHYLENE GLYCOL 3350 17 G/17G
17 POWDER, FOR SOLUTION ORAL DAILY
Qty: 30 PACKET | Refills: 0 | Status: SHIPPED | OUTPATIENT
Start: 2024-07-27 | End: 2024-08-26

## 2024-07-26 RX ADMIN — MAGNESIUM SULFATE HEPTAHYDRATE 2000 MG: 40 INJECTION, SOLUTION INTRAVENOUS at 00:41

## 2024-07-26 RX ADMIN — HEPARIN SODIUM 5000 UNITS: 5000 INJECTION INTRAVENOUS; SUBCUTANEOUS at 06:20

## 2024-07-26 RX ADMIN — ATORVASTATIN CALCIUM 80 MG: 80 TABLET, FILM COATED ORAL at 09:33

## 2024-07-26 RX ADMIN — ACETAMINOPHEN 1000 MG: 500 TABLET ORAL at 13:36

## 2024-07-26 RX ADMIN — ASPIRIN 81 MG 81 MG: 81 TABLET ORAL at 09:33

## 2024-07-26 RX ADMIN — OXYCODONE 2.5 MG: 5 TABLET ORAL at 09:32

## 2024-07-26 RX ADMIN — FUROSEMIDE 40 MG: 40 TABLET ORAL at 09:33

## 2024-07-26 RX ADMIN — PANTOPRAZOLE SODIUM 40 MG: 40 TABLET, DELAYED RELEASE ORAL at 09:33

## 2024-07-26 RX ADMIN — METOPROLOL TARTRATE 12.5 MG: 25 TABLET ORAL at 09:32

## 2024-07-26 RX ADMIN — ONDANSETRON 4 MG: 2 INJECTION INTRAMUSCULAR; INTRAVENOUS at 06:20

## 2024-07-26 RX ADMIN — SERTRALINE HYDROCHLORIDE 100 MG: 50 TABLET ORAL at 09:33

## 2024-07-26 RX ADMIN — INSULIN LISPRO 4 UNITS: 100 INJECTION, SOLUTION INTRAVENOUS; SUBCUTANEOUS at 12:12

## 2024-07-26 RX ADMIN — SODIUM CHLORIDE, PRESERVATIVE FREE 10 ML: 5 INJECTION INTRAVENOUS at 09:34

## 2024-07-26 RX ADMIN — HEPARIN SODIUM 5000 UNITS: 5000 INJECTION INTRAVENOUS; SUBCUTANEOUS at 13:36

## 2024-07-26 ASSESSMENT — PAIN DESCRIPTION - DESCRIPTORS
DESCRIPTORS: ACHING;DISCOMFORT
DESCRIPTORS: ACHING;DISCOMFORT
DESCRIPTORS: ACHING;CRAMPING

## 2024-07-26 ASSESSMENT — PAIN SCALES - GENERAL
PAINLEVEL_OUTOF10: 5
PAINLEVEL_OUTOF10: 0
PAINLEVEL_OUTOF10: 5
PAINLEVEL_OUTOF10: 5

## 2024-07-26 ASSESSMENT — PAIN DESCRIPTION - LOCATION
LOCATION: CHEST
LOCATION: CHEST
LOCATION: RIB CAGE

## 2024-07-26 ASSESSMENT — PAIN DESCRIPTION - ORIENTATION
ORIENTATION: RIGHT

## 2024-07-26 ASSESSMENT — PAIN - FUNCTIONAL ASSESSMENT
PAIN_FUNCTIONAL_ASSESSMENT: ACTIVITIES ARE NOT PREVENTED

## 2024-07-26 ASSESSMENT — PAIN DESCRIPTION - PAIN TYPE: TYPE: ACUTE PAIN

## 2024-07-26 NOTE — PROGRESS NOTES
PROGRESS NOTE          PATIENT NAME: Parvin Patel  MEDICAL RECORD NO. [id number]  DATE: 7/26/2024  SURGEON: Dr. Hughes  PRIMARY CARE PHYSICIAN: Braydon Hernandez MD    HD: # 1    ASSESSMENT    Patient Active Problem List   Diagnosis    Acute kidney injury (HCC)    Anemia due to vitamin B12 deficiency    CAD (coronary artery disease)    Thyroid cancer (HCC)    Hypothyroidism uncontrolled    Essential hypertension    Type 2 diabetes mellitus with complication, without long-term current use of insulin (HCC)    Transaminitis    Acute cystitis without hematuria    Urinary tract infection without hematuria    Acute on chronic diastolic congestive heart failure (HCC)    Otalgia of both ears    Bilateral swelling of feet and ankles    Dyslipidemia    Type 2 diabetes mellitus without complication, with long-term current use of insulin (HCC)    Bilateral non-suppurative otitis media    Hypochromic-microcytic anemia    Pneumonia of right lung due to infectious organism    Tracheobronchitis    Frequency of micturition    Pneumonia    Hypoalbuminemia    Lactic acid acidosis    Hyponatremia    Hyperglycemia    Hypocalcemia    Obesity, Class III, BMI 40-49.9 (morbid obesity) (HCC)    Sepsis due to pneumonia (HCC)    Cellulitis    Hypophosphatemia    Obesity    Atrial flutter (HCC)    Hypoglycemia    Encounter for palliative care    BRIA (acute kidney injury) (HCC)    Upper GI bleed    Superficial gastritis without hemorrhage    Morbid obesity (HCC)    Mild anemia    Acute gastric ulcer with hemorrhage    Melena    Gastric polyps    Family history of GI bleeding    Absolute anemia    Hx of long term use of blood thinners    Hypokalemia    COVID    Venous stasis    Major depressive disorder, recurrent, mild    Major depressive disorder, recurrent, moderate    Major depressive disorder, recurrent, unspecified    Chest pain, musculoskeletal    Multiple closed fractures of ribs of right side    Vitamin D deficiency    ACP (advance

## 2024-07-26 NOTE — PROGRESS NOTES
PALLIATIVE CARE PROGRESS NOTE     NAME:  Parvin Patel  MEDICAL RECORD NUMBER:  [id number]  AGE: 89 y.o.   GENDER: female  : 1935  TODAY'S DATE:  2024  Room: 0235/0235-01    Reason For Consult:  Goals of care evaluation  Distress management  Symptom Management  Guidance and support  Facilitate communications  Assistance in coordinating care  Recommendations for the above    Plan      Palliative Interaction:  Parvin was seen and examined today on rounds.  No acute events overnight.  She is sitting up in bed, appears quite well.  Discharge order in place.  Evaluated the patient's legs, per nursing request, as they were concerned about cellulitis.  Legs appear to have chronic stasis bilaterally.  Compression stockings to be used at home. Will see if they care be ordered here.  We discussed her goals of care. She discussed with her children regarding intubation. She has elected to not pursue intubation, even if temporary. I have adjusted her order to reflect this.    Palliative care to sign off. Please reach out if we can be of further assistance.     IMPRESSION/ PLAN  Symptom management/pain control    We feel the patient's symptoms are being controlled. Their current regimen has been reviewed by myself and discussed with the staff. We recommend adjusting their medications as follows: none    Goals of care evaluation  The patient goals of care are preserve independence/autonomy/control  Long discussion to ensure the patient's and family's understanding of goals of care, and theconcept of palliative care.    Code Status:  DNR-CCA, no intubation    Other Recommendations - none. Palliative care to sign off.      History of Present Illness     HISTORY OF PRESENT ILLNESS:   The patient is a 89 y.o. female who initially presented to [facility] for evaluation of chest pain and shortness of breath. Her comorbidities include CAD s/p stenting, DM II, primary hypertension and HFpEF. Per  LOC full  ___90% full ambulation; normal activity/some disease; full self-care; normal intake; LOC full  ___80% ambulation full; normal activity with effort/some disease; full self-care; normal/reduced intake; LOC full  ___70% ambulation reduced; cannot do normal work/some disease; full self-care; normal/reduce intake; LOC full  ___60%  Ambulation reduced; Significant disease; Can't do hobbies/housework; intake normal or reduced; occasional assist; LOC full/confusion  _x_50%  Mainly sit/lie; Extensive disease; Can't do any work; Considerable assist; intake normal or reduced; LOC full/confusion  ___40%  Mainly in bed; Extensive disease; Mainly assist; intake normal or reduced; LOC full/confusion   ___30%  Bed Bound; Extensive disease; Total care; intake reduced; LOC full/confusion  ___20%  Bed Bound; Extensive disease; Total care; intake minimal; Drowsy/coma  ___10%  Bed Bound; Extensive disease; Total care; Mouth care only; Drowsy/coma  ___0       Death    Principle Problem/Diagnosis:  Principal Problem:    Chest pain, musculoskeletal  Active Problems:    CAD (coronary artery disease)    Hypothyroidism uncontrolled    Essential hypertension    Type 2 diabetes mellitus with complication, without long-term current use of insulin (HCC)    Dyslipidemia    Obesity    Atrial flutter (HCC)    Encounter for palliative care    Morbid obesity (HCC)    Multiple closed fractures of ribs of right side    Vitamin D deficiency    ACP (advance care planning)    Lymphedema    Leg swelling    Lipodermatosclerosis  Resolved Problems:    * No resolved hospital problems. *        Total time in talking with family, discussing with patient, chart review, and writing note: 26 min        Electronically signed by      Murphy Caal,   Hospice/Palliative Care  [place], [place]  7/26/2024 12:15 PM  Palliative Care Office [phone]

## 2024-07-26 NOTE — PROGRESS NOTES
CLINICAL PHARMACY NOTE: MEDS TO BEDS    Total # of Prescriptions Filled: 2   The following medications were delivered to the patient:  Oxycodone 5mg tabs  Polyethylene glycol 3350 powd    Additional Documentation:  Delivered to pt + 2 in room 235 on 7/26 at 2:39P. Copay was $10.03 paid with cash

## 2024-07-26 NOTE — PLAN OF CARE
Problem: Safety - Adult  Goal: Free from fall injury  7/26/2024 1117 by Fritsch, Sarah, RN  Outcome: Progressing     Problem: Discharge Planning  Goal: Discharge to home or other facility with appropriate resources  Outcome: Progressing  Flowsheets (Taken 7/26/2024 0800)  Discharge to home or other facility with appropriate resources: Identify barriers to discharge with patient and caregiver     Problem: Pain  Goal: Verbalizes/displays adequate comfort level or baseline comfort level  7/26/2024 1117 by Fritsch, Sarah, RN  Outcome: Progressing

## 2024-07-26 NOTE — PLAN OF CARE
Problem: Safety - Adult  Goal: Free from fall injury  7/25/2024 2229 by Dakota Mera, RN  Outcome: Progressing  7/25/2024 1733 by Dilcia Arana RN  Outcome: Progressing     Problem: Discharge Planning  Goal: Discharge to home or other facility with appropriate resources  7/25/2024 1733 by Dilcia Arana RN  Outcome: Progressing     Problem: Pain  Goal: Verbalizes/displays adequate comfort level or baseline comfort level  7/25/2024 2229 by Dakota Mera, RN  Outcome: Progressing  7/25/2024 1733 by Dilcia Arana RN  Outcome: Progressing     Problem: ABCDS Injury Assessment  Goal: Absence of physical injury  Outcome: Progressing

## 2024-07-26 NOTE — ACP (ADVANCE CARE PLANNING)
Advance Care Planning      Palliative Medicine Provider (MD/NP)  Advance Care Planning (ACP) Conversation      Date of Conversation: 07/26/24  The patient and/or authorized decision maker consented to a voluntary Advance Care Planning conversation.   Individuals present for the conversation:   Patient with decision making capacity    Legal Healthcare Agent(s):    Primary Decision Maker: Qing Morgan - Child - [phone]    Secondary Decision Maker: Amanda Khalil - Child - [phone]    ACP documents available in EMR prior to discussion:  -Power of  for Healthcare  -Living Will    Primary Palliative Diagnosis(es):  Multiple rib fractures, right    Conversation Summary:  Patient does not want CPR.  HCPOA and LW paperwork reviewed and confirmed.  Has made the decision to not be intubated. Order requested.    Resuscitation Status:    Code Status: DNR-CCA, without intubation.    Outcomes / Completed Documentation:  An explanation of advance directives and their importance was provided and the following forms completed:    -Portable DNR    If new document completed, original was provided to patient and/or family member.    Copy was placed for scanning into the [facility] EMR.      I spent 15 minutes providing separately identifiable ACP services with the patient and/or surrogate decision maker in a voluntary, in-person conversation discussing the patient's wishes and goals as detailed in the above note.       IGNACIO MCKEE, DO

## 2024-07-26 NOTE — CARE COORDINATION
Transitional planning: Met w/ trauma team r/t discharge. Dr Hughes states is ready for discharge. Met w/ patient about discharge plan and wants to return home. Is agreeable to [facility] for therapy as recommended by PT. States daughter can transport home.                      Post Acute Facility/Agency List     Provided patient with the following list, the list includes the overall star ratings obtained from CMS per the Medicare Web site (www.Medicare.gov):     [] Long Term Acute Care Facilities  [] Acute Inpatient Rehabilitation Facilities  [] Skilled Nursing Facilities  [x] Home Care    Provided verbal instructions on how to utilize the QR Code to obtain additional detailed star ratings from www.Medicare.gov     offered to print and provide the detailed list:    []Accepted   [x]Declined    The following choices made:    P/S Trauma w/ discharge plan and need for orders, KATELYN completion and medication reconciliation

## 2024-07-26 NOTE — PROGRESS NOTES
Physical Therapy  Facility/Department: [address] ORTHO/MED SURG  Physical Therapy Daily Treatment Note    Name: Parvin Patel  : 1935  MRN: [id number]  Date of Service: 2024    Discharge Recommendations:  Patient would benefit from continued therapy after discharge   PT Equipment Recommendations  Equipment Needed: No      Patient Diagnosis(es): The primary encounter diagnosis was Closed fracture of multiple ribs of right side, initial encounter. A diagnosis of Edema, unspecified type was also pertinent to this visit.  Past Medical History:  has a past medical history of Arthritis, Atrial flutter (HCC), CAD (coronary artery disease), CHF (congestive heart failure) (HCC), Diabetes (HCC), Diabetes mellitus (HCC), Hyperlipidemia, Hypertension, Psychiatric problem, Thyroid cancer (HCC), Thyroid disease, and Type 2 diabetes mellitus without complication, with long-term current use of insulin (HCC).  Past Surgical History:  has a past surgical history that includes Coronary angioplasty with stent; Hysterectomy; Cholecystectomy; Thyroidectomy; Appendectomy; back surgery; Upper gastrointestinal endoscopy (2021); and Upper gastrointestinal endoscopy (2021).    Assessment   Body Structures, Functions, Activity Limitations Requiring Skilled Therapeutic Intervention: Decreased functional mobility ;Decreased strength;Decreased endurance;Decreased balance  Assessment: Pt completed BM SBA, exercises provided building functional endurance, BLE and core strength.  Pt would benefit from continuation of PT fo rgait, strengthening and functional mobility training prior to DC.  Therapy Prognosis: Good  Decision Making: Medium Complexity  Activity Tolerance  Activity Tolerance: Patient limited by fatigue;Patient limited by endurance  Activity Tolerance Comments: Pt expresses feeling good to mobilize, mild R rib pain during exercises w/no affect on functionality this date.     Plan   Physical Therapy Plan  General Plan:

## 2024-07-29 ENCOUNTER — CARE COORDINATION (OUTPATIENT)
Dept: CARE COORDINATION | Age: 89
End: 2024-07-29

## 2024-07-29 NOTE — ADT AUTH CERT
Member in ED 7/24 and upgraded to Inpatient 7/25, that's why notification was sent on 7/26. Please review for medical necessity.     Thank you.

## 2024-07-29 NOTE — CARE COORDINATION
Care Transitions Note    Initial Call - Call within 2 business days of discharge: Yes    Patient Current Location:  Home: [address] [place] [id number]    Care Transition Nurse contacted the patient by telephone to perform post hospital discharge assessment, verified name and  as identifiers. Provided introduction to self, and explanation of the Care Transition Nurse role.     Patient: Parvin Patel    Patient : 1935   MRN: [id number]    Reason for Admission: rib pain  Discharge Date: 24  RURS: Readmission Risk Score: 16.3      Last Discharge Facility       Date Complaint Diagnosis Description Type Department Provider    24 Rib Pain (injury); Toe Pain; Chest Pain; Leg Swelling Closed fracture of multiple ribs of right side, initial encounter ... ED to Hosp-Admission (Discharged) (ADMITTED) [facility] 2[facility] Thomas Romero MD; VICTORIA Wise..            Was this an external facility discharge? No    Additional needs identified to be addressed with provider   High priority:    Patient needs HFU Please have office address  Can please get an order for VNS, pt/ot to [facility]  thank you//JU           Method of communication with provider: chart routing.    Patients top risk factors for readmission: functional cognitive ability, functional physical ability, falls, and medication management    Interventions to address risk factors:   Review of patient management of conditions/medications:   Home Health: sent request to pcp office for order for [facility]  Referrals: RPM   Communication with providers: sent message to pcp office for HFU a[pt and sme VNS    Care Summary Note: Writer spoke to patient, she is doing ok just still very weak and having pain in her ribs, denied any c/o fever, chills, n/v/d sob or chest pain at time of call, sent request to pcp for a sooner HFU and also for orders for VNS, pt/ot, reviewed s/s/tx of chf, sent order for RPM to Sophia, explained role of CTN, provided contact

## 2024-07-29 NOTE — PROGRESS NOTES
[facility] ([facility]) Inpatient Discharge Summary  JOSELYN Hill  7/29/2024        Parvin Patel  1935  [id number]    Date & Time of Discharge: 7/26/2024  2:47 PM     Is consult a Victim of Crime?: No    Services provided:  Psychotherapy Individual and Screenings: ITSS    Screen Results (If any):      ITSS:  Date Screen Completed: 7/25/24  Patient's score= 0 for PTSD risk. ( ? 2 is positive for PTSD risk. )  Patient's score= 0 for depression risk.  ( ? 2 is positive for Depression risk )         Discharge Recommendations:  No outpatient mental health services recommended      The therapist may be reached through the [facility] offices at [phone].

## 2024-07-30 ENCOUNTER — TELEPHONE (OUTPATIENT)
Dept: INTERNAL MEDICINE CLINIC | Age: 89
End: 2024-07-30

## 2024-07-30 ENCOUNTER — CARE COORDINATION (OUTPATIENT)
Dept: PRIMARY CARE CLINIC | Age: 89
End: 2024-07-30

## 2024-07-30 DIAGNOSIS — Z91.81 AT HIGH RISK FOR FALLS: ICD-10-CM

## 2024-07-30 DIAGNOSIS — I50.33 ACUTE ON CHRONIC DIASTOLIC CONGESTIVE HEART FAILURE (HCC): Primary | ICD-10-CM

## 2024-07-30 DIAGNOSIS — M54.6 THORACIC BACK PAIN, UNSPECIFIED BACK PAIN LATERALITY, UNSPECIFIED CHRONICITY: Primary | ICD-10-CM

## 2024-07-30 NOTE — CARE COORDINATION
Remote Patient Kit Ordering Note      Date/Time:  7/30/2024 9:13 AM      CCSS placed phone call to patient/family today to notify of RPM kit order; patient/family was available; discussed the following topics below and all questions answered.    [x[facility]  [x] Patient will receive package over the next 1-3 business days. Someone 21 years or older must be present to sign for UPS delivery.  [x] HRS will contact patient within 24 hours, an HRS  will call the patient directly: If the patient does not answer, HRS will follow up with the clinical team notifying them about the unsuccessful attempt to contact the patient. HRS will make three call attempts to the patient.Provide patient with [facility] Virtual install number is: [phone].  [x] CTN will contact patient once equipment is active to welcome them to the program.                                                         [x] Hours of RPM monitoring - Monday-Friday 5850-8505; encourage patient to get vitals entered by Noon each day to have the alert addressed same day.  [x][facility] mailed RPM Patient flyer to patient.                      CTN made aware the RPM kit has been ordered.

## 2024-07-30 NOTE — PROGRESS NOTES
Remote Patient Monitoring Treatment Plan    Received request from [facility]/Shwetha Armendariz RN   to order remote patient monitoring for in home monitoring of CHF; Condition managed by PCP.  and order completed.     Patient will be monitoring activity  blood pressure   pulse ox   weight  disease specific education modules .      Patient will engage in Remote Patient Monitoring each day to develop the skills necessary for self management.       RPM Care Team Responsibilities:   Alerts will be reviewed daily and addressed within 2-4 hours during operational hours (Monday -Friday 9 am-4 pm)  Alert response and intervention documented in patient medical record  Alert response escalated to PCP per protocol and documented in patient medical record  Patient monitored over approximately  days  Discharge from program based on self-management readiness    See care coordination encounters for additional details.

## 2024-07-30 NOTE — TELEPHONE ENCOUNTER
Ute with [facility] called asking if you will write orders for PTO and OT. Patient was recently at [facility] and they didn't write the orders.  Patient has multiple rib fractures from a fall. Discharged on July 26    Ute said she will also need the last office notes with the PTO and OT orders         Ute   [phone] direct line  Fax [phone]

## 2024-07-30 NOTE — DISCHARGE SUMMARY
CHEST WO CONTRAST    Result Date: 7/24/2024  EXAMINATION: CT OF THE CHEST WITHOUT CONTRAST 7/24/2024 9:00 pm TECHNIQUE: CT of the chest was performed without the administration of intravenous contrast. Multiplanar reformatted images are provided for review. Automated exposure control, iterative reconstruction, and/or weight based adjustment of the mA/kV was utilized to reduce the radiation dose to as low as reasonably achievable. COMPARISON: 01/08/2022 HISTORY: ORDERING SYSTEM PROVIDED HISTORY: right sided rib pain after being squeezed during transfer TECHNOLOGIST PROVIDED HISTORY: right sided rib pain after being squeezed during transfer Decision Support Exception - unselect if not a suspected or confirmed emergency medical condition->Emergency Medical Condition (MA) Reason for Exam: right sided rib pain after being squeezed during transfer FINDINGS: Mediastinum: The heart is enlarged.  Coronary artery calcifications are a marker of atherosclerosis.  There are no enlarged thoracic lymph nodes. Status post bilateral thyroidectomy. Lungs/pleura: The tracheobronchial tree is patent.  There is no pneumothorax or pleural effusion.  There is bibasilar scarring. Upper Abdomen: A small hiatal hernia is noted.  Status post cholecystectomy. There is a complex right upper pole renal cyst measuring 3.5 x 3.6 cm.  A small duodenal diverticulum is noted. Soft Tissues/Bones: Degenerative changes involve the thoracic spine and left glenohumeral joint.  The bones are demineralized.  There is an old healed manubrial fracture. However, there are acute nondisplaced right anterolateral 5-8 rib fractures.     1. Acute right 5-8 rib fractures. 2. 3.6 cm complex right renal cyst.  Recommend nonemergent MRI of the abdomen with without contrast using renal mass protocol.       DISCHARGE INSTRUCTIONS     Discharge Medications:        Medication List        START taking these medications      oxyCODONE 5 MG immediate release tablet  Commonly

## 2024-07-31 NOTE — TELEPHONE ENCOUNTER
I cannot addend my last office note, since patient did not had fall at that point in time  I can sign orders now, and certainly will update PT/OT requirement on her upcoming office appointment

## 2024-08-01 NOTE — TELEPHONE ENCOUNTER
I called the mother back to inform her of her new appointment next Thursday. Patients mother voiced agreement and was told to arrive 15 to 30 minutes early to get registered and to bring her photo ID and the [employer]. Ute from [facility] called to get a verbal for PT/OT as that's all they needed.    Unknown

## 2024-08-02 ENCOUNTER — CARE COORDINATION (OUTPATIENT)
Dept: CARE COORDINATION | Age: 89
End: 2024-08-02

## 2024-08-02 NOTE — CARE COORDINATION
Care Transitions Note    Follow Up Call     Patient Current Location:  Home: [address] [place] [id number]    [facility] Care Coordinator contacted the patient by telephone. Verified name and  as identifiers.    Additional needs identified to be addressed with provider   No needs identified                 Method of communication with provider: none.    Care Summary Note: Writer spoke with Parvin for a follow up care transitions call. She states she is doing okay overall. She is still having rib pain but denies SOB or cough. She is out of her pain medications and has been taking Tylenol.  Per Parvin she has not heard from [facility] about SOC yet. Writer called [facility] who stated they just got the verbal okay from her PCP, they will be calling today to schedule her first appt. Patient informed of this and verbalized her understanding. She has not yet received her RPM kit. She is scheduled to see her PCP on 8/15/24 and denies having any new needs or concerns at this time.     Plan of care updates since last contact:  Review of patient management of conditions/medications:         Advance Care Planning:   Does patient have an Advance Directive: reviewed and current.    Medication Review:  No changes since last call.     Remote Patient Monitoring:  Offered patient enrollment in the Remote Patient Monitoring (RPM) program for in-home monitoring: Yes, patient enrolled; current status is awaiting kit.    Assessments:  Care Transitions Subsequent and Final Call    Subsequent and Final Calls  Do you have any ongoing symptoms?: No  Have your medications changed?: No  Do you have any questions related to your medications?: No  Do you currently have any active services?: No  Are you currently active with any services?: Home Health  Do you have any needs or concerns that I can assist you with?: No  Identified Barriers: None  Care Transitions Interventions    Physical Therapy: Completed Other Services: Completed     Registered Dietician:

## 2024-08-08 ENCOUNTER — CARE COORDINATION (OUTPATIENT)
Dept: CARE COORDINATION | Age: 89
End: 2024-08-08

## 2024-08-08 NOTE — CARE COORDINATION
Care Transitions Note    Follow Up Call     Attempted to reach patient for transitions of care follow up.  Unable to reach patient.      Outreach Attempts:   HIPAA compliant voicemail left for patient.     Care Summary Note: Attempted to reach patient for subsequent call. Left Hipaa appropriate message with contact information requesting return call to [phone]     Follow Up Appointment:   Future Appointments         Provider Specialty Dept Phone    8/15/2024 2:30 PM Braydon Hernandez MD Internal Medicine [phone]            Plan for follow-up on next business day.  based on severity of symptoms and risk factors. Plan for next call:  2nd attempt to reach patient for RPM welcome call    Shwetha Kaur RN

## 2024-08-09 ENCOUNTER — CLINICAL DOCUMENTATION (OUTPATIENT)
Dept: CASE MANAGEMENT | Age: 89
End: 2024-08-09

## 2024-08-09 ENCOUNTER — CARE COORDINATION (OUTPATIENT)
Dept: CARE COORDINATION | Age: 89
End: 2024-08-09

## 2024-08-09 ENCOUNTER — CARE COORDINATION (OUTPATIENT)
Dept: CASE MANAGEMENT | Age: 89
End: 2024-08-09

## 2024-08-09 DIAGNOSIS — I50.33 ACUTE ON CHRONIC DIASTOLIC CONGESTIVE HEART FAILURE (HCC): Primary | ICD-10-CM

## 2024-08-09 NOTE — CARE COORDINATION
Care Transitions Note    Follow Up Call     Attempted to reach patient for transitions of care follow up.  Unable to reach patient.      Outreach Attempts:   Multiple attempts to contact patient at phone numbers on file.     Care Summary Note: # 2nd attempt- Attempted to reach patient for subsequent call. Left Hipaa appropriate message with contact information requesting return call to [phone]     Follow Up Appointment:   Future Appointments         Provider Specialty Dept Phone    8/15/2024 2:30 PM Braydon Hernandez MD Internal Medicine [phone]            Plan for follow-up on next business day.  based on severity of symptoms and risk factors. Plan for next call:  3 rd attempt for RPM welcome call    Shwetha Kaur RN

## 2024-08-09 NOTE — CARE COORDINATION
Called patient and informed her to increase to 40 mg Lasix twice today and tomorrow then repeat BMP on Monday patient verbalized understanding and had no concerns

## 2024-08-09 NOTE — CARE COORDINATION
-Remote Alert Monitoring Note      Date/Time:  2024 9:31 AM  Patient Current Location: Home: [address] [place], [place] [id number]  Verified patients name and  as identifiers.    Rpm alert to be reviewed by the provider   red alert  weight (6 lb weight gain in 1 day)    Additional needs to be addressed by provider: RICKY Spoke with patient about 6 lb weight gain in 1 day she started RPM monitoring yesterday with an initial weight of 189.9 today she weighed 195.4. we went over all the weighing techniques and patient is doing everything correct except she has to have her daughter hold her up she states she cannot stand alone, patient is speaking in clear full sentences, denies chest pain, SOB, dizziness has some swelling in feet but states it is normal. We are monitoring for CHF but if she is unable to stand unassisted to get accurate rates do you want us to continue weight monitoring?                    LPN contacted patient by telephone regarding red alert received   Background: CHF  Refer to 911 immediately if:  Patient unresponsive or unable to provide history  Change in cognition or sudden confusion  Patient unable to respond in complete sentences  Intense chest pain/tightness  Any concern for any clinical emergency  Red Alert: Provider response time of 1 hr required for any red alert requiring intervention  Yellow Alert: Provider response time of 3hr required for any escalated yellow alert  Patient Chief Complaint:  Weight Scale Triage  Was your weight obtained upon rising/waking today? yes   Was your weight obtained after voiding and/or use of the bathroom today? yes   Did you weigh yourself in the same amount of clothing today, compared to how you typically do? yes   Was the scale bumped or moved prior to today's weight? no   Is your scale on a flat/hard surface? yes   Did you obtain your weight with shoes on? no   If yes, is this something you normally do during your daily weights? no   Were you standing up

## 2024-08-09 NOTE — CARE COORDINATION
Please have patient take Lasix 40 mg oral twice daily today and tomorrow, continue to monitor weight and repeat BMP on Monday.

## 2024-08-12 ENCOUNTER — ENROLLMENT (OUTPATIENT)
Dept: CARE COORDINATION | Age: 89
End: 2024-08-12

## 2024-08-12 ENCOUNTER — CARE COORDINATION (OUTPATIENT)
Dept: CARE COORDINATION | Age: 89
End: 2024-08-12

## 2024-08-12 ENCOUNTER — CARE COORDINATION (OUTPATIENT)
Dept: CASE MANAGEMENT | Age: 89
End: 2024-08-12

## 2024-08-12 NOTE — CARE COORDINATION
information                All questions answered at this time.      Plan of care updates since last contact:  Review of patient management of conditions/medications: Writer spoke to patient, completed welcome call for RPM, patient stated RPM nurse called today about her 02 sat and her weight , stated she recheck her 02 sat at it was 95 % and she said her weigh is off d/t her daughter helping her stand, reviewed up coming appt, denied any c/o fever, chills. N.v.d or chest pain with follow//JU       Advance Care Planning:   Does patient have an Advance Directive: reviewed during previous call, see note. .    Medication Review:  No changes since last call.     Remote Patient Monitoring:  Offered patient enrollment in the Remote Patient Monitoring (RPM) program for in-home monitoring: Yes, patient enrolled; current status is activated and monitoring.    Assessments:   Goals Addressed    None          Follow Up Appointment:   Reviewed upcoming appointment(s).  Future Appointments         Provider Specialty Dept Phone    8/15/2024 2:30 PM Braydon Hernandez MD Internal Medicine [phone]            Care Transition Nurse provided contact information.  Plan for follow-up call in 2-5 days based on severity of symptoms and risk factors.  Plan for next call: symptom management-follow up on sob, vital,s, RPM  follow-up appointment-follow up on HFU appt      Shwetha Kaur RN

## 2024-08-12 NOTE — CARE COORDINATION
-Remote Alert Monitoring Note      Date/Time:  2024 1:26 PM  Patient Current Location: Home: [address] [place] [address]  Verified patients name and  as identifiers.    Rpm alert to be reviewed by the provider   red alert  pulse ox reading (91% ) and weight (increase of 22 pounds in 1 week)  Vitals Recheck pulse ox reading (95%)  Additional needs to be addressed by provider: No                   LPN contacted patient by telephone regarding red alert received   Background: enrolled in RPM for CHF   Refer to 911 immediately if:  Patient unresponsive or unable to provide history  Change in cognition or sudden confusion  Patient unable to respond in complete sentences  Intense chest pain/tightness  Any concern for any clinical emergency  Red Alert: Provider response time of 1 hr required for any red alert requiring intervention  Yellow Alert: Provider response time of 3hr required for any escalated yellow alert  Patient Chief Complaint:  O2 Triage  Are you having any Chest Pain? no   Are you having any Shortness of Breath? no   Swelling in your hands or feet? no     Are you having any other health concerns or issues? no     Weight Scale Triage  Was your weight obtained upon rising/waking today? YES   Was your weight obtained after voiding and/or use of the bathroom today? yes   Did you weigh yourself in the same amount of clothing today, compared to how you typically do? yes   Was the scale bumped or moved prior to today's weight? no   Is your scale on a flat/hard surface? yes   Did you obtain your weight with shoes on? no   If yes, is this something you normally do during your daily weights? yes   Were you standing up straight on the scale today? no   Were you leaning on anything while obtaining your weight today? Yes  HAS TO HOLD ON TO HER WALKER AND HER DAUGHTER TO WEIGH SELF      Clinical Interventions:  DX CHF  pt reports she is unable to stand on the scale securely. She has to on to her walker and has to 
Date/Time:  8/12/2024 9:33 AM  LPN attempted to reach patient by telephone regarding red alert in remote patient monitoring program. Left HIPPA compliant message requesting a return call. Will attempt to reach patient again.    RPM red alert for pulse ox 91% and weight increase of 22 pounds in 5 days  Enrolled in RPM for CHF  
Skin normal color for race, warm, dry and intact. No evidence of rash.

## 2024-08-14 VITALS
OXYGEN SATURATION: 93 % | DIASTOLIC BLOOD PRESSURE: 66 MMHG | HEART RATE: 67 BPM | BODY MASS INDEX: 38.85 KG/M2 | WEIGHT: 212.4 LBS | SYSTOLIC BLOOD PRESSURE: 128 MMHG

## 2024-08-15 ENCOUNTER — OFFICE VISIT (OUTPATIENT)
Dept: INTERNAL MEDICINE CLINIC | Age: 89
End: 2024-08-15

## 2024-08-15 ENCOUNTER — CARE COORDINATION (OUTPATIENT)
Dept: CARE COORDINATION | Age: 89
End: 2024-08-15

## 2024-08-15 ENCOUNTER — HOSPITAL ENCOUNTER (OUTPATIENT)
Age: 89
Setting detail: SPECIMEN
Discharge: HOME | End: 2024-08-15

## 2024-08-15 VITALS
WEIGHT: 212 LBS | DIASTOLIC BLOOD PRESSURE: 68 MMHG | SYSTOLIC BLOOD PRESSURE: 122 MMHG | OXYGEN SATURATION: 97 % | HEART RATE: 72 BPM | HEIGHT: 62 IN | BODY MASS INDEX: 39.01 KG/M2

## 2024-08-15 DIAGNOSIS — F32.A DEPRESSION, UNSPECIFIED DEPRESSION TYPE: ICD-10-CM

## 2024-08-15 DIAGNOSIS — F33.0 MAJOR DEPRESSIVE DISORDER, RECURRENT, MILD (HCC): ICD-10-CM

## 2024-08-15 DIAGNOSIS — N39.0 URINARY TRACT INFECTION WITHOUT HEMATURIA, SITE UNSPECIFIED: ICD-10-CM

## 2024-08-15 DIAGNOSIS — Z00.00 MEDICARE ANNUAL WELLNESS VISIT, SUBSEQUENT: Primary | ICD-10-CM

## 2024-08-15 DIAGNOSIS — I50.33 ACUTE ON CHRONIC DIASTOLIC CONGESTIVE HEART FAILURE (HCC): ICD-10-CM

## 2024-08-15 DIAGNOSIS — E11.8 TYPE 2 DIABETES MELLITUS WITH COMPLICATION, WITHOUT LONG-TERM CURRENT USE OF INSULIN (HCC): ICD-10-CM

## 2024-08-15 DIAGNOSIS — E03.9 HYPOTHYROIDISM, UNSPECIFIED TYPE: ICD-10-CM

## 2024-08-15 DIAGNOSIS — I10 PRIMARY HYPERTENSION: ICD-10-CM

## 2024-08-15 DIAGNOSIS — F33.1 MAJOR DEPRESSIVE DISORDER, RECURRENT, MODERATE (HCC): ICD-10-CM

## 2024-08-15 LAB
BACTERIA URNS QL MICRO: ABNORMAL
BILIRUB UR QL STRIP: NEGATIVE
CASTS #/AREA URNS LPF: ABNORMAL /LPF (ref 0–8)
CLARITY UR: ABNORMAL
COLOR UR: ABNORMAL
EPI CELLS #/AREA URNS HPF: ABNORMAL /HPF (ref 0–5)
GLUCOSE UR STRIP-MCNC: NEGATIVE MG/DL
HGB UR QL STRIP.AUTO: NEGATIVE
KETONES UR STRIP-MCNC: NEGATIVE MG/DL
LEUKOCYTE ESTERASE UR QL STRIP: ABNORMAL
NITRITE UR QL STRIP: NEGATIVE
PH UR STRIP: 6.5 [PH] (ref 5–8)
PROT UR STRIP-MCNC: NEGATIVE MG/DL
RBC #/AREA URNS HPF: ABNORMAL /HPF (ref 0–4)
SP GR UR STRIP: 1.02 (ref 1–1.03)
UROBILINOGEN UR STRIP-ACNC: NORMAL EU/DL (ref 0–1)
WBC #/AREA URNS HPF: ABNORMAL /HPF (ref 0–5)

## 2024-08-15 RX ORDER — LEVOTHYROXINE SODIUM 0.15 MG/1
150 TABLET ORAL DAILY
Qty: 90 TABLET | Refills: 2 | Status: SHIPPED | OUTPATIENT
Start: 2024-08-15

## 2024-08-15 RX ORDER — INSULIN GLARGINE 100 [IU]/ML
50 INJECTION, SOLUTION SUBCUTANEOUS 2 TIMES DAILY
Qty: 5 ADJUSTABLE DOSE PRE-FILLED PEN SYRINGE | Refills: 3 | Status: SHIPPED | OUTPATIENT
Start: 2024-08-15

## 2024-08-15 ASSESSMENT — PATIENT HEALTH QUESTIONNAIRE - PHQ9
9. THOUGHTS THAT YOU WOULD BE BETTER OFF DEAD, OR OF HURTING YOURSELF: SEVERAL DAYS
SUM OF ALL RESPONSES TO PHQ QUESTIONS 1-9: 14
3. TROUBLE FALLING OR STAYING ASLEEP: MORE THAN HALF THE DAYS
6. FEELING BAD ABOUT YOURSELF - OR THAT YOU ARE A FAILURE OR HAVE LET YOURSELF OR YOUR FAMILY DOWN: SEVERAL DAYS
SUM OF ALL RESPONSES TO PHQ QUESTIONS 1-9: 14
5. POOR APPETITE OR OVEREATING: NEARLY EVERY DAY
4. FEELING TIRED OR HAVING LITTLE ENERGY: MORE THAN HALF THE DAYS
SUM OF ALL RESPONSES TO PHQ9 QUESTIONS 1 & 2: 4
7. TROUBLE CONCENTRATING ON THINGS, SUCH AS READING THE NEWSPAPER OR WATCHING TELEVISION: SEVERAL DAYS
SUM OF ALL RESPONSES TO PHQ QUESTIONS 1-9: 14
2. FEELING DOWN, DEPRESSED OR HOPELESS: MORE THAN HALF THE DAYS
SUM OF ALL RESPONSES TO PHQ QUESTIONS 1-9: 13
10. IF YOU CHECKED OFF ANY PROBLEMS, HOW DIFFICULT HAVE THESE PROBLEMS MADE IT FOR YOU TO DO YOUR WORK, TAKE CARE OF THINGS AT HOME, OR GET ALONG WITH OTHER PEOPLE: VERY DIFFICULT
1. LITTLE INTEREST OR PLEASURE IN DOING THINGS: MORE THAN HALF THE DAYS
8. MOVING OR SPEAKING SO SLOWLY THAT OTHER PEOPLE COULD HAVE NOTICED. OR THE OPPOSITE, BEING SO FIGETY OR RESTLESS THAT YOU HAVE BEEN MOVING AROUND A LOT MORE THAN USUAL: NOT AT ALL

## 2024-08-15 ASSESSMENT — COLUMBIA-SUICIDE SEVERITY RATING SCALE - C-SSRS
2. HAVE YOU ACTUALLY HAD ANY THOUGHTS OF KILLING YOURSELF?: YES
5. HAVE YOU STARTED TO WORK OUT OR WORKED OUT THE DETAILS OF HOW TO KILL YOURSELF? DO YOU INTEND TO CARRY OUT THIS PLAN?: NO
1. WITHIN THE PAST MONTH, HAVE YOU WISHED YOU WERE DEAD OR WISHED YOU COULD GO TO SLEEP AND NOT WAKE UP?: YES
4. HAVE YOU HAD THESE THOUGHTS AND HAD SOME INTENTION OF ACTING ON THEM?: NO
3. HAVE YOU BEEN THINKING ABOUT HOW YOU MIGHT KILL YOURSELF?: NO
6. HAVE YOU EVER DONE ANYTHING, STARTED TO DO ANYTHING, OR PREPARED TO DO ANYTHING TO END YOUR LIFE?: NO

## 2024-08-15 ASSESSMENT — LIFESTYLE VARIABLES
HOW MANY STANDARD DRINKS CONTAINING ALCOHOL DO YOU HAVE ON A TYPICAL DAY: PATIENT DOES NOT DRINK
HOW OFTEN DO YOU HAVE A DRINK CONTAINING ALCOHOL: NEVER

## 2024-08-15 NOTE — CARE COORDINATION
Care Transitions Note    Follow Up Call     Patient Current Location:  [place]    Care Transition Nurse contacted the patient, family, Qing daughter  by telephone. Verified name and  as identifiers.    Additional needs identified to be addressed with provider   High priority: Patient has RPM, having issues getting correct weights with her RPM equipment d/t patient needs to use walker and daughter has to help her stand for the weight ,   can patient stop daily weights?                  Method of communication with provider: chart routing and phone.    Care Summary Note: Writer received request from Lorena RPM nurse in regards to an issue with getting accurate weights with RPM equipment, patient was seen by pcp today, he is ok with her not getting daily weights at this time, writer will updated rpm nurse and put in order to stop the daily weights//JU    Plan of care updates since last contact:  Communication with providers: patient saw pcp today, he is ok with stopping the daily weights, norma received call from patient's daughter Qing, she asked pcp during visit today       Advance Care Planning:   Does patient have an Advance Directive: reviewed during previous call, see note. .    Medication Review:  Medications changed since last call, reviewed today.     Remote Patient Monitoring:  Offered patient enrollment in the Remote Patient Monitoring (RPM) program for in-home monitoring: Yes, patient enrolled; current status is activated and monitoring.    Assessments:   Goals Addressed    None          Follow Up Appointment:   Reviewed upcoming appointment(s).      Care Transition Nurse provided contact information.  Plan for follow-up call in 6-10 days based on severity of symptoms and risk factors.  Plan for next call: symptom management-monitor s//s/tx of chf/RPM      Shwetha Kaur RN

## 2024-08-15 NOTE — PATIENT INSTRUCTIONS
drinks.  Where can you learn more?  Go to https://www.[employer].net/patientEd and enter T756 to learn more about \"Eating Healthy Foods: Care Instructions.\"  Current as of: September 20, 2023  Content Version: 14.1  © 2006-2024 [employer].   Care instructions adapted under license by [employer]. If you have questions about a medical condition or this instruction, always ask your healthcare professional. [employer] disclaims any warranty or liability for your use of this information.           Starting a Weight Loss Plan: Care Instructions  Overview     It can be a challenge to lose weight. But your doctor can help you make a weight-loss plan that meets your needs.  You don't have to make a lot of big changes at once. A better idea might be to focus on small changes and stick with them. When those changes become habit, you can add a few more changes.  Some people find it helpful to take an exercise or nutrition class. If you have questions, ask your doctor about seeing a registered dietitian or an exercise specialist. You might also think about joining a weight-loss support group.  If you're not ready to make changes right now, try to pick a date in the future. Then make an appointment with your doctor to talk about when and how you'll get started with a plan.  Follow-up care is a key part of your treatment and safety. Be sure to make and go to all appointments, and call your doctor if you are having problems. It's also a good idea to know your test results and keep a list of the medicines you take.  How can you care for yourself at home?  Set realistic goals. Many people expect to lose much more weight than is likely. A weight loss of 5% to 10% of your body weight may be enough to improve your health.  Get family and friends involved to provide support. Talk to them about why you are trying to lose weight, and ask them to help. They can help by participating in exercise and having meals

## 2024-08-15 NOTE — PROGRESS NOTES
Visit Information    Have you changed or started any medications since your last visit including any over-the-counter medicines, vitamins, or herbal medicines? no   Are you having any side effects from any of your medications? -  no  Have you stopped taking any of your medications? Is so, why? -  no    Have you seen any other physician or provider since your last visit? No  Have you had any other diagnostic tests since your last visit? No  Have you been seen in the emergency room and/or had an admission to a hospital since we last saw you? No  Have you had your routine dental cleaning in the past 6 months? no    Have you activated your [employer] account? If not, what are your barriers? Yes     Patient Care Team:  Braydon Hernandez MD as PCP - General (Internal Medicine)  Braydon Hernandez MD as PCP - Empaneled Provider  Andrew Lo MD as Consulting Physician (Pulmonology)  Evans Brewster MD as Consulting Physician (Gastroenterology)  Shwetha Kaur, AMARJIT as Care Transitions Nurse    Medical History Review  Past Medical, Family, and Social History reviewed and does contribute to the patient presenting condition    Health Maintenance   Topic Date Due    DTaP/Tdap/Td vaccine (1 - Tdap) Never done    Shingles vaccine (1 of 2) Never done    Respiratory Syncytial Virus (RSV) Pregnant or age 60 yrs+ (1 - 1-dose 60+ series) Never done    COVID-19 Vaccine (2 - 2023-24 season) 09/01/2023    Annual Wellness Visit (Medicare Advantage)  01/01/2024    Depression Monitoring  03/07/2024    Flu vaccine (1) 08/01/2024    Lipids  10/11/2024    Pneumococcal 65+ years Vaccine  Completed    Hepatitis A vaccine  Aged Out    Hepatitis B vaccine  Aged Out    Hib vaccine  Aged Out    Polio vaccine  Aged Out    Meningococcal (ACWY) vaccine  Aged Out    Diabetic foot exam  Discontinued    A1C test (Diabetic or Prediabetic)  Discontinued    Diabetic retinal exam  Discontinued    DEXA (modify frequency per FRAX score)  Discontinued    Depression 
Abnormal    Interventions:  Advice to eat healthy diet       Dentist Screen:  Have you seen the dentist within the past year?: (!) No    Intervention:  Advised to schedule with their dentist    Hearing Screen:  Do you or your family notice any trouble with your hearing that hasn't been managed with hearing aids?: (!) Yes    Interventions:  Tested in past, never got hearing aid     Vision Screen:  Do you have difficulty driving, watching TV, or doing any of your daily activities because of your eyesight?: (!) Yes  Have you had an eye exam within the past year?: (!) No    Interventions:   Patient encouraged to make appointment with their eye specialist                    Objective   Vitals:    08/15/24 1343   BP: 122/68   Site: Right Upper Arm   Pulse: 72   SpO2: 97%   Weight: 96.2 kg (212 lb)   Height: 1.575 m (5' 2\")      Body mass index is 38.78 kg/m².        General Appearance: alert and oriented to person, place and time, well developed and well- nourished, in no acute distress  Skin: warm and dry, no rash or erythema, wheel chair bound   Head: normocephalic and atraumatic  Eyes: pupils equal, round, and reactive to light, extraocular eye movements intact, conjunctivae normal  Neck: supple and non-tender without mass, no thyromegaly or thyroid nodules, no cervical lymphadenopathy  Pulmonary/Chest: clear to auscultation bilaterally- no wheezes, rales or rhonchi, normal air movement, no respiratory distress  Cardiovascular: normal rate, regular rhythm, normal S1 and S2, no murmurs, rubs, clicks, or gallops, distal pulses intact, no carotid bruits  Abdomen: soft, non-tender, non-distended, normal bowel sounds, no masses or organomegaly  Extremities: no cyanosis, clubbing or edema  Musculoskeletal: has Lymphedema   Neurologic: reflexes normal and symmetric, no cranial nerve deficit, gait, coordination and speech normal            Allergies   Allergen Reactions    Strawberry Extract     Tape [Adhesive Tape] Rash

## 2024-08-17 ENCOUNTER — TELEPHONE (OUTPATIENT)
Dept: INTERNAL MEDICINE CLINIC | Age: 89
End: 2024-08-17

## 2024-08-17 DIAGNOSIS — N39.0 URINARY TRACT INFECTION WITHOUT HEMATURIA, SITE UNSPECIFIED: Primary | ICD-10-CM

## 2024-08-17 LAB
MICROORGANISM SPEC CULT: ABNORMAL
SPECIMEN DESCRIPTION: ABNORMAL

## 2024-08-17 RX ORDER — CIPROFLOXACIN 500 MG/1
500 TABLET, FILM COATED ORAL 2 TIMES DAILY
Qty: 14 TABLET | Refills: 0 | Status: SHIPPED | OUTPATIENT
Start: 2024-08-17 | End: 2024-08-24

## 2024-08-19 ENCOUNTER — TELEPHONE (OUTPATIENT)
Dept: INTERNAL MEDICINE CLINIC | Age: 89
End: 2024-08-19

## 2024-08-19 NOTE — TELEPHONE ENCOUNTER
Please advise if you are willing to see patient virtually at your next available.  She was just in the office on 8/15

## 2024-08-19 NOTE — TELEPHONE ENCOUNTER
----- Message from Mateo CELAYA sent at 8/19/2024  3:16 PM EDT -----  Regarding: [facility] Appointment Request  [facility] Appointment Request    Patient needs appointment for E[facility] Appointment Type: Existing Condition Follow Up.    Patient Requested Dates(s): Any day  Patient Requested Time: Any time  Provider Name: Braydon Hernandez MD    Reason for Appointment Request: Established Patient - No appointments available during search    Patient also needs a virtual visit appointment  --------------------------------------------------------------------------------------------------------------------------    Relationship to Patient: Guardian Vandana Silveira     Call Back Information: Do not leave any message, patient will call back for answer  Preferred Call Back Number: Phone [phone] (home) caller's number:[phone]

## 2024-08-20 ENCOUNTER — CARE COORDINATION (OUTPATIENT)
Dept: CASE MANAGEMENT | Age: 89
End: 2024-08-20

## 2024-08-20 NOTE — CARE COORDINATION
-Remote Alert Monitoring Note      Date/Time:  2024 10:01 AM  Patient Current Location: Home: [address] [id number]  Verified patients name and  as identifiers.    Rpm alert to be reviewed by the provider   red alert  pulse ox reading (83)  Vitals Recheck pulse ox reading (94)  Additional needs to be addressed by provider: No                   LPN contacted patient by telephone regarding red alert received   Background: CHF  Refer to 911 immediately if:  Patient unresponsive or unable to provide history  Change in cognition or sudden confusion  Patient unable to respond in complete sentences  Intense chest pain/tightness  Any concern for any clinical emergency  Red Alert: Provider response time of 1 hr required for any red alert requiring intervention  Yellow Alert: Provider response time of 3hr required for any escalated yellow alert  Patient Chief Complaint:  O2 Triage  Are you having any Chest Pain? no   Are you having any Shortness of Breath? no   Swelling in your hands or feet? no     Are you having any other health concerns or issues? no     Clinical Interventions: Reviewed and followed up on alerts and treatments-Spoke with patient and daughter recheck of pulse ox was 78/157 I walked them through changing the batteries on device and new reading was 94/62 they have no concerns at present time    Plan/Follow Up: Will continue to review, monitor and address alerts with follow up based on severity of symptoms and risk factors.  **For any new or worsening symptoms or you are concerned in anyway, please contact your Provider or report to the nearest Emergency Room.**

## 2024-08-21 NOTE — TELEPHONE ENCOUNTER
Patient notified and verbalized understanding.   Patient confirmed she did start antibiotic, agreed to finish.   Denied scheduling follow up at this time.

## 2024-08-22 ENCOUNTER — CARE COORDINATION (OUTPATIENT)
Dept: CARE COORDINATION | Age: 89
End: 2024-08-22

## 2024-08-22 NOTE — CARE COORDINATION
Care Transitions Note    Final Call     Patient Current Location:  Home: [address] [facility] [address]    [facility] Care Coordinator contacted the patient by telephone. Verified name and  as identifiers.    Patient graduated from the Care Transitions program on 2024.  Patient/family has the ability to self manage at this time..      Advance Care Planning:   Does patient have an Advance Directive: reviewed and current.    Handoff:   RPM and condition management for CHF.     Care Summary Note: Writer spoke with Parvin for her final care transitions call. She states she has been doing good. She denies having any chest pain,palpitations,SOB or dizziness. RPM vitals reviewed and are WNL. Today id her final care transitions call-will hand off to [facility] for ongoing care coordination/RPM monitoring. Parvin denies having any new needs or concerns at this time.     Assessments:  Care Transitions Subsequent and Final Call    Subsequent and Final Calls  Do you have any ongoing symptoms?: No  Have your medications changed?: No  Do you have any questions related to your medications?: No  Do you currently have any active services?: No  Are you currently active with any services?: Home Health  Do you have any needs or concerns that I can assist you with?: No  Identified Barriers: None  Care Transitions Interventions    Physical Therapy: Completed Other Services: Completed     Registered Dietician: Completed     Occupational Therapy: Completed       Specialty Service Referral: Completed    Other Interventions:              Upcoming Appointments:        Patient has agreed to contact primary care provider and/or specialist for any further questions, concerns, or needs.    Jenny Calix LPN

## 2024-08-23 RX ORDER — FERROUS SULFATE 325(65) MG
TABLET ORAL
Qty: 60 TABLET | Refills: 3 | Status: SHIPPED | OUTPATIENT
Start: 2024-08-23

## 2024-08-26 ENCOUNTER — CARE COORDINATION (OUTPATIENT)
Dept: CARE COORDINATION | Age: 89
End: 2024-08-26

## 2024-08-26 NOTE — CARE COORDINATION
Remote Alert Monitoring Note      Date/Time:  2024 8:33 AM  Patient Current Location: Home: [address] [place] [id number]    LPN contacted patient by telephone regarding red alert received for pulse ox reading (88%). Verified patients name and  as identifiers.    Rpm alert to be reviewed by the provider                         Background: CHF    Refer to 911 immediately if:  Patient unresponsive or unable to provide history  Change in cognition or sudden confusion  Patient unable to respond in complete sentences  Intense chest pain/tightness  Any concern for any clinical emergency  Red Alert: Provider response time of 1 hr required for any red alert requiring intervention  Yellow Alert: Provider response time of 3hr required for any escalated yellow alert        O2 Triage  Are you having any Chest Pain? no   Are you having any Shortness of Breath? no             Clinical Interventions: Reviewed and followed up on alerts and treatments-. Pt is asymptomatic and in no apparent distress, speaking in full sentences.  Patient educated on ensuring her hands are warm prior to checking her SP02. Patient is agreeable to go to the other room where she keeps her equipment and sit an rest for a few minutes prior to rechecking her SP02.  Will await update.        Plan/Follow Up: Will continue to review, monitor and address alerts with follow up based on severity of symptoms and risk factors.    JACQUELINE [facility]/ CTN/ Remote Patient monitoring  [phone]

## 2024-08-27 ENCOUNTER — ENROLLMENT (OUTPATIENT)
Dept: CARE COORDINATION | Age: 89
End: 2024-08-27

## 2024-08-27 ENCOUNTER — CARE COORDINATION (OUTPATIENT)
Dept: CARE COORDINATION | Age: 89
End: 2024-08-27

## 2024-08-27 NOTE — CARE COORDINATION
Ambulatory Care Coordination Note     8/27/2024 10:36 AM     ACM outreach attempt by this ACM today to offer care management services. ACM was unable to reach the patient by telephone today; left voice message requesting a return phone call to this ACM.     ACM: DARON DIAMOND RN     Care Summary Note: Patient has RPM and is monitoring. Latest metrics: Blood Pressure: 119/59, 66bpm Pulseox: 94%, 64bpm Survey: 0/0 Note Created at: 08/27/2024 08:25 AM     PCP/Specialist follow up:       Follow Up:   Plan for next ACM outreach in approximately  3-5 days  to complete:  - outreach attempt to offer care management services  - RPM.

## 2024-08-28 ENCOUNTER — CARE COORDINATION (OUTPATIENT)
Dept: CASE MANAGEMENT | Age: 89
End: 2024-08-28

## 2024-08-28 NOTE — CARE COORDINATION
Date/Time:  8/28/2024 11:52 AM  LPN attempted to reach patient by telephone regarding red alert pulse ox 88  in remote patient monitoring program. Left HIPPA compliant message requesting a return call. Will attempt to reach patient again.

## 2024-08-28 NOTE — CARE COORDINATION
Date/Time:  8/28/2024 8:38 AM  LPN attempted to reach patient by telephone regarding red alert pulse ox 88 in remote patient monitoring program. Left HIPPA compliant message requesting a return call. Will attempt to reach patient again.

## 2024-08-30 ENCOUNTER — CARE COORDINATION (OUTPATIENT)
Dept: CARE COORDINATION | Age: 89
End: 2024-08-30

## 2024-08-30 DIAGNOSIS — N39.0 URINARY TRACT INFECTION WITHOUT HEMATURIA, SITE UNSPECIFIED: Primary | ICD-10-CM

## 2024-08-30 NOTE — CARE COORDINATION
Ambulatory Care Coordination Note     2024 1:18 PM     Patient Current Location:  Home: [address] [place] [id number]     This patient was received as a referral from Ambulatory Care Manager .    ACM contacted the patient by telephone. Verified name and  with patient as identifiers. Provided introduction to self, and explanation of the ACM role.   Patient accepted care management services at this time.          ACM: ROXIE LOYA RN     Challenges to be reviewed by the provider   Additional needs identified to be addressed with provider Yes  Patient stated she took her last antibiotic today and she is still having S&S of a UTI. Burning with urination, frquency, low grade fever per patient.                Method of communication with provider: chart routing.    Care Summary Note: Concern for UTI continued symptoms will be routed to PCP.   Patient has denied any other needs today.     Offered patient enrollment in the Remote Patient Monitoring (RPM) program for in-home monitoring: Yes, patient enrolled; current status is activated and monitoring.  Recent metrics: Current Patient Metrics ---- Blood Pressure: 100/60, 62bpm Pulseox: 93%, 60bpm Survey: 0/5 Note Created at: 2024 10:55 AM      Assessments Completed:   General Assessment    Do you have any symptoms that are causing concern?: Yes  Progression since Onset: Unchanged  Reported Symptoms: Other (Comment: UTI symptoms)          Medications Reviewed:   Patient denies any changes with medications and reports taking all medications as prescribed.    Advance Care Planning:   Reviewed and current     Care Planning:   Education Documentation  General medication information, taught by Roxie Loya RN at 2024  1:17 PM.  Learner: Patient  Readiness: Acceptance  Method: Explanation  Response: Verbalizes Understanding    Educate reporting changes in condition, taught by Roxie Loya RN at 2024  1:17 PM.  Learner: Patient  Readiness:

## 2024-09-03 ENCOUNTER — CARE COORDINATION (OUTPATIENT)
Dept: CARE COORDINATION | Age: 89
End: 2024-09-03

## 2024-09-03 NOTE — CARE COORDINATION
Left VM requesting a call back. Checking on UTI symptoms.   Roxie Loya, BSN, RN ambulatory care manager [phone].

## 2024-09-05 ENCOUNTER — CARE COORDINATION (OUTPATIENT)
Dept: CARE COORDINATION | Age: 89
End: 2024-09-05

## 2024-09-05 ENCOUNTER — CARE COORDINATION (OUTPATIENT)
Dept: CASE MANAGEMENT | Age: 89
End: 2024-09-05

## 2024-09-05 NOTE — CARE COORDINATION
-Remote Alert Monitoring Note      Date/Time:  2024 9:27 AM  Patient Current Location: Home: [address] [place], [place] [address]  Verified patients name and  as identifiers.    Rpm alert to be reviewed by the provider   red alert  pulse ox reading (89)  Vitals Recheck pulse ox reading (83)  Additional needs to be addressed by provider: No                   LPN contacted patient by telephone regarding red alert received   Background: CHF  Refer to 911 immediately if:  Patient unresponsive or unable to provide history  Change in cognition or sudden confusion  Patient unable to respond in complete sentences  Intense chest pain/tightness  Any concern for any clinical emergency  Red Alert: Provider response time of 1 hr required for any red alert requiring intervention  Yellow Alert: Provider response time of 3hr required for any escalated yellow alert  Patient Chief Complaint:  O2 Triage  Are you having any Chest Pain? no   Are you having any Shortness of Breath? no   Swelling in your hands or feet? no     Are you having any other health concerns or issues? no     Clinical Interventions: Reviewed and followed up on alerts and treatments-Spoke to patient and daughter she states she feels fine, denies chest pain, SOB, dizziness she is speaking in full clear sentences, multiple attempts to get normal reading with pulse ox daughter states the device has been acting up and she changed the batteries 3 days ago . Patient does not think fingers are cold, she will recheck again at later time but is in no distress daughter also states patient is fine and they have no concerns    Plan/Follow Up: Will continue to review, monitor and address alerts with follow up based on severity of symptoms and risk factors.  **For any new or worsening symptoms or you are concerned in anyway, please contact your Provider or report to the nearest Emergency Room.**

## 2024-09-05 NOTE — CARE COORDINATION
Ambulatory Care Coordination Note     9/5/2024 2:39 PM     ACM outreach attempt by this ACM today to perform care management follow up . ACM was unable to reach the patient by telephone today; left voice message requesting a return phone call to this ACM.     ACM: DARON DIAMOND RN         PCP/Specialist follow up:       Follow Up:   Plan for next ACM outreach in approximately 1 week to complete:  - disease specific assessments  - SDOH assessments  - advance care planning  - goal progression  - education   - RPM.

## 2024-09-06 DIAGNOSIS — M79.89 LEFT LEG SWELLING: ICD-10-CM

## 2024-09-06 RX ORDER — FUROSEMIDE 40 MG
40 TABLET ORAL DAILY
Qty: 30 TABLET | Refills: 11 | Status: SHIPPED | OUTPATIENT
Start: 2024-09-06

## 2024-09-12 ENCOUNTER — CARE COORDINATION (OUTPATIENT)
Dept: CARE COORDINATION | Age: 89
End: 2024-09-12

## 2024-09-16 ENCOUNTER — CARE COORDINATION (OUTPATIENT)
Dept: CASE MANAGEMENT | Age: 89
End: 2024-09-16

## 2024-09-17 ENCOUNTER — CARE COORDINATION (OUTPATIENT)
Dept: CARE COORDINATION | Age: 89
End: 2024-09-17

## 2024-09-23 ENCOUNTER — HOSPITAL ENCOUNTER (OUTPATIENT)
Age: 89
Setting detail: OBSERVATION
Discharge: HOME | End: 2024-09-24
Attending: EMERGENCY MEDICINE | Admitting: EMERGENCY MEDICINE
Payer: MEDICARE

## 2024-09-23 ENCOUNTER — APPOINTMENT (OUTPATIENT)
Dept: GENERAL RADIOLOGY | Age: 89
End: 2024-09-23
Payer: MEDICARE

## 2024-09-23 DIAGNOSIS — N30.00 ACUTE CYSTITIS WITHOUT HEMATURIA: Primary | ICD-10-CM

## 2024-09-23 DIAGNOSIS — R07.9 CHEST PAIN, UNSPECIFIED TYPE: ICD-10-CM

## 2024-09-23 DIAGNOSIS — I20.9 ANGINA PECTORIS (HCC): ICD-10-CM

## 2024-09-23 LAB
ANION GAP SERPL CALCULATED.3IONS-SCNC: 11 MMOL/L (ref 9–16)
BACTERIA URNS QL MICRO: ABNORMAL
BASOPHILS # BLD: 0.05 K/UL (ref 0–0.2)
BASOPHILS NFR BLD: 1 % (ref 0–2)
BILIRUB UR QL STRIP: NEGATIVE
BNP SERPL-MCNC: 1062 PG/ML (ref 0–300)
BUN SERPL-MCNC: 27 MG/DL (ref 8–23)
CALCIUM SERPL-MCNC: 8.1 MG/DL (ref 8.6–10.4)
CHLORIDE SERPL-SCNC: 105 MMOL/L (ref 98–107)
CLARITY UR: ABNORMAL
CO2 SERPL-SCNC: 25 MMOL/L (ref 20–31)
COLOR UR: ABNORMAL
COMMENT: ABNORMAL
CREAT SERPL-MCNC: 1.3 MG/DL (ref 0.5–0.9)
EOSINOPHIL # BLD: 0.33 K/UL (ref 0–0.44)
EOSINOPHILS RELATIVE PERCENT: 4 % (ref 1–4)
EPI CELLS #/AREA URNS HPF: ABNORMAL /HPF (ref 0–5)
ERYTHROCYTE [DISTWIDTH] IN BLOOD BY AUTOMATED COUNT: 14 % (ref 11.8–14.4)
GFR, ESTIMATED: 40 ML/MIN/1.73M2
GLUCOSE BLD-MCNC: 119 MG/DL (ref 65–105)
GLUCOSE BLD-MCNC: 153 MG/DL (ref 65–105)
GLUCOSE BLD-MCNC: 60 MG/DL (ref 65–105)
GLUCOSE BLD-MCNC: 63 MG/DL (ref 65–105)
GLUCOSE BLD-MCNC: 88 MG/DL (ref 65–105)
GLUCOSE BLD-MCNC: 96 MG/DL (ref 65–105)
GLUCOSE SERPL-MCNC: 95 MG/DL (ref 74–99)
GLUCOSE UR STRIP-MCNC: NEGATIVE MG/DL
HCT VFR BLD AUTO: 36.2 % (ref 36.3–47.1)
HGB BLD-MCNC: 11.8 G/DL (ref 11.9–15.1)
HGB UR QL STRIP.AUTO: ABNORMAL
IMM GRANULOCYTES # BLD AUTO: 0.06 K/UL (ref 0–0.3)
IMM GRANULOCYTES NFR BLD: 1 %
KETONES UR STRIP-MCNC: NEGATIVE MG/DL
LACTIC ACID, WHOLE BLOOD: 0.8 MMOL/L (ref 0.7–2.1)
LEUKOCYTE ESTERASE UR QL STRIP: ABNORMAL
LYMPHOCYTES NFR BLD: 1.24 K/UL (ref 1.1–3.7)
LYMPHOCYTES RELATIVE PERCENT: 13 % (ref 24–43)
MCH RBC QN AUTO: 29.6 PG (ref 25.2–33.5)
MCHC RBC AUTO-ENTMCNC: 32.6 G/DL (ref 28.4–34.8)
MCV RBC AUTO: 90.7 FL (ref 82.6–102.9)
MONOCYTES NFR BLD: 0.59 K/UL (ref 0.1–1.2)
MONOCYTES NFR BLD: 6 % (ref 3–12)
NEUTROPHILS NFR BLD: 75 % (ref 36–65)
NEUTS SEG NFR BLD: 7.03 K/UL (ref 1.5–8.1)
NITRITE UR QL STRIP: POSITIVE
NRBC BLD-RTO: 0 PER 100 WBC
PH UR STRIP: 5.5 [PH] (ref 5–8)
PLATELET # BLD AUTO: 158 K/UL (ref 138–453)
PMV BLD AUTO: 11.4 FL (ref 8.1–13.5)
POTASSIUM SERPL-SCNC: 3.3 MMOL/L (ref 3.7–5.3)
PROT UR STRIP-MCNC: ABNORMAL MG/DL
RBC # BLD AUTO: 3.99 M/UL (ref 3.95–5.11)
RBC #/AREA URNS HPF: ABNORMAL /HPF (ref 0–2)
SODIUM SERPL-SCNC: 141 MMOL/L (ref 136–145)
SP GR UR STRIP: 1.02 (ref 1–1.03)
TROPONIN I SERPL HS-MCNC: 23 NG/L (ref 0–14)
TROPONIN I SERPL HS-MCNC: 25 NG/L (ref 0–14)
UROBILINOGEN UR STRIP-ACNC: NORMAL EU/DL (ref 0–1)
WBC #/AREA URNS HPF: ABNORMAL /HPF (ref 0–5)
WBC OTHER # BLD: 9.3 K/UL (ref 3.5–11.3)

## 2024-09-23 PROCEDURE — 96365 THER/PROPH/DIAG IV INF INIT: CPT

## 2024-09-23 PROCEDURE — 2500000003 HC RX 250 WO HCPCS

## 2024-09-23 PROCEDURE — 6370000000 HC RX 637 (ALT 250 FOR IP)

## 2024-09-23 PROCEDURE — 71045 X-RAY EXAM CHEST 1 VIEW: CPT

## 2024-09-23 PROCEDURE — 87086 URINE CULTURE/COLONY COUNT: CPT

## 2024-09-23 PROCEDURE — 84484 ASSAY OF TROPONIN QUANT: CPT

## 2024-09-23 PROCEDURE — 83880 ASSAY OF NATRIURETIC PEPTIDE: CPT

## 2024-09-23 PROCEDURE — 99285 EMERGENCY DEPT VISIT HI MDM: CPT

## 2024-09-23 PROCEDURE — 2580000003 HC RX 258

## 2024-09-23 PROCEDURE — 82947 ASSAY GLUCOSE BLOOD QUANT: CPT

## 2024-09-23 PROCEDURE — 83605 ASSAY OF LACTIC ACID: CPT

## 2024-09-23 PROCEDURE — G0378 HOSPITAL OBSERVATION PER HR: HCPCS

## 2024-09-23 PROCEDURE — 81001 URINALYSIS AUTO W/SCOPE: CPT

## 2024-09-23 PROCEDURE — 93005 ELECTROCARDIOGRAM TRACING: CPT

## 2024-09-23 PROCEDURE — 6360000002 HC RX W HCPCS

## 2024-09-23 PROCEDURE — 80048 BASIC METABOLIC PNL TOTAL CA: CPT

## 2024-09-23 PROCEDURE — 85025 COMPLETE CBC W/AUTO DIFF WBC: CPT

## 2024-09-23 RX ORDER — FUROSEMIDE 40 MG
40 TABLET ORAL DAILY
Status: DISCONTINUED | OUTPATIENT
Start: 2024-09-24 | End: 2024-09-24 | Stop reason: HOSPADM

## 2024-09-23 RX ORDER — DOCUSATE SODIUM 100 MG/1
100 CAPSULE, LIQUID FILLED ORAL DAILY PRN
Status: DISCONTINUED | OUTPATIENT
Start: 2024-09-23 | End: 2024-09-24 | Stop reason: HOSPADM

## 2024-09-23 RX ORDER — SODIUM CHLORIDE 0.9 % (FLUSH) 0.9 %
5-40 SYRINGE (ML) INJECTION PRN
Status: DISCONTINUED | OUTPATIENT
Start: 2024-09-23 | End: 2024-09-24 | Stop reason: HOSPADM

## 2024-09-23 RX ORDER — METOPROLOL TARTRATE 25 MG/1
12.5 TABLET, FILM COATED ORAL 2 TIMES DAILY
Status: DISCONTINUED | OUTPATIENT
Start: 2024-09-23 | End: 2024-09-24 | Stop reason: HOSPADM

## 2024-09-23 RX ORDER — ACETAMINOPHEN 325 MG/1
650 TABLET ORAL EVERY 6 HOURS PRN
Status: DISCONTINUED | OUTPATIENT
Start: 2024-09-23 | End: 2024-09-24 | Stop reason: HOSPADM

## 2024-09-23 RX ORDER — INSULIN LISPRO 100 [IU]/ML
0-16 INJECTION, SOLUTION INTRAVENOUS; SUBCUTANEOUS
Status: DISCONTINUED | OUTPATIENT
Start: 2024-09-23 | End: 2024-09-24 | Stop reason: HOSPADM

## 2024-09-23 RX ORDER — ASPIRIN 81 MG/1
81 TABLET, CHEWABLE ORAL DAILY
Status: DISCONTINUED | OUTPATIENT
Start: 2024-09-24 | End: 2024-09-24 | Stop reason: HOSPADM

## 2024-09-23 RX ORDER — ATORVASTATIN CALCIUM 80 MG/1
80 TABLET, FILM COATED ORAL DAILY
Status: DISCONTINUED | OUTPATIENT
Start: 2024-09-24 | End: 2024-09-24 | Stop reason: HOSPADM

## 2024-09-23 RX ORDER — ACETAMINOPHEN 650 MG/1
650 SUPPOSITORY RECTAL EVERY 6 HOURS PRN
Status: DISCONTINUED | OUTPATIENT
Start: 2024-09-23 | End: 2024-09-24 | Stop reason: HOSPADM

## 2024-09-23 RX ORDER — INSULIN LISPRO 100 [IU]/ML
0-4 INJECTION, SOLUTION INTRAVENOUS; SUBCUTANEOUS NIGHTLY
Status: DISCONTINUED | OUTPATIENT
Start: 2024-09-23 | End: 2024-09-24 | Stop reason: HOSPADM

## 2024-09-23 RX ORDER — LEVOTHYROXINE SODIUM 75 UG/1
150 TABLET ORAL DAILY
Status: DISCONTINUED | OUTPATIENT
Start: 2024-09-24 | End: 2024-09-24 | Stop reason: HOSPADM

## 2024-09-23 RX ORDER — SODIUM CHLORIDE 0.9 % (FLUSH) 0.9 %
5-40 SYRINGE (ML) INJECTION EVERY 12 HOURS SCHEDULED
Status: DISCONTINUED | OUTPATIENT
Start: 2024-09-23 | End: 2024-09-24 | Stop reason: HOSPADM

## 2024-09-23 RX ORDER — SODIUM CHLORIDE 9 MG/ML
INJECTION, SOLUTION INTRAVENOUS PRN
Status: DISCONTINUED | OUTPATIENT
Start: 2024-09-23 | End: 2024-09-24 | Stop reason: HOSPADM

## 2024-09-23 RX ADMIN — SODIUM CHLORIDE, PRESERVATIVE FREE 10 ML: 5 INJECTION INTRAVENOUS at 20:02

## 2024-09-23 RX ADMIN — POTASSIUM BICARBONATE 40 MEQ: 782 TABLET, EFFERVESCENT ORAL at 11:10

## 2024-09-23 RX ADMIN — METOPROLOL TARTRATE 12.5 MG: 25 TABLET, FILM COATED ORAL at 20:01

## 2024-09-23 RX ADMIN — CEFTRIAXONE SODIUM 1000 MG: 1 INJECTION, POWDER, FOR SOLUTION INTRAMUSCULAR; INTRAVENOUS at 12:45

## 2024-09-23 RX ADMIN — ACETAMINOPHEN 650 MG: 325 TABLET ORAL at 21:31

## 2024-09-23 RX ADMIN — ANTI-FUNGAL POWDER MICONAZOLE NITRATE TALC FREE: 1.42 POWDER TOPICAL at 21:31

## 2024-09-23 ASSESSMENT — PAIN DESCRIPTION - ORIENTATION: ORIENTATION: RIGHT

## 2024-09-23 ASSESSMENT — PAIN SCALES - GENERAL
PAINLEVEL_OUTOF10: 6
PAINLEVEL_OUTOF10: 10

## 2024-09-23 ASSESSMENT — PAIN DESCRIPTION - LOCATION: LOCATION: LEG

## 2024-09-24 ENCOUNTER — APPOINTMENT (OUTPATIENT)
Dept: NUCLEAR MEDICINE | Age: 89
End: 2024-09-24
Payer: MEDICARE

## 2024-09-24 ENCOUNTER — HOSPITAL ENCOUNTER (OUTPATIENT)
Age: 89
Setting detail: OBSERVATION
Discharge: HOME | End: 2024-09-26
Payer: MEDICARE

## 2024-09-24 VITALS
SYSTOLIC BLOOD PRESSURE: 118 MMHG | WEIGHT: 219.8 LBS | TEMPERATURE: 97.5 F | RESPIRATION RATE: 18 BRPM | HEIGHT: 61 IN | HEART RATE: 77 BPM | OXYGEN SATURATION: 94 % | DIASTOLIC BLOOD PRESSURE: 54 MMHG | BODY MASS INDEX: 41.5 KG/M2

## 2024-09-24 VITALS — DIASTOLIC BLOOD PRESSURE: 62 MMHG | SYSTOLIC BLOOD PRESSURE: 118 MMHG | HEART RATE: 81 BPM

## 2024-09-24 LAB
ANION GAP SERPL CALCULATED.3IONS-SCNC: 9 MMOL/L (ref 9–16)
BUN SERPL-MCNC: 18 MG/DL (ref 8–23)
CALCIUM SERPL-MCNC: 8.1 MG/DL (ref 8.6–10.4)
CHLORIDE SERPL-SCNC: 105 MMOL/L (ref 98–107)
CO2 SERPL-SCNC: 27 MMOL/L (ref 20–31)
CREAT SERPL-MCNC: 1.1 MG/DL (ref 0.5–0.9)
ECHO BSA: 2.07 M2
GFR, ESTIMATED: 49 ML/MIN/1.73M2
GLUCOSE BLD-MCNC: 73 MG/DL (ref 65–105)
GLUCOSE SERPL-MCNC: 86 MG/DL (ref 74–99)
NUC STRESS EJECTION FRACTION: 70 %
POTASSIUM SERPL-SCNC: 4.1 MMOL/L (ref 3.7–5.3)
SODIUM SERPL-SCNC: 141 MMOL/L (ref 136–145)
STRESS BASELINE DIAS BP: 62 MMHG
STRESS BASELINE HR: 80 BPM
STRESS BASELINE SYS BP: 118 MMHG
STRESS ESTIMATED WORKLOAD: 1 METS
STRESS PEAK DIAS BP: 62 MMHG
STRESS PEAK SYS BP: 118 MMHG
STRESS PERCENT HR ACHIEVED: 66 %
STRESS POST PEAK HR: 88 BPM
STRESS RATE PRESSURE PRODUCT: NORMAL BPM*MMHG
STRESS TARGET HR: 133 BPM
TID: 1.12

## 2024-09-24 PROCEDURE — 78452 HT MUSCLE IMAGE SPECT MULT: CPT

## 2024-09-24 PROCEDURE — G0378 HOSPITAL OBSERVATION PER HR: HCPCS

## 2024-09-24 PROCEDURE — 2580000003 HC RX 258: Performed by: INTERNAL MEDICINE

## 2024-09-24 PROCEDURE — 36415 COLL VENOUS BLD VENIPUNCTURE: CPT

## 2024-09-24 PROCEDURE — 93017 CV STRESS TEST TRACING ONLY: CPT

## 2024-09-24 PROCEDURE — 82947 ASSAY GLUCOSE BLOOD QUANT: CPT

## 2024-09-24 PROCEDURE — 99223 1ST HOSP IP/OBS HIGH 75: CPT | Performed by: INTERNAL MEDICINE

## 2024-09-24 PROCEDURE — 93005 ELECTROCARDIOGRAM TRACING: CPT | Performed by: EMERGENCY MEDICINE

## 2024-09-24 PROCEDURE — 80048 BASIC METABOLIC PNL TOTAL CA: CPT

## 2024-09-24 PROCEDURE — 2580000003 HC RX 258: Performed by: EMERGENCY MEDICINE

## 2024-09-24 PROCEDURE — A9500 TC99M SESTAMIBI: HCPCS | Performed by: EMERGENCY MEDICINE

## 2024-09-24 PROCEDURE — 93016 CV STRESS TEST SUPVJ ONLY: CPT | Performed by: INTERNAL MEDICINE

## 2024-09-24 PROCEDURE — 6360000002 HC RX W HCPCS: Performed by: INTERNAL MEDICINE

## 2024-09-24 PROCEDURE — 93018 CV STRESS TEST I&R ONLY: CPT | Performed by: INTERNAL MEDICINE

## 2024-09-24 PROCEDURE — 3430000000 HC RX DIAGNOSTIC RADIOPHARMACEUTICAL: Performed by: EMERGENCY MEDICINE

## 2024-09-24 RX ORDER — SODIUM CHLORIDE 0.9 % (FLUSH) 0.9 %
10 SYRINGE (ML) INJECTION PRN
Status: DISCONTINUED | OUTPATIENT
Start: 2024-09-24 | End: 2024-09-24 | Stop reason: HOSPADM

## 2024-09-24 RX ORDER — REGADENOSON 0.08 MG/ML
0.4 INJECTION, SOLUTION INTRAVENOUS
Status: CANCELLED | OUTPATIENT
Start: 2024-09-24

## 2024-09-24 RX ORDER — SODIUM CHLORIDE 9 MG/ML
500 INJECTION, SOLUTION INTRAVENOUS CONTINUOUS PRN
Status: CANCELLED | OUTPATIENT
Start: 2024-09-24 | End: 2024-09-24

## 2024-09-24 RX ORDER — ATROPINE SULFATE 0.1 MG/ML
0.5 INJECTION INTRAVENOUS EVERY 5 MIN PRN
Status: CANCELLED | OUTPATIENT
Start: 2024-09-24 | End: 2024-09-24

## 2024-09-24 RX ORDER — AMINOPHYLLINE 25 MG/ML
50 INJECTION, SOLUTION INTRAVENOUS PRN
Status: CANCELLED | OUTPATIENT
Start: 2024-09-24 | End: 2024-09-24

## 2024-09-24 RX ORDER — METOPROLOL TARTRATE 1 MG/ML
5 INJECTION, SOLUTION INTRAVENOUS EVERY 5 MIN PRN
Status: CANCELLED | OUTPATIENT
Start: 2024-09-24 | End: 2024-09-24

## 2024-09-24 RX ORDER — SODIUM CHLORIDE 0.9 % (FLUSH) 0.9 %
5-40 SYRINGE (ML) INJECTION PRN
Status: DISCONTINUED | OUTPATIENT
Start: 2024-09-24 | End: 2024-09-24

## 2024-09-24 RX ORDER — TETRAKIS(2-METHOXYISOBUTYLISOCYANIDE)COPPER(I) TETRAFLUOROBORATE 1 MG/ML
16.6 INJECTION, POWDER, LYOPHILIZED, FOR SOLUTION INTRAVENOUS
Status: COMPLETED | OUTPATIENT
Start: 2024-09-24 | End: 2024-09-24

## 2024-09-24 RX ORDER — ATROPINE SULFATE 0.1 MG/ML
0.5 INJECTION INTRAVENOUS EVERY 5 MIN PRN
Status: DISCONTINUED | OUTPATIENT
Start: 2024-09-24 | End: 2024-09-24

## 2024-09-24 RX ORDER — SODIUM CHLORIDE 0.9 % (FLUSH) 0.9 %
5-40 SYRINGE (ML) INJECTION PRN
Status: CANCELLED | OUTPATIENT
Start: 2024-09-24 | End: 2024-09-24

## 2024-09-24 RX ORDER — TETRAKIS(2-METHOXYISOBUTYLISOCYANIDE)COPPER(I) TETRAFLUOROBORATE 1 MG/ML
43.3 INJECTION, POWDER, LYOPHILIZED, FOR SOLUTION INTRAVENOUS
Status: COMPLETED | OUTPATIENT
Start: 2024-09-24 | End: 2024-09-24

## 2024-09-24 RX ORDER — AMINOPHYLLINE 25 MG/ML
50 INJECTION, SOLUTION INTRAVENOUS PRN
Status: DISCONTINUED | OUTPATIENT
Start: 2024-09-24 | End: 2024-09-24

## 2024-09-24 RX ORDER — NITROGLYCERIN 0.4 MG/1
0.4 TABLET SUBLINGUAL EVERY 5 MIN PRN
Status: CANCELLED | OUTPATIENT
Start: 2024-09-24 | End: 2024-09-24

## 2024-09-24 RX ORDER — REGADENOSON 0.08 MG/ML
0.4 INJECTION, SOLUTION INTRAVENOUS
Status: COMPLETED | OUTPATIENT
Start: 2024-09-24 | End: 2024-09-24

## 2024-09-24 RX ORDER — ALBUTEROL SULFATE 90 UG/1
2 INHALANT RESPIRATORY (INHALATION) PRN
Status: CANCELLED | OUTPATIENT
Start: 2024-09-24 | End: 2024-09-24

## 2024-09-24 RX ORDER — ALBUTEROL SULFATE 90 UG/1
2 INHALANT RESPIRATORY (INHALATION) PRN
Status: DISCONTINUED | OUTPATIENT
Start: 2024-09-24 | End: 2024-09-24

## 2024-09-24 RX ORDER — NITROGLYCERIN 0.4 MG/1
0.4 TABLET SUBLINGUAL EVERY 5 MIN PRN
Status: DISCONTINUED | OUTPATIENT
Start: 2024-09-24 | End: 2024-09-24

## 2024-09-24 RX ORDER — METOPROLOL TARTRATE 1 MG/ML
5 INJECTION, SOLUTION INTRAVENOUS EVERY 5 MIN PRN
Status: DISCONTINUED | OUTPATIENT
Start: 2024-09-24 | End: 2024-09-24

## 2024-09-24 RX ORDER — SODIUM CHLORIDE 9 MG/ML
500 INJECTION, SOLUTION INTRAVENOUS CONTINUOUS PRN
Status: DISCONTINUED | OUTPATIENT
Start: 2024-09-24 | End: 2024-09-24

## 2024-09-24 RX ADMIN — SODIUM CHLORIDE, PRESERVATIVE FREE 10 ML: 5 INJECTION INTRAVENOUS at 11:12

## 2024-09-24 RX ADMIN — SODIUM CHLORIDE, PRESERVATIVE FREE 10 ML: 5 INJECTION INTRAVENOUS at 09:22

## 2024-09-24 RX ADMIN — TETRAKIS(2-METHOXYISOBUTYLISOCYANIDE)COPPER(I) TETRAFLUOROBORATE 16.6 MILLICURIE: 1 INJECTION, POWDER, LYOPHILIZED, FOR SOLUTION INTRAVENOUS at 09:22

## 2024-09-24 RX ADMIN — TETRAKIS(2-METHOXYISOBUTYLISOCYANIDE)COPPER(I) TETRAFLUOROBORATE 43.3 MILLICURIE: 1 INJECTION, POWDER, LYOPHILIZED, FOR SOLUTION INTRAVENOUS at 11:12

## 2024-09-24 RX ADMIN — REGADENOSON 0.4 MG: 0.08 INJECTION, SOLUTION INTRAVENOUS at 11:12

## 2024-09-25 ENCOUNTER — CARE COORDINATION (OUTPATIENT)
Dept: CARE COORDINATION | Age: 89
End: 2024-09-25

## 2024-09-25 ENCOUNTER — TELEPHONE (OUTPATIENT)
Dept: INTERNAL MEDICINE CLINIC | Age: 89
End: 2024-09-25

## 2024-09-25 LAB
EKG ATRIAL RATE: 70 BPM
EKG ATRIAL RATE: 76 BPM
EKG P AXIS: -8 DEGREES
EKG P AXIS: 41 DEGREES
EKG P-R INTERVAL: 196 MS
EKG P-R INTERVAL: 222 MS
EKG Q-T INTERVAL: 442 MS
EKG Q-T INTERVAL: 484 MS
EKG QRS DURATION: 134 MS
EKG QRS DURATION: 152 MS
EKG QTC CALCULATION (BAZETT): 497 MS
EKG QTC CALCULATION (BAZETT): 522 MS
EKG R AXIS: -63 DEGREES
EKG R AXIS: -68 DEGREES
EKG T AXIS: 35 DEGREES
EKG T AXIS: 54 DEGREES
EKG VENTRICULAR RATE: 70 BPM
EKG VENTRICULAR RATE: 76 BPM
MICROORGANISM SPEC CULT: NORMAL
SPECIMEN DESCRIPTION: NORMAL

## 2024-09-25 PROCEDURE — 93010 ELECTROCARDIOGRAM REPORT: CPT | Performed by: INTERNAL MEDICINE

## 2024-09-26 LAB
EKG ATRIAL RATE: 57 BPM
EKG P AXIS: 53 DEGREES
EKG P-R INTERVAL: 190 MS
EKG Q-T INTERVAL: 436 MS
EKG QRS DURATION: 82 MS
EKG QTC CALCULATION (BAZETT): 424 MS
EKG R AXIS: -15 DEGREES
EKG T AXIS: 93 DEGREES
EKG VENTRICULAR RATE: 57 BPM

## 2024-09-27 ENCOUNTER — CARE COORDINATION (OUTPATIENT)
Dept: CARE COORDINATION | Age: 89
End: 2024-09-27

## 2024-10-02 ENCOUNTER — CARE COORDINATION (OUTPATIENT)
Dept: CARE COORDINATION | Age: 89
End: 2024-10-02

## 2024-10-02 NOTE — CARE COORDINATION
Ambulatory Care Coordination Note     10/2/2024 1:14 PM     ACM outreach attempt by this ACM today to perform care management follow up . ACM was unable to reach the patient by telephone today; left voice message requesting a return phone call to this ACM.     ACM: DARON DIAMOND RN     Care Summary Note: PCP appointment reminder provided on VM as well.     PCP/Specialist follow up:   Future Appointments         Provider Specialty Dept Phone    10/3/2024 10:45 AM Karine Talamantes MD Internal Medicine [phone]            Follow Up:   Plan for next ACM outreach in approximately 1 week to complete:  - disease specific assessments  - SDOH assessments  - medication review  - goal progression  - education .

## 2024-10-03 ENCOUNTER — OFFICE VISIT (OUTPATIENT)
Dept: INTERNAL MEDICINE CLINIC | Age: 89
End: 2024-10-03
Payer: MEDICARE

## 2024-10-03 ENCOUNTER — HOSPITAL ENCOUNTER (OUTPATIENT)
Age: 89
Setting detail: SPECIMEN
Discharge: HOME | End: 2024-10-03

## 2024-10-03 VITALS
DIASTOLIC BLOOD PRESSURE: 60 MMHG | OXYGEN SATURATION: 98 % | SYSTOLIC BLOOD PRESSURE: 102 MMHG | HEIGHT: 61 IN | BODY MASS INDEX: 41.53 KG/M2 | HEART RATE: 71 BPM

## 2024-10-03 DIAGNOSIS — N30.01 ACUTE CYSTITIS WITH HEMATURIA: ICD-10-CM

## 2024-10-03 DIAGNOSIS — R07.81 RIB PAIN ON RIGHT SIDE: ICD-10-CM

## 2024-10-03 DIAGNOSIS — Z09 HOSPITAL DISCHARGE FOLLOW-UP: ICD-10-CM

## 2024-10-03 DIAGNOSIS — L03.317 CELLULITIS AND ABSCESS OF BUTTOCK: ICD-10-CM

## 2024-10-03 DIAGNOSIS — E11.8 TYPE 2 DIABETES MELLITUS WITH COMPLICATION, WITHOUT LONG-TERM CURRENT USE OF INSULIN (HCC): ICD-10-CM

## 2024-10-03 DIAGNOSIS — L02.31 CELLULITIS AND ABSCESS OF BUTTOCK: ICD-10-CM

## 2024-10-03 DIAGNOSIS — I25.83 CORONARY ARTERY DISEASE DUE TO LIPID RICH PLAQUE: ICD-10-CM

## 2024-10-03 DIAGNOSIS — I25.10 CORONARY ARTERY DISEASE DUE TO LIPID RICH PLAQUE: ICD-10-CM

## 2024-10-03 DIAGNOSIS — N30.01 ACUTE CYSTITIS WITH HEMATURIA: Primary | ICD-10-CM

## 2024-10-03 PROCEDURE — 3046F HEMOGLOBIN A1C LEVEL >9.0%: CPT

## 2024-10-03 PROCEDURE — 1123F ACP DISCUSS/DSCN MKR DOCD: CPT

## 2024-10-03 PROCEDURE — 1111F DSCHRG MED/CURRENT MED MERGE: CPT

## 2024-10-03 PROCEDURE — 99214 OFFICE O/P EST MOD 30 MIN: CPT

## 2024-10-03 RX ORDER — INSULIN GLARGINE 100 [IU]/ML
50 INJECTION, SOLUTION SUBCUTANEOUS DAILY
Qty: 5 ADJUSTABLE DOSE PRE-FILLED PEN SYRINGE | Refills: 0 | Status: SHIPPED | OUTPATIENT
Start: 2024-10-03

## 2024-10-03 RX ORDER — INSULIN GLARGINE 100 [IU]/ML
40 INJECTION, SOLUTION SUBCUTANEOUS NIGHTLY
Qty: 5 ADJUSTABLE DOSE PRE-FILLED PEN SYRINGE | Refills: 3 | Status: SHIPPED | OUTPATIENT
Start: 2024-10-03

## 2024-10-03 RX ORDER — LIDOCAINE 4 G/G
1 PATCH TOPICAL DAILY
Qty: 10 EACH | Refills: 0 | Status: SHIPPED | OUTPATIENT
Start: 2024-10-03 | End: 2024-10-13

## 2024-10-03 RX ORDER — SULFAMETHOXAZOLE/TRIMETHOPRIM 800-160 MG
1 TABLET ORAL 2 TIMES DAILY
Qty: 14 TABLET | Refills: 0 | Status: SHIPPED | OUTPATIENT
Start: 2024-10-03 | End: 2024-10-10

## 2024-10-03 ASSESSMENT — ENCOUNTER SYMPTOMS
WHEEZING: 0
SHORTNESS OF BREATH: 0
ALLERGIC/IMMUNOLOGIC NEGATIVE: 1
DIARRHEA: 0
NAUSEA: 0
ABDOMINAL PAIN: 0
BACK PAIN: 0
CONSTIPATION: 0
VOMITING: 0
COUGH: 0

## 2024-10-03 NOTE — PROGRESS NOTES
\"Have you been to the ER, urgent care clinic since your last visit?    Hospitalized since your last visit?\"      [facility] 9/23/24-9/24/24    “Have you seen or consulted any other health care providers outside our system since your last visit?”    NO

## 2024-10-03 NOTE — PROGRESS NOTES
Post-Discharge Transitional Care Follow Up      Parvin Patel   YOB: 1935    Date of Office Visit:  10/3/2024  Date of Hospital Admission: 9/23/24  Date of Hospital Discharge: 9/24/24  Readmission Risk Score (high >=14%. Medium >=10%):Readmission Risk Score: 16.3      Care management risk score Rising risk (score 2-5) and Complex Care (Scores >=6): No Risk Score On File     Non face to face  following discharge, date last encounter closed (first attempt may have been earlier): *No documented post hospital discharge outreach found in the last 14 days     Call initiated 2 business days of discharge: *No response recorded in the last 14 days     Acute cystitis with hematuria  -     Urinalysis with Reflex to Culture; Future  -     sulfamethoxazole-trimethoprim (BACTRIM DS;SEPTRA DS) 800-160 MG per tablet; Take 1 tablet by mouth 2 times daily for 7 days, Disp-14 tablet, R-0Normal  Cellulitis and abscess of buttock  -     sulfamethoxazole-trimethoprim (BACTRIM DS;SEPTRA DS) 800-160 MG per tablet; Take 1 tablet by mouth 2 times daily for 7 days, Disp-14 tablet, R-0Normal  Type 2 diabetes mellitus with complication, without long-term current use of insulin (HCC)  -     insulin glargine (BASAGLAR KWIKPEN) 100 UNIT/ML injection pen; Inject 40 Units into the skin nightly, Disp-5 Adjustable Dose Pre-filled Pen Syringe, R-3Normal  -     insulin glargine (BASAGLAR KWIKPEN) 100 UNIT/ML injection pen; Inject 50 Units into the skin daily, Disp-5 Adjustable Dose Pre-filled Pen Syringe, R-0Normal  Hospital discharge follow-up  -     DE DISCHARGE MEDS RECONCILED W/ CURRENT OUTPATIENT MED LIST  Coronary artery disease due to lipid rich plaque  -     AFL (Epic) - Michael Martin, DO, Cardiology, [facility]  Rib pain on right side  -     lidocaine 4 % external patch; Place 1 patch onto the skin daily for 10 days, TransDERmal, DAILY Starting Thu 10/3/2024, Until Sun 10/13/2024, For 10 days, Disp-10 each, R-0, Normal    Decrease

## 2024-10-04 LAB
BACTERIA URNS QL MICRO: ABNORMAL
BILIRUB UR QL STRIP: NEGATIVE
CASTS #/AREA URNS LPF: ABNORMAL /LPF (ref 0–8)
CLARITY UR: CLEAR
COLOR UR: YELLOW
EPI CELLS #/AREA URNS HPF: ABNORMAL /HPF (ref 0–5)
GLUCOSE UR STRIP-MCNC: NEGATIVE MG/DL
HGB UR QL STRIP.AUTO: NEGATIVE
KETONES UR STRIP-MCNC: NEGATIVE MG/DL
LEUKOCYTE ESTERASE UR QL STRIP: ABNORMAL
NITRITE UR QL STRIP: POSITIVE
PH UR STRIP: 6 [PH] (ref 5–8)
PROT UR STRIP-MCNC: NEGATIVE MG/DL
RBC #/AREA URNS HPF: ABNORMAL /HPF (ref 0–4)
SP GR UR STRIP: 1.02 (ref 1–1.03)
UROBILINOGEN UR STRIP-ACNC: NORMAL EU/DL (ref 0–1)
WBC #/AREA URNS HPF: ABNORMAL /HPF (ref 0–5)

## 2024-10-05 LAB
MICROORGANISM SPEC CULT: NORMAL
SPECIMEN DESCRIPTION: NORMAL

## 2024-10-09 ENCOUNTER — CARE COORDINATION (OUTPATIENT)
Dept: PRIMARY CARE CLINIC | Age: 89
End: 2024-10-09

## 2024-10-09 ENCOUNTER — CARE COORDINATION (OUTPATIENT)
Dept: CARE COORDINATION | Age: 89
End: 2024-10-09

## 2024-10-09 DIAGNOSIS — I50.33 ACUTE ON CHRONIC DIASTOLIC CONGESTIVE HEART FAILURE (HCC): Primary | ICD-10-CM

## 2024-10-09 NOTE — CARE COORDINATION
Ambulatory Care Coordination Note     10/9/2024 2:24 PM     patient outreach attempt by this ACM today to perform care management follow up . AC was unable to reach the patient by telephone today; left voice message requesting a return phone call to this ACM.     Patient closed (unable to reach patient) from the High Risk Care Management program on 10/9/2024.  Patient  has been unable to reach, multiple attempts . No further Ambulatory Care Manager follow up scheduled.   Remote Patient Monitoring Disenrollment      Date/Time:  10/9/2024 2:25 PM  Patient Current Location: Home: [address] [place] [address]  Patient has been dis-enrolled from Remote Patient Monitoring program on 10/9/2024 due to disengagement. Patient has been provided instruction on process to return RPM equipment and RPM has been deactivated.     Patient has ACM's contact information for any further questions, concerns, or needs.

## 2024-10-09 NOTE — CARE COORDINATION
Patient Parvin Patel  10/09/24     Care Coordination  placed call to patient to arrange RPM kit  through [employer]. Left HIPAA Compliant Message     provided return and how to pack equipment in original packing via the patients voicemail if available and provided call back number should patient have questions.    Patient made aware [employer] will  equipment in 2-4 days.

## 2024-10-09 NOTE — PROGRESS NOTES
Remote Patient Order Discontinued    Received request from Roxie Loya, RN   to discontinue order for remote patient monitoring of CHF and order completed.

## 2024-10-14 ENCOUNTER — OFFICE VISIT (OUTPATIENT)
Dept: INTERNAL MEDICINE CLINIC | Age: 89
End: 2024-10-14
Payer: MEDICARE

## 2024-10-14 ENCOUNTER — HOSPITAL ENCOUNTER (OUTPATIENT)
Age: 89
Setting detail: SPECIMEN
Discharge: HOME | End: 2024-10-14

## 2024-10-14 VITALS — DIASTOLIC BLOOD PRESSURE: 68 MMHG | HEART RATE: 69 BPM | SYSTOLIC BLOOD PRESSURE: 118 MMHG | OXYGEN SATURATION: 95 %

## 2024-10-14 DIAGNOSIS — R63.0 LOSS OF APPETITE: Primary | ICD-10-CM

## 2024-10-14 DIAGNOSIS — E03.9 HYPOTHYROIDISM, UNSPECIFIED TYPE: ICD-10-CM

## 2024-10-14 DIAGNOSIS — E55.9 VITAMIN D DEFICIENCY: ICD-10-CM

## 2024-10-14 DIAGNOSIS — E55.9 VITAMIN D DEFICIENCY: Primary | ICD-10-CM

## 2024-10-14 DIAGNOSIS — E11.8 TYPE 2 DIABETES MELLITUS WITH COMPLICATION, WITHOUT LONG-TERM CURRENT USE OF INSULIN (HCC): ICD-10-CM

## 2024-10-14 LAB
25(OH)D3 SERPL-MCNC: 8.6 NG/ML (ref 30–100)
TSH SERPL DL<=0.05 MIU/L-ACNC: 0.96 UIU/ML (ref 0.27–4.2)

## 2024-10-14 PROCEDURE — 1123F ACP DISCUSS/DSCN MKR DOCD: CPT

## 2024-10-14 PROCEDURE — 99214 OFFICE O/P EST MOD 30 MIN: CPT

## 2024-10-14 PROCEDURE — 3046F HEMOGLOBIN A1C LEVEL >9.0%: CPT

## 2024-10-14 RX ORDER — ERGOCALCIFEROL 1.25 MG/1
50000 CAPSULE, LIQUID FILLED ORAL WEEKLY
Qty: 12 CAPSULE | Refills: 0 | Status: SHIPPED | OUTPATIENT
Start: 2024-10-14

## 2024-10-14 RX ORDER — INSULIN GLARGINE 100 [IU]/ML
20 INJECTION, SOLUTION SUBCUTANEOUS NIGHTLY
Qty: 5 ADJUSTABLE DOSE PRE-FILLED PEN SYRINGE | Refills: 3 | Status: SHIPPED | OUTPATIENT
Start: 2024-10-14

## 2024-10-14 SDOH — ECONOMIC STABILITY: FOOD INSECURITY: WITHIN THE PAST 12 MONTHS, YOU WORRIED THAT YOUR FOOD WOULD RUN OUT BEFORE YOU GOT MONEY TO BUY MORE.: NEVER TRUE

## 2024-10-14 SDOH — ECONOMIC STABILITY: FOOD INSECURITY: WITHIN THE PAST 12 MONTHS, THE FOOD YOU BOUGHT JUST DIDN'T LAST AND YOU DIDN'T HAVE MONEY TO GET MORE.: NEVER TRUE

## 2024-10-14 SDOH — ECONOMIC STABILITY: INCOME INSECURITY: HOW HARD IS IT FOR YOU TO PAY FOR THE VERY BASICS LIKE FOOD, HOUSING, MEDICAL CARE, AND HEATING?: NOT HARD AT ALL

## 2024-10-14 ASSESSMENT — ENCOUNTER SYMPTOMS
VOMITING: 0
ALLERGIC/IMMUNOLOGIC NEGATIVE: 1
CONSTIPATION: 0
SHORTNESS OF BREATH: 0
ABDOMINAL PAIN: 0
WHEEZING: 0
NAUSEA: 0
DIARRHEA: 0
BACK PAIN: 0
COUGH: 0

## 2024-10-14 NOTE — PROGRESS NOTES
[facility]  [address] [address]  Dept: [phone]  Dept Fax: [phone]    OFFICE VISIT NOTE  Date of patient's visit: 10/14/2024  Patient's Name:  Parvin Patel YOB: 1935            Patient Care Team:  Braydon Hernandez MD as PCP - General (Internal Medicine)  [facility] MD [facility] P[facility]  Andrew Lo MD as Consulting Physician (Pulmonology)  Evans Brewster MD as Consulting Physician (Gastroenterology)  _________________________________________    ________________________________________________  Chief Complaint:   Wound Check and Cystitis (Follow up )    _______________________________________________  History of Presenting Illness:  History was obtained from the patient. Parvin Patel is a 89 y.o. female.     BP: normal   HR: normal     Follow-up     Diabetes mellitus.  There was concerns of hypoglycemia during morning time.  Last office visit patient was on Lantus 50 units daily and Lantus 50 units at nighttime, and her nighttime dose was decreased to Lantus 40 units.  Family states that she still has been having hypoglycemia episodes in the morning.  Low appetite as per the family since her hospital stay.    She completed treatment for UTI with antibiotics, Bactrim.  No urinary symptoms currently.  Wound is getting better.  Denies fever, chills, chest pain, shortness of breath    Lab Results   Component Value Date    HGB 11.8 (L) 09/23/2024    LABA1C 10.3 (H) 07/24/2024    CHOL 157 10/11/2023    HDL 59 10/11/2023    LDL 54 10/11/2023    TRIG 222 (H) 10/11/2023    VITD25 <6.0 (L) 07/24/2024    CREATININE 1.1 (H) 09/24/2024     _____________________________________________________  Past Medical/Surgical History:        Diagnosis Date    Arthritis     Atrial flutter (HCC)     CAD (coronary artery disease)     CHF (congestive heart failure) (HCC)     Diabetes (HCC)     Diabetes mellitus (HCC)     Hyperlipidemia

## 2024-11-04 DIAGNOSIS — M79.89 LEFT LEG SWELLING: ICD-10-CM

## 2024-11-04 RX ORDER — FUROSEMIDE 40 MG/1
40 TABLET ORAL DAILY
Qty: 30 TABLET | Refills: 3 | OUTPATIENT
Start: 2024-11-04

## 2024-11-08 DIAGNOSIS — E11.8 TYPE 2 DIABETES MELLITUS WITH COMPLICATION, WITHOUT LONG-TERM CURRENT USE OF INSULIN (HCC): ICD-10-CM

## 2024-11-08 RX ORDER — INSULIN GLARGINE 100 [IU]/ML
50 INJECTION, SOLUTION SUBCUTANEOUS DAILY
Qty: 15 ML | Refills: 3 | Status: SHIPPED | OUTPATIENT
Start: 2024-11-08

## 2024-11-20 ENCOUNTER — TELEPHONE (OUTPATIENT)
Dept: INTERNAL MEDICINE CLINIC | Age: 89
End: 2024-11-20

## 2024-12-20 DIAGNOSIS — E11.8 TYPE 2 DIABETES MELLITUS WITH COMPLICATION, WITHOUT LONG-TERM CURRENT USE OF INSULIN (HCC): ICD-10-CM

## 2024-12-20 RX ORDER — INSULIN GLARGINE 100 [IU]/ML
50 INJECTION, SOLUTION SUBCUTANEOUS 2 TIMES DAILY
Refills: 3 | OUTPATIENT
Start: 2024-12-20

## 2025-01-06 ENCOUNTER — TELEPHONE (OUTPATIENT)
Dept: INTERNAL MEDICINE CLINIC | Age: 89
End: 2025-01-06

## 2025-01-06 NOTE — TELEPHONE ENCOUNTER
LVM informing pt appt for 1/13/25 has been cancelled.   Advised she return call to discuss an appt same day with different provider, or next available with Dr. Talamantes.

## 2025-01-08 RX ORDER — FERROUS SULFATE 325(65) MG
TABLET ORAL
Qty: 60 TABLET | Refills: 0 | Status: SHIPPED | OUTPATIENT
Start: 2025-01-08

## 2025-02-10 RX ORDER — PEN NEEDLE, DIABETIC 31 GX5/16"
1 NEEDLE, DISPOSABLE MISCELLANEOUS DAILY
Qty: 100 EACH | Refills: 4 | Status: SHIPPED | OUTPATIENT
Start: 2025-02-10

## 2025-02-10 RX ORDER — FERROUS SULFATE 325(65) MG
TABLET ORAL
Qty: 60 TABLET | Refills: 1 | Status: SHIPPED | OUTPATIENT
Start: 2025-02-10

## 2025-02-12 ENCOUNTER — TELEPHONE (OUTPATIENT)
Dept: INTERNAL MEDICINE CLINIC | Age: 89
End: 2025-02-12

## 2025-02-12 DIAGNOSIS — E11.8 TYPE 2 DIABETES MELLITUS WITH COMPLICATION, WITHOUT LONG-TERM CURRENT USE OF INSULIN (HCC): Primary | ICD-10-CM

## 2025-02-12 NOTE — TELEPHONE ENCOUNTER
Pharmacy called stating the Basglar in no longer covered by patient's insurance are they able to change it to Lantus.

## 2025-02-13 RX ORDER — INSULIN GLARGINE 100 [IU]/ML
50 INJECTION, SOLUTION SUBCUTANEOUS DAILY
Qty: 5 ADJUSTABLE DOSE PRE-FILLED PEN SYRINGE | Refills: 3 | Status: SHIPPED | OUTPATIENT
Start: 2025-02-13

## 2025-03-06 ENCOUNTER — TELEPHONE (OUTPATIENT)
Dept: INTERNAL MEDICINE CLINIC | Age: 89
End: 2025-03-06

## 2025-03-27 ENCOUNTER — TELEPHONE (OUTPATIENT)
Dept: PHARMACY | Facility: CLINIC | Age: 89
End: 2025-03-27

## 2025-03-27 NOTE — TELEPHONE ENCOUNTER
[facility] CLINICAL PHARMACY: ADHERENCE REVIEW  Identified care gap per [place]: fills at [facility] : Statin adherence    Patient also appears to be prescribed: Diabetes    ASSESSMENT  STATIN ADHERENCE    Insurance Records claims through 3/24/25 (Prior Year PDC = not reported; YTD PDC = 78%; Potential Fail Date: 25):   ATORVASTATIN TAB 80MG last filled on 02/10/25 for 30 day supply. Next refill due: 25    Prescribed si tablet/capsule daily    Per Insurer Portal: last filled on 02/10/25 for 30 day supply.     Per [facility]: not contacted    Lab Results   Component Value Date    CHOL 157 10/11/2023    TRIG 222 (H) 10/11/2023    HDL 59 10/11/2023    LDLDIRECT 94 2019     Lab Results   Component Value Date    LDL 54 10/11/2023    LDLDIRECT 94 2019      ALT   Date Value Ref Range Status   10/11/2023 10 5 - 33 U/L Final     AST   Date Value Ref Range Status   10/11/2023 14 <32 U/L Final     The ASCVD Risk score (Blanca DK, et al., 2019) failed to calculate for the following reasons:    The 2019 ASCVD risk score is only valid for ages 40 to 79     PLAN    The following are interventions that have been identified:   Patient OVERDUE refilling ATORVASTATIN TAB 80MG and active on home medication list.   Patient NEEDS REFILLS for ATORVASTATIN TAB 80MG  Patient eligible for 100 day supply of ATORVASTATIN TAB 80MG    Attempting to reach patient to review - unable to leave message. [employer] message sent to patient.    Last Visit: 10/14/24  Next Visit: none    [facility]   [facility]  [phone] Option 1     For Pharmacy Admin Tracking Only    Program: [employer]  CPA in place:  No  Gap Closed?: No   Time Spent (min): 5

## 2025-04-19 ENCOUNTER — APPOINTMENT (OUTPATIENT)
Dept: CT IMAGING | Age: 89
DRG: 080 | End: 2025-04-19
Payer: MEDICARE

## 2025-04-19 ENCOUNTER — APPOINTMENT (OUTPATIENT)
Dept: GENERAL RADIOLOGY | Age: 89
DRG: 080 | End: 2025-04-19
Payer: MEDICARE

## 2025-04-19 ENCOUNTER — HOSPITAL ENCOUNTER (INPATIENT)
Age: 89
LOS: 4 days | Discharge: HOME HEALTH | DRG: 080 | End: 2025-04-23
Attending: STUDENT IN AN ORGANIZED HEALTH CARE EDUCATION/TRAINING PROGRAM | Admitting: STUDENT IN AN ORGANIZED HEALTH CARE EDUCATION/TRAINING PROGRAM
Payer: MEDICARE

## 2025-04-19 DIAGNOSIS — E87.0 HYPERNATREMIA: ICD-10-CM

## 2025-04-19 DIAGNOSIS — R00.1 BRADYCARDIA: ICD-10-CM

## 2025-04-19 DIAGNOSIS — M62.82 NON-TRAUMATIC RHABDOMYOLYSIS: ICD-10-CM

## 2025-04-19 DIAGNOSIS — E16.2 HYPOGLYCEMIA: ICD-10-CM

## 2025-04-19 DIAGNOSIS — E03.5 MYXEDEMA COMA (HCC): Primary | ICD-10-CM

## 2025-04-19 DIAGNOSIS — T68.XXXA HYPOTHERMIA, INITIAL ENCOUNTER: ICD-10-CM

## 2025-04-19 DIAGNOSIS — I50.9 CONGESTIVE HEART FAILURE, UNSPECIFIED HF CHRONICITY, UNSPECIFIED HEART FAILURE TYPE (HCC): ICD-10-CM

## 2025-04-19 DIAGNOSIS — N30.00 ACUTE CYSTITIS WITHOUT HEMATURIA: ICD-10-CM

## 2025-04-19 LAB
ALBUMIN SERPL-MCNC: 3.7 G/DL (ref 3.5–5.2)
ALBUMIN/GLOB SERPL: 1.2 {RATIO} (ref 1–2.5)
ALP SERPL-CCNC: 126 U/L (ref 35–104)
ALT SERPL-CCNC: 72 U/L (ref 10–35)
ANION GAP SERPL CALCULATED.3IONS-SCNC: 13 MMOL/L (ref 9–16)
ANION GAP SERPL CALCULATED.3IONS-SCNC: 14 MMOL/L (ref 9–16)
AST SERPL-CCNC: 84 U/L (ref 10–35)
B PARAP IS1001 DNA NPH QL NAA+NON-PROBE: NOT DETECTED
B PERT DNA SPEC QL NAA+PROBE: NOT DETECTED
BACTERIA URNS QL MICRO: ABNORMAL
BASOPHILS # BLD: 0.05 K/UL (ref 0–0.2)
BASOPHILS NFR BLD: 1 % (ref 0–2)
BILIRUB SERPL-MCNC: 0.3 MG/DL (ref 0–1.2)
BILIRUB UR QL STRIP: NEGATIVE
BNP SERPL-MCNC: 119 PG/ML (ref 0–450)
BUN BLD-MCNC: 25 MG/DL (ref 8–26)
BUN SERPL-MCNC: 24 MG/DL (ref 8–23)
BUN SERPL-MCNC: 25 MG/DL (ref 8–23)
C PNEUM DNA NPH QL NAA+NON-PROBE: NOT DETECTED
CA-I BLD-SCNC: 1.18 MMOL/L (ref 1.15–1.33)
CALCIUM SERPL-MCNC: 8.6 MG/DL (ref 8.6–10.4)
CALCIUM SERPL-MCNC: 8.8 MG/DL (ref 8.6–10.4)
CASTS #/AREA URNS LPF: ABNORMAL /LPF (ref 0–8)
CHLORIDE BLD-SCNC: 108 MMOL/L (ref 98–107)
CHLORIDE SERPL-SCNC: 105 MMOL/L (ref 98–107)
CHLORIDE SERPL-SCNC: 116 MMOL/L (ref 98–107)
CHLORIDE UR-SCNC: 147 MMOL/L
CK SERPL-CCNC: 2113 U/L (ref 26–192)
CK SERPL-CCNC: 3745 U/L (ref 26–192)
CLARITY UR: CLEAR
CO2 BLD CALC-SCNC: 31 MMOL/L (ref 22–30)
CO2 SERPL-SCNC: 22 MMOL/L (ref 20–31)
CO2 SERPL-SCNC: 24 MMOL/L (ref 20–31)
COLOR UR: YELLOW
CREAT SERPL-MCNC: 1.3 MG/DL (ref 0.6–0.9)
CREAT SERPL-MCNC: 1.4 MG/DL (ref 0.6–0.9)
CREAT UR-MCNC: 15.6 MG/DL (ref 28–217)
EGFR, POC: 36 ML/MIN/1.73M2
EOSINOPHIL # BLD: 0.08 K/UL (ref 0–0.44)
EOSINOPHILS RELATIVE PERCENT: 1 % (ref 1–4)
EPI CELLS #/AREA URNS HPF: ABNORMAL /HPF (ref 0–5)
ERYTHROCYTE [DISTWIDTH] IN BLOOD BY AUTOMATED COUNT: 15.2 % (ref 11.8–14.4)
EST. AVERAGE GLUCOSE BLD GHB EST-MCNC: 123 MG/DL
FLUAV RNA NPH QL NAA+NON-PROBE: NOT DETECTED
FLUBV RNA NPH QL NAA+NON-PROBE: NOT DETECTED
GFR, ESTIMATED: 36 ML/MIN/1.73M2
GFR, ESTIMATED: 40 ML/MIN/1.73M2
GLUCOSE BLD-MCNC: 106 MG/DL (ref 65–105)
GLUCOSE BLD-MCNC: 118 MG/DL (ref 65–105)
GLUCOSE BLD-MCNC: 121 MG/DL (ref 65–105)
GLUCOSE BLD-MCNC: 148 MG/DL (ref 65–105)
GLUCOSE BLD-MCNC: 157 MG/DL (ref 65–105)
GLUCOSE BLD-MCNC: 55 MG/DL (ref 74–100)
GLUCOSE SERPL-MCNC: 110 MG/DL (ref 74–99)
GLUCOSE SERPL-MCNC: 59 MG/DL (ref 74–99)
GLUCOSE UR STRIP-MCNC: ABNORMAL MG/DL
HADV DNA NPH QL NAA+NON-PROBE: NOT DETECTED
HBA1C MFR BLD: 5.9 % (ref 4–6)
HCO3 VENOUS: 31 MMOL/L (ref 22–29)
HCOV 229E RNA NPH QL NAA+NON-PROBE: NOT DETECTED
HCOV HKU1 RNA NPH QL NAA+NON-PROBE: NOT DETECTED
HCOV NL63 RNA NPH QL NAA+NON-PROBE: NOT DETECTED
HCOV OC43 RNA NPH QL NAA+NON-PROBE: NOT DETECTED
HCT VFR BLD AUTO: 41.3 % (ref 36.3–47.1)
HCT VFR BLD AUTO: 45 % (ref 36–46)
HGB BLD-MCNC: 13.3 G/DL (ref 11.9–15.1)
HGB UR QL STRIP.AUTO: ABNORMAL
HMPV RNA NPH QL NAA+NON-PROBE: NOT DETECTED
HPIV1 RNA NPH QL NAA+NON-PROBE: NOT DETECTED
HPIV2 RNA NPH QL NAA+NON-PROBE: NOT DETECTED
HPIV3 RNA NPH QL NAA+NON-PROBE: NOT DETECTED
HPIV4 RNA NPH QL NAA+NON-PROBE: NOT DETECTED
IMM GRANULOCYTES # BLD AUTO: 0.13 K/UL (ref 0–0.3)
IMM GRANULOCYTES NFR BLD: 2 %
KETONES UR STRIP-MCNC: ABNORMAL MG/DL
LEUKOCYTE ESTERASE UR QL STRIP: NEGATIVE
LYMPHOCYTES NFR BLD: 1.01 K/UL (ref 1.1–3.7)
LYMPHOCYTES RELATIVE PERCENT: 12 % (ref 24–43)
M PNEUMO DNA NPH QL NAA+NON-PROBE: NOT DETECTED
MAGNESIUM SERPL-MCNC: 2.1 MG/DL (ref 1.6–2.4)
MCH RBC QN AUTO: 30.4 PG (ref 25.2–33.5)
MCHC RBC AUTO-ENTMCNC: 32.2 G/DL (ref 28.4–34.8)
MCV RBC AUTO: 94.3 FL (ref 82.6–102.9)
MONOCYTES NFR BLD: 0.4 K/UL (ref 0.1–1.2)
MONOCYTES NFR BLD: 5 % (ref 3–12)
MYOGLOBIN SERPL-MCNC: 4281 NG/ML (ref 25–58)
NEUTROPHILS NFR BLD: 80 % (ref 36–65)
NEUTS SEG NFR BLD: 6.46 K/UL (ref 1.5–8.1)
NITRITE UR QL STRIP: POSITIVE
NRBC BLD-RTO: 0 PER 100 WBC
O2 SAT, VEN: 35.6 % (ref 60–85)
OSMOLALITY SERPL: 293 MOSM/KG (ref 275–295)
OSMOLALITY UR: 331 MOSM/KG (ref 80–1300)
PCO2 VENOUS: 64.5 MM HG (ref 41–51)
PH UR STRIP: 5 [PH] (ref 5–8)
PH VENOUS: 7.29 (ref 7.32–7.43)
PHOSPHATE SERPL-MCNC: 3.6 MG/DL (ref 2.5–4.5)
PLATELET # BLD AUTO: ABNORMAL K/UL (ref 138–453)
PLATELET, FLUORESCENCE: 109 K/UL (ref 138–453)
PLATELETS.RETICULATED NFR BLD AUTO: 6.5 % (ref 1.1–10.3)
PO2 VENOUS: 24.6 MM HG (ref 30–50)
POC ANION GAP: 6 MMOL/L (ref 7–16)
POC CREATININE: 1.4 MG/DL (ref 0.51–1.19)
POC HEMOGLOBIN (CALC): 15.4 G/DL (ref 12–16)
POC LACTIC ACID: 1.2 MMOL/L (ref 0.56–1.39)
POSITIVE BASE EXCESS, VEN: 2.3 MMOL/L (ref 0–3)
POTASSIUM BLD-SCNC: 4.1 MMOL/L (ref 3.5–4.5)
POTASSIUM SERPL-SCNC: 4.2 MMOL/L (ref 3.7–5.3)
POTASSIUM SERPL-SCNC: 4.6 MMOL/L (ref 3.7–5.3)
POTASSIUM, UR: 16.6 MMOL/L
PROCALCITONIN SERPL-MCNC: 0.06 NG/ML (ref 0–0.09)
PROT SERPL-MCNC: 6.8 G/DL (ref 6.6–8.7)
PROT UR STRIP-MCNC: ABNORMAL MG/DL
RBC # BLD AUTO: 4.38 M/UL (ref 3.95–5.11)
RBC # BLD: ABNORMAL 10*6/UL
RBC #/AREA URNS HPF: ABNORMAL /HPF (ref 0–4)
RSV RNA NPH QL NAA+NON-PROBE: NOT DETECTED
RV+EV RNA NPH QL NAA+NON-PROBE: NOT DETECTED
SARS-COV-2 RNA NPH QL NAA+NON-PROBE: NOT DETECTED
SODIUM BLD-SCNC: 144 MMOL/L (ref 138–146)
SODIUM SERPL-SCNC: 140 MMOL/L (ref 136–145)
SODIUM SERPL-SCNC: 154 MMOL/L (ref 136–145)
SODIUM UR-SCNC: 134 MMOL/L
SP GR UR STRIP: 1.03 (ref 1–1.03)
SPECIMEN DESCRIPTION: NORMAL
T4 FREE SERPL-MCNC: <0.1 NG/DL (ref 0.92–1.68)
TROPONIN I SERPL HS-MCNC: 50 NG/L (ref 0–14)
TROPONIN I SERPL HS-MCNC: 52 NG/L (ref 0–14)
TSH SERPL DL<=0.05 MIU/L-ACNC: 29.3 UIU/ML (ref 0.27–4.2)
UROBILINOGEN UR STRIP-ACNC: NORMAL EU/DL (ref 0–1)
WBC #/AREA URNS HPF: ABNORMAL /HPF (ref 0–5)
WBC OTHER # BLD: 8.1 K/UL (ref 3.5–11.3)

## 2025-04-19 PROCEDURE — 82803 BLOOD GASES ANY COMBINATION: CPT

## 2025-04-19 PROCEDURE — 84100 ASSAY OF PHOSPHORUS: CPT

## 2025-04-19 PROCEDURE — 83735 ASSAY OF MAGNESIUM: CPT

## 2025-04-19 PROCEDURE — 84439 ASSAY OF FREE THYROXINE: CPT

## 2025-04-19 PROCEDURE — 80053 COMPREHEN METABOLIC PANEL: CPT

## 2025-04-19 PROCEDURE — 82947 ASSAY GLUCOSE BLOOD QUANT: CPT

## 2025-04-19 PROCEDURE — 6360000002 HC RX W HCPCS

## 2025-04-19 PROCEDURE — 83036 HEMOGLOBIN GLYCOSYLATED A1C: CPT

## 2025-04-19 PROCEDURE — 2580000003 HC RX 258

## 2025-04-19 PROCEDURE — 87086 URINE CULTURE/COLONY COUNT: CPT

## 2025-04-19 PROCEDURE — P9047 ALBUMIN (HUMAN), 25%, 50ML: HCPCS

## 2025-04-19 PROCEDURE — 70450 CT HEAD/BRAIN W/O DYE: CPT

## 2025-04-19 PROCEDURE — 80048 BASIC METABOLIC PNL TOTAL CA: CPT

## 2025-04-19 PROCEDURE — 83930 ASSAY OF BLOOD OSMOLALITY: CPT

## 2025-04-19 PROCEDURE — 94640 AIRWAY INHALATION TREATMENT: CPT

## 2025-04-19 PROCEDURE — 2500000003 HC RX 250 WO HCPCS

## 2025-04-19 PROCEDURE — 82570 ASSAY OF URINE CREATININE: CPT

## 2025-04-19 PROCEDURE — 85025 COMPLETE CBC W/AUTO DIFF WBC: CPT

## 2025-04-19 PROCEDURE — 84145 PROCALCITONIN (PCT): CPT

## 2025-04-19 PROCEDURE — 87077 CULTURE AEROBIC IDENTIFY: CPT

## 2025-04-19 PROCEDURE — 84300 ASSAY OF URINE SODIUM: CPT

## 2025-04-19 PROCEDURE — 82436 ASSAY OF URINE CHLORIDE: CPT

## 2025-04-19 PROCEDURE — 87040 BLOOD CULTURE FOR BACTERIA: CPT

## 2025-04-19 PROCEDURE — 83874 ASSAY OF MYOGLOBIN: CPT

## 2025-04-19 PROCEDURE — 93005 ELECTROCARDIOGRAM TRACING: CPT

## 2025-04-19 PROCEDURE — 83605 ASSAY OF LACTIC ACID: CPT

## 2025-04-19 PROCEDURE — 87186 SC STD MICRODIL/AGAR DIL: CPT

## 2025-04-19 PROCEDURE — 6370000000 HC RX 637 (ALT 250 FOR IP)

## 2025-04-19 PROCEDURE — 96374 THER/PROPH/DIAG INJ IV PUSH: CPT

## 2025-04-19 PROCEDURE — 96375 TX/PRO/DX INJ NEW DRUG ADDON: CPT

## 2025-04-19 PROCEDURE — 99285 EMERGENCY DEPT VISIT HI MDM: CPT

## 2025-04-19 PROCEDURE — 99291 CRITICAL CARE FIRST HOUR: CPT | Performed by: STUDENT IN AN ORGANIZED HEALTH CARE EDUCATION/TRAINING PROGRAM

## 2025-04-19 PROCEDURE — 84484 ASSAY OF TROPONIN QUANT: CPT

## 2025-04-19 PROCEDURE — 71045 X-RAY EXAM CHEST 1 VIEW: CPT

## 2025-04-19 PROCEDURE — 84133 ASSAY OF URINE POTASSIUM: CPT

## 2025-04-19 PROCEDURE — 84520 ASSAY OF UREA NITROGEN: CPT

## 2025-04-19 PROCEDURE — 80051 ELECTROLYTE PANEL: CPT

## 2025-04-19 PROCEDURE — 82550 ASSAY OF CK (CPK): CPT

## 2025-04-19 PROCEDURE — 72125 CT NECK SPINE W/O DYE: CPT

## 2025-04-19 PROCEDURE — 82565 ASSAY OF CREATININE: CPT

## 2025-04-19 PROCEDURE — 83880 ASSAY OF NATRIURETIC PEPTIDE: CPT

## 2025-04-19 PROCEDURE — 85014 HEMATOCRIT: CPT

## 2025-04-19 PROCEDURE — 83935 ASSAY OF URINE OSMOLALITY: CPT

## 2025-04-19 PROCEDURE — 2000000000 HC ICU R&B

## 2025-04-19 PROCEDURE — 85055 RETICULATED PLATELET ASSAY: CPT

## 2025-04-19 PROCEDURE — 84443 ASSAY THYROID STIM HORMONE: CPT

## 2025-04-19 PROCEDURE — 94660 CPAP INITIATION&MGMT: CPT

## 2025-04-19 PROCEDURE — 36415 COLL VENOUS BLD VENIPUNCTURE: CPT

## 2025-04-19 PROCEDURE — 81001 URINALYSIS AUTO W/SCOPE: CPT

## 2025-04-19 PROCEDURE — 5A09457 ASSISTANCE WITH RESPIRATORY VENTILATION, 24-96 CONSECUTIVE HOURS, CONTINUOUS POSITIVE AIRWAY PRESSURE: ICD-10-PCS

## 2025-04-19 PROCEDURE — 82330 ASSAY OF CALCIUM: CPT

## 2025-04-19 PROCEDURE — 0202U NFCT DS 22 TRGT SARS-COV-2: CPT

## 2025-04-19 RX ORDER — ONDANSETRON 2 MG/ML
4 INJECTION INTRAMUSCULAR; INTRAVENOUS EVERY 6 HOURS PRN
Status: DISCONTINUED | OUTPATIENT
Start: 2025-04-19 | End: 2025-04-23 | Stop reason: HOSPADM

## 2025-04-19 RX ORDER — POTASSIUM CHLORIDE 29.8 MG/ML
20 INJECTION INTRAVENOUS PRN
Status: DISCONTINUED | OUTPATIENT
Start: 2025-04-19 | End: 2025-04-19

## 2025-04-19 RX ORDER — HYDROCORTISONE SODIUM SUCCINATE 100 MG/2ML
100 INJECTION INTRAMUSCULAR; INTRAVENOUS ONCE
Status: COMPLETED | OUTPATIENT
Start: 2025-04-19 | End: 2025-04-19

## 2025-04-19 RX ORDER — ALBUMIN (HUMAN) 12.5 G/50ML
25 SOLUTION INTRAVENOUS ONCE
Status: COMPLETED | OUTPATIENT
Start: 2025-04-19 | End: 2025-04-19

## 2025-04-19 RX ORDER — MAGNESIUM SULFATE IN WATER 40 MG/ML
2000 INJECTION, SOLUTION INTRAVENOUS PRN
Status: DISCONTINUED | OUTPATIENT
Start: 2025-04-19 | End: 2025-04-21

## 2025-04-19 RX ORDER — POLYETHYLENE GLYCOL 3350 17 G/17G
17 POWDER, FOR SOLUTION ORAL DAILY PRN
Status: DISCONTINUED | OUTPATIENT
Start: 2025-04-19 | End: 2025-04-23 | Stop reason: HOSPADM

## 2025-04-19 RX ORDER — LEVOTHYROXINE SODIUM ANHYDROUS 100 UG/5ML
150 INJECTION, POWDER, LYOPHILIZED, FOR SOLUTION INTRAVENOUS DAILY
Status: DISCONTINUED | OUTPATIENT
Start: 2025-04-20 | End: 2025-04-22

## 2025-04-19 RX ORDER — DEXTROSE MONOHYDRATE 25 G/50ML
INJECTION, SOLUTION INTRAVENOUS
Status: COMPLETED
Start: 2025-04-19 | End: 2025-04-19

## 2025-04-19 RX ORDER — HYDROCORTISONE SODIUM SUCCINATE 100 MG/2ML
100 INJECTION INTRAMUSCULAR; INTRAVENOUS EVERY 8 HOURS
Status: DISCONTINUED | OUTPATIENT
Start: 2025-04-19 | End: 2025-04-21

## 2025-04-19 RX ORDER — ONDANSETRON 4 MG/1
4 TABLET, ORALLY DISINTEGRATING ORAL EVERY 8 HOURS PRN
Status: DISCONTINUED | OUTPATIENT
Start: 2025-04-19 | End: 2025-04-23 | Stop reason: HOSPADM

## 2025-04-19 RX ORDER — SODIUM CHLORIDE 9 MG/ML
5 INJECTION, SOLUTION INTRAMUSCULAR; INTRAVENOUS; SUBCUTANEOUS DAILY
Status: DISCONTINUED | OUTPATIENT
Start: 2025-04-19 | End: 2025-04-22

## 2025-04-19 RX ORDER — SODIUM CHLORIDE 0.9 % (FLUSH) 0.9 %
5-40 SYRINGE (ML) INJECTION EVERY 12 HOURS SCHEDULED
Status: DISCONTINUED | OUTPATIENT
Start: 2025-04-19 | End: 2025-04-23 | Stop reason: HOSPADM

## 2025-04-19 RX ORDER — DEXTROSE MONOHYDRATE 25 G/50ML
25 INJECTION, SOLUTION INTRAVENOUS ONCE
Status: COMPLETED | OUTPATIENT
Start: 2025-04-19 | End: 2025-04-19

## 2025-04-19 RX ORDER — IPRATROPIUM BROMIDE AND ALBUTEROL SULFATE 2.5; .5 MG/3ML; MG/3ML
1 SOLUTION RESPIRATORY (INHALATION)
Status: DISCONTINUED | OUTPATIENT
Start: 2025-04-19 | End: 2025-04-20

## 2025-04-19 RX ORDER — LEVOTHYROXINE SODIUM 75 UG/1
150 TABLET ORAL DAILY
Status: DISCONTINUED | OUTPATIENT
Start: 2025-04-20 | End: 2025-04-19

## 2025-04-19 RX ORDER — SODIUM CHLORIDE 9 MG/ML
INJECTION, SOLUTION INTRAVENOUS PRN
Status: DISCONTINUED | OUTPATIENT
Start: 2025-04-19 | End: 2025-04-23 | Stop reason: HOSPADM

## 2025-04-19 RX ORDER — ACETAMINOPHEN 325 MG/1
650 TABLET ORAL EVERY 6 HOURS PRN
Status: DISCONTINUED | OUTPATIENT
Start: 2025-04-19 | End: 2025-04-23 | Stop reason: HOSPADM

## 2025-04-19 RX ORDER — ACETAMINOPHEN 650 MG/1
650 SUPPOSITORY RECTAL EVERY 6 HOURS PRN
Status: DISCONTINUED | OUTPATIENT
Start: 2025-04-19 | End: 2025-04-23 | Stop reason: HOSPADM

## 2025-04-19 RX ORDER — POTASSIUM CHLORIDE 7.45 MG/ML
10 INJECTION INTRAVENOUS PRN
Status: DISCONTINUED | OUTPATIENT
Start: 2025-04-19 | End: 2025-04-21

## 2025-04-19 RX ORDER — LEVOTHYROXINE SODIUM ANHYDROUS 200 UG/5ML
200 INJECTION, POWDER, LYOPHILIZED, FOR SOLUTION INTRAVENOUS ONCE
Status: COMPLETED | OUTPATIENT
Start: 2025-04-19 | End: 2025-04-19

## 2025-04-19 RX ORDER — SODIUM CHLORIDE 0.9 % (FLUSH) 0.9 %
5-40 SYRINGE (ML) INJECTION PRN
Status: DISCONTINUED | OUTPATIENT
Start: 2025-04-19 | End: 2025-04-23 | Stop reason: HOSPADM

## 2025-04-19 RX ADMIN — DEXTROSE MONOHYDRATE 25 G: 25 INJECTION, SOLUTION INTRAVENOUS at 08:50

## 2025-04-19 RX ADMIN — HYDROCORTISONE SODIUM SUCCINATE 100 MG: 100 INJECTION, POWDER, FOR SOLUTION INTRAMUSCULAR; INTRAVENOUS at 19:43

## 2025-04-19 RX ADMIN — SODIUM CHLORIDE, PRESERVATIVE FREE 10 ML: 5 INJECTION INTRAVENOUS at 21:00

## 2025-04-19 RX ADMIN — HYDROCORTISONE SODIUM SUCCINATE 100 MG: 100 INJECTION, POWDER, FOR SOLUTION INTRAMUSCULAR; INTRAVENOUS at 10:02

## 2025-04-19 RX ADMIN — DEXTROSE: 5 SOLUTION INTRAVENOUS at 11:43

## 2025-04-19 RX ADMIN — IPRATROPIUM BROMIDE AND ALBUTEROL SULFATE 1 DOSE: .5; 2.5 SOLUTION RESPIRATORY (INHALATION) at 20:20

## 2025-04-19 RX ADMIN — IPRATROPIUM BROMIDE AND ALBUTEROL SULFATE 1 DOSE: .5; 2.5 SOLUTION RESPIRATORY (INHALATION) at 13:22

## 2025-04-19 RX ADMIN — PIPERACILLIN AND TAZOBACTAM 3375 MG: 3; .375 INJECTION, POWDER, LYOPHILIZED, FOR SOLUTION INTRAVENOUS at 11:47

## 2025-04-19 RX ADMIN — ALBUMIN (HUMAN) 25 G: 0.25 INJECTION, SOLUTION INTRAVENOUS at 12:54

## 2025-04-19 RX ADMIN — PANTOPRAZOLE SODIUM 40 MG: 40 INJECTION, POWDER, FOR SOLUTION INTRAVENOUS at 17:13

## 2025-04-19 RX ADMIN — FUROSEMIDE 5 MG/HR: 10 INJECTION, SOLUTION INTRAMUSCULAR; INTRAVENOUS at 12:36

## 2025-04-19 RX ADMIN — LEVOTHYROXINE SODIUM ANHYDROUS 200 MCG: 100 INJECTION, POWDER, LYOPHILIZED, FOR SOLUTION INTRAVENOUS at 10:30

## 2025-04-19 RX ADMIN — SODIUM CHLORIDE 1500 MG: 9 INJECTION, SOLUTION INTRAVENOUS at 12:34

## 2025-04-19 ASSESSMENT — PAIN - FUNCTIONAL ASSESSMENT: PAIN_FUNCTIONAL_ASSESSMENT: NONE - DENIES PAIN

## 2025-04-19 NOTE — ED NOTES
Pt arrived to ED alert and oriented x1 via EMS.   Pt to ED s/p fall.  Per EMS, pt was found down next to her bed, stated that pt was last seen by family at 0230.  EMS reported that pt was found to be bradycardic in the 40's and hypoglycemic in the 40', stated they gave pt half a bag of D10.  EMS reported that pt's repeat glucose was 60's, stated they administered the rest of the D10 PTA.  EMS reported that pt was found to have bed bugs.  On arrival, pt is alert to self.  Pt changed from all clothing, cleaned per ED staff, and new linens and gown in place.  Pt placed on cardiac monitor, continuous pulse ox, and BP cuff.  RR even and unlabored.   NAD noted.   Whiteboard updated.  Will continue with plan of care.

## 2025-04-19 NOTE — ED PROVIDER NOTES
[facility] EMERGENCY DEPARTMENT  Emergency Department Encounter  Emergency Medicine Resident     Pt Name:Parvin Patel  MRN: [id number]  Birthdate 1935  Date of evaluation: 4/19/25  PCP:  Braydon Hernandez MD  Note Started: 8:57 AM EDT      CHIEF COMPLAINT       Chief Complaint   Patient presents with    Fall     Found next to her bed, last seen at 0230    Hypoglycemia     40's on EMS arrival, given D10 with improvement to 60's       HISTORY OF PRESENT ILLNESS  (Location/Symptom, Timing/Onset, Context/Setting, Quality, Duration, Modifying Factors, Severity.)      Parvin Patel is a 89 y.o. female with history of insulin-dependent diabetes, HTN, hypothyroidism, dementia presents with fall, bradycardia and hypoglycemia.  Patient lives with her daughter.  She was last seen at 0200 sleeping in bed.  When patient's daughter returned from work this morning she was found her lying next to her bed on the floor.  She reportedly is at her baseline, denying any pain.  Per EMS she was found to have a heart rate in the 40s, normotensive, hypoglycemic.  She received dextrose en route to the ED. At baseline she requires some assistance to complete ADLs, wheelchair-bound however is typically able to pivot and transfer on her own.     PAST MEDICAL / SURGICAL / SOCIAL / FAMILY HISTORY      has a past medical history of Arthritis, Atrial flutter (HCC), CAD (coronary artery disease), CHF (congestive heart failure) (HCC), Diabetes (HCC), Diabetes mellitus (HCC), Hyperlipidemia, Hypertension, Psychiatric problem, Thyroid cancer (HCC), Thyroid disease, and Type 2 diabetes mellitus without complication, with long-term current use of insulin.       has a past surgical history that includes Coronary angioplasty with stent; Hysterectomy; Cholecystectomy; Thyroidectomy; Appendectomy; back surgery; Upper gastrointestinal endoscopy (4/14/2021); and Upper gastrointestinal endoscopy (4/14/2021).      Social History     Socioeconomic

## 2025-04-19 NOTE — ED NOTES
The following labs were labeled with appropriate pt sticker and tubed to lab:     [x] Blue     [x] Lavender   [] on ice  [x] Green/yellow  [x] Green/black [] on ice  [] Gonzalez  [] on ice  [x] Yellow  [] Red  [] Pink  [] Type/ Screen  [] ABG  [] VBG    [] COVID-19 swab    [] Rapid  [] PCR  [] Flu swab  [] Peds Viral Panel     [] Urine Sample  [] Fecal Sample  [] Pelvic Cultures  [] Blood Cultures  [] X 2  [] STREP Cultures  [] Wound Cultures

## 2025-04-19 NOTE — ED NOTES
ED to inpatient nurses report      Chief Complaint:  Chief Complaint   Patient presents with    Fall     Found next to her bed, last seen at 0230    Hypoglycemia     40's on EMS arrival, given D10 with improvement to 60's     Present to ED from: Home    MOA:     LOC: alert to only name  Mobility: Requires assistance * 2  Oxygen Baseline: Room Air    Current needs required: None  Pending ED orders: None  Present condition: Resting    Why did the patient come to the ED? Pt arrived to ED alert and oriented x1 via EMS.   Pt to ED s/p fall.  Per EMS, pt was found down next to her bed, stated that pt was last seen by family at 0230.  EMS reported that pt was found to be bradycardic in the 40's and hypoglycemic in the 40', stated they gave pt half a bag of D10.  EMS reported that pt's repeat glucose was 60's, stated they administered the rest of the D10 PTA.  EMS reported that pt was found to have bed bugs.  On arrival, pt is alert to self.  Pt changed from all clothing, cleaned per ED staff, and new linens and gown in place.  What is the plan? Admission  Any procedures or intervention occur? Labs, meds. Imaging  Any safety concerns? Fall Risk    Mental Status:  Level of Consciousness: Alert (0)    Psych Assessment:   Psychosocial  Psychosocial (WDL): Within Defined Limits  Vital signs   Vitals:    04/19/25 1100 04/19/25 1104 04/19/25 1105 04/19/25 1115   BP: (!) 147/120   (!) 160/139   Pulse: 56 62 53 54   Resp:       Temp:  (!) 89.1 °F (31.7 °C) (!) 89.4 °F (31.9 °C) (!) 91 °F (32.8 °C)   TempSrc:       SpO2: 99% 98% 99% 97%   Weight:       Height:            Vitals:  Patient Vitals for the past 24 hrs:   BP Temp Temp src Pulse Resp SpO2 Height Weight   04/19/25 1115 (!) 160/139 (!) 91 °F (32.8 °C) -- 54 -- 97 % -- --   04/19/25 1105 -- (!) 89.4 °F (31.9 °C) -- 53 -- 99 % -- --   04/19/25 1104 -- (!) 89.1 °F (31.7 °C) -- 62 -- 98 % -- --   04/19/25 1100 (!) 147/120 -- -- 56 -- 99 % -- --   04/19/25 1051 -- (!) 90.3 °F

## 2025-04-19 NOTE — ED NOTES
Labeled blood specimens sent to lab via tube system.    [] Green/yellow  [] Lavender   [] On ice   [] Blue   [] Green/black [] On ice  [] Yellow  [] On ice  [] Red  [] Pink  [x] Blood Cultures  [] x1 [x] x2    [] Ped Green  [] On ice  [] Ped Lavender  [] On ice    [] Ped Yellow  [] On ice  [] Ped Red    Labeled nasal swab sent to lab via tube system.       [] Rapid COVID-19 swab   [] Non- Rapid COVID-19 swab/PCR  [x] Respiratory Panel with COVID  [] Flu  [] MRSA

## 2025-04-19 NOTE — ED PROVIDER NOTES
[facility]     Emergency Department     Faculty Attestation    I performed a history and physical examination of the patient and discussed management with the resident. I have reviewed and agree with the resident’s findings including all diagnostic interpretations, and treatment plans as written at the time of my review. Any areas of disagreement are noted on the chart. I was personally present for the key portions of any procedures. I have documented in the chart those procedures where I was not present during the key portions. For Physician Assistant/ Nurse Practitioner cases/documentation I have personally evaluated this patient and have completed at least one if not all key elements of the E/M (history, physical exam, and MDM). Additional findings are as noted.    PtName: Parvin Patel  MRN: [id number]  Birthdate 1935  Date of evaluation: 4/19/25  Note Started: 8:51 AM EDT    Primary Care Physician: Braydon Hernandez MD    Brief HPI:  Patient is an 89-year-old female presents emergency department status post fall.  The patient was last seen by family at 2:30 AM.  She was found on the ground next to her bed this morning.  They called 911.  According to EMS the patient was found to be bradycardic and hypoglycemic upon their arrival.  She was given D10 infusion and placed on the cardiac pads and brought to the emergency department for evaluation.  The patient is currently alert and oriented x 1, denies any complaints    Pertinent Physical Exam Findings:  Vitals:    04/19/25 0844   BP: (!) 189/70   Pulse: (!) 49   Resp: 20   SpO2: 96%   Appears chronically ill, no acute distress, edematous, no obvious signs of head trauma, bradycardic rate, regular rhythm, breathing is even and unlabored, abdomen is soft nontender.    Medical Decision Making: Patient is a 89 y.o. female presenting to the emergency department with altered mental status, bradycardia, hypoglycemia. The

## 2025-04-19 NOTE — ED NOTES
Labeled blood specimens sent to lab via tube system.    [x] Green/yellow  [] Lavender   [] On ice   [] Blue   [] Green/black [] On ice  [] Yellow  [] On ice  [] Red  [] Pink  [] Blood Cultures  [] x1 [] x2    [] Ped Green  [] On ice  [] Ped Lavender  [] On ice    [] Ped Yellow  [] On ice  [] Ped Red

## 2025-04-19 NOTE — H&P
Critical Care - History and Physical Examination    Patient's name:  Parvin Patel  Medical Record Number: [id number]  Patient's account/billing number: [id number]  Patient's YOB: 1935  Age: 89 y.o.  Date of Admission: 4/19/2025  8:36 AM  Date of History and Physical Examination: 4/19/2025      Primary Care Physician: Braydon Hernandez MD  Attending Physician:    Code Status: Full Code    Chief complaint: Fall    HISTORY OF PRESENT ILLNESS:   History was obtained from sibling and chart review.  Parvin Patel is a 89 y.o. female with a past medical history of thyroid cancer s/p thyroidectomy, acquired hypothyroidism secondary to a forementioned on Synthyroid, insulin dependent type II diabetes mellitus, dementia, depression, HFpEF, history of GI bleed, presented to the ED via EMS after a fall at home, unclear if there was any loss of consciousness.  Last known well at 2:30 AM.  Patient lives at home with her daughter and per daughter, patient was trying to get out of bed and had a fall somewhere between 2:30 AM and 7 AM when the daughter came home from night shift.  EMS was called and patient was found to be bradycardic with heart rate in the 40s and hypoglycemic with blood glucose in the 40s.  She was given D10 with improvement in her sugars to 60.  Found to be in rhabdomyolysis.    On arrival to the ER, patient was found to be hypothermic with Tmax of 90.3.  /70.  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  Given Solu-Cortef 100 and IV Synthyroid 200 in the ED.  Mentation intact.    Per daughter, patient has had decreased appetite over the past few weeks associated with occasional nausea, vomiting, and shortness of breath.  Chest x-ray shows cardiomegaly with bilateral pleural effusions.  proBNP ordered.  Denies any fevers, chills, cough, chest or abdominal pain, diarrhea, or dysuria.  Patient is is on Lantus 40 units twice daily, Synthyroid 150 mcg, and Lasix

## 2025-04-19 NOTE — PLAN OF CARE
Internal medicine service was consulted for admission of this patient however patient is critically ill, will need intensive care monitoring for myxedema coma.  Critical care resident was paged, discussion was had with the emergency medicine attending as well as critical care resident.  Medical ICU resident is agreeable to take the patient.    Jasen Hanson M.D.  Internal Medicine Chief Resident PGY-3  [facility],  [place], [place].

## 2025-04-20 ENCOUNTER — APPOINTMENT (OUTPATIENT)
Dept: ULTRASOUND IMAGING | Age: 89
DRG: 080 | End: 2025-04-20
Payer: MEDICARE

## 2025-04-20 ENCOUNTER — APPOINTMENT (OUTPATIENT)
Dept: GENERAL RADIOLOGY | Age: 89
DRG: 080 | End: 2025-04-20
Payer: MEDICARE

## 2025-04-20 LAB
ALBUMIN SERPL-MCNC: 3.7 G/DL (ref 3.5–5.2)
ALBUMIN/GLOB SERPL: 1.4 {RATIO} (ref 1–2.5)
ALP SERPL-CCNC: 98 U/L (ref 35–104)
ALT SERPL-CCNC: 69 U/L (ref 10–35)
ANION GAP SERPL CALCULATED.3IONS-SCNC: 12 MMOL/L (ref 9–16)
ANION GAP SERPL CALCULATED.3IONS-SCNC: 12 MMOL/L (ref 9–16)
ANION GAP SERPL CALCULATED.3IONS-SCNC: 13 MMOL/L (ref 9–16)
AST SERPL-CCNC: 102 U/L (ref 10–35)
BASOPHILS # BLD: 0 K/UL (ref 0–0.2)
BASOPHILS NFR BLD: 0 % (ref 0–2)
BILIRUB SERPL-MCNC: 0.3 MG/DL (ref 0–1.2)
BUN SERPL-MCNC: 23 MG/DL (ref 8–23)
CALCIUM SERPL-MCNC: 8.7 MG/DL (ref 8.6–10.4)
CHLORIDE SERPL-SCNC: 102 MMOL/L (ref 98–107)
CHLORIDE SERPL-SCNC: 104 MMOL/L (ref 98–107)
CHLORIDE SERPL-SCNC: 104 MMOL/L (ref 98–107)
CK SERPL-CCNC: 3341 U/L (ref 26–192)
CO2 SERPL-SCNC: 23 MMOL/L (ref 20–31)
CO2 SERPL-SCNC: 24 MMOL/L (ref 20–31)
CO2 SERPL-SCNC: 25 MMOL/L (ref 20–31)
CREAT SERPL-MCNC: 1.4 MG/DL (ref 0.6–0.9)
EOSINOPHIL # BLD: 0 K/UL (ref 0–0.4)
EOSINOPHILS RELATIVE PERCENT: 0 % (ref 1–4)
ERYTHROCYTE [DISTWIDTH] IN BLOOD BY AUTOMATED COUNT: 15 % (ref 11.8–14.4)
GFR, ESTIMATED: 36 ML/MIN/1.73M2
GLUCOSE BLD-MCNC: 116 MG/DL (ref 65–105)
GLUCOSE BLD-MCNC: 138 MG/DL (ref 65–105)
GLUCOSE SERPL-MCNC: 122 MG/DL (ref 74–99)
HCT VFR BLD AUTO: 36.8 % (ref 36.3–47.1)
HGB BLD-MCNC: 12 G/DL (ref 11.9–15.1)
IMM GRANULOCYTES # BLD AUTO: 0 K/UL (ref 0–0.3)
IMM GRANULOCYTES NFR BLD: 0 %
LACTIC ACID, WHOLE BLOOD: 1.5 MMOL/L (ref 0.7–2.1)
LYMPHOCYTES NFR BLD: 0.96 K/UL (ref 1–4.8)
LYMPHOCYTES RELATIVE PERCENT: 9 % (ref 24–44)
MCH RBC QN AUTO: 30.6 PG (ref 25.2–33.5)
MCHC RBC AUTO-ENTMCNC: 32.6 G/DL (ref 28.4–34.8)
MCV RBC AUTO: 93.9 FL (ref 82.6–102.9)
MICROORGANISM SPEC CULT: ABNORMAL
MONOCYTES NFR BLD: 0.43 K/UL (ref 0.1–0.8)
MONOCYTES NFR BLD: 4 % (ref 1–7)
MORPHOLOGY: ABNORMAL
MYOGLOBIN SERPL-MCNC: 1337 NG/ML (ref 25–58)
NEUTROPHILS NFR BLD: 87 % (ref 36–66)
NEUTS SEG NFR BLD: 9.31 K/UL (ref 1.8–7.7)
NRBC BLD-RTO: 0 PER 100 WBC
PLATELET # BLD AUTO: ABNORMAL K/UL (ref 138–453)
PLATELET, FLUORESCENCE: 118 K/UL (ref 138–453)
PLATELETS.RETICULATED NFR BLD AUTO: 5.6 % (ref 1.1–10.3)
POTASSIUM SERPL-SCNC: 4 MMOL/L (ref 3.7–5.3)
POTASSIUM SERPL-SCNC: 4.1 MMOL/L (ref 3.7–5.3)
POTASSIUM SERPL-SCNC: 4.4 MMOL/L (ref 3.7–5.3)
PROT SERPL-MCNC: 6.3 G/DL (ref 6.6–8.7)
RBC # BLD AUTO: 3.92 M/UL (ref 3.95–5.11)
SODIUM SERPL-SCNC: 139 MMOL/L (ref 136–145)
SODIUM SERPL-SCNC: 140 MMOL/L (ref 136–145)
SODIUM SERPL-SCNC: 140 MMOL/L (ref 136–145)
SPECIMEN DESCRIPTION: ABNORMAL
TROPONIN I SERPL HS-MCNC: 71 NG/L (ref 0–14)
WBC OTHER # BLD: 10.7 K/UL (ref 3.5–11.3)

## 2025-04-20 PROCEDURE — 80053 COMPREHEN METABOLIC PANEL: CPT

## 2025-04-20 PROCEDURE — 2580000003 HC RX 258

## 2025-04-20 PROCEDURE — 2500000003 HC RX 250 WO HCPCS

## 2025-04-20 PROCEDURE — 94761 N-INVAS EAR/PLS OXIMETRY MLT: CPT

## 2025-04-20 PROCEDURE — 85055 RETICULATED PLATELET ASSAY: CPT

## 2025-04-20 PROCEDURE — 76705 ECHO EXAM OF ABDOMEN: CPT

## 2025-04-20 PROCEDURE — 71045 X-RAY EXAM CHEST 1 VIEW: CPT

## 2025-04-20 PROCEDURE — 94660 CPAP INITIATION&MGMT: CPT

## 2025-04-20 PROCEDURE — 36415 COLL VENOUS BLD VENIPUNCTURE: CPT

## 2025-04-20 PROCEDURE — 94640 AIRWAY INHALATION TREATMENT: CPT

## 2025-04-20 PROCEDURE — 82550 ASSAY OF CK (CPK): CPT

## 2025-04-20 PROCEDURE — 6360000002 HC RX W HCPCS

## 2025-04-20 PROCEDURE — 99233 SBSQ HOSP IP/OBS HIGH 50: CPT | Performed by: STUDENT IN AN ORGANIZED HEALTH CARE EDUCATION/TRAINING PROGRAM

## 2025-04-20 PROCEDURE — 6370000000 HC RX 637 (ALT 250 FOR IP)

## 2025-04-20 PROCEDURE — 83605 ASSAY OF LACTIC ACID: CPT

## 2025-04-20 PROCEDURE — 85025 COMPLETE CBC W/AUTO DIFF WBC: CPT

## 2025-04-20 PROCEDURE — 83874 ASSAY OF MYOGLOBIN: CPT

## 2025-04-20 PROCEDURE — 2060000000 HC ICU INTERMEDIATE R&B

## 2025-04-20 PROCEDURE — 82947 ASSAY GLUCOSE BLOOD QUANT: CPT

## 2025-04-20 PROCEDURE — 84484 ASSAY OF TROPONIN QUANT: CPT

## 2025-04-20 PROCEDURE — 80051 ELECTROLYTE PANEL: CPT

## 2025-04-20 RX ORDER — IPRATROPIUM BROMIDE AND ALBUTEROL SULFATE 2.5; .5 MG/3ML; MG/3ML
1 SOLUTION RESPIRATORY (INHALATION) EVERY 4 HOURS PRN
Status: DISCONTINUED | OUTPATIENT
Start: 2025-04-20 | End: 2025-04-23 | Stop reason: HOSPADM

## 2025-04-20 RX ORDER — FUROSEMIDE 10 MG/ML
20 INJECTION INTRAMUSCULAR; INTRAVENOUS 2 TIMES DAILY
Status: DISCONTINUED | OUTPATIENT
Start: 2025-04-20 | End: 2025-04-23

## 2025-04-20 RX ADMIN — HYDROCORTISONE SODIUM SUCCINATE 100 MG: 100 INJECTION, POWDER, FOR SOLUTION INTRAMUSCULAR; INTRAVENOUS at 10:04

## 2025-04-20 RX ADMIN — SODIUM CHLORIDE, PRESERVATIVE FREE 10 ML: 5 INJECTION INTRAVENOUS at 22:00

## 2025-04-20 RX ADMIN — FUROSEMIDE 5 MG/HR: 10 INJECTION, SOLUTION INTRAMUSCULAR; INTRAVENOUS at 05:39

## 2025-04-20 RX ADMIN — SODIUM CHLORIDE, PRESERVATIVE FREE 5 ML: 5 INJECTION INTRAVENOUS at 10:08

## 2025-04-20 RX ADMIN — HYDROCORTISONE SODIUM SUCCINATE 100 MG: 100 INJECTION, POWDER, FOR SOLUTION INTRAMUSCULAR; INTRAVENOUS at 18:17

## 2025-04-20 RX ADMIN — WATER 1000 MG: 1 INJECTION INTRAMUSCULAR; INTRAVENOUS; SUBCUTANEOUS at 00:23

## 2025-04-20 RX ADMIN — LEVOTHYROXINE SODIUM ANHYDROUS 150 MCG: 100 INJECTION, POWDER, LYOPHILIZED, FOR SOLUTION INTRAVENOUS at 10:07

## 2025-04-20 RX ADMIN — HYDROCORTISONE SODIUM SUCCINATE 100 MG: 100 INJECTION, POWDER, FOR SOLUTION INTRAMUSCULAR; INTRAVENOUS at 00:24

## 2025-04-20 RX ADMIN — IPRATROPIUM BROMIDE AND ALBUTEROL SULFATE 1 DOSE: .5; 2.5 SOLUTION RESPIRATORY (INHALATION) at 09:10

## 2025-04-20 RX ADMIN — SODIUM CHLORIDE, PRESERVATIVE FREE 10 ML: 5 INJECTION INTRAVENOUS at 10:09

## 2025-04-20 RX ADMIN — PANTOPRAZOLE SODIUM 40 MG: 40 INJECTION, POWDER, FOR SOLUTION INTRAVENOUS at 10:05

## 2025-04-20 ASSESSMENT — PAIN SCALES - GENERAL: PAINLEVEL_OUTOF10: 0

## 2025-04-20 NOTE — PLAN OF CARE
Problem: Safety - Adult  Goal: Free from fall injury  4/20/2025 0912 by Delfin Nash RN  Outcome: Progressing  4/20/2025 0305 by Jose Fink RN  Outcome: Progressing     Problem: Chronic Conditions and Co-morbidities  Goal: Patient's chronic conditions and co-morbidity symptoms are monitored and maintained or improved  4/20/2025 0912 by Delfin Nash RN  Outcome: Progressing  4/20/2025 0305 by Jose Fink RN  Outcome: Progressing     Problem: Discharge Planning  Goal: Discharge to home or other facility with appropriate resources  4/20/2025 0912 by Delfin Nash RN  Outcome: Progressing  4/20/2025 0305 by Jose Fink RN  Outcome: Progressing     Problem: Respiratory - Adult  Goal: Achieves optimal ventilation and oxygenation  4/20/2025 0912 by Delfin Nash RN  Outcome: Progressing  4/20/2025 0305 by Jose Fink RN  Outcome: Progressing  4/19/2025 2023 by Pia Her RCP  Outcome: Progressing

## 2025-04-20 NOTE — PLAN OF CARE
Problem: Safety - Adult  Goal: Free from fall injury  Outcome: Progressing     Problem: Chronic Conditions and Co-morbidities  Goal: Patient's chronic conditions and co-morbidity symptoms are monitored and maintained or improved  Outcome: Progressing     Problem: Discharge Planning  Goal: Discharge to home or other facility with appropriate resources  Outcome: Progressing     Problem: Respiratory - Adult  Goal: Achieves optimal ventilation and oxygenation  4/20/2025 0305 by Jose Fink RN  Outcome: Progressing  4/19/2025 2023 by Pia Her RCP  Outcome: Progressing

## 2025-04-20 NOTE — RT PROTOCOL NOTE
RT Inhaler-Nebulizer Bronchodilator Protocol Note    There is a bronchodilator order in the chart from a provider indicating to follow the RT Bronchodilator Protocol and there is an “Initiate RT Inhaler-Nebulizer Bronchodilator Protocol” order as well (see protocol at bottom of note).    CXR Findings:  XR CHEST PORTABLE  Result Date: 4/19/2025  Low lung volumes.  Cardiomegaly without definite acute abnormality.       The findings from the last RT Protocol Assessment were as follows:   History Pulmonary Disease: None or smoker <15 pack years  Respiratory Pattern: Regular pattern and RR 12-20 bpm  Breath Sounds: Slightly diminished and/or crackles  Cough: Strong, spontaneous, non-productive  Indication for Bronchodilator Therapy:    Bronchodilator Assessment Score: 2    Aerosolized bronchodilator medication orders have been revised according to the RT Inhaler-Nebulizer Bronchodilator Protocol below.    Respiratory Therapist to perform RT Therapy Protocol Assessment initially then follow the protocol.  Repeat RT Therapy Protocol Assessment PRN for score 0-3 or on second treatment, BID, and PRN for scores above 3.    No Indications - adjust the frequency to every 6 hours PRN wheezing or bronchospasm, if no treatments needed after 48 hours then discontinue using Per Protocol order mode.     If indication present, adjust the RT bronchodilator orders based on the Bronchodilator Assessment Score as indicated below.  Use Inhaler orders unless patient has one or more of the following: on home nebulizer, not able to hold breath for 10 seconds, is not alert and oriented, cannot activate and use MDI correctly, or respiratory rate 25 breaths per minute or more, then use the equivalent nebulizer order(s) with same Frequency and PRN reasons based on the score.  If a patient is on this medication at home then do not decrease Frequency below that used at home.    0-3 - enter or revise RT bronchodilator order(s) to equivalent RT

## 2025-04-21 ENCOUNTER — APPOINTMENT (OUTPATIENT)
Age: 89
DRG: 080 | End: 2025-04-21
Payer: MEDICARE

## 2025-04-21 LAB
ALBUMIN SERPL-MCNC: 3.6 G/DL (ref 3.5–5.2)
ALBUMIN/GLOB SERPL: 1.4 {RATIO} (ref 1–2.5)
ALP SERPL-CCNC: 93 U/L (ref 35–104)
ALT SERPL-CCNC: 58 U/L (ref 10–35)
ANION GAP SERPL CALCULATED.3IONS-SCNC: 12 MMOL/L (ref 9–16)
ANION GAP SERPL CALCULATED.3IONS-SCNC: 13 MMOL/L (ref 9–16)
ANION GAP SERPL CALCULATED.3IONS-SCNC: 14 MMOL/L (ref 9–16)
ANION GAP SERPL CALCULATED.3IONS-SCNC: 14 MMOL/L (ref 9–16)
AST SERPL-CCNC: 86 U/L (ref 10–35)
BASOPHILS # BLD: 0 K/UL (ref 0–0.2)
BASOPHILS NFR BLD: 0 % (ref 0–2)
BILIRUB SERPL-MCNC: 0.4 MG/DL (ref 0–1.2)
BUN SERPL-MCNC: 31 MG/DL (ref 8–23)
BUN SERPL-MCNC: 32 MG/DL (ref 8–23)
CALCIUM SERPL-MCNC: 8.3 MG/DL (ref 8.6–10.4)
CHLORIDE SERPL-SCNC: 102 MMOL/L (ref 98–107)
CHLORIDE SERPL-SCNC: 103 MMOL/L (ref 98–107)
CHLORIDE SERPL-SCNC: 103 MMOL/L (ref 98–107)
CHLORIDE SERPL-SCNC: 104 MMOL/L (ref 98–107)
CO2 SERPL-SCNC: 21 MMOL/L (ref 20–31)
CO2 SERPL-SCNC: 24 MMOL/L (ref 20–31)
CREAT SERPL-MCNC: 1.5 MG/DL (ref 0.6–0.9)
CREAT SERPL-MCNC: 1.6 MG/DL (ref 0.6–0.9)
EOSINOPHIL # BLD: 0 K/UL (ref 0–0.44)
EOSINOPHILS RELATIVE PERCENT: 0 % (ref 1–4)
ERYTHROCYTE [DISTWIDTH] IN BLOOD BY AUTOMATED COUNT: 15.2 % (ref 11.8–14.4)
GFR, ESTIMATED: 31 ML/MIN/1.73M2
GFR, ESTIMATED: 34 ML/MIN/1.73M2
GLUCOSE BLD-MCNC: 148 MG/DL (ref 65–105)
GLUCOSE SERPL-MCNC: 153 MG/DL (ref 74–99)
HCT VFR BLD AUTO: 35.8 % (ref 36.3–47.1)
HGB BLD-MCNC: 11.3 G/DL (ref 11.9–15.1)
IMM GRANULOCYTES # BLD AUTO: 0.09 K/UL (ref 0–0.3)
IMM GRANULOCYTES NFR BLD: 1 %
LYMPHOCYTES NFR BLD: 0.45 K/UL (ref 1.1–3.7)
LYMPHOCYTES RELATIVE PERCENT: 5 % (ref 24–43)
MCH RBC QN AUTO: 30.5 PG (ref 25.2–33.5)
MCHC RBC AUTO-ENTMCNC: 31.6 G/DL (ref 28.4–34.8)
MCV RBC AUTO: 96.5 FL (ref 82.6–102.9)
MONOCYTES NFR BLD: 0.18 K/UL (ref 0.1–1.2)
MONOCYTES NFR BLD: 2 % (ref 3–12)
MORPHOLOGY: ABNORMAL
MYOGLOBIN SERPL-MCNC: 453 NG/ML (ref 25–58)
NEUTROPHILS NFR BLD: 92 % (ref 36–65)
NEUTS SEG NFR BLD: 8.18 K/UL (ref 1.5–8.1)
NRBC BLD-RTO: 0 PER 100 WBC
PLATELET # BLD AUTO: ABNORMAL K/UL (ref 138–453)
PLATELET, FLUORESCENCE: 112 K/UL (ref 138–453)
PLATELETS.RETICULATED NFR BLD AUTO: 5.7 % (ref 1.1–10.3)
POTASSIUM SERPL-SCNC: 3.8 MMOL/L (ref 3.7–5.3)
POTASSIUM SERPL-SCNC: 4.1 MMOL/L (ref 3.7–5.3)
POTASSIUM SERPL-SCNC: 4.2 MMOL/L (ref 3.7–5.3)
POTASSIUM SERPL-SCNC: 4.3 MMOL/L (ref 3.7–5.3)
PROT SERPL-MCNC: 6.1 G/DL (ref 6.6–8.7)
RBC # BLD AUTO: 3.71 M/UL (ref 3.95–5.11)
SODIUM SERPL-SCNC: 137 MMOL/L (ref 136–145)
SODIUM SERPL-SCNC: 140 MMOL/L (ref 136–145)
SODIUM SERPL-SCNC: 140 MMOL/L (ref 136–145)
SODIUM SERPL-SCNC: 141 MMOL/L (ref 136–145)
TROPONIN I SERPL HS-MCNC: 79 NG/L (ref 0–14)
WBC OTHER # BLD: 8.9 K/UL (ref 3.5–11.3)

## 2025-04-21 PROCEDURE — 85025 COMPLETE CBC W/AUTO DIFF WBC: CPT

## 2025-04-21 PROCEDURE — 6360000002 HC RX W HCPCS

## 2025-04-21 PROCEDURE — 82565 ASSAY OF CREATININE: CPT

## 2025-04-21 PROCEDURE — 6370000000 HC RX 637 (ALT 250 FOR IP)

## 2025-04-21 PROCEDURE — 84484 ASSAY OF TROPONIN QUANT: CPT

## 2025-04-21 PROCEDURE — 2060000000 HC ICU INTERMEDIATE R&B

## 2025-04-21 PROCEDURE — 80051 ELECTROLYTE PANEL: CPT

## 2025-04-21 PROCEDURE — 85055 RETICULATED PLATELET ASSAY: CPT

## 2025-04-21 PROCEDURE — 36415 COLL VENOUS BLD VENIPUNCTURE: CPT

## 2025-04-21 PROCEDURE — 2500000003 HC RX 250 WO HCPCS

## 2025-04-21 PROCEDURE — C8929 TTE W OR WO FOL WCON,DOPPLER: HCPCS

## 2025-04-21 PROCEDURE — 83874 ASSAY OF MYOGLOBIN: CPT

## 2025-04-21 PROCEDURE — 6360000004 HC RX CONTRAST MEDICATION

## 2025-04-21 PROCEDURE — 2580000003 HC RX 258

## 2025-04-21 PROCEDURE — 99291 CRITICAL CARE FIRST HOUR: CPT | Performed by: INTERNAL MEDICINE

## 2025-04-21 PROCEDURE — 84520 ASSAY OF UREA NITROGEN: CPT

## 2025-04-21 PROCEDURE — 82947 ASSAY GLUCOSE BLOOD QUANT: CPT

## 2025-04-21 PROCEDURE — 80053 COMPREHEN METABOLIC PANEL: CPT

## 2025-04-21 RX ORDER — DOCUSATE SODIUM 100 MG/1
200 CAPSULE, LIQUID FILLED ORAL DAILY
Status: DISCONTINUED | OUTPATIENT
Start: 2025-04-21 | End: 2025-04-23 | Stop reason: HOSPADM

## 2025-04-21 RX ORDER — HEPARIN SODIUM 5000 [USP'U]/ML
5000 INJECTION, SOLUTION INTRAVENOUS; SUBCUTANEOUS EVERY 8 HOURS SCHEDULED
Status: DISCONTINUED | OUTPATIENT
Start: 2025-04-21 | End: 2025-04-23 | Stop reason: HOSPADM

## 2025-04-21 RX ORDER — HYDROCORTISONE SODIUM SUCCINATE 100 MG/2ML
50 INJECTION INTRAMUSCULAR; INTRAVENOUS EVERY 8 HOURS
Status: DISCONTINUED | OUTPATIENT
Start: 2025-04-21 | End: 2025-04-21

## 2025-04-21 RX ORDER — PREDNISONE 20 MG/1
20 TABLET ORAL DAILY
Status: DISCONTINUED | OUTPATIENT
Start: 2025-04-21 | End: 2025-04-22

## 2025-04-21 RX ORDER — HYDROCORTISONE SODIUM SUCCINATE 100 MG/2ML
100 INJECTION INTRAMUSCULAR; INTRAVENOUS EVERY 8 HOURS
Status: DISCONTINUED | OUTPATIENT
Start: 2025-04-21 | End: 2025-04-21

## 2025-04-21 RX ORDER — PREDNISONE 5 MG/1
5 TABLET ORAL DAILY
Status: DISCONTINUED | OUTPATIENT
Start: 2025-04-30 | End: 2025-04-22

## 2025-04-21 RX ORDER — PREDNISONE 20 MG/1
10 TABLET ORAL DAILY
Status: DISCONTINUED | OUTPATIENT
Start: 2025-04-27 | End: 2025-04-22

## 2025-04-21 RX ADMIN — SULFUR HEXAFLUORIDE 2 ML: KIT at 15:23

## 2025-04-21 RX ADMIN — PANTOPRAZOLE SODIUM 40 MG: 40 INJECTION, POWDER, FOR SOLUTION INTRAVENOUS at 08:53

## 2025-04-21 RX ADMIN — PREDNISONE 20 MG: 20 TABLET ORAL at 21:35

## 2025-04-21 RX ADMIN — LEVOTHYROXINE SODIUM ANHYDROUS 150 MCG: 100 INJECTION, POWDER, LYOPHILIZED, FOR SOLUTION INTRAVENOUS at 08:54

## 2025-04-21 RX ADMIN — SODIUM CHLORIDE, PRESERVATIVE FREE 10 ML: 5 INJECTION INTRAVENOUS at 20:13

## 2025-04-21 RX ADMIN — HEPARIN SODIUM 5000 UNITS: 5000 INJECTION INTRAVENOUS; SUBCUTANEOUS at 21:35

## 2025-04-21 RX ADMIN — HYDROCORTISONE SODIUM SUCCINATE 100 MG: 100 INJECTION, POWDER, FOR SOLUTION INTRAMUSCULAR; INTRAVENOUS at 00:07

## 2025-04-21 RX ADMIN — WATER 1000 MG: 1 INJECTION INTRAMUSCULAR; INTRAVENOUS; SUBCUTANEOUS at 00:08

## 2025-04-21 RX ADMIN — SODIUM CHLORIDE, PRESERVATIVE FREE 10 ML: 5 INJECTION INTRAVENOUS at 08:51

## 2025-04-21 RX ADMIN — HYDROCORTISONE SODIUM SUCCINATE 100 MG: 100 INJECTION, POWDER, FOR SOLUTION INTRAMUSCULAR; INTRAVENOUS at 08:57

## 2025-04-21 RX ADMIN — FUROSEMIDE 20 MG: 10 INJECTION, SOLUTION INTRAMUSCULAR; INTRAVENOUS at 08:56

## 2025-04-21 RX ADMIN — SODIUM CHLORIDE, PRESERVATIVE FREE 5 ML: 5 INJECTION INTRAVENOUS at 08:54

## 2025-04-21 RX ADMIN — HYDROCORTISONE SODIUM SUCCINATE 100 MG: 100 INJECTION, POWDER, FOR SOLUTION INTRAMUSCULAR; INTRAVENOUS at 17:27

## 2025-04-21 ASSESSMENT — PAIN SCALES - GENERAL
PAINLEVEL_OUTOF10: 0
PAINLEVEL_OUTOF10: 3
PAINLEVEL_OUTOF10: 0
PAINLEVEL_OUTOF10: 0

## 2025-04-21 ASSESSMENT — PAIN DESCRIPTION - DESCRIPTORS: DESCRIPTORS: ACHING

## 2025-04-21 ASSESSMENT — PAIN DESCRIPTION - LOCATION: LOCATION: GENERALIZED

## 2025-04-21 NOTE — TRANSFER CENTER NOTE
Critical care team - Resident sign-out to medicine service      Date and time: 4/21/2025 12:39 PM  Patient's name:  Parvin Patel  Medical Record Number: [id number]  Patient's account/billing number: [id number]  Patient's YOB: 1935  Age: 89 y.o.  Date of Admission: 4/19/2025  8:36 AM  Length of stay during current admission: 2    Primary Care Physician: Braydon Hernandez MD    Code Status: Full Code    Mode of physician to physician communication:        [x] Via telephone   [] In person     Date and time of sign-out: 4/21/2025 12:39 PM    Accepting Internal Medicine resident: Dr. Kruger     Accepting Medicine team: IM Team 1    Accepting team's attending: Dr. Cheema    Patient's current ICU Bed:  3017     Patient's assigned bed on floor:  416        [] Med-Surg Monitored [x] Step-down       [] Psychiatry ICU       [] Psych floor     Reason for ICU admission:     myxedema coma.       ICU course summary:     89 y.o. female with a past medical history of thyroid cancer s/p thyroidectomy, acquired hypothyroidism secondary to a forementioned on Synthyroid, insulin dependent type II diabetes mellitus, dementia, depression, HFpEF, history of GI bleed, presented to the ED via EMS after a fall at home, unclear if there was any loss of consciousness.  Last known well at 2:30 AM.  Patient lives at home with her daughter and per daughter, patient was trying to get out of bed and had a fall somewhere between 2:30 AM and 7 AM when the daughter came home from night shift.  EMS was called and patient was found to be bradycardic with heart rate in the 40s and hypoglycemic with blood glucose in the 40s.  She was given D10 with improvement in her sugars to 60.  Found to be in rhabdomyolysis.     On arrival to the ER, patient was found to be hypothermic with Tmax of 90.3.  /70.  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  Given Solu-Cortef 100 and IV Synthyroid 200 in the ED. her

## 2025-04-21 NOTE — CARE COORDINATION
Case Management Assessment  Initial Evaluation    Date/Time of Evaluation: 4/21/2025 9:27 AM  Assessment Completed by: Terese Singer RN    If patient is discharged prior to next notation, then this note serves as note for discharge by case management.    Patient Name: Parvin Patel                   YOB: 1935  Diagnosis: Bradycardia [R00.1]  Hypernatremia [E87.0]  Hypoglycemia [E16.2]  Myxedema coma (HCC) [E03.5]  Acute cystitis without hematuria [N30.00]  Hypothermia, initial encounter [T68.XXXA]  Non-traumatic rhabdomyolysis [M62.82]  Congestive heart failure, unspecified HF chronicity, unspecified heart failure type (HCC) [I50.9]                   Date / Time: 4/19/2025  8:36 AM    Patient Admission Status: Inpatient   Readmission Risk (Low < 19, Mod (19-27), High > 27): Readmission Risk Score: 17.2    Current PCP: Braydon Heranndez MD  PCP verified by CM? (P) Yes (Braydon Hernandez)    Chart Reviewed: Yes      History Provided by: (P) Patient, Child/Family (and dioner Amanda at bedside)  Patient Orientation: (P) Alert and Oriented, Person, Place, Situation, Self    Patient Cognition: (P) Alert    Hospitalization in the last 30 days (Readmission):  No    If yes, Readmission Assessment in CM Navigator will be completed.    Advance Directives:      Code Status: Full Code   Patient's Primary Decision Maker is: (P) Legal Next of Kin (has 6 children)    Primary Decision Maker: Qing Morgan - Child - [phone]    Secondary Decision Maker: Amanda Khalil - Child - [phone]    Discharge Planning:    Patient lives with: (P) Children Type of Home: (P) House  Primary Care Giver: (P) Family  Patient Support Systems include: (P) Children   Current Financial resources: (P) Medicare, Medicaid  Current community resources: (P) None  Current services prior to admission: (P) Durable Medical Equipment            Current DME: (P) Wheelchair, Walker            Type of Home Care services:  (P) None    ADLS  Prior functional

## 2025-04-21 NOTE — PLAN OF CARE
Problem: Safety - Adult  Goal: Free from fall injury  Outcome: Progressing     Problem: Chronic Conditions and Co-morbidities  Goal: Patient's chronic conditions and co-morbidity symptoms are monitored and maintained or improved  Outcome: Progressing     Problem: Discharge Planning  Goal: Discharge to home or other facility with appropriate resources  Outcome: Progressing     Problem: Respiratory - Adult  Goal: Achieves optimal ventilation and oxygenation  Outcome: Progressing     Problem: Skin/Tissue Integrity  Goal: Skin integrity remains intact  Description: 1.  Monitor for areas of redness and/or skin breakdown2.  Assess vascular access sites hourly3.  Every 4-6 hours minimum:  Change oxygen saturation probe site4.  Every 4-6 hours:  If on nasal continuous positive airway pressure, respiratory therapy assess nares and determine need for appliance change or resting period  Outcome: Progressing

## 2025-04-22 PROBLEM — M62.82 NON-TRAUMATIC RHABDOMYOLYSIS: Status: ACTIVE | Noted: 2025-04-22

## 2025-04-22 PROBLEM — T68.XXXA HYPOTHERMIA: Status: ACTIVE | Noted: 2025-04-22

## 2025-04-22 PROBLEM — R00.1 BRADYCARDIA: Status: ACTIVE | Noted: 2025-04-22

## 2025-04-22 PROBLEM — E87.0 HYPERNATREMIA: Status: ACTIVE | Noted: 2025-04-22

## 2025-04-22 PROBLEM — I50.9 CONGESTIVE HEART FAILURE (HCC): Status: ACTIVE | Noted: 2025-04-22

## 2025-04-22 LAB
ANION GAP SERPL CALCULATED.3IONS-SCNC: 13 MMOL/L (ref 9–16)
BUN SERPL-MCNC: 32 MG/DL (ref 8–23)
CALCIUM SERPL-MCNC: 8.2 MG/DL (ref 8.6–10.4)
CHLORIDE SERPL-SCNC: 103 MMOL/L (ref 98–107)
CO2 SERPL-SCNC: 26 MMOL/L (ref 20–31)
CREAT SERPL-MCNC: 1.3 MG/DL (ref 0.6–0.9)
ECHO AO ASC DIAM: 3.9 CM
ECHO AO ASCENDING AORTA INDEX: 1.86 CM/M2
ECHO AO ROOT DIAM: 3.1 CM
ECHO AO ROOT INDEX: 1.48 CM/M2
ECHO AV AREA PEAK VELOCITY: 1.2 CM2
ECHO AV AREA VTI: 1.3 CM2
ECHO AV AREA/BSA PEAK VELOCITY: 0.6 CM2/M2
ECHO AV AREA/BSA VTI: 0.6 CM2/M2
ECHO AV MEAN GRADIENT: 7 MMHG
ECHO AV MEAN VELOCITY: 1.2 M/S
ECHO AV PEAK GRADIENT: 12 MMHG
ECHO AV PEAK VELOCITY: 1.8 M/S
ECHO AV VELOCITY RATIO: 0.56
ECHO AV VTI: 46.2 CM
ECHO BSA: 2.07 M2
ECHO LA AREA 2C: 21.2 CM2
ECHO LA AREA 4C: 21.8 CM2
ECHO LA DIAMETER INDEX: 1.48 CM/M2
ECHO LA DIAMETER: 3.1 CM
ECHO LA MAJOR AXIS: 6.8 CM
ECHO LA MINOR AXIS: 5.2 CM
ECHO LA TO AORTIC ROOT RATIO: 1
ECHO LA VOL MOD A2C: 72 ML (ref 22–52)
ECHO LA VOL MOD A4C: 58 ML (ref 22–52)
ECHO LA VOLUME INDEX MOD A2C: 34 ML/M2 (ref 16–34)
ECHO LA VOLUME INDEX MOD A4C: 28 ML/M2 (ref 16–34)
ECHO LV E' LATERAL VELOCITY: 7.07 CM/S
ECHO LV E' SEPTAL VELOCITY: 4.9 CM/S
ECHO LV EDV A2C: 100 ML
ECHO LV EDV A4C: 104 ML
ECHO LV EDV INDEX A4C: 50 ML/M2
ECHO LV EDV NDEX A2C: 48 ML/M2
ECHO LV EF PHYSICIAN: 60 %
ECHO LV EJECTION FRACTION A2C: 62 %
ECHO LV EJECTION FRACTION A4C: 66 %
ECHO LV EJECTION FRACTION BIPLANE: 66 % (ref 55–100)
ECHO LV ESV A2C: 38 ML
ECHO LV ESV A4C: 35 ML
ECHO LV ESV INDEX A2C: 18 ML/M2
ECHO LV ESV INDEX A4C: 17 ML/M2
ECHO LV FRACTIONAL SHORTENING: 46 % (ref 28–44)
ECHO LV INTERNAL DIMENSION DIASTOLE INDEX: 1.67 CM/M2
ECHO LV INTERNAL DIMENSION DIASTOLIC: 3.5 CM (ref 3.9–5.3)
ECHO LV INTERNAL DIMENSION SYSTOLIC INDEX: 0.9 CM/M2
ECHO LV INTERNAL DIMENSION SYSTOLIC: 1.9 CM
ECHO LV IVSD: 1.2 CM (ref 0.6–0.9)
ECHO LV MASS 2D: 135.8 G (ref 67–162)
ECHO LV MASS INDEX 2D: 64.7 G/M2 (ref 43–95)
ECHO LV POSTERIOR WALL DIASTOLIC: 1.2 CM (ref 0.6–0.9)
ECHO LV RELATIVE WALL THICKNESS RATIO: 0.69
ECHO LVOT AREA: 2 CM2
ECHO LVOT AV VTI INDEX: 0.62
ECHO LVOT DIAM: 1.6 CM
ECHO LVOT MEAN GRADIENT: 2 MMHG
ECHO LVOT PEAK GRADIENT: 4 MMHG
ECHO LVOT PEAK VELOCITY: 1 M/S
ECHO LVOT STROKE VOLUME INDEX: 27.6 ML/M2
ECHO LVOT SV: 57.9 ML
ECHO LVOT VTI: 28.8 CM
ECHO MV A VELOCITY: 0.73 M/S
ECHO MV AREA VTI: 1.5 CM2
ECHO MV E DECELERATION TIME (DT): 255 MS
ECHO MV E VELOCITY: 1.01 M/S
ECHO MV E/A RATIO: 1.38
ECHO MV E/E' LATERAL: 14.29
ECHO MV E/E' RATIO (AVERAGED): 17.45
ECHO MV E/E' SEPTAL: 20.61
ECHO MV LVOT VTI INDEX: 1.33
ECHO MV MAX VELOCITY: 1.1 M/S
ECHO MV MEAN GRADIENT: 2 MMHG
ECHO MV MEAN VELOCITY: 0.7 M/S
ECHO MV PEAK GRADIENT: 5 MMHG
ECHO MV VTI: 38.4 CM
ECHO PV MAX VELOCITY: 1 M/S
ECHO PV PEAK GRADIENT: 4 MMHG
ECHO RV FREE WALL PEAK S': 8.5 CM/S
ECHO RV TAPSE: 1.1 CM (ref 1.7–?)
GFR, ESTIMATED: 40 ML/MIN/1.73M2
GLUCOSE BLD-MCNC: 182 MG/DL (ref 65–105)
GLUCOSE BLD-MCNC: 186 MG/DL (ref 65–105)
GLUCOSE BLD-MCNC: 201 MG/DL (ref 65–105)
GLUCOSE BLD-MCNC: 201 MG/DL (ref 65–105)
GLUCOSE SERPL-MCNC: 152 MG/DL (ref 74–99)
POTASSIUM SERPL-SCNC: 3.9 MMOL/L (ref 3.7–5.3)
SODIUM SERPL-SCNC: 142 MMOL/L (ref 136–145)

## 2025-04-22 PROCEDURE — 36415 COLL VENOUS BLD VENIPUNCTURE: CPT

## 2025-04-22 PROCEDURE — 6360000002 HC RX W HCPCS

## 2025-04-22 PROCEDURE — 2580000003 HC RX 258

## 2025-04-22 PROCEDURE — 2060000000 HC ICU INTERMEDIATE R&B

## 2025-04-22 PROCEDURE — 97530 THERAPEUTIC ACTIVITIES: CPT

## 2025-04-22 PROCEDURE — 97166 OT EVAL MOD COMPLEX 45 MIN: CPT

## 2025-04-22 PROCEDURE — 97535 SELF CARE MNGMENT TRAINING: CPT

## 2025-04-22 PROCEDURE — 2500000003 HC RX 250 WO HCPCS

## 2025-04-22 PROCEDURE — 99232 SBSQ HOSP IP/OBS MODERATE 35: CPT | Performed by: INTERNAL MEDICINE

## 2025-04-22 PROCEDURE — 51798 US URINE CAPACITY MEASURE: CPT

## 2025-04-22 PROCEDURE — 6370000000 HC RX 637 (ALT 250 FOR IP)

## 2025-04-22 PROCEDURE — 99233 SBSQ HOSP IP/OBS HIGH 50: CPT | Performed by: HOSPITALIST

## 2025-04-22 PROCEDURE — 80048 BASIC METABOLIC PNL TOTAL CA: CPT

## 2025-04-22 PROCEDURE — 82947 ASSAY GLUCOSE BLOOD QUANT: CPT

## 2025-04-22 PROCEDURE — 97162 PT EVAL MOD COMPLEX 30 MIN: CPT

## 2025-04-22 RX ORDER — PREDNISONE 20 MG/1
10 TABLET ORAL DAILY
Status: DISCONTINUED | OUTPATIENT
Start: 2025-04-25 | End: 2025-04-23 | Stop reason: HOSPADM

## 2025-04-22 RX ORDER — AMLODIPINE BESYLATE 5 MG/1
5 TABLET ORAL DAILY
Status: DISCONTINUED | OUTPATIENT
Start: 2025-04-22 | End: 2025-04-22

## 2025-04-22 RX ORDER — FERROUS SULFATE 325(65) MG
1 TABLET ORAL 2 TIMES DAILY
Qty: 60 TABLET | Refills: 1 | Status: SHIPPED | OUTPATIENT
Start: 2025-04-22

## 2025-04-22 RX ORDER — PREDNISONE 20 MG/1
20 TABLET ORAL DAILY
Status: COMPLETED | OUTPATIENT
Start: 2025-04-22 | End: 2025-04-22

## 2025-04-22 RX ORDER — PREDNISONE 5 MG/1
5 TABLET ORAL DAILY
Status: DISCONTINUED | OUTPATIENT
Start: 2025-04-27 | End: 2025-04-23 | Stop reason: HOSPADM

## 2025-04-22 RX ORDER — AMLODIPINE BESYLATE 10 MG/1
10 TABLET ORAL DAILY
Status: DISCONTINUED | OUTPATIENT
Start: 2025-04-23 | End: 2025-04-23 | Stop reason: HOSPADM

## 2025-04-22 RX ORDER — LEVOTHYROXINE SODIUM 75 UG/1
150 TABLET ORAL DAILY
Status: DISCONTINUED | OUTPATIENT
Start: 2025-04-23 | End: 2025-04-23 | Stop reason: HOSPADM

## 2025-04-22 RX ADMIN — FUROSEMIDE 20 MG: 10 INJECTION, SOLUTION INTRAMUSCULAR; INTRAVENOUS at 17:22

## 2025-04-22 RX ADMIN — LEVOTHYROXINE SODIUM ANHYDROUS 150 MCG: 100 INJECTION, POWDER, LYOPHILIZED, FOR SOLUTION INTRAVENOUS at 07:50

## 2025-04-22 RX ADMIN — PANTOPRAZOLE SODIUM 40 MG: 40 INJECTION, POWDER, FOR SOLUTION INTRAVENOUS at 07:50

## 2025-04-22 RX ADMIN — SODIUM CHLORIDE, PRESERVATIVE FREE 5 ML: 5 INJECTION INTRAVENOUS at 07:51

## 2025-04-22 RX ADMIN — WATER 1000 MG: 1 INJECTION INTRAMUSCULAR; INTRAVENOUS; SUBCUTANEOUS at 23:44

## 2025-04-22 RX ADMIN — SODIUM CHLORIDE, PRESERVATIVE FREE 10 ML: 5 INJECTION INTRAVENOUS at 21:02

## 2025-04-22 RX ADMIN — HEPARIN SODIUM 5000 UNITS: 5000 INJECTION INTRAVENOUS; SUBCUTANEOUS at 21:02

## 2025-04-22 RX ADMIN — WATER 1000 MG: 1 INJECTION INTRAMUSCULAR; INTRAVENOUS; SUBCUTANEOUS at 00:13

## 2025-04-22 RX ADMIN — SODIUM CHLORIDE, PRESERVATIVE FREE 10 ML: 5 INJECTION INTRAVENOUS at 07:50

## 2025-04-22 RX ADMIN — PREDNISONE 20 MG: 20 TABLET ORAL at 07:49

## 2025-04-22 RX ADMIN — AMLODIPINE BESYLATE 5 MG: 5 TABLET ORAL at 10:35

## 2025-04-22 ASSESSMENT — PAIN SCALES - GENERAL: PAINLEVEL_OUTOF10: 0

## 2025-04-22 NOTE — PLAN OF CARE
Problem: Safety - Adult  Goal: Free from fall injury  4/21/2025 2008 by Jazmyn Almonte RN  Outcome: Progressing  4/21/2025 1342 by Dai Gonzalez RN  Outcome: Progressing  4/21/2025 0659 by Jose Fink RN  Outcome: Progressing     Problem: Chronic Conditions and Co-morbidities  Goal: Patient's chronic conditions and co-morbidity symptoms are monitored and maintained or improved  4/21/2025 2008 by Jazmyn Almonte RN  Outcome: Progressing  4/21/2025 1342 by Dai Gonzalez RN  Outcome: Progressing  Flowsheets (Taken 4/21/2025 0800)  Care Plan - Patient's Chronic Conditions and Co-Morbidity Symptoms are Monitored and Maintained or Improved: Monitor and assess patient's chronic conditions and comorbid symptoms for stability, deterioration, or improvement  4/21/2025 0659 by Jose Fink RN  Outcome: Progressing     Problem: Discharge Planning  Goal: Discharge to home or other facility with appropriate resources  4/21/2025 2008 by Jazmyn Almonte RN  Outcome: Progressing  4/21/2025 1342 by Dai Gonzalez RN  Outcome: Progressing  Flowsheets (Taken 4/21/2025 0800)  Discharge to home or other facility with appropriate resources: Identify barriers to discharge with patient and caregiver  4/21/2025 0659 by Jose Fink RN  Outcome: Progressing     Problem: Respiratory - Adult  Goal: Achieves optimal ventilation and oxygenation  4/21/2025 2008 by Jazmyn Almonte RN  Outcome: Progressing  4/21/2025 1342 by Dai Gonzalez RN  Outcome: Progressing  Flowsheets (Taken 4/21/2025 0800)  Achieves optimal ventilation and oxygenation: Assess for changes in respiratory status  4/21/2025 0659 by Jose Fink RN  Outcome: Progressing     Problem: Skin/Tissue Integrity  Goal: Skin integrity remains intact  Description: 1.  Monitor for areas of redness and/or skin breakdown2.  Assess vascular access sites hourly3.  Every 4-6 hours minimum:  Change oxygen saturation probe site4.  Every 4-6

## 2025-04-22 NOTE — PLAN OF CARE
Problem: Safety - Adult  Goal: Free from fall injury  4/22/2025 0725 by Elana Smith RN  Outcome: Progressing  4/21/2025 2008 by Jazmyn Almonte RN  Outcome: Progressing     Problem: Chronic Conditions and Co-morbidities  Goal: Patient's chronic conditions and co-morbidity symptoms are monitored and maintained or improved  4/22/2025 0725 by Elana Smith RN  Outcome: Progressing  4/21/2025 2008 by Jazmyn Almonte RN  Outcome: Progressing     Problem: Discharge Planning  Goal: Discharge to home or other facility with appropriate resources  4/22/2025 0725 by Elana Smith RN  Outcome: Progressing  4/21/2025 2008 by Jazmyn Almonte RN  Outcome: Progressing     Problem: Respiratory - Adult  Goal: Achieves optimal ventilation and oxygenation  4/22/2025 0725 by Elana Smith RN  Outcome: Progressing  4/21/2025 2008 by Jazmyn Almonte RN  Outcome: Progressing     Problem: Skin/Tissue Integrity  Goal: Skin integrity remains intact  Description: 1.  Monitor for areas of redness and/or skin breakdown2.  Assess vascular access sites hourly3.  Every 4-6 hours minimum:  Change oxygen saturation probe site4.  Every 4-6 hours:  If on nasal continuous positive airway pressure, respiratory therapy assess nares and determine need for appliance change or resting period  4/22/2025 0725 by Elana Smith RN  Outcome: Progressing  4/21/2025 2008 by Jazmyn Almonte RN  Outcome: Progressing     Problem: Neurosensory - Adult  Goal: Achieves stable or improved neurological status  4/22/2025 0725 by Elana Smith RN  Outcome: Progressing  4/21/2025 2008 by Jazmyn Almonte RN  Outcome: Progressing     Problem: Cardiovascular - Adult  Goal: Maintains optimal cardiac output and hemodynamic stability  4/22/2025 0725 by Elana Smith RN  Outcome: Progressing  4/21/2025 2008 by Jazmyn Almonte RN  Outcome: Progressing     Problem: Skin/Tissue Integrity - Adult  Goal: Skin integrity remains intact  Description: 1.

## 2025-04-23 VITALS
WEIGHT: 262.79 LBS | TEMPERATURE: 98.4 F | HEIGHT: 61 IN | SYSTOLIC BLOOD PRESSURE: 130 MMHG | DIASTOLIC BLOOD PRESSURE: 68 MMHG | OXYGEN SATURATION: 95 % | HEART RATE: 72 BPM | RESPIRATION RATE: 18 BRPM | BODY MASS INDEX: 49.61 KG/M2

## 2025-04-23 LAB
ALBUMIN SERPL-MCNC: 3.3 G/DL (ref 3.5–5.2)
ALBUMIN/GLOB SERPL: 1.2 {RATIO} (ref 1–2.5)
ALP SERPL-CCNC: 102 U/L (ref 35–104)
ALT SERPL-CCNC: 59 U/L (ref 10–35)
ANION GAP SERPL CALCULATED.3IONS-SCNC: 11 MMOL/L (ref 9–16)
AST SERPL-CCNC: 54 U/L (ref 10–35)
BASOPHILS # BLD: 0.09 K/UL (ref 0–0.2)
BASOPHILS NFR BLD: 1 % (ref 0–2)
BILIRUB SERPL-MCNC: 0.4 MG/DL (ref 0–1.2)
BUN SERPL-MCNC: 31 MG/DL (ref 8–23)
CALCIUM SERPL-MCNC: 8 MG/DL (ref 8.6–10.4)
CHLORIDE SERPL-SCNC: 102 MMOL/L (ref 98–107)
CO2 SERPL-SCNC: 26 MMOL/L (ref 20–31)
CREAT SERPL-MCNC: 1.2 MG/DL (ref 0.6–0.9)
EOSINOPHIL # BLD: 0.15 K/UL (ref 0–0.44)
EOSINOPHILS RELATIVE PERCENT: 2 % (ref 1–4)
ERYTHROCYTE [DISTWIDTH] IN BLOOD BY AUTOMATED COUNT: 14.7 % (ref 11.8–14.4)
GFR, ESTIMATED: 44 ML/MIN/1.73M2
GLUCOSE BLD-MCNC: 120 MG/DL (ref 65–105)
GLUCOSE BLD-MCNC: 186 MG/DL (ref 65–105)
GLUCOSE BLD-MCNC: 215 MG/DL (ref 65–105)
GLUCOSE SERPL-MCNC: 120 MG/DL (ref 74–99)
HCT VFR BLD AUTO: 39.4 % (ref 36.3–47.1)
HGB BLD-MCNC: 12.2 G/DL (ref 11.9–15.1)
IMM GRANULOCYTES # BLD AUTO: 0.21 K/UL (ref 0–0.3)
IMM GRANULOCYTES NFR BLD: 3 %
LYMPHOCYTES NFR BLD: 1.06 K/UL (ref 1.1–3.7)
LYMPHOCYTES RELATIVE PERCENT: 14 % (ref 24–43)
MCH RBC QN AUTO: 30.9 PG (ref 25.2–33.5)
MCHC RBC AUTO-ENTMCNC: 31 G/DL (ref 28.4–34.8)
MCV RBC AUTO: 99.7 FL (ref 82.6–102.9)
MONOCYTES NFR BLD: 0.64 K/UL (ref 0.1–1.2)
MONOCYTES NFR BLD: 8 % (ref 3–12)
NEUTROPHILS NFR BLD: 72 % (ref 36–65)
NEUTS SEG NFR BLD: 5.63 K/UL (ref 1.5–8.1)
NRBC BLD-RTO: 0 PER 100 WBC
PLATELET # BLD AUTO: ABNORMAL K/UL (ref 138–453)
PLATELET, FLUORESCENCE: 106 K/UL (ref 138–453)
PLATELETS.RETICULATED NFR BLD AUTO: 7.7 % (ref 1.1–10.3)
POTASSIUM SERPL-SCNC: 3.9 MMOL/L (ref 3.7–5.3)
PROT SERPL-MCNC: 6.1 G/DL (ref 6.6–8.7)
RBC # BLD AUTO: 3.95 M/UL (ref 3.95–5.11)
RBC # BLD: ABNORMAL 10*6/UL
SODIUM SERPL-SCNC: 139 MMOL/L (ref 136–145)
WBC OTHER # BLD: 7.8 K/UL (ref 3.5–11.3)

## 2025-04-23 PROCEDURE — 82947 ASSAY GLUCOSE BLOOD QUANT: CPT

## 2025-04-23 PROCEDURE — 85055 RETICULATED PLATELET ASSAY: CPT

## 2025-04-23 PROCEDURE — 6370000000 HC RX 637 (ALT 250 FOR IP)

## 2025-04-23 PROCEDURE — 85025 COMPLETE CBC W/AUTO DIFF WBC: CPT

## 2025-04-23 PROCEDURE — 99232 SBSQ HOSP IP/OBS MODERATE 35: CPT | Performed by: STUDENT IN AN ORGANIZED HEALTH CARE EDUCATION/TRAINING PROGRAM

## 2025-04-23 PROCEDURE — 6360000002 HC RX W HCPCS

## 2025-04-23 PROCEDURE — 2500000003 HC RX 250 WO HCPCS

## 2025-04-23 PROCEDURE — 99239 HOSP IP/OBS DSCHRG MGMT >30: CPT | Performed by: HOSPITALIST

## 2025-04-23 PROCEDURE — 80053 COMPREHEN METABOLIC PANEL: CPT

## 2025-04-23 PROCEDURE — 36415 COLL VENOUS BLD VENIPUNCTURE: CPT

## 2025-04-23 RX ORDER — FUROSEMIDE 40 MG/1
40 TABLET ORAL 2 TIMES DAILY
Status: DISCONTINUED | OUTPATIENT
Start: 2025-04-23 | End: 2025-04-23

## 2025-04-23 RX ORDER — DEXTROSE MONOHYDRATE 100 MG/ML
INJECTION, SOLUTION INTRAVENOUS CONTINUOUS PRN
Status: DISCONTINUED | OUTPATIENT
Start: 2025-04-23 | End: 2025-04-23 | Stop reason: HOSPADM

## 2025-04-23 RX ORDER — CEPHALEXIN 500 MG/1
500 CAPSULE ORAL EVERY 8 HOURS SCHEDULED
Qty: 12 CAPSULE | Refills: 0 | Status: SHIPPED | OUTPATIENT
Start: 2025-04-23 | End: 2025-04-27

## 2025-04-23 RX ORDER — GLUCAGON 1 MG/ML
1 KIT INJECTION PRN
Status: DISCONTINUED | OUTPATIENT
Start: 2025-04-23 | End: 2025-04-23 | Stop reason: HOSPADM

## 2025-04-23 RX ORDER — PREDNISONE 5 MG/1
TABLET ORAL
Qty: 9 TABLET | Refills: 0 | Status: SHIPPED | OUTPATIENT
Start: 2025-04-24 | End: 2025-05-03

## 2025-04-23 RX ORDER — INSULIN LISPRO 100 [IU]/ML
0-4 INJECTION, SOLUTION INTRAVENOUS; SUBCUTANEOUS
Status: DISCONTINUED | OUTPATIENT
Start: 2025-04-23 | End: 2025-04-23 | Stop reason: HOSPADM

## 2025-04-23 RX ORDER — FUROSEMIDE 40 MG/1
40 TABLET ORAL DAILY
Status: DISCONTINUED | OUTPATIENT
Start: 2025-04-24 | End: 2025-04-23 | Stop reason: HOSPADM

## 2025-04-23 RX ORDER — AMLODIPINE BESYLATE 10 MG/1
10 TABLET ORAL DAILY
Qty: 30 TABLET | Refills: 3 | Status: SHIPPED | OUTPATIENT
Start: 2025-04-24

## 2025-04-23 RX ORDER — PANTOPRAZOLE SODIUM 40 MG/1
40 TABLET, DELAYED RELEASE ORAL
Status: DISCONTINUED | OUTPATIENT
Start: 2025-04-23 | End: 2025-04-23 | Stop reason: HOSPADM

## 2025-04-23 RX ORDER — CEPHALEXIN 500 MG/1
500 CAPSULE ORAL EVERY 8 HOURS SCHEDULED
Status: DISCONTINUED | OUTPATIENT
Start: 2025-04-23 | End: 2025-04-23 | Stop reason: HOSPADM

## 2025-04-23 RX ADMIN — AMLODIPINE BESYLATE 10 MG: 10 TABLET ORAL at 09:36

## 2025-04-23 RX ADMIN — CEPHALEXIN 500 MG: 500 CAPSULE ORAL at 10:35

## 2025-04-23 RX ADMIN — FUROSEMIDE 40 MG: 40 TABLET ORAL at 09:36

## 2025-04-23 RX ADMIN — LEVOTHYROXINE SODIUM 150 MCG: 0.07 TABLET ORAL at 10:35

## 2025-04-23 RX ADMIN — SODIUM CHLORIDE, PRESERVATIVE FREE 10 ML: 5 INJECTION INTRAVENOUS at 09:35

## 2025-04-23 RX ADMIN — PREDNISONE 15 MG: 10 TABLET ORAL at 09:35

## 2025-04-23 RX ADMIN — PANTOPRAZOLE SODIUM 40 MG: 40 TABLET, DELAYED RELEASE ORAL at 10:35

## 2025-04-23 RX ADMIN — HEPARIN SODIUM 5000 UNITS: 5000 INJECTION INTRAVENOUS; SUBCUTANEOUS at 05:34

## 2025-04-23 NOTE — PLAN OF CARE
Problem: Safety - Adult  Goal: Free from fall injury  4/22/2025 2114 by Jazmyn Almonte RN  Outcome: Progressing  4/22/2025 0725 by Elana Smith RN  Outcome: Progressing     Problem: Chronic Conditions and Co-morbidities  Goal: Patient's chronic conditions and co-morbidity symptoms are monitored and maintained or improved  4/22/2025 2114 by Jazmyn Almonte RN  Outcome: Progressing  4/22/2025 0725 by Elana Smith RN  Outcome: Progressing     Problem: Discharge Planning  Goal: Discharge to home or other facility with appropriate resources  4/22/2025 2114 by Jazmyn Almonte RN  Outcome: Progressing  4/22/2025 0725 by Elana Smith RN  Outcome: Progressing     Problem: Respiratory - Adult  Goal: Achieves optimal ventilation and oxygenation  4/22/2025 2114 by Jazmyn Almonte RN  Outcome: Progressing  4/22/2025 0725 by Elana Smith RN  Outcome: Progressing     Problem: Skin/Tissue Integrity  Goal: Skin integrity remains intact  Description: 1.  Monitor for areas of redness and/or skin breakdown2.  Assess vascular access sites hourly3.  Every 4-6 hours minimum:  Change oxygen saturation probe site4.  Every 4-6 hours:  If on nasal continuous positive airway pressure, respiratory therapy assess nares and determine need for appliance change or resting period  4/22/2025 2114 by Jazmyn Almonte RN  Outcome: Progressing  4/22/2025 0725 by Elana Smith RN  Outcome: Progressing     Problem: Neurosensory - Adult  Goal: Achieves stable or improved neurological status  4/22/2025 2114 by Jazmyn Almonte RN  Outcome: Progressing  4/22/2025 0725 by Elana Smith RN  Outcome: Progressing     Problem: Cardiovascular - Adult  Goal: Maintains optimal cardiac output and hemodynamic stability  4/22/2025 2114 by Jazmyn Almonte RN  Outcome: Progressing  4/22/2025 0725 by Elana Smith RN  Outcome: Progressing     Problem: Skin/Tissue Integrity - Adult  Goal: Skin integrity remains intact  Description: 1.

## 2025-04-23 NOTE — DISCHARGE INSTRUCTIONS
You were found unresponsive and was noted to have low glucose and temperature.  You were treated for severely low thyroid hormones, low glucose, heart failure, high blood pressure and urine infection.    Please take your thyroid medications regularly and recheck thyroid test with your doctor in 4 weeks.    Please complete the tapering course of steroids.    You received intravenous Lasix while in the hospital.  Your usual dose of Lasix 40 mg once a day has been resumed.  Please monitor your weight and follow-up with your PCP for further optimization.    You are also being treated for a urine infection and please complete the course of antibiotics.  Please take Keflex 3 times a day for the next 4 days.    Your blood pressure also remained high and you have been started on Norvasc 10 mg once a day.  Please monitor your blood pressure at home and follow-up with your PCP for further optimization.    You did not require any insulin while in the hospital and therefore, your regular insulin has been stopped.  Please monitor your glucose and follow-up with your doctor if it starts to go high.    Please follow-up with your primary care provider within 5 to 7 days for continued care.   If you have been given medication please take them as prescribed. Do not take more medication than recommended at any given time.   If you begin to experience any symptoms such as chest pain shortness of breath nausea vomiting dizziness drowsiness abdominal pain loss of consciousness or any other symptoms you find concerning please return to the ED for follow-up evaluation.  Please feel free return to the hospital if your symptoms worsen or any new concerning symptoms develop.  Follow-up with your primary care physician as needed for all other the concerns.

## 2025-04-23 NOTE — CARE COORDINATION
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]  Acute cystitis without hematuria [N30.00]  Hypothermia, initial encounter [T68.XXXA]  Non-traumatic rhabdomyolysis [M62.82]  Congestive heart failure, unspecified HF chronicity, unspecified heart failure type (HCC) [I50.9]                       GMLOS: 4.9 days  Length of Stay: 4  days    Anticipated Discharge Date: Ready for discharge    Readmission Risk (Low < 19, Mod (19-27), High > 27): Readmission Risk Score: 16.1        Current Transitional Plan    [] Home Independently    [x] Home with [facility]    [] Skilled Nursing Facility    [] Acute Rehabilitation    [] Long Term Acute Care (LTAC)    [] Other:               Workflow Continuation (Additional Notes) :  Spoke to robinson [facility] accept  Met with patient and daughter beto updated   Ps Dr ANA MARIA Orr requesting attending to complete dave    CECY PARSONS, RN  April 23, 2025

## 2025-04-23 NOTE — PROGRESS NOTES
[facility]  Internal Medicine Teaching Residency Program  Inpatient Daily Progress Note  ______________________________________________________________________________    Patient: Parvin Patel  YOB: 1935   MRN:[id number]    Acct: [id number]     Room: 0416/0416-01  Admit date: 4/19/2025  Today's date: 04/22/25  Number of days in the hospital: 3    SUBJECTIVE   Admitting Diagnosis: Myxedema coma (HCC)  CC: Unresponsive     Pt examined at bedside. Chart & results reviewed.   Vital stable and patient afebrile, has been restarted on 1.5 L NC oxygen overnight  BP slightly on the higher side -IV steroids stopped yesterday   Denies any pain or distress  Latest glucose 148 and latest /87  No active symptoms or concerns  Patient initially refused labs- however, agreed after I explained the importance     U Cx - Klebsiella - pan sensitive except nitrofurantoin       Plan:  Switch synthroid to oral   On steroids oral taper   Continue ceftriaxone today - switch to keflex / levaquin tomorrow   Wean off oxygen   Follow up on labs and give lasix if appropriate   Strict intake and output monitoring     ROS:  Constitutional:  negative for chills, fevers, sweats  Respiratory:  negative for cough, dyspnea on exertion, hemoptysis, shortness of breath, wheezing  Cardiovascular:  negative for chest pain, chest pressure/discomfort, lower extremity edema, palpitations  Gastrointestinal:  negative for abdominal pain, constipation, diarrhea, nausea, vomiting  Neurological:  negative for dizziness, headache  BRIEF HISTORY      Parvin Patel is a 89 y.o. female with a past medical history of thyroid cancer s/p thyroidectomy, acquired hypothyroidism secondary to a forementioned on Synthyroid, insulin dependent type II diabetes mellitus, dementia, depression, HFpEF, history of GI bleed, presented to the ED via EMS after a fall at home, unclear if there was any loss of 
    [facility]  Internal Medicine Teaching Residency Program  Inpatient Daily Progress Note  ______________________________________________________________________________    Patient: Parvin Patel  YOB: 1935   MRN:[id number]    Acct: [id number]     Room: [facility]/0416-01  Admit date: 4/19/2025  Today's date: 04/23/25  Number of days in the hospital: 4    SUBJECTIVE   Admitting Diagnosis: Myxedema coma (HCC)  CC: Unresponsive     Pt examined at bedside. Chart & results reviewed.   Vital stable and patient afebrile, has been weaned off oxygen   BP slightly on the higher side - IV steroids stopped yesterday   Denies any pain or distress - feeling well   Latest glucose 120 and latest /65       U Cx - Klebsiella - pan sensitive except nitrofurantoin       Plan:  Continue oral synthroid and steroids taper   Ceftriaxone changed to Keflex - 4 more days   Norvasc increased to 10 mg   Lasix changed to oral 40 mg x BID   Strict intake and output monitoring     ROS:  Constitutional:  negative for chills, fevers, sweats  Respiratory:  negative for cough, dyspnea on exertion, hemoptysis, shortness of breath, wheezing  Cardiovascular:  negative for chest pain, chest pressure/discomfort, lower extremity edema, palpitations  Gastrointestinal:  negative for abdominal pain, constipation, diarrhea, nausea, vomiting  Neurological:  negative for dizziness, headache  BRIEF HISTORY      Parvin Patel is a 89 y.o. female with a past medical history of thyroid cancer s/p thyroidectomy, acquired hypothyroidism secondary to a forementioned on Synthyroid, insulin dependent type II diabetes mellitus, dementia, depression, HFpEF, history of GI bleed, presented to the ED via EMS after a fall at home, unclear if there was any loss of consciousness.  Last known well at 2:30 AM.  Patient lives at home with her daughter and per daughter, patient was trying to get out of bed and had a fall 
  [facility]         PATIENT NAME: Parvin Patel    Shift date: 04/19/2025  Shift day: Saturday   Shift # 1    Room # 14/14     Name: Parvin Patel            Age: 89 y.o.  Gender: female          Mandaen: Non-Yazidism   Place of Pentecostalism:     Trauma/Incident type: Found Down  Admit Date & Time: 4/19/2025  8:36 AM  TRAUMA NAME: None    PATIENT/EVENT DESCRIPTION:  Parvin Patel is a 89 y.o. female who arrived ED via ground ambulance. Per report, patient was found down next to her bed.  Patient to be admitted to 14/14. Patient was awake when  entered her room.     SPIRITUAL ASSESSMENT-INTERVENTION-OUTCOME:  No spiritual assessment was carried out because patient was having a rough time. Family was present and open to spiritual care.  provided ministry of presence, offered support, prayed with family and reassured them that patient was in good hands. Family expressed gratitude for the spiritual and emotional support they received.      PATIENT BELONGINGS:  No belongings noted    ANY BELONGINGS OF SIGNIFICANT VALUE NOTED:  Unknown    REGISTRATION STAFF NOTIFIED?  Yes    WHAT IS YOUR SPIRITUAL CARE PLAN FOR THIS PATIENT?:  Follow up visits recommended for ongoing assessment of patient's condition and for more prayers and support.     Electronically signed by Chaplain Cordell, on 4/19/2025 at 11:55 AM.  [facility]  [phone]    
  Physician Progress Note    Patient - Parvin Patel  Date of Admission -  2025  8:36 AM  Date of Evaluation -  2025  Room and Bed Number -  0416/0416-01   Hospital Day - 3    Chief Complaint   Patient presents with    Fall     Found next to her bed, last seen at 0230    Hypoglycemia     40's on EMS arrival, given D10 with improvement to 60's      SUBJECTIVE:   2025     No acute events       OBJECTIVE:     VITAL SIGNS:  BP (!) 158/72   Pulse 62   Temp 98.2 °F (36.8 °C) (Oral)   Resp 18   Ht 1.549 m (5' 1\")   Wt 118.5 kg (261 lb 3.9 oz)   SpO2 93%   BMI 49.36 kg/m²   Tmax over 24 hours:  Temp (24hrs), Av °F (36.7 °C), Min:97.5 °F (36.4 °C), Max:98.2 °F (36.8 °C)      Patient Vitals for the past 8 hrs:   BP Temp Temp src Pulse Resp SpO2   25 1107 (!) 158/72 98.2 °F (36.8 °C) Oral 62 18 93 %   25 0907 (!) 176/84 98 °F (36.7 °C) Axillary 66 24 95 %         Intake/Output Summary (Last 24 hours) at 2025 1501  Last data filed at 2025 0436  Gross per 24 hour   Intake --   Output 500 ml   Net -500 ml     Date 25 0000 - 25 2359   Shift 6323-6103 3947-7940 8511-3190 24 Hour Total   INTAKE   Shift Total(mL/kg)       OUTPUT   Urine(mL/kg/hr) 225(0.2)   225   Shift Total(mL/kg) 225(1.9)   225(1.9)   Weight (kg) 118.5 118.5 118.5 118.5     Wt Readings from Last 3 Encounters:   25 118.5 kg (261 lb 3.9 oz)   24 99.7 kg (219 lb 12.8 oz)   08/15/24 96.2 kg (212 lb)     Body mass index is 49.36 kg/m².        PHYSICAL EXAM:  Neck: Short thick neck, low hanging soft palate, large tongue, large uvula. No adenopathy, no goiter,   Head and neck atraumatic, normocephalic    Lymph nodes-no cervical, supraclavicular lymphadenopathy    Neck-no JVP elevation    Lungs - Ventilating all lobes without any rales, rhonchi, wheezes or dullness.  No bronchial breath sounds.  Chest expansion equal bilaterally    CVS- S1, S2 regular.  No S3 no S4, no murmurs    Abdomen-nontender, 
  Pulmonary progress note    Patient - Parvin Patel  Date of Admission -  2025  8:36 AM  Date of Evaluation -  2025  Room and Bed Number -  0416/0416-01   Hospital Day - 4    Chief Complaint   Patient presents with    Fall     Found next to her bed, last seen at 0230    Hypoglycemia     40's on EMS arrival, given D10 with improvement to 60's      SUBJECTIVE:   2025   I have seen and examined the patient personally  She is hemodynamically stable  No acute issues overnight  Currently on 2 L nasal cannula  Uses BiPAP overnight while asleep  OBJECTIVE:     VITAL SIGNS:  /68   Pulse 72   Temp 97.7 °F (36.5 °C) (Oral)   Resp 18   Ht 1.549 m (5' 1\")   Wt 119.2 kg (262 lb 12.6 oz)   SpO2 95%   BMI 49.65 kg/m²   Tmax over 24 hours:  Temp (24hrs), Av.8 °F (36.6 °C), Min:97.5 °F (36.4 °C), Max:98.1 °F (36.7 °C)      Patient Vitals for the past 8 hrs:   BP Temp Temp src Pulse Resp SpO2 Weight   25 1121 130/68 97.7 °F (36.5 °C) Oral 72 18 95 % --   25 0724 136/65 97.9 °F (36.6 °C) Oral 64 19 98 % --   25 0534 -- -- -- -- -- -- 119.2 kg (262 lb 12.6 oz)         Intake/Output Summary (Last 24 hours) at 2025 1241  Last data filed at 2025 0534  Gross per 24 hour   Intake --   Output 1500 ml   Net -1500 ml     Date 25 0000 - 25 2359   Shift 6402-3946 3729-5101 1936-5212 24 Hour Total   INTAKE   Shift Total(mL/kg)       OUTPUT   Urine(mL/kg/hr) 300(0.3)   300   Shift Total(mL/kg) 300(2.5)   300(2.5)   Weight (kg) 119.2 119.2 119.2 119.2     Wt Readings from Last 3 Encounters:   25 119.2 kg (262 lb 12.6 oz)   24 99.7 kg (219 lb 12.8 oz)   08/15/24 96.2 kg (212 lb)     Body mass index is 49.65 kg/m².        PHYSICAL EXAM:  Neck: Short thick neck, low hanging soft palate, large tongue, large uvula. No adenopathy, no goiter,   Head and neck atraumatic, normocephalic    Lymph nodes-no cervical, supraclavicular lymphadenopathy    Neck-no JVP 
CLINICAL PHARMACY NOTE: MEDS TO BEDS    Total # of Prescriptions Filled: 3   The following medications were delivered to the patient:  Prednisone 5mg tabs  Cephalexin 500mg caps  Amlodipine 10mg tabs    Additional Documentation:  Delivered to pt + 2 in room   416  on 4/23/2025 at 3:26 PM. No copay.     
ER nurse Jaky called 2 times when my room was dirty   , I told her Id call after room is set up for pt safety  
INTENSIVE CARE UNIT  Resident Physician Progress Note    Patient - Parvin Patel  Date of Admission -  2025  8:36 AM  Date of Evaluation -  2025  Room and Bed Number -  3017/3017-01   Hospital Day - 1      SUBJECTIVE:     OVERNIGHT EVENTS: No acute events overnight.  Patient has been tolerating CPAP at nighttime.  Afebrile with stable vital signs without any pressor support, on Lasix drip.  Tolerated breakfast this morning.    [facility]    Discontinue vancomycin, continue ceftriaxone for UTI    Continue Synthroid 150 IV    Discontinue Lasix drip, Lasix 20 Mg IV twice daily    Follow-up echo    AWAKE & FOLLOWING COMMANDS:  [] No   [x] Yes    SECRETIONS Amount:  [] Small [] Moderate  [] Large  [x] None  Color:     [] White [] Colored  [] Bloody    SEDATION:  RAAS Score:  [] Propofol gtt  [] Versed gtt  [] Ativan gtt   [x] No Sedation    PARALYZED:  [x] No    [] Yes    VASOPRESSORS:  [x] No    [] Yes  [] Levophed [] Dopamine [] Vasopressin  [] Dobutamine [] Phenylephrine [] Epinephrine      OBJECTIVE:     VITAL SIGNS:  /69   Pulse 63   Temp 97.3 °F (36.3 °C)   Resp 11   Ht 1.549 m (5' 1\")   Wt 115.5 kg (254 lb 10.1 oz)   SpO2 94%   BMI 48.11 kg/m²   Tmax over 24 hours:  Temp (24hrs), Av.1 °F (35.1 °C), Min:89.1 °F (31.7 °C), Max:97.6 °F (36.4 °C)      Patient Vitals for the past 8 hrs:   BP Temp Pulse Resp SpO2 Weight   25 0700 127/69 97.3 °F (36.3 °C) 63 11 94 % --   25 0600 113/68 97.4 °F (36.3 °C) 60 12 91 % --   25 0538 -- -- -- -- -- 115.5 kg (254 lb 10.1 oz)   25 0530 (!) 138/102 97.6 °F (36.4 °C) 69 23 93 % --   25 0515 -- 97.6 °F (36.4 °C) 68 15 94 % --   25 0500 (!) 127/47 97.5 °F (36.4 °C) 65 21 97 % --   25 0331 -- -- 63 15 97 % --   25 0300 130/72 97.2 °F (36.2 °C) 60 11 99 % --   25 0200 128/75 97 °F (36.1 °C) 59 15 98 % --         Intake/Output Summary (Last 24 hours) at 2025 0904  Last data 
INTENSIVE CARE UNIT  Resident Physician Progress Note    Patient - Parvin Patel  Date of Admission -  2025  8:36 AM  Date of Evaluation -  2025  Room and Bed Number -  3017/3017-01   Hospital Day - 2      SUBJECTIVE:     OVERNIGHT EVENTS: Plan was to transfer her to stepdown however became slightly hypotensive last night for which she remained in the ICU with the lowest MAP of 61 and lowest systolic of 91  TODAY: Plan for transfer to stepdown    Continue ceftriaxone for UTI    Continue Synthroid 150 IV    Discontinue Lasix drip, Lasix 20 Mg IV twice daily    Follow-up echo    AWAKE & FOLLOWING COMMANDS:  [] No   [x] Yes    SECRETIONS Amount:  [] Small [] Moderate  [] Large  [x] None  Color:     [] White [] Colored  [] Bloody    SEDATION:  RAAS Score:  [] Propofol gtt  [] Versed gtt  [] Ativan gtt   [x] No Sedation    PARALYZED:  [x] No    [] Yes    VASOPRESSORS:  [x] No    [] Yes  [] Levophed [] Dopamine [] Vasopressin  [] Dobutamine [] Phenylephrine [] Epinephrine      OBJECTIVE:     VITAL SIGNS:  /76   Pulse 62   Temp 98.2 °F (36.8 °C)   Resp 13   Ht 1.549 m (5' 1\")   Wt 116.2 kg (256 lb 2.8 oz)   SpO2 98%   BMI 48.40 kg/m²   Tmax over 24 hours:  Temp (24hrs), Av.4 °F (36.9 °C), Min:97.5 °F (36.4 °C), Max:98.9 °F (37.2 °C)      Patient Vitals for the past 8 hrs:   BP Temp Temp src Pulse Resp SpO2 Weight   25 0600 -- -- -- -- -- -- 116.2 kg (256 lb 2.8 oz)   25 0530 120/76 98.2 °F (36.8 °C) -- 62 13 98 % --   25 0500 110/63 98.2 °F (36.8 °C) -- 61 14 96 % --   25 0430 -- 98.2 °F (36.8 °C) -- 65 18 95 % --   25 0400 135/75 98.3 °F (36.8 °C) Bladder 58 19 95 % --   25 0335 -- -- -- 60 22 97 % --   25 0330 132/71 98.2 °F (36.8 °C) -- 62 23 97 % --   25 0300 (!) 127/105 98.2 °F (36.8 °C) -- 59 15 96 % --   25 0230 (!) 152/75 98.3 °F (36.8 °C) -- 60 14 98 % --   25 0200 (!) 144/77 98.3 °F (36.8 °C) -- 57 18 97 % --   25 0130 
Occupational Therapy  Occupational Therapy Initial Evaluation  Facility/Department: [facility] 4B STEPDOWN   Patient Name: Parvin Patel        MRN: [id number]    : 1935    Date of Service: 2025    Chief Complaint   Patient presents with    Fall     Found next to her bed, last seen at 0230    Hypoglycemia     40's on EMS arrival, given D10 with improvement to 60's     Past Medical History:  has a past medical history of Arthritis, Atrial flutter (HCC), CAD (coronary artery disease), CHF (congestive heart failure) (HCC), Diabetes (HCC), Diabetes mellitus (HCC), Hyperlipidemia, Hypertension, Psychiatric problem, Thyroid cancer (HCC), Thyroid disease, and Type 2 diabetes mellitus without complication, with long-term current use of insulin.  Past Surgical History:  has a past surgical history that includes Coronary angioplasty with stent; Hysterectomy; Cholecystectomy; Thyroidectomy; Appendectomy; back surgery; Upper gastrointestinal endoscopy (2021); and Upper gastrointestinal endoscopy (2021).    Discharge Recommendations  Discharge Recommendations: Patient would benefit from continued therapy after discharge       Assessment  Pt lying supine in bed upon entrance to room. Pt completed bed mobility with contact guard assistance. Pt sat at eob 25 minutes with good unsupported sitting balance. Sit to stand with min x 2 assistance with r walker. Pt completed dynamic mob in room with min x 2 assistance with r walker taking 3-4 L lateral side steps. Pt leaking urine from pure wick in stance pt returned to supine in bed, cleansed rolling to side to sode of bed, applied new purewick and brief around pt unfastened Please refer to below assist levels for adl completion. Pt ed on OT POC, safety awareness tech, proper hand placement for transfers, and proper breathing tech with fair return. Pt retired supine in bed with call light and phone in reach, bed alarm activated, RN informed, dtr present upon exit. All 
Physical Therapy  Facility/Department: [address] STEPDOWN   Physical Therapy Initial Evaluation    Patient Name: Parvin Patel        MRN: [id number]    : 1935    Date of Service: 2025    Chief Complaint   Patient presents with    Fall     Found next to her bed, last seen at 0230    Hypoglycemia     40's on EMS arrival, given D10 with improvement to 60's     History was obtained from sibling and chart review.  Parvin Patel is a 89 y.o. female with a past medical history of thyroid cancer s/p thyroidectomy, acquired hypothyroidism secondary to a forementioned on Synthyroid, insulin dependent type II diabetes mellitus, dementia, depression, HFpEF, history of GI bleed, presented to the ED via EMS after a fall at home, unclear if there was any loss of consciousness. Patient lives at home with her daughter and per daughter, patient was trying to get out of bed and had a fall.  EMS was called and patient was found to be bradycardic with heart rate in the 40s and hypoglycemic with blood glucose in the 40s.  She was given D10 with improvement in her sugars to 60.  Found to be in rhabdomyolysis.     Past Medical History:  has a past medical history of Arthritis, Atrial flutter (HCC), CAD (coronary artery disease), CHF (congestive heart failure) (HCC), Diabetes (HCC), Diabetes mellitus (HCC), Hyperlipidemia, Hypertension, Psychiatric problem, Thyroid cancer (HCC), Thyroid disease, and Type 2 diabetes mellitus without complication, with long-term current use of insulin.  Past Surgical History:  has a past surgical history that includes Coronary angioplasty with stent; Hysterectomy; Cholecystectomy; Thyroidectomy; Appendectomy; back surgery; Upper gastrointestinal endoscopy (2021); and Upper gastrointestinal endoscopy (2021).    Discharge Recommendations   Further therapy recommended at discharge.    PT Equipment Recommendations  Equipment Needed: No (pt reports owning hover round)    Assessment  Body 
Pt refuses NIV for the night. RT will be notified of any changes   
[facility]   Pharmacy Pharmacokinetic Monitoring Service - Vancomycin     Parvin Patel is a 89 y.o. female starting on vancomycin therapy for sepsis. Pharmacy consulted by Dr Anderson for monitoring and adjustment.    Target Concentration: Goal AUC/RASHMI 400-600 mg*hr/L    Additional Antimicrobials:     Pertinent Laboratory Values:   Wt Readings from Last 1 Encounters:   04/19/25 99.8 kg (220 lb)     Temp Readings from Last 1 Encounters:   04/19/25 (!) 94 °F (34.4 °C)     Estimated Creatinine Clearance: 33 mL/min (A) (based on SCr of 1.3 mg/dL (H)).  Recent Labs     04/19/25  0845 04/19/25  0854   CREATININE 1.4* 1.3*   BUN  --  25*   WBC  --  8.1     Procalcitonin:     Pertinent Cultures:  Culture Date Source Results        MRSA Nasal Swab:     Plan:  Dosing recommendations based on Bayesian software  Start vancomycin 1000 mg q 24 hour. Received 1500 mg load.   Anticipated AUC of 498 and trough concentration of 13.2 at steady state  Renal labs as indicated   Vancomycin concentration not ordered    Pharmacy will continue to monitor patient and adjust therapy as indicated    Thank you for the consult,  Kari Cazares [facility]  4/19/2025 1:40 PM   
Report called to 4B RNM[facility], all questions answered. Pt transported on monitor with RN to Room 416. Family updated on transfer, all questions answered. No belongings present with patient.     RU GALICIA, RN    
thyroidectomy for thyroid cancer  TSH 29.30 and FT4 < 0.1 on admission  Has been started on synthroid IV - continue  Switch steroids to tapering dose    Acute on chronic respiratory acidosis:   Likely undiagnosed BHASKAR:  Acute CHF:  Chest x-ray - low lung volumes, cardiomegaly   pH 7.28, pCO2 64, HCO 31  , echo awaited  Last echo January 2022 -EF 54%, grade 1 diastolic dysfunction  Currently on room air - saturating 93-94 %  Was on Lasix infusion -switch to bolus on 4/21  Strict intake and output monitoring  Monitor BMP  BMI 48 -continue CPAP while in the hospital, outpatient sleep studies    Hypoglycemia  Type II diabetes mellitus  Hypoglycemia likely due to combination of myxedema coma and insulin use  Was on insulin glargine 40 units twice daily  Glucose normal since admission  Continue monitoring glucose  Insulin not required at the moment     UTI  UA positive for nitrites  Urine culture growing Klebsiella -sensitive to ceftriaxone and Levaquin  Continue ceftriaxone for now    Chronic kidney disease  CKD stage IIIb  Baseline creatinine around 1.2-1.3  Latest creatinine 1.6  Continue monitoring and strict I&O    Rhabdomyolysis  CK 3745 and myoglobin 1337 on admission  Trend levels and monitor kidney function    Mild thrombocytopenia  Platelets around 110  Continue monitoring  WBC and Hb okay    Coronary artery disease  Stress test September 2024 -no scintigraphic evidence of reversible ischemia or infarct  Troponin 71 and 79  On aspirin and Lopressor 25 mg twice daily    Hyperlipidemia  On Lipitor 80 mg    DVT ppx : Heparin   GI ppx: Protonix     PT/OT: Consulted  Discharge Planning / SW: Anticipate in 24-48 hours if remaining stable      Kyaw Zepeda MD  Internal Medicine Resident, PGY-1  [facility]; [place], [place]  4/21/2025, 12:43 PM

## 2025-04-23 NOTE — PLAN OF CARE
Problem: Safety - Adult  Goal: Free from fall injury  4/23/2025 0952 by Lorraine Oscar RN  Outcome: Progressing  4/22/2025 2114 by Jazmyn Almonte RN  Outcome: Progressing     Problem: Chronic Conditions and Co-morbidities  Goal: Patient's chronic conditions and co-morbidity symptoms are monitored and maintained or improved  4/23/2025 0952 by Lorraine Oscar RN  Outcome: Progressing  4/22/2025 2114 by Jazmyn Almonte RN  Outcome: Progressing     Problem: Discharge Planning  Goal: Discharge to home or other facility with appropriate resources  4/23/2025 0952 by Lorraine Oscar RN  Outcome: Progressing  4/22/2025 2114 by Jazmyn Almonte RN  Outcome: Progressing     Problem: Respiratory - Adult  Goal: Achieves optimal ventilation and oxygenation  4/23/2025 0952 by Lorraine Oscar RN  Outcome: Progressing  4/22/2025 2114 by Jazmyn Almonte RN  Outcome: Progressing     Problem: Skin/Tissue Integrity  Goal: Skin integrity remains intact  Description: 1.  Monitor for areas of redness and/or skin breakdown2.  Assess vascular access sites hourly3.  Every 4-6 hours minimum:  Change oxygen saturation probe site4.  Every 4-6 hours:  If on nasal continuous positive airway pressure, respiratory therapy assess nares and determine need for appliance change or resting period  4/23/2025 0952 by Lorraine Oscar RN  Outcome: Progressing  4/22/2025 2114 by Jazmyn Almonte RN  Outcome: Progressing     Problem: Neurosensory - Adult  Goal: Achieves stable or improved neurological status  4/23/2025 0952 by Lorraine Oscar RN  Outcome: Progressing  4/22/2025 2114 by Jazmyn Almonte RN  Outcome: Progressing     Problem: Cardiovascular - Adult  Goal: Maintains optimal cardiac output and hemodynamic stability  4/23/2025 0952 by Lorraine Oscar RN  Outcome: Progressing  4/22/2025 2114 by Jazmyn Almonte RN  Outcome: Progressing     Problem: Skin/Tissue Integrity - Adult  Goal: Skin integrity remains intact  Description: 1.

## 2025-04-23 NOTE — DISCHARGE INSTR - COC
Continuity of Care Form    Patient Name: Parvin Patel   :  1935  MRN:  [id number]    Admit date:  2025  Discharge date:  25    Code Status Order: Full Code   Advance Directives:     Admitting Physician:  Mason Potter MD  PCP: Braydon Hernandez MD    Discharging [facility]16/0416-01  Discharging Unit Phone Number: [phone]    Emergency Contact:   Extended Emergency Contact Information  Primary Emergency Contact: Qing Morgan  Address: [place] [address] [facility]  Home Phone: [phone]  Mobile Phone: [phone]  Relation: Child  Secondary Emergency Contact: Amanda Khalil  Address: [address] [place] [address] [facility]  Home Phone: [phone]  Mobile Phone: [phone]  Relation: Child    Past Surgical History:  Past Surgical History:   Procedure Laterality Date    APPENDECTOMY      BACK SURGERY      CHOLECYSTECTOMY      CORONARY ANGIOPLASTY WITH STENT PLACEMENT      HYSTERECTOMY (CERVIX STATUS UNKNOWN)      THYROIDECTOMY      UPPER GASTROINTESTINAL ENDOSCOPY  2021    EGD BIOPSY performed by Wade Richardson MD at [facility]    UPPER GASTROINTESTINAL ENDOSCOPY  2021    EGD CONTROL HEMORRHAGE performed by Wade Richardson MD at [facility]       Immunization History:   Immunization History   Administered Date(s) Administered    COVID-19, J&J, (age 18y+), IM, 0.5 mL 2021    Influenza Vaccine, unspecified formulation 2014    Influenza Virus Vaccine 2014    Influenza, FLUAD, (age 65 y+), IM, Quadv, 0.5mL 2021    Influenza, FLUZONE High Dose, (age 65 y+), IM, Trivalent PF, 0.5mL 2016, 10/10/2017    Pneumococcal, PCV-13, PREVNAR 13, (age 6w+), IM, 0.5mL 2016    Pneumococcal, PPSV23, PNEUMOVAX 23, (age 2y+), SC/IM, 0.5mL 02/15/2018       Active Problems:  Patient Active Problem List   Diagnosis Code    Acute kidney injury (HCC) N17.9    Anemia due to

## 2025-04-24 ENCOUNTER — CARE COORDINATION (OUTPATIENT)
Dept: CARE COORDINATION | Age: 89
End: 2025-04-24

## 2025-04-24 DIAGNOSIS — E03.5 MYXEDEMA COMA (HCC): Primary | ICD-10-CM

## 2025-04-24 LAB
MICROORGANISM SPEC CULT: NORMAL
MICROORGANISM SPEC CULT: NORMAL
SERVICE CMNT-IMP: NORMAL
SERVICE CMNT-IMP: NORMAL
SPECIMEN DESCRIPTION: NORMAL
SPECIMEN DESCRIPTION: NORMAL

## 2025-04-24 PROCEDURE — 1111F DSCHRG MED/CURRENT MED MERGE: CPT | Performed by: INTERNAL MEDICINE

## 2025-04-24 NOTE — CARE COORDINATION
Care Transitions Note    Initial Call - Call within 2 business days of discharge: Yes    Patient Current Location:  Home: [address] [place] [address]    Care Transition Nurse contacted the patient by telephone to perform post hospital discharge assessment, verified name and  as identifiers.  Provided introduction to self, and explanation of the Care Transition Nurse role.    Patient: Parvin Patel    Patient : 1935   MRN: [id number]    Reason for Admission: Myedema Coma  Discharge Date: 25  RURS: Readmission Risk Score: 16.1      Last Discharge Facility       Date Complaint Diagnosis Description Type Department Provider    25 Fall; Hypoglycemia Myxedema coma (HCC) ... ED to Hosp-Admission (Discharged) (ADMITTED) [facility] Kurt Cheema,...            Was this an external facility discharge? No    Additional needs identified to be addressed with provider   No needs identified             Method of communication with provider: none.    Patients top risk factors for readmission: medical condition-DM    Interventions to address risk factors:   Education: Medications as ordered, monitor blood sugars, keep log for PCP review, drink fluids, diabetic diet, follow up with PCP and pulmonary    Care Summary Note: Spoke with Amanda, daughter for initial 24 hour follow up. Patient was in the home at the time, Amanda states she is very [place].  Patient was found down at home, was brought in with low blood sugar reading, admitted for myxedema coma, treated for UTI and rhabdo.  She discharged to home with family with [facility]. Per Amanda, she is feeling ok today, reports blood sugar last evening was 280, had reading of 182 this am, not having any s/s of hyperglycemia.  Lantus was stopped at discharge and no longer on insulin at this time.  Also is on the tail end of tapering dose of steroids.  She is eating and drinking ok, has not had bm since discharge but moved bowels while in the

## 2025-04-24 NOTE — CARE COORDINATION
Per CTN request, writer called Pulmonary to schedule post hosp f/u for 1 week and spoke to Mario:  Dr. Lo /[facility]  Friday, May 2 @ 10am   [address] / [address]   On first floor   Across street from [facility] ED  Ph [phone]      Writer called patient and Daughter (Amanda) answered for patient.  Provided details d/t/location for Pulmonary appointment- office ph# also provided to Amanda who wrote down information and verbally understands.   Will advise CTN   CC f/u one week

## 2025-04-26 LAB
EKG ATRIAL RATE: 49 BPM
EKG P AXIS: -8 DEGREES
EKG P-R INTERVAL: 138 MS
EKG Q-T INTERVAL: 482 MS
EKG QRS DURATION: 8 MS
EKG QTC CALCULATION (BAZETT): 435 MS
EKG R AXIS: 0 DEGREES
EKG T AXIS: 36 DEGREES
EKG VENTRICULAR RATE: 49 BPM

## 2025-04-27 NOTE — PROGRESS NOTES
(H) 74 - 99 mg/dL    BUN 32 (H) 8 - 23 mg/dL    Creatinine 1.3 (H) 0.6 - 0.9 mg/dL    Est, Glom Filt Rate 40 (L) >60 mL/min/1.73m2    Calcium 8.2 (L) 8.6 - 10.4 mg/dL   CBC with Auto Differential   Result Value Ref Range    WBC 7.8 3.5 - 11.3 k/uL    RBC 3.95 3.95 - 5.11 m/uL    Hemoglobin 12.2 11.9 - 15.1 g/dL    Hematocrit 39.4 36.3 - 47.1 %    MCV 99.7 82.6 - 102.9 fL    MCH 30.9 25.2 - 33.5 pg    MCHC 31.0 28.4 - 34.8 g/dL    RDW 14.7 (H) 11.8 - 14.4 %    Platelets See Reflexed IPF Result 138 - 453 k/uL    Platelet, Fluorescence 106 (L) 138 - 453 k/uL    Platelet, Immature Fraction 7.7 1.1 - 10.3 %    NRBC Automated 0.0 0.0 per 100 WBC    RBC Morphology ANISOCYTOSIS PRESENT     Neutrophils % 72 (H) 36 - 65 %    Lymphocytes % 14 (L) 24 - 43 %    Monocytes % 8 3 - 12 %    Eosinophils % 2 1 - 4 %    Basophils % 1 0 - 2 %    Immature Granulocytes % 3 (H) 0 %    Neutrophils Absolute 5.63 1.50 - 8.10 k/uL    Lymphocytes Absolute 1.06 (L) 1.10 - 3.70 k/uL    Monocytes Absolute 0.64 0.10 - 1.20 k/uL    Eosinophils Absolute 0.15 0.00 - 0.44 k/uL    Basophils Absolute 0.09 0.00 - 0.20 k/uL    Immature Granulocytes Absolute 0.21 0.00 - 0.30 k/uL   Comprehensive Metabolic Panel w/ Reflex to MG   Result Value Ref Range    Sodium 139 136 - 145 mmol/L    Potassium 3.9 3.7 - 5.3 mmol/L    Chloride 102 98 - 107 mmol/L    CO2 26 20 - 31 mmol/L    Anion Gap 11 9 - 16 mmol/L    Glucose 120 (H) 74 - 99 mg/dL    BUN 31 (H) 8 - 23 mg/dL    Creatinine 1.2 (H) 0.6 - 0.9 mg/dL    Est, Glom Filt Rate 44 (L) >60 mL/min/1.73m2    Calcium 8.0 (L) 8.6 - 10.4 mg/dL    Total Protein 6.1 (L) 6.6 - 8.7 g/dL    Albumin 3.3 (L) 3.5 - 5.2 g/dL    Albumin/Globulin Ratio 1.2 1.0 - 2.5    Total Bilirubin 0.4 0.0 - 1.2 mg/dL    Alkaline Phosphatase 102 35 - 104 U/L    ALT 59 (H) 10 - 35 U/L    AST 54 (H) 10 - 35 U/L   Venous Blood Gas, POC   Result Value Ref Range    pH, Uday 7.289 (L) 7.320 - 7.430    pCO2, Uday 64.5 (H) 41.0 - 51.0 mm Hg    PO2, Uday

## 2025-04-28 ENCOUNTER — PATIENT MESSAGE (OUTPATIENT)
Dept: INTERNAL MEDICINE CLINIC | Age: 89
End: 2025-04-28

## 2025-05-02 ENCOUNTER — OFFICE VISIT (OUTPATIENT)
Dept: PULMONOLOGY | Age: 89
End: 2025-05-02
Payer: MEDICARE

## 2025-05-02 ENCOUNTER — CARE COORDINATION (OUTPATIENT)
Dept: CARE COORDINATION | Age: 89
End: 2025-05-02

## 2025-05-02 VITALS
OXYGEN SATURATION: 95 % | SYSTOLIC BLOOD PRESSURE: 122 MMHG | BODY MASS INDEX: 49.47 KG/M2 | HEART RATE: 72 BPM | DIASTOLIC BLOOD PRESSURE: 67 MMHG | RESPIRATION RATE: 19 BRPM | HEIGHT: 61 IN | WEIGHT: 262 LBS

## 2025-05-02 DIAGNOSIS — I25.83 CORONARY ARTERY DISEASE DUE TO LIPID RICH PLAQUE: ICD-10-CM

## 2025-05-02 DIAGNOSIS — I25.10 CORONARY ARTERY DISEASE DUE TO LIPID RICH PLAQUE: ICD-10-CM

## 2025-05-02 DIAGNOSIS — G47.33 OSA (OBSTRUCTIVE SLEEP APNEA): Primary | ICD-10-CM

## 2025-05-02 PROCEDURE — 99213 OFFICE O/P EST LOW 20 MIN: CPT | Performed by: NURSE PRACTITIONER

## 2025-05-02 PROCEDURE — 1159F MED LIST DOCD IN RCRD: CPT | Performed by: NURSE PRACTITIONER

## 2025-05-02 PROCEDURE — 1123F ACP DISCUSS/DSCN MKR DOCD: CPT | Performed by: NURSE PRACTITIONER

## 2025-05-02 RX ORDER — ATORVASTATIN CALCIUM 80 MG/1
80 TABLET, FILM COATED ORAL DAILY
Qty: 30 TABLET | Refills: 11 | Status: SHIPPED | OUTPATIENT
Start: 2025-05-02

## 2025-05-02 ASSESSMENT — SLEEP AND FATIGUE QUESTIONNAIRES
HOW LIKELY ARE YOU TO NOD OFF OR FALL ASLEEP WHILE WATCHING TV: HIGH CHANCE OF DOZING
HOW LIKELY ARE YOU TO NOD OFF OR FALL ASLEEP IN A CAR, WHILE STOPPED FOR A FEW MINUTES IN TRAFFIC: WOULD NEVER DOZE
HOW LIKELY ARE YOU TO NOD OFF OR FALL ASLEEP WHILE SITTING AND TALKING TO SOMEONE: HIGH CHANCE OF DOZING
ESS TOTAL SCORE: 19
HOW LIKELY ARE YOU TO NOD OFF OR FALL ASLEEP WHILE SITTING QUIETLY AFTER LUNCH WITHOUT ALCOHOL: HIGH CHANCE OF DOZING
HOW LIKELY ARE YOU TO NOD OFF OR FALL ASLEEP WHILE SITTING INACTIVE IN A PUBLIC PLACE: HIGH CHANCE OF DOZING
HOW LIKELY ARE YOU TO NOD OFF OR FALL ASLEEP WHEN YOU ARE A PASSENGER IN A CAR FOR AN HOUR WITHOUT A BREAK: HIGH CHANCE OF DOZING
HOW LIKELY ARE YOU TO NOD OFF OR FALL ASLEEP WHILE LYING DOWN TO REST IN THE AFTERNOON WHEN CIRCUMSTANCES PERMIT: SLIGHT CHANCE OF DOZING
HOW LIKELY ARE YOU TO NOD OFF OR FALL ASLEEP WHILE SITTING AND READING: HIGH CHANCE OF DOZING

## 2025-05-02 NOTE — PATIENT INSTRUCTIONS
[email or web address]        YOU ARE BEING REFERRED TO:    [facility] (a department of [facility])    [address] [place] [address] [address]  [place] [address]    Main Phone Number: [phone]    Phone number for scheduling sleep study: [phone]      Please contact the above numbers to schedule your sleep study.     Once you have completed the FIRST NIGHT you may be asked to return for a SECOND NIGHT if necessary.   After the SECOND NIGHT the sleep center will order a CPAP/BiPAP machine for you.     An order for the machine will be sent to a durable medical equipment company ([employer]   The sleep center will provide you with the [employer] companies information.     Once you have your machine please contact our office to schedule a follow up appointment.   Your follow up will be scheduled for a date 30-90 days after you have received your machine.   At that follow up visit we will need to have a compliance download from your DME company.   Please contact your [employer] company to have the compliance download faxed to our office at   [phone] for your follow up appointment.       IF YOU HAVE ANY QUESTIONS ABOUT YOUR SLEEP STUDY   PLEASE CONTACT THE SLEEP CENTER.

## 2025-05-02 NOTE — CARE COORDINATION
Writer received from Sherry from [facility] care she states that they denied the referral because pt needed a higher level of care. She states that she will reach out to [facility]'s case manger. Per case manger Nurse Soheila from [facility] accepted the referral. Sherry asked for Case manger name writer provided case manger name.   Christie Rodriguez LPN   Care Transitions Nurse  Cell

## 2025-05-02 NOTE — CARE COORDINATION
Care Transitions Note    Follow Up Call     Patient Current Location:  Home: [address]  [place] [address]    [facility] Care Coordinator contacted the patient by telephone. Verified name and  as identifiers.    Additional needs identified to be addressed with provider   Pt's daughter reports BS has been around 240 insulin was stopped while In patient states she will see you on the  advised to keep log and keep you aware she does not report any low BS called  [facility] had to leave vm states they have not been out yet.                  Method of communication with provider: chart routing.    Care Summary Note: Writer was able to speak to pt's daughter Amanda AGUIRRE for follow up call.She was admitted for myxedema coma Amanda states that her mom is doing good has not had any falls, BS have been around 240 states she has not had any real low BS. Writer asked if they have let pcp know about BS she states she will see him on 25. She has finished ATB and steroids. She reports that she is eating and drinking fine. She is voiding/BM'S.     Today pulmonary appointment completed notes still open no med changes pt will be getting a home sleep study.  /67 p 72 SPO2 95%.   Writer asked if home care has been out she states they have not called.  Writer called [facility] had to leave a VM requested a return call. Writer provided Amanda with [facility] number and let her know writer had to leave VM.   Amanda reports that she is taking medications as ordered. Advised to keep a BS log discussed that she will need to let PCP know BS since insulin was stopped while in patient will route message. She states she will see him on 25.   She voiced no needs or concerns at this time. Will call next week.   Plan of care updates since last contact:  Education: take meds as ordered keep BS log reports BS   Review of patient management of conditions/medications: myxedema coma   Home Health: called Med had to leave          [facility]

## 2025-05-05 ENCOUNTER — CARE COORDINATION (OUTPATIENT)
Dept: CARE COORDINATION | Age: 89
End: 2025-05-05

## 2025-05-05 NOTE — CARE COORDINATION
Care Transitions Note    Follow Up Call     Patient Current Location:  Home: [address] [place] [address]    Care Transition Nurse contacted the patient by telephone. Verified name and  as identifiers.    Additional needs identified to be addressed with provider   No needs identified                 Method of communication with provider: none.    Care Summary Note: Spoke with daughter, Amanda today for transitional follow up call. Patient is doing ok this week, has not had any reported falls, blood sugars have been running in the 200's since coming home, her insulin was stopped at discharge and has had some higher readings.  She was also coming off the tail end of a steroid taper as well.  She was seen by pulmonary who wanted to do sleep study, they ordered both home sleep study and OP study and had family decide.  Amanda said today that patient does not want to do either, has follow up scheduled in November with pulmonary that they will ultimately wind up cancelling.  She has not had any new issues since last outreach.  She has follow up this coming Friday with PCP, they deny any current needs or issues at this time.     Plan of care updates since last contact:  None       Advance Care Planning:   Does patient have an Advance Directive: reviewed during previous call, see note. .    Medication Review:  Full medication reconciliation completed during previous call.    Remote Patient Monitoring:  Offered patient enrollment in the Remote Patient Monitoring (RPM) program for in-home monitoring: Patient is not eligible for RPM program because: patient does not have qualifying diagnosis.    Assessments:  Care Transitions Subsequent and Final Call    Schedule Follow Up Appointment with PCP: Completed  Subsequent and Final Calls  Do you have any ongoing symptoms?: No  Have your medications changed?: No  Do you have any questions related to your medications?: No  Do you currently have any active services?: Yes  Are you

## 2025-05-09 ENCOUNTER — OFFICE VISIT (OUTPATIENT)
Dept: INTERNAL MEDICINE CLINIC | Age: 89
End: 2025-05-09

## 2025-05-09 VITALS
OXYGEN SATURATION: 92 % | DIASTOLIC BLOOD PRESSURE: 68 MMHG | BODY MASS INDEX: 49.53 KG/M2 | HEART RATE: 65 BPM | HEIGHT: 61 IN | SYSTOLIC BLOOD PRESSURE: 128 MMHG

## 2025-05-09 DIAGNOSIS — Z71.89 ACP (ADVANCE CARE PLANNING): ICD-10-CM

## 2025-05-09 DIAGNOSIS — Z79.4 TYPE 2 DIABETES MELLITUS WITHOUT COMPLICATION, WITH LONG-TERM CURRENT USE OF INSULIN (HCC): Primary | ICD-10-CM

## 2025-05-09 DIAGNOSIS — E66.813 OBESITY, CLASS III, BMI 40-49.9 (MORBID OBESITY) (HCC): ICD-10-CM

## 2025-05-09 DIAGNOSIS — E03.9 ACQUIRED HYPOTHYROIDISM: ICD-10-CM

## 2025-05-09 DIAGNOSIS — E11.8 TYPE 2 DIABETES MELLITUS WITH COMPLICATION, WITHOUT LONG-TERM CURRENT USE OF INSULIN (HCC): ICD-10-CM

## 2025-05-09 DIAGNOSIS — B37.2 CANDIDAL SKIN INFECTION: ICD-10-CM

## 2025-05-09 DIAGNOSIS — Z09 HOSPITAL DISCHARGE FOLLOW-UP: ICD-10-CM

## 2025-05-09 DIAGNOSIS — Z13.220 SCREENING FOR HYPERLIPIDEMIA: ICD-10-CM

## 2025-05-09 DIAGNOSIS — I25.83 CORONARY ARTERY DISEASE DUE TO LIPID RICH PLAQUE: ICD-10-CM

## 2025-05-09 DIAGNOSIS — I48.92 ATRIAL FLUTTER, UNSPECIFIED TYPE (HCC): ICD-10-CM

## 2025-05-09 DIAGNOSIS — I50.33 ACUTE ON CHRONIC DIASTOLIC CONGESTIVE HEART FAILURE (HCC): ICD-10-CM

## 2025-05-09 DIAGNOSIS — I10 ESSENTIAL HYPERTENSION: Chronic | ICD-10-CM

## 2025-05-09 DIAGNOSIS — E89.0 POSTOPERATIVE HYPOTHYROIDISM: ICD-10-CM

## 2025-05-09 DIAGNOSIS — F33.1 MAJOR DEPRESSIVE DISORDER, RECURRENT, MODERATE (HCC): ICD-10-CM

## 2025-05-09 DIAGNOSIS — I25.10 CORONARY ARTERY DISEASE DUE TO LIPID RICH PLAQUE: ICD-10-CM

## 2025-05-09 DIAGNOSIS — E11.9 TYPE 2 DIABETES MELLITUS WITHOUT COMPLICATION, WITH LONG-TERM CURRENT USE OF INSULIN (HCC): Primary | ICD-10-CM

## 2025-05-09 RX ORDER — INSULIN GLARGINE 100 [IU]/ML
25 INJECTION, SOLUTION SUBCUTANEOUS EVERY MORNING
Qty: 5 ADJUSTABLE DOSE PRE-FILLED PEN SYRINGE | Refills: 3 | Status: SHIPPED | OUTPATIENT
Start: 2025-05-09

## 2025-05-09 RX ORDER — ATORVASTATIN CALCIUM 80 MG/1
80 TABLET, FILM COATED ORAL DAILY
Qty: 90 TABLET | Refills: 3 | Status: SHIPPED | OUTPATIENT
Start: 2025-05-09

## 2025-05-09 RX ORDER — NYSTATIN 100000 U/G
CREAM TOPICAL
Qty: 1 EACH | Refills: 1 | Status: SHIPPED | OUTPATIENT
Start: 2025-05-09

## 2025-05-09 RX ORDER — AMLODIPINE BESYLATE 10 MG/1
10 TABLET ORAL DAILY
Qty: 30 TABLET | Refills: 3 | Status: CANCELLED | OUTPATIENT
Start: 2025-05-09

## 2025-05-09 RX ORDER — PEN NEEDLE, DIABETIC 31 GX5/16"
1 NEEDLE, DISPOSABLE MISCELLANEOUS DAILY
Qty: 100 EACH | Refills: 4 | Status: SHIPPED | OUTPATIENT
Start: 2025-05-09

## 2025-05-09 ASSESSMENT — PATIENT HEALTH QUESTIONNAIRE - PHQ9
SUM OF ALL RESPONSES TO PHQ QUESTIONS 1-9: 3
5. POOR APPETITE OR OVEREATING: SEVERAL DAYS
SUM OF ALL RESPONSES TO PHQ QUESTIONS 1-9: 3
SUM OF ALL RESPONSES TO PHQ QUESTIONS 1-9: 3
9. THOUGHTS THAT YOU WOULD BE BETTER OFF DEAD, OR OF HURTING YOURSELF: NOT AT ALL
2. FEELING DOWN, DEPRESSED OR HOPELESS: NOT AT ALL
3. TROUBLE FALLING OR STAYING ASLEEP: NOT AT ALL
SUM OF ALL RESPONSES TO PHQ QUESTIONS 1-9: 3
8. MOVING OR SPEAKING SO SLOWLY THAT OTHER PEOPLE COULD HAVE NOTICED. OR THE OPPOSITE, BEING SO FIGETY OR RESTLESS THAT YOU HAVE BEEN MOVING AROUND A LOT MORE THAN USUAL: SEVERAL DAYS
4. FEELING TIRED OR HAVING LITTLE ENERGY: NOT AT ALL
6. FEELING BAD ABOUT YOURSELF - OR THAT YOU ARE A FAILURE OR HAVE LET YOURSELF OR YOUR FAMILY DOWN: NOT AT ALL
10. IF YOU CHECKED OFF ANY PROBLEMS, HOW DIFFICULT HAVE THESE PROBLEMS MADE IT FOR YOU TO DO YOUR WORK, TAKE CARE OF THINGS AT HOME, OR GET ALONG WITH OTHER PEOPLE: SOMEWHAT DIFFICULT
7. TROUBLE CONCENTRATING ON THINGS, SUCH AS READING THE NEWSPAPER OR WATCHING TELEVISION: SEVERAL DAYS
1. LITTLE INTEREST OR PLEASURE IN DOING THINGS: NOT AT ALL

## 2025-05-09 NOTE — PROGRESS NOTES
[facility]  [address] [address]  Dept: [phone]  Dept Fax: [phone]     Name: Parvin Patel  : 1935           Chief Complaint   Patient presents with    Follow-Up from [facility] : Myxedema Coma    Shortness of Breath     Patient reports SOB with exertion    Discuss Medications     Daughter would like to know why was patient taken off insulin?     Hypothryoid  Uti  Chf exacerbation    History of Present Illness             Past Medical History:    Past Medical History:   Diagnosis Date    Arthritis     Atrial flutter (HCC)     CAD (coronary artery disease)     CHF (congestive heart failure) (HCC)     Diabetes (HCC)     Diabetes mellitus (HCC)     Hyperlipidemia     Hypertension     Psychiatric problem     Thyroid cancer (HCC)     S/P thyroidectomy; on synthroid    Thyroid disease     hypothyroid    Type 2 diabetes mellitus without complication, with long-term current use of insulin (HCC) 2017      Reviewed all health maintenance requirements and ordered appropriate tests  Health Maintenance Due   Topic Date Due    DTaP/Tdap/Td vaccine (1 - Tdap) Never done    Shingles vaccine (1 of 2) Never done    Respiratory Syncytial Virus (RSV) Pregnant or age 60 yrs+ (1 - 1-dose 75+ series) Never done    COVID-19 Vaccine (2 -  season) 2024    Lipids  10/11/2024    Annual Wellness Visit (Medicare Advantage)  2025       Past Surgical History:    Past Surgical History:   Procedure Laterality Date    APPENDECTOMY      BACK SURGERY      CHOLECYSTECTOMY      CORONARY ANGIOPLASTY WITH STENT PLACEMENT      HYSTERECTOMY (CERVIX STATUS UNKNOWN)      THYROIDECTOMY      UPPER GASTROINTESTINAL ENDOSCOPY  2021    EGD BIOPSY performed by Wade Richardson MD at [facility]    UPPER GASTROINTESTINAL ENDOSCOPY  2021    EGD CONTROL HEMORRHAGE performed by Wade Richardson MD at [facility]        Medications:      Current

## 2025-05-09 NOTE — PROGRESS NOTES
Parvin Patel provided verbal consent for the provider to use the YEE recording tool during their visit.

## 2025-05-09 NOTE — PATIENT INSTRUCTIONS

## 2025-05-13 ENCOUNTER — CARE COORDINATION (OUTPATIENT)
Dept: CARE COORDINATION | Age: 89
End: 2025-05-13

## 2025-05-13 NOTE — CARE COORDINATION
Care Transitions Note    Follow Up Call     Attempted to reach family, daughter, Amanda  for transitions of care follow up.  Unable to reach Amanda nguyễn .      Outreach Attempts:   HIPAA compliant voicemail left for familyAmanda.     Care Summary Note: Patient being followed after admission for myxedema coma, had follow up with PCP last Friday, notes reviewed.  Lantus added to medication but at 1/2 dose she was on prior.  Will attempt to reach again this week.     Follow Up Appointment:   Future Appointments         Provider Specialty Dept Phone    6/12/2025 11:15 AM Michael Green MD Internal Medicine [phone]    11/3/2025 10:00 AM Mason Potter MD Pulmonology [phone]            Plan for follow-up call in 2-5 days based on severity of symptoms and risk factors. Plan for next call:  How are blood sugars doing?  Any issues with dyspnea? Any swelling?     Ivana Medina RN

## 2025-05-15 ENCOUNTER — CARE COORDINATION (OUTPATIENT)
Dept: CARE COORDINATION | Age: 89
End: 2025-05-15

## 2025-05-15 NOTE — CARE COORDINATION
Care Transitions Note    Follow Up Call     Patient Current Location:  Home: [address] [place] [id number]    [facility] by telephone. Verified name and  as identifiers.    Additional needs identified to be addressed with provider   No needs identified                 Method of communication with provider: none.    Care Summary Note: Writer spoke to pt's daughter Amanda for follow up call.She is being followed for myxedema coma . She states that her mom is eating better , looking better and doing good so far. She reports no sob. They was some confusion regarding home care.Amanda states that she is doing good at this time taking care of her mom. Writer advised if she changes her mind she would need a home care order from PCP. Last call with Amanda writer reached out to [facility] care they states that denied referral because pt needed a higher level of care.   Discussed PCP visit Labs ordered-future Insulin changed to 25 units every AM BS today 168 nystatin cream added, atorvastatin 80 mg added-she has picked up medications. Stopped medication Zoloft. She has follow up appointment with PCP 25.     Plan of care updates since last contact:  Discussed PCP appointment HC medications BS       Advance Care Planning:   Does patient have an Advance Directive: reviewed during previous call, see note. .    Medication Review:  Medications changed since last call, reviewed today.     Remote Patient Monitoring:  Offered patient enrollment in the Remote Patient Monitoring (RPM) program for in-home monitoring: Yes, but did not enroll at this time: not discussed  .    Assessments:  Care Transitions Subsequent and Final Call    Subsequent and Final Calls  Do you have any ongoing symptoms?: No  Have your medications changed?: Yes  Patient Reports: insulin cut down to 25 units daily atorvastatin addded 80 mg nysaatin cream, stopped  zoloft  Do you have any questions related to your medications?: No  Do

## 2025-05-22 ENCOUNTER — CARE COORDINATION (OUTPATIENT)
Dept: CARE COORDINATION | Age: 89
End: 2025-05-22

## 2025-05-22 NOTE — CARE COORDINATION
Care Transitions Note    Follow Up Call     Patient Current Location:  Home: [address] [place], [place] [id number]    Care Transition Nurse contacted the family, daughter Amanda  by telephone. Verified name and  as identifiers.    Patient graduated from the Care Transitions program on 25.  Patient/family verbalizes confidence in the ability to self-manage at this time..      Advance Care Planning:   Does patient have an Advance Directive: reviewed during previous call, see note. .    Handoff:   Patient was not referred to the ACM team due to patient declined services.      Care Summary Note: Daughter Amanda denied any concerns, stated doing well, blood sugars good but didn't give any readings.  -Has lower leg swelling, is not worse, always has it. Doesn't get weighed at home. Stated breathing is good.  - She saw PCP recently, needs to get labs done.  -Daughter stated they didn't need any further calls from a nurse.    Assessments:  Care Transitions Subsequent and Final Call    Subsequent and Final Calls  Do you have any ongoing symptoms?: No  Have your medications changed?: No  Do you have any questions related to your medications?: No  Do you currently have any active services?: No  Are you currently active with any services?: Home Health  Do you have any needs or concerns that I can assist you with?: No  Care Transitions Interventions    Physical Therapy: Completed Other Services: Completed     Registered Dietician: Completed     Occupational Therapy: Completed       Specialty Service Referral: Completed    Other Interventions:              Upcoming Appointments:    Future Appointments         Provider Specialty Dept Phone    2025 11:15 AM Michael Green MD Internal Medicine [phone]    11/3/2025 10:00 AM Mason Potter MD Pulmonology [phone]            Patient has agreed to contact primary care provider and/or specialist for any further questions, concerns, or needs.    Rebecca Brown RN

## 2025-06-09 ENCOUNTER — HOSPITAL ENCOUNTER (OUTPATIENT)
Age: 89
Discharge: HOME | End: 2025-06-09
Payer: MEDICARE

## 2025-06-09 DIAGNOSIS — I48.92 ATRIAL FLUTTER, UNSPECIFIED TYPE (HCC): ICD-10-CM

## 2025-06-09 DIAGNOSIS — Z13.220 SCREENING FOR HYPERLIPIDEMIA: ICD-10-CM

## 2025-06-09 DIAGNOSIS — E03.9 ACQUIRED HYPOTHYROIDISM: ICD-10-CM

## 2025-06-09 LAB
CHOLEST SERPL-MCNC: 115 MG/DL (ref 0–199)
CHOLESTEROL/HDL RATIO: 2.6
HDLC SERPL-MCNC: 45 MG/DL
LDLC SERPL CALC-MCNC: 30 MG/DL (ref 0–100)
T4 FREE SERPL-MCNC: 1.2 NG/DL (ref 0.92–1.68)
TRIGL SERPL-MCNC: 202 MG/DL
TSH SERPL DL<=0.05 MIU/L-ACNC: 4.92 UIU/ML (ref 0.27–4.2)
VLDLC SERPL CALC-MCNC: 40 MG/DL (ref 1–30)

## 2025-06-09 PROCEDURE — 84439 ASSAY OF FREE THYROXINE: CPT

## 2025-06-09 PROCEDURE — 84443 ASSAY THYROID STIM HORMONE: CPT

## 2025-06-09 PROCEDURE — 80061 LIPID PANEL: CPT

## 2025-06-12 ENCOUNTER — OFFICE VISIT (OUTPATIENT)
Dept: INTERNAL MEDICINE CLINIC | Age: 89
End: 2025-06-12
Payer: MEDICARE

## 2025-06-12 VITALS
WEIGHT: 220.2 LBS | HEART RATE: 69 BPM | HEIGHT: 61 IN | SYSTOLIC BLOOD PRESSURE: 126 MMHG | DIASTOLIC BLOOD PRESSURE: 78 MMHG | BODY MASS INDEX: 41.57 KG/M2 | OXYGEN SATURATION: 97 %

## 2025-06-12 DIAGNOSIS — E55.9 VITAMIN D DEFICIENCY: ICD-10-CM

## 2025-06-12 DIAGNOSIS — Z00.00 MEDICARE ANNUAL WELLNESS VISIT, SUBSEQUENT: Primary | ICD-10-CM

## 2025-06-12 DIAGNOSIS — Z71.89 ACP (ADVANCE CARE PLANNING): ICD-10-CM

## 2025-06-12 PROCEDURE — 1159F MED LIST DOCD IN RCRD: CPT | Performed by: INTERNAL MEDICINE

## 2025-06-12 PROCEDURE — G0439 PPPS, SUBSEQ VISIT: HCPCS | Performed by: INTERNAL MEDICINE

## 2025-06-12 PROCEDURE — 1123F ACP DISCUSS/DSCN MKR DOCD: CPT | Performed by: INTERNAL MEDICINE

## 2025-06-12 RX ORDER — ZOSTER VACCINE RECOMBINANT, ADJUVANTED 50 MCG/0.5
0.5 KIT INTRAMUSCULAR SEE ADMIN INSTRUCTIONS
Qty: 0.5 ML | Refills: 0 | Status: SHIPPED | OUTPATIENT
Start: 2025-06-12 | End: 2025-12-09

## 2025-06-12 RX ORDER — LIDOCAINE 4 G/G
1 PATCH TOPICAL DAILY
Qty: 30 PATCH | Refills: 0 | Status: SHIPPED | OUTPATIENT
Start: 2025-06-12 | End: 2025-07-12

## 2025-06-12 RX ORDER — ERGOCALCIFEROL 1.25 MG/1
50000 CAPSULE, LIQUID FILLED ORAL WEEKLY
Qty: 12 CAPSULE | Refills: 0 | Status: SHIPPED | OUTPATIENT
Start: 2025-06-12

## 2025-06-12 ASSESSMENT — PATIENT HEALTH QUESTIONNAIRE - PHQ9
6. FEELING BAD ABOUT YOURSELF - OR THAT YOU ARE A FAILURE OR HAVE LET YOURSELF OR YOUR FAMILY DOWN: SEVERAL DAYS
8. MOVING OR SPEAKING SO SLOWLY THAT OTHER PEOPLE COULD HAVE NOTICED. OR THE OPPOSITE, BEING SO FIGETY OR RESTLESS THAT YOU HAVE BEEN MOVING AROUND A LOT MORE THAN USUAL: NOT AT ALL
4. FEELING TIRED OR HAVING LITTLE ENERGY: SEVERAL DAYS
9. THOUGHTS THAT YOU WOULD BE BETTER OFF DEAD, OR OF HURTING YOURSELF: NOT AT ALL
3. TROUBLE FALLING OR STAYING ASLEEP: NEARLY EVERY DAY
1. LITTLE INTEREST OR PLEASURE IN DOING THINGS: SEVERAL DAYS
SUM OF ALL RESPONSES TO PHQ QUESTIONS 1-9: 9
2. FEELING DOWN, DEPRESSED OR HOPELESS: NOT AT ALL
5. POOR APPETITE OR OVEREATING: NEARLY EVERY DAY
7. TROUBLE CONCENTRATING ON THINGS, SUCH AS READING THE NEWSPAPER OR WATCHING TELEVISION: NOT AT ALL
SUM OF ALL RESPONSES TO PHQ QUESTIONS 1-9: 9
SUM OF ALL RESPONSES TO PHQ QUESTIONS 1-9: 9
10. IF YOU CHECKED OFF ANY PROBLEMS, HOW DIFFICULT HAVE THESE PROBLEMS MADE IT FOR YOU TO DO YOUR WORK, TAKE CARE OF THINGS AT HOME, OR GET ALONG WITH OTHER PEOPLE: SOMEWHAT DIFFICULT
SUM OF ALL RESPONSES TO PHQ QUESTIONS 1-9: 9

## 2025-06-12 NOTE — PROGRESS NOTES
Medicare Annual Wellness Visit    Parvin Patel is here for Medicare AWV (Pt here today for Medicare Annual Wellness Visit. Pt given three words to remember: Kitchen, Village, Baby. Time given to draw was 11:15./)    Assessment & Plan   Medicare annual wellness visit, subsequent  Vitamin D deficiency  -     vitamin D (ERGOCALCIFEROL) 1.25 MG (77853 UT) CAPS capsule; Take 1 capsule by mouth once a week, Disp-12 capsule, R-0Normal  ACP (advance care planning)   A1c is lower  Pt lost 40 lb not eating much  Will cut back lantus to 20 units  And keep cutting back if am fasting sugaris less than 90  Risk of stoke with low sugar  Family educated       No follow-ups on file.     Subjective       Patient's complete Health Risk Assessment and screening values have been reviewed and are found in Flowsheets. The following problems were reviewed today and where indicated follow up appointments were made and/or referrals ordered.    Positive Risk Factor Screenings with Interventions:    Fall Risk:  Do you feel unsteady or are you worried about falling? : (!) yes  2 or more falls in past year?: (!) yes  Fall with injury in past year?: no     Interventions:    Reviewed medications, home hazards, visual acuity, and co-morbidities that can increase risk for falls    Cognitive:   Clock Drawing Test (CDT): Normal  Words recalled: 0 Words Recalled  Total Score: (!) 2  Total Score Interpretation: Abnormal Mini-Cog  Interventions:  Patient declines any further evaluation or treatment    Depression:  PHQ-2 Score: 1  PHQ-9 Total Score: 9  Total Score Interpretation: 5-9 = mild depression  Interventions:  Patient comments: mood is stable          General HRA Questions:  Select all that apply: (!) New or Increased Pain, Stress  Interventions - Pain:  Patient comments: pain chronic back  Interventions - Stress:  Patient declined any further interventions or treatment      Inactivity:  On average, how many days per week do you engage in

## 2025-06-12 NOTE — PATIENT INSTRUCTIONS
have ordered immunizations, labs, imaging, and/or referrals for you.  A list of these orders (if applicable) as well as your Preventive Care list are included within your After Visit Summary for your review.      Advance Care Planning     Advance Care Planning opens a door to talk about and write down your wishes before a sudden accident or illness.  Make your goals, values, and preferences known.     This puts you in the ’s seat and helps others know what matters most to you so they won’t have to guess.      Where can you learn more?    Go to https://www.[employer]/patient-resources/advance-care-planning   to learn how to:    Name someone you trust to make healthcare decisions for you, only if you can’t. (Healthcare Power of )    Document your wishes for care if you were seriously ill and not expected to recover or are approaching end of life. (Advance Directive or Living Will)    The same page can be found using the QR code below.

## 2025-06-13 ENCOUNTER — TELEPHONE (OUTPATIENT)
Dept: INTERNAL MEDICINE CLINIC | Age: 89
End: 2025-06-13

## 2025-06-13 NOTE — TELEPHONE ENCOUNTER
PA has been submitted for Lidocaine Pain Relief 4% patches   Insurance BCBS within Cover My Meds  Waiting on response back

## 2025-06-16 DIAGNOSIS — E11.8 TYPE 2 DIABETES MELLITUS WITH COMPLICATION, WITHOUT LONG-TERM CURRENT USE OF INSULIN (HCC): ICD-10-CM

## 2025-06-16 DIAGNOSIS — E03.9 HYPOTHYROIDISM, UNSPECIFIED TYPE: ICD-10-CM

## 2025-06-16 RX ORDER — LEVOTHYROXINE SODIUM 150 UG/1
150 TABLET ORAL DAILY
Qty: 30 TABLET | Refills: 1 | Status: SHIPPED | OUTPATIENT
Start: 2025-06-16

## 2025-06-16 RX ORDER — SITAGLIPTIN 100 MG/1
100 TABLET, FILM COATED ORAL DAILY
Qty: 30 TABLET | Refills: 1 | Status: SHIPPED | OUTPATIENT
Start: 2025-06-16

## 2025-06-16 NOTE — TELEPHONE ENCOUNTER
Can you please give a Dx code for the requested medication. Did not see anything in chart. Dx required for PA.

## 2025-06-17 DIAGNOSIS — M54.6 THORACIC BACK PAIN, UNSPECIFIED BACK PAIN LATERALITY, UNSPECIFIED CHRONICITY: ICD-10-CM

## 2025-06-17 DIAGNOSIS — R07.81 RIB PAIN ON RIGHT SIDE: Primary | ICD-10-CM

## 2025-06-17 RX ORDER — LIDOCAINE 4 G/G
1 PATCH TOPICAL DAILY
Qty: 30 PATCH | Refills: 3 | Status: SHIPPED | OUTPATIENT
Start: 2025-06-17 | End: 2025-07-17

## 2025-06-18 NOTE — TELEPHONE ENCOUNTER
Received denial letter for patients medication Lidocaine 4% patch, due to it being an OTC Medication.     LM informing patient of insurance decision on PA.     Scanned into chart.

## 2025-07-08 RX ORDER — FERROUS SULFATE 325(65) MG
1 TABLET ORAL 2 TIMES DAILY
Qty: 60 TABLET | Refills: 1 | Status: SHIPPED | OUTPATIENT
Start: 2025-07-08

## 2025-07-11 ENCOUNTER — TELEPHONE (OUTPATIENT)
Dept: INTERNAL MEDICINE CLINIC | Age: 89
End: 2025-07-11

## 2025-08-05 DIAGNOSIS — E03.9 HYPOTHYROIDISM, UNSPECIFIED TYPE: ICD-10-CM

## 2025-08-05 DIAGNOSIS — E11.8 TYPE 2 DIABETES MELLITUS WITH COMPLICATION, WITHOUT LONG-TERM CURRENT USE OF INSULIN (HCC): ICD-10-CM

## 2025-08-06 RX ORDER — LEVOTHYROXINE SODIUM 150 UG/1
150 TABLET ORAL DAILY
Qty: 30 TABLET | Refills: 1 | Status: SHIPPED | OUTPATIENT
Start: 2025-08-06

## 2025-08-06 RX ORDER — SITAGLIPTIN 100 MG/1
100 TABLET, FILM COATED ORAL DAILY
Qty: 30 TABLET | Refills: 1 | Status: SHIPPED | OUTPATIENT
Start: 2025-08-06

## 2025-09-03 RX ORDER — FERROUS SULFATE 325(65) MG
1 TABLET ORAL 2 TIMES DAILY
Qty: 60 TABLET | Refills: 1 | Status: SHIPPED | OUTPATIENT
Start: 2025-09-03

## (undated) DEVICE — CLIPPING DEVICE: Brand: RESOLUTION CLIP

## (undated) DEVICE — BIPOLAR ELECTROHEMOSTASIS CATHETER: Brand: INJECTION GOLD PROBE